# Patient Record
Sex: MALE | Race: BLACK OR AFRICAN AMERICAN | Employment: OTHER | URBAN - METROPOLITAN AREA
[De-identification: names, ages, dates, MRNs, and addresses within clinical notes are randomized per-mention and may not be internally consistent; named-entity substitution may affect disease eponyms.]

---

## 2017-01-26 ENCOUNTER — HOSPITAL ENCOUNTER (OUTPATIENT)
Dept: LAB | Age: 77
Discharge: HOME | End: 2017-01-26
Attending: PHYSICIAN ASSISTANT
Payer: MEDICARE

## 2017-01-26 LAB
ALBUMIN SERPL BCP-MCNC: 4.2 G/DL (ref 3.2–4.6)
ALBUMIN/GLOB SERPL: 1 {RATIO} (ref 1.2–3.5)
ALP SERPL-CCNC: 123 U/L (ref 50–136)
ALT SERPL-CCNC: 21 U/L (ref 12–65)
ANION GAP BLD CALC-SCNC: 9 MMOL/L (ref 7–16)
AST SERPL W P-5'-P-CCNC: 16 U/L (ref 15–37)
BASOPHILS # BLD AUTO: 0 K/UL (ref 0–0.2)
BASOPHILS # BLD: 0 % (ref 0–2)
BILIRUB SERPL-MCNC: 0.2 MG/DL (ref 0.2–1.1)
BUN SERPL-MCNC: 28 MG/DL (ref 8–23)
CALCIUM SERPL-MCNC: 9 MG/DL (ref 8.3–10.4)
CHLORIDE SERPL-SCNC: 102 MMOL/L (ref 98–107)
CHOLEST SERPL-MCNC: 173 MG/DL
CO2 SERPL-SCNC: 31 MMOL/L (ref 21–32)
CREAT SERPL-MCNC: 1.65 MG/DL (ref 0.8–1.5)
DIFFERENTIAL METHOD BLD: ABNORMAL
EOSINOPHIL # BLD: 0.3 K/UL (ref 0–0.8)
EOSINOPHIL NFR BLD: 4 % (ref 0.5–7.8)
ERYTHROCYTE [DISTWIDTH] IN BLOOD BY AUTOMATED COUNT: 14 % (ref 11.9–14.6)
GLOBULIN SER CALC-MCNC: 4.1 G/DL (ref 2.3–3.5)
GLUCOSE SERPL-MCNC: 168 MG/DL (ref 65–100)
HCT VFR BLD AUTO: 37.7 % (ref 41.1–50.3)
HDLC SERPL-MCNC: 49 MG/DL (ref 40–60)
HDLC SERPL: 3.5 {RATIO}
HGB BLD-MCNC: 11.8 G/DL (ref 13.6–17.2)
IMM GRANULOCYTES # BLD: 0 K/UL (ref 0–0.5)
IMM GRANULOCYTES NFR BLD AUTO: 0.2 % (ref 0–5)
LDLC SERPL CALC-MCNC: 100.4 MG/DL
LIPID PROFILE,FLP: ABNORMAL
LYMPHOCYTES # BLD AUTO: 45 % (ref 13–44)
LYMPHOCYTES # BLD: 2.7 K/UL (ref 0.5–4.6)
MCH RBC QN AUTO: 28.8 PG (ref 26.1–32.9)
MCHC RBC AUTO-ENTMCNC: 31.3 G/DL (ref 31.4–35)
MCV RBC AUTO: 92 FL (ref 79.6–97.8)
MONOCYTES # BLD: 0.6 K/UL (ref 0.1–1.3)
MONOCYTES NFR BLD AUTO: 10 % (ref 4–12)
NEUTS SEG # BLD: 2.4 K/UL (ref 1.7–8.2)
NEUTS SEG NFR BLD AUTO: 41 % (ref 43–78)
PLATELET # BLD AUTO: 211 K/UL (ref 150–450)
PMV BLD AUTO: 10.3 FL (ref 10.8–14.1)
POTASSIUM SERPL-SCNC: 4.4 MMOL/L (ref 3.5–5.1)
PROT SERPL-MCNC: 8.3 G/DL (ref 6.3–8.2)
PSA SERPL-MCNC: 1.2 NG/ML
RBC # BLD AUTO: 4.1 M/UL (ref 4.23–5.67)
SODIUM SERPL-SCNC: 142 MMOL/L (ref 136–145)
TRIGL SERPL-MCNC: 118 MG/DL (ref 35–150)
VLDLC SERPL CALC-MCNC: 23.6 MG/DL (ref 7–27)
WBC # BLD AUTO: 6 K/UL (ref 4.3–11.1)

## 2017-01-26 PROCEDURE — 80061 LIPID PANEL: CPT | Performed by: PHYSICIAN ASSISTANT

## 2017-01-26 PROCEDURE — 36415 COLL VENOUS BLD VENIPUNCTURE: CPT | Performed by: PHYSICIAN ASSISTANT

## 2017-01-26 PROCEDURE — 85025 COMPLETE CBC W/AUTO DIFF WBC: CPT | Performed by: PHYSICIAN ASSISTANT

## 2017-01-26 PROCEDURE — 80053 COMPREHEN METABOLIC PANEL: CPT | Performed by: PHYSICIAN ASSISTANT

## 2017-01-26 PROCEDURE — 84153 ASSAY OF PSA TOTAL: CPT | Performed by: PHYSICIAN ASSISTANT

## 2017-01-27 NOTE — PROGRESS NOTES
PSA is within normal range - no previous to use for comparison. Blood sugar levels are within a reasonable range for him. Kidney function is somewhat improved this time. Protein level is marginally high - need to discuss and monitor. Cholesterol is not to goal of < 70 - will need to consider change in treatment regimen to accomplish this given his multiple risk factors. Anemia is somewhat improved this time as well. Results will be discussed with patient during upcoming appt on 2/21/17.

## 2017-03-20 PROBLEM — E11.9 TYPE 2 DIABETES MELLITUS WITHOUT COMPLICATION, WITHOUT LONG-TERM CURRENT USE OF INSULIN (HCC): Status: ACTIVE | Noted: 2017-03-20

## 2017-03-20 PROBLEM — G47.30 SLEEP APNEA: Status: ACTIVE | Noted: 2017-03-20

## 2017-03-20 PROBLEM — I50.32 DIASTOLIC CHF, CHRONIC (HCC): Status: ACTIVE | Noted: 2017-03-20

## 2017-03-24 ENCOUNTER — TELEPHONE (OUTPATIENT)
Dept: CARDIAC REHAB | Age: 77
End: 2017-03-24

## 2017-03-24 NOTE — TELEPHONE ENCOUNTER
For cardiac rehab referral for CHF, we need to know:   1) Is it chronic? 2) EF = ?  (must be 35% or less)  3) NYHA class  = ? (must be 2, 3, or 4)    If you could please, send the above information for this patient. Thank you for you assistance.   Edith Kennedy

## 2017-06-12 ENCOUNTER — HOSPITAL ENCOUNTER (EMERGENCY)
Age: 77
Discharge: HOME | End: 2017-06-12
Attending: EMERGENCY MEDICINE
Payer: MEDICARE

## 2017-06-12 ENCOUNTER — APPOINTMENT (OUTPATIENT)
Dept: GENERAL RADIOLOGY | Age: 77
End: 2017-06-12
Attending: EMERGENCY MEDICINE
Payer: MEDICARE

## 2017-06-12 VITALS
RESPIRATION RATE: 19 BRPM | TEMPERATURE: 97.3 F | OXYGEN SATURATION: 97 % | HEART RATE: 73 BPM | HEIGHT: 67 IN | BODY MASS INDEX: 43.16 KG/M2 | DIASTOLIC BLOOD PRESSURE: 61 MMHG | WEIGHT: 275 LBS | SYSTOLIC BLOOD PRESSURE: 117 MMHG

## 2017-06-12 DIAGNOSIS — K29.00 ACUTE GASTRITIS WITHOUT HEMORRHAGE, UNSPECIFIED GASTRITIS TYPE: Primary | ICD-10-CM

## 2017-06-12 DIAGNOSIS — K59.00 CONSTIPATION, UNSPECIFIED CONSTIPATION TYPE: ICD-10-CM

## 2017-06-12 LAB
ALBUMIN SERPL BCP-MCNC: 3.9 G/DL (ref 3.2–4.6)
ALBUMIN/GLOB SERPL: 0.8 {RATIO} (ref 1.2–3.5)
ALP SERPL-CCNC: 115 U/L (ref 50–136)
ALT SERPL-CCNC: 26 U/L (ref 12–65)
ANION GAP BLD CALC-SCNC: 6 MMOL/L (ref 7–16)
AST SERPL W P-5'-P-CCNC: 39 U/L (ref 15–37)
ATRIAL RATE: 133 BPM
BASOPHILS # BLD AUTO: 0 K/UL (ref 0–0.2)
BASOPHILS # BLD: 0 % (ref 0–2)
BILIRUB SERPL-MCNC: 0.5 MG/DL (ref 0.2–1.1)
BNP SERPL-MCNC: 4 PG/ML
BUN SERPL-MCNC: 28 MG/DL (ref 8–23)
CALCIUM SERPL-MCNC: 8.7 MG/DL (ref 8.3–10.4)
CALCULATED R AXIS, ECG10: -25 DEGREES
CALCULATED T AXIS, ECG11: -10 DEGREES
CHLORIDE SERPL-SCNC: 100 MMOL/L (ref 98–107)
CO2 SERPL-SCNC: 30 MMOL/L (ref 21–32)
CREAT SERPL-MCNC: 1.71 MG/DL (ref 0.8–1.5)
DIAGNOSIS, 93000: NORMAL
DIFFERENTIAL METHOD BLD: ABNORMAL
EOSINOPHIL # BLD: 0.2 K/UL (ref 0–0.8)
EOSINOPHIL NFR BLD: 2 % (ref 0.5–7.8)
ERYTHROCYTE [DISTWIDTH] IN BLOOD BY AUTOMATED COUNT: 15.8 % (ref 11.9–14.6)
GLOBULIN SER CALC-MCNC: 4.7 G/DL (ref 2.3–3.5)
GLUCOSE SERPL-MCNC: 119 MG/DL (ref 65–100)
HCT VFR BLD AUTO: 36.9 % (ref 41.1–50.3)
HGB BLD-MCNC: 12 G/DL (ref 13.6–17.2)
IMM GRANULOCYTES # BLD: 0 K/UL (ref 0–0.5)
IMM GRANULOCYTES NFR BLD AUTO: 0.4 % (ref 0–5)
LIPASE SERPL-CCNC: 110 U/L (ref 73–393)
LYMPHOCYTES # BLD AUTO: 38 % (ref 13–44)
LYMPHOCYTES # BLD: 3.1 K/UL (ref 0.5–4.6)
MCH RBC QN AUTO: 29.1 PG (ref 26.1–32.9)
MCHC RBC AUTO-ENTMCNC: 32.5 G/DL (ref 31.4–35)
MCV RBC AUTO: 89.6 FL (ref 79.6–97.8)
MONOCYTES # BLD: 0.7 K/UL (ref 0.1–1.3)
MONOCYTES NFR BLD AUTO: 9 % (ref 4–12)
NEUTS SEG # BLD: 4.2 K/UL (ref 1.7–8.2)
NEUTS SEG NFR BLD AUTO: 51 % (ref 43–78)
PLATELET # BLD AUTO: 269 K/UL (ref 150–450)
PMV BLD AUTO: 11.6 FL (ref 10.8–14.1)
POTASSIUM SERPL-SCNC: 5.6 MMOL/L (ref 3.5–5.1)
PROT SERPL-MCNC: 8.6 G/DL (ref 6.3–8.2)
Q-T INTERVAL, ECG07: 296 MS
QRS DURATION, ECG06: 86 MS
QTC CALCULATION (BEZET), ECG08: 358 MS
RBC # BLD AUTO: 4.12 M/UL (ref 4.23–5.67)
SODIUM SERPL-SCNC: 136 MMOL/L (ref 136–145)
TROPONIN I BLD-MCNC: 0 NG/ML (ref 0–0.08)
VENTRICULAR RATE, ECG03: 88 BPM
WBC # BLD AUTO: 8.2 K/UL (ref 4.3–11.1)

## 2017-06-12 PROCEDURE — 83880 ASSAY OF NATRIURETIC PEPTIDE: CPT | Performed by: EMERGENCY MEDICINE

## 2017-06-12 PROCEDURE — 85025 COMPLETE CBC W/AUTO DIFF WBC: CPT | Performed by: EMERGENCY MEDICINE

## 2017-06-12 PROCEDURE — 99285 EMERGENCY DEPT VISIT HI MDM: CPT | Performed by: EMERGENCY MEDICINE

## 2017-06-12 PROCEDURE — 80053 COMPREHEN METABOLIC PANEL: CPT | Performed by: EMERGENCY MEDICINE

## 2017-06-12 PROCEDURE — 71010 XR CHEST PORT: CPT

## 2017-06-12 PROCEDURE — 74011250637 HC RX REV CODE- 250/637: Performed by: EMERGENCY MEDICINE

## 2017-06-12 PROCEDURE — 84484 ASSAY OF TROPONIN QUANT: CPT

## 2017-06-12 PROCEDURE — 83690 ASSAY OF LIPASE: CPT | Performed by: EMERGENCY MEDICINE

## 2017-06-12 PROCEDURE — 93005 ELECTROCARDIOGRAM TRACING: CPT | Performed by: EMERGENCY MEDICINE

## 2017-06-12 RX ORDER — PANTOPRAZOLE SODIUM 40 MG/1
40 TABLET, DELAYED RELEASE ORAL DAILY
Qty: 20 TAB | Refills: 0 | Status: SHIPPED | OUTPATIENT
Start: 2017-06-12 | End: 2017-07-02

## 2017-06-12 RX ORDER — PANTOPRAZOLE SODIUM 40 MG/1
40 TABLET, DELAYED RELEASE ORAL
Status: COMPLETED | OUTPATIENT
Start: 2017-06-12 | End: 2017-06-12

## 2017-06-12 RX ORDER — GUAIFENESIN 100 MG/5ML
324 LIQUID (ML) ORAL
Status: COMPLETED | OUTPATIENT
Start: 2017-06-12 | End: 2017-06-12

## 2017-06-12 RX ADMIN — PANTOPRAZOLE SODIUM 40 MG: 40 TABLET, DELAYED RELEASE ORAL at 13:52

## 2017-06-12 RX ADMIN — ASPIRIN 81 MG 324 MG: 81 TABLET ORAL at 13:43

## 2017-06-12 NOTE — ED TRIAGE NOTES
Patient complains of abdominal distention for 1 month, patient feels like his esophagus is narrowing. Patient complains of reflux.

## 2017-06-12 NOTE — ED PROVIDER NOTES
HPI Comments: Patient with history of GERD and constipation. Also with diabetes congestive heart failure. Presents with epigastric pain and burning in nature radiating up into his chest that started this morning and lasted for about 30 minutes. Some nausea and diaphoresis with it. Patient feels better now. Patient is a 78 y.o. male presenting with abdominal pain. The history is provided by the patient. No  was used. Abdominal Pain    This is a new problem. The current episode started 1 to 2 hours ago. The problem has been resolved. The pain is associated with an unknown factor. The pain is located in the epigastric region. The quality of the pain is burning. The pain is moderate. Associated symptoms include nausea. Pertinent negatives include no fever, no diarrhea, no melena, no vomiting, no constipation, no dysuria, no hematuria, no headaches, no chest pain and no back pain. Nothing worsens the pain. The pain is relieved by nothing. His past medical history is significant for GERD and DM.         Past Medical History:   Diagnosis Date    Arthritis     Asthma      prn inhalers    Cervical stenosis of spine     Chronic pain     Constipation     Degenerative disc disease, lumbar     Diabetes mellitus type II     uncontrolled-insulin and oral med. highest ;lowest 57; this am 110    Diverticulosis of colon June 2010    GERD (gastroesophageal reflux disease)     Heart failure (HCC)     Hx of colonic polyps     Hyperlipidemia LDL goal < 70     Hypertension x 30 yrs    some what controlled with meds    Obstructive sleep apnea     Non-Compliant with CPAP    Other ill-defined conditions     obese    Other ill-defined conditions     spur on spine    Pulmonary embolism ([address] Mar 2014    Bilateral - Completed 6 months on Xarelto    Shortness of breath dyspnea     Thromboembolus ([address]     right leg    Thyroid disease     Type 2 diabetes, uncontrolled, with neuropathy ([address] 1/22/2015    Unspecified sleep apnea        Past Surgical History:   Procedure Laterality Date    COLONOSCOPY  7/29/2010    Diverticulosis & Colon/Rectal Polyps    EGD  5/9/2011         HX HERNIA REPAIR      bilateral    HX KNEE ARTHROSCOPY      1991    HX KNEE REPLACEMENT  2006    right    HX ORTHOPAEDIC      bunionectomy    SINUS SURGERY PROC UNLISTED      1973-74    TOTAL KNEE ARTHROPLASTY Right     2006         Family History:   Problem Relation Age of Onset   [facility] Stroke Mother     Diabetes Mother     Heart Disease Mother     Diabetes Maternal Grandmother     Stroke Father     Diabetes Father     Diabetes Paternal Aunt     Cancer Paternal Aunt     Diabetes Paternal Uncle     Cancer Paternal Uncle        Social History     Social History    Marital status: SINGLE     Spouse name: N/A    Number of children: N/A    Years of education: N/A     Occupational History    Not on file. Social History Main Topics    Smoking status: Never Smoker    Smokeless tobacco: Never Used    Alcohol use No    Drug use: No    Sexual activity: Not on file     Other Topics Concern    Not on file     Social History Narrative         ALLERGIES: Enalaprilat and Vasotec [enalapril maleate]    Review of Systems   Constitutional: Negative for chills and fever. HENT: Negative for rhinorrhea and sore throat. Eyes: Negative for pain and redness. Respiratory: Negative for chest tightness, shortness of breath and wheezing. Cardiovascular: Negative for chest pain and leg swelling. Gastrointestinal: Positive for abdominal pain and nausea. Negative for constipation, diarrhea, melena and vomiting. Genitourinary: Negative for dysuria and hematuria. Musculoskeletal: Negative for back pain, gait problem, neck pain and neck stiffness. Skin: Negative for color change and rash. Neurological: Negative for weakness, numbness and headaches.        Vitals:    06/12/17 1238   BP: 165/49   Resp: 18   Temp: 97.3 °F (36.3 °C)   SpO2: 99%   Weight: 124.7 kg (275 lb)   Height: 5' 7\" (1.702 m)            Physical Exam   Constitutional: He is oriented to person, place, and time. He appears well-developed and well-nourished. No distress. HENT:   Head: Normocephalic and atraumatic. Neck: Normal range of motion. Neck supple. Cardiovascular: Normal rate and regular rhythm. No murmur heard. Pulmonary/Chest: Effort normal and breath sounds normal. He has no wheezes. Abdominal: Soft. Bowel sounds are normal. There is tenderness (epigastric). There is no rebound and no guarding. Musculoskeletal: Normal range of motion. He exhibits no edema. Neurological: He is alert and oriented to person, place, and time. Skin: Skin is warm and dry. Nursing note and vitals reviewed. MDM  Number of Diagnoses or Management Options  Diagnosis management comments: Pt feels better wants to eat. Will discharge with GERD medicine and he is going to take mag citrate he has at home for constipation. Amount and/or Complexity of Data Reviewed  Clinical lab tests: ordered and reviewed  Tests in the radiology section of CPT®: ordered and reviewed  Tests in the medicine section of CPT®: ordered and reviewed    Patient Progress  Patient progress: stable    ED Course       Procedures      EKG: normal sinus rhythm, nonspecific ST and T waves changes. Rate 88. Bigeminy. XR CHEST PORT (Final result) Result time: 06/12/17 13:14:24     Final result by Faith Manzo MD (06/12/17 13:14:24)     Impression:     IMPRESSION: No acute disease.       Narrative:     Chest portable    CLINICAL INDICATION: Acute moderate dyspnea    COMPARISON: 8/24/2016    TECHNIQUE: single AP portable view chest at 12:48 PM upright     FINDINGS: Domitila Wheat is no evidence of consolidation, pneumothorax, pleural effusion  or pulmonary edema. The mediastinal and hilar contours are not significantly  changed, given technique.  There is some artifact overlying the lung bases due to  external skin folds and monitoring wires. The patient is slightly rotated to the  left. Results Include:    Recent Results (from the past 24 hour(s))   EKG, 12 LEAD, INITIAL    Collection Time: 06/12/17 12:48 PM   Result Value Ref Range    Ventricular Rate 88 BPM    Atrial Rate 133 BPM    QRS Duration 86 ms    Q-T Interval 296 ms    QTC Calculation (Bezet) 358 ms    Calculated R Axis -25 degrees    Calculated T Axis -10 degrees    Diagnosis       !! AGE AND GENDER SPECIFIC ECG ANALYSIS !! Atrial fibrillation with a competing junctional pacemaker  Nonspecific ST and T wave abnormality , probably digitalis effect  Abnormal ECG  When compared with ECG of 24-AUG-2016 00:31,  Atrial fibrillation has replaced Sinus rhythm  Vent. rate has increased BY  30 BPM     CBC WITH AUTOMATED DIFF    Collection Time: 06/12/17 12:59 PM   Result Value Ref Range    WBC 8.2 4.3 - 11.1 K/uL    RBC 4.12 (L) 4.23 - 5.67 M/uL    HGB 12.0 (L) 13.6 - 17.2 g/dL    HCT 36.9 (L) 41.1 - 50.3 %    MCV 89.6 79.6 - 97.8 FL    MCH 29.1 26.1 - 32.9 PG    MCHC 32.5 31.4 - 35.0 g/dL    RDW 15.8 (H) 11.9 - 14.6 %    PLATELET 602 590 - 585 K/uL    MPV 11.6 10.8 - 14.1 FL    DF AUTOMATED      NEUTROPHILS 51 43 - 78 %    LYMPHOCYTES 38 13 - 44 %    MONOCYTES 9 4.0 - 12.0 %    EOSINOPHILS 2 0.5 - 7.8 %    BASOPHILS 0 0.0 - 2.0 %    IMMATURE GRANULOCYTES 0.4 0.0 - 5.0 %    ABS. NEUTROPHILS 4.2 1.7 - 8.2 K/UL    ABS. LYMPHOCYTES 3.1 0.5 - 4.6 K/UL    ABS. MONOCYTES 0.7 0.1 - 1.3 K/UL    ABS. EOSINOPHILS 0.2 0.0 - 0.8 K/UL    ABS. BASOPHILS 0.0 0.0 - 0.2 K/UL    ABS. IMM.  GRANS. 0.0 0.0 - 0.5 K/UL   METABOLIC PANEL, COMPREHENSIVE    Collection Time: 06/12/17 12:59 PM   Result Value Ref Range    Sodium 136 136 - 145 mmol/L    Potassium 5.6 (H) 3.5 - 5.1 mmol/L    Chloride 100 98 - 107 mmol/L    CO2 30 21 - 32 mmol/L    Anion gap 6 (L) 7 - 16 mmol/L    Glucose 119 (H) 65 - 100 mg/dL    BUN 28 (H) 8 - 23 MG/DL    Creatinine 1.71 (H) 0.8 - 1.5 MG/DL    GFR est AA 51 (L) >60 ml/min/1.73m2    GFR est non-AA 42 (L) >60 ml/min/1.73m2    Calcium 8.7 8.3 - 10.4 MG/DL    Bilirubin, total 0.5 0.2 - 1.1 MG/DL    ALT (SGPT) 26 12 - 65 U/L    AST (SGOT) 39 (H) 15 - 37 U/L    Alk.  phosphatase 115 50 - 136 U/L    Protein, total 8.6 (H) 6.3 - 8.2 g/dL    Albumin 3.9 3.2 - 4.6 g/dL    Globulin 4.7 (H) 2.3 - 3.5 g/dL    A-G Ratio 0.8 (L) 1.2 - 3.5     BNP    Collection Time: 06/12/17 12:59 PM   Result Value Ref Range    BNP 4 pg/mL   POC TROPONIN-I    Collection Time: 06/12/17  1:01 PM   Result Value Ref Range    Troponin-I (POC) 0 0.0 - 0.08 ng/ml   LIPASE    Collection Time: 06/12/17  1:06 PM   Result Value Ref Range    Lipase 110 73 - 393 U/L

## 2017-06-12 NOTE — DISCHARGE INSTRUCTIONS
Constipation: Care Instructions  Your Care Instructions  Constipation means that you have a hard time passing stools (bowel movements). People pass stools from 3 times a day to once every 3 days. What is normal for you may be different. Constipation may occur with pain in the rectum and cramping. The pain may get worse when you try to pass stools. Sometimes there are small amounts of bright red blood on toilet paper or the surface of stools. This is because of enlarged veins near the rectum (hemorrhoids). A few changes in your diet and lifestyle may help you avoid ongoing constipation. Your doctor may also prescribe medicine to help loosen your stool. Some medicines can cause constipation. These include pain medicines and antidepressants. Tell your doctor about all the medicines you take. Your doctor may want to make a medicine change to ease your symptoms. Follow-up care is a key part of your treatment and safety. Be sure to make and go to all appointments, and call your doctor if you are having problems. It's also a good idea to know your test results and keep a list of the medicines you take. How can you care for yourself at home? · Drink plenty of fluids, enough so that your urine is light yellow or clear like water. If you have kidney, heart, or liver disease and have to limit fluids, talk with your doctor before you increase the amount of fluids you drink. · Include high-fiber foods in your diet each day. These include fruits, vegetables, beans, and whole grains. · Get at least 30 minutes of exercise on most days of the week. Walking is a good choice. You also may want to do other activities, such as running, swimming, cycling, or playing tennis or team sports. · Take a fiber supplement, such as Citrucel or Metamucil, every day. Read and follow all instructions on the label. · Schedule time each day for a bowel movement. A daily routine may help.  Take your time having your bowel movement. · Support your feet with a small step stool when you sit on the toilet. This helps flex your hips and places your pelvis in a squatting position. · Your doctor may recommend an over-the-counter laxative to relieve your constipation. Examples are Milk of Magnesia and MiraLax. Read and follow all instructions on the label. Do not use laxatives on a long-term basis. When should you call for help? Call your doctor now or seek immediate medical care if:  · You have new or worse belly pain. · You have new or worse nausea or vomiting. · You have blood in your stools. Watch closely for changes in your health, and be sure to contact your doctor if:  · Your constipation is getting worse. · You do not get better as expected. Where can you learn more? Go to http://jono-zuleima.info/. Enter 21 [phone] in the search box to learn more about \"Constipation: Care Instructions. \"  Current as of: May 27, 2016  Content Version: 11.2  © 2367-7158 [employer]. Care instructions adapted under license by [employer] (which disclaims liability or warranty for this information). If you have questions about a medical condition or this instruction, always ask your healthcare professional. [place] [address] any warranty or liability for your use of this information. Gastritis: Care Instructions  Your Care Instructions    Gastritis is a sore and upset stomach. It happens when something irritates the stomach lining. Many things can cause it. These include an infection such as the flu or something you ate or drank. Medicines or a sore on the lining of the stomach (ulcer) also can cause it. Your belly may bloat and ache. You may belch, vomit, and feel sick to your stomach. You should be able to relieve the problem by taking medicine. And it may help to change your diet. If gastritis lasts, your doctor may prescribe medicine.   Follow-up care is a key part of your treatment and safety. Be sure to make and go to all appointments, and call your doctor if you are having problems. It's also a good idea to know your test results and keep a list of the medicines you take. How can you care for yourself at home? · If your doctor prescribed antibiotics, take them as directed. Do not stop taking them just because you feel better. You need to take the full course of antibiotics. · Be safe with medicines. If your doctor prescribed medicine to decrease stomach acid, take it as directed. Call your doctor if you think you are having a problem with your medicine. · Do not take any other medicine, including over-the-counter pain relievers, without talking to your doctor first.  · If your doctor recommends over-the-counter medicine to reduce stomach acid, such as Pepcid AC, Prilosec, Tagamet HB, or Zantac 75, follow the directions on the label. · Drink plenty of fluids (enough so that your urine is light yellow or clear like water) to prevent dehydration. Choose water and other caffeine-free clear liquids. If you have kidney, heart, or liver disease and have to limit fluids, talk with your doctor before you increase the amount of fluids you drink. · Limit how much alcohol you drink. · Avoid coffee, tea, cola drinks, chocolate, and other foods with caffeine. They increase stomach acid. When should you call for help? Call 911 anytime you think you may need emergency care. For example, call if:  · You vomit blood or what looks like coffee grounds. · You pass maroon or very bloody stools. Call your doctor now or seek immediate medical care if:  · You start breathing fast and have not produced urine in the last 8 hours. · You cannot keep fluids down. Watch closely for changes in your health, and be sure to contact your doctor if:  · You do not get better as expected. Where can you learn more? Go to http://jono-zuleima.info/.   Enter [id number] in the search box to learn more about \"Gastritis: Care Instructions. \"  Current as of: August 9, 2016  Content Version: 11.2  © 2629-0423 [employer]. Care instructions adapted under license by [employer] (which disclaims liability or warranty for this information). If you have questions about a medical condition or this instruction, always ask your healthcare professional. [address] any warranty or liability for your use of this information.

## 2017-06-12 NOTE — ED NOTES
Pt given 1 dose of SL nitro. Pt c/o increased pain to head. Pt educated on Nitro with increased blood flow to coronary arteries and to head. Pt refused any more Nitro despite Carmine Xiomara. Pt said it is not helping his chest pain and headache. Dr. Deanne Kong notified. Orders for Morphine 4mg.

## 2017-06-13 ENCOUNTER — PATIENT OUTREACH (OUTPATIENT)
Dept: CASE MANAGEMENT | Age: 77
End: 2017-06-13

## 2017-06-13 NOTE — PROGRESS NOTES
This note will not be viewable in [address] [facility] OUTREACH #1  Initial outreach attempt unsuccessful. Left message for call back. Will attempt 2nd outreach within 24 hrs.

## 2017-06-14 ENCOUNTER — PATIENT OUTREACH (OUTPATIENT)
Dept: CASE MANAGEMENT | Age: 77
End: 2017-06-14

## 2017-06-14 NOTE — PROGRESS NOTES
After a long discussion with . Jocelyn Owen, it appears he has no transportation for the appointment with his PCP tomorrow morning and his usual transportation is his sister which she is not available to take him until Thursday this week. He is still having so many symptoms that he is afraid he will end up back in the emergency room. He states he cannot afford to pay anyone to take him so after I spoke with my director, we have decided to use [employer] to pick him up and take him back home so we can be sure he is getting a hospital follow up since he is still having ongoing problems. He agreed to this plan and I contacted Vern at [facility] and he agreed to pick him up and did confirm this with the patient as well. Our cost round trip is $30.00 and this will be billed over to our department and approved by [facility]. This note will not be viewable in [address]

## 2017-06-14 NOTE — PROGRESS NOTES
[facility] follow up with Mr. Jocelyn Owen following a recent ED visit for epigastric pain. He reports he is still having a good bit of epigastric pain though he says he has had the medication filled that the ED provided, he does not notice that it's helping. He says he is a little short of breath today. He reports he has not made an appointment with his PCP, has one for next week for an annual wellness check but feels he needs to be seen this week. He asked me to call Dr. Chris Whitfield office to see if I could get him one this week. He has a sister that sometimes takes him to his appointments, but she is not able to do this until Thursday which is the only day he has transportation. I did call Dr. Chris Whtifield office and spoke with [facility] at reception and she indicated they did have an opening tomorrow morning at 8:40a.m with Dr. Colin Donahue, this is the only available appointment for this week. I had her to schedule this and I called Mr. Jocelyn Owen back and he says the only way he could go would be if I could get transportation for him. He says he cannot afford to pay anyone, he says he uses a cane and can walk but has not driven in many years. I will see if I can get transportation arranged so that he can go to the doctor tomorrow. This note will not be viewable in [address]

## 2017-06-14 NOTE — PROGRESS NOTES
This note will not be viewable in [address] [facility] OUTREACH #2  Left message to call back. 2nd outreach attempt unsuccessful. Will schedule 3rd outreach attempt within 5 business days.

## 2017-06-23 ENCOUNTER — HOSPITAL ENCOUNTER (OUTPATIENT)
Dept: LAB | Age: 77
Discharge: HOME | End: 2017-06-23
Attending: PHYSICIAN ASSISTANT
Payer: MEDICARE

## 2017-06-23 LAB
ALBUMIN SERPL BCP-MCNC: 3.5 G/DL (ref 3.2–4.6)
ALBUMIN/GLOB SERPL: 0.8 {RATIO} (ref 1.2–3.5)
ALP SERPL-CCNC: 109 U/L (ref 50–136)
ALT SERPL-CCNC: 25 U/L (ref 12–65)
ANION GAP BLD CALC-SCNC: 8 MMOL/L (ref 7–16)
AST SERPL W P-5'-P-CCNC: 18 U/L (ref 15–37)
BILIRUB SERPL-MCNC: 0.3 MG/DL (ref 0.2–1.1)
BUN SERPL-MCNC: 22 MG/DL (ref 8–23)
CALCIUM SERPL-MCNC: 9.1 MG/DL (ref 8.3–10.4)
CHLORIDE SERPL-SCNC: 104 MMOL/L (ref 98–107)
CHOLEST SERPL-MCNC: 143 MG/DL
CO2 SERPL-SCNC: 30 MMOL/L (ref 21–32)
CREAT SERPL-MCNC: 1.56 MG/DL (ref 0.8–1.5)
GLOBULIN SER CALC-MCNC: 4.3 G/DL (ref 2.3–3.5)
GLUCOSE SERPL-MCNC: 72 MG/DL (ref 65–100)
HDLC SERPL-MCNC: 55 MG/DL (ref 40–60)
HDLC SERPL: 2.6 {RATIO}
LDLC SERPL CALC-MCNC: 73.8 MG/DL
LIPID PROFILE,FLP: NORMAL
POTASSIUM SERPL-SCNC: 5 MMOL/L (ref 3.5–5.1)
PROT SERPL-MCNC: 7.8 G/DL (ref 6.3–8.2)
SODIUM SERPL-SCNC: 142 MMOL/L (ref 136–145)
TRIGL SERPL-MCNC: 71 MG/DL (ref 35–150)
VLDLC SERPL CALC-MCNC: 14.2 MG/DL (ref 6–23)

## 2017-06-23 PROCEDURE — 80053 COMPREHEN METABOLIC PANEL: CPT | Performed by: PHYSICIAN ASSISTANT

## 2017-06-23 PROCEDURE — 36415 COLL VENOUS BLD VENIPUNCTURE: CPT | Performed by: PHYSICIAN ASSISTANT

## 2017-06-23 PROCEDURE — 80061 LIPID PANEL: CPT | Performed by: PHYSICIAN ASSISTANT

## 2017-06-26 NOTE — PROGRESS NOTES
Blood sugar on the low side this time. Kidney function is stable - mildly impaired. Liver function is normal.  Cholesterol is reasonably well controlled with current treatment plan. Results will be discussed with patient during upcoming appt.

## 2017-07-11 ENCOUNTER — PATIENT OUTREACH (OUTPATIENT)
Dept: CASE MANAGEMENT | Age: 77
End: 2017-07-11

## 2017-07-11 ENCOUNTER — HOSPITAL ENCOUNTER (OUTPATIENT)
Dept: LAB | Age: 77
Discharge: HOME | End: 2017-07-11
Attending: PHYSICIAN ASSISTANT
Payer: MEDICARE

## 2017-07-11 LAB
ALBUMIN SERPL BCP-MCNC: 3.6 G/DL (ref 3.2–4.6)
ALBUMIN/GLOB SERPL: 0.9 {RATIO} (ref 1.2–3.5)
ALP SERPL-CCNC: 103 U/L (ref 50–136)
ALT SERPL-CCNC: 20 U/L (ref 12–65)
ANION GAP BLD CALC-SCNC: 11 MMOL/L (ref 7–16)
AST SERPL W P-5'-P-CCNC: 15 U/L (ref 15–37)
BILIRUB SERPL-MCNC: 0.2 MG/DL (ref 0.2–1.1)
BUN SERPL-MCNC: 26 MG/DL (ref 8–23)
CALCIUM SERPL-MCNC: 8.6 MG/DL (ref 8.3–10.4)
CHLORIDE SERPL-SCNC: 104 MMOL/L (ref 98–107)
CHOLEST SERPL-MCNC: 132 MG/DL
CO2 SERPL-SCNC: 28 MMOL/L (ref 21–32)
CREAT SERPL-MCNC: 1.55 MG/DL (ref 0.8–1.5)
GLOBULIN SER CALC-MCNC: 4.1 G/DL (ref 2.3–3.5)
GLUCOSE SERPL-MCNC: 115 MG/DL (ref 65–100)
HDLC SERPL-MCNC: 43 MG/DL (ref 40–60)
HDLC SERPL: 3.1 {RATIO}
LDLC SERPL CALC-MCNC: 74.6 MG/DL
LIPID PROFILE,FLP: NORMAL
POTASSIUM SERPL-SCNC: 4.1 MMOL/L (ref 3.5–5.1)
PROT SERPL-MCNC: 7.7 G/DL (ref 6.3–8.2)
SODIUM SERPL-SCNC: 143 MMOL/L (ref 136–145)
TRIGL SERPL-MCNC: 72 MG/DL (ref 35–150)
VLDLC SERPL CALC-MCNC: 14.4 MG/DL (ref 6–23)

## 2017-07-11 PROCEDURE — 36415 COLL VENOUS BLD VENIPUNCTURE: CPT | Performed by: PHYSICIAN ASSISTANT

## 2017-07-11 PROCEDURE — 80061 LIPID PANEL: CPT | Performed by: PHYSICIAN ASSISTANT

## 2017-07-11 PROCEDURE — 80053 COMPREHEN METABOLIC PANEL: CPT | Performed by: PHYSICIAN ASSISTANT

## 2017-07-11 NOTE — PROGRESS NOTES
[facility] follow up call to Mr. Xuan Oropeza, no answer, left a message. May consider referral over to [facility] for diabetes education due to elevated A1C. This note will not be viewable in [address]

## 2017-07-11 NOTE — PROGRESS NOTES
Unsure why he had labs done today? He was just here for his diabetes f/u and had labs done prior to that visit. His insurance is unlikely to pay for the labs drawn today since they were just done. The labs I ordered are not due until Sept prior to his next office visit.

## 2017-07-12 NOTE — PROGRESS NOTES
Pt notified and verbalized understanding. He states he may have looked at it wrong and states he has had some issues with remembering things. He ask that we send him a new set of labs and highlight when they should be done.

## 2017-07-18 PROBLEM — E11.9 TYPE 2 DIABETES MELLITUS WITHOUT COMPLICATION, WITHOUT LONG-TERM CURRENT USE OF INSULIN (HCC): Status: RESOLVED | Noted: 2017-03-20 | Resolved: 2017-07-18

## 2017-07-18 PROBLEM — G47.30 SLEEP APNEA: Status: RESOLVED | Noted: 2017-03-20 | Resolved: 2017-07-18

## 2017-07-18 PROBLEM — Z98.890 H/O COLONOSCOPY: Chronic | Status: ACTIVE | Noted: 2017-07-18

## 2017-07-18 PROBLEM — E11.40 DIABETIC NEUROPATHY ASSOCIATED WITH TYPE 2 DIABETES MELLITUS (HCC): Chronic | Status: ACTIVE | Noted: 2017-07-18

## 2017-07-18 PROBLEM — I50.32 DIASTOLIC CHF, CHRONIC (HCC): Chronic | Status: ACTIVE | Noted: 2017-03-20

## 2017-07-21 ENCOUNTER — HOSPITAL ENCOUNTER (OUTPATIENT)
Dept: ULTRASOUND IMAGING | Age: 77
Discharge: HOME | End: 2017-07-21
Attending: FAMILY MEDICINE
Payer: MEDICARE

## 2017-07-21 ENCOUNTER — HOSPITAL ENCOUNTER (OUTPATIENT)
Dept: MRI IMAGING | Age: 77
Discharge: HOME | End: 2017-07-21
Attending: FAMILY MEDICINE
Payer: MEDICARE

## 2017-07-21 DIAGNOSIS — G45.8 OTHER SPECIFIED TRANSIENT CEREBRAL ISCHEMIAS: ICD-10-CM

## 2017-07-21 PROCEDURE — 70551 MRI BRAIN STEM W/O DYE: CPT

## 2017-07-21 PROCEDURE — 93880 EXTRACRANIAL BILAT STUDY: CPT

## 2017-07-23 NOTE — PROGRESS NOTES
Please let patient know his brain studies show normal blood blood flow with no significant blockages in carotid or brain blood vessels. No evidence of an aneurysm and the MRI did not show any evidence of an old stroke. He did have evidence of Left maxillary sinusitis or a sinus infection and I have have sent in an antibiotic ( Augmentin ) to treat this so he can pick this up at his pharmacy.

## 2017-07-25 NOTE — PROGRESS NOTES
Pt advised and sts is there something else he can take that he is very agitated since his sister passed.

## 2017-08-07 ENCOUNTER — PATIENT OUTREACH (OUTPATIENT)
Dept: CASE MANAGEMENT | Age: 77
End: 2017-08-07

## 2017-08-07 NOTE — PROGRESS NOTES
[facility] - follow up call to Mr. Jewel Daniels to discuss his status with blood pressure and diabetes and he reports he has been doing ok, but still has some issues with high blood sugars. He does report some shortness of breath and wonders why Cardiology pushed his appointment from June back to September. I did tell him to call cardiology and let them know if he feels he needs to see them sooner. He states he will if he feels he has more of the shortness of breath or chest pain. He states he finished up Amoxicillan a couple days ago, he couldn't really tell me what this was given to him for, thinks maybe GI prescribed it. Also he was recently seen by sleep lab and stated he is supposed to have a follow up set with them again this month. Mostly he does have concern about his A1C being 10.8 and he is willing to let me have our diabetes educator contact him to schedule a home visit to discuss education. I will ask Kevin Braga, our diabetes educator on our team to contact him. This note will not be viewable in [address]

## 2017-08-11 ENCOUNTER — PATIENT OUTREACH (OUTPATIENT)
Dept: CASE MANAGEMENT | Age: 77
End: 2017-08-11

## 2017-08-14 ENCOUNTER — PATIENT OUTREACH (OUTPATIENT)
Dept: CASE MANAGEMENT | Age: 77
End: 2017-08-14

## 2017-08-14 NOTE — PROGRESS NOTES
Diabetes Education Initial Assessment    Date    8/11/17    Referral source? Referral reason?     DSME   Diabetes History  Type  Time since diagnosis  History of treatment  DSME in past?  Recent ED or IP related to DM   T2DM  1998  Several months no meds. Then Lantus insulin  Two years ago       Blood Glucose checks:  What is physician order? How often actually checked? When? Readings? Keep Log? Four times a day    Four time a day  Fasting, lunch, dinner, hs    yes   If not checking BG or not as often as ordered, What is the reason? Recognizes hypoglycemic/hyperglycemic symptoms? Comorbidities/Complications:         CHF, HTN, Pul HTN, MARLEEN, hyperlipidemia    Health Screening:  B/P date/result  B/P checked at home? A1C  date/result  Lipid panel date/result  Foot Exam date  Microalbumin date/result  Renal function date/results  Eye exam date   126/70 at office visit 8/2017    10.8 on 6/2017  132/74/43/72 on 7/2017 1/2017  0 on 6/2017  1.55/26/47 on 7/2017     Health Screening Needs:  A1C, Microalbumin, Metabolic Panel  Physician Foot Exam  Dilated eye exam  B/P home checks  Foot self-checks,  BG monitoring    Diabetes Medications:  Name, strength        Do you ever miss a dose? What prevents taking? Side effects? Empagliflozin  10mg/daily  Metformin 2000mg/daily  Insulin lispro 6u +SS before meals  Insulin degludec 100u/daily   Height/Weight/BMI  Any recent gain or loss? Weight change intentional?   57/290 lb/45   How often food consumed:  Number of meals/day  Snacks  Ever miss meals? 3 meals  Irregular with snacks  Occasionally   Describe typical meal:  Who does the cooking? Meal plan? Breakfast  Lunch  Dinner        Drink preference:  Sweet drinks  Diet drinks  Alcohol  Caffeine    Restaurant meals: How often? What is normally ordered?         Current physical activities:  Type  Duration  Frequency          Barriers to physical activity:  Fear of injury, no time, no energy, need instruction, no place to exercise      Stress:  Home, work, social  Severity 1-10  Measures taken to reduce stress? What concerns you most about diabetes? How concerned are you? 1-10      Where do you want your health to be in 10 years? What do you look forward to/is important for you to be able to do/go to? How important? 1-10    Current Diabetes Management:  Monitoring  Activity  Medication  Diet  Stress Reduction    Needed changes to achieve 10 year goal  Monitoring  Activity  Medication  Diet  Stress Reduction    DSME Plan:   Diabetes 1  General Information/disease process, Reducing Risk- complications,  sick-day management,   Diabetes 2  Monitoring, Being Active, Healthy Coping, Medications  Diabetes 3  Healthy Eating, Smart Goals  Goal Setting and Support  problem solving, psychological adjustment, behavior change    DE outreach completed by:   Michelle Gómez RN      First visit for DSME scheduled for 8/17 at 2 pm. DE will complete assessment at first visit.

## 2017-08-17 ENCOUNTER — PATIENT OUTREACH (OUTPATIENT)
Dept: CASE MANAGEMENT | Age: 77
End: 2017-08-17

## 2017-08-17 NOTE — PROGRESS NOTES
DSME visit at [facility] [place] [address] OF [facility] office. Mr. Lisa Kemp brought a list of his BG readings from 4/2017 to present. He also brought his diet diary from 4/2017 to present. This is reviewed with Mr. Татьяна Sánchez, discussing which foods were good choices and which could be changed for better choices. He is encouraged to eat every 3-4 hours a meal or snack. Each meal should have protein and carb. Avoid fruit juices. Walk daily. Inside during the summer months. Suggested [employer] on T/Th mornings. If he has dessert, have it with a meal.    Next meeting in two weeks to evaluate changes.

## 2017-08-24 PROBLEM — J45.20 MILD INTERMITTENT ASTHMA WITHOUT COMPLICATION: Status: ACTIVE | Noted: 2017-08-24

## 2017-08-30 ENCOUNTER — PATIENT OUTREACH (OUTPATIENT)
Dept: CASE MANAGEMENT | Age: 77
End: 2017-08-30

## 2017-08-30 NOTE — PROGRESS NOTES
DSME visit #2. Patient has many questions and requests a meal plan he can follow for three meals/day. Written plan given based on Mr. Nesha cardoso. All questions answered. Long discussion over adding protein to each meal/snack, eating every 3-4 hours, avoiding simple sugar foods, reducing salt and saturated fat. Next visit scheduled for one month to evaluate progress and answer additional questions.

## 2017-09-12 NOTE — PROGRESS NOTES
Pt was scheduled 9/12/17 and rescheduled for 11/10/17. Spoke with patient to express the importance of regular office visits for uncontrolled diabetes and to see if we could move the rescheduled appt sooner. Pt states he is unable to be seen anytime sooner than the date he has set. I stressed the importance of getting him in sooner and he stated nothing further needed to be explained to him and this was the only day he would be able to come in.

## 2017-09-12 NOTE — PROGRESS NOTES
We will just use these labs and get him back on schedule to have labs done just prior to appointment after this next follow-up.

## 2017-09-19 ENCOUNTER — HOSPITAL ENCOUNTER (OUTPATIENT)
Dept: LAB | Age: 77
Discharge: HOME | End: 2017-09-19
Attending: PHYSICIAN ASSISTANT
Payer: MEDICARE

## 2017-09-19 LAB
ALBUMIN SERPL-MCNC: 3.8 G/DL (ref 3.2–4.6)
ALBUMIN/GLOB SERPL: 1.1 {RATIO} (ref 1.2–3.5)
ALP SERPL-CCNC: 110 U/L (ref 50–136)
ALT SERPL-CCNC: 22 U/L (ref 12–65)
ANION GAP SERPL CALC-SCNC: 5 MMOL/L (ref 7–16)
AST SERPL-CCNC: 18 U/L (ref 15–37)
BILIRUB SERPL-MCNC: 0.3 MG/DL (ref 0.2–1.1)
BUN SERPL-MCNC: 28 MG/DL (ref 8–23)
CALCIUM SERPL-MCNC: 8.7 MG/DL (ref 8.3–10.4)
CHLORIDE SERPL-SCNC: 102 MMOL/L (ref 98–107)
CHOLEST SERPL-MCNC: 141 MG/DL
CO2 SERPL-SCNC: 31 MMOL/L (ref 21–32)
CREAT SERPL-MCNC: 1.76 MG/DL (ref 0.8–1.5)
GLOBULIN SER CALC-MCNC: 3.6 G/DL (ref 2.3–3.5)
GLUCOSE SERPL-MCNC: 92 MG/DL (ref 65–100)
HDLC SERPL-MCNC: 42 MG/DL (ref 40–60)
HDLC SERPL: 3.4 {RATIO}
LDLC SERPL CALC-MCNC: 79 MG/DL
LIPID PROFILE,FLP: NORMAL
POTASSIUM SERPL-SCNC: 4.8 MMOL/L (ref 3.5–5.1)
PROT SERPL-MCNC: 7.4 G/DL (ref 6.3–8.2)
SODIUM SERPL-SCNC: 138 MMOL/L (ref 136–145)
TRIGL SERPL-MCNC: 100 MG/DL (ref 35–150)
VLDLC SERPL CALC-MCNC: 20 MG/DL (ref 6–23)

## 2017-09-19 PROCEDURE — 80053 COMPREHEN METABOLIC PANEL: CPT | Performed by: PHYSICIAN ASSISTANT

## 2017-09-19 PROCEDURE — 80061 LIPID PANEL: CPT | Performed by: PHYSICIAN ASSISTANT

## 2017-09-19 PROCEDURE — 36415 COLL VENOUS BLD VENIPUNCTURE: CPT | Performed by: PHYSICIAN ASSISTANT

## 2017-09-19 NOTE — PROGRESS NOTES
Blood sugar is doing much better - at least according to these labs. Kidney function has worsened - if improvement not seen in this area we will have to consider discontinuing Metformin & Jardiance. Liver function is normal.  Cholesterol levels are stable but not to diabetic & CVA goal of LDL < 70 - need to consider adjustment in statin dose. Results will be discussed with patient during upcoming appt.

## 2017-09-22 ENCOUNTER — PATIENT OUTREACH (OUTPATIENT)
Dept: CASE MANAGEMENT | Age: 77
End: 2017-09-22

## 2017-09-27 ENCOUNTER — PATIENT OUTREACH (OUTPATIENT)
Dept: CASE MANAGEMENT | Age: 77
End: 2017-09-27

## 2017-09-28 ENCOUNTER — PATIENT OUTREACH (OUTPATIENT)
Dept: CASE MANAGEMENT | Age: 77
End: 2017-09-28

## 2017-09-28 NOTE — PROGRESS NOTES
DSME visit #3. Topics covered are a review for Mr. Gypsy Martínez: Monitoring BG, being active, healthy coping, medications. Mr. Gypsy Martínez has questions concerning diet. These were answered by DE. He is pleased with his decrease in A1c to 8.8%. He is encouraged to continue working to goal of <7%.     Next visit scheduled for Nov. 2.

## 2017-10-12 ENCOUNTER — PATIENT OUTREACH (OUTPATIENT)
Dept: CASE MANAGEMENT | Age: 77
End: 2017-10-12

## 2017-10-12 NOTE — PROGRESS NOTES
Incoming call from [facility] Esther Costello with questions concerning his diet. Answered all questions to patient's satisfaction.  Esther Trang reporting s/s of autonomic neuropathy.

## 2017-11-01 ENCOUNTER — PATIENT OUTREACH (OUTPATIENT)
Dept: CASE MANAGEMENT | Age: 77
End: 2017-11-01

## 2017-11-01 NOTE — PROGRESS NOTES
Call to Mr. Elvin Cuevas to verify DSME visit at [address] [place] [address] on Nov 2 at 2pm.   Mr. Elvin Cuevas plans to attent; his sister-in-law will transport.

## 2017-11-02 ENCOUNTER — APPOINTMENT (OUTPATIENT)
Dept: GENERAL RADIOLOGY | Age: 77
End: 2017-11-02
Attending: EMERGENCY MEDICINE
Payer: MEDICARE

## 2017-11-02 ENCOUNTER — HOSPITAL ENCOUNTER (EMERGENCY)
Age: 77
Discharge: HOME | End: 2017-11-02
Attending: EMERGENCY MEDICINE
Payer: MEDICARE

## 2017-11-02 VITALS
WEIGHT: 290 LBS | RESPIRATION RATE: 18 BRPM | BODY MASS INDEX: 45.52 KG/M2 | HEART RATE: 64 BPM | DIASTOLIC BLOOD PRESSURE: 74 MMHG | HEIGHT: 67 IN | SYSTOLIC BLOOD PRESSURE: 118 MMHG | OXYGEN SATURATION: 99 % | TEMPERATURE: 98.5 F

## 2017-11-02 DIAGNOSIS — K22.2 ESOPHAGEAL STRICTURE: Primary | ICD-10-CM

## 2017-11-02 DIAGNOSIS — K21.9 GASTROESOPHAGEAL REFLUX DISEASE WITHOUT ESOPHAGITIS: ICD-10-CM

## 2017-11-02 DIAGNOSIS — K21.9 GASTROESOPHAGEAL REFLUX DISEASE, ESOPHAGITIS PRESENCE NOT SPECIFIED: ICD-10-CM

## 2017-11-02 LAB
ANION GAP SERPL CALC-SCNC: 12 MMOL/L (ref 7–16)
BASOPHILS # BLD: 0 K/UL (ref 0–0.2)
BASOPHILS NFR BLD: 0 % (ref 0–2)
BUN SERPL-MCNC: 34 MG/DL (ref 8–23)
CALCIUM SERPL-MCNC: 9.3 MG/DL (ref 8.3–10.4)
CHLORIDE SERPL-SCNC: 101 MMOL/L (ref 98–107)
CO2 SERPL-SCNC: 28 MMOL/L (ref 21–32)
CREAT SERPL-MCNC: 1.78 MG/DL (ref 0.8–1.5)
DIFFERENTIAL METHOD BLD: ABNORMAL
EOSINOPHIL # BLD: 0.2 K/UL (ref 0–0.8)
EOSINOPHIL NFR BLD: 3 % (ref 0.5–7.8)
ERYTHROCYTE [DISTWIDTH] IN BLOOD BY AUTOMATED COUNT: 14.4 % (ref 11.9–14.6)
GLUCOSE BLD STRIP.AUTO-MCNC: 65 MG/DL (ref 65–100)
GLUCOSE SERPL-MCNC: 76 MG/DL (ref 65–100)
HCT VFR BLD AUTO: 37.4 % (ref 41.1–50.3)
HGB BLD-MCNC: 12.2 G/DL (ref 13.6–17.2)
IMM GRANULOCYTES # BLD: 0 K/UL (ref 0–0.5)
IMM GRANULOCYTES NFR BLD: 0 % (ref 0–5)
LIPASE SERPL-CCNC: 91 U/L (ref 73–393)
LYMPHOCYTES # BLD: 3.5 K/UL (ref 0.5–4.6)
LYMPHOCYTES NFR BLD: 45 % (ref 13–44)
MCH RBC QN AUTO: 29.4 PG (ref 26.1–32.9)
MCHC RBC AUTO-ENTMCNC: 32.6 G/DL (ref 31.4–35)
MCV RBC AUTO: 90.1 FL (ref 79.6–97.8)
MONOCYTES # BLD: 0.7 K/UL (ref 0.1–1.3)
MONOCYTES NFR BLD: 9 % (ref 4–12)
NEUTS SEG # BLD: 3.5 K/UL (ref 1.7–8.2)
NEUTS SEG NFR BLD: 43 % (ref 43–78)
PLATELET # BLD AUTO: 223 K/UL (ref 150–450)
PMV BLD AUTO: 10.5 FL (ref 10.8–14.1)
POTASSIUM SERPL-SCNC: 4.6 MMOL/L (ref 3.5–5.1)
RBC # BLD AUTO: 4.15 M/UL (ref 4.23–5.67)
SODIUM SERPL-SCNC: 141 MMOL/L (ref 136–145)
WBC # BLD AUTO: 8 K/UL (ref 4.3–11.1)

## 2017-11-02 PROCEDURE — 71020 XR CHEST PA LAT: CPT

## 2017-11-02 PROCEDURE — 81003 URINALYSIS AUTO W/O SCOPE: CPT | Performed by: EMERGENCY MEDICINE

## 2017-11-02 PROCEDURE — 99285 EMERGENCY DEPT VISIT HI MDM: CPT | Performed by: EMERGENCY MEDICINE

## 2017-11-02 PROCEDURE — 83690 ASSAY OF LIPASE: CPT | Performed by: EMERGENCY MEDICINE

## 2017-11-02 PROCEDURE — 85025 COMPLETE CBC W/AUTO DIFF WBC: CPT | Performed by: EMERGENCY MEDICINE

## 2017-11-02 PROCEDURE — 82962 GLUCOSE BLOOD TEST: CPT

## 2017-11-02 PROCEDURE — 80048 BASIC METABOLIC PNL TOTAL CA: CPT | Performed by: EMERGENCY MEDICINE

## 2017-11-02 RX ORDER — PANTOPRAZOLE SODIUM 40 MG/1
40 TABLET, DELAYED RELEASE ORAL 2 TIMES DAILY
Qty: 30 TAB | Refills: 5 | Status: SHIPPED
Start: 2017-11-02 | End: 2018-02-05

## 2017-11-02 NOTE — ED PROVIDER NOTES
HPI Comments: Patient presents returned with complaints of difficulty swallowing and painful swallowing. He states is similar episode he was seen for the past records indicate a visit in June with diagnosis of gastroesophageal reflux disease. He apparently had an [facility] but was not planned to have a endoscopy until he is cleared by his cardiologist.  He states he has cardiology appointment on 9 November. He states symptoms are getting worse with difficulty swallowing he tried swallowing some rice earlier that it was painful and took a while to occur as well as some subsequent nausea. He had then ate some applesauce shortly after that that did go down liquids do not appear to be a problem. He's had no regurgitating he describes the pain as starting in the area of the thyroid cartilage going then going down retrosternally to the stomach. He denies any vomiting diarrhea fever chills cough or shortness of breath. Patient is a 78 y.o. male presenting with sore throat. The history is provided by the patient. Sore Throat    This is a recurrent problem. The current episode started more than 2 days ago. There has been no fever. Associated symptoms include trouble swallowing. Pertinent negatives include no diarrhea, no vomiting, no congestion, no drooling, no ear discharge, no ear pain, no headaches, no plugged ear sensation, no shortness of breath, no stridor, no swollen glands, no stiff neck and no cough. Treatments tried: pt currently taking Zantac and Protonix.         Past Medical History:   Diagnosis Date    Cervical stenosis of spine     Chronic kidney disease     Stage 3    Degenerative disc disease, lumbar     Diabetes mellitus type II     uncontrolled-insulin and oral med. 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 (HCC)     R - Moderate, L - Mild    Diastolic congestive heart failure [facility]     Diverticulosis of colon June 2010    DVT (deep venous thrombosis) (HCC)     RLE    Extrinsic allergic asthma     GERD (gastroesophageal reflux disease)     Hx of colonic polyps 07/29/2010    Hyperplastic     Hyperlipidemia LDL goal < 79     Hypertension     Obstructive sleep apnea     Non-Compliant with CPAP    Perennial allergic rhinitis with seasonal variation     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 ([facility]     Pulmonary embolism ([facility] Mar 2014    Bilateral - Completed 6 months on Xarelto    Type 2 diabetes, uncontrolled, with neuropathy ([facility] 1/22/2015       Past Surgical History:   Procedure Laterality Date    COLONOSCOPY  07/29/2010    Diverticulosis & Colon/Rectal Polyps - Due 2020    EGD  5/9/2011         HX BUNIONECTOMY Bilateral     HX HERNIA REPAIR Bilateral     Inguinal, Repeat on Both Sides Separately    HX KNEE ARTHROSCOPY  1991    x 3    SINUS SURGERY PROC UNLISTED      TOTAL KNEE ARTHROPLASTY Right 2006         Family History:   Problem Relation Age of Onset    Stroke Mother     Diabetes Mother     Heart Disease Mother     Diabetes Maternal Grandmother     Glaucoma Maternal Grandmother     Stroke Father     Diabetes Father     Diabetes Paternal Aunt     Cancer Paternal Aunt     Diabetes Paternal Uncle     Cancer Paternal Uncle      Colon    Heart Attack Sister 76    Cancer Sister     Prostate Cancer Brother     Heart Disease Sister 48    Prostate Cancer Brother        Social History     Social History    Marital status: SINGLE     Spouse name: N/A    Number of children: N/A    Years of education: N/A     Occupational History    Not on file.      Social History Main Topics    Smoking status: Never Smoker    Smokeless tobacco: Never Used    Alcohol use No    Drug use: No    Sexual activity: Not on file     Other Topics Concern    Not on file     Social History Narrative         ALLERGIES: Vasotec [enalapril maleate]    Review of Systems   HENT: Positive for sore throat and trouble swallowing. Negative for congestion, drooling, ear discharge and ear pain. Respiratory: Negative for cough, shortness of breath and stridor. Gastrointestinal: Negative for diarrhea and vomiting. Neurological: Negative for headaches. All other systems reviewed and are negative. Vitals:    11/02/17 1524   BP: 120/63   Pulse: 68   Resp: 22   Temp: 98.5 °F (36.9 °C)   SpO2: 99%   Weight: 131.5 kg (290 lb)   Height: 5' 7\" (1.702 m)            Physical Exam   Constitutional: He is oriented to person, place, and time. He appears well-developed and well-nourished. No distress. HENT:   Head: Normocephalic and atraumatic. Eyes: Conjunctivae and EOM are normal. Pupils are equal, round, and reactive to light. Neck: Normal range of motion. Neck supple. Cardiovascular: Normal rate, regular rhythm, normal heart sounds and intact distal pulses. Pulmonary/Chest: Effort normal and breath sounds normal.   Abdominal: Soft. Bowel sounds are normal.   Musculoskeletal: Normal range of motion. He exhibits no edema, tenderness or deformity. Neurological: He is alert and oriented to person, place, and time. Skin: Skin is warm and dry. Psychiatric: He has a normal mood and affect. His behavior is normal.   Nursing note and vitals reviewed.        MDM  Number of Diagnoses or Management Options     Amount and/or Complexity of Data Reviewed  Clinical lab tests: ordered and reviewed  Tests in the radiology section of CPT®: ordered and reviewed  Independent visualization of images, tracings, or specimens: yes    Risk of Complications, Morbidity, and/or Mortality  Presenting problems: high  Diagnostic procedures: moderate  Management options: moderate    Patient Progress  Patient progress: stable    ED Course       Procedures         patient's case was discussed with Dr. Alfonzo Tony who is on-call for [facility] cardiology, and Dr. Jameel Hernandez who is on-call for [facility] with the goal of the accelerating the patient's out patient evaluation and EGD given the worsening of his dysphagia symptoms.

## 2017-11-02 NOTE — DISCHARGE INSTRUCTIONS
Upper GI Endoscopy: Before Your Procedure  What is an upper GI endoscopy? An upper gastrointestinal (or GI) endoscopy is a test that allows your doctor to look at the inside of your esophagus, stomach, and the first part of your small intestine, called the duodenum. The esophagus is the tube that carries food to your stomach. The doctor uses a thin, lighted tube that bends. It is called an endoscope, or scope. The doctor puts the tip of the scope in your mouth and gently moves it down your throat. The scope is a flexible video camera. The doctor looks at a monitor (like a TV set or a computer screen) as he or she moves the scope. A doctor may do this test, which is also called a procedure, to look for ulcers, tumors, infection, or bleeding. It also can be used to look for signs of acid backing up into your esophagus. This is called gastroesophageal reflux disease, or GERD. The doctor can use the scope to take a sample of tissue for study (a biopsy). The doctor also can use the scope to take out growths or stop bleeding. Follow-up care is a key part of your treatment and safety. Be sure to make and go to all appointments, and call your doctor if you are having problems. It's also a good idea to know your test results and keep a list of the medicines you take. What happens before the procedure? ?Preparing for the procedure  ? · Understand exactly what procedure is planned, along with the risks, benefits, and other options. · Tell your doctors ALL the medicines, vitamins, supplements, and herbal remedies you take. Some of these can increase the risk of bleeding or interact with anesthesia. ? · If you take blood thinners, such as warfarin (Coumadin), clopidogrel (Plavix), or aspirin, be sure to talk to your doctor. He or she will tell you if you should stop taking these medicines before your procedure.  Make sure that you understand exactly what your doctor wants you to do.   ? · Your doctor will tell you which medicines to take or stop before your procedure. You may need to stop taking certain medicines a week or more before the procedure. So talk to your doctor as soon as you can.   ? · If you have an advance directive, let your doctor know. It may include a living will and a durable power of  for health care. Bring a copy to the hospital. If you don't have one, you may want to prepare one. It lets your doctor and loved ones know your health care wishes. Doctors advise that everyone prepare these papers before any type of surgery or procedure. Procedures can be stressful. This information will help you understand what you can expect. And it will help you safely prepare for your procedure. What happens on the day of the procedure? · Follow the instructions exactly about when to stop eating and drinking. If you don't, your procedure may be canceled. If your doctor told you to take your medicines on the day of the procedure, take them with only a sip of water. ? · Take a bath or shower before you come in for your procedure. Do not apply lotions, perfumes, deodorants, or nail polish. ? · Take off all jewelry and piercings. And take out contact lenses, if you wear them. ? At the hospital or surgery center   · Bring a picture ID. ? · The test may take 15 to 30 minutes. ? · The doctor may spray medicine on the back of your throat to numb it. You also will get medicine to prevent pain and to relax you. ? · You will lie on your left side. The doctor will put the scope in your mouth and toward the back of your throat. The doctor will tell you when to swallow. This helps the scope move down your throat. You will be able to breathe normally. The doctor will move the scope down your esophagus into your stomach. The doctor also may look at the duodenum.    ? · If your doctor wants to take a sample of tissue for a biopsy, he or she may use small surgical tools, which are put into the scope, to cut off some tissue. You will not feel a biopsy, if one is taken. The doctor also can use the tools to stop bleeding or to do other treatments, if needed. ? · You will stay at the hospital or surgery center for 1 to 2 hours until the medicine you were given wears off. Going home   · Be sure you have someone to drive you home. Anesthesia and pain medicine make it unsafe for you to drive. ? · You will be given more specific instructions about recovering from your procedure. They will cover things like diet, wound care, follow-up care, driving, and getting back to your normal routine. When should you call your doctor? · You have questions or concerns. ? · You don't understand how to prepare for your procedure. ? · You become ill before the procedure (such as fever, flu, or a cold). ? · You need to reschedule or have changed your mind about having the procedure. Where can you learn more? Go to http://jono-zuleima.info/. Enter P790 in the search box to learn more about \"Upper GI Endoscopy: Before Your Procedure. \"  Current as of: May 12, 2017  Content Version: 11.4  © 9619-7210 [employer]. Care instructions adapted under license by [employer] (which disclaims liability or warranty for this information). If you have questions about a medical condition or this instruction, always ask your healthcare professional. [place] [address] any warranty or liability for your use of this information. Gastroesophageal Reflux Disease (GERD): Care Instructions  Your Care Instructions    Gastroesophageal reflux disease (GERD) is the backward flow of stomach acid into the esophagus. The esophagus is the tube that leads from your throat to your stomach. A one-way valve prevents the stomach acid from moving up into this tube. When you have GERD, this valve does not close tightly enough.   If you have mild GERD symptoms including heartburn, you may be able to control the problem with antacids or over-the-counter medicine. Changing your diet, losing weight, and making other lifestyle changes can also help reduce symptoms. Follow-up care is a key part of your treatment and safety. Be sure to make and go to all appointments, and call your doctor if you are having problems. It's also a good idea to know your test results and keep a list of the medicines you take. How can you care for yourself at home? · Take your medicines exactly as prescribed. Call your doctor if you think you are having a problem with your medicine. · Your doctor may recommend over-the-counter medicine. For mild or occasional indigestion, antacids, such as Tums, Gaviscon, Mylanta, or Maalox, may help. Your doctor also may recommend over-the-counter acid reducers, such as Pepcid AC, Tagamet HB, Zantac 75, or Prilosec. Read and follow all instructions on the label. If you use these medicines often, talk with your doctor. · Change your eating habits. ¨ It's best to eat several small meals instead of two or three large meals. ¨ After you eat, wait 2 to 3 hours before you lie down. ¨ Chocolate, mint, and alcohol can make GERD worse. ¨ Spicy foods, foods that have a lot of acid (like tomatoes and oranges), and coffee can make GERD symptoms worse in some people. If your symptoms are worse after you eat a certain food, you may want to stop eating that food to see if your symptoms get better. · Do not smoke or chew tobacco. Smoking can make GERD worse. If you need help quitting, talk to your doctor about stop-smoking programs and medicines. These can increase your chances of quitting for good. · If you have GERD symptoms at night, raise the head of your bed 6 to 8 inches by putting the frame on blocks or placing a foam wedge under the head of your mattress. (Adding extra pillows does not work.)  · Do not wear tight clothing around your middle. · Lose weight if you need to.  Losing just 5 to 10 pounds can help.  When should you call for help? Call your doctor now or seek immediate medical care if:  ? · You have new or different belly pain. ? · Your stools are black and tarlike or have streaks of blood. ? Watch closely for changes in your health, and be sure to contact your doctor if:  ? · Your symptoms have not improved after 2 days. ? · Food seems to catch in your throat or chest.   Where can you learn more? Go to http://jono-zuleima.info/. Enter R794 in the search box to learn more about \"Gastroesophageal Reflux Disease (GERD): Care Instructions. \"  Current as of: May 12, 2017  Content Version: 11.4  © 9024-5823 [employer]. Care instructions adapted under license by [employer] (which disclaims liability or warranty for this information). If you have questions about a medical condition or this instruction, always ask your healthcare professional. [address] any warranty or liability for your use of this information.

## 2017-11-09 PROBLEM — Z79.4 TYPE 2 DIABETES MELLITUS WITHOUT COMPLICATION, WITH LONG-TERM CURRENT USE OF INSULIN (HCC): Status: ACTIVE | Noted: 2017-11-09

## 2017-11-09 PROBLEM — E66.01 MORBID OBESITY DUE TO EXCESS CALORIES (HCC): Status: ACTIVE | Noted: 2017-11-09

## 2017-11-09 PROBLEM — G47.30 SLEEP APNEA: Status: ACTIVE | Noted: 2017-11-09

## 2017-11-09 PROBLEM — E11.9 TYPE 2 DIABETES MELLITUS WITHOUT COMPLICATION, WITH LONG-TERM CURRENT USE OF INSULIN (HCC): Status: ACTIVE | Noted: 2017-11-09

## 2017-11-09 PROBLEM — R13.19 ESOPHAGEAL DYSPHAGIA: Status: ACTIVE | Noted: 2017-11-09

## 2017-11-09 PROBLEM — G45.9 TIA (TRANSIENT ISCHEMIC ATTACK): Status: ACTIVE | Noted: 2017-11-09

## 2017-11-17 ENCOUNTER — HOSPITAL ENCOUNTER (OUTPATIENT)
Dept: GENERAL RADIOLOGY | Age: 77
Discharge: HOME | End: 2017-11-17
Attending: INTERNAL MEDICINE
Payer: MEDICARE

## 2017-11-17 VITALS — HEIGHT: 72 IN | BODY MASS INDEX: 39.01 KG/M2 | WEIGHT: 288 LBS

## 2017-11-17 DIAGNOSIS — K21.9 GERD (GASTROESOPHAGEAL REFLUX DISEASE): ICD-10-CM

## 2017-11-17 DIAGNOSIS — R13.10 DYSPHAGIA: ICD-10-CM

## 2017-11-17 LAB — GLUCOSE BLD STRIP.AUTO-MCNC: 105 MG/DL (ref 65–100)

## 2017-11-17 PROCEDURE — 74220 X-RAY XM ESOPHAGUS 1CNTRST: CPT

## 2017-11-17 PROCEDURE — 74011000250 HC RX REV CODE- 250: Performed by: INTERNAL MEDICINE

## 2017-11-17 PROCEDURE — 74011000255 HC RX REV CODE- 255: Performed by: INTERNAL MEDICINE

## 2017-11-17 PROCEDURE — 82962 GLUCOSE BLOOD TEST: CPT

## 2017-11-17 RX ADMIN — ANTACID/ANTIFLATULENT 4 G: 380; 550; 10; 10 GRANULE, EFFERVESCENT ORAL at 13:45

## 2017-11-17 RX ADMIN — BARIUM SULFATE 250 ML: 0.6 SUSPENSION ORAL at 13:46

## 2017-11-17 RX ADMIN — BARIUM SULFATE 700 MG: 700 TABLET ORAL at 13:53

## 2017-11-20 ENCOUNTER — PATIENT OUTREACH (OUTPATIENT)
Dept: CASE MANAGEMENT | Age: 77
End: 2017-11-20

## 2017-11-20 NOTE — PROGRESS NOTES
Outreach to evaluate further DSME needs. Mr. Maycol Hanley requests further DE visits following the Thanksgiving Holiday. CDE agreed to one visit to cover final questions. Will contact in December. This note will not be viewable in [address]

## 2018-01-24 ENCOUNTER — HOSPITAL ENCOUNTER (OUTPATIENT)
Dept: LAB | Age: 78
Discharge: HOME | End: 2018-01-24
Attending: PHYSICIAN ASSISTANT
Payer: MEDICARE

## 2018-01-24 LAB
ALBUMIN SERPL-MCNC: 3.5 G/DL (ref 3.2–4.6)
ALBUMIN/GLOB SERPL: 0.9 {RATIO} (ref 1.2–3.5)
ALP SERPL-CCNC: 110 U/L (ref 50–136)
ALT SERPL-CCNC: 23 U/L (ref 12–65)
ANION GAP SERPL CALC-SCNC: 11 MMOL/L (ref 7–16)
AST SERPL-CCNC: 19 U/L (ref 15–37)
BASOPHILS # BLD: 0 K/UL (ref 0–0.2)
BASOPHILS NFR BLD: 0 % (ref 0–2)
BILIRUB SERPL-MCNC: 0.3 MG/DL (ref 0.2–1.1)
BUN SERPL-MCNC: 25 MG/DL (ref 8–23)
CALCIUM SERPL-MCNC: 9.1 MG/DL (ref 8.3–10.4)
CHLORIDE SERPL-SCNC: 99 MMOL/L (ref 98–107)
CHOLEST SERPL-MCNC: 120 MG/DL
CO2 SERPL-SCNC: 27 MMOL/L (ref 21–32)
CREAT SERPL-MCNC: 1.68 MG/DL (ref 0.8–1.5)
DIFFERENTIAL METHOD BLD: ABNORMAL
EOSINOPHIL # BLD: 0.2 K/UL (ref 0–0.8)
EOSINOPHIL NFR BLD: 4 % (ref 0.5–7.8)
ERYTHROCYTE [DISTWIDTH] IN BLOOD BY AUTOMATED COUNT: 15.9 % (ref 11.9–14.6)
GLOBULIN SER CALC-MCNC: 3.8 G/DL (ref 2.3–3.5)
GLUCOSE SERPL-MCNC: 211 MG/DL (ref 65–100)
HCT VFR BLD AUTO: 35.8 % (ref 41.1–50.3)
HDLC SERPL-MCNC: 43 MG/DL (ref 40–60)
HDLC SERPL: 2.8 {RATIO}
HGB BLD-MCNC: 11.2 G/DL (ref 13.6–17.2)
IMM GRANULOCYTES # BLD: 0 K/UL (ref 0–0.5)
IMM GRANULOCYTES NFR BLD AUTO: 0 % (ref 0–5)
LDLC SERPL CALC-MCNC: 57.8 MG/DL
LIPID PROFILE,FLP: NORMAL
LYMPHOCYTES # BLD: 2 K/UL (ref 0.5–4.6)
LYMPHOCYTES NFR BLD: 40 % (ref 13–44)
MCH RBC QN AUTO: 28.4 PG (ref 26.1–32.9)
MCHC RBC AUTO-ENTMCNC: 31.3 G/DL (ref 31.4–35)
MCV RBC AUTO: 90.6 FL (ref 79.6–97.8)
MONOCYTES # BLD: 0.4 K/UL (ref 0.1–1.3)
MONOCYTES NFR BLD: 9 % (ref 4–12)
NEUTS SEG # BLD: 2.3 K/UL (ref 1.7–8.2)
NEUTS SEG NFR BLD: 47 % (ref 43–78)
PLATELET # BLD AUTO: 190 K/UL (ref 150–450)
PMV BLD AUTO: 10.3 FL (ref 10.8–14.1)
POTASSIUM SERPL-SCNC: 4.5 MMOL/L (ref 3.5–5.1)
PROT SERPL-MCNC: 7.3 G/DL (ref 6.3–8.2)
RBC # BLD AUTO: 3.95 M/UL (ref 4.23–5.67)
SODIUM SERPL-SCNC: 137 MMOL/L (ref 136–145)
TRIGL SERPL-MCNC: 96 MG/DL (ref 35–150)
VLDLC SERPL CALC-MCNC: 19.2 MG/DL (ref 6–23)
WBC # BLD AUTO: 4.9 K/UL (ref 4.3–11.1)

## 2018-01-24 PROCEDURE — 80053 COMPREHEN METABOLIC PANEL: CPT | Performed by: PHYSICIAN ASSISTANT

## 2018-01-24 PROCEDURE — 85025 COMPLETE CBC W/AUTO DIFF WBC: CPT | Performed by: PHYSICIAN ASSISTANT

## 2018-01-24 PROCEDURE — 36415 COLL VENOUS BLD VENIPUNCTURE: CPT | Performed by: PHYSICIAN ASSISTANT

## 2018-01-24 PROCEDURE — 80061 LIPID PANEL: CPT | Performed by: PHYSICIAN ASSISTANT

## 2018-01-26 NOTE — PROGRESS NOTES
Blood sugar is very high this time. Kidney function is relatively stable but remains impaired. Liver function is normal.  Cholesterol is well controlled with LDL < 70. Anemia is more prominent though no source has been identified other than chronic kidney disease. Results will be discussed with patient during upcoming office visit.

## 2018-05-11 PROBLEM — R42 POSTURAL DIZZINESS: Status: ACTIVE | Noted: 2018-05-11

## 2018-05-11 PROBLEM — E11.21 TYPE 2 DIABETES WITH NEPHROPATHY (HCC): Status: ACTIVE | Noted: 2018-05-11

## 2018-05-11 PROBLEM — R07.9 CHEST PAIN: Status: ACTIVE | Noted: 2018-05-11

## 2018-07-09 ENCOUNTER — HOSPITAL ENCOUNTER (OUTPATIENT)
Dept: LAB | Age: 78
Discharge: HOME | End: 2018-07-09
Attending: PHYSICIAN ASSISTANT
Payer: MEDICARE

## 2018-07-09 LAB
ALBUMIN SERPL-MCNC: 3.5 G/DL (ref 3.2–4.6)
ALBUMIN/GLOB SERPL: 0.9 {RATIO} (ref 1.2–3.5)
ALP SERPL-CCNC: 118 U/L (ref 50–136)
ALT SERPL-CCNC: 29 U/L (ref 12–65)
ANION GAP SERPL CALC-SCNC: 7 MMOL/L (ref 7–16)
AST SERPL-CCNC: 21 U/L (ref 15–37)
BILIRUB SERPL-MCNC: 0.3 MG/DL (ref 0.2–1.1)
BUN SERPL-MCNC: 38 MG/DL (ref 8–23)
CALCIUM SERPL-MCNC: 9.2 MG/DL (ref 8.3–10.4)
CHLORIDE SERPL-SCNC: 104 MMOL/L (ref 98–107)
CHOLEST SERPL-MCNC: 112 MG/DL
CO2 SERPL-SCNC: 31 MMOL/L (ref 21–32)
CREAT SERPL-MCNC: 1.65 MG/DL (ref 0.8–1.5)
GLOBULIN SER CALC-MCNC: 3.9 G/DL (ref 2.3–3.5)
GLUCOSE SERPL-MCNC: 118 MG/DL (ref 65–100)
HDLC SERPL-MCNC: 44 MG/DL (ref 40–60)
HDLC SERPL: 2.5 {RATIO}
LDLC SERPL CALC-MCNC: 59 MG/DL
LIPID PROFILE,FLP: NORMAL
POTASSIUM SERPL-SCNC: 4.3 MMOL/L (ref 3.5–5.1)
PROT SERPL-MCNC: 7.4 G/DL (ref 6.3–8.2)
SODIUM SERPL-SCNC: 142 MMOL/L (ref 136–145)
TRIGL SERPL-MCNC: 45 MG/DL (ref 35–150)
VLDLC SERPL CALC-MCNC: 9 MG/DL (ref 6–23)

## 2018-07-09 PROCEDURE — 36415 COLL VENOUS BLD VENIPUNCTURE: CPT | Performed by: PHYSICIAN ASSISTANT

## 2018-07-09 PROCEDURE — 80061 LIPID PANEL: CPT | Performed by: PHYSICIAN ASSISTANT

## 2018-07-09 PROCEDURE — 80053 COMPREHEN METABOLIC PANEL: CPT | Performed by: PHYSICIAN ASSISTANT

## 2018-07-09 NOTE — PROGRESS NOTES
Blood sugar looks MUCH better this time! Kidney function is stable. Liver function is normal.  Cholesterol levels are well managed with current treatment. Results will be discussed with patient during upcoming office visit.

## 2018-07-16 PROBLEM — E11.9 TYPE 2 DIABETES MELLITUS WITHOUT COMPLICATION, WITH LONG-TERM CURRENT USE OF INSULIN (HCC): Status: RESOLVED | Noted: 2017-11-09 | Resolved: 2018-07-16

## 2018-07-16 PROBLEM — R42 POSTURAL DIZZINESS: Status: RESOLVED | Noted: 2018-05-11 | Resolved: 2018-07-16

## 2018-07-16 PROBLEM — Z98.890 H/O COLONOSCOPY: Chronic | Status: RESOLVED | Noted: 2017-07-18 | Resolved: 2018-07-16

## 2018-07-16 PROBLEM — R07.9 CHEST PAIN: Status: RESOLVED | Noted: 2018-05-11 | Resolved: 2018-07-16

## 2018-07-16 PROBLEM — Z79.4 TYPE 2 DIABETES MELLITUS WITHOUT COMPLICATION, WITH LONG-TERM CURRENT USE OF INSULIN (HCC): Status: RESOLVED | Noted: 2017-11-09 | Resolved: 2018-07-16

## 2018-12-04 ENCOUNTER — HOSPITAL ENCOUNTER (OUTPATIENT)
Dept: LAB | Age: 78
Discharge: HOME | End: 2018-12-04
Payer: MEDICARE

## 2018-12-04 LAB
ALBUMIN SERPL-MCNC: 3.7 G/DL (ref 3.2–4.6)
ALBUMIN/GLOB SERPL: 1 {RATIO}
ALP SERPL-CCNC: 107 U/L (ref 50–136)
ALT SERPL-CCNC: 24 U/L (ref 12–65)
ANION GAP SERPL CALC-SCNC: 7 MMOL/L
AST SERPL-CCNC: 18 U/L (ref 15–37)
BASOPHILS # BLD: 0 K/UL (ref 0–0.2)
BASOPHILS NFR BLD: 1 % (ref 0–2)
BILIRUB SERPL-MCNC: 0.4 MG/DL (ref 0.2–1.1)
BUN SERPL-MCNC: 32 MG/DL (ref 8–23)
CALCIUM SERPL-MCNC: 9 MG/DL (ref 8.3–10.4)
CHLORIDE SERPL-SCNC: 105 MMOL/L (ref 98–107)
CHOLEST SERPL-MCNC: 103 MG/DL
CO2 SERPL-SCNC: 29 MMOL/L (ref 21–32)
CREAT SERPL-MCNC: 1.83 MG/DL (ref 0.8–1.5)
DIFFERENTIAL METHOD BLD: ABNORMAL
EOSINOPHIL # BLD: 0.3 K/UL (ref 0–0.8)
EOSINOPHIL NFR BLD: 4 % (ref 0.5–7.8)
ERYTHROCYTE [DISTWIDTH] IN BLOOD BY AUTOMATED COUNT: 15.6 % (ref 11.9–14.6)
GLOBULIN SER CALC-MCNC: 3.8 G/DL (ref 2.3–3.5)
GLUCOSE SERPL-MCNC: 98 MG/DL (ref 65–100)
HCT VFR BLD AUTO: 37.3 % (ref 41.1–50.3)
HDLC SERPL-MCNC: 44 MG/DL (ref 40–60)
HDLC SERPL: 2.3 {RATIO}
HGB BLD-MCNC: 11.4 G/DL (ref 13.6–17.2)
IMM GRANULOCYTES # BLD: 0 K/UL (ref 0–0.5)
IMM GRANULOCYTES NFR BLD AUTO: 0 % (ref 0–5)
LDLC SERPL CALC-MCNC: 43.2 MG/DL
LIPID PROFILE,FLP: NORMAL
LYMPHOCYTES # BLD: 2.9 K/UL (ref 0.5–4.6)
LYMPHOCYTES NFR BLD: 48 % (ref 13–44)
MCH RBC QN AUTO: 27.7 PG (ref 26.1–32.9)
MCHC RBC AUTO-ENTMCNC: 30.6 G/DL (ref 31.4–35)
MCV RBC AUTO: 90.5 FL (ref 79.6–97.8)
MONOCYTES # BLD: 0.6 K/UL (ref 0.1–1.3)
MONOCYTES NFR BLD: 9 % (ref 4–12)
NEUTS SEG # BLD: 2.3 K/UL (ref 1.7–8.2)
NEUTS SEG NFR BLD: 38 % (ref 43–78)
NRBC # BLD: 0 K/UL (ref 0–0.2)
PLATELET # BLD AUTO: 207 K/UL (ref 150–450)
PMV BLD AUTO: 10.4 FL (ref 9.4–12.3)
POTASSIUM SERPL-SCNC: 4.5 MMOL/L (ref 3.5–5.1)
PROT SERPL-MCNC: 7.5 G/DL
RBC # BLD AUTO: 4.12 M/UL (ref 4.23–5.6)
SODIUM SERPL-SCNC: 141 MMOL/L (ref 136–145)
TRIGL SERPL-MCNC: 79 MG/DL (ref 35–150)
VLDLC SERPL CALC-MCNC: 15.8 MG/DL (ref 6–23)
WBC # BLD AUTO: 6 K/UL (ref 4.3–11.1)

## 2018-12-04 PROCEDURE — 85025 COMPLETE CBC W/AUTO DIFF WBC: CPT

## 2018-12-04 PROCEDURE — 36415 COLL VENOUS BLD VENIPUNCTURE: CPT

## 2018-12-04 PROCEDURE — 80061 LIPID PANEL: CPT

## 2018-12-04 PROCEDURE — 80053 COMPREHEN METABOLIC PANEL: CPT

## 2019-03-07 PROBLEM — G81.92 HEMIPARESIS OF LEFT DOMINANT SIDE (HCC): Status: ACTIVE | Noted: 2019-03-07

## 2019-03-21 PROBLEM — I63.9 CEREBROVASCULAR ACCIDENT (CVA) (HCC): Status: ACTIVE | Noted: 2019-03-21

## 2019-03-21 PROBLEM — E11.9 TYPE 2 DIABETES MELLITUS WITHOUT COMPLICATION (HCC): Status: ACTIVE | Noted: 2019-03-21

## 2019-06-24 PROBLEM — N18.30 CKD (CHRONIC KIDNEY DISEASE) STAGE 3, GFR 30-59 ML/MIN (HCC): Status: ACTIVE | Noted: 2019-06-24

## 2019-07-12 ENCOUNTER — HOSPITAL ENCOUNTER (OUTPATIENT)
Dept: PHYSICAL THERAPY | Age: 79
Discharge: HOME | End: 2019-07-12
Attending: INTERNAL MEDICINE
Payer: MEDICARE

## 2019-07-12 DIAGNOSIS — I69.352 HEMIPARESIS OF LEFT DOMINANT SIDE AS LATE EFFECT OF CEREBRAL INFARCTION (HCC): ICD-10-CM

## 2019-07-12 PROCEDURE — 97162 PT EVAL MOD COMPLEX 30 MIN: CPT

## 2019-07-12 PROCEDURE — 97110 THERAPEUTIC EXERCISES: CPT

## 2019-07-12 NOTE — PROGRESS NOTES
Silvano Mayen  : 1940  [place]  Secondary: Deepak Kenney at [place] [id number] [facility], [address] [place] [address],[address], [address] [facility]  [phone]   [phone]        OUTPATIENT PHYSICAL THERAPY: Daily Treatment Note 2019  Visit Count:  1    ICD-10: Treatment Diagnosis:   · Other abnormalities of gait and mobility (R26.89)  · Difficulty in walking, not elsewhere classified (R26.2)  Precautions/Allergies:   Vasotec [enalapril maleate]   TREATMENT PLAN:  Effective Dates: 2019 TO 10/10/2019 (90 days). Frequency/Duration: 2 times a week for 90 Day(s)    Pre-treatment Symptoms/Complaints:  2019: Patient reports he would like for therapy to help strengthen his arms and leg. Pain: Initial: Pain Intensity 1: 0  Post Session:  0/10   Medications Last Reviewed:  2019  Updated Objective Findings:  See evaluation note from today  TREATMENT:     THERAPEUTIC EXERCISE: (20 minutes):  Exercises per grid below to improve mobility, strength, balance and coordination. Required minimal visual, verbal and manual cues to promote proper body alignment, promote proper body posture and promote proper body mechanics. Progressed resistance, range, repetitions and complexity of movement as indicated. Date:  2019   Activity/Exercise Parameters   Pinch  Pink foam  10 reps  HEP   Standing march 10 reps  B LE  HEP      Treatment/Session Summary:    · Response to Treatment:  Patient tolerated assessment without complaints of increased imbalance or fatigue. Patient verbalized and demonstrated understanding of HEP. · Communication/Consultation:  None today  · Equipment provided today:  None today  · Recommendations/Intent for next treatment session: Next visit will focus on improving overall mobility and safety with ambulation.     Total Treatment Billable Duration:  20 minutes + evaluation  PT Patient Time In/Time Out  Time In: 1100  Time Out: 3200 [facility], PT    Future Appointments   Date Time Provider Lashay Kiser   7/12/2019 11:00 AM Gloria Torres, PT [facility] SFE   8/1/2019  3:30 PM Sakina Cruz MD [facility] UCDG UCD   8/13/2019 10:00 AM Balaji Barnes DO BSNE BSNE   9/25/2019 11:00 AM Joselyn Rosen MD [facility] MAT

## 2019-07-12 NOTE — THERAPY EVALUATION
Sarthak Kiran : 1940 [place] Secondary: Deepak Kenney at [address], [address], [address] [facility] [phone]   [phone] OUTPATIENT PHYSICAL THERAPY:Initial Assessment 2019 ICD-10: Treatment Diagnosis:  
· Other abnormalities of gait and mobility (R26.89) · Difficulty in walking, not elsewhere classified (R26.2) Precautions/Allergies:  
Newton Bors maleate] TREATMENT PLAN: 
Effective Dates: 2019 TO 10/10/2019 (90 days). Frequency/Duration: 2 times a week for 90 Day(s) MEDICAL/REFERRING DIAGNOSIS: 
Hemiparesis of left dominant side as late effect of cerebral infarction ([facility] [address] [I69.352] DATE OF ONSET: Chronic REFERRING PHYSICIAN: Anselmo Costello MD MD Orders: Evaluate and Treat Return MD Appointment: TBD INITIAL ASSESSMENT:  Mr. Lawrence Sanchez presents with decreased mobility, decreased strength, and ambulation secondary to previous CVA. After discussing with patient, he agreed he would benefit from physical therapy to improve above deficits. Please sign this plan of treatment if you concur. Thank you for the opportunity to serve this patient. PROBLEM LIST (Impacting functional limitations): 1. Decreased Strength 2. Decreased ADL/Functional Activities 3. Decreased Ambulation Ability/Technique 4. Decreased Balance 5. Decreased Activity Tolerance INTERVENTIONS PLANNED: (Treatment may consist of any combination of the following) 1. Balance Exercise 2. Gait Training 3. Home Exercise Program (HEP) 4. Neuromuscular Re-education/Strengthening 5. Range of Motion (ROM) 6. Therapeutic Activites 7. Therapeutic Exercise/Strengthening GOALS: (Goals have been discussed and agreed upon with patient.) Short-Term Functional Goals: Time Frame: 30 days 1.  Patient will be independent with home exercise program without exacerbation of symptoms or cueing needed. Discharge Goals: Time Frame: 90 days 1. Patient will be independent with all ADLs with minimal fear of falling and loss of balance with daily tasks. 2. Patient will report no fear avoidance with social or recreational activities due to fear of falling. 3. Patient will score greater than or equal to 45/56 on Suarez Balance Scale with minimal effect of imbalance or weakness on patient's ability to manage every day life activities. OUTCOME MEASURE:  
Tool Used: Suarez Balance Scale Score:  Initial: 25/56 Most Recent: X/56 (Date: -- ) Interpretation of Score: Each section is scored on a 0-4 scale, 0 representing the patients inability to perform the task and 4 representing independence. The scores of each section are added together for a total score of 56. The higher the patients score, the more independent the patient is. Any score below 45 indicates increased risk for falls. MEDICAL NECESSITY:  
· Patient is expected to demonstrate progress in strength, range of motion, balance, coordination and functional technique to improve safety during daily activities. · Patient demonstrates fair rehab potential due to higher previous functional level. · Skilled intervention continues to be required due to decreased mobility. REASON FOR SERVICES/OTHER COMMENTS: 
· Patient continues to demonstrate capacity to improve overall functional mobility which will increase independence and increase safety. Total Duration: PT Patient Time In/Time Out Time In: 1100 Time Out: 1200 Rehabilitation Potential For Stated Goals: Fair Regarding Daivd Spire therapy, I certify that the treatment plan above will be carried out by a therapist or under their direction. Thank you for this referral, 
Felipe Frey PT Referring Physician Signature: Mala Snow MD _______________________________ Date _____________ PAIN/SUBJECTIVE:  
 Initial: Pain Intensity 1: 0  Post Session:  0/10 HISTORY:  
History of Injury/Illness (Reason for Referral): 
Patient reports he has had several TIA's in the past.  He reports most of them have effected his left side. He reports he has weakness in his left arm, hand, and leg. He reports he has very bad blood sugar issues. He reports he tries to go to [employer] and workout but he hasn't been able to make it lately because of not feeling well. He reports he would like to try therapy to make him stronger and able to move better. Past Medical History/Comorbidities:  
Mr. Uriel Alonso  has a past medical history of Cervical stenosis of spine, Chronic kidney disease, Degenerative disc disease, lumbar, Diabetes mellitus type II, Diabetic retinopathy of both eyes without macular edema associated with type 2 diabetes mellitus ([address] Diastolic congestive heart failure ([address] Diverticulosis of colon (June 2010), DVT (deep venous thrombosis) ([address] Extrinsic allergic asthma, GERD (gastroesophageal reflux disease), colonic polyps (07/29/2010), Hyperlipidemia LDL goal < 70, Hypertension, Obstructive sleep apnea, Perennial allergic rhinitis with seasonal variation, 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 ([address] Pulmonary embolism ([address] (Mar 2014), TIA (transient ischemic attack) (11/9/2017), and Type 2 diabetes, uncontrolled, with neuropathy ([address] (1/22/2015). He also has no past medical history of Aneurysm ([address] Arrhythmia, Autoimmune disease ([address] CAD (coronary artery disease), Cancer ([address] Coagulation defects, COPD, Liver disease, Psychiatric disorder, PUD (peptic ulcer disease), or Seizures ([address] Mr. Uriel Alonso  has a past surgical history that includes colonoscopy (07/29/2010); egd (5/9/2011); pr sinus surgery proc unlisted; hx knee arthroscopy (1991); pr total knee arthroplasty (Right, 2006); hx bunionectomy (Bilateral); and hx hernia repair (Bilateral). Social History/Living Environment: Home Environment: Private residence Living Alone: No 
Support Systems: Family member(s), Friends \ neighbors Prior Level of Function/Work/Activity: 
Independent Dominant Side:  
      RIGHT Personal Factors:   
      Sex:  male Age:  70 y.o. Ambulatory/Rehab Services H2 Model Falls Risk Assessment Risk Factors: 
     (1)  Gender [Male] Ability to Rise from Chair: 
     (3)  Multiple attempts, but successful Falls Prevention Plan: No modifications necessary Total: (5 or greater = High Risk): 4  
©2010 [facility] [address] [place] [address] Patent #[id number]. Federal Law prohibits the replication, distribution or use without written permission from [facility] [email or web address] Current Medications:   
  
Current Outpatient Medications:  
  empagliflozin (JARDIANCE) 10 mg tablet, Take 1 Tab by mouth daily. , Disp: 30 Tab, Rfl: 5 
  atorvastatin (LIPITOR) 80 mg tablet, Take 1 Tab by mouth nightly., Disp: 90 Tab, Rfl: 3 
  Insulin Needles, Disposable, (1ST TIER UNIFINE PENTIPS) 32 gauge x 5/32\" ndle, Use to inject insulin, Disp: 200 Pen Needle, Rfl: 1   insulin syr/ndl U100 half rashida 0.3 mL 31 gauge x 15/64\" syrg, Use to inject insulin, Disp: 200 Syringe, Rfl: 1 
  polyethylene glycol (MIRALAX) 17 gram/dose powder, Take 17 g by mouth daily. , Disp: 17 g, Rfl: 5 
  lancets misc, Use to test glucose 4 times/day. Dx: E11.51, I10, Disp: 150 Each, Rfl: 11 
  glucose blood VI test strips (FREESTYLE LITE STRIPS) strip, Use to check glucose 4 times/day. Dx: E11.51, I10, Disp: 150 Strip, Rfl: 11 
  insulin aspart U-100 (NOVOLOG FLEXPEN U-100 INSULIN) 100 unit/mL (3 mL) inpn, Inject 8 units standing dose + additional insulin according to sliding scale (up to 16 units) subQ before each meal, Disp: 15 Adjustable Dose Pre-filled Pen Syringe, Rfl: 3 
  furosemide (LASIX) 40 mg tablet, Take 1 Tab by mouth three (3) times daily. , Disp: 270 Tab, Rfl: 3   carvedilol (COREG) 25 mg tablet, Take 0.5 Tabs by mouth two (2) times daily (with meals). , Disp: 90 Tab, Rfl: 3 
  raNITIdine (ZANTAC) 150 mg tablet, Take 150 mg by mouth two (2) times a day., Disp: , Rfl:  
  insulin degludec (TRESIBA FLEXTOUCH U-100) 100 unit/mL (3 mL) inpn, by SubCUTAneous route., Disp: , Rfl:  
  busPIRone (BUSPAR) 15 mg tablet, Take 1 Tab by mouth daily. , Disp: 90 Tab, Rfl: 1 
  pantoprazole (PROTONIX) 40 mg tablet, Take 1 tablet po 30 minutes before breakfast, Disp: 90 Tab, Rfl: 3 
  ezetimibe (ZETIA) 10 mg tablet, Take 1 Tab by mouth nightly., Disp: 90 Tab, Rfl: 3 
  metFORMIN (GLUCOPHAGE) 1,000 mg tablet, Take 1 Tab by mouth two (2) times daily (with meals). , Disp: 180 Tab, Rfl: 1   irbesartan (AVAPRO) 150 mg tablet, Take 1 Tab by mouth nightly., Disp: 90 Tab, Rfl: 3 
  clopidogrel (PLAVIX) 75 mg tab, Take 1 Tab by mouth daily. , Disp: 90 Tab, Rfl: 3 
  Insulin Syringe-Needle U-100 (BD INSULIN SYRINGE ULTRA-FINE) 0.3 mL 31 gauge x 5/16\" syrg, Use to injection insulin 3 times/day at meals. Dx: E11.40, Disp: 270 Syringe, Rfl: 3 
  Lancets misc, Use to check glucose 4 times/day as directed.   Dx: E11.40, Disp: 120 Each, Rfl: 11 
  cloNIDine HCl (CATAPRES) 0.1 mg tablet, Take 1 Tab by mouth two (2) times a day., Disp: 180 Tab, Rfl: 3 
  albuterol (VENTOLIN HFA) 90 mcg/actuation inhaler, Take 2 Puffs by inhalation every four (4) hours as needed for Wheezing or Shortness of Breath., Disp: 1 Inhaler, Rfl: 3 
  pregabalin (LYRICA) 150 mg capsule, Take 150 mg by mouth two (2) times a day., Disp: , Rfl:  
  potassium chloride (KLOR-CON M20) 20 mEq tablet, TAKE 1 TABLET TWICE A DAY, Disp: 180 Tab, Rfl: 3 
  spironolactone (ALDACTONE) 25 mg tablet, TAKE 1 TABLET BY MOUTH EVERY DAY, Disp: 90 Tab, Rfl: 3 
  OTHER, Semi-Electric Hospital Bed Dx: Mild Diastolic Dysfunction (CVF-83 I51.9) Pulmonary Hypertension (ICD-10 I27.2) Morbid Obesity (ICD-10 E66.01) Lumbar DDD (ICD-10 M51.36) Obstructive Sleep Apnea (ICD-10 G47.33), Disp: 1 Device, Rfl: 0 
  loratadine (CLARITIN) 10 mg tablet, Take 10 mg by mouth daily. , Disp: , Rfl:  
  multivit-min-FA-lycopen-lutein (CENTRUM SILVER) 0.4-300-250 mg-mcg-mcg tab, Take  by mouth., Disp: , Rfl:  
  HYDROcodone-acetaminophen (NORCO)  mg tablet, Take 1 Tab by mouth every six (6) hours as needed for Pain., Disp: 20 Tab, Rfl: 0 
  aspirin 81 mg tablet, Take 81 mg by mouth daily. , Disp: , Rfl:   
Date Last Reviewed:  7/12/2019 Number of Personal Factors/Comorbidities that affect the Plan of Care: 1-2: MODERATE COMPLEXITY EXAMINATION:  
Observation/Orthostatic Postural Assessment:   
      Posture Assessment: Rounded shoulders, Forward head Functional Mobility:  
      Gait/Mobility:  
 Base of Support: Center of gravity altered Speed/Sharon: Pace decreased (<100 feet/min) Step Length: Left shortened, Right shortened Swing Pattern: Left symmetrical, Right symmetrical 
Stance: Left increased, Right increased Gait Abnormalities: Antalgic, Path deviations Ambulation - Level of Assistance: Modified independent Assistive Device: Cane, straight · Interventions: Verbal cues, Safety awareness training Transfers:   
 Sit to Stand: Modified independent Stand to Sit: Modified independent Stand Pivot Transfers: Modified independent Bed to Chair: Modified independent Lateral Transfers: Modified independent Bed Mobility:   
 Rolling: Independent Supine to Sit: Independent Sit to Supine: Independent Scooting: Independent Strength:   
      UE STRENGTH: 3/5 shoulder flexion, 3/5 shoulder abduction, 3/5 shoulder extension, 3/5 elbow flexion, 3/5 elbow extension. LE STRENGTH:  3/5 hip flexion, 3/5 hip abduction, 3/5 hip adduction, 3/5 hip extension, 3/5 knee extension, 3/5 knee flexion, 3/5 ankle dorsiflexion, 3/5 ankle plantarflexion, 3/5 ankle inversion, 3/5 ankle eversion. Sensation:  
      Intact Postural Control & Balance: · Suarez Balance Scale:  25/56.   (A score less than 45/56 indicates high risk of falls) Body Structures Involved: 1. Nerves 2. Muscles Body Functions Affected: 1. Neuromusculoskeletal 
2. Movement Related Activities and Participation Affected: 1. Mobility 2. Self Care Number of elements (examined above) that affect the Plan of Care: 4+: HIGH COMPLEXITY CLINICAL PRESENTATION:  
Presentation: Evolving clinical presentation with changing clinical characteristics: MODERATE COMPLEXITY CLINICAL DECISION MAKING:  
Use of outcome tool(s) and clinical judgement create a POC that gives a: Questionable prediction of patient's progress: MODERATE COMPLEXITY

## 2019-07-18 ENCOUNTER — HOSPITAL ENCOUNTER (OUTPATIENT)
Dept: PHYSICAL THERAPY | Age: 79
Discharge: HOME | End: 2019-07-18
Attending: INTERNAL MEDICINE
Payer: MEDICARE

## 2019-07-18 PROCEDURE — 97110 THERAPEUTIC EXERCISES: CPT

## 2019-07-18 NOTE — PROGRESS NOTES
Ganga Nielsen  : 1940  [place]  Secondary: Deepak Kenney at [facility], [address] [place] [address],[address], [address] [facility]  [phone]   [phone]        OUTPATIENT PHYSICAL THERAPY: Daily Treatment Note 2019  Visit Count:  2    ICD-10: Treatment Diagnosis:   · Other abnormalities of gait and mobility (R26.89)  · Difficulty in walking, not elsewhere classified (R26.2)  Precautions/Allergies:   Vasotec [enalapril maleate]   TREATMENT PLAN:  Effective Dates: 2019 TO 10/10/2019 (90 days). Frequency/Duration: 2 times a week for 90 Day(s)    Pre-treatment Symptoms/Complaints:  2019: Patient reports he is doing ok right now, just some family drama. Pain: Initial: Pain Intensity 1: 0  Post Session:  0/10   Medications Last Reviewed:  2019  Updated Objective Findings:  None Today  TREATMENT:     THERAPEUTIC EXERCISE: (45 minutes):  Exercises per grid below to improve mobility, strength, balance and coordination. Required minimal visual, verbal and manual cues to promote proper body alignment, promote proper body posture and promote proper body mechanics. Progressed resistance, range, repetitions and complexity of movement as indicated.    Date:  2019   Activity/Exercise Parameters   Nu-step 5 minutes  Level 2   Standing march 12 reps  B LE   Standing hip extension 12 reps  B LE   Standing knee flexion 12 reps  B LE   Standing hip abduction 12 reps  B LE   Standing heel raises 12 reps  B LE   Standing toe raises 12 reps  B LE   Step ups 6 inch  8 reps  B LE   Bicep curls 5 pounds  12 reps  B UE  In sitting   Shoulder flexion 3 pounds  12 reps  B UE  In sitting   Digi-flex 3 reps each finger  Red Digi-flex   Digi-flex Gross  with all fingers  10 reps  B UE      Treatment/Session Summary:    · Response to Treatment:  Patient tolerated treatment with minimal complaints of fatigue; needed a few sitting rest breaks. · Communication/Consultation:  None today  · Equipment provided today:  None today  · Recommendations/Intent for next treatment session: Next visit will focus on improving overall mobility and safety with ambulation.     Total Treatment Billable Duration:  45 minutes  PT Patient Time In/Time Out  Time In: 9656  Time Out: JESSIKA Hogue    Future Appointments   Date Time Provider Lashay Kiser   7/18/2019  1:45 PM Eric Ends, PT [facility] SFE   7/19/2019 11:00 AM Eric Ends, PT [facility] SFE   7/23/2019  3:15 PM Eric Ends, PT [facility] SFE   7/25/2019 10:15 AM Eric Ends, PT [facility] SFE   7/30/2019  3:15 PM Eric Ends, PT [facility] SFE   8/1/2019  3:30 PM Sofi Rowley MD [facility] UC UCD   8/2/2019  1:45 PM Eric Ends, PT [facility] SFE   8/6/2019  1:00 PM Eric Ends, PT [facility] SFE   8/8/2019  1:00 PM Eric Ends, PT [facility] SFE   8/13/2019 10:00 AM DO CARMELA EllisNE BSNE   9/25/2019 11:00 AM Leti Camacho MD [facility]

## 2019-07-19 ENCOUNTER — HOSPITAL ENCOUNTER (OUTPATIENT)
Dept: PHYSICAL THERAPY | Age: 79
Discharge: HOME | End: 2019-07-19
Attending: INTERNAL MEDICINE
Payer: MEDICARE

## 2019-07-19 PROCEDURE — 97110 THERAPEUTIC EXERCISES: CPT

## 2019-07-19 NOTE — PROGRESS NOTES
Theron Andre  : 1940  [place]  Secondary: Deepak Kenney at [place] [id number] [facility], [address] [place] [address],[address], [address] [facility]  [phone]   [phone]        OUTPATIENT PHYSICAL THERAPY: Daily Treatment Note 2019  Visit Count:  3    ICD-10: Treatment Diagnosis:   · Other abnormalities of gait and mobility (R26.89)  · Difficulty in walking, not elsewhere classified (R26.2)  Precautions/Allergies:   Vasotec [enalapril maleate]   TREATMENT PLAN:  Effective Dates: 2019 TO 10/10/2019 (90 days). Frequency/Duration: 2 times a week for 90 Day(s)    Pre-treatment Symptoms/Complaints:  2019: Patient reports he went home and took a nap yesterday after therapy. Pain: Initial: Pain Intensity 1: 0  Post Session:  0/10   Medications Last Reviewed:  2019  Updated Objective Findings:  None Today  TREATMENT:     THERAPEUTIC EXERCISE: (45 minutes):  Exercises per grid below to improve mobility, strength, balance and coordination. Required minimal visual, verbal and manual cues to promote proper body alignment, promote proper body posture and promote proper body mechanics. Progressed resistance, range, repetitions and complexity of movement as indicated.    Date:  2019   Activity/Exercise Parameters   Nu-step 6 minutes  Level 2   Standing march 15 reps  B LE   Standing hip extension 15 reps  B LE   Standing knee flexion 15 reps  B LE   Standing hip abduction 15 reps  B LE   Standing heel raises 15 reps  B LE   Standing toe raises 15 reps  B LE   Step ups 6 inch  10 reps  B LE   Bicep curls 5 pounds  15 reps  B UE  In sitting   Shoulder flexion 3 pounds  15 reps  B UE  In sitting   Digi-flex 5 reps each finger  Red Digi-flex   Digi-flex Gross  with all fingers  10 reps  B UE      Treatment/Session Summary:    · Response to Treatment:  Patient tolerated treatment with minimal complaints of fatigue; needed a few sitting rest breaks. · Communication/Consultation:  None today  · Equipment provided today:  None today  · Recommendations/Intent for next treatment session: Next visit will focus on improving overall mobility and safety with ambulation.     Total Treatment Billable Duration:  45 minutes  PT Patient Time In/Time Out  Time In: 1100  Time Out: 13[address] [facility],     Future Appointments   Date Time Provider Lashay Margi   7/19/2019 11:00 AM Linard Iris, PT [facility] SFE   7/23/2019  3:15 PM Linard Iirs, PT [facility] SFE   7/25/2019 10:15 AM Linard Iris, PT [facility] SFE   7/30/2019  3:15 PM Linard Iris, PT [facility] SFE   8/1/2019  3:30 PM Teresa Patel MD [facility] UC UCD   8/2/2019  1:45 PM Linard Iris, PT [facility] SFE   8/6/2019  1:00 PM Linard Iris, PT [facility] SFE   8/8/2019  1:00 PM Linard Iris, PT [facility] SFE   8/13/2019 10:00 AM Manas Duff DO BSNE BSNE   9/25/2019 11:00 AM Donald Boss MD [facility]

## 2019-07-23 ENCOUNTER — HOSPITAL ENCOUNTER (OUTPATIENT)
Dept: PHYSICAL THERAPY | Age: 79
Discharge: HOME | End: 2019-07-23
Attending: INTERNAL MEDICINE
Payer: MEDICARE

## 2019-07-23 PROCEDURE — 97110 THERAPEUTIC EXERCISES: CPT

## 2019-07-23 NOTE — PROGRESS NOTES
Mindi Henderson  : 1940  [place]  Secondary: Deepak Kenney at [address], [address] [place] [address],[address], [address]  [phone]   [phone]        OUTPATIENT PHYSICAL THERAPY: Daily Treatment Note 2019  Visit Count:  4    ICD-10: Treatment Diagnosis:   · Other abnormalities of gait and mobility (R26.89)  · Difficulty in walking, not elsewhere classified (R26.2)  Precautions/Allergies:   Vasotec [enalapril maleate]   TREATMENT PLAN:  Effective Dates: 2019 TO 10/10/2019 (90 days). Frequency/Duration: 2 times a week for 90 Day(s)    Pre-treatment Symptoms/Complaints:  2019: Patient reports he did not do much this weekend. Pain: Initial: Pain Intensity 1: 0  Post Session:  0/10   Medications Last Reviewed:  2019  Updated Objective Findings:  None Today  TREATMENT:     THERAPEUTIC EXERCISE: (45 minutes):  Exercises per grid below to improve mobility, strength, balance and coordination. Required minimal visual, verbal and manual cues to promote proper body alignment, promote proper body posture and promote proper body mechanics. Progressed resistance, range, repetitions and complexity of movement as indicated. Date:  2019   Activity/Exercise Parameters   Nu-step 6 minutes  Level 2   Standing march 15 reps  B LE   Standing hip extension 15 reps  B LE   Standing knee flexion 15 reps  B LE   Standing hip abduction 15 reps  B LE   Standing heel raises 15 reps  B LE   Standing toe raises 15 reps  B LE   Step ups 6 inch  10 reps  B LE   Bicep curls 5 pounds  15 reps  B UE  In sitting   Shoulder flexion 3 pounds  15 reps  B UE  In sitting   Digi-flex 5 reps each finger  Red Digi-flex   Digi-flex Gross  with all fingers  10 reps  B UE      Treatment/Session Summary:    · Response to Treatment:  Patient completed all activities with fewer sitting rest breaks.    · Communication/Consultation:  None today  · Equipment provided today:  None today  · Recommendations/Intent for next treatment session: Next visit will focus on improving overall mobility and safety with ambulation.     Total Treatment Billable Duration:  45 minutes  PT Patient Time In/Time Out  Time In: 6448  Time Out: Wil [address], PT    Future Appointments   Date Time Provider Lashay Margi   7/23/2019  3:15 PM Meryle Rung, PT [facility] SFE   7/25/2019 10:15 AM Meryle Rung, PT [facility] SFE   7/30/2019  3:15 PM Meryle Rung, PT [facility] SFE   8/1/2019  3:30 PM Aroldo Landry MD [facility] UCDG UCD   8/2/2019  1:45 PM Meryle Rung, PT [facility] SFE   8/6/2019  1:00 PM Meryle Rung, PT [facility] SFE   8/8/2019  1:00 PM Meryle Rung, PT [facility] SFE   8/13/2019 10:00 AM Dewayne Ravi DO BSNE BSNE   9/25/2019 11:00 AM Negin Fink MD [facility] MAT

## 2019-07-25 ENCOUNTER — HOSPITAL ENCOUNTER (OUTPATIENT)
Dept: PHYSICAL THERAPY | Age: 79
Discharge: HOME | End: 2019-07-25
Attending: INTERNAL MEDICINE
Payer: MEDICARE

## 2019-07-25 PROCEDURE — 97110 THERAPEUTIC EXERCISES: CPT

## 2019-07-25 NOTE — PROGRESS NOTES
Marleny Bowling  : 1940  [place]  Secondary: Deepak Kenney at [place] [id number] [facility], [address] [place] [address],[address], [facility] U. 91.  [phone]   [phone]        OUTPATIENT PHYSICAL THERAPY: Daily Treatment Note 2019  Visit Count:  5    ICD-10: Treatment Diagnosis:   · Other abnormalities of gait and mobility (R26.89)  · Difficulty in walking, not elsewhere classified (R26.2)  Precautions/Allergies:   Vasotec [enalapril maleate]   TREATMENT PLAN:  Effective Dates: 2019 TO 10/10/2019 (90 days). Frequency/Duration: 2 times a week for 90 Day(s)    Pre-treatment Symptoms/Complaints:  2019: Patient reports he is tired today. Pain: Initial: Pain Intensity 1: 0  Post Session:  0/10   Medications Last Reviewed:  2019  Updated Objective Findings:  None Today  TREATMENT:     THERAPEUTIC EXERCISE: (45 minutes):  Exercises per grid below to improve mobility, strength, balance and coordination. Required minimal visual, verbal and manual cues to promote proper body alignment, promote proper body posture and promote proper body mechanics. Progressed resistance, range, repetitions and complexity of movement as indicated. Date:  2019   Activity/Exercise Parameters   Nu-step 6 minutes  Level 2   Standing march 15 reps  B LE   Standing hip extension 15 reps  B LE   Standing knee flexion 15 reps  B LE   Standing hip abduction 15 reps  B LE   Standing heel raises 15 reps  B LE   Standing toe raises 15 reps  B LE   Step ups 6 inch  10 reps  B LE   Bicep curls 5 pounds  15 reps  B UE  In sitting   Shoulder flexion 3 pounds  15 reps  B UE  In sitting   Digi-flex 5 reps each finger  Red Digi-flex   Digi-flex Gross  with all fingers  10 reps  B UE      Treatment/Session Summary:    · Response to Treatment:  Patient completed all activities with fewer sitting rest breaks.    · Communication/Consultation:  None today  · Equipment provided today:  None today  · Recommendations/Intent for next treatment session: Next visit will focus on improving overall mobility and safety with ambulation.     Total Treatment Billable Duration:  45 minutes  PT Patient Time In/Time Out  Time In: 1015  Time Out: [address], PT    Future Appointments   Date Time Provider Lashay Kiser   7/25/2019 10:15 AM Carlitos Patel, PT [facility] SFE   7/30/2019  3:15 PM Carlitos Hove, PT [facility] SFE   8/1/2019  3:30 PM Lisbeth Nichols MD [facility] UCD   8/2/2019  1:45 PM Carlitos Hove, PT [facility]PT SFE   8/6/2019  1:00 PM Carlitoskaylee Rigginsve, PT SFEORPT SFE   8/8/2019  1:00 PM Carlitoskaylee Rigginsve, PT SFEORPT SFE   8/8/2019  3:15 PM Tay Walsh MD [id number] PGU   8/13/2019 10:00 AM Dalton Cabrera DO BSNE BSNE   9/25/2019 11:00 AM Rosemary Puente MD [facility]

## 2019-07-30 ENCOUNTER — HOSPITAL ENCOUNTER (OUTPATIENT)
Dept: PHYSICAL THERAPY | Age: 79
Discharge: HOME | End: 2019-07-30
Attending: INTERNAL MEDICINE
Payer: MEDICARE

## 2019-07-30 PROCEDURE — 97110 THERAPEUTIC EXERCISES: CPT

## 2019-07-30 NOTE — PROGRESS NOTES
Mindi Henderson  : 1940  [place]  Secondary: Deepak Kenney at [place] [id number] [facility], [address] [place] [address],[address], [facility] [address] 91.  [phone]   [phone]        OUTPATIENT PHYSICAL THERAPY: Daily Treatment Note 2019  Visit Count:  6    ICD-10: Treatment Diagnosis:   · Other abnormalities of gait and mobility (R26.89)  · Difficulty in walking, not elsewhere classified (R26.2)  Precautions/Allergies:   Vasotec [enalapril maleate]   TREATMENT PLAN:  Effective Dates: 2019 TO 10/10/2019 (90 days). Frequency/Duration: 2 times a week for 90 Day(s)    Pre-treatment Symptoms/Complaints:  2019: Patient reports he is tired and thinks that therapy makes him feel worse. Pain: Initial: Pain Intensity 1: 0  Post Session:  0/10   Medications Last Reviewed:  2019  Updated Objective Findings:  None Today  TREATMENT:     THERAPEUTIC EXERCISE: (45 minutes):  Exercises per grid below to improve mobility, strength, balance and coordination. Required minimal visual, verbal and manual cues to promote proper body alignment, promote proper body posture and promote proper body mechanics. Progressed resistance, range, repetitions and complexity of movement as indicated. Date:  2019   Activity/Exercise Parameters   Nu-step 6 minutes  Level 2   Standing march 15 reps  B LE   Standing hip extension 15 reps  B LE   Standing knee flexion 15 reps  B LE   Standing hip abduction 15 reps  B LE   Standing heel raises 15 reps  B LE   Standing toe raises 15 reps  B LE   Step ups 6 inch  10 reps  B LE   Bicep curls 5 pounds  15 reps  B UE  In sitting   Shoulder flexion 3 pounds  15 reps  B UE  In sitting   Digi-flex 5 reps each finger  Red Digi-flex   Digi-flex Gross  with all fingers  10 reps  B UE      Treatment/Session Summary:    · Response to Treatment:  Patient completed all activities with fewer sitting rest breaks. · Communication/Consultation:  None today  · Equipment provided today:  None today  · Recommendations/Intent for next treatment session: Next visit will focus on improving overall mobility and safety with ambulation.     Total Treatment Billable Duration:  45 minutes  PT Patient Time In/Time Out  Time In: 7200  Time Out: [address], PT    Future Appointments   Date Time Provider Lashay Kiser   7/30/2019  3:15 PM Sukhjinder Bishop, PT [facility] SFE   8/1/2019  3:30 PM Victor M Valdez MD SSA UCDG UCD   8/2/2019  1:45 PM Sukhjinder Bishop, PT SFEORPT SFE   8/6/2019  1:00 PM Sukhjinder Bishop PT SFEORPT SFE   8/8/2019  1:00 PM Sukhjinder Bishop PT SFEORPT SFE   8/8/2019  3:15 PM Nga Jiménez MD [id number] PGU   8/13/2019 10:00 AM Dany Daily DO BSNE BSNE   9/25/2019 11:00 AM Cali Moon MD [facility]

## 2019-08-02 ENCOUNTER — HOSPITAL ENCOUNTER (OUTPATIENT)
Dept: PHYSICAL THERAPY | Age: 79
Discharge: HOME | End: 2019-08-02
Attending: INTERNAL MEDICINE
Payer: MEDICARE

## 2019-08-02 PROCEDURE — 97110 THERAPEUTIC EXERCISES: CPT

## 2019-08-02 NOTE — PROGRESS NOTES
Gloria Quivers  : 1940  [place]  Secondary: Deepak Kenney at [address], [address] [place] [address],[address], [facility] [address] 91.  [phone]   [phone]        OUTPATIENT PHYSICAL THERAPY: Daily Treatment Note 2019  Visit Count:  7    ICD-10: Treatment Diagnosis:   · Other abnormalities of gait and mobility (R26.89)  · Difficulty in walking, not elsewhere classified (R26.2)  Precautions/Allergies:   Vasotec [enalapril maleate]   TREATMENT PLAN:  Effective Dates: 2019 TO 10/10/2019 (90 days). Frequency/Duration: 2 times a week for 90 Day(s)    Pre-treatment Symptoms/Complaints:  2019: Patient reports he has a headache today and has for the past few days. Pain: Initial: Pain Intensity 1: 0  Post Session:  0/10   Medications Last Reviewed:  2019  Updated Objective Findings:  None Today  TREATMENT:     THERAPEUTIC EXERCISE: (35 minutes):  Exercises per grid below to improve mobility, strength, balance and coordination. Required minimal visual, verbal and manual cues to promote proper body alignment, promote proper body posture and promote proper body mechanics. Progressed resistance, range, repetitions and complexity of movement as indicated. Date:  2019   Activity/Exercise Parameters   Nu-step 4 minutes  Level 2   Standing march 15 reps  B LE   Standing hip extension X    Standing knee flexion 15 reps  B LE   Standing hip abduction 15 reps  B LE   Standing heel raises 15 reps  B LE   Standing toe raises 15 reps  B LE   Step ups X    Bicep curls 5 pounds  15 reps  B UE  In sitting   Shoulder flexion 3 pounds  15 reps  B UE  In sitting   Digi-flex 5 reps each finger  Red Digi-flex   Digi-flex Gross  with all fingers  10 reps  B UE      Treatment/Session Summary:    · Response to Treatment:  Patient completed all activities with several rest breaks.   Patient was 10 minutes late.  · Communication/Consultation:  None today  · Equipment provided today:  None today  · Recommendations/Intent for next treatment session: Next visit will focus on improving overall mobility and safety with ambulation.     Total Treatment Billable Duration:  35 minutes  PT Patient Time In/Time Out  Time In: 9945  Time Out: JESSIKA Hogue    Future Appointments   Date Time Provider Lashay Kiser   8/6/2019  1:00 PM Ash[facility]   8/8/2019  1:00 PM [facility]g PT [facility]   8/8/2019  3:15 PM Abe Hassan MD [id number] PGU   8/13/2019 10:00 AM Nena Coto DO BSNE BSNE   9/25/2019 11:00 AM Mary Anderson MD [facility] MAT

## 2019-08-06 ENCOUNTER — HOSPITAL ENCOUNTER (OUTPATIENT)
Dept: PHYSICAL THERAPY | Age: 79
Discharge: HOME | End: 2019-08-06
Attending: INTERNAL MEDICINE
Payer: MEDICARE

## 2019-08-06 PROCEDURE — 97110 THERAPEUTIC EXERCISES: CPT

## 2019-08-06 NOTE — PROGRESS NOTES
Constance Hightower  : 1940  [place]  Secondary: Deepak Kenney at [place] [id number] [facility], [address] [place] [address],[address], [address] [facility]  [phone]   [phone]        OUTPATIENT PHYSICAL THERAPY: Daily Treatment Note 2019  Visit Count:  8    ICD-10: Treatment Diagnosis:   · Other abnormalities of gait and mobility (R26.89)  · Difficulty in walking, not elsewhere classified (R26.2)  Precautions/Allergies:   Vasotec [enalapril maleate]   TREATMENT PLAN:  Effective Dates: 2019 TO 10/10/2019 (90 days). Frequency/Duration: 2 times a week for 90 Day(s)    Pre-treatment Symptoms/Complaints:  2019: Patient reports he is very tired today and doesn't know how much he will be able to do. Pain: Initial: Pain Intensity 1: 0  Post Session:  0/10   Medications Last Reviewed:  2019  Updated Objective Findings:  None Today  TREATMENT:     THERAPEUTIC EXERCISE: (30 minutes):  Exercises per grid below to improve mobility, strength, balance and coordination. Required minimal visual, verbal and manual cues to promote proper body alignment, promote proper body posture and promote proper body mechanics. Progressed resistance, range, repetitions and complexity of movement as indicated. Date:  2019   Activity/Exercise Parameters   Nu-step 6 minutes  Level 3   Standing march X    Standing hip extension X    Standing knee flexion X    Standing hip abduction X    Standing heel raises X    Standing toe raises X    Step ups X    Bicep curls 5 pounds  15 reps  B UE  In sitting   Shoulder flexion 3 pounds  15 reps  B UE  In sitting   Digi-flex 5 reps each finger  Red Digi-flex   Digi-flex Gross  with all fingers  10 reps  B UE      Treatment/Session Summary:    · Response to Treatment:  Patient asked to not perform standing LE activities today secondary to fatigue.     · Communication/Consultation:  None today  · Equipment provided today:  None today  · Recommendations/Intent for next treatment session: Next visit will focus on improving overall mobility and safety with ambulation.     Total Treatment Billable Duration:  30 minutes  PT Patient Time In/Time Out  Time In: 1300  Time Out: [facility], [facility]    Future Appointments   Date Time Provider Lashay Margi   8/6/2019  1:00 PM Pablito Young, PT [facility]   8/8/2019  1:00 PM Pablito Young PT [facility]   8/8/2019  3:15 PM Donte Camacho MD [id number] PGU   8/13/2019 10:00 AM Dena Noel DO BSNE BSNE   9/25/2019 11:00 AM Anamaria Singh MD [facility] MAT

## 2019-08-08 ENCOUNTER — HOSPITAL ENCOUNTER (OUTPATIENT)
Dept: PHYSICAL THERAPY | Age: 79
Discharge: HOME | End: 2019-08-08
Attending: INTERNAL MEDICINE
Payer: MEDICARE

## 2019-08-08 PROCEDURE — 97110 THERAPEUTIC EXERCISES: CPT

## 2019-08-08 NOTE — PROGRESS NOTES
Melissa Wynn  : 1940  [place]  Secondary: Deepak Kenney at [address], [address] [place] [address],[address], [facility] [address] 91.  [phone]   [phone]        OUTPATIENT PHYSICAL THERAPY: Daily Treatment Note 2019  Visit Count:  9    ICD-10: Treatment Diagnosis:   · Other abnormalities of gait and mobility (R26.89)  · Difficulty in walking, not elsewhere classified (R26.2)  Precautions/Allergies:   Vasotec [enalapril maleate]   TREATMENT PLAN:  Effective Dates: 2019 TO 10/10/2019 (90 days). Frequency/Duration: 2 times a week for 90 Day(s)    Pre-treatment Symptoms/Complaints:  2019: Patient reports he is tired today. Pain: Initial: Pain Intensity 1: 0  Post Session:  0/10   Medications Last Reviewed:  2019  Updated Objective Findings:  None Today  TREATMENT:     THERAPEUTIC EXERCISE: (40 minutes):  Exercises per grid below to improve mobility, strength, balance and coordination. Required minimal visual, verbal and manual cues to promote proper body alignment, promote proper body posture and promote proper body mechanics. Progressed resistance, range, repetitions and complexity of movement as indicated. Date:  2019   Activity/Exercise Parameters   Nu-step 8 minutes  Level 3   Standing march 15 reps  B LE   Standing hip extension 15 reps  B LE    Standing knee flexion 15 reps  B LE   Standing hip abduction 15 reps  B LE   Standing heel raises 15 reps  B LE   Standing toe raises 15 reps  B LE   Step ups X    Bicep curls 5 pounds  15 reps  B UE  In sitting   Shoulder flexion 3 pounds  15 reps  B UE  In sitting   Digi-flex 5 reps each finger  Red Digi-flex   Digi-flex Gross  with all fingers  10 reps  B UE      Treatment/Session Summary:    · Response to Treatment:  Patient completed all activities with minimal change in mobility.   · Communication/Consultation:  None today  · Equipment provided today: None today  · Recommendations/Intent for next treatment session: Next visit will focus on improving overall mobility and safety with ambulation.     Total Treatment Billable Duration:  40 minutes  PT Patient Time In/Time Out  Time In: 1300  Time Out: 75 Adan Farrar PT    Future Appointments   Date Time Provider Lashay Kiser   8/8/2019  1:00 PM Osmin Stacy, [facility] SFE   8/13/2019 10:00 AM Marilu Moya DO BSNE BSNE   9/18/2019 10:15 AM Lucian Montoya MD [id number] PGU   9/25/2019 11:00 AM Trinidad Villalobos MD SSA MAT MAT

## 2019-09-25 ENCOUNTER — APPOINTMENT (OUTPATIENT)
Dept: GENERAL RADIOLOGY | Age: 79
DRG: 871 | End: 2019-09-25
Attending: EMERGENCY MEDICINE
Payer: MEDICARE

## 2019-09-25 ENCOUNTER — APPOINTMENT (OUTPATIENT)
Dept: CT IMAGING | Age: 79
DRG: 871 | End: 2019-09-25
Attending: PSYCHIATRY & NEUROLOGY
Payer: MEDICARE

## 2019-09-25 ENCOUNTER — APPOINTMENT (OUTPATIENT)
Dept: ULTRASOUND IMAGING | Age: 79
DRG: 871 | End: 2019-09-25
Attending: INTERNAL MEDICINE
Payer: MEDICARE

## 2019-09-25 ENCOUNTER — HOSPITAL ENCOUNTER (INPATIENT)
Age: 79
LOS: 5 days | Discharge: NURSING, REHAB OR LONG-TERM CARE | DRG: 871 | End: 2019-10-01
Attending: EMERGENCY MEDICINE | Admitting: INTERNAL MEDICINE
Payer: MEDICARE

## 2019-09-25 DIAGNOSIS — R40.4 TRANSIENT ALTERATION OF AWARENESS: Primary | ICD-10-CM

## 2019-09-25 DIAGNOSIS — E11.42 DIABETIC POLYNEUROPATHY ASSOCIATED WITH TYPE 2 DIABETES MELLITUS (HCC): Chronic | ICD-10-CM

## 2019-09-25 DIAGNOSIS — R47.1 DYSARTHRIA: ICD-10-CM

## 2019-09-25 DIAGNOSIS — R18.8 ASCITES OF LIVER: ICD-10-CM

## 2019-09-25 PROBLEM — R29.90 STROKE-LIKE SYMPTOMS: Status: ACTIVE | Noted: 2019-09-25

## 2019-09-25 LAB
AMMONIA PLAS-SCNC: 20 UMOL/L (ref 11–32)
ANION GAP SERPL CALC-SCNC: 8 MMOL/L (ref 7–16)
APPEARANCE UR: CLEAR
ATRIAL RATE: 97 BPM
BASOPHILS # BLD: 0 K/UL (ref 0–0.2)
BASOPHILS NFR BLD: 0 % (ref 0–2)
BILIRUB UR QL: NEGATIVE
BUN SERPL-MCNC: 38 MG/DL (ref 8–23)
CALCIUM SERPL-MCNC: 8.6 MG/DL (ref 8.3–10.4)
CALCULATED P AXIS, ECG09: 47 DEGREES
CALCULATED R AXIS, ECG10: -28 DEGREES
CALCULATED T AXIS, ECG11: 42 DEGREES
CHLORIDE SERPL-SCNC: 103 MMOL/L (ref 98–107)
CO2 SERPL-SCNC: 25 MMOL/L (ref 21–32)
COLOR UR: YELLOW
CREAT SERPL-MCNC: 1.85 MG/DL (ref 0.8–1.5)
DIAGNOSIS, 93000: NORMAL
DIFFERENTIAL METHOD BLD: ABNORMAL
EOSINOPHIL # BLD: 0 K/UL (ref 0–0.8)
EOSINOPHIL NFR BLD: 0 % (ref 0.5–7.8)
ERYTHROCYTE [DISTWIDTH] IN BLOOD BY AUTOMATED COUNT: 15.5 % (ref 11.9–14.6)
GLUCOSE BLD STRIP.AUTO-MCNC: 103 MG/DL (ref 65–100)
GLUCOSE BLD STRIP.AUTO-MCNC: 120 MG/DL (ref 65–100)
GLUCOSE SERPL-MCNC: 117 MG/DL (ref 65–100)
GLUCOSE UR STRIP.AUTO-MCNC: >1000 MG/DL
HCT VFR BLD AUTO: 39.2 % (ref 41.1–50.3)
HGB BLD-MCNC: 12.6 G/DL (ref 13.6–17.2)
HGB UR QL STRIP: NEGATIVE
IMM GRANULOCYTES # BLD AUTO: 0.1 K/UL (ref 0–0.5)
IMM GRANULOCYTES NFR BLD AUTO: 1 % (ref 0–5)
KETONES UR QL STRIP.AUTO: NEGATIVE MG/DL
LACTATE SERPL-SCNC: 1.4 MMOL/L (ref 0.4–2)
LEUKOCYTE ESTERASE UR QL STRIP.AUTO: NEGATIVE
LYMPHOCYTES # BLD: 1.4 K/UL (ref 0.5–4.6)
LYMPHOCYTES NFR BLD: 12 % (ref 13–44)
MCH RBC QN AUTO: 29 PG (ref 26.1–32.9)
MCHC RBC AUTO-ENTMCNC: 32.1 G/DL (ref 31.4–35)
MCV RBC AUTO: 90.3 FL (ref 79.6–97.8)
MONOCYTES # BLD: 1.1 K/UL (ref 0.1–1.3)
MONOCYTES NFR BLD: 9 % (ref 4–12)
NEUTS SEG # BLD: 9.3 K/UL (ref 1.7–8.2)
NEUTS SEG NFR BLD: 78 % (ref 43–78)
NITRITE UR QL STRIP.AUTO: NEGATIVE
NRBC # BLD: 0 K/UL (ref 0–0.2)
P-R INTERVAL, ECG05: 184 MS
PH UR STRIP: 7 [PH] (ref 5–9)
PLATELET # BLD AUTO: 171 K/UL (ref 150–450)
PMV BLD AUTO: 11.1 FL (ref 9.4–12.3)
POTASSIUM SERPL-SCNC: 4.1 MMOL/L (ref 3.5–5.1)
POTASSIUM SERPL-SCNC: 5.6 MMOL/L (ref 3.5–5.1)
PROCALCITONIN SERPL-MCNC: 0.8 NG/ML
PROT UR STRIP-MCNC: NEGATIVE MG/DL
Q-T INTERVAL, ECG07: 322 MS
QRS DURATION, ECG06: 102 MS
QTC CALCULATION (BEZET), ECG08: 408 MS
RBC # BLD AUTO: 4.34 M/UL (ref 4.23–5.6)
SODIUM SERPL-SCNC: 136 MMOL/L (ref 136–145)
SP GR UR REFRACTOMETRY: 1.02 (ref 1–1.02)
TROPONIN I SERPL-MCNC: <0.02 NG/ML (ref 0.02–0.05)
UROBILINOGEN UR QL STRIP.AUTO: 1 EU/DL (ref 0.2–1)
VENTRICULAR RATE, ECG03: 97 BPM
WBC # BLD AUTO: 11.9 K/UL (ref 4.3–11.1)

## 2019-09-25 PROCEDURE — 80048 BASIC METABOLIC PNL TOTAL CA: CPT

## 2019-09-25 PROCEDURE — 84484 ASSAY OF TROPONIN QUANT: CPT

## 2019-09-25 PROCEDURE — 93880 EXTRACRANIAL BILAT STUDY: CPT

## 2019-09-25 PROCEDURE — 81003 URINALYSIS AUTO W/O SCOPE: CPT

## 2019-09-25 PROCEDURE — 74011250637 HC RX REV CODE- 250/637: Performed by: HOSPITALIST

## 2019-09-25 PROCEDURE — 74011000258 HC RX REV CODE- 258: Performed by: INTERNAL MEDICINE

## 2019-09-25 PROCEDURE — 77030020255 HC SOL INJ LR 1000ML BG

## 2019-09-25 PROCEDURE — 84132 ASSAY OF SERUM POTASSIUM: CPT

## 2019-09-25 PROCEDURE — 74011250636 HC RX REV CODE- 250/636: Performed by: INTERNAL MEDICINE

## 2019-09-25 PROCEDURE — 77030011943

## 2019-09-25 PROCEDURE — 83605 ASSAY OF LACTIC ACID: CPT

## 2019-09-25 PROCEDURE — 99218 HC RM OBSERVATION: CPT

## 2019-09-25 PROCEDURE — 99285 EMERGENCY DEPT VISIT HI MDM: CPT | Performed by: EMERGENCY MEDICINE

## 2019-09-25 PROCEDURE — 93005 ELECTROCARDIOGRAM TRACING: CPT | Performed by: EMERGENCY MEDICINE

## 2019-09-25 PROCEDURE — 70450 CT HEAD/BRAIN W/O DYE: CPT

## 2019-09-25 PROCEDURE — 36415 COLL VENOUS BLD VENIPUNCTURE: CPT

## 2019-09-25 PROCEDURE — 77030040361 HC SLV COMPR DVT MDII -B

## 2019-09-25 PROCEDURE — 99285 EMERGENCY DEPT VISIT HI MDM: CPT | Performed by: PSYCHIATRY & NEUROLOGY

## 2019-09-25 PROCEDURE — 82962 GLUCOSE BLOOD TEST: CPT

## 2019-09-25 PROCEDURE — 71045 X-RAY EXAM CHEST 1 VIEW: CPT

## 2019-09-25 PROCEDURE — 84145 PROCALCITONIN (PCT): CPT

## 2019-09-25 PROCEDURE — 82140 ASSAY OF AMMONIA: CPT

## 2019-09-25 PROCEDURE — 87040 BLOOD CULTURE FOR BACTERIA: CPT

## 2019-09-25 PROCEDURE — 85025 COMPLETE CBC W/AUTO DIFF WBC: CPT

## 2019-09-25 RX ORDER — ATORVASTATIN CALCIUM 40 MG/1
80 TABLET, FILM COATED ORAL
Status: DISCONTINUED | OUTPATIENT
Start: 2019-09-25 | End: 2019-10-01 | Stop reason: HOSPADM

## 2019-09-25 RX ORDER — PREGABALIN 75 MG/1
150 CAPSULE ORAL 2 TIMES DAILY
Status: DISCONTINUED | OUTPATIENT
Start: 2019-09-25 | End: 2019-10-01 | Stop reason: HOSPADM

## 2019-09-25 RX ORDER — VALSARTAN 80 MG/1
80 TABLET ORAL DAILY
Status: DISCONTINUED | OUTPATIENT
Start: 2019-09-26 | End: 2019-10-01 | Stop reason: HOSPADM

## 2019-09-25 RX ORDER — ACETAMINOPHEN 650 MG/1
650 SUPPOSITORY RECTAL
Status: DISCONTINUED | OUTPATIENT
Start: 2019-09-25 | End: 2019-09-27

## 2019-09-25 RX ORDER — INSULIN GLARGINE 100 [IU]/ML
10 INJECTION, SOLUTION SUBCUTANEOUS
Status: DISCONTINUED | OUTPATIENT
Start: 2019-09-25 | End: 2019-09-26

## 2019-09-25 RX ORDER — SODIUM CHLORIDE 0.9 % (FLUSH) 0.9 %
5-40 SYRINGE (ML) INJECTION AS NEEDED
Status: DISCONTINUED | OUTPATIENT
Start: 2019-09-25 | End: 2019-10-01 | Stop reason: HOSPADM

## 2019-09-25 RX ORDER — POLYETHYLENE GLYCOL 3350 17 G/17G
17 POWDER, FOR SOLUTION ORAL DAILY
Status: DISCONTINUED | OUTPATIENT
Start: 2019-09-26 | End: 2019-09-26 | Stop reason: SDUPTHER

## 2019-09-25 RX ORDER — SODIUM CHLORIDE 9 MG/ML
125 INJECTION, SOLUTION INTRAVENOUS CONTINUOUS
Status: DISCONTINUED | OUTPATIENT
Start: 2019-09-25 | End: 2019-09-26

## 2019-09-25 RX ORDER — LORATADINE 10 MG/1
10 TABLET ORAL DAILY
Status: DISCONTINUED | OUTPATIENT
Start: 2019-09-26 | End: 2019-09-27

## 2019-09-25 RX ORDER — SODIUM CHLORIDE 0.9 % (FLUSH) 0.9 %
5-40 SYRINGE (ML) INJECTION EVERY 8 HOURS
Status: DISCONTINUED | OUTPATIENT
Start: 2019-09-25 | End: 2019-10-01 | Stop reason: HOSPADM

## 2019-09-25 RX ORDER — PANTOPRAZOLE SODIUM 40 MG/1
40 TABLET, DELAYED RELEASE ORAL
Status: DISCONTINUED | OUTPATIENT
Start: 2019-09-26 | End: 2019-10-01 | Stop reason: HOSPADM

## 2019-09-25 RX ORDER — LABETALOL HYDROCHLORIDE 5 MG/ML
5 INJECTION, SOLUTION INTRAVENOUS
Status: DISCONTINUED | OUTPATIENT
Start: 2019-09-25 | End: 2019-10-01 | Stop reason: HOSPADM

## 2019-09-25 RX ORDER — TAMSULOSIN HYDROCHLORIDE 0.4 MG/1
0.4 CAPSULE ORAL DAILY
Status: DISCONTINUED | OUTPATIENT
Start: 2019-09-26 | End: 2019-10-01 | Stop reason: HOSPADM

## 2019-09-25 RX ORDER — INSULIN LISPRO 100 [IU]/ML
INJECTION, SOLUTION INTRAVENOUS; SUBCUTANEOUS
Status: DISCONTINUED | OUTPATIENT
Start: 2019-09-25 | End: 2019-10-01 | Stop reason: HOSPADM

## 2019-09-25 RX ORDER — POTASSIUM CHLORIDE 20 MEQ/1
20 TABLET, EXTENDED RELEASE ORAL DAILY
Status: DISCONTINUED | OUTPATIENT
Start: 2019-09-26 | End: 2019-10-01 | Stop reason: HOSPADM

## 2019-09-25 RX ORDER — FUROSEMIDE 40 MG/1
40 TABLET ORAL 3 TIMES DAILY
Status: DISCONTINUED | OUTPATIENT
Start: 2019-09-25 | End: 2019-09-27

## 2019-09-25 RX ORDER — ASPIRIN 81 MG/1
81 TABLET ORAL DAILY
Status: DISCONTINUED | OUTPATIENT
Start: 2019-09-26 | End: 2019-10-01 | Stop reason: HOSPADM

## 2019-09-25 RX ORDER — CLOPIDOGREL BISULFATE 75 MG/1
75 TABLET ORAL DAILY
Status: DISCONTINUED | OUTPATIENT
Start: 2019-09-26 | End: 2019-10-01 | Stop reason: HOSPADM

## 2019-09-25 RX ORDER — ALBUTEROL SULFATE 0.83 MG/ML
2.5 SOLUTION RESPIRATORY (INHALATION)
Status: DISCONTINUED | OUTPATIENT
Start: 2019-09-25 | End: 2019-10-01 | Stop reason: HOSPADM

## 2019-09-25 RX ORDER — EZETIMIBE 10 MG/1
10 TABLET ORAL
Status: DISCONTINUED | OUTPATIENT
Start: 2019-09-25 | End: 2019-10-01 | Stop reason: HOSPADM

## 2019-09-25 RX ADMIN — SODIUM CHLORIDE 125 ML/HR: 900 INJECTION, SOLUTION INTRAVENOUS at 20:29

## 2019-09-25 RX ADMIN — ACETAMINOPHEN 650 MG: 650 SUPPOSITORY RECTAL at 22:46

## 2019-09-25 RX ADMIN — Medication 10 ML: at 23:04

## 2019-09-25 RX ADMIN — CEFTRIAXONE 1 G: 1 INJECTION, POWDER, FOR SOLUTION INTRAMUSCULAR; INTRAVENOUS at 20:25

## 2019-09-25 RX ADMIN — Medication 1 EACH: at 22:46

## 2019-09-25 NOTE — PROGRESS NOTES
09/25/19 1928   NIH Stroke Scale   Interval Other (comment)  (dual nih)   LOC 0   LOC Questions 1   LOC Commands 1   Best Gaze 1   Visual 0   Facial Palsy 1   Motor Right Arm 0   Motor Left Arm 0   Motor Right Leg 0   Motor Left Leg 1   Limb Ataxia 1   Sensory 0   Best Language 0   Dysarthria 1   Extinction and Inattention 0   Total 7

## 2019-09-25 NOTE — PROGRESS NOTES
09/25/19 1743   Dual Skin Pressure Injury Assessment   Dual Skin Pressure Injury Assessment WDL   Scar to right knee, generalized dry, flaky skin.

## 2019-09-25 NOTE — ED NOTES
TRANSFER - OUT REPORT:    Verbal report given to Dora Daniels RN(name) on Russ Do  being transferred to 732(unit) for routine progression of care       Report consisted of patients Situation, Background, Assessment and   Recommendations(SBAR). Information from the following report(s) SBAR, ED Summary and Recent Results was reveiwed with the receiving nurse. Opportunity for questions and clarification was provided.

## 2019-09-25 NOTE — CONSULTS
Consult    Patient: Fior Snow MRN: [id number]  SSN: [id number]    YOB: 1940  Age: 70 y.o. Sex: male      Subjective:      Fior Snow is a 70 y.o. male who is being seen for acute stroke alert. The stroke alert team was in the emergency room having been given advance warning 10 minutes prior to the patient's arrival and he was seen immediately    The patient was last known well at 0730 hrs. morning. He was noted to be confused paramedics were called and there was a concern with reference to right-sided weakness he was brought to the hospital and an immediate urgent Code S was called. Patient has a lengthy past medical history which is alluded to below.         Past Medical History:   Diagnosis Date    Cervical stenosis of spine     Chronic kidney disease     Stage 3    Degenerative disc disease, lumbar     Diabetes mellitus type II     uncontrolled-insulin and oral med. 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 ([address]     Pulmonary embolism ([address] Mar 2014    Bilateral - Completed 6 months on Xarelto    TIA (transient ischemic attack) 11/9/2017    Type 2 diabetes, uncontrolled, with neuropathy ([address] 1/22/2015     Past Surgical History:   Procedure Laterality Date    COLONOSCOPY  07/29/2010    Diverticulosis & Colon/Rectal Polyps - Due 2020    EGD  5/9/2011         HX BUNIONECTOMY Bilateral     HX HERNIA REPAIR Bilateral     Inguinal, Repeat on Both Sides Separately    HX KNEE ARTHROSCOPY  1991    x 3    SINUS SURGERY PROC UNLISTED      TOTAL KNEE ARTHROPLASTY Right 2006      Family History   Problem Relation Age of Onset    Stroke Mother     Diabetes Mother     Heart Disease Mother     Diabetes Maternal Grandmother     Glaucoma Maternal Grandmother     Stroke Father     Diabetes Father     Diabetes Paternal Aunt     Cancer Paternal Aunt     Diabetes Paternal Uncle     Cancer Paternal Uncle         Colon    Heart Attack Sister 76    Cancer Sister     Prostate Cancer Brother     Heart Disease Sister 48    Prostate Cancer Brother      Social History     Tobacco Use    Smoking status: Never Smoker    Smokeless tobacco: Never Used   Substance Use Topics    Alcohol use: No      Current Outpatient Medications   Medication Sig Dispense Refill    tamsulosin (FLOMAX) 0.4 mg capsule Take 1 Cap by mouth daily. 90 Cap 3    spironolactone (ALDACTONE) 25 mg tablet TAKE 1 TABLET BY MOUTH EVERY DAY 90 Tab 3    busPIRone (BUSPAR) 15 mg tablet Take 1 Tab by mouth daily. 90 Tab 0    metFORMIN (GLUCOPHAGE) 1,000 mg tablet Take 1 Tab by mouth two (2) times daily (with meals). 180 Tab 0    potassium chloride (KLOR-CON M20) 20 mEq tablet TAKE 1 TABLET TWICE A  Tab 3    empagliflozin (JARDIANCE) 10 mg tablet Take 1 Tab by mouth daily. 30 Tab 5    atorvastatin (LIPITOR) 80 mg tablet Take 1 Tab by mouth nightly. 90 Tab 3    Insulin Needles, Disposable, (1ST TIER UNIFINE PENTIPS) 32 gauge x 5/32\" ndle Use to inject insulin 200 Pen Needle 1    insulin syr/ndl U100 half rashida 0.3 mL 31 gauge x 15/64\" syrg Use to inject insulin 200 Syringe 1    polyethylene glycol (MIRALAX) 17 gram/dose powder Take 17 g by mouth daily. 17 g 5    lancets misc Use to test glucose 4 times/day.   Dx: E11.51, I10 150 Each 11    glucose blood VI test strips (FREESTYLE LITE STRIPS) strip Use to check glucose 4 times/day. Dx: E11.51, I10 150 Strip 11    insulin aspart U-100 (NOVOLOG FLEXPEN U-100 INSULIN) 100 unit/mL (3 mL) inpn Inject 8 units standing dose + additional insulin according to sliding scale (up to 16 units) subQ before each meal 15 Adjustable Dose Pre-filled Pen Syringe 3    furosemide (LASIX) 40 mg tablet Take 1 Tab by mouth three (3) times daily. 270 Tab 3    carvedilol (COREG) 25 mg tablet Take 0.5 Tabs by mouth two (2) times daily (with meals). 90 Tab 3    raNITIdine (ZANTAC) 150 mg tablet Take 150 mg by mouth two (2) times a day.  insulin degludec (TRESIBA FLEXTOUCH U-100) 100 unit/mL (3 mL) inpn by SubCUTAneous route.  pantoprazole (PROTONIX) 40 mg tablet Take 1 tablet po 30 minutes before breakfast 90 Tab 3    ezetimibe (ZETIA) 10 mg tablet Take 1 Tab by mouth nightly. 90 Tab 3    irbesartan (AVAPRO) 150 mg tablet Take 1 Tab by mouth nightly. 90 Tab 3    clopidogrel (PLAVIX) 75 mg tab Take 1 Tab by mouth daily. 90 Tab 3    Insulin Syringe-Needle U-100 (BD INSULIN SYRINGE ULTRA-FINE) 0.3 mL 31 gauge x 5/16\" syrg Use to injection insulin 3 times/day at meals. Dx: E11.40 270 Syringe 3    Lancets misc Use to check glucose 4 times/day as directed. Dx: E11.40 120 Each 11    cloNIDine HCl (CATAPRES) 0.1 mg tablet Take 1 Tab by mouth two (2) times a day. 180 Tab 3    albuterol (VENTOLIN HFA) 90 mcg/actuation inhaler Take 2 Puffs by inhalation every four (4) hours as needed for Wheezing or Shortness of Breath. 1 Inhaler 3    pregabalin (LYRICA) 150 mg capsule Take 150 mg by mouth two (2) times a day. [facility] [address] Bed  Dx: Mild Diastolic Dysfunction (DUT-50 I51.9)  Pulmonary Hypertension (ICD-10 I27.2)  Morbid Obesity (ICD-10 E66.01)  Lumbar DDD (ICD-10 M51.36)  Obstructive Sleep Apnea (ICD-10 G47.33) 1 Device 0    loratadine (CLARITIN) 10 mg tablet Take 10 mg by mouth daily.       multivit-min-FA-lycopen-lutein (CENTRUM SILVER) 0.4-300-250 mg-mcg-mcg tab Take  by mouth.  HYDROcodone-acetaminophen (NORCO)  mg tablet Take 1 Tab by mouth every six (6) hours as needed for Pain. 20 Tab 0    aspirin 81 mg tablet Take 81 mg by mouth daily. Allergies   Allergen Reactions    Vasotec [Enalapril Maleate] Shortness of Breath and Cough       Review of Systems:  Not done in an acute stroke setting    Objective:   Review of the patient's prior CT scan in care everywhere    [id number] CT HEAD WO CONTRAST   ACCESSION NO: [id number]  ORDERED BY: Danny Maradiaga MD    DATE OF EXAM: 06/29/2017 17:35  REASON FOR Clark Fontana    ADMISSION DATE: 06/29/2017 17:15        CT Brain was performed without administration of contrast.    Indication:  Stroke    Comparison: None    Findings:  There is diffuse cerebral atrophy. The ventricles and sulci are within normal limits for size.  There are no abnormal intra-axial or extra-axial fluid collection. There is some scattered areas of low attenuation that are nonspecific.  There is no evidence of intracranial, hemmorhage, hematoma, or shift of the midline structures.  There is no evidence of shift of the midline or brainstem herniation. Intracranial vascular calcifications are seen. The left maxillary sinus is slightly small in size.  There is a small amount of fluid within the left maxillary sinus.  There is also fluid within the right sphenoid sinus. Impression:  No evidence of acute intracranial process. These findings were discussed with Dr. Saad Parker, physician, at 550 p.m. on 06/29/2017 via telephone. :   maggi  TRANSCRIBE TIME/DATE: 06/29/2017 05:50 pm  Vitals:    09/25/19 1413   BP: 193/79   Pulse: 97   Temp: 99.5 °F (37.5 °C)        Physical Exam:  He is alert but appears cognitively somewhat bemused -American gentleman. He is able to give his age birthdate and the year.   The NIH score is fluctuating but appears to be of the order of 3-4    There is no evidence of the visual field abnormality. The tongue is midline in the mouth shoulder shrug is symmetric. No persistent asymmetry to the nasolabial folds    A metabolic flap is noted in both upper extremities together with some multifocal asterixis. Impression of the ankle reflexes. 1+ reflexes elsewhere. There is no persistent abnormality in terms of drift testing. Finger-to-nose is inhibited by the his underlying asterixis    Not sufficiently attentive for sensory examination, which would be compromised in terms of acute vascular assessment by his underlying documented peripheral neuropathy    General physical examination demonstrates the presence of a distended abdomen diminished bowel sounds and clinically would appear to have ascites        Assessment:   Fluctuating focal neurological deficit with significant asterixis. Acute toxic metabolic encephalopathy  Hospital Problems  Date Reviewed: 9/18/2019    None          Plan:   Discussed management with the emergency room physician. I do not believe on clinical grounds this is an acute stroke    There is no evidence to suggest any difference in the CT scan appearance now following the scan as reported 2 years ago.     We will be pleased to see again if evidence of further persistent localized neurological disturbance but believe a systemic work-up is more appropriate at the present time    Secondary to my concern with regards to a acute toxic metabolic process I did not believe that a CTA was appropriate as he does not have clinical evidence to suggest a large vessel occlusion and I believe that any intervention in that regard would be contraindicated by his general systemic condition    Secondary to the above considerations and with those being outside the time window he is not a candidate for TPA      Signed By: Cali Muniz MD     September 25, 2019

## 2019-09-25 NOTE — PROGRESS NOTES
TRANSFER - IN REPORT:    Verbal report received from La Nena Floyd, [address] [facility] on [address]  being received from ED for routine progression of care      Report consisted of patients Situation, Background, Assessment and   Recommendations(SBAR). Information from the following report(s) SBAR, Kardex, ED Summary, Procedure Summary, Intake/Output, MAR and Recent Results was reviewed with the receiving nurse. Opportunity for questions and clarification was provided. Assessment completed upon patients arrival to unit and care assumed.

## 2019-09-25 NOTE — PROGRESS NOTES
Initial visit to assess pt's spiritual needs. Feeling today? Pt presented as slightly unfocused; pt just transported to floor from ED    Receiving good care? Needs from Spiritual Care:  Pt did not indicate a need for spiritual care; follow up visit would be advised. Ministry of presence & prayer to demonstrate caring & concern, convey emotional & spiritual support.      Mouna Holder MDiv,ThM,PhD

## 2019-09-25 NOTE — H&P
HOSPITALIST H&P/CONSULT  NAME:  Buster Kussmaul   Age:  70 y.o.  :   1940   MRN:   [id number]  PCP: Jenny Rangel MD  Consulting MD:  Treatment Team: Attending Provider: Darlene Lara DO  HPI:   71 yo AAM with past history of CKD Stage III, IDDM type II, diastolic heart failure, GERD, HTN, HLD, previous TIAs, prior pulmonary embolism, MARLEEN, morbid obesity presents from home via EMS after patient was observed to not be talking well over the phone with a friend. Per sister at bedside, Henna Almazan started doing this grunting or groaning and he actually did a similar type of spell last week and knocked prune juice all over himself. \" Currently at bedside, patient is very slow to answer some questions and it seems like he is having some difficulty with some word finding. He states he did not feel any differently this morning when he woke up. He does not really have any recollection of what was going on when he was talking on the phone. He says he felt \"terrible\" last night and contributed to \"I think it was something that I ate. \" Currently, he denies fevers/chills, chest pain, shortness of breath. He does have some lower abdominal pain and states, \"I really have to go! \" He has swelling in his lower extremities that is unchanged. He says he saw his urologist last week and he was started on Flomax. ED Course: upon presentation, code S called and patient taken to CT scanner with CT head unremarkable. Patient deemed not a candidate for tPA due to being outside the window of presentation as well as unreliable duration of symptoms. Seen by neurology with low suspicion for stroke and higher concern for metabolic encephalopathy. WBC rise to 11.9. Procalcitonin of 0.8. Troponin negative x1. K of 5.6 and SCr of 1.8 (appears at baseline). Serum ammonia of 20. CBG of 117. CXR unremarkable. EKG showing NSR with no ST-T wave abnormalities. Hospitalist called for admission.      Complete ROS done and is as stated in HPI or otherwise negative  Past Medical History:   Diagnosis Date    Cervical stenosis of spine     Chronic kidney disease     Stage 3    Degenerative disc disease, lumbar     Diabetes mellitus type II     uncontrolled-insulin and oral med. 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 ([address]     Pulmonary embolism ([facility] Mar 2014    Bilateral - Completed 6 months on Xarelto    TIA (transient ischemic attack) 11/9/2017    Type 2 diabetes, uncontrolled, with neuropathy ([facility] 1/22/2015      Past Surgical History:   Procedure Laterality Date    COLONOSCOPY  07/29/2010    Diverticulosis & Colon/Rectal Polyps - Due 2020    EGD  5/9/2011         HX BUNIONECTOMY Bilateral     HX HERNIA REPAIR Bilateral     Inguinal, Repeat on Both Sides Separately    HX KNEE ARTHROSCOPY  1991    x 3    SINUS SURGERY PROC UNLISTED      TOTAL KNEE ARTHROPLASTY Right 2006      Prior to Admission Medications   Prescriptions Last Dose Informant Patient Reported? Taking? HYDROcodone-acetaminophen (NORCO)  mg tablet   No No   Sig: Take 1 Tab by mouth every six (6) hours as needed for Pain.    Insulin Needles, Disposable, (1ST TIER UNIFINE PENTIPS) 32 gauge x 5/32\" ndle   No No   Sig: Use to inject insulin   Insulin Syringe-Needle U-100 (BD INSULIN SYRINGE ULTRA-FINE) 0.3 mL 31 gauge x 5/16\" syrg   No No   Sig: Use to injection insulin 3 times/day at meals. Dx: E11.40   Lancets misc   No No   Sig: Use to check glucose 4 times/day as directed. Dx: E11.40   OTHER   No No   Sig: Semi-Electric Hospital Bed  Dx: Mild Diastolic Dysfunction (UXE-37 I51.9)  Pulmonary Hypertension (ICD-10 I27.2)  Morbid Obesity (ICD-10 E66.01)  Lumbar DDD (ICD-10 M51.36)  Obstructive Sleep Apnea (ICD-10 G47.33)   albuterol (VENTOLIN HFA) 90 mcg/actuation inhaler   No No   Sig: Take 2 Puffs by inhalation every four (4) hours as needed for Wheezing or Shortness of Breath. aspirin 81 mg tablet   Yes No   Sig: Take 81 mg by mouth daily. atorvastatin (LIPITOR) 80 mg tablet   No No   Sig: Take 1 Tab by mouth nightly. busPIRone (BUSPAR) 15 mg tablet   No No   Sig: Take 1 Tab by mouth daily. carvedilol (COREG) 25 mg tablet   No No   Sig: Take 0.5 Tabs by mouth two (2) times daily (with meals). cloNIDine HCl (CATAPRES) 0.1 mg tablet   No No   Sig: Take 1 Tab by mouth two (2) times a day. clopidogrel (PLAVIX) 75 mg tab   No No   Sig: Take 1 Tab by mouth daily. empagliflozin (JARDIANCE) 10 mg tablet   No No   Sig: Take 1 Tab by mouth daily. ezetimibe (ZETIA) 10 mg tablet   No No   Sig: Take 1 Tab by mouth nightly. furosemide (LASIX) 40 mg tablet   No No   Sig: Take 1 Tab by mouth three (3) times daily. glucose blood VI test strips (FREESTYLE LITE STRIPS) strip   No No   Sig: Use to check glucose 4 times/day. Dx: E11.51, I10   insulin aspart U-100 (NOVOLOG FLEXPEN U-100 INSULIN) 100 unit/mL (3 mL) inpn   No No   Sig: Inject 8 units standing dose + additional insulin according to sliding scale (up to 16 units) subQ before each meal   insulin degludec (TRESIBA FLEXTOUCH U-100) 100 unit/mL (3 mL) inpn   Yes No   Sig: by SubCUTAneous route. insulin syr/ndl U100 half rashida 0.3 mL 31 gauge x 15/64\" syrg   No No   Sig: Use to inject insulin   irbesartan (AVAPRO) 150 mg tablet   No No   Sig: Take 1 Tab by mouth nightly.    lancets misc   No No   Sig: Use to test glucose 4 times/day. Dx: E11.51, I10   loratadine (CLARITIN) 10 mg tablet   Yes No   Sig: Take 10 mg by mouth daily. metFORMIN (GLUCOPHAGE) 1,000 mg tablet   No No   Sig: Take 1 Tab by mouth two (2) times daily (with meals). multivit-min-FA-lycopen-lutein (CENTRUM SILVER) 0.4-300-250 mg-mcg-mcg tab   Yes No   Sig: Take  by mouth.   pantoprazole (PROTONIX) 40 mg tablet   No No   Sig: Take 1 tablet po 30 minutes before breakfast   polyethylene glycol (MIRALAX) 17 gram/dose powder   No No   Sig: Take 17 g by mouth daily. potassium chloride (KLOR-CON M20) 20 mEq tablet   No No   Sig: TAKE 1 TABLET TWICE A DAY   pregabalin (LYRICA) 150 mg capsule   Yes No   Sig: Take 150 mg by mouth two (2) times a day. raNITIdine (ZANTAC) 150 mg tablet   Yes No   Sig: Take 150 mg by mouth two (2) times a day. spironolactone (ALDACTONE) 25 mg tablet   No No   Sig: TAKE 1 TABLET BY MOUTH EVERY DAY   tamsulosin (FLOMAX) 0.4 mg capsule   No No   Sig: Take 1 Cap by mouth daily.       Facility-Administered Medications: None     Allergies   Allergen Reactions    Vasotec [Enalapril Maleate] Shortness of Breath and Cough      Social History     Tobacco Use    Smoking status: Never Smoker    Smokeless tobacco: Never Used   Substance Use Topics    Alcohol use: No      Family History   Problem Relation Age of Onset    Stroke Mother     Diabetes Mother     Heart Disease Mother     Diabetes Maternal Grandmother     Glaucoma Maternal Grandmother     Stroke Father     Diabetes Father     Diabetes Paternal Aunt     Cancer Paternal Aunt     Diabetes Paternal Uncle     Cancer Paternal Uncle         Colon    Heart Attack Sister 76    Cancer Sister     Prostate Cancer Brother     Heart Disease Sister 48    Prostate Cancer Brother       Objective:     Visit Vitals  /80 (BP 1 Location: Right arm, BP Patient Position: At rest)   Pulse 98   Temp 99 °F (37.2 °C)   Resp 22   SpO2 91%      Temp (24hrs), Av.3 °F (37.4 °C), Min:99 °F (37.2 °C), Max:99.5 °F (37.5 °C)    Oxygen Therapy  O2 Sat (%): 91 % (09/25/19 1743)  Pulse via Oximetry: 102 beats per minute (09/25/19 1558)  Physical Exam:  General:    Alert, cooperative, no distress, answers questions appropriately but slow to respond  Head:   Normocephalic, without obvious abnormality, atraumatic. Nose:  Nares normal. No drainage or sinus tenderness. Lungs:   Clear to auscultation bilaterally. No Wheezing or Rhonchi. No rales. Heart:   Regular rate and rhythm,  no murmur, rub or gallop. Abdomen:   Soft, non-tender. Not distended. Bowel sounds normal. +suprapubic tenderness  Extremities: No cyanosis. No edema. No clubbing  Skin:     Texture, turgor normal. No rashes or lesions. Not Jaundiced  Neurologic: Alert and oriented x 3. +4/5 muscle strength on hip flexion b/l. +5/5 muscle strength of upper extremities b/l. Finger-to-nose testing intact. CN II thru XII intact b/l. Sensation to light touch intact UEs and Harriet b/l. PERRL b/l.      Data Review:   Recent Results (from the past 24 hour(s))   GLUCOSE, POC    Collection Time: 09/25/19  2:14 PM   Result Value Ref Range    Glucose (POC) 120 (H) 65 - 860 mg/dL   METABOLIC PANEL, BASIC    Collection Time: 09/25/19  2:15 PM   Result Value Ref Range    Sodium 136 136 - 145 mmol/L    Potassium 5.6 (H) 3.5 - 5.1 mmol/L    Chloride 103 98 - 107 mmol/L    CO2 25 21 - 32 mmol/L    Anion gap 8 7 - 16 mmol/L    Glucose 117 (H) 65 - 100 mg/dL    BUN 38 (H) 8 - 23 MG/DL    Creatinine 1.85 (H) 0.8 - 1.5 MG/DL    GFR est AA 46 (L) >60 ml/min/1.73m2    GFR est non-AA 38 (L) >60 ml/min/1.73m2    Calcium 8.6 8.3 - 10.4 MG/DL   TROPONIN I    Collection Time: 09/25/19  2:15 PM   Result Value Ref Range    Troponin-I, Qt. <0.02 (L) 0.02 - 0.05 NG/ML   PROCALCITONIN    Collection Time: 09/25/19  2:15 PM   Result Value Ref Range    Procalcitonin 0.8 ng/mL   EKG, 12 LEAD, INITIAL    Collection Time: 09/25/19  2:16 PM   Result Value Ref Range    Ventricular Rate 97 BPM    Atrial Rate 97 BPM    P-R Interval 184 ms    QRS Duration 102 ms    Q-T Interval 322 ms    QTC Calculation (Bezet) 408 ms    Calculated P Axis 47 degrees    Calculated R Axis -28 degrees    Calculated T Axis 42 degrees    Diagnosis       !! AGE AND GENDER SPECIFIC ECG ANALYSIS !! Normal sinus rhythm  Normal ECG  When compared with ECG of 12-JUN-2017 12:48,  Previous ECG has undetermined rhythm, needs review  Non-specific change in ST segment in Anterior leads  Nonspecific T wave abnormality no longer evident in Inferior leads  Nonspecific T wave abnormality, improved in Anterolateral leads  QT has lengthened  Confirmed by [facility]  MD ()DONNELL ([id number]) on 9/25/2019 4:11:53 PM     AMMONIA    Collection Time: 09/25/19  3:18 PM   Result Value Ref Range    Ammonia 20 11 - 32 UMOL/L   CBC WITH AUTOMATED DIFF    Collection Time: 09/25/19  3:18 PM   Result Value Ref Range    WBC 11.9 (H) 4.3 - 11.1 K/uL    RBC 4.34 4.23 - 5.6 M/uL    HGB 12.6 (L) 13.6 - 17.2 g/dL    HCT 39.2 (L) 41.1 - 50.3 %    MCV 90.3 79.6 - 97.8 FL    MCH 29.0 26.1 - 32.9 PG    MCHC 32.1 31.4 - 35.0 g/dL    RDW 15.5 (H) 11.9 - 14.6 %    PLATELET 110 765 - 656 K/uL    MPV 11.1 9.4 - 12.3 FL    ABSOLUTE NRBC 0.00 0.0 - 0.2 K/uL    DF AUTOMATED      NEUTROPHILS 78 43 - 78 %    LYMPHOCYTES 12 (L) 13 - 44 %    MONOCYTES 9 4.0 - 12.0 %    EOSINOPHILS 0 (L) 0.5 - 7.8 %    BASOPHILS 0 0.0 - 2.0 %    IMMATURE GRANULOCYTES 1 0.0 - 5.0 %    ABS. NEUTROPHILS 9.3 (H) 1.7 - 8.2 K/UL    ABS. LYMPHOCYTES 1.4 0.5 - 4.6 K/UL    ABS. MONOCYTES 1.1 0.1 - 1.3 K/UL    ABS. EOSINOPHILS 0.0 0.0 - 0.8 K/UL    ABS. BASOPHILS 0.0 0.0 - 0.2 K/UL    ABS. IMM. GRANS. 0.1 0.0 - 0.5 K/UL     Imaging /Procedures /Studies     Assessment and Plan:      Active Hospital Problems    Diagnosis Date Noted    Stroke-like symptoms 09/25/2019    CKD (chronic kidney disease) stage 3, GFR 30-59 ml/min ([facility] 06/24/2019    Diabetic neuropathy associated with type 2 diabetes mellitus ([facility] 03/84/2453    Diastolic CHF, chronic ([facility] 03/20/2017    Pulmonary hypertension ([facility] 10/14/2015    Gastroesophageal reflux disease without esophagitis 04/21/2015    Hyperlipidemia with target LDL less than 70     Essential hypertension, benign 01/22/2015    Type 2 diabetes, uncontrolled, with neuropathy ([facility] 01/22/2015    Obstructive sleep apnea of adult 01/22/2015       PLAN    # Acute metabolic encephalopathy  - given elevated WBC count, procalcitonin, and suprapubic tenderness, will get sepsis workup and start the patient on Rocephin empirically for UTI (UA collected and pending)  - CT head negative  - MRI, TTE, carotid ultrasound pending  - permissive hypertension, hold home antihypertensives for now and use labetalol for SBP>220 and/or DBP>110  - risk stratification with HgbA1c and lipid panel  - encephalopathy panel with UA, blood cultures x2, TSH, RPR, folate, vitamin B12, TSH, LFTs  - ?nonconvulsive seizure as etiology for \"spells\", will order EEG  - ammonia negative  - PT/OT/speech therapy consults  - continuous neuro checks  - hold heparin products for now  - continue with ASA and Plavix  - continue with statin    # CKD Stage III  - currently at baseline  - avoid nephrotoxic meds  - serial BMPs    # DM type II  - Lantus 10 units   - Humalog SSI and serial CBGs with goal CBGs between 140-180 while inpatient  - repeat HgbA1c pending    # History of pulmonary hypertension and diastolic dysfunction  - continue with home diuretics    # GERD  - continue with pantoprazole    F/E/N: start maintenance fluids, replete electrolytes as needed, diabetic diet    Ppx: SCDs for VTE    Code Status: FULL CODE    Disposition: Admit to OBS with plan as above. PT/OT consults. PPD ordered. Discussed with patient and family at bedside. All questions answered.      Signed By: Tyron Faust DO     September 25, 2019

## 2019-09-25 NOTE — ED TRIAGE NOTES
Patient comes from home via EMS for Code S. As of 0730 today normal and at 1230 unable to speak, per family. For EMS, drift to right arm and right leg cannot lift. VS fpr /80,  HR, 94% RA, put on 3 L increased to 99%. Absent gaze on arrival, resolved by ED.  Patient arrived and immediately taken to CT with Stroke coordinator and neurologist.

## 2019-09-25 NOTE — ED PROVIDER NOTES
Patient presents from home with a story per EMS that he was normal at 730 this morning when he talked with his sister, but sometime in the last 6 hours patient became a phasic and would not respond. Patient had a RACE score of 5 and code stroke was called in the field. The history is provided by the patient and the EMS personnel. The history is limited by the condition of the patient. Extremity Weakness    This is a new problem. Episode onset: Unknown, last known normal at 730 this morning. The problem occurs constantly. The problem has been gradually improving. The patient is experiencing no pain. Pertinent negatives include no numbness, full range of motion, no stiffness, no tingling, no itching and no back pain. He has tried rest for the symptoms. The treatment provided moderate relief. There has been no history of extremity trauma.         Past Medical History:   Diagnosis Date    Cervical stenosis of spine     Chronic kidney disease     Stage 3    Degenerative disc disease, lumbar     Diabetes mellitus type II     uncontrolled-insulin and oral med. 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 ([address]     Pulmonary embolism ([address] Mar 2014    Bilateral - Completed 6 months on Xarelto    TIA (transient ischemic attack) 11/9/2017    Type 2 diabetes, uncontrolled, with neuropathy ([address] 1/22/2015       Past Surgical History:   Procedure Laterality Date    COLONOSCOPY  07/29/2010    Diverticulosis & Colon/Rectal Polyps - Due 2020    EGD  5/9/2011         HX BUNIONECTOMY Bilateral     HX HERNIA REPAIR Bilateral     Inguinal, Repeat on Both Sides Separately    HX KNEE ARTHROSCOPY  1991    x 3    SINUS SURGERY PROC UNLISTED      TOTAL KNEE ARTHROPLASTY Right 2006         Family History:   Problem Relation Age of Onset    Stroke Mother     Diabetes Mother     Heart Disease Mother     Diabetes Maternal Grandmother     Glaucoma Maternal Grandmother     Stroke Father     Diabetes Father     Diabetes Paternal Aunt     Cancer Paternal Aunt     Diabetes Paternal Uncle     Cancer Paternal Uncle         Colon    Heart Attack Sister 76    Cancer Sister     Prostate Cancer Brother     Heart Disease Sister 48    Prostate Cancer Brother        Social History     Socioeconomic History    Marital status: SINGLE     Spouse name: Not on file    Number of children: Not on file    Years of education: Not on file    Highest education level: Not on file   Occupational History    Not on file   Social Needs    Financial resource strain: Not on file    Food insecurity:     Worry: Not on file     Inability: Not on file    Transportation needs:     Medical: Not on file     Non-medical: Not on file   Tobacco Use    Smoking status: Never Smoker    Smokeless tobacco: Never Used   Substance and Sexual Activity    Alcohol use: No    Drug use: No    Sexual activity: Not on file   Lifestyle    Physical activity:     Days per week: Not on file     Minutes per session: Not on file    Stress: Not on file   Relationships    Social connections:     Talks on phone: Not on file     Gets together: Not on file     Attends Taoism service: Not on file     Active member of club or organization: Not on file     Attends meetings of clubs or organizations: Not on file     Relationship status: Not on file    Intimate partner violence:     Fear of current or ex partner: Not on file     Emotionally abused: Not on file     Physically abused: Not on file     Forced sexual activity: Not on file   Other Topics Concern    Not on file   Social History Narrative    Not on file         ALLERGIES: Vasotec [enalapril maleate]    Review of Systems   Unable to perform ROS: Mental status change   Musculoskeletal: Negative for back pain and stiffness. Skin: Negative for itching. Neurological: Positive for weakness. Negative for tingling and numbness. There were no vitals filed for this visit. Physical Exam   Constitutional: He appears well-developed and well-nourished. He appears lethargic. No distress. HENT:   Head: Normocephalic and atraumatic. Right Ear: External ear normal.   Left Ear: External ear normal.   Mouth/Throat: Oropharynx is clear and moist.   Eyes: Pupils are equal, round, and reactive to light. Conjunctivae and EOM are normal.   Neck: Normal range of motion. Neck supple. No thyromegaly present. Cardiovascular: Normal rate, regular rhythm, normal heart sounds and intact distal pulses. Pulmonary/Chest: Effort normal and breath sounds normal.   Abdominal: Soft. Bowel sounds are normal. There is no tenderness. No hernia. Musculoskeletal: Normal range of motion. He exhibits no edema. Neurological: He has normal strength. He appears lethargic. He is disoriented (Patient thinks is 2005, knows Lucy Pedersen is the president and knows he is in an emergency room). No cranial nerve deficit or sensory deficit. Negative pronator drift, although the patient does have some flapping asterixis-like movements of bilateral hands with attempts to hold them backward. Skin: Skin is warm and dry. Capillary refill takes less than 2 seconds. Psychiatric: He has a normal mood and affect. His speech is delayed. He is slowed. He exhibits abnormal recent memory. Nursing note and vitals reviewed.        MDM  Number of Diagnoses or Management Options     Amount and/or Complexity of Data Reviewed  Clinical lab tests: reviewed and ordered  Tests in the radiology section of CPT®: reviewed and ordered  Tests in the medicine section of CPT®: reviewed  Obtain history from someone other than the patient: yes  Review and summarize past medical records: yes  Discuss the patient with other providers: yes  Independent visualization of images, tracings, or specimens: yes    Risk of Complications, Morbidity, and/or Mortality  Presenting problems: high  Diagnostic procedures: high  Management options: high    Critical Care  Total time providing critical care: (===================================================================  This patient is critically ill and there is a high probability of of imminent or life threatening deterioration in the patient's condition without immediate management. The nature of the patient's clinical problem is: Acute altered mental status with dysarthria    I have spent 45 minutes in direct patient care, documentation, review of labs/xrays/old records, discussion with Family, Staff, Colleague, Nursing . The time involved in the performance of separately reportable procedures was not counted toward critical care time. Henri Anderson MD; 9/25/2019 @3:30 PM  ===================================================================    )    Patient Progress  Patient progress: stable    ED Course as of Sep 25 1526   Wed Sep 25, 2019   1409 The patient presented as a code stroke, was evaluated by the stroke team, felt millimeters be encephalopathic with possible asterixis then he was any type of stroke. 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 Patient was not felt to be a candidate for TPA due to the fact he was not felt to be a stroke presentation.   Patient's symptoms also apparently started sometime in the last 6 hours, so actual time of onset is unclear but he does fall outside the therapeutic window for TPA.  Last normal interaction was at 730 this morning.    [BB]   1504 Family arrived with further history. According to family patient was in his normal state and was actually talking to a neighbor on the phone who was going to take him to the office when he suddenly stopped talking. Neighbor went over to find the patient unable to speak. To the fact they could not get the patient in the car EMS was called.   According to family, patient is now essentially back at baseline.     [BB]      ED Course User Index  [BB] Benny Carrera MD       Procedures

## 2019-09-26 PROBLEM — N18.30 CKD (CHRONIC KIDNEY DISEASE) STAGE 3, GFR 30-59 ML/MIN (HCC): Chronic | Status: ACTIVE | Noted: 2019-06-24

## 2019-09-26 PROBLEM — G93.41 ACUTE METABOLIC ENCEPHALOPATHY: Status: ACTIVE | Noted: 2019-09-25

## 2019-09-26 PROBLEM — A41.9 SEPSIS SECONDARY TO UTI (HCC): Status: ACTIVE | Noted: 2019-09-26

## 2019-09-26 PROBLEM — N39.0 SEPSIS SECONDARY TO UTI (HCC): Status: ACTIVE | Noted: 2019-09-26

## 2019-09-26 LAB
ALBUMIN SERPL-MCNC: 2.9 G/DL (ref 3.2–4.6)
ALBUMIN/GLOB SERPL: 0.8 {RATIO} (ref 1.2–3.5)
ALP SERPL-CCNC: 79 U/L (ref 50–136)
ALT SERPL-CCNC: 17 U/L (ref 12–65)
ANION GAP SERPL CALC-SCNC: 9 MMOL/L (ref 7–16)
AST SERPL-CCNC: 20 U/L (ref 15–37)
BASOPHILS # BLD: 0 K/UL (ref 0–0.2)
BASOPHILS NFR BLD: 0 % (ref 0–2)
BILIRUB DIRECT SERPL-MCNC: 0.2 MG/DL
BILIRUB SERPL-MCNC: 0.6 MG/DL (ref 0.2–1.1)
BUN SERPL-MCNC: 32 MG/DL (ref 8–23)
CALCIUM SERPL-MCNC: 8.4 MG/DL (ref 8.3–10.4)
CHLORIDE SERPL-SCNC: 107 MMOL/L (ref 98–107)
CHOLEST SERPL-MCNC: 108 MG/DL
CO2 SERPL-SCNC: 25 MMOL/L (ref 21–32)
CREAT SERPL-MCNC: 1.56 MG/DL (ref 0.8–1.5)
DIFFERENTIAL METHOD BLD: ABNORMAL
EOSINOPHIL # BLD: 0 K/UL (ref 0–0.8)
EOSINOPHIL NFR BLD: 0 % (ref 0.5–7.8)
ERYTHROCYTE [DISTWIDTH] IN BLOOD BY AUTOMATED COUNT: 15.9 % (ref 11.9–14.6)
EST. AVERAGE GLUCOSE BLD GHB EST-MCNC: 194 MG/DL
FOLATE SERPL-MCNC: 15.1 NG/ML (ref 3.1–17.5)
GLOBULIN SER CALC-MCNC: 3.7 G/DL (ref 2.3–3.5)
GLUCOSE BLD STRIP.AUTO-MCNC: 129 MG/DL (ref 65–100)
GLUCOSE BLD STRIP.AUTO-MCNC: 234 MG/DL (ref 65–100)
GLUCOSE BLD STRIP.AUTO-MCNC: 59 MG/DL (ref 65–100)
GLUCOSE BLD STRIP.AUTO-MCNC: 87 MG/DL (ref 65–100)
GLUCOSE BLD STRIP.AUTO-MCNC: 89 MG/DL (ref 65–100)
GLUCOSE SERPL-MCNC: 93 MG/DL (ref 65–100)
HBA1C MFR BLD: 8.4 % (ref 4.8–6)
HCT VFR BLD AUTO: 38 % (ref 41.1–50.3)
HDLC SERPL-MCNC: 55 MG/DL (ref 40–60)
HDLC SERPL: 2 {RATIO}
HGB BLD-MCNC: 11.7 G/DL (ref 13.6–17.2)
IMM GRANULOCYTES # BLD AUTO: 0.1 K/UL (ref 0–0.5)
IMM GRANULOCYTES NFR BLD AUTO: 1 % (ref 0–5)
LACTATE SERPL-SCNC: 1.4 MMOL/L (ref 0.4–2)
LDLC SERPL CALC-MCNC: 41.4 MG/DL
LIPID PROFILE,FLP: NORMAL
LYMPHOCYTES # BLD: 1.8 K/UL (ref 0.5–4.6)
LYMPHOCYTES NFR BLD: 17 % (ref 13–44)
MCH RBC QN AUTO: 28.8 PG (ref 26.1–32.9)
MCHC RBC AUTO-ENTMCNC: 30.8 G/DL (ref 31.4–35)
MCV RBC AUTO: 93.6 FL (ref 79.6–97.8)
MONOCYTES # BLD: 1 K/UL (ref 0.1–1.3)
MONOCYTES NFR BLD: 9 % (ref 4–12)
NEUTS SEG # BLD: 7.7 K/UL (ref 1.7–8.2)
NEUTS SEG NFR BLD: 73 % (ref 43–78)
NRBC # BLD: 0 K/UL (ref 0–0.2)
PLATELET # BLD AUTO: 162 K/UL (ref 150–450)
PMV BLD AUTO: 10.9 FL (ref 9.4–12.3)
POTASSIUM SERPL-SCNC: 4 MMOL/L (ref 3.5–5.1)
PROT SERPL-MCNC: 6.6 G/DL (ref 6.3–8.2)
RBC # BLD AUTO: 4.06 M/UL (ref 4.23–5.6)
SODIUM SERPL-SCNC: 141 MMOL/L (ref 136–145)
T4 FREE SERPL-MCNC: 1 NG/DL (ref 0.9–1.8)
TRIGL SERPL-MCNC: 58 MG/DL (ref 35–150)
TSH SERPL DL<=0.005 MIU/L-ACNC: 0.25 UIU/ML (ref 0.36–3.74)
VIT B12 SERPL-MCNC: 191 PG/ML (ref 193–986)
VLDLC SERPL CALC-MCNC: 11.6 MG/DL (ref 6–23)
WBC # BLD AUTO: 10.6 K/UL (ref 4.3–11.1)

## 2019-09-26 PROCEDURE — 74011000250 HC RX REV CODE- 250

## 2019-09-26 PROCEDURE — 74011250637 HC RX REV CODE- 250/637: Performed by: INTERNAL MEDICINE

## 2019-09-26 PROCEDURE — 86593 SYPHILIS TEST NON-TREP QUANT: CPT

## 2019-09-26 PROCEDURE — 74011000258 HC RX REV CODE- 258: Performed by: INTERNAL MEDICINE

## 2019-09-26 PROCEDURE — 36415 COLL VENOUS BLD VENIPUNCTURE: CPT

## 2019-09-26 PROCEDURE — 83036 HEMOGLOBIN GLYCOSYLATED A1C: CPT

## 2019-09-26 PROCEDURE — 80076 HEPATIC FUNCTION PANEL: CPT

## 2019-09-26 PROCEDURE — 65270000029 HC RM PRIVATE

## 2019-09-26 PROCEDURE — 83605 ASSAY OF LACTIC ACID: CPT

## 2019-09-26 PROCEDURE — 84443 ASSAY THYROID STIM HORMONE: CPT

## 2019-09-26 PROCEDURE — 4A00X4Z MEASUREMENT OF CENTRAL NERVOUS ELECTRICAL ACTIVITY, EXTERNAL APPROACH: ICD-10-PCS | Performed by: PSYCHIATRY & NEUROLOGY

## 2019-09-26 PROCEDURE — 95816 EEG AWAKE AND DROWSY: CPT | Performed by: PSYCHIATRY & NEUROLOGY

## 2019-09-26 PROCEDURE — 85025 COMPLETE CBC W/AUTO DIFF WBC: CPT

## 2019-09-26 PROCEDURE — 82607 VITAMIN B-12: CPT

## 2019-09-26 PROCEDURE — 82962 GLUCOSE BLOOD TEST: CPT

## 2019-09-26 PROCEDURE — 80048 BASIC METABOLIC PNL TOTAL CA: CPT

## 2019-09-26 PROCEDURE — 74011636637 HC RX REV CODE- 636/637: Performed by: INTERNAL MEDICINE

## 2019-09-26 PROCEDURE — 74011250636 HC RX REV CODE- 250/636: Performed by: INTERNAL MEDICINE

## 2019-09-26 PROCEDURE — 92610 EVALUATE SWALLOWING FUNCTION: CPT

## 2019-09-26 PROCEDURE — 86780 TREPONEMA PALLIDUM: CPT

## 2019-09-26 PROCEDURE — 99218 HC RM OBSERVATION: CPT

## 2019-09-26 PROCEDURE — 80061 LIPID PANEL: CPT

## 2019-09-26 PROCEDURE — 82746 ASSAY OF FOLIC ACID SERUM: CPT

## 2019-09-26 PROCEDURE — 84439 ASSAY OF FREE THYROXINE: CPT

## 2019-09-26 PROCEDURE — 95816 EEG AWAKE AND DROWSY: CPT | Performed by: INTERNAL MEDICINE

## 2019-09-26 PROCEDURE — 86592 SYPHILIS TEST NON-TREP QUAL: CPT

## 2019-09-26 RX ORDER — POLYETHYLENE GLYCOL 3350 17 G/17G
17 POWDER, FOR SOLUTION ORAL DAILY
Status: DISCONTINUED | OUTPATIENT
Start: 2019-09-26 | End: 2019-10-01 | Stop reason: HOSPADM

## 2019-09-26 RX ORDER — DEXTROSE 50 % IN WATER (D50W) INTRAVENOUS SYRINGE
Status: COMPLETED
Start: 2019-09-26 | End: 2019-09-26

## 2019-09-26 RX ORDER — ONDANSETRON 2 MG/ML
4 INJECTION INTRAMUSCULAR; INTRAVENOUS
Status: DISCONTINUED | OUTPATIENT
Start: 2019-09-26 | End: 2019-10-01 | Stop reason: HOSPADM

## 2019-09-26 RX ADMIN — Medication 10 ML: at 20:30

## 2019-09-26 RX ADMIN — ATORVASTATIN CALCIUM 80 MG: 40 TABLET, FILM COATED ORAL at 20:20

## 2019-09-26 RX ADMIN — FUROSEMIDE 40 MG: 40 TABLET ORAL at 20:26

## 2019-09-26 RX ADMIN — CEFTRIAXONE 1 G: 1 INJECTION, POWDER, FOR SOLUTION INTRAMUSCULAR; INTRAVENOUS at 20:19

## 2019-09-26 RX ADMIN — INSULIN LISPRO 4 UNITS: 100 INJECTION, SOLUTION INTRAVENOUS; SUBCUTANEOUS at 20:29

## 2019-09-26 RX ADMIN — EZETIMIBE 10 MG: 10 TABLET ORAL at 20:19

## 2019-09-26 RX ADMIN — DEXTROSE 50 % IN WATER (D50W) INTRAVENOUS SYRINGE 25 G: at 07:16

## 2019-09-26 RX ADMIN — FUROSEMIDE 40 MG: 40 TABLET ORAL at 14:51

## 2019-09-26 RX ADMIN — Medication 10 ML: at 16:17

## 2019-09-26 RX ADMIN — ONDANSETRON 4 MG: 2 INJECTION INTRAMUSCULAR; INTRAVENOUS at 09:20

## 2019-09-26 RX ADMIN — SODIUM CHLORIDE 125 ML/HR: 900 INJECTION, SOLUTION INTRAVENOUS at 05:54

## 2019-09-26 RX ADMIN — PREGABALIN 150 MG: 75 CAPSULE ORAL at 14:51

## 2019-09-26 RX ADMIN — ONDANSETRON 4 MG: 2 INJECTION INTRAMUSCULAR; INTRAVENOUS at 20:19

## 2019-09-26 NOTE — PROCEDURES
EEG    The study is performed on a multi channel digital EEG device utilizing dermal electrodes    The background rhythm posterior scalp recording areas is fragmentary alpha but there is a good deal of theta range slowing. There is theta activity as the patient waxes and wanes in and out of drowsiness. There are no specific triphasic waves identified. Symmetry is maintained between the 2 cerebral hemispheres. In the latter stages of the recording the largely drowsy in the sleep does not reveal evidence however of well-formed sleep transients segment activity or vertex sharp waves of sleep    Impression    This is an abnormal EEG which shows evidence of a diffuse encephalopathy. It does not demonstrate any ictal propensity as a potential underlying etiology for the patient's change in neurologic status.     The recording does not demonstrate triphasia in terms of an underlying potential hepatic etiology

## 2019-09-26 NOTE — PROGRESS NOTES
Attempted OT evaluation. Patient is currently getting EEG. Will check back as schedule permits.    Laura Arias, OTR/L

## 2019-09-26 NOTE — PROGRESS NOTES
Physical Therapy Note:    Physical therapy evaluation orders received and chart reviewed. Attempted to see patient this AM to initiate assessment. Patient currently undergoing  testing. Will follow and re-attempt at a later time/date as schedule permits/patient available.  Thank you,    Ortega Atkinson, PT, DPT  9/26/19

## 2019-09-26 NOTE — PROGRESS NOTES
Hospitalist Note     Admit Date:  2019  1:59 PM   Name:  Seema Oliva   Age:  70 y.o.  :  1940   MRN:  [id number]   PCP:  Ina Grant MD  Treatment Team: Attending Provider: Rabia Marrero DO; Consulting Provider: Kecia Wong MD; Speech Language Pathologist: Luna Jackson; Physical Therapist: POLLY ParnellT; Utilization Review: Angélica Spring RN; Occupational Therapist: Hanny Coe, OTR/EULALIA    HPI/Subjective:   Pt is a 69 y/o M admitted with encephalopathy. Hx CKD3, DM, dCHF, GERD, HTN, prior TIA, MARLEEN, obesity. Neurology saw in ER and felt infectious in etiology. Had fever in ER with elevated WBC. Did complain of some urinary symptoms suprapubic pain, urinary urgency on admission. Recently started on flomax for these symptoms as an outpatient.  - pt feeling a little better. No more fevers. Some mild nausea this morning, no vomiting. Denies abd pain, urinary symptoms today. Objective:     Patient Vitals for the past 24 hrs:   Temp Pulse Resp BP SpO2   19 0800 98.1 °F (36.7 °C) 85 20 152/70 92 %   19 0400 97.4 °F (36.3 °C) 89 20 101/60    19 0000 99.1 °F (37.3 °C) 93 18 116/67 90 %   19 1955 (!) 100.5 °F (38.1 °C) 94 20 108/60 95 %   19 1743 99 °F (37.2 °C) 98 22 161/80 91 %   19 1558  (!) 101   95 %   19 1415  97  193/79 94 %   19 1413 99.5 °F (37.5 °C) 97  193/79      Oxygen Therapy  O2 Sat (%): 92 % (19 0800)  Pulse via Oximetry: 102 beats per minute (19 1558)    Estimated body mass index is 38.79 kg/m² as calculated from the following:    Height as of 19: 6' (1.829 m). Weight as of this encounter: 129.7 kg (286 lb).       Intake/Output Summary (Last 24 hours) at 2019 1028  Last data filed at 2019 0553  Gross per 24 hour   Intake 975 ml   Output 1800 ml   Net -825 ml       *Note that automatically entered I/Os may not be accurate; dependent on patient compliance with collection and accurate  by [employer]. General:    Well nourished. Alert. CV:   RRR. No murmur, rub, or gallop. Lungs:   CTAB. No wheezing, rhonchi, or rales. Abdomen:   Soft, nontender, nondistended. Extremities: Warm and dry. No cyanosis or edema. Skin:     No rashes or jaundice. Neuro:  No gross focal deficits    Data Review:  I have reviewed all labs, meds, and studies from the last 24 hours:    Recent Results (from the past 24 hour(s))   GLUCOSE, POC    Collection Time: 09/25/19  2:14 PM   Result Value Ref Range    Glucose (POC) 120 (H) 65 - 067 mg/dL   METABOLIC PANEL, BASIC    Collection Time: 09/25/19  2:15 PM   Result Value Ref Range    Sodium 136 136 - 145 mmol/L    Potassium 5.6 (H) 3.5 - 5.1 mmol/L    Chloride 103 98 - 107 mmol/L    CO2 25 21 - 32 mmol/L    Anion gap 8 7 - 16 mmol/L    Glucose 117 (H) 65 - 100 mg/dL    BUN 38 (H) 8 - 23 MG/DL    Creatinine 1.85 (H) 0.8 - 1.5 MG/DL    GFR est AA 46 (L) >60 ml/min/1.73m2    GFR est non-AA 38 (L) >60 ml/min/1.73m2    Calcium 8.6 8.3 - 10.4 MG/DL   TROPONIN I    Collection Time: 09/25/19  2:15 PM   Result Value Ref Range    Troponin-I, Qt. <0.02 (L) 0.02 - 0.05 NG/ML   PROCALCITONIN    Collection Time: 09/25/19  2:15 PM   Result Value Ref Range    Procalcitonin 0.8 ng/mL   EKG, 12 LEAD, INITIAL    Collection Time: 09/25/19  2:16 PM   Result Value Ref Range    Ventricular Rate 97 BPM    Atrial Rate 97 BPM    P-R Interval 184 ms    QRS Duration 102 ms    Q-T Interval 322 ms    QTC Calculation (Bezet) 408 ms    Calculated P Axis 47 degrees    Calculated R Axis -28 degrees    Calculated T Axis 42 degrees    Diagnosis       !! AGE AND GENDER SPECIFIC ECG ANALYSIS !!   Normal sinus rhythm  Normal ECG  When compared with ECG of 12-JUN-2017 12:48,  Previous ECG has undetermined rhythm, needs review  Non-specific change in ST segment in Anterior leads  Nonspecific T wave abnormality no longer evident in Inferior leads  Nonspecific T wave abnormality, improved in Anterolateral leads  QT has lengthened  Confirmed by [facility]  MD ()DONNELL ([id number]) on 9/25/2019 4:11:53 PM     AMMONIA    Collection Time: 09/25/19  3:18 PM   Result Value Ref Range    Ammonia 20 11 - 32 UMOL/L   CBC WITH AUTOMATED DIFF    Collection Time: 09/25/19  3:18 PM   Result Value Ref Range    WBC 11.9 (H) 4.3 - 11.1 K/uL    RBC 4.34 4.23 - 5.6 M/uL    HGB 12.6 (L) 13.6 - 17.2 g/dL    HCT 39.2 (L) 41.1 - 50.3 %    MCV 90.3 79.6 - 97.8 FL    MCH 29.0 26.1 - 32.9 PG    MCHC 32.1 31.4 - 35.0 g/dL    RDW 15.5 (H) 11.9 - 14.6 %    PLATELET 100 054 - 404 K/uL    MPV 11.1 9.4 - 12.3 FL    ABSOLUTE NRBC 0.00 0.0 - 0.2 K/uL    DF AUTOMATED      NEUTROPHILS 78 43 - 78 %    LYMPHOCYTES 12 (L) 13 - 44 %    MONOCYTES 9 4.0 - 12.0 %    EOSINOPHILS 0 (L) 0.5 - 7.8 %    BASOPHILS 0 0.0 - 2.0 %    IMMATURE GRANULOCYTES 1 0.0 - 5.0 %    ABS. NEUTROPHILS 9.3 (H) 1.7 - 8.2 K/UL    ABS. LYMPHOCYTES 1.4 0.5 - 4.6 K/UL    ABS. MONOCYTES 1.1 0.1 - 1.3 K/UL    ABS. EOSINOPHILS 0.0 0.0 - 0.8 K/UL    ABS. BASOPHILS 0.0 0.0 - 0.2 K/UL    ABS. IMM.  GRANS. 0.1 0.0 - 0.5 K/UL   URINALYSIS W/ RFLX MICROSCOPIC    Collection Time: 09/25/19  7:00 PM   Result Value Ref Range    Color YELLOW      Appearance CLEAR      Specific gravity 1.018 1.001 - 1.023      pH (UA) 7.0 5.0 - 9.0      Protein NEGATIVE  NEG mg/dL    Glucose >1,000 mg/dL    Ketone NEGATIVE  NEG mg/dL    Bilirubin NEGATIVE  NEG      Blood NEGATIVE  NEG      Urobilinogen 1.0 0.2 - 1.0 EU/dL    Nitrites NEGATIVE  NEG      Leukocyte Esterase NEGATIVE  NEG     GLUCOSE, POC    Collection Time: 09/25/19  8:22 PM   Result Value Ref Range    Glucose (POC) 103 (H) 65 - 100 mg/dL   CULTURE, BLOOD    Collection Time: 09/25/19  8:55 PM   Result Value Ref Range    Special Requests: LEFT  Antecubital        Culture result: NO GROWTH AFTER 8 HOURS     LACTIC ACID    Collection Time: 09/25/19  8:55 PM   Result Value Ref Range    Lactic acid 1.4 0.4 - 2.0 MMOL/L POTASSIUM    Collection Time: 09/25/19  8:55 PM   Result Value Ref Range    Potassium 4.1 3.5 - 5.1 mmol/L   CULTURE, BLOOD    Collection Time: 09/25/19  8:59 PM   Result Value Ref Range    Special Requests: RIGHT  Antecubital        Culture result: NO GROWTH AFTER 8 HOURS     TSH 3RD GENERATION    Collection Time: 09/26/19  5:50 AM   Result Value Ref Range    TSH 0.254 (L) 0.358 - 3.740 uIU/mL   VITAMIN B12    Collection Time: 09/26/19  5:50 AM   Result Value Ref Range    Vitamin B12 191 (L) 193 - 986 pg/mL   FOLATE    Collection Time: 09/26/19  5:50 AM   Result Value Ref Range    Folate 15.1 3.1 - 17.5 ng/mL   LACTIC ACID    Collection Time: 09/26/19  5:50 AM   Result Value Ref Range    Lactic acid 1.4 0.4 - 2.0 MMOL/L   HEPATIC FUNCTION PANEL    Collection Time: 09/26/19  5:50 AM   Result Value Ref Range    Protein, total 6.6 6.3 - 8.2 g/dL    Albumin 2.9 (L) 3.2 - 4.6 g/dL    Globulin 3.7 (H) 2.3 - 3.5 g/dL    A-G Ratio 0.8 (L) 1.2 - 3.5      Bilirubin, total 0.6 0.2 - 1.1 MG/DL    Bilirubin, direct 0.2 <0.4 MG/DL    Alk.  phosphatase 79 50 - 136 U/L    AST (SGOT) 20 15 - 37 U/L    ALT (SGPT) 17 12 - 65 U/L   RPR    Collection Time: 09/26/19  5:50 AM   Result Value Ref Range    RPR PENDING    HEMOGLOBIN A1C WITH EAG    Collection Time: 09/26/19  5:50 AM   Result Value Ref Range    Hemoglobin A1c 8.4 (H) 4.8 - 6.0 %    Est. average glucose 194 mg/dL   LIPID PANEL    Collection Time: 09/26/19  5:50 AM   Result Value Ref Range    LIPID PROFILE          Cholesterol, total 108 <200 MG/DL    Triglyceride 58 35 - 150 MG/DL    HDL Cholesterol 55 40 - 60 MG/DL    LDL, calculated 41.4 <100 MG/DL    VLDL, calculated 11.6 6.0 - 23.0 MG/DL    CHOL/HDL Ratio 2.0     GLUCOSE, POC    Collection Time: 09/26/19  7:02 AM   Result Value Ref Range    Glucose (POC) 59 (L) 65 - 100 mg/dL   GLUCOSE, POC    Collection Time: 09/26/19  7:49 AM   Result Value Ref Range    Glucose (POC) 89 65 - 100 mg/dL        All Micro Results Procedure Component Value Units Date/Time    CULTURE, BLOOD [[id number]] Collected:  09/25/19 2055    Order Status:  Completed Specimen:  Blood Updated:  09/26/19 0709     Special Requests: --        LEFT  Antecubital       Culture result: NO GROWTH AFTER 8 HOURS       CULTURE, BLOOD [[id number]] Collected:  09/25/19 2059    Order Status:  Completed Specimen:  Blood Updated:  09/26/19 0709     Special Requests: --        RIGHT  Antecubital       Culture result: NO GROWTH AFTER 8 HOURS             No results found for this visit on 09/25/19.     Current Meds:  Current Facility-Administered Medications   Medication Dose Route Frequency    polyethylene glycol (MIRALAX) packet 17 g  17 g Oral DAILY    ondansetron (ZOFRAN) injection 4 mg  4 mg IntraVENous Q4H PRN    sodium chloride (NS) flush 5-40 mL  5-40 mL IntraVENous Q8H    sodium chloride (NS) flush 5-40 mL  5-40 mL IntraVENous PRN    labetalol (NORMODYNE;TRANDATE) injection 5 mg  5 mg IntraVENous Q10MIN PRN    albuterol (PROVENTIL VENTOLIN) nebulizer solution 2.5 mg  2.5 mg Nebulization Q4H PRN    aspirin delayed-release tablet 81 mg  81 mg Oral DAILY    atorvastatin (LIPITOR) tablet 80 mg  80 mg Oral QHS    busPIRone (BUSPAR) tablet 15 mg  15 mg Oral DAILY    clopidogrel (PLAVIX) tablet 75 mg  75 mg Oral DAILY    insulin lispro (HUMALOG) injection   SubCUTAneous AC&HS    ezetimibe (ZETIA) tablet 10 mg  10 mg Oral QHS    furosemide (LASIX) tablet 40 mg  40 mg Oral TID    valsartan (DIOVAN) tablet 80 mg  80 mg Oral DAILY    loratadine (CLARITIN) tablet 10 mg  10 mg Oral DAILY    pantoprazole (PROTONIX) tablet 40 mg  40 mg Oral ACB    [Held by provider] potassium chloride (K-DUR, KLOR-CON) SR tablet 20 mEq  20 mEq Oral DAILY    pregabalin (LYRICA) capsule 150 mg  150 mg Oral BID    tamsulosin (FLOMAX) capsule 0.4 mg  0.4 mg Oral DAILY    cefTRIAXone (ROCEPHIN) 1 g in 0.9% sodium chloride (MBP/ADV) 50 mL  1 g IntraVENous Q24H    acetaminophen (TYLENOL) suppository 650 mg  650 mg Rectal Q6H PRN    lip protectant (BLISTEX) ointment 1 Each  1 Each Topical PRN       Other Studies (last 24 hours):  Xr Chest Sngl V    Result Date: 9/25/2019  CHEST X-RAY, one view. HISTORY:  Altered mental status. TECHNIQUE:  AP upright semiupright view COMPARISON: November 2017 FINDINGS:   The lungs are clear. The heart size is normal. The costophrenic angles are sharp. The pulmonary vasculature is unremarkable. Included portion of the upper abdomen is unremarkable. . There are aortic arch calcifications. IMPRESSION:  Negative for acute change     Ct Code Neuro Head Wo Contrast    Result Date: 9/25/2019  CT HEAD WITHOUT CONTRAST. INDICATION: Aphasia and left-sided weakness. Code stroke. COMPARISON: April 2014  TECHNIQUE:   5 mm axial scans from the skull base to the vertex. FINDINGS:  No acute intraparenchymal hemorrhage or abnormal extra-axial fluid collection. The ventricles are normal size. No midline shift or mass effect. Mild symmetric volume loss. Included portion of the paranasal sinuses and orbits demonstrates opacification of right sphenoid sinus. IMPRESSION:  Negative for acute intracranial abnormality. Chronic changes. Duplex Carotid Bilateral    Result Date: 9/26/2019  TITLE:  Carotid and Vertebral Ultrasound Examination. INDICATION:  Strokelike symptoms. TECHNIQUE: Grayscale, Color Doppler, and Spectral Doppler Ultrasound interrogation performed. Peak Systolic Velocity, ICA-to-CCA ratio, and End-Diastolic Velocity are recorded. The estimate of stenosis is inferred from velocity measurements cross referenced to published correlations as defined by the Society of Radiologists in [address] [place] [address] Drive Radiology 2003; 229; 340-346. Slightly limited study secondary to patient body habitus COMPARISON: July 21, 2017. RIGHT NECK:  The peak systolic velocity in the Common Carotid Artery = 100 cm/sec; Internal Carotid Artery = 74 cm/sec.   Ratio = 0.7. Antegrade flow in the vertebral artery. LEFT  NECK:  The peak systolic velocity in the Common Carotid Artery = 73 cm/sec; Internal Carotid Artery = 60 cm/sec. Ratio = 0.8. Antegrade flow in the vertebral artery. IMPRESSION:  DANNIE:  No significant stenosis. LICA:  No significant stenosis. Assessment and Plan:     Hospital Problems as of 9/26/2019 Date Reviewed: 9/18/2019          Codes Class Noted - Resolved POA    Sepsis secondary to UTI [facility]) ICD-10-CM: A41.9, N39.0  ICD-9-CM: 038.9, 995.91, 599.0  9/26/2019 - Present Yes        * (Principal) Acute metabolic encephalopathy [id number]: G93.41  ICD-9-CM: 348.31  9/25/2019 - Present Yes        CKD (chronic kidney disease) stage 3, GFR 30-59 ml/min (HCC) (Chronic) ICD-10-CM: N18.3  ICD-9-CM: 585.3  6/24/2019 - Present Yes        Diabetic neuropathy associated with type 2 diabetes mellitus (HCC) (Chronic) ICD-10-CM: E11.40  ICD-9-CM: 250.60, 357.2  7/18/2017 - Present Yes        Diastolic CHF, chronic (HCC) (Chronic) ICD-10-CM: I50.32  ICD-9-CM: 428.32, 428.0  3/20/2017 - Present Yes        Pulmonary hypertension (HCC) (Chronic) ICD-10-CM: I27.20  ICD-9-CM: 416.8  10/14/2015 - Present Yes        Gastroesophageal reflux disease without esophagitis (Chronic) ICD-10-CM: K21.9  ICD-9-CM: 530.81  4/21/2015 - Present Yes        Hyperlipidemia with target LDL less than 70 (Chronic) ICD-10-CM: E78.5  ICD-9-CM: 272.4  Unknown - Present Yes        Essential hypertension, benign (Chronic) ICD-10-CM: I10  ICD-9-CM: 401.1  1/22/2015 - Present Yes        Type 2 diabetes, uncontrolled, with neuropathy (HCC) (Chronic) ICD-10-CM: E11.40, E11.65  ICD-9-CM: 250.62, 357.2  1/22/2015 - Present Yes        Obstructive sleep apnea of adult (Chronic) ICD-10-CM: G47.33  ICD-9-CM: 327.23  1/22/2015 - Present Yes              Plan:  · Agree with neurology note this is likely infectious in etiology, does not need echo, EEG, etc.  Cancelled appropriately  · Cont rocephin.   No more fevers since admission  · Cont home meds for previous TIAs, asa/plavix.   Lipids ok  · Check ft4, CBC, BMP  · Hold lantus  · Restart diet  · Likely home vs  tomorrow    Diet:  DIET CARDIAC      Signed:  Ronald Squires MD

## 2019-09-26 NOTE — PROGRESS NOTES
Physical Medicine & Rehabilitation Note-consult    Patient: Consuelo Snowden MRN: [id number]  SSN: [id number]    YOB: 1940  Age: 70 y.o. Sex: male      Admit Date: 9/25/2019  Admitting Physician: Yi Rivera DO      Medical Decision Making/Plan/Recommend:   Case reviewed. This is a patient who is being worked up for acute CVA/ encephalopathy. Still observation status. PT/OT and ST to assess and treat as appropriate. I will follow for possible [facility] admission. Thank you for the opportunity to participate in the care of this patient.     Signed By: Lorrie Parker MD     September 26, 2019

## 2019-09-26 NOTE — PROGRESS NOTES
SPEECH PATHOLOGY NOTE:    Speech therapy consult received and appreciated. Attempted to see patient this AM for bedside swallow evaluation, however EEG in progress. Will re-attempt at later time this date as patient becomes available.          Tristan Ans[address] CCC-SLP

## 2019-09-26 NOTE — PROGRESS NOTES
09/26/19 0700   NIH Stroke Scale   Interval Other (comment)  (shift change)   LOC 0   LOC Questions 0   LOC Commands 0   Best Gaze 1   Visual 0   Facial Palsy 1   Motor Right Arm 0   Motor Left Arm 0   Motor Right Leg 0   Motor Left Leg 1   Limb Ataxia 1   Sensory 0   Best Language 0   Dysarthria 1   Extinction and Inattention 0   Total 5

## 2019-09-26 NOTE — PROGRESS NOTES
STG: Patient will participate in speech/language/cognitive evaluation if deemed appropriate pending imaging     OBSERVATION STATUS  SPEECH LANGUAGE PATHOLOGY: DYSPHAGIA- Initial Assessment    NAME/AGE/GENDER: Paula Javier is a 70 y.o. male  DATE: 9/26/2019  PRIMARY DIAGNOSIS: Stroke-like symptoms [R29.90]      ICD-10: Treatment Diagnosis: R13.12 Dysphagia, Oropharyngeal Phase    INTERDISCIPLINARY COLLABORATION: Registered Nurse  PRECAUTIONS/ALLERGIES: Belenda Freed maleate]       SUBJECTIVE   Pleasant, sister at bedside. Denies changes with speech or cognition. Diet Prior to Evaluation: cardiac regular/thin liquids      History of Present Injury/Illness: Mr. Seng Melendez  has a past medical history of Cervical stenosis of spine, Chronic kidney disease, Degenerative disc disease, lumbar, Diabetes mellitus type II, Diabetic retinopathy of both eyes without macular edema associated with type 2 diabetes mellitus ([address] Diastolic congestive heart failure ([address] Diverticulosis of colon (June 2010), DVT (deep venous thrombosis) ([address] Extrinsic allergic asthma, GERD (gastroesophageal reflux disease), colonic polyps (07/29/2010), Hyperlipidemia LDL goal < 70, Hypertension, Obstructive sleep apnea, Perennial allergic rhinitis with seasonal variation, 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 ([address] Pulmonary embolism ([address] (Mar 2014), TIA (transient ischemic attack) (11/9/2017), and Type 2 diabetes, uncontrolled, with neuropathy ([address] (1/22/2015). He also has no past medical history of Aneurysm ([address] Arrhythmia, Autoimmune disease ([address] CAD (coronary artery disease), Cancer ([address] Coagulation defects, COPD, Liver disease, Psychiatric disorder, PUD (peptic ulcer disease), or Seizures ([address] . He also  has a past surgical history that includes colonoscopy (07/29/2010); egd (5/9/2011); pr sinus surgery proc unlisted; hx knee arthroscopy (1991); pr total knee arthroplasty (Right, 2006); hx bunionectomy (Bilateral); and hx hernia repair (Bilateral). Previous Dysphagia: NONE REPORTED    Problem List:  (Impairments causing functional limitations):  1. Oropharyngeal dysphagia- No symptoms identified      Orientation:   Person  Place  Time  Situation     Pain: Pain Scale 1: Numeric (0 - 10)  Pain Intensity 1: 0         OBJECTIVE   Oral Motor Assessment:  · Labial: No impairment  · Dentition: Natural  · Oral Hygiene: Adequate  · Lingual: No impairment     Swallow assessment:   Patient presented with thin liquid via cup and straw, puree, mixed, and solid consistencies. Appropriate oral prep with all textures. Timely swallow initiation, and single swallows upon palpation. Adequate oral clearing. No overt signs or symptoms of airway compromise observed with liquid or solid textures. ASSESSMENT   Patient presents with normal oropharyngeal swallow. No overt s/sx of airway compromise with any consistencies. Mild wheezing which patient reports \"I do that\". Recommend continue current diet: regular/thin. Medications at a time with liquid wash. No dysphagia treatment indicated. Per chart review, \"abnormal EEG which shows evidence of a diffuse encephalopathy\". Will follow up for cognitive linguistic evaluation if appropriate pending MRI results.         Tool Used: Dysphagia Outcome and Severity Scale (HEMANT)    Score Comments   Normal Diet  [x] 7 With no strategies or extra time needed   Functional Swallow  [] 6 May have mild oral or pharyngeal delay   Mild Dysphagia  [] 5 Which may require one diet consistency restricted    Mild-Moderate Dysphagia  [] 4 With 1-2 diet consistencies restricted   Moderate Dysphagia  [] 3 With 2 or more diet consistencies restricted   Moderate-Severe Dysphagia  [] 2 With partial PO strategies (trials with ST only)   Severe Dysphagia  [] 1 With inability to tolerate any PO safely      Score:  Initial: 7 Most Recent:  (Date 09/26/19 )   Interpretation of Tool: The Dysphagia Outcome and Severity Scale (HEMANT) is a simple, easy-to-use, 7-point scale developed to systematically rate the functional severity of dysphagia based on objective assessment and make recommendations for diet level, independence level, and type of nutrition. Current Medications:   No current facility-administered medications on file prior to encounter. Current Outpatient Medications on File Prior to Encounter   Medication Sig Dispense Refill    tamsulosin (FLOMAX) 0.4 mg capsule Take 1 Cap by mouth daily. 90 Cap 3    spironolactone (ALDACTONE) 25 mg tablet TAKE 1 TABLET BY MOUTH EVERY DAY 90 Tab 3    busPIRone (BUSPAR) 15 mg tablet Take 1 Tab by mouth daily. 90 Tab 0    metFORMIN (GLUCOPHAGE) 1,000 mg tablet Take 1 Tab by mouth two (2) times daily (with meals). 180 Tab 0    potassium chloride (KLOR-CON M20) 20 mEq tablet TAKE 1 TABLET TWICE A  Tab 3    empagliflozin (JARDIANCE) 10 mg tablet Take 1 Tab by mouth daily. 30 Tab 5    atorvastatin (LIPITOR) 80 mg tablet Take 1 Tab by mouth nightly. 90 Tab 3    Insulin Needles, Disposable, (1ST TIER UNIFINE PENTIPS) 32 gauge x 5/32\" ndle Use to inject insulin 200 Pen Needle 1    insulin syr/ndl U100 half rashida 0.3 mL 31 gauge x 15/64\" syrg Use to inject insulin 200 Syringe 1    polyethylene glycol (MIRALAX) 17 gram/dose powder Take 17 g by mouth daily. 17 g 5    lancets misc Use to test glucose 4 times/day. Dx: E11.51, I10 150 Each 11    glucose blood VI test strips (FREESTYLE LITE STRIPS) strip Use to check glucose 4 times/day. Dx: E11.51, I10 150 Strip 11    insulin aspart U-100 (NOVOLOG FLEXPEN U-100 INSULIN) 100 unit/mL (3 mL) inpn Inject 8 units standing dose + additional insulin according to sliding scale (up to 16 units) subQ before each meal 15 Adjustable Dose Pre-filled Pen Syringe 3    furosemide (LASIX) 40 mg tablet Take 1 Tab by mouth three (3) times daily.  270 Tab 3    carvedilol (COREG) 25 mg tablet Take 0.5 Tabs by mouth two (2) times daily (with meals). 90 Tab 3    raNITIdine (ZANTAC) 150 mg tablet Take 150 mg by mouth two (2) times a day.  insulin degludec (TRESIBA FLEXTOUCH U-100) 100 unit/mL (3 mL) inpn by SubCUTAneous route.  pantoprazole (PROTONIX) 40 mg tablet Take 1 tablet po 30 minutes before breakfast 90 Tab 3    ezetimibe (ZETIA) 10 mg tablet Take 1 Tab by mouth nightly. 90 Tab 3    irbesartan (AVAPRO) 150 mg tablet Take 1 Tab by mouth nightly. 90 Tab 3    clopidogrel (PLAVIX) 75 mg tab Take 1 Tab by mouth daily. 90 Tab 3    Insulin Syringe-Needle U-100 (BD INSULIN SYRINGE ULTRA-FINE) 0.3 mL 31 gauge x 5/16\" syrg Use to injection insulin 3 times/day at meals. Dx: E11.40 270 Syringe 3    Lancets misc Use to check glucose 4 times/day as directed. Dx: E11.40 120 Each 11    cloNIDine HCl (CATAPRES) 0.1 mg tablet Take 1 Tab by mouth two (2) times a day. 180 Tab 3    albuterol (VENTOLIN HFA) 90 mcg/actuation inhaler Take 2 Puffs by inhalation every four (4) hours as needed for Wheezing or Shortness of Breath. 1 Inhaler 3    pregabalin (LYRICA) 150 mg capsule Take 150 mg by mouth two (2) times a day. [facility] 1690 Bed  Dx: Mild Diastolic Dysfunction (HZV-30 I51.9)  Pulmonary Hypertension (ICD-10 I27.2)  Morbid Obesity (ICD-10 E66.01)  Lumbar DDD (ICD-10 M51.36)  Obstructive Sleep Apnea (ICD-10 G47.33) 1 Device 0    loratadine (CLARITIN) 10 mg tablet Take 10 mg by mouth daily.  multivit-min-FA-lycopen-lutein (CENTRUM SILVER) 0.4-300-250 mg-mcg-mcg tab Take  by mouth.  HYDROcodone-acetaminophen (NORCO)  mg tablet Take 1 Tab by mouth every six (6) hours as needed for Pain. 20 Tab 0    aspirin 81 mg tablet Take 81 mg by mouth daily.            PLAN    FREQUENCY/DURATION: Speech therapy to follow up for assessment of cognitive-linguistic function.     - Recommendations for next treatment session: Next treatment will address cognitive linguistic evaluation if appropriate REHABILITATION POTENTIAL FOR STATED GOALS: Good     COMPLIANCE WITH PROGRAM/EXERCISES: Will assess as treatment progresses    CONTINUATION OF SKILLED SERVICES/MEDICAL NECESSITY:   Will follow up with cognitive linguistic evaluation pending MRI results          RECOMMENDATIONS   DIET:    continue prescribed diet   PO:  Regular   Liquids:  regular thin    MEDICATIONS: With liquid     ASPIRATION PRECAUTIONS  · Slow rate of intake  · Small bites/sips  · Upright at 90 degrees during meal     COMPENSATORY STRATEGIES/MODIFICATIONS  · None     EDUCATION:  · Recommendations discussed with Nursing  · Family  · Patient     RECOMMENDATIONS for CONTINUED SPEECH THERAPY:   YES: Anticipate need for ongoing speech therapy during this hospitalization.        SAFETY:  After treatment position/precautions:  · RN notified  · sister at bedside  · Call light within reach      Total Treatment Duration:   Time In: 9519  Time Out: [address] [address] [facility]

## 2019-09-26 NOTE — PROGRESS NOTES
Care Management Interventions  PCP Verified by CM: Yes  Palliative Care Criteria Met (RRAT>21 & CHF Dx)?: No(RRAT 45 Dx Sepsis UTI)  Transition of Care Consult (CM Consult): Discharge Planning  Discharge Durable Medical Equipment: No(walker, cane, BSC)  Physical Therapy Consult: Yes  Occupational Therapy Consult: Yes  Speech Therapy Consult: Yes  Current Support Network: Own Home(brother lives with him)  Confirm Follow Up Transport: Family(sister and brother)  Plan discussed with Pt/Family/Caregiver: Yes  Freedom of Choice Offered: Yes  Discharge Location  Discharge Placement: Home with home health  Met with patient for d/c planning. Patient alert and oriented x 3, independent of ADL's and lives in his apartment with his brother who works during the day. DME includes walker, cane and BSC. He does not drive and has transportation from his brother or sister. He is the oldest of 10 children. He has Medicare and secondary insurance and able to obtain medications. He wants to go home if possible. He would like [facility] 13. if needed. Awaiting PT/OT evaluation. Noted was changed to inpatient admission today and if needs rehab will need to have 3 night inpatient admission which would not be until 9/30 and also PPD. Current d/c plan is home with home health RN/PT/OT. CM following.

## 2019-09-27 ENCOUNTER — HOME HEALTH ADMISSION (OUTPATIENT)
Dept: HOME HEALTH SERVICES | Facility: HOME HEALTH | Age: 79
End: 2019-09-27

## 2019-09-27 PROBLEM — G93.41 ACUTE METABOLIC ENCEPHALOPATHY: Status: RESOLVED | Noted: 2019-09-25 | Resolved: 2019-09-27

## 2019-09-27 PROBLEM — N39.0 SEPSIS SECONDARY TO UTI (HCC): Status: RESOLVED | Noted: 2019-09-26 | Resolved: 2019-09-27

## 2019-09-27 PROBLEM — E87.5 HYPERKALEMIA: Status: ACTIVE | Noted: 2019-09-27

## 2019-09-27 PROBLEM — A41.9 SEPSIS SECONDARY TO UTI (HCC): Status: RESOLVED | Noted: 2019-09-26 | Resolved: 2019-09-27

## 2019-09-27 PROBLEM — E87.5 HYPERKALEMIA: Status: RESOLVED | Noted: 2019-09-27 | Resolved: 2019-09-27

## 2019-09-27 LAB
ANION GAP SERPL CALC-SCNC: 5 MMOL/L (ref 7–16)
BUN SERPL-MCNC: 31 MG/DL (ref 8–23)
CALCIUM SERPL-MCNC: 8.3 MG/DL (ref 8.3–10.4)
CHLORIDE SERPL-SCNC: 108 MMOL/L (ref 98–107)
CO2 SERPL-SCNC: 29 MMOL/L (ref 21–32)
CREAT SERPL-MCNC: 1.62 MG/DL (ref 0.8–1.5)
ERYTHROCYTE [DISTWIDTH] IN BLOOD BY AUTOMATED COUNT: 16 % (ref 11.9–14.6)
GLUCOSE BLD STRIP.AUTO-MCNC: 157 MG/DL (ref 65–100)
GLUCOSE BLD STRIP.AUTO-MCNC: 166 MG/DL (ref 65–100)
GLUCOSE BLD STRIP.AUTO-MCNC: 329 MG/DL (ref 65–100)
GLUCOSE BLD STRIP.AUTO-MCNC: 338 MG/DL (ref 65–100)
GLUCOSE SERPL-MCNC: 131 MG/DL (ref 65–100)
HCT VFR BLD AUTO: 35.8 % (ref 41.1–50.3)
HGB BLD-MCNC: 11.2 G/DL (ref 13.6–17.2)
MCH RBC QN AUTO: 28.6 PG (ref 26.1–32.9)
MCHC RBC AUTO-ENTMCNC: 31.3 G/DL (ref 31.4–35)
MCV RBC AUTO: 91.3 FL (ref 79.6–97.8)
NRBC # BLD: 0 K/UL (ref 0–0.2)
PLATELET # BLD AUTO: 150 K/UL (ref 150–450)
PMV BLD AUTO: 10.9 FL (ref 9.4–12.3)
POTASSIUM SERPL-SCNC: 3.6 MMOL/L (ref 3.5–5.1)
RBC # BLD AUTO: 3.92 M/UL (ref 4.23–5.6)
RPR SER QL: REACTIVE
RPR SER-TITR: NORMAL {TITER}
SODIUM SERPL-SCNC: 142 MMOL/L (ref 136–145)
WBC # BLD AUTO: 9.2 K/UL (ref 4.3–11.1)

## 2019-09-27 PROCEDURE — 94640 AIRWAY INHALATION TREATMENT: CPT

## 2019-09-27 PROCEDURE — 97530 THERAPEUTIC ACTIVITIES: CPT

## 2019-09-27 PROCEDURE — 97161 PT EVAL LOW COMPLEX 20 MIN: CPT

## 2019-09-27 PROCEDURE — 82962 GLUCOSE BLOOD TEST: CPT

## 2019-09-27 PROCEDURE — 74011000250 HC RX REV CODE- 250: Performed by: INTERNAL MEDICINE

## 2019-09-27 PROCEDURE — 65270000029 HC RM PRIVATE

## 2019-09-27 PROCEDURE — 94664 DEMO&/EVAL PT USE INHALER: CPT

## 2019-09-27 PROCEDURE — 36415 COLL VENOUS BLD VENIPUNCTURE: CPT

## 2019-09-27 PROCEDURE — 74011250636 HC RX REV CODE- 250/636: Performed by: INTERNAL MEDICINE

## 2019-09-27 PROCEDURE — 74011250637 HC RX REV CODE- 250/637: Performed by: INTERNAL MEDICINE

## 2019-09-27 PROCEDURE — 77010033678 HC OXYGEN DAILY

## 2019-09-27 PROCEDURE — 85027 COMPLETE CBC AUTOMATED: CPT

## 2019-09-27 PROCEDURE — 74011636637 HC RX REV CODE- 636/637: Performed by: INTERNAL MEDICINE

## 2019-09-27 PROCEDURE — 80048 BASIC METABOLIC PNL TOTAL CA: CPT

## 2019-09-27 PROCEDURE — 74011250637 HC RX REV CODE- 250/637: Performed by: HOSPITALIST

## 2019-09-27 PROCEDURE — 86580 TB INTRADERMAL TEST: CPT | Performed by: INTERNAL MEDICINE

## 2019-09-27 PROCEDURE — 74011000302 HC RX REV CODE- 302: Performed by: INTERNAL MEDICINE

## 2019-09-27 PROCEDURE — 94760 N-INVAS EAR/PLS OXIMETRY 1: CPT

## 2019-09-27 RX ORDER — CEPHALEXIN 500 MG/1
500 CAPSULE ORAL 2 TIMES DAILY
Qty: 10 CAP | Refills: 0 | Status: SHIPPED | OUTPATIENT
Start: 2019-09-27 | End: 2019-10-09

## 2019-09-27 RX ORDER — ACETAMINOPHEN 325 MG/1
650 TABLET ORAL
Status: DISCONTINUED | OUTPATIENT
Start: 2019-09-27 | End: 2019-10-01 | Stop reason: HOSPADM

## 2019-09-27 RX ORDER — CEPHALEXIN 500 MG/1
500 CAPSULE ORAL 2 TIMES DAILY
Status: DISCONTINUED | OUTPATIENT
Start: 2019-09-27 | End: 2019-09-28

## 2019-09-27 RX ORDER — NYSTATIN 100000 [USP'U]/G
POWDER TOPICAL 2 TIMES DAILY
Status: DISCONTINUED | OUTPATIENT
Start: 2019-09-28 | End: 2019-10-01 | Stop reason: HOSPADM

## 2019-09-27 RX ORDER — LANOLIN ALCOHOL/MO/W.PET/CERES
500 CREAM (GRAM) TOPICAL DAILY
Qty: 30 TAB | Refills: 2 | Status: SHIPPED | OUTPATIENT
Start: 2019-09-27 | End: 2020-09-15

## 2019-09-27 RX ORDER — FUROSEMIDE 40 MG/1
40 TABLET ORAL
Status: DISCONTINUED | OUTPATIENT
Start: 2019-09-28 | End: 2019-10-01 | Stop reason: HOSPADM

## 2019-09-27 RX ADMIN — FUROSEMIDE 40 MG: 40 TABLET ORAL at 07:36

## 2019-09-27 RX ADMIN — PANTOPRAZOLE SODIUM 40 MG: 40 TABLET, DELAYED RELEASE ORAL at 03:51

## 2019-09-27 RX ADMIN — Medication 10 ML: at 17:00

## 2019-09-27 RX ADMIN — ATORVASTATIN CALCIUM 80 MG: 40 TABLET, FILM COATED ORAL at 21:01

## 2019-09-27 RX ADMIN — INSULIN LISPRO 8 UNITS: 100 INJECTION, SOLUTION INTRAVENOUS; SUBCUTANEOUS at 23:25

## 2019-09-27 RX ADMIN — ASPIRIN 81 MG: 81 TABLET ORAL at 07:36

## 2019-09-27 RX ADMIN — PREGABALIN 150 MG: 75 CAPSULE ORAL at 16:57

## 2019-09-27 RX ADMIN — VALSARTAN 80 MG: 80 TABLET, FILM COATED ORAL at 07:37

## 2019-09-27 RX ADMIN — BUSPIRONE HYDROCHLORIDE 15 MG: 5 TABLET ORAL at 07:36

## 2019-09-27 RX ADMIN — INSULIN LISPRO 8 UNITS: 100 INJECTION, SOLUTION INTRAVENOUS; SUBCUTANEOUS at 16:56

## 2019-09-27 RX ADMIN — PREGABALIN 150 MG: 75 CAPSULE ORAL at 07:36

## 2019-09-27 RX ADMIN — POLYETHYLENE GLYCOL 3350 17 G: 17 POWDER, FOR SOLUTION ORAL at 07:38

## 2019-09-27 RX ADMIN — ACETAMINOPHEN 650 MG: 325 TABLET, FILM COATED ORAL at 03:51

## 2019-09-27 RX ADMIN — LORATADINE 10 MG: 10 TABLET ORAL at 07:37

## 2019-09-27 RX ADMIN — CEPHALEXIN 500 MG: 500 CAPSULE ORAL at 16:57

## 2019-09-27 RX ADMIN — EZETIMIBE 10 MG: 10 TABLET ORAL at 21:01

## 2019-09-27 RX ADMIN — ALBUTEROL SULFATE 2.5 MG: 2.5 SOLUTION RESPIRATORY (INHALATION) at 21:28

## 2019-09-27 RX ADMIN — INSULIN LISPRO 2 UNITS: 100 INJECTION, SOLUTION INTRAVENOUS; SUBCUTANEOUS at 07:35

## 2019-09-27 RX ADMIN — Medication 5 ML: at 03:52

## 2019-09-27 RX ADMIN — TUBERCULIN PURIFIED PROTEIN DERIVATIVE 5 UNITS: 5 INJECTION, SOLUTION INTRADERMAL at 16:57

## 2019-09-27 RX ADMIN — TAMSULOSIN HYDROCHLORIDE 0.4 MG: 0.4 CAPSULE ORAL at 07:36

## 2019-09-27 RX ADMIN — Medication 5 ML: at 21:01

## 2019-09-27 RX ADMIN — INSULIN LISPRO 2 UNITS: 100 INJECTION, SOLUTION INTRAVENOUS; SUBCUTANEOUS at 12:27

## 2019-09-27 RX ADMIN — POTASSIUM CHLORIDE 20 MEQ: 20 TABLET, EXTENDED RELEASE ORAL at 12:27

## 2019-09-27 RX ADMIN — ONDANSETRON 4 MG: 2 INJECTION INTRAMUSCULAR; INTRAVENOUS at 23:25

## 2019-09-27 RX ADMIN — CLOPIDOGREL BISULFATE 75 MG: 75 TABLET ORAL at 07:35

## 2019-09-27 NOTE — PROGRESS NOTES
09/27/19 0641   NIH Stroke Scale   Interval Other (comment)   LOC 0   LOC Questions 0   LOC Commands 0   Best Gaze 1   Visual 0   Facial Palsy 1   Motor Right Arm 0   Motor Left Arm 0   Motor Right Leg 0   Motor Left Leg 1   Limb Ataxia 1   Sensory 0   Best Language 0   Dysarthria 1   Extinction and Inattention 0   Total 5

## 2019-09-27 NOTE — PROGRESS NOTES
Problem: Mobility Impaired (Adult and Pediatric)  Goal: *Acute Goals and Plan of Care (Insert Text)  Description  ST. Patient will perform bed mobility with MINIMAL ASSISTANCE within 3 days. 2. Patient will transfer bed to chair with MODERATE ASSISTANCE within 3 days. 3. Patient will demonstrate GOOD DYNAMIC SITTING balance within 3 day(s). 4. Patient will ambulate 25+ using least restrictive assistive device and MINIMAL ASSISTANCE within 3 days. 5. Patient will tolerate 15+ minutes of therapeutic activity/exercise and/or neuromuscular re-education while maintaining stable vitals to improve functional strength and activity tolerance within 3 days. LT. Patient will perform bed mobility with SUPERVISION within 7 days. 2. Patient will transfer bed to chair with STAND BY ASSISTANCE within 7 days. 3. Patient will demonstrate FAIR DYNAMIC STANDING balance within 7 day(s). 4. Patient will ambulate 50+ using least restrictive assistive device and SUPERVISION within 7 days. 5. Patient will tolerate 25+ minutes of therapeutic activity/exercise and/or neuromuscular re-education while maintaining stable vitals to improve functional strength and activity tolerance within 7 days. Outcome: Progressing Towards Goal       PHYSICAL THERAPY: Initial Assessment, Daily Note and AM 2019  INPATIENT: PT Visit Days : 1  [place] / [place] / Product Type: Medicare /       NAME/AGE/GENDER: Boubacar Garsia is a 70 y.o. male   PRIMARY DIAGNOSIS: Stroke-like symptoms [K64.84]  Acute metabolic encephalopathy [W48.72] Sepsis secondary to UTI ([facility]   Sepsis secondary to UTI [facility])          ICD-10: Treatment Diagnosis:    Generalized Muscle Weakness (M62.81)  Difficulty in walking, Not elsewhere classified (R26.2)   Precaution/Allergies:  Debbora Farhan maleate]      ASSESSMENT:     Mr. Xuan Mccauley is a 70year old male admitted with acute metabolic encephalopathy.   Patient seen this AM for initial physical therapy evaluation: presents supine in bed, oriented x3, and endorses head ache as well as abdominal pain (ongoing per patient). Patient lives with his brother, alone during the day, in a single story apartment with 4 steps to enter. At baseline, patient is modified independent with ADLs, ambulates household level distances with a SPC > RW, and endorse 0 recent falls. States he has not been out of bed during admission. Today, B UE strength 4+/5, B UE edema appreciated (patient states new), L LE strength 3-3+/5, R LE 3-/5 proximally, AROM R LE limited, edema B LE, and sensation is impaired to light touch L3-S1 R LE. Patient stated R LE strength and sensory deficits are baseline x years and patient utilizes a strap to lift leg in and out of bed. Patient required moderate assistance for rolling, and maximal assistance and additional time for supine to sitting mobility. Patient c/o severe R LE quadrant pain in sitting, became tearful, sweating, and short of breath and increasingly poor sitting balance. Transitioned back into supine, placed on 2L 02 per patient request, RN called to bedside. Vitals assessed: BP: 104/70, HR: 78bpm, and Sp02 98% on 2L and 96% on room air. Palpated R LE quadrant, patient with rebound tenderness. Notified primary RN and attempted to position patient to comfort. Mr. Katie Olivas presents with decreased functional strength and activity tolerance. Recommend continued skilled PT services to address stated deficits.        This section established at most recent assessment   PROBLEM LIST (Impairments causing functional limitations):  Decreased Strength  Decreased ADL/Functional Activities  Decreased Transfer Abilities  Decreased Ambulation Ability/Technique  Decreased Balance  Increased Pain  Decreased Activity Tolerance   INTERVENTIONS PLANNED: (Benefits and precautions of physical therapy have been discussed with the patient.)  Balance Exercise  Bed Mobility  Family Education  Gait Training  Home Exercise Program (HEP)  Range of Motion (ROM)  Therapeutic Activites  Therapeutic Exercise/Strengthening  Transfer Training     TREATMENT PLAN: Frequency/Duration: 3 times a week for duration of hospital stay  Rehabilitation Potential For Stated Goals: Good     REHAB RECOMMENDATIONS (at time of discharge pending progress):    Placement: It is my opinion, based on this patient's performance to date, that Mr. Edward Wagner may benefit from participating in 1-2 additional therapy sessions in order to continue to assess for rehab potential and then make recommendation for disposition at discharge. Equipment:   Patient has RW and SPC for home use. HISTORY:   History of Present Injury/Illness (Reason for Referral):  Weakness, Difficulty in walking  Past Medical History/Comorbidities:   Mr. Edward Wagner  has a past medical history of Cervical stenosis of spine, Chronic kidney disease, Degenerative disc disease, lumbar, Diabetes mellitus type II, Diabetic retinopathy of both eyes without macular edema associated with type 2 diabetes mellitus ([address] Diastolic congestive heart failure ([address] Diverticulosis of colon (June 2010), DVT (deep venous thrombosis) ([address] Extrinsic allergic asthma, GERD (gastroesophageal reflux disease), colonic polyps (07/29/2010), Hyperlipidemia LDL goal < 70, Hypertension, Obstructive sleep apnea, Perennial allergic rhinitis with seasonal variation, 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 ([address] Pulmonary embolism ([address] (Mar 2014), TIA (transient ischemic attack) (11/9/2017), and Type 2 diabetes, uncontrolled, with neuropathy ([address] (1/22/2015). He also has no past medical history of Aneurysm ([address] Arrhythmia, Autoimmune disease ([address] CAD (coronary artery disease), Cancer ([address] Coagulation defects, COPD, Liver disease, Psychiatric disorder, PUD (peptic ulcer disease), or Seizures ([address]   Mr. Edward Wagner  has a past surgical history that includes colonoscopy (2010); egd (2011); pr sinus surgery proc unlisted; hx knee arthroscopy (); pr total knee arthroplasty (Right, ); hx bunionectomy (Bilateral); and hx hernia repair (Bilateral). Social History/Living Environment:   Home Environment: Apartment  # Steps to Enter: 4  One/Two Story Residence: One story  Living Alone: No  Support Systems: Family member(s)(Lives with brother)  Patient Expects to be Discharged to[de-identified] Rehabilitation facility  Current DME Used/Available at Home: Mounika Dills, straight, Walker, rolling  Prior Level of Function/Work/Activity:  Patient lives with his brother, alone during the day, in a single story apartment with 4 steps to enter. At baseline, patient is modified independent with ADLs, ambulates household level distances with a SPC > RW, and endorse 0 recent falls. States he has not been out of bed during admission. Number of Personal Factors/Comorbidities that affect the Plan of Care: 1-2: MODERATE COMPLEXITY   EXAMINATION:   Most Recent Physical Functioning:   Gross Assessment:  AROM: Generally decreased, functional(R LE proximally> distally)  Strength: Generally decreased, functional  Sensation: Impaired(light touch R LE)               Posture:     Balance:  Sitting: Impaired  Sitting - Static: Poor (constant support) Bed Mobility:  Rolling: Moderate assistance  Supine to Sit: Maximum assistance  Sit to Supine: Maximum assistance  Scooting: Maximum assistance  Wheelchair Mobility:     Transfers:     Gait:            Body Structures Involved:  Metabolic  Muscles Body Functions Affected: Movement Related Activities and Participation Affected:   Mobility  Self Care   Number of elements that affect the Plan of Care: 4+: HIGH COMPLEXITY   CLINICAL PRESENTATION:   Presentation: Stable and uncomplicated: LOW COMPLEXITY   CLINICAL DECISION [facility] [address] [id number] AM-PAC 6 Clicks   Basic Mobility Inpatient Short Form  How much difficulty does the patient currently have... Unable A Lot A Little None   1. Turning over in bed (including adjusting bedclothes, sheets and blankets)? ? 1   ? 2   ? 3   ? 4   2. Sitting down on and standing up from a chair with arms ( e.g., wheelchair, bedside commode, etc.)   ? 1   ? 2   ? 3   ? 4   3. Moving from lying on back to sitting on the side of the bed?   ? 1   ? 2   ? 3   ? 4   How much help from another person does the patient currently need. .. Total A Lot A Little None   4. Moving to and from a bed to a chair (including a wheelchair)? ? 1   ? 2   ? 3   ? 4   5. Need to walk in hospital room? ? 1   ? 2   ? 3   ? 4   6. Climbing 3-5 steps with a railing? ? 1   ? 2   ? 3   ? 4   © 2007, [address] [address], under license to [employer]. All rights reserved      Score:  Initial: 12 Most Recent: 12 (Date: 9/27/19)    Interpretation of Tool:  Represents activities that are increasingly more difficult (i.e. Bed mobility, Transfers, Gait). Medical Necessity:     Patient demonstrates good   rehab potential due to higher previous functional level. Reason for Services/Other Comments:  Patient continues to require skilled intervention due to decreased functional activity tolerance from baseline. .   Use of outcome tool(s) and clinical judgement create a POC that gives a: Clear prediction of patient's progress: LOW COMPLEXITY            TREATMENT:   (In addition to Assessment/Re-Assessment sessions the following treatments were rendered)   Pre-treatment Symptoms/Complaints:    Pain: Initial:   Pain Intensity 1: 10  Pain Location 1: Abdomen(R LQ)  Pain Orientation 1: Right, Lower  Pain Intervention(s) 1: Nurse notified, Repositioned  Post Session:  RN monitoring abdominal pain; patient positioned to comfort. Initial Evaluation (untimed charge)  Therapeutic Activity: (    24 Minutes):   Therapeutic activities including rolling, scooting, transfer training strategies, scooting strategies, positioning, sitting balance and postural retraining, and patient education  to improve mobility and balance. Required maximal   to promote static balance in sitting. Braces/Orthotics/Lines/Etc:   IV  Treatment/Session Assessment:    Response to Treatment:  See above. Interdisciplinary Collaboration:   Physical Therapist  Registered Nurse  After treatment position/precautions:   Supine in bed  Bed alarm/tab alert on  Bed/Chair-wheels locked  Bed in low position  Call light within reach  RN notified  Family at bedside  Side rails x 2  Needs met    Compliance with Program/Exercises: Will assess as treatment progresses  Recommendations/Intent for next treatment session: \"Next visit will focus on advancements to more challenging activities and reduction in assistance provided\".     Total Treatment Duration:  PT Patient Time In/Time Out  Time In: 0940  Time Out: [address] [facility], [place]

## 2019-09-27 NOTE — PROGRESS NOTES
[address]  Face to Face Encounter    Patients Name: Stewart Garcia    YOB: 1940    Ordering Physician: Karlene Nunez    Primary Diagnosis: Stroke-like symptoms [M09.85]  Acute metabolic encephalopathy [W50.50]    Date of Face to Face:   9/27/2019                                  Face to Face Encounter findings are related to primary reason for home care:   yes. 1. I certify that the patient needs intermittent care as follows: skilled nursing care:  skilled observation/assessment, patient education  physical therapy: strengthening  occupational therapy:  ADL safety (ie. cooking, bathing, dressing)    2. I certify that this patient is homebound, that is Patient's condition makes leaving the home medically contraindicated; and 3) patient has a normal inability to leave the home and leaving the home requires considerable and taxing effort. Patient may leave the home for infrequent and short duration for medical reasons, and occasional absences for non-medical reasons. Homebound status is due to the following functional limitations:     3. I certify that this patient is under my care and that I, or a nurse practitioner or  [id number], or clinical nurse specialist, or certified nurse midwife, working with me, had a Face-to-Face Encounter that meets the physician Face-to-Face Encounter requirements. The following are the clinical findings from the [address] [place] [address] encounter that support the need for skilled services and is a summary of the encounter:     See Progress notes      Tank Parra RN  9/27/2019      THE FOLLOWING TO BE COMPLETED BY THE COMMUNITY PHYSICIAN:    I concur with the findings described above from the [facility] encounter that this patient is homebound and in need of a skilled service.     Certifying Physician: _____________________________________      Printed Certifying Physician Name: _____________________________________    Date: _________________

## 2019-09-27 NOTE — PROGRESS NOTES
09/27/19 4215   NIH Stroke Scale   Interval Other (comment)  (Dual NIH with [facility] [place])   LOC 0   LOC Questions 0   LOC Commands 0   Best Gaze 1   Visual 0   Facial Palsy 1   Motor Right Arm 0   Motor Left Arm 0   Motor Right Leg 0   Motor Left Leg 1   Limb Ataxia 1   Sensory 0   Best Language 0   Dysarthria 0   Extinction and Inattention 0   Total 4

## 2019-09-27 NOTE — PROGRESS NOTES
Physical Therapy Note:    PT evaluation completed. Patient modified independent at baseline with SPC. Today, maximal assistance for bed mobility with poor sitting balance with severe right lower quadrant pain with supine to sitting mobility. Rebound tenderness present. Patient tearful, short of breath, and sweating in sitting secondary to pain. Placed on 2L 02. Transitioned into supine: vitals assessed and RN called to bedside: BP: 104/ 70, HR: 78bpm, Sp02 98% on 2L and 96% on room air. Primary RN updated. Full consult to follow.     Thank you,    Mary Rutledge, PT, DPT

## 2019-09-27 NOTE — PROGRESS NOTES
09/26/19 1856   NIH Stroke Scale   Interval Other (comment)  (Dual NIH with David Nguyen RN)   LOC 0   LOC Questions 0   LOC Commands 0   Best Gaze 1   Visual 0   Facial Palsy 1   Motor Right Arm 0   Motor Left Arm 0   Motor Right Leg 0   Motor Left Leg 1   Limb Ataxia 1   Sensory 0   Best Language 0   Dysarthria 1   Extinction and Inattention 0   Total 5

## 2019-09-27 NOTE — PROGRESS NOTES
Hospitalist Note     Admit Date:  2019  1:59 PM   Name:  Yanet Wray   Age:  70 y.o.  :  1940   MRN:  [id number]   PCP:  Kena Dixon MD  Treatment Team: Attending Provider: Paul Gonzales MD; Utilization Review: Saba Mccauley RN; Physical Therapist: Leon Brenner DPT; Charge Nurse: Sanju Gibson    HPI/Subjective:   Pt is a 71 y/o M admitted with encephalopathy. Hx CKD3, DM, dCHF, GERD, HTN, prior TIA, MARLEEN, obesity. Neurology saw in ER and felt infectious in etiology. Had fever in ER with elevated WBC. Did complain of some urinary symptoms suprapubic pain, urinary urgency on admission. Recently started on flomax for these symptoms as an outpatient.  - See DC summary from earlier. DC held due to worsened weakness on PT eval today. Pt has chronic debility and severe obesity BMI 45 complicating issue. No CP, SOB, fevers. Mental status baseline      Objective:     Patient Vitals for the past 24 hrs:   Temp Pulse Resp BP SpO2   19 1600 98.2 °F (36.8 °C) 96 18 147/72 92 %   19 1200 98 °F (36.7 °C) 80 18 126/78 91 %   19 0935  78  104/70    19 0800 97.5 °F (36.4 °C) 91 18 122/71 91 %   19 0400 98.5 °F (36.9 °C) 80 17 146/77 97 %   19 0000 99.2 °F (37.3 °C) 86 17 149/55 94 %   19 2000 98 °F (36.7 °C) 93 20 143/65 93 %     Oxygen Therapy  O2 Sat (%): 92 % (19 1600)  Pulse via Oximetry: 102 beats per minute (19 1558)    Estimated body mass index is 45.73 kg/m² as calculated from the following:    Height as of an earlier encounter on 19: 5' 6\" (1.676 m). Weight as of this encounter: 128.5 kg (283 lb 4.8 oz).       Intake/Output Summary (Last 24 hours) at 2019 1622  Last data filed at 2019 0544  Gross per 24 hour   Intake    Output 2300 ml   Net -2300 ml       *Note that automatically entered I/Os may not be accurate; dependent on patient compliance with collection and accurate  by techs.    General:    Well nourished. Alert. CV:   RRR. No murmur, rub, or gallop. Lungs:   CTAB. No wheezing, rhonchi, or rales. Abdomen:   Soft, nontender, nondistended. Extremities: Warm and dry. No cyanosis or edema. Skin:     No rashes or jaundice.    Neuro:  No gross focal deficits    Data Review:  I have reviewed all labs, meds, and studies from the last 24 hours:    Recent Results (from the past 24 hour(s))   GLUCOSE, POC    Collection Time: 09/26/19  8:28 PM   Result Value Ref Range    Glucose (POC) 234 (H) 65 - 237 mg/dL   METABOLIC PANEL, BASIC    Collection Time: 09/27/19  6:34 AM   Result Value Ref Range    Sodium 142 136 - 145 mmol/L    Potassium 3.6 3.5 - 5.1 mmol/L    Chloride 108 (H) 98 - 107 mmol/L    CO2 29 21 - 32 mmol/L    Anion gap 5 (L) 7 - 16 mmol/L    Glucose 131 (H) 65 - 100 mg/dL    BUN 31 (H) 8 - 23 MG/DL    Creatinine 1.62 (H) 0.8 - 1.5 MG/DL    GFR est AA 53 (L) >60 ml/min/1.73m2    GFR est non-AA 44 (L) >60 ml/min/1.73m2    Calcium 8.3 8.3 - 10.4 MG/DL   CBC W/O DIFF    Collection Time: 09/27/19  6:34 AM   Result Value Ref Range    WBC 9.2 4.3 - 11.1 K/uL    RBC 3.92 (L) 4.23 - 5.6 M/uL    HGB 11.2 (L) 13.6 - 17.2 g/dL    HCT 35.8 (L) 41.1 - 50.3 %    MCV 91.3 79.6 - 97.8 FL    MCH 28.6 26.1 - 32.9 PG    MCHC 31.3 (L) 31.4 - 35.0 g/dL    RDW 16.0 (H) 11.9 - 14.6 %    PLATELET 523 394 - 472 K/uL    MPV 10.9 9.4 - 12.3 FL    ABSOLUTE NRBC 0.00 0.0 - 0.2 K/uL   GLUCOSE, POC    Collection Time: 09/27/19  7:30 AM   Result Value Ref Range    Glucose (POC) 157 (H) 65 - 100 mg/dL   GLUCOSE, POC    Collection Time: 09/27/19 12:13 PM   Result Value Ref Range    Glucose (POC) 166 (H) 65 - 100 mg/dL        All Micro Results     Procedure Component Value Units Date/Time    CULTURE, BLOOD [[id number]] Collected:  09/25/19 2055    Order Status:  Completed Specimen:  Blood Updated:  09/27/19 1104     Special Requests: --        LEFT  Antecubital       Culture result: NO GROWTH 2 DAYS CULTURE, BLOOD [[id number]] Collected:  09/25/19 2053    Order Status:  Completed Specimen:  Blood Updated:  09/27/19 1932     Special Requests: --        RIGHT  Antecubital       Culture result: NO GROWTH 2 DAYS             No results found for this visit on 09/25/19. Current Meds:  Current Facility-Administered Medications   Medication Dose Route Frequency    acetaminophen (TYLENOL) tablet 650 mg  650 mg Oral Q6H PRN    cephALEXin (KEFLEX) capsule 500 mg  500 mg Oral BID    [START ON 9/28/2019] furosemide (LASIX) tablet 40 mg  40 mg Oral ACB&D    polyethylene glycol (MIRALAX) packet 17 g  17 g Oral DAILY    ondansetron (ZOFRAN) injection 4 mg  4 mg IntraVENous Q4H PRN    sodium chloride (NS) flush 5-40 mL  5-40 mL IntraVENous Q8H    sodium chloride (NS) flush 5-40 mL  5-40 mL IntraVENous PRN    labetalol (NORMODYNE;TRANDATE) injection 5 mg  5 mg IntraVENous Q10MIN PRN    albuterol (PROVENTIL VENTOLIN) nebulizer solution 2.5 mg  2.5 mg Nebulization Q4H PRN    aspirin delayed-release tablet 81 mg  81 mg Oral DAILY    atorvastatin (LIPITOR) tablet 80 mg  80 mg Oral QHS    busPIRone (BUSPAR) tablet 15 mg  15 mg Oral DAILY    clopidogrel (PLAVIX) tablet 75 mg  75 mg Oral DAILY    insulin lispro (HUMALOG) injection   SubCUTAneous AC&HS    ezetimibe (ZETIA) tablet 10 mg  10 mg Oral QHS    valsartan (DIOVAN) tablet 80 mg  80 mg Oral DAILY    loratadine (CLARITIN) tablet 10 mg  10 mg Oral DAILY    pantoprazole (PROTONIX) tablet 40 mg  40 mg Oral ACB    potassium chloride (K-DUR, KLOR-CON) SR tablet 20 mEq  20 mEq Oral DAILY    pregabalin (LYRICA) capsule 150 mg  150 mg Oral BID    tamsulosin (FLOMAX) capsule 0.4 mg  0.4 mg Oral DAILY    lip protectant (BLISTEX) ointment 1 Each  1 Each Topical PRN       Other Studies (last 24 hours):  No results found.     Assessment and Plan:     Hospital Problems as of 9/27/2019 Date Reviewed: 9/18/2019          Codes Class Noted - Resolved POA    CKD (chronic kidney disease) stage 3, GFR 30-59 ml/min (HCC) (Chronic) ICD-10-CM: N18.3  ICD-9-CM: 585.3  6/24/2019 - Present Yes        Diabetic neuropathy associated with type 2 diabetes mellitus (HCC) (Chronic) ICD-10-CM: E11.40  ICD-9-CM: 250.60, 357.2  7/18/2017 - Present Yes        Diastolic CHF, chronic (HCC) (Chronic) ICD-10-CM: I50.32  ICD-9-CM: 428.32, 428.0  3/20/2017 - Present Yes        Pulmonary hypertension (HCC) (Chronic) ICD-10-CM: I27.20  ICD-9-CM: 416.8  10/14/2015 - Present Yes        Gastroesophageal reflux disease without esophagitis (Chronic) ICD-10-CM: K21.9  ICD-9-CM: 530.81  4/21/2015 - Present Yes        Hyperlipidemia with target LDL less than 70 (Chronic) ICD-10-CM: E78.5  ICD-9-CM: 272.4  Unknown - Present Yes        Essential hypertension, benign (Chronic) ICD-10-CM: I10  ICD-9-CM: 401.1  1/22/2015 - Present Yes        Type 2 diabetes, uncontrolled, with neuropathy (HCC) (Chronic) ICD-10-CM: E11.40, E11.65  ICD-9-CM: 250.62, 357.2  1/22/2015 - Present Yes        Obstructive sleep apnea of adult (Chronic) ICD-10-CM: G47.33  ICD-9-CM: 327.23  1/22/2015 - Present Yes        Morbid obesity ([address] (Chronic) ICD-10-CM: E66.01  ICD-9-CM: 278.01  6/27/2011 - Present Yes        RESOLVED: Hyperkalemia ICD-10-CM: E87.5  ICD-9-CM: 276.7  9/27/2019 - 9/27/2019 Yes        * (Principal) RESOLVED: Sepsis secondary to UTI [facility]) ICD-10-CM: A41.9, N39.0  ICD-9-CM: 038.9, 995.91, 599.0  9/26/2019 - 9/27/2019 Yes        RESOLVED: Acute metabolic encephalopathy [id number]: G93.41  ICD-9-CM: 348.31  9/25/2019 - 9/27/2019 Yes              Plan:  · Cont keflex for UTI  · Discharge done but PT now saying pt needs STR.   Pt can go when placement found    Diet:  DIET CARDIAC      Signed:  Aristeo Dotson MD

## 2019-09-27 NOTE — DISCHARGE INSTRUCTIONS
DISCHARGE SUMMARY from Nurse    PATIENT INSTRUCTIONS:    After general anesthesia or intravenous sedation, for 24 hours or while taking prescription Narcotics:  · Limit your activities  · Do not drive and operate hazardous machinery  · Do not make important personal or business decisions  · Do  not drink alcoholic beverages  · If you have not urinated within 8 hours after discharge, please contact your surgeon on call. What to do at Home:  Recommended activity: Activity as tolerated. If you experience any of the following symptoms temp > 101.5, unrelieved pain, nausea or vomiting, shortness of breath or fatigue not relieved with rest, please follow up with MD.    *  Please give a list of your current medications to your Primary Care Provider. *  Please update this list whenever your medications are discontinued, doses are      changed, or new medications (including over-the-counter products) are added. *  Please carry medication information at all times in case of emergency situations. These are general instructions for a healthy lifestyle:    No smoking/ No tobacco products/ Avoid exposure to second hand smoke  Surgeon General's Warning:  Quitting smoking now greatly reduces serious risk to your health. Obesity, smoking, and sedentary lifestyle greatly increases your risk for illness    A healthy diet, regular physical exercise & weight monitoring are important for maintaining a healthy lifestyle    You may be retaining fluid if you have a history of heart failure or if you experience any of the following symptoms:  Weight gain of 3 pounds or more overnight or 5 pounds in a week, increased swelling in our hands or feet or shortness of breath while lying flat in bed. Please call your doctor as soon as you notice any of these symptoms; do not wait until your next office visit. The discharge information has been reviewed with the patient. The patient verbalized understanding.   Discharge medications reviewed with the patient and appropriate educational materials and side effects teaching were provided. Patient Education        Learning About Metabolic Encephalopathy  What is metabolic encephalopathy? Metabolic encephalopathy is a problem in the brain. It is caused by a chemical imbalance in the blood. The imbalance is caused by an illness or organs that are not working as well as they should. It is not caused by a head injury. When the imbalance affects the brain, it can lead to personality changes. It can also make it harder to think clearly and remember things. The problems may only last a short time if you get treatment right away. But this depends on the cause. If the imbalance has been building up because you've been sick for a long time, the mental changes may be more severe. They may also last longer. What happens when you have this problem? When things are working right, your body has many ways to keep the chemicals in your blood in balance. For example, your liver and kidneys remove waste from your blood. The kidneys also help keep fluids and sodium in balance. And your pancreas makes insulin. It is a hormone that helps control the amount of sugar in your blood. But the chemicals in your blood can get out of balance and damage parts of your body because of a medical problem. This may be kidney or liver failure. Or it could be diabetes that isn't controlled well. When the imbalance affects the brain, normal thinking and behavior can change. What are the symptoms? Symptoms may include:  · Confusion. · Problems with thinking and remembering. · Being grouchy and depressed. · Feeling drowsy. · Not being able to sleep. · Passing out (fainting) now and then. How is it treated? The doctor will try to find the illness that's causing the problem. He or she may ask questions about your past health.   The doctor will also do tests to find what is causing the chemical imbalance and to see how severe it is. The doctor may treat the organ system that's causing the problem. For example, if it's a kidney problem, you may have treatment to help your kidneys work better. If you have an infection, you may need antibiotics. If the doctor can't treat the cause of the problem, he or she will treat the symptoms. The doctor will carefully watch your blood chemicals to make sure that your treatment is being done safely. Follow-up care is a key part of your treatment and safety. Be sure to make and go to all appointments, and call your doctor if you are having problems. It's also a good idea to know your test results and keep a list of the medicines you take. Where can you learn more? Go to http://jono-zuleima.info/. Enter X842 in the search box to learn more about \"Learning About Metabolic Encephalopathy. \"  Current as of: November 7, 2018  Content Version: 12.2  © 6481-4575 [employer]. Care instructions adapted under license by [employer] (which disclaims liability or warranty for this information). If you have questions about a medical condition or this instruction, always ask your healthcare professional. [place] [address] any warranty or liability for your use of this information. Patient Education        Urinary Tract Infections in Men: Care Instructions  Your Care Instructions    A urinary tract infection, or UTI, is a general term for an infection anywhere between the kidneys and the tip of the penis. UTIs can also be a result of a prostate problem. Most cause pain or burning when you urinate. Most UTIs are caused by bacteria and can be cured with antibiotics. It is important to complete your treatment so that the infection does not get worse. Follow-up care is a key part of your treatment and safety. Be sure to make and go to all appointments, and call your doctor if you are having problems.  It's also a good idea to know your test results and keep a list of the medicines you take. How can you care for yourself at home? · Take your antibiotics as prescribed. Do not stop taking them just because you feel better. You need to take the full course of antibiotics. · Take your medicines exactly as prescribed. Your doctor may have prescribed a medicine, such as phenazopyridine (Pyridium), to help relieve pain when you urinate. This turns your urine orange. You may stop taking it when your symptoms get better. But be sure to take all of your antibiotics, which treat the infection. · Drink extra water for the next day or two. This will help make the urine less concentrated and help wash out the bacteria causing the infection. (If you have kidney, heart, or liver disease and have to limit your fluids, talk with your doctor before you increase your fluid intake.)  · Avoid drinks that are carbonated or have caffeine. They can irritate the bladder. · Urinate often. Try to empty your bladder each time. · To relieve pain, take a hot bath or lay a heating pad (set on low) over your lower belly or genital area. Never go to sleep with a heating pad in place. To help prevent UTIs  · Drink plenty of fluids, enough so that your urine is light yellow or clear like water. If you have kidney, heart, or liver disease and have to limit fluids, talk with your doctor before you increase the amount of fluids you drink. · Urinate when you have the urge. Do not hold your urine for a long time. Urinate before you go to sleep. · Keep your penis clean. Catheter care  If you have a drainage tube (catheter) in place, the following steps will help you care for it. · Always wash your hands before and after touching your catheter. · Check the area around the urethra for inflammation or signs of infection. Signs of infection include irritated, swollen, red, or tender skin, or pus around the catheter.   · Clean the area around the catheter with soap and water two times a day. Dry with a clean towel afterward. · Do not apply powder or lotion to the skin around the catheter. To empty the urine collection bag  · Wash your hands with soap and water. · Without touching the drain spout, remove the spout from its sleeve at the bottom of the collection bag. Open the valve on the spout. · Let the urine flow out of the bag and into the toilet or a container. Do not let the tubing or drain spout touch anything. · After you empty the bag, clean the end of the drain spout with tissue and water. Close the valve and put the drain spout back into its sleeve at the bottom of the collection bag. · Wash your hands with soap and water. When should you call for help? Call your doctor now or seek immediate medical care if:    · Symptoms such as a fever, chills, nausea, or vomiting get worse or happen for the first time.     · You have new pain in your back just below your rib cage. This is called flank pain.     · There is new blood or pus in your urine.     · You are not able to take or keep down your antibiotics.    Watch closely for changes in your health, and be sure to contact your doctor if:    · You are not getting better after taking an antibiotic for 2 days.     · Your symptoms go away but then come back. Where can you learn more? Go to http://jono-zuleima.info/. Enter K648 in the search box to learn more about \"Urinary Tract Infections in Men: Care Instructions. \"  Current as of: December 19, 2018  Content Version: 12.2  © 0496-2236 [employer]. Care instructions adapted under license by [employer] (which disclaims liability or warranty for this information). If you have questions about a medical condition or this instruction, always ask your healthcare professional. [place] [address] any warranty or liability for your use of this information. ___________________________________________________________________________________________________________________________________

## 2019-09-27 NOTE — DISCHARGE SUMMARY
Hospitalist Discharge Summary     Admit Date:  2019  1:59 PM   Name:  Fior Snow   Age:  70 y.o.  :  1940   MRN:  [id number]   PCP:  Hari Feliz MD  Treatment Team: Attending Provider: Patito Marie MD; Consulting Provider: Hernan Sena MD; Utilization Review: Luisa Fallon RN; Speech Language Pathologist: Maryann Vasques; Occupational Therapist: Zuleima Hutchison, OTR/L    Problem List for this Hospitalization:  Hospital Problems as of 2019 Date Reviewed: 2019          Codes Class Noted - Resolved POA    CKD (chronic kidney disease) stage 3, GFR 30-59 ml/min (HCC) (Chronic) ICD-10-CM: N18.3  ICD-9-CM: 585.3  2019 - Present Yes        Diabetic neuropathy associated with type 2 diabetes mellitus ([facility] (Chronic) ICD-10-CM: E11.40  ICD-9-CM: 250.60, 357.2  2017 - Present Yes        Diastolic CHF, chronic (HCC) (Chronic) ICD-10-CM: I50.32  ICD-9-CM: 428.32, 428.0  3/20/2017 - Present Yes        Pulmonary hypertension ([facility] (Chronic) ICD-10-CM: I27.20  ICD-9-CM: 416.8  10/14/2015 - Present Yes        Gastroesophageal reflux disease without esophagitis (Chronic) ICD-10-CM: K21.9  ICD-9-CM: 530.81  2015 - Present Yes        Hyperlipidemia with target LDL less than 70 (Chronic) ICD-10-CM: E78.5  ICD-9-CM: 272.4  Unknown - Present Yes        Essential hypertension, benign (Chronic) ICD-10-CM: I10  ICD-9-CM: 401.1  2015 - Present Yes        Type 2 diabetes, uncontrolled, with neuropathy ([facility] (Chronic) ICD-10-CM: E11.40, E11.65  ICD-9-CM: 250.62, 357.2  2015 - Present Yes        Obstructive sleep apnea of adult (Chronic) ICD-10-CM: G47.33  ICD-9-CM: 327.23  2015 - Present Yes        RESOLVED: Hyperkalemia ICD-10-CM: E87.5  ICD-9-CM: 276.7  2019 - 2019 Yes        * (Principal) RESOLVED: Sepsis secondary to UTI [facility]) ICD-10-CM: A41.9, N39.0  ICD-9-CM: 038.9, 995.91, 599.0  2019 - 2019 Yes        RESOLVED: Acute metabolic encephalopathy ICD-10-CM: G93.41  ICD-9-CM: 348.31  9/25/2019 - 9/27/2019 Yes                Admission HPI from 9/25/2019:    \"78 yo AAM with past history of CKD Stage III, IDDM type II, diastolic heart failure, GERD, HTN, HLD, previous TIAs, prior pulmonary embolism, MARLEEN, morbid obesity presents from home via EMS after patient was observed to not be talking well over the phone with a friend. Per sister at bedside, Tena Catherine started doing this grunting or groaning and he actually did a similar type of spell last week and knocked prune juice all over himself. \" Currently at bedside, patient is very slow to answer some questions and it seems like he is having some difficulty with some word finding. He states he did not feel any differently this morning when he woke up. He does not really have any recollection of what was going on when he was talking on the phone. He says he felt \"terrible\" last night and contributed to \"I think it was something that I ate. \" Currently, he denies fevers/chills, chest pain, shortness of breath. He does have some lower abdominal pain and states, \"I really have to go! \" He has swelling in his lower extremities that is unchanged. He says he saw his urologist last week and he was started on Flomax.      ED Course: upon presentation, code S called and patient taken to CT scanner with CT head unremarkable. Patient deemed not a candidate for tPA due to being outside the window of presentation as well as unreliable duration of symptoms. Seen by neurology with low suspicion for stroke and higher concern for metabolic encephalopathy. WBC rise to 11.9. Procalcitonin of 0.8. Troponin negative x1. K of 5.6 and SCr of 1.8 (appears at baseline). Serum ammonia of 20. CBG of 117. CXR unremarkable. EKG showing NSR with no ST-T wave abnormalities. Hospitalist called for admission. \"    Hospital Course:  Pt is a 69 y/o M admitted with encephalopathy. Hx CKD3, DM, dCHF, GERD, HTN, prior TIA, MARLEEN, obesity. Neurology saw in ER and felt infectious in etiology. Had fever in ER with elevated WBC. Did complain of some urinary symptoms suprapubic pain, urinary urgency on admission. Recently started on flomax for these symptoms as an outpatient. Admitted with encephalopathy due to UTI. Unfortunately no urine culture sent in ER. Improved on rocephin. Blood cx NGTD. Cont keflex as outpatient, renally dosed. Neurology evaluated and did not think TIA/CVA was likely. EEG no evidence of seizure activity, showed encephalopathy. Incidentally b12 was low so will start PO supplementation. Pt reports he takes 40mg lasix TID at home. He thinks it is for his CHF. I do not see any info in recent cardiology notes about this. Recent echo with normal EF, which could represent compensation. He got a little lightheaded when getting up for discharge today. I offered to keep him but he wants to go home. Will check orthostatics. May need small bolus of IVF and lasix held if orthostatic. I encouraged him to call his physician that prescribed the lasix; he is not sure which doctor prescribed it but will check at home    Reported some moderate RLQ abd pain earlier but is better now, thinks it may be gas related. He refused CT scan, told me he would rather go home and follow up with Dr Pau Palencia. Called nursing again around 11:30 and nursing reported to me again he is still refusing CT abd. Hyperkalemia on admission though K quickly dropped as KCL was held. Will resume today and needs recheck at PCP follow up    Subclinical hyperthyroidism, mildly low TSH, normal t4. No symptoms. Disposition: Home Health Care Svc  Activity: Activity as tolerated and PT/OT per Home Health  Diet: DIET CARDIAC Regular  Code Status: Full Code      Follow up instructions, discharge meds at bottom of this note. Plan was discussed with pt. All questions answered. Patient was stable at time of discharge.   Patient will call a physician or return if any concerns. Diagnostic Imaging/Tests:   Xr Chest Sngl V    Result Date: 9/25/2019  CHEST X-RAY, one view. HISTORY:  Altered mental status. TECHNIQUE:  AP upright semiupright view COMPARISON: November 2017 FINDINGS:   The lungs are clear. The heart size is normal. The costophrenic angles are sharp. The pulmonary vasculature is unremarkable. Included portion of the upper abdomen is unremarkable. . There are aortic arch calcifications. IMPRESSION:  Negative for acute change     Ct Code Neuro Head Wo Contrast    Result Date: 9/25/2019  CT HEAD WITHOUT CONTRAST. INDICATION: Aphasia and left-sided weakness. Code stroke. COMPARISON: April 2014  TECHNIQUE:   5 mm axial scans from the skull base to the vertex. FINDINGS:  No acute intraparenchymal hemorrhage or abnormal extra-axial fluid collection. The ventricles are normal size. No midline shift or mass effect. Mild symmetric volume loss. Included portion of the paranasal sinuses and orbits demonstrates opacification of right sphenoid sinus. IMPRESSION:  Negative for acute intracranial abnormality. Chronic changes. Duplex Carotid Bilateral    Result Date: 9/26/2019  TITLE:  Carotid and Vertebral Ultrasound Examination. INDICATION:  Strokelike symptoms. TECHNIQUE: Grayscale, Color Doppler, and Spectral Doppler Ultrasound interrogation performed. Peak Systolic Velocity, ICA-to-CCA ratio, and End-Diastolic Velocity are recorded. The estimate of stenosis is inferred from velocity measurements cross referenced to published correlations as defined by the Society of Radiologists in [address] [place] [address] Drive Radiology 2003; 229; 340-346. Slightly limited study secondary to patient body habitus COMPARISON: July 21, 2017. RIGHT NECK:  The peak systolic velocity in the Common Carotid Artery = 100 cm/sec; Internal Carotid Artery = 74 cm/sec. Ratio = 0.7. Antegrade flow in the vertebral artery.  LEFT  NECK:  The peak systolic velocity in the Common Carotid Artery = 73 cm/sec; Internal Carotid Artery = 60 cm/sec. Ratio = 0.8. Antegrade flow in the vertebral artery. IMPRESSION:  DANNIE:  No significant stenosis. LICA:  No significant stenosis. Echocardiogram results:  No results found for this visit on 09/25/19.     Procedures done this admission:  * No surgery found *    All Micro Results     Procedure Component Value Units Date/Time    CULTURE, BLOOD [[id number]] Collected:  09/25/19 2055    Order Status:  Completed Specimen:  Blood Updated:  09/26/19 0709     Special Requests: --        LEFT  Antecubital       Culture result: NO GROWTH AFTER 8 HOURS       CULTURE, BLOOD [[id number]] Collected:  09/25/19 2059    Order Status:  Completed Specimen:  Blood Updated:  09/26/19 0709     Special Requests: --        RIGHT  Antecubital       Culture result: NO GROWTH AFTER 8 HOURS             Labs: Results:       BMP, Mg, Phos Recent Labs     09/27/19  0634 09/26/19  1153 09/25/19 2055 09/25/19  1415    141  --  136   K 3.6 4.0 4.1 5.6*   * 107  --  103   CO2 29 25  --  25   AGAP 5* 9  --  8   BUN 31* 32*  --  38*   CREA 1.62* 1.56*  --  1.85*   CA 8.3 8.4  --  8.6   * 93  --  117*      CBC Recent Labs     09/27/19  0634 09/26/19  1153 09/25/19  1518   WBC 9.2 10.6 11.9*   RBC 3.92* 4.06* 4.34   HGB 11.2* 11.7* 12.6*   HCT 35.8* 38.0* 39.2*    162 171   GRANS  --  73 78   LYMPH  --  17 12*   EOS  --  0* 0*   MONOS  --  9 9   BASOS  --  0 0   IG  --  1 1   ANEU  --  7.7 9.3*   ABL  --  1.8 1.4   HANNAH  --  0.0 0.0   ABM  --  1.0 1.1   ABB  --  0.0 0.0   AIG  --  0.1 0.1      LFT Recent Labs     09/26/19  0550   SGOT 20   ALT 17   AP 79   TP 6.6   ALB 2.9*   GLOB 3.7*   AGRAT 0.8*      Cardiac Testing Lab Results   Component Value Date/Time    BNP 4 06/12/2017 12:59 PM    BNP 26 03/21/2013 10:36 AM    BNP 4 10/03/2012 10:03 AM    BNP <5 05/09/2012 01:34 AM    Troponin-I <0.05 05/09/2012 01:34 AM    Troponin-I <0.05 05/09/2012 01:15 AM    Troponin-I <0.05 06/26/2011 12:36 AM    Troponin-I, Qt. <0.02 (L) 09/25/2019 02:15 PM    Troponin-I, Qt. <0.04 (L) 06/26/2011 03:20 PM    Troponin-I, Qt. <0.04 (L) 06/26/2011 07:15 AM      Coagulation Tests Lab Results   Component Value Date/Time    Prothrombin time 11.5 06/26/2011 12:22 AM    INR 1.1 06/26/2011 12:22 AM      A1c Lab Results   Component Value Date/Time    Hemoglobin A1c 8.4 (H) 09/26/2019 05:50 AM    Hemoglobin A1c 10.8 (H) 06/15/2017 09:35 AM    Hemoglobin A1c 10.7 (H) 04/23/2013 09:09 AM      Lipid Panel Lab Results   Component Value Date/Time    Cholesterol, total 108 09/26/2019 05:50 AM    HDL Cholesterol 55 09/26/2019 05:50 AM    LDL, calculated 41.4 09/26/2019 05:50 AM    VLDL, calculated 11.6 09/26/2019 05:50 AM    Triglyceride 58 09/26/2019 05:50 AM    CHOL/HDL Ratio 2.0 09/26/2019 05:50 AM      Thyroid Panel Lab Results   Component Value Date/Time    TSH 0.254 (L) 09/26/2019 05:50 AM    TSH 0.871 04/23/2013 09:09 AM    T4, Free 1.0 09/26/2019 11:53 AM        Most Recent UA Lab Results   Component Value Date/Time    Color YELLOW 09/25/2019 07:00 PM    Appearance CLEAR 09/25/2019 07:00 PM    Specific gravity 1.018 09/25/2019 07:00 PM    pH (UA) 7.0 09/25/2019 07:00 PM    Protein NEGATIVE  09/25/2019 07:00 PM    Glucose >1,000 09/25/2019 07:00 PM    Ketone NEGATIVE  09/25/2019 07:00 PM    Bilirubin NEGATIVE  09/25/2019 07:00 PM    Blood NEGATIVE  09/25/2019 07:00 PM    Urobilinogen 1.0 09/25/2019 07:00 PM    Nitrites NEGATIVE  09/25/2019 07:00 PM    Leukocyte Esterase NEGATIVE  09/25/2019 07:00 PM    WBC 0 12/04/2012 10:16 AM    RBC 0 12/04/2012 10:16 AM    Epithelial cells 0-3 12/04/2012 10:16 AM    Bacteria 0 12/04/2012 10:16 AM    Casts 0 12/04/2012 10:16 AM    Crystals, urine 0 12/04/2012 10:16 AM    Mucus 0 12/04/2012 10:16 AM        Allergies   Allergen Reactions    Vasotec [Enalapril Maleate] Shortness of Breath and Cough     Immunization History   Administered Date(s) Administered    Influenza High Dose Vaccine PF 09/23/2011, 08/21/2016, 08/24/2017, 08/27/2018    Influenza Vaccine 09/12/2012, 10/01/2015    Pneumococcal Conjugate (PCV-13) 09/11/2015    Pneumococcal Polysaccharide (PPSV-23) 09/13/2010, 11/01/2010    Tdap 06/22/2017    Zoster Recombinant 09/02/2018    Zoster Vaccine, Live 01/10/2013       All Labs from Last 24 Hrs:  Recent Results (from the past 24 hour(s))   GLUCOSE, POC    Collection Time: 09/26/19 11:26 AM   Result Value Ref Range    Glucose (POC) 87 65 - 100 mg/dL   CBC WITH AUTOMATED DIFF    Collection Time: 09/26/19 11:53 AM   Result Value Ref Range    WBC 10.6 4.3 - 11.1 K/uL    RBC 4.06 (L) 4.23 - 5.6 M/uL    HGB 11.7 (L) 13.6 - 17.2 g/dL    HCT 38.0 (L) 41.1 - 50.3 %    MCV 93.6 79.6 - 97.8 FL    MCH 28.8 26.1 - 32.9 PG    MCHC 30.8 (L) 31.4 - 35.0 g/dL    RDW 15.9 (H) 11.9 - 14.6 %    PLATELET 086 747 - 491 K/uL    MPV 10.9 9.4 - 12.3 FL    ABSOLUTE NRBC 0.00 0.0 - 0.2 K/uL    NEUTROPHILS 73 43 - 78 %    LYMPHOCYTES 17 13 - 44 %    MONOCYTES 9 4.0 - 12.0 %    EOSINOPHILS 0 (L) 0.5 - 7.8 %    BASOPHILS 0 0.0 - 2.0 %    IMMATURE GRANULOCYTES 1 0.0 - 5.0 %    ABS. NEUTROPHILS 7.7 1.7 - 8.2 K/UL    ABS. LYMPHOCYTES 1.8 0.5 - 4.6 K/UL    ABS. MONOCYTES 1.0 0.1 - 1.3 K/UL    ABS. EOSINOPHILS 0.0 0.0 - 0.8 K/UL    ABS. BASOPHILS 0.0 0.0 - 0.2 K/UL    ABS. IMM.  GRANS. 0.1 0.0 - 0.5 K/UL    DF AUTOMATED     T4, FREE    Collection Time: 09/26/19 11:53 AM   Result Value Ref Range    T4, Free 1.0 0.9 - 1.8 NG/DL   METABOLIC PANEL, BASIC    Collection Time: 09/26/19 11:53 AM   Result Value Ref Range    Sodium 141 136 - 145 mmol/L    Potassium 4.0 3.5 - 5.1 mmol/L    Chloride 107 98 - 107 mmol/L    CO2 25 21 - 32 mmol/L    Anion gap 9 7 - 16 mmol/L    Glucose 93 65 - 100 mg/dL    BUN 32 (H) 8 - 23 MG/DL    Creatinine 1.56 (H) 0.8 - 1.5 MG/DL    GFR est AA 56 (L) >60 ml/min/1.73m2    GFR est non-AA 46 (L) >60 ml/min/1.73m2    Calcium 8.4 8.3 - 10.4 MG/DL GLUCOSE, POC    Collection Time: 09/26/19  4:18 PM   Result Value Ref Range    Glucose (POC) 129 (H) 65 - 100 mg/dL   GLUCOSE, POC    Collection Time: 09/26/19  8:28 PM   Result Value Ref Range    Glucose (POC) 234 (H) 65 - 946 mg/dL   METABOLIC PANEL, BASIC    Collection Time: 09/27/19  6:34 AM   Result Value Ref Range    Sodium 142 136 - 145 mmol/L    Potassium 3.6 3.5 - 5.1 mmol/L    Chloride 108 (H) 98 - 107 mmol/L    CO2 29 21 - 32 mmol/L    Anion gap 5 (L) 7 - 16 mmol/L    Glucose 131 (H) 65 - 100 mg/dL    BUN 31 (H) 8 - 23 MG/DL    Creatinine 1.62 (H) 0.8 - 1.5 MG/DL    GFR est AA 53 (L) >60 ml/min/1.73m2    GFR est non-AA 44 (L) >60 ml/min/1.73m2    Calcium 8.3 8.3 - 10.4 MG/DL   CBC W/O DIFF    Collection Time: 09/27/19  6:34 AM   Result Value Ref Range    WBC 9.2 4.3 - 11.1 K/uL    RBC 3.92 (L) 4.23 - 5.6 M/uL    HGB 11.2 (L) 13.6 - 17.2 g/dL    HCT 35.8 (L) 41.1 - 50.3 %    MCV 91.3 79.6 - 97.8 FL    MCH 28.6 26.1 - 32.9 PG    MCHC 31.3 (L) 31.4 - 35.0 g/dL    RDW 16.0 (H) 11.9 - 14.6 %    PLATELET 703 289 - 081 K/uL    MPV 10.9 9.4 - 12.3 FL    ABSOLUTE NRBC 0.00 0.0 - 0.2 K/uL   GLUCOSE, POC    Collection Time: 09/27/19  7:30 AM   Result Value Ref Range    Glucose (POC) 157 (H) 65 - 100 mg/dL       Current Med List in Hospital:   Current Facility-Administered Medications   Medication Dose Route Frequency    acetaminophen (TYLENOL) tablet 650 mg  650 mg Oral Q6H PRN    cephALEXin (KEFLEX) capsule 500 mg  500 mg Oral BID    polyethylene glycol (MIRALAX) packet 17 g  17 g Oral DAILY    ondansetron (ZOFRAN) injection 4 mg  4 mg IntraVENous Q4H PRN    sodium chloride (NS) flush 5-40 mL  5-40 mL IntraVENous Q8H    sodium chloride (NS) flush 5-40 mL  5-40 mL IntraVENous PRN    labetalol (NORMODYNE;TRANDATE) injection 5 mg  5 mg IntraVENous Q10MIN PRN    albuterol (PROVENTIL VENTOLIN) nebulizer solution 2.5 mg  2.5 mg Nebulization Q4H PRN    aspirin delayed-release tablet 81 mg  81 mg Oral DAILY    atorvastatin (LIPITOR) tablet 80 mg  80 mg Oral QHS    busPIRone (BUSPAR) tablet 15 mg  15 mg Oral DAILY    clopidogrel (PLAVIX) tablet 75 mg  75 mg Oral DAILY    insulin lispro (HUMALOG) injection   SubCUTAneous AC&HS    ezetimibe (ZETIA) tablet 10 mg  10 mg Oral QHS    furosemide (LASIX) tablet 40 mg  40 mg Oral TID    valsartan (DIOVAN) tablet 80 mg  80 mg Oral DAILY    loratadine (CLARITIN) tablet 10 mg  10 mg Oral DAILY    pantoprazole (PROTONIX) tablet 40 mg  40 mg Oral ACB    potassium chloride (K-DUR, KLOR-CON) SR tablet 20 mEq  20 mEq Oral DAILY    pregabalin (LYRICA) capsule 150 mg  150 mg Oral BID    tamsulosin (FLOMAX) capsule 0.4 mg  0.4 mg Oral DAILY    lip protectant (BLISTEX) ointment 1 Each  1 Each Topical PRN       Discharge Exam:  Patient Vitals for the past 24 hrs:   Temp Pulse Resp BP SpO2   09/27/19 0400 98.5 °F (36.9 °C) 80 17 146/77 97 %   09/27/19 0000 99.2 °F (37.3 °C) 86 17 149/55 94 %   09/26/19 2000 98 °F (36.7 °C) 93 20 143/65 93 %   09/26/19 1600 98.1 °F (36.7 °C) 84 20 145/69 92 %   09/26/19 1200 98.8 °F (37.1 °C) 87 20 151/68 91 %   09/26/19 0800 98.1 °F (36.7 °C) 85 20 152/70 92 %     Oxygen Therapy  O2 Sat (%): 97 % (09/27/19 0400)  Pulse via Oximetry: 102 beats per minute (09/25/19 1558)    Estimated body mass index is 45.73 kg/m² as calculated from the following:    Height as of an earlier encounter on 9/25/19: 5' 6\" (1.676 m). Weight as of this encounter: 128.5 kg (283 lb 4.8 oz). Intake/Output Summary (Last 24 hours) at 9/27/2019 0752  Last data filed at 9/27/2019 0544  Gross per 24 hour   Intake    Output 2300 ml   Net -2300 ml       *Note that automatically entered I/Os may not be accurate; dependent on patient compliance with collection and accurate  by assistants. General:    Well nourished. Alert. Eyes:   Normal sclerae. Extraocular movements intact. ENT:  Normocephalic, atraumatic.   Moist mucous membranes  CV:   Regular rate and rhythm. No murmur, rub, or gallop. Lungs:  Clear to auscultation bilaterally. No wheezing, rhonchi, or rales. Abdomen: Soft, nontender, nondistended. Bowel sounds normal.   Extremities: Warm and dry. No cyanosis or edema. Neurologic: CN II-XII grossly intact. Sensation intact. Skin:     No rashes or jaundice. Psych:  Normal mood and affect. Discharge Info:   Current Discharge Medication List      START taking these medications    Details   cephALEXin (KEFLEX) 500 mg capsule Take 1 Cap by mouth two (2) times a day for 5 days. Indications: Bacterial Urinary Tract Infection  Qty: 10 Cap, Refills: 0      cyanocobalamin (VITAMIN B12) 500 mcg tablet Take 1 Tab by mouth daily. Indications: b12 deficiency  Qty: 30 Tab, Refills: 2         CONTINUE these medications which have NOT CHANGED    Details   tamsulosin (FLOMAX) 0.4 mg capsule Take 1 Cap by mouth daily. Qty: 90 Cap, Refills: 3    Associated Diagnoses: Benign prostatic hyperplasia with urinary frequency      spironolactone (ALDACTONE) 25 mg tablet TAKE 1 TABLET BY MOUTH EVERY DAY  Qty: 90 Tab, Refills: 3      busPIRone (BUSPAR) 15 mg tablet Take 1 Tab by mouth daily. Qty: 90 Tab, Refills: 0    Associated Diagnoses: Mood disorder ([address]      metFORMIN (GLUCOPHAGE) 1,000 mg tablet Take 1 Tab by mouth two (2) times daily (with meals). Qty: 180 Tab, Refills: 0    Associated Diagnoses: Type 2 diabetes with nephropathy (HCC)      potassium chloride (KLOR-CON M20) 20 mEq tablet TAKE 1 TABLET TWICE A DAY  Qty: 180 Tab, Refills: 3      empagliflozin (JARDIANCE) 10 mg tablet Take 1 Tab by mouth daily.   Qty: 30 Tab, Refills: 5    Associated Diagnoses: Type 2 diabetes with nephropathy (HCC)      Insulin Needles, Disposable, (1ST TIER UNIFINE PENTIPS) 32 gauge x 5/32\" ndle Use to inject insulin  Qty: 200 Pen Needle, Refills: 1    Associated Diagnoses: Type 2 diabetes with nephropathy (HCC)      insulin syr/ndl U100 half rashida 0.3 mL 31 gauge x 15/64\" syrg Use to inject insulin  Qty: 200 Syringe, Refills: 1    Associated Diagnoses: Type 2 diabetes with nephropathy (HCC)      polyethylene glycol (MIRALAX) 17 gram/dose powder Take 17 g by mouth daily. Qty: 17 g, Refills: 5      !! lancets misc Use to test glucose 4 times/day. Dx: E11.51, I10  Qty: 150 Each, Refills: 11    Associated Diagnoses: Type 2 diabetes, uncontrolled, with neuropathy (HCC)      glucose blood VI test strips (FREESTYLE LITE STRIPS) strip Use to check glucose 4 times/day. Dx: E11.51, I10  Qty: 150 Strip, Refills: 11    Associated Diagnoses: Type 2 diabetes, uncontrolled, with neuropathy (HCC)      insulin aspart U-100 (NOVOLOG FLEXPEN U-100 INSULIN) 100 unit/mL (3 mL) inpn Inject 8 units standing dose + additional insulin according to sliding scale (up to 16 units) subQ before each meal  Qty: 15 Adjustable Dose Pre-filled Pen Syringe, Refills: 3    Associated Diagnoses: Type 2 diabetes, uncontrolled, with neuropathy (HCC)      furosemide (LASIX) 40 mg tablet Take 1 Tab by mouth three (3) times daily. Qty: 270 Tab, Refills: 3      carvedilol (COREG) 25 mg tablet Take 0.5 Tabs by mouth two (2) times daily (with meals). Qty: 90 Tab, Refills: 3      raNITIdine (ZANTAC) 150 mg tablet Take 150 mg by mouth two (2) times a day. insulin degludec (TRESIBA FLEXTOUCH U-100) 100 unit/mL (3 mL) inpn by SubCUTAneous route. pantoprazole (PROTONIX) 40 mg tablet Take 1 tablet po 30 minutes before breakfast  Qty: 90 Tab, Refills: 3    Associated Diagnoses: Chronic GERD; Esophageal dysphagia      clopidogrel (PLAVIX) 75 mg tab Take 1 Tab by mouth daily. Qty: 90 Tab, Refills: 3      Insulin Syringe-Needle U-100 (BD INSULIN SYRINGE ULTRA-FINE) 0.3 mL 31 gauge x 5/16\" syrg Use to injection insulin 3 times/day at meals. Dx: E11.40  Qty: 270 Syringe, Refills: 3    Associated Diagnoses: Type 2 diabetes, uncontrolled, with neuropathy ([address]      ! ! Lancets misc Use to check glucose 4 times/day as directed.   Dx: E11.40  Qty: 120 Each, Refills: 11    Associated Diagnoses: Type 2 diabetes, uncontrolled, with neuropathy (HCC)      cloNIDine HCl (CATAPRES) 0.1 mg tablet Take 1 Tab by mouth two (2) times a day. Qty: 180 Tab, Refills: 3      albuterol (VENTOLIN HFA) 90 mcg/actuation inhaler Take 2 Puffs by inhalation every four (4) hours as needed for Wheezing or Shortness of Breath. Qty: 1 Inhaler, Refills: 3    Associated Diagnoses: Extrinsic asthma, moderate persistent, uncomplicated      pregabalin (LYRICA) 150 mg capsule Take 150 mg by mouth two (2) times a day. OTHER Semi-Electric Hospital Bed  Dx: Mild Diastolic Dysfunction (CSB-59 I51.9)  Pulmonary Hypertension (ICD-10 I27.2)  Morbid Obesity (ICD-10 E66.01)  Lumbar DDD (ICD-10 M51.36)  Obstructive Sleep Apnea (ICD-10 G47.33)  Qty: 1 Device, Refills: 0      loratadine (CLARITIN) 10 mg tablet Take 10 mg by mouth daily. multivit-min-FA-lycopen-lutein (CENTRUM SILVER) 0.4-300-250 mg-mcg-mcg tab Take  by mouth. aspirin 81 mg tablet Take 81 mg by mouth daily. atorvastatin (LIPITOR) 80 mg tablet Take 1 Tab by mouth nightly. Qty: 90 Tab, Refills: 3    Associated Diagnoses: Hyperlipidemia LDL goal <70      ezetimibe (ZETIA) 10 mg tablet Take 1 Tab by mouth nightly. Qty: 90 Tab, Refills: 3    Associated Diagnoses: Hyperlipidemia LDL goal <70      irbesartan (AVAPRO) 150 mg tablet Take 1 Tab by mouth nightly. Qty: 90 Tab, Refills: 3      HYDROcodone-acetaminophen (NORCO)  mg tablet Take 1 Tab by mouth every six (6) hours as needed for Pain. Qty: 20 Tab, Refills: 0       !! - Potential duplicate medications found. Please discuss with provider. Follow Up Orders: Follow-up Appointments   Procedures    FOLLOW UP VISIT Appointment in: One Week PCP for follow up     PCP for follow up     Standing Status:   Standing     Number of Occurrences:   1     Order Specific Question:   Appointment in     Answer:    One Week       Follow-up Information Follow up With Specialties Details Why Contact Info    Aubrie Hull MD Internal Medicine In 1 week follow up. repeat BMP at visit [address] [address] [phone]            Time spent in patient discharge planning and coordination 35 minutes.     Signed:  Naeem Ruano MD

## 2019-09-28 ENCOUNTER — APPOINTMENT (OUTPATIENT)
Dept: CT IMAGING | Age: 79
DRG: 871 | End: 2019-09-28
Attending: INTERNAL MEDICINE
Payer: MEDICARE

## 2019-09-28 PROBLEM — R10.31 RLQ ABDOMINAL PAIN: Status: ACTIVE | Noted: 2019-09-28

## 2019-09-28 LAB
ANION GAP SERPL CALC-SCNC: 6 MMOL/L (ref 7–16)
BUN SERPL-MCNC: 27 MG/DL (ref 8–23)
CALCIUM SERPL-MCNC: 8.7 MG/DL (ref 8.3–10.4)
CHLORIDE SERPL-SCNC: 107 MMOL/L (ref 98–107)
CO2 SERPL-SCNC: 28 MMOL/L (ref 21–32)
CREAT SERPL-MCNC: 1.5 MG/DL (ref 0.8–1.5)
GLUCOSE BLD STRIP.AUTO-MCNC: 173 MG/DL (ref 65–100)
GLUCOSE BLD STRIP.AUTO-MCNC: 235 MG/DL (ref 65–100)
GLUCOSE BLD STRIP.AUTO-MCNC: 240 MG/DL (ref 65–100)
GLUCOSE BLD STRIP.AUTO-MCNC: 315 MG/DL (ref 65–100)
GLUCOSE SERPL-MCNC: 156 MG/DL (ref 65–100)
POTASSIUM SERPL-SCNC: 3.9 MMOL/L (ref 3.5–5.1)
SODIUM SERPL-SCNC: 141 MMOL/L (ref 136–145)

## 2019-09-28 PROCEDURE — 74011000258 HC RX REV CODE- 258: Performed by: INTERNAL MEDICINE

## 2019-09-28 PROCEDURE — 74011000250 HC RX REV CODE- 250: Performed by: INTERNAL MEDICINE

## 2019-09-28 PROCEDURE — 77010033678 HC OXYGEN DAILY

## 2019-09-28 PROCEDURE — 74011250637 HC RX REV CODE- 250/637: Performed by: INTERNAL MEDICINE

## 2019-09-28 PROCEDURE — 74011636637 HC RX REV CODE- 636/637: Performed by: INTERNAL MEDICINE

## 2019-09-28 PROCEDURE — 74011636320 HC RX REV CODE- 636/320: Performed by: INTERNAL MEDICINE

## 2019-09-28 PROCEDURE — 74011250636 HC RX REV CODE- 250/636: Performed by: HOSPITALIST

## 2019-09-28 PROCEDURE — 65270000029 HC RM PRIVATE

## 2019-09-28 PROCEDURE — 80048 BASIC METABOLIC PNL TOTAL CA: CPT

## 2019-09-28 PROCEDURE — 94760 N-INVAS EAR/PLS OXIMETRY 1: CPT

## 2019-09-28 PROCEDURE — 36415 COLL VENOUS BLD VENIPUNCTURE: CPT

## 2019-09-28 PROCEDURE — 74177 CT ABD & PELVIS W/CONTRAST: CPT

## 2019-09-28 PROCEDURE — 94640 AIRWAY INHALATION TREATMENT: CPT

## 2019-09-28 PROCEDURE — 74011250637 HC RX REV CODE- 250/637: Performed by: HOSPITALIST

## 2019-09-28 PROCEDURE — 97165 OT EVAL LOW COMPLEX 30 MIN: CPT

## 2019-09-28 PROCEDURE — 82962 GLUCOSE BLOOD TEST: CPT

## 2019-09-28 RX ORDER — METRONIDAZOLE 500 MG/100ML
500 INJECTION, SOLUTION INTRAVENOUS EVERY 8 HOURS
Status: DISCONTINUED | OUTPATIENT
Start: 2019-09-28 | End: 2019-10-01

## 2019-09-28 RX ORDER — SODIUM CHLORIDE 0.9 % (FLUSH) 0.9 %
10 SYRINGE (ML) INJECTION
Status: COMPLETED | OUTPATIENT
Start: 2019-09-28 | End: 2019-09-28

## 2019-09-28 RX ORDER — INSULIN GLARGINE 100 [IU]/ML
20 INJECTION, SOLUTION SUBCUTANEOUS
Status: DISCONTINUED | OUTPATIENT
Start: 2019-09-28 | End: 2019-10-01 | Stop reason: HOSPADM

## 2019-09-28 RX ORDER — CIPROFLOXACIN 2 MG/ML
400 INJECTION, SOLUTION INTRAVENOUS EVERY 12 HOURS
Status: DISCONTINUED | OUTPATIENT
Start: 2019-09-28 | End: 2019-10-01

## 2019-09-28 RX ADMIN — EZETIMIBE 10 MG: 10 TABLET ORAL at 21:20

## 2019-09-28 RX ADMIN — FUROSEMIDE 40 MG: 40 TABLET ORAL at 17:07

## 2019-09-28 RX ADMIN — INSULIN LISPRO 4 UNITS: 100 INJECTION, SOLUTION INTRAVENOUS; SUBCUTANEOUS at 12:41

## 2019-09-28 RX ADMIN — INSULIN LISPRO 2 UNITS: 100 INJECTION, SOLUTION INTRAVENOUS; SUBCUTANEOUS at 08:59

## 2019-09-28 RX ADMIN — POTASSIUM CHLORIDE 20 MEQ: 20 TABLET, EXTENDED RELEASE ORAL at 09:01

## 2019-09-28 RX ADMIN — NYSTATIN: 100000 POWDER TOPICAL at 09:02

## 2019-09-28 RX ADMIN — ACETAMINOPHEN 650 MG: 325 TABLET, FILM COATED ORAL at 05:16

## 2019-09-28 RX ADMIN — ASPIRIN 81 MG: 81 TABLET ORAL at 09:00

## 2019-09-28 RX ADMIN — PREGABALIN 150 MG: 75 CAPSULE ORAL at 09:00

## 2019-09-28 RX ADMIN — INSULIN LISPRO 4 UNITS: 100 INJECTION, SOLUTION INTRAVENOUS; SUBCUTANEOUS at 21:19

## 2019-09-28 RX ADMIN — PANTOPRAZOLE SODIUM 40 MG: 40 TABLET, DELAYED RELEASE ORAL at 05:16

## 2019-09-28 RX ADMIN — ALBUTEROL SULFATE 2.5 MG: 2.5 SOLUTION RESPIRATORY (INHALATION) at 20:15

## 2019-09-28 RX ADMIN — BUSPIRONE HYDROCHLORIDE 15 MG: 5 TABLET ORAL at 09:01

## 2019-09-28 RX ADMIN — CEPHALEXIN 500 MG: 500 CAPSULE ORAL at 17:08

## 2019-09-28 RX ADMIN — CLOPIDOGREL BISULFATE 75 MG: 75 TABLET ORAL at 09:01

## 2019-09-28 RX ADMIN — IOPAMIDOL 100 ML: 755 INJECTION, SOLUTION INTRAVENOUS at 18:51

## 2019-09-28 RX ADMIN — Medication 5 ML: at 21:21

## 2019-09-28 RX ADMIN — TAMSULOSIN HYDROCHLORIDE 0.4 MG: 0.4 CAPSULE ORAL at 09:01

## 2019-09-28 RX ADMIN — PREGABALIN 150 MG: 75 CAPSULE ORAL at 17:19

## 2019-09-28 RX ADMIN — ACETAMINOPHEN 650 MG: 325 TABLET, FILM COATED ORAL at 12:40

## 2019-09-28 RX ADMIN — ATORVASTATIN CALCIUM 80 MG: 40 TABLET, FILM COATED ORAL at 21:20

## 2019-09-28 RX ADMIN — VALSARTAN 80 MG: 80 TABLET, FILM COATED ORAL at 10:00

## 2019-09-28 RX ADMIN — Medication 10 ML: at 18:51

## 2019-09-28 RX ADMIN — DIATRIZOATE MEGLUMINE AND DIATRIZOATE SODIUM 15 ML: 660; 100 LIQUID ORAL; RECTAL at 17:06

## 2019-09-28 RX ADMIN — CIPROFLOXACIN 400 MG: 2 INJECTION, SOLUTION INTRAVENOUS at 23:25

## 2019-09-28 RX ADMIN — FUROSEMIDE 40 MG: 40 TABLET ORAL at 05:16

## 2019-09-28 RX ADMIN — Medication 10 ML: at 14:08

## 2019-09-28 RX ADMIN — CEPHALEXIN 500 MG: 500 CAPSULE ORAL at 09:01

## 2019-09-28 RX ADMIN — POLYETHYLENE GLYCOL 3350 17 G: 17 POWDER, FOR SOLUTION ORAL at 09:07

## 2019-09-28 RX ADMIN — INSULIN LISPRO 8 UNITS: 100 INJECTION, SOLUTION INTRAVENOUS; SUBCUTANEOUS at 17:09

## 2019-09-28 RX ADMIN — SODIUM CHLORIDE 100 ML: 900 INJECTION, SOLUTION INTRAVENOUS at 18:51

## 2019-09-28 RX ADMIN — INSULIN GLARGINE 20 UNITS: 100 INJECTION, SOLUTION SUBCUTANEOUS at 21:19

## 2019-09-28 RX ADMIN — NYSTATIN: 100000 POWDER TOPICAL at 17:20

## 2019-09-28 NOTE — PROGRESS NOTES
1. Yanet Wray will be modified independent with functional mobility for ADL within 4 - 7 visits. 2. Yanet Wray will be modified independent with total body bathing and dressing within 4 - 7 visits. 3. Yanet Wray will voice a plan for appropriate home modifications for home safety and fall prevention within 7 visits. 4. Yanet Wray will participate at least 30 minutes of ADL with 3 or less rest breaks within 7 visits. 5. Yanet Wray will complete a toilet transfer with modified independence within 7 visits. OCCUPATIONAL THERAPY: Initial Assessment 9/28/2019  INPATIENT: OT Visit Days: 1  [place] / [place] / Product Type: Medicare /      NAME/AGE/GENDER: Yanet Wray is a 70 y.o. male   PRIMARY DIAGNOSIS:  Stroke-like symptoms [S79.04]  Acute metabolic encephalopathy [E20.39] Sepsis secondary to UTI ([facility] [address]   Sepsis secondary to UTI ([facility] [address]          ICD-10: Treatment Diagnosis:    Generalized Muscle Weakness (M62.81)   Precautions/Allergies:     Davey Vincent maleate]      ASSESSMENT:     Mr. Leila Ibarra presents for the above stating he had L sided weakness which has improved significantly. BUE grossly equal in strength/ROM. Mr. Leila Ibarra complains of R lower quadrant pain and hasn't had a bowel movement since getting here on Wednesday. He apparently was not able to stand with PT yesterday, but stood today with minimal assistance and transferred over to the chair. Yanet Wray presents with the following problems and would benefit from occupational therapy to maximize independence with self-care,instrumental activities of daily living, and functional transfers/mobility for activities of daily living/instrumental activities of daily living via the stated goals.     This section established at most recent assessment   PROBLEM LIST (Impairments causing functional limitations):  Decreased Strength  Decreased ADL/Functional Activities  Decreased Transfer Abilities  Decreased Balance  Increased Pain  Decreased Activity Tolerance  Decreased Flexibility/Joint Mobility  Edema/Girth   INTERVENTIONS PLANNED: (Benefits and precautions of occupational therapy have been discussed with the patient.)  Activities of daily living training  Therapeutic activity  Therapeutic exercise     TREATMENT PLAN: Frequency/Duration: Follow patient 3 times/week to address above goals. Rehabilitation Potential For Stated Goals: Good     REHAB RECOMMENDATIONS (at time of discharge pending progress):    Placement: It is my opinion, based on this patient's performance to date, that Mr. Ganesh Leblanc may benefit from participating in 1-2 additional therapy sessions in order to continue to assess for rehab potential and then make recommendation for disposition at discharge. Equipment:   None at this time              OCCUPATIONAL PROFILE AND HISTORY:   History of Present Injury/Illness (Reason for Referral):  See H & P. Past Medical History/Comorbidities:   Mr. Ganesh Leblanc  has a past medical history of Cervical stenosis of spine, Chronic kidney disease, Degenerative disc disease, lumbar, Diabetes mellitus type II, Diabetic retinopathy of both eyes without macular edema associated with type 2 diabetes mellitus ([address] Diastolic congestive heart failure ([address] Diverticulosis of colon (June 2010), DVT (deep venous thrombosis) ([address] Extrinsic allergic asthma, GERD (gastroesophageal reflux disease), colonic polyps (07/29/2010), Hyperlipidemia LDL goal < 70, Hypertension, Obstructive sleep apnea, Perennial allergic rhinitis with seasonal variation, 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 ([address] Pulmonary embolism ([address] (Mar 2014), TIA (transient ischemic attack) (11/9/2017), and Type 2 diabetes, uncontrolled, with neuropathy ([address] (1/22/2015).  He also has no past medical history of Aneurysm ([address] Arrhythmia, Autoimmune disease ([address] CAD (coronary artery disease), Cancer ([address] Coagulation defects, COPD, Liver disease, Psychiatric disorder, PUD (peptic ulcer disease), or Seizures ([facility] [address] Mr. Dania Guzman  has a past surgical history that includes colonoscopy (07/29/2010); egd (5/9/2011); pr sinus surgery proc unlisted; hx knee arthroscopy (1991); pr total knee arthroplasty (Right, 2006); hx bunionectomy (Bilateral); and hx hernia repair (Bilateral). Social History/Living Environment:   Home Environment: Apartment  # Steps to Enter: 4  One/Two Story Residence: One story  Living Alone: No  Support Systems: Family member(s)(Lives with brother)  Patient Expects to be Discharged to[de-identified] Rehabilitation facility  Current DME Used/Available at Home: gaurang Olmedo, Walker, rolling  Prior Level of Function/Work/Activity:  Modified independent with ADL. Number of Personal Factors/Comorbidities that affect the Plan of Care: Brief history (0):  LOW COMPLEXITY   ASSESSMENT OF OCCUPATIONAL PERFORMANCE[de-identified]   Activities of Daily Living:   Basic ADLs (From Assessment) Complex ADLs (From Assessment)   Feeding: Supervision  Oral Facial Hygiene/Grooming: Supervision  Bathing: Moderate assistance  Upper Body Dressing: Supervision  Lower Body Dressing: Moderate assistance  Toileting: Minimum assistance Instrumental ADL  Meal Preparation: Total assistance  Homemaking:  Total assistance   Grooming/Bathing/Dressing Activities of Daily Living     Cognitive Retraining  Safety/Judgement: Awareness of environment                       Bed/Mat Mobility  Supine to Sit: Stand-by assistance  Sit to Stand: Minimum assistance  Stand to Sit: Contact guard assistance  Bed to Chair: Minimum assistance  Scooting: Minimum assistance  Cues: Verbal cues provided     Most Recent Physical Functioning:   Gross Assessment:  AROM: Generally decreased, functional  Strength: Generally decreased, functional               Posture:     Balance:  Sitting: Intact  Standing: Impaired  Standing - Static: Constant support  Standing - Dynamic : Fair Bed Mobility:  Supine to Sit: Stand-by assistance  Scooting: Minimum assistance  Wheelchair Mobility:     Transfers:  Sit to Stand: Minimum assistance  Stand to Sit: Contact guard assistance  Bed to Chair: Minimum assistance            Patient Vitals for the past 6 hrs:   BP BP Patient Position SpO2 Pulse   19 1200 121/74 At rest 94 % 81       Mental Status  Neurologic State: Alert  Orientation Level: Oriented to person, Oriented to place  Cognition: Follows commands  Perception: Appears intact  Perseveration: No perseveration noted  Safety/Judgement: Awareness of environment                          Physical Skills Involved:  Range of Motion  Balance  Strength  Activity Tolerance  Pain (acute)  Edema Cognitive Skills Affected (resulting in the inability to perform in a timely and safe manner):  None noted  Psychosocial Skills Affected:  Habits/Routines  Environmental Adaptation  Social Interaction  Social Roles   Number of elements that affect the Plan of Care: 3-5:  MODERATE COMPLEXITY   CLINICAL DECISION MAKIN[address] [place] [id number] AM-PAC 6 Clicks   Daily Activity Inpatient Short Form  How much help from another person does the patient currently need. .. Total A Lot A Little None   1. Putting on and taking off regular lower body clothing? ? 1   ? 2   ? 3   ? 4   2. Bathing (including washing, rinsing, drying)? ? 1   ? 2   ? 3   ? 4   3. Toileting, which includes using toilet, bedpan or urinal?   ? 1   ? 2   ? 3   ? 4   4. Putting on and taking off regular upper body clothing? ? 1   ? 2   ? 3   ? 4   5. Taking care of personal grooming such as brushing teeth? ? 1   ? 2   ? 3   ? 4   6. Eating meals? ? 1   ? 2   ? 3   ? 4   © 2007, Trustees of [address] [place] [id number], under license to [employer].  All rights reserved      Score:  Initial: 15 Most Recent: X (Date: -- )    Interpretation of Tool:  Represents activities that are increasingly more difficult (i.e. Bed mobility, Transfers, Gait).    Medical Necessity:     Patient demonstrates good   rehab potential due to higher previous functional level. Reason for Services/Other Comments:  Patient continues to require skilled intervention due to decreased independence with ADL/instrumental ADL   . Use of outcome tool(s) and clinical judgement create a POC that gives a: LOW COMPLEXITY         TREATMENT:   (In addition to Assessment/Re-Assessment sessions the following treatments were rendered)     Pre-treatment Symptoms/Complaints:    Pain: Initial:   Pain Intensity 1: (no complaints of pain)  Post Session:  same     Assessment/Reassessment only, no treatment provided today    Braces/Orthotics/Lines/Etc:   IV  O2 Device: Room air  Treatment/Session Assessment:    Response to Treatment:  No treatment rendered. Assessment only. Interdisciplinary Collaboration:   Occupational Therapist  Registered Nurse  After treatment position/precautions:   Up in chair  Bed/Chair-wheels locked  Call light within reach  RN notified   Compliance with Program/Exercises: Will assess as treatment progresses. Recommendations/Intent for next treatment session: \"Next visit will focus on advancements to more challenging activities\".   Total Treatment Duration:  OT Patient Time In/Time Out  Time In: 3617  Time Out: [facility] LILLIANA Meeks, OTR/L

## 2019-09-28 NOTE — PROGRESS NOTES
09/28/19 0750   Abdominal    Abdominal (WDL) X   Abdominal Assessment Distended;Tender   Appetite Fair   Bowel Sounds Hyperactive    Dr Yoli Ley notified

## 2019-09-28 NOTE — PROGRESS NOTES
Hospitalist Progress Note    2019  Admit Date: 2019  1:59 PM   NAME: George Toro   :  1940   MRN:  [id number]   Attending: Arden Slaughter MD  PCP:  Ibrahima Armenta MD    SUBJECTIVE:   Pt is a 69 y/o M admitted with encephalopathy. Hx CKD3, DM, dCHF, GERD, HTN, prior TIA, MARLEEN, obesity. Neurology saw in ER and felt infectious in etiology. Had fever in ER with elevated WBC. Did complain of some urinary symptoms suprapubic pain, urinary urgency on admission. Recently started on flomax for these symptoms as an outpatient. UA from ED showing negative leukocyte esterace and nitrites. Urine culture never obtained. He has been on rocephin or keflex since admission. Per chart review, he was complaining of RLQ abdominal pain at time of admission.  '  - reports ongoing RLQ abdominal pain. Has been constipated with last reported BM on . He reports he is passing flatus. He is uncertain if he has had appendectomy in the past, but he has remote history of bilateral hernia repairs.       Review of Systems negative with exception of pertinent positives noted above  PHYSICAL EXAM     Visit Vitals  /79 (BP 1 Location: Left arm, BP Patient Position: Sitting)   Pulse 91   Temp 98.2 °F (36.8 °C)   Resp 17   Wt 128.5 kg (283 lb 4.8 oz)   SpO2 98%   BMI 45.73 kg/m²      Temp (24hrs), Av.1 °F (36.7 °C), Min:97.5 °F (36.4 °C), Max:98.6 °F (37 °C)    Patient Vitals for the past 24 hrs:   Temp Pulse Resp BP SpO2   19 1200 97.2 °F (36.2 °C) 81 18 121/74 94 %   19 0800 98.2 °F (36.8 °C) 91 17 113/79 98 %   19 0400 98.6 °F (37 °C) 87 18 151/75 94 %   19 0000 98 °F (36.7 °C) 75 18 174/85 95 %   19 2128  92   92 %   19 2000 97.5 °F (36.4 °C) 90 18 168/85 90 %   19 1600 98.2 °F (36.8 °C) 96 18 147/72 92 %       Oxygen Therapy  O2 Sat (%): 98 % (19 0800)  Pulse via Oximetry: 102 beats per minute (19 1558)  O2 Device: Room air (19 2128)    Intake/Output Summary (Last 24 hours) at 9/28/2019 1037  Last data filed at 9/27/2019 2128  Gross per 24 hour   Intake    Output 1400 ml   Net -1400 ml      General: No acute distress, morbidly obese   Lungs:  CTA Bilaterally. Heart:  Regular rate and rhythm,  No murmur, rub, or gallop  Abdomen: Very obese with difficult exam due to habitus, has RLQ tenderness to palpation  Extremities: No cyanosis, clubbing or edema  Neurologic:  No focal deficits    Recent Results (from the past 24 hour(s))   GLUCOSE, POC    Collection Time: 09/27/19 12:13 PM   Result Value Ref Range    Glucose (POC) 166 (H) 65 - 100 mg/dL   GLUCOSE, POC    Collection Time: 09/27/19  4:11 PM   Result Value Ref Range    Glucose (POC) 329 (H) 65 - 100 mg/dL   GLUCOSE, POC    Collection Time: 09/27/19  9:09 PM   Result Value Ref Range    Glucose (POC) 338 (H) 65 - 158 mg/dL   METABOLIC PANEL, BASIC    Collection Time: 09/28/19  5:29 AM   Result Value Ref Range    Sodium 141 136 - 145 mmol/L    Potassium 3.9 3.5 - 5.1 mmol/L    Chloride 107 98 - 107 mmol/L    CO2 28 21 - 32 mmol/L    Anion gap 6 (L) 7 - 16 mmol/L    Glucose 156 (H) 65 - 100 mg/dL    BUN 27 (H) 8 - 23 MG/DL    Creatinine 1.50 0.8 - 1.5 MG/DL    GFR est AA 58 (L) >60 ml/min/1.73m2    GFR est non-AA 48 (L) >60 ml/min/1.73m2    Calcium 8.7 8.3 - 10.4 MG/DL   GLUCOSE, POC    Collection Time: 09/28/19  7:58 AM   Result Value Ref Range    Glucose (POC) 173 (H) 65 - 100 mg/dL     Imaging:    DUPLEX CAROTID BILATERAL   Final Result   IMPRESSION:     DANNIE:  No significant stenosis. LICA:  No significant stenosis. XR CHEST SNGL V   Final Result   IMPRESSION:  Negative for acute change                CT CODE NEURO HEAD WO CONTRAST   Final Result   IMPRESSION:  Negative for acute intracranial abnormality. Chronic changes.              CT ABD PELV W CONT    (Results Pending)         ASSESSMENT      Hospital Problems as of 9/28/2019 Date Reviewed: 9/18/2019          Codes Class Noted - Resolved POA    CKD (chronic kidney disease) stage 3, GFR 30-59 ml/min (HCC) (Chronic) ICD-10-CM: N18.3  ICD-9-CM: 585.3  6/24/2019 - Present Yes        Diabetic neuropathy associated with type 2 diabetes mellitus (HCC) (Chronic) ICD-10-CM: E11.40  ICD-9-CM: 250.60, 357.2  7/18/2017 - Present Yes        Diastolic CHF, chronic (HCC) (Chronic) ICD-10-CM: I50.32  ICD-9-CM: 428.32, 428.0  3/20/2017 - Present Yes        Pulmonary hypertension (HCC) (Chronic) ICD-10-CM: I27.20  ICD-9-CM: 416.8  10/14/2015 - Present Yes        Gastroesophageal reflux disease without esophagitis (Chronic) ICD-10-CM: K21.9  ICD-9-CM: 530.81  4/21/2015 - Present Yes        Hyperlipidemia with target LDL less than 70 (Chronic) ICD-10-CM: E78.5  ICD-9-CM: 272.4  Unknown - Present Yes        Essential hypertension, benign (Chronic) ICD-10-CM: I10  ICD-9-CM: 401.1  1/22/2015 - Present Yes        Type 2 diabetes, uncontrolled, with neuropathy (HCC) (Chronic) ICD-10-CM: E11.40, E11.65  ICD-9-CM: 250.62, 357.2  1/22/2015 - Present Yes        Obstructive sleep apnea of adult (Chronic) ICD-10-CM: G47.33  ICD-9-CM: 327.23  1/22/2015 - Present Yes        Morbid obesity ([address] (Chronic) ICD-10-CM: E66.01  ICD-9-CM: 278.01  6/27/2011 - Present Yes        RESOLVED: Hyperkalemia ICD-10-CM: E87.5  ICD-9-CM: 276.7  9/27/2019 - 9/27/2019 Yes        * (Principal) RESOLVED: Sepsis secondary to UTI [facility]) ICD-10-CM: A41.9, N39.0  ICD-9-CM: 038.9, 995.91, 599.0  9/26/2019 - 9/27/2019 Yes        RESOLVED: Acute metabolic encephalopathy RSD-98-TZ: G93.41  ICD-9-CM: 348.31  9/25/2019 - 9/27/2019 Yes            Plan:  · ? UTI  · Keflex for now  · RLQ abdominal pain  · Ongoing  · Since urinalysis not impressive on admission, will get CT abd/pelvis to further evaluate for infectious cause  · May need abx broadened if colitis or appendicitis found  · ? If related to constipation  · If CT okay, give enema and laxative.     · Constipation  · As above    · Encephalopathy- resolved    · T2DM  · Add lantus 20 units qHS based on elevated blood sugars  · Continue SSI  · Hold oral medications; likely discontinue Jardiance on discharge as he feels this is leading to what sounds like jock itch    DVT Prophylaxis: SCDs    Signed By: Chato Hugo MD     September 28, 2019

## 2019-09-28 NOTE — PROGRESS NOTES
09/28/19 0745   NIH Stroke Scale   Interval Other (comment)  (End of shift w/Tanja)   LOC 0   LOC Questions 0   LOC Commands 0   Best Gaze 1   Visual 0   Facial Palsy 1   Motor Right Arm 0   Motor Left Arm 0   Motor Right Leg 1   Motor Left Leg 1   Limb Ataxia 0   Sensory 0   Best Language 0   Dysarthria 0   Extinction and Inattention 0   Total 4

## 2019-09-29 PROBLEM — K57.32 CECAL DIVERTICULITIS: Status: ACTIVE | Noted: 2019-09-29

## 2019-09-29 LAB
ANION GAP SERPL CALC-SCNC: 6 MMOL/L (ref 7–16)
BUN SERPL-MCNC: 27 MG/DL (ref 8–23)
CALCIUM SERPL-MCNC: 8.8 MG/DL (ref 8.3–10.4)
CHLORIDE SERPL-SCNC: 107 MMOL/L (ref 98–107)
CO2 SERPL-SCNC: 28 MMOL/L (ref 21–32)
CREAT SERPL-MCNC: 1.44 MG/DL (ref 0.8–1.5)
CRP SERPL-MCNC: 12.7 MG/DL (ref 0–0.9)
ERYTHROCYTE [DISTWIDTH] IN BLOOD BY AUTOMATED COUNT: 16.2 % (ref 11.9–14.6)
GLUCOSE BLD STRIP.AUTO-MCNC: 148 MG/DL (ref 65–100)
GLUCOSE BLD STRIP.AUTO-MCNC: 219 MG/DL (ref 65–100)
GLUCOSE BLD STRIP.AUTO-MCNC: 243 MG/DL (ref 65–100)
GLUCOSE BLD STRIP.AUTO-MCNC: 263 MG/DL (ref 65–100)
GLUCOSE SERPL-MCNC: 148 MG/DL (ref 65–100)
HCT VFR BLD AUTO: 39.6 % (ref 41.1–50.3)
HGB BLD-MCNC: 12.3 G/DL (ref 13.6–17.2)
LACTATE SERPL-SCNC: 1.6 MMOL/L (ref 0.4–2)
MCH RBC QN AUTO: 28.5 PG (ref 26.1–32.9)
MCHC RBC AUTO-ENTMCNC: 31.1 G/DL (ref 31.4–35)
MCV RBC AUTO: 91.9 FL (ref 79.6–97.8)
MM INDURATION POC: 0 MM (ref 0–5)
NRBC # BLD: 0 K/UL (ref 0–0.2)
PLATELET # BLD AUTO: 186 K/UL (ref 150–450)
PMV BLD AUTO: 10.8 FL (ref 9.4–12.3)
POTASSIUM SERPL-SCNC: 4.3 MMOL/L (ref 3.5–5.1)
PPD POC: NEGATIVE NEGATIVE
RBC # BLD AUTO: 4.31 M/UL (ref 4.23–5.6)
SODIUM SERPL-SCNC: 141 MMOL/L (ref 136–145)
WBC # BLD AUTO: 7.6 K/UL (ref 4.3–11.1)

## 2019-09-29 PROCEDURE — 65270000029 HC RM PRIVATE

## 2019-09-29 PROCEDURE — 85027 COMPLETE CBC AUTOMATED: CPT

## 2019-09-29 PROCEDURE — 74011636637 HC RX REV CODE- 636/637: Performed by: INTERNAL MEDICINE

## 2019-09-29 PROCEDURE — 36415 COLL VENOUS BLD VENIPUNCTURE: CPT

## 2019-09-29 PROCEDURE — 74011250637 HC RX REV CODE- 250/637: Performed by: HOSPITALIST

## 2019-09-29 PROCEDURE — 94760 N-INVAS EAR/PLS OXIMETRY 1: CPT

## 2019-09-29 PROCEDURE — 77030020263 HC SOL INJ SOD CL0.9% LFCR 1000ML

## 2019-09-29 PROCEDURE — 74011250637 HC RX REV CODE- 250/637: Performed by: INTERNAL MEDICINE

## 2019-09-29 PROCEDURE — 77010033678 HC OXYGEN DAILY

## 2019-09-29 PROCEDURE — 86140 C-REACTIVE PROTEIN: CPT

## 2019-09-29 PROCEDURE — 83605 ASSAY OF LACTIC ACID: CPT

## 2019-09-29 PROCEDURE — 74011250636 HC RX REV CODE- 250/636: Performed by: HOSPITALIST

## 2019-09-29 PROCEDURE — 80048 BASIC METABOLIC PNL TOTAL CA: CPT

## 2019-09-29 PROCEDURE — 82962 GLUCOSE BLOOD TEST: CPT

## 2019-09-29 RX ORDER — HYDROCODONE BITARTRATE AND ACETAMINOPHEN 7.5; 325 MG/1; MG/1
1 TABLET ORAL
Status: DISCONTINUED | OUTPATIENT
Start: 2019-09-29 | End: 2019-10-01 | Stop reason: HOSPADM

## 2019-09-29 RX ORDER — DOCUSATE SODIUM 100 MG/1
100 CAPSULE, LIQUID FILLED ORAL DAILY
Status: DISCONTINUED | OUTPATIENT
Start: 2019-09-29 | End: 2019-10-01 | Stop reason: HOSPADM

## 2019-09-29 RX ADMIN — Medication 10 ML: at 21:18

## 2019-09-29 RX ADMIN — INSULIN LISPRO 6 UNITS: 100 INJECTION, SOLUTION INTRAVENOUS; SUBCUTANEOUS at 22:03

## 2019-09-29 RX ADMIN — ACETAMINOPHEN 650 MG: 325 TABLET, FILM COATED ORAL at 00:50

## 2019-09-29 RX ADMIN — FUROSEMIDE 40 MG: 40 TABLET ORAL at 06:32

## 2019-09-29 RX ADMIN — ASPIRIN 81 MG: 81 TABLET ORAL at 08:22

## 2019-09-29 RX ADMIN — INSULIN LISPRO 4 UNITS: 100 INJECTION, SOLUTION INTRAVENOUS; SUBCUTANEOUS at 17:14

## 2019-09-29 RX ADMIN — ATORVASTATIN CALCIUM 80 MG: 40 TABLET, FILM COATED ORAL at 21:16

## 2019-09-29 RX ADMIN — TAMSULOSIN HYDROCHLORIDE 0.4 MG: 0.4 CAPSULE ORAL at 08:21

## 2019-09-29 RX ADMIN — BUSPIRONE HYDROCHLORIDE 15 MG: 5 TABLET ORAL at 08:22

## 2019-09-29 RX ADMIN — DOCUSATE SODIUM 100 MG: 100 CAPSULE, LIQUID FILLED ORAL at 14:51

## 2019-09-29 RX ADMIN — PANTOPRAZOLE SODIUM 40 MG: 40 TABLET, DELAYED RELEASE ORAL at 06:32

## 2019-09-29 RX ADMIN — NYSTATIN: 100000 POWDER TOPICAL at 09:00

## 2019-09-29 RX ADMIN — Medication 10 ML: at 06:31

## 2019-09-29 RX ADMIN — PREGABALIN 150 MG: 75 CAPSULE ORAL at 08:21

## 2019-09-29 RX ADMIN — METRONIDAZOLE 500 MG: 500 INJECTION, SOLUTION INTRAVENOUS at 17:15

## 2019-09-29 RX ADMIN — METRONIDAZOLE 500 MG: 500 INJECTION, SOLUTION INTRAVENOUS at 00:50

## 2019-09-29 RX ADMIN — FUROSEMIDE 40 MG: 40 TABLET ORAL at 17:15

## 2019-09-29 RX ADMIN — HYDROCODONE BITARTRATE AND ACETAMINOPHEN 1 TABLET: 7.5; 325 TABLET ORAL at 09:17

## 2019-09-29 RX ADMIN — PREGABALIN 150 MG: 75 CAPSULE ORAL at 17:15

## 2019-09-29 RX ADMIN — POTASSIUM CHLORIDE 20 MEQ: 20 TABLET, EXTENDED RELEASE ORAL at 08:21

## 2019-09-29 RX ADMIN — VALSARTAN 80 MG: 80 TABLET, FILM COATED ORAL at 08:21

## 2019-09-29 RX ADMIN — Medication 10 ML: at 13:54

## 2019-09-29 RX ADMIN — POLYETHYLENE GLYCOL 3350 17 G: 17 POWDER, FOR SOLUTION ORAL at 08:21

## 2019-09-29 RX ADMIN — CIPROFLOXACIN 400 MG: 2 INJECTION, SOLUTION INTRAVENOUS at 08:21

## 2019-09-29 RX ADMIN — INSULIN GLARGINE 20 UNITS: 100 INJECTION, SOLUTION SUBCUTANEOUS at 21:19

## 2019-09-29 RX ADMIN — METRONIDAZOLE 500 MG: 500 INJECTION, SOLUTION INTRAVENOUS at 08:14

## 2019-09-29 RX ADMIN — INSULIN LISPRO 4 UNITS: 100 INJECTION, SOLUTION INTRAVENOUS; SUBCUTANEOUS at 12:29

## 2019-09-29 RX ADMIN — EZETIMIBE 10 MG: 10 TABLET ORAL at 21:16

## 2019-09-29 RX ADMIN — CLOPIDOGREL BISULFATE 75 MG: 75 TABLET ORAL at 08:21

## 2019-09-29 RX ADMIN — CIPROFLOXACIN 400 MG: 2 INJECTION, SOLUTION INTRAVENOUS at 21:13

## 2019-09-29 NOTE — PROGRESS NOTES
09/29/19 4873   NIH Stroke Scale   Interval Other (comment)  (shift change with Ashlean)   LOC 0   LOC Questions 0   LOC Commands 0   Best Gaze 0   Visual 0   Facial Palsy 1   Motor Right Arm 0   Motor Left Arm 0   Motor Right Leg 0   Motor Left Leg 0   Limb Ataxia 0   Sensory 0   Best Language 0   Dysarthria 0   Extinction and Inattention 0   Total 1

## 2019-09-29 NOTE — PROGRESS NOTES
Problem: Patient Education: Go to Patient Education Activity  Goal: Patient/Family Education  Outcome: Progressing Towards Goal     Problem: TIA/CVA Stroke: Day 2 Until Discharge  Goal: Off Pathway (Use only if patient is Off Pathway)  Outcome: Progressing Towards Goal  Goal: Activity/Safety  Outcome: Progressing Towards Goal  Goal: Diagnostic Test/Procedures  Outcome: Progressing Towards Goal  Goal: Nutrition/Diet  Outcome: Progressing Towards Goal  Goal: Discharge Planning  Outcome: Progressing Towards Goal  Goal: Medications  Outcome: Progressing Towards Goal  Goal: Respiratory  Outcome: Progressing Towards Goal  Goal: Treatments/Interventions/Procedures  Outcome: Progressing Towards Goal  Goal: Psychosocial  Outcome: Progressing Towards Goal  Goal: *Verbalizes anxiety and depression are reduced or absent  Outcome: Progressing Towards Goal  Goal: *Absence of aspiration  Outcome: Progressing Towards Goal  Goal: *Absence of deep venous thrombosis signs and symptoms(Stroke Metric)  Outcome: Progressing Towards Goal  Goal: *Optimal pain control at patient's stated goal  Outcome: Progressing Towards Goal  Goal: *Tolerating diet  Outcome: Progressing Towards Goal  Goal: *Ability to perform ADLs and demonstrates progressive mobility and function  Outcome: Progressing Towards Goal  Goal: *Stroke education continued(Stroke Metric)  Outcome: Progressing Towards Goal     Problem: Ischemic Stroke: Discharge Outcomes  Goal: *Verbalizes anxiety and depression are reduced or absent  Outcome: Progressing Towards Goal  Goal: *Verbalize understanding of risk factor modification(Stroke Metric)  Outcome: Progressing Towards Goal  Goal: *Hemodynamically stable  Outcome: Progressing Towards Goal  Goal: *Absence of aspiration pneumonia  Outcome: Progressing Towards Goal  Goal: *Aware of needed dietary changes  Outcome: Progressing Towards Goal  Goal: *Verbalize understanding of prescribed medications including anti-coagulants, anti-lipid, and/or anti-platelets(Stroke Metric)  Outcome: Progressing Towards Goal  Goal: *Tolerating diet  Outcome: Progressing Towards Goal  Goal: *Aware of follow-up diagnostics related to anticoagulants  Outcome: Progressing Towards Goal  Goal: *Ability to perform ADLs and demonstrates progressive mobility and function  Outcome: Progressing Towards Goal  Goal: *Absence of DVT(Stroke Metric)  Outcome: Progressing Towards Goal  Goal: *Absence of aspiration  Outcome: Progressing Towards Goal  Goal: *Optimal pain control at patient's stated goal  Outcome: Progressing Towards Goal  Goal: *Home safety concerns addressed  Outcome: Progressing Towards Goal  Goal: *Describes available resources and support systems  Outcome: Progressing Towards Goal  Goal: *Verbalizes understanding of activation of EMS(911) for stroke symptoms(Stroke Metric)  Outcome: Progressing Towards Goal  Goal: *Understands and describes signs and symptoms to report to providers(Stroke Metric)  Outcome: Progressing Towards Goal  Goal: *Neurolgocially stable (absence of additional neurological deficits)  Outcome: Progressing Towards Goal  Goal: *Verbalizes importance of follow-up with primary care physician(Stroke Metric)  Outcome: Progressing Towards Goal  Goal: *Smoking cessation discussed,if applicable(Stroke Metric)  Outcome: Progressing Towards Goal  Goal: *Depression screening completed(Stroke Metric)  Outcome: Progressing Towards Goal     Problem: Patient Education: Go to Patient Education Activity  Goal: Patient/Family Education  Outcome: Progressing Towards Goal     Problem: Heart Failure: Day 2  Goal: Off Pathway (Use only if patient is Off Pathway)  Outcome: Progressing Towards Goal  Goal: Activity/Safety  Outcome: Progressing Towards Goal  Goal: Consults, if ordered  Outcome: Progressing Towards Goal  Goal: Diagnostic Test/Procedures  Outcome: Progressing Towards Goal  Goal: Nutrition/Diet  Outcome: Progressing Towards Goal  Goal: Discharge Planning  Outcome: Progressing Towards Goal  Goal: Medications  Outcome: Progressing Towards Goal  Goal: Respiratory  Outcome: Progressing Towards Goal  Goal: Treatments/Interventions/Procedures  Outcome: Progressing Towards Goal  Goal: Psychosocial  Outcome: Progressing Towards Goal  Goal: *Oxygen saturation within defined limits  Outcome: Progressing Towards Goal  Goal: *Hemodynamically stable  Outcome: Progressing Towards Goal  Goal: *Optimal pain control at patient's stated goal  Outcome: Progressing Towards Goal  Goal: *Anxiety reduced or absent  Outcome: Progressing Towards Goal  Goal: *Demonstrates progressive activity  Outcome: Progressing Towards Goal     Problem: Heart Failure: Day 3  Goal: Off Pathway (Use only if patient is Off Pathway)  Outcome: Progressing Towards Goal  Goal: Activity/Safety  Outcome: Progressing Towards Goal  Goal: Diagnostic Test/Procedures  Outcome: Progressing Towards Goal  Goal: Nutrition/Diet  Outcome: Progressing Towards Goal  Goal: Discharge Planning  Outcome: Progressing Towards Goal  Goal: Medications  Outcome: Progressing Towards Goal  Goal: Respiratory  Outcome: Progressing Towards Goal  Goal: Treatments/Interventions/Procedures  Outcome: Progressing Towards Goal  Goal: Psychosocial  Outcome: Progressing Towards Goal  Goal: *Oxygen saturation within defined limits  Outcome: Progressing Towards Goal  Goal: *Hemodynamically stable  Outcome: Progressing Towards Goal  Goal: *Optimal pain control at patient's stated goal  Outcome: Progressing Towards Goal  Goal: *Anxiety reduced or absent  Outcome: Progressing Towards Goal  Goal: *Demonstrates progressive activity  Outcome: Progressing Towards Goal     Problem: Heart Failure: Day 4  Goal: Off Pathway (Use only if patient is Off Pathway)  Outcome: Progressing Towards Goal  Goal: Activity/Safety  Outcome: Progressing Towards Goal  Goal: Diagnostic Test/Procedures  Outcome: Progressing Towards Goal  Goal: Nutrition/Diet  Outcome: Progressing Towards Goal  Goal: Discharge Planning  Outcome: Progressing Towards Goal  Goal: Medications  Outcome: Progressing Towards Goal  Goal: Respiratory  Outcome: Progressing Towards Goal  Goal: Treatments/Interventions/Procedures  Outcome: Progressing Towards Goal  Goal: Psychosocial  Outcome: Progressing Towards Goal  Goal: *Oxygen saturation within defined limits  Outcome: Progressing Towards Goal  Goal: *Hemodynamically stable  Outcome: Progressing Towards Goal  Goal: *Optimal pain control at patient's stated goal  Outcome: Progressing Towards Goal  Goal: *Anxiety reduced or absent  Outcome: Progressing Towards Goal  Goal: *Demonstrates progressive activity  Outcome: Progressing Towards Goal     Problem: Heart Failure: Day 5  Goal: Off Pathway (Use only if patient is Off Pathway)  Outcome: Progressing Towards Goal  Goal: Activity/Safety  Outcome: Progressing Towards Goal  Goal: Diagnostic Test/Procedures  Outcome: Progressing Towards Goal  Goal: Nutrition/Diet  Outcome: Progressing Towards Goal  Goal: Discharge Planning  Outcome: Progressing Towards Goal  Goal: Medications  Outcome: Progressing Towards Goal  Goal: Respiratory  Outcome: Progressing Towards Goal  Goal: Treatments/Interventions/Procedures  Outcome: Progressing Towards Goal  Goal: Psychosocial  Outcome: Progressing Towards Goal     Problem: Heart Failure: Discharge Outcomes  Goal: *Demonstrates ability to perform prescribed activity without shortness of breath or discomfort  Outcome: Progressing Towards Goal  Goal: *Left ventricular function assessment completed prior to or during stay, or planned for post-discharge  Outcome: Progressing Towards Goal  Goal: *ACEI prescribed if LVEF less than 40% and no contraindications or ARB prescribed  Outcome: Progressing Towards Goal  Goal: *Verbalizes understanding and describes prescribed diet  Outcome: Progressing Towards Goal  Goal: *Verbalizes understanding/describes prescribed medications  Outcome: Progressing Towards Goal  Goal: *Describes available resources and support systems  Description  (eg: Home Health, Palliative Care, Advanced Medical Directive)  Outcome: Progressing Towards Goal  Goal: *Describes smoking cessation resources  Outcome: Progressing Towards Goal  Goal: *Understands and describes signs and symptoms to report to providers(Stroke Metric)  Outcome: Progressing Towards Goal  Goal: *Describes/verbalizes understanding of follow-up/return appt  Description  (eg: to physicians, diabetes treatment coordinator, and other resources  Outcome: Progressing Towards Goal  Goal: *Describes importance of continuing daily weights and changes to report to physician  Outcome: Progressing Towards Goal

## 2019-09-29 NOTE — PROGRESS NOTES
09/29/19 0748   NIH Stroke Scale   Interval Other (comment)   LOC 0   LOC Questions 0   LOC Commands 0   Best Gaze 0   Visual 0   Facial Palsy 0   Motor Right Arm 0   Motor Left Arm 0   Motor Right Leg 0   Motor Left Leg 0   Limb Ataxia 0   Sensory 0   Best Language 0   Dysarthria 0   Extinction and Inattention 0   Total 0

## 2019-09-29 NOTE — CONSULTS
Admit Date: 9/25/2019  Referring Physician: Dr. Jenifer Adorno Making/Plan/Recommend: Medical chart reviewed. Briefly, this is a 70year old with past medical history of CKD, DM, CHF, HTN, prior TIA, obesity who was admitted due to infectious encephalopathy secondary to UTI. Neurology consulted with EEG positive diffuse encephalopathy. He continues on antibiotics and reports abdominal pain with CT abdomen/pelvis suspicious for acute cecal diverticulitis. He was made NPO. Acute therapy report current level of function: mobility - min A. Will follow as medical work-up continues. Discuss with Dorothy Wallace/Artur and rehab admission coordinators tomorrow. Thank you for the opportunity to participate in the care of this patient.

## 2019-09-29 NOTE — PROGRESS NOTES
Night coverage: Follow-up of CT abdomen/pelvis-- focal inflammation and wall thickening of the cecum. Few scattered diverticula. Acute cecal diverticulitis suspected. Appendix normal    Will keep patient NPO except for meds.  Start IV cipro and flagyl  Check CBC, CRP and lactic acid in am

## 2019-09-29 NOTE — PROGRESS NOTES
Hospitalist Progress Note    2019  Admit Date: 2019  1:59 PM   NAME: Boubacar Garsia   :  1940   MRN:  [id number]   Attending: Donna Álvarez MD  PCP:  Joe Hernandez MD    SUBJECTIVE:   Pt is a 71 y/o M admitted with encephalopathy. Hx CKD3, DM, dCHF, GERD, HTN, prior TIA, MARLEEN, obesity. Neurology saw in ER and felt infectious in etiology. Had fever in ER with elevated WBC. Did complain of some urinary symptoms suprapubic pain, urinary urgency on admission. Recently started on flomax for these symptoms as an outpatient. UA from ED showing negative leukocyte esterace and nitrites. Urine culture never obtained. He has been on rocephin or keflex since admission. Per chart review, he was complaining of RLQ abdominal pain at time of admission.  '  - reports ongoing RLQ abdominal pain. Has been constipated with last reported BM on . He reports he is passing flatus. He is uncertain if he has had appendectomy in the past, but he has remote history of bilateral hernia repairs. - CT abd/pelvis yesterday showed cecal diverticulitis and large amount of stool in colon. His abx changed to cipro/flagyl overnight and made NPO. He reports abdominal pain still present, but less than yesterday. Still constipated despite miralax; does not want enema yet.       Review of Systems negative with exception of pertinent positives noted above  PHYSICAL EXAM     Visit Vitals  /70 (BP 1 Location: Left arm, BP Patient Position: At rest)   Pulse 80   Temp 98.2 °F (36.8 °C)   Resp 18   Wt 128.8 kg (284 lb)   SpO2 98%   BMI 45.84 kg/m²      Temp (24hrs), Av.6 °F (36.4 °C), Min:97.2 °F (36.2 °C), Max:98.2 °F (36.8 °C)    Patient Vitals for the past 24 hrs:   Temp Pulse Resp BP SpO2   19 0800 98.2 °F (36.8 °C) 80 18 124/70 98 %   19 0400 97.3 °F (36.3 °C) 87 17 101/60 95 %   19 0000 97.8 °F (36.6 °C) 82 17 119/79 97 %   19     99 %   19     98 % 09/28/19 2000 97.5 °F (36.4 °C) 83 17 117/74 97 %   09/28/19 1600 97.7 °F (36.5 °C) 85 17 122/80 96 %   09/28/19 1200 97.2 °F (36.2 °C) 81 18 121/74 94 %       Oxygen Therapy  O2 Sat (%): 98 % (09/29/19 0800)  Pulse via Oximetry: 88 beats per minute (09/28/19 2019)  O2 Device: Nasal cannula (09/28/19 2019)  O2 Flow Rate (L/min): 2 l/min (09/28/19 2019)    Intake/Output Summary (Last 24 hours) at 9/29/2019 1143  Last data filed at 9/29/2019 0559  Gross per 24 hour   Intake    Output 1350 ml   Net -1350 ml      General: No acute distress, morbidly obese   Lungs:  CTA Bilaterally.    Heart:  Regular rate and rhythm,  No murmur, rub, or gallop  Abdomen: Very obese with difficult exam due to habitus, has RLQ tenderness to palpation  Extremities: No cyanosis, clubbing or edema  Neurologic:  No focal deficits    Recent Results (from the past 24 hour(s))   GLUCOSE, POC    Collection Time: 09/28/19 11:54 AM   Result Value Ref Range    Glucose (POC) 235 (H) 65 - 100 mg/dL   GLUCOSE, POC    Collection Time: 09/28/19  4:58 PM   Result Value Ref Range    Glucose (POC) 315 (H) 65 - 100 mg/dL   GLUCOSE, POC    Collection Time: 09/28/19  9:17 PM   Result Value Ref Range    Glucose (POC) 240 (H) 65 - 514 mg/dL   METABOLIC PANEL, BASIC    Collection Time: 09/29/19  4:45 AM   Result Value Ref Range    Sodium 141 136 - 145 mmol/L    Potassium 4.3 3.5 - 5.1 mmol/L    Chloride 107 98 - 107 mmol/L    CO2 28 21 - 32 mmol/L    Anion gap 6 (L) 7 - 16 mmol/L    Glucose 148 (H) 65 - 100 mg/dL    BUN 27 (H) 8 - 23 MG/DL    Creatinine 1.44 0.8 - 1.5 MG/DL    GFR est AA >60 >60 ml/min/1.73m2    GFR est non-AA 51 (L) >60 ml/min/1.73m2    Calcium 8.8 8.3 - 10.4 MG/DL   CBC W/O DIFF    Collection Time: 09/29/19  4:45 AM   Result Value Ref Range    WBC 7.6 4.3 - 11.1 K/uL    RBC 4.31 4.23 - 5.6 M/uL    HGB 12.3 (L) 13.6 - 17.2 g/dL    HCT 39.6 (L) 41.1 - 50.3 %    MCV 91.9 79.6 - 97.8 FL    MCH 28.5 26.1 - 32.9 PG    MCHC 31.1 (L) 31.4 - 35.0 g/dL RDW 16.2 (H) 11.9 - 14.6 %    PLATELET 813 380 - 932 K/uL    MPV 10.8 9.4 - 12.3 FL    ABSOLUTE NRBC 0.00 0.0 - 0.2 K/uL   LACTIC ACID    Collection Time: 09/29/19  4:45 AM   Result Value Ref Range    Lactic acid 1.6 0.4 - 2.0 MMOL/L   C REACTIVE PROTEIN, QT    Collection Time: 09/29/19  4:45 AM   Result Value Ref Range    C-Reactive protein 12.7 (H) 0.0 - 0.9 mg/dL   GLUCOSE, POC    Collection Time: 09/29/19  7:43 AM   Result Value Ref Range    Glucose (POC) 148 (H) 65 - 100 mg/dL     Imaging:    CT ABD PELV W CONT   Final Result   IMPRESSION:   Focal inflammation and wall thickening of the cecum. There are a few scattered   diverticula. Acute cecal diverticulitis is suspected. The appendix is visualized   and normal.            DUPLEX CAROTID BILATERAL   Final Result   IMPRESSION:     DANNIE:  No significant stenosis. LICA:  No significant stenosis. XR CHEST SNGL V   Final Result   IMPRESSION:  Negative for acute change                CT CODE NEURO HEAD WO CONTRAST   Final Result   IMPRESSION:  Negative for acute intracranial abnormality. Chronic changes.                    ASSESSMENT      Hospital Problems as of 9/29/2019 Date Reviewed: 9/18/2019          Codes Class Noted - Resolved POA    Cecal diverticulitis ICD-10-CM: K57.32  ICD-9-CM: 562.11  9/29/2019 - Present Yes        RLQ abdominal pain ICD-10-CM: R10.31  ICD-9-CM: 789.03  9/28/2019 - Present Yes        CKD (chronic kidney disease) stage 3, GFR 30-59 ml/min (HCC) (Chronic) ICD-10-CM: N18.3  ICD-9-CM: 585.3  6/24/2019 - Present Yes        Diabetic neuropathy associated with type 2 diabetes mellitus ([facility] [address] (Chronic) ICD-10-CM: E11.40  ICD-9-CM: 250.60, 357.2  7/18/2017 - Present Yes        Diastolic CHF, chronic (HCC) (Chronic) ICD-10-CM: I50.32  ICD-9-CM: 428.32, 428.0  3/20/2017 - Present Yes        Pulmonary hypertension (HCC) (Chronic) ICD-10-CM: I27.20  ICD-9-CM: 416.8  10/14/2015 - Present Yes        Gastroesophageal reflux disease without esophagitis (Chronic) ICD-10-CM: K21.9  ICD-9-CM: 530.81  4/21/2015 - Present Yes        Hyperlipidemia with target LDL less than 70 (Chronic) ICD-10-CM: E78.5  ICD-9-CM: 272.4  Unknown - Present Yes        Essential hypertension, benign (Chronic) ICD-10-CM: I10  ICD-9-CM: 401.1  1/22/2015 - Present Yes        Type 2 diabetes, uncontrolled, with neuropathy (HCC) (Chronic) ICD-10-CM: E11.40, E11.65  ICD-9-CM: 250.62, 357.2  1/22/2015 - Present Yes        Obstructive sleep apnea of adult (Chronic) ICD-10-CM: G47.33  ICD-9-CM: 327.23  1/22/2015 - Present Yes        Morbid obesity ([address] (Chronic) ICD-10-CM: E66.01  ICD-9-CM: 278.01  6/27/2011 - Present Yes        RESOLVED: Hyperkalemia ICD-10-CM: E87.5  ICD-9-CM: 276.7  9/27/2019 - 9/27/2019 Yes        * (Principal) RESOLVED: Sepsis secondary to UTI [facility]) ICD-10-CM: A41.9, N39.0  ICD-9-CM: 038.9, 995.91, 599.0  9/26/2019 - 9/27/2019 Yes        RESOLVED: Acute metabolic encephalopathy YPI-14-OU: G93.41  ICD-9-CM: 348.31  9/25/2019 - 9/27/2019 Yes            Plan:  · No UTI   · Keflex stopped  · Abdominal pain from cecal diverticulitis    · Cecal diverticulitis on CT abd/pelvis  · Continue IV cipro/flagyl (treat with 10 days but likely switch to PO tomorrow)  · Since urinalysis not impressive on admission, will get CT abd/pelvis to further evaluate for infectious cause    · Constipation  · Continue miralax  · Add colace  · Enema if no BM by tomorrow    · Metabolic encephalopathy- resolved    · T2DM  · Blood sugar improved; continue lantus 20 units qHS and SSI  · Hold oral medications; likely discontinue Jardiance on discharge as he feels this is leading to what sounds like jock itch    DVT Prophylaxis: SCDs    Signed By: Tank Gore MD     September 29, 2019

## 2019-09-29 NOTE — PROGRESS NOTES
Problem: Patient Education: Go to Patient Education Activity  Goal: Patient/Family Education  Outcome: Progressing Towards Goal     Problem: TIA/CVA Stroke: Day 2 Until Discharge  Goal: Activity/Safety  Outcome: Progressing Towards Goal  Goal: Diagnostic Test/Procedures  Outcome: Progressing Towards Goal  Goal: Nutrition/Diet  Outcome: Progressing Towards Goal  Goal: Discharge Planning  Outcome: Progressing Towards Goal  Goal: Medications  Outcome: Progressing Towards Goal  Goal: Respiratory  Outcome: Progressing Towards Goal  Goal: Treatments/Interventions/Procedures  Outcome: Progressing Towards Goal  Goal: Psychosocial  Outcome: Progressing Towards Goal  Goal: *Verbalizes anxiety and depression are reduced or absent  Outcome: Progressing Towards Goal  Goal: *Absence of aspiration  Outcome: Progressing Towards Goal  Goal: *Absence of deep venous thrombosis signs and symptoms(Stroke Metric)  Outcome: Progressing Towards Goal  Goal: *Optimal pain control at patient's stated goal  Outcome: Progressing Towards Goal  Goal: *Tolerating diet  Outcome: Progressing Towards Goal  Goal: *Ability to perform ADLs and demonstrates progressive mobility and function  Outcome: Progressing Towards Goal  Goal: *Stroke education continued(Stroke Metric)  Outcome: Progressing Towards Goal     Problem: Patient Education: Go to Patient Education Activity  Goal: Patient/Family Education  Outcome: Progressing Towards Goal     Problem: Heart Failure: Day 2  Goal: Activity/Safety  Outcome: Progressing Towards Goal  Goal: Consults, if ordered  Outcome: Progressing Towards Goal  Goal: Diagnostic Test/Procedures  Outcome: Progressing Towards Goal  Goal: Nutrition/Diet  Outcome: Progressing Towards Goal  Goal: Discharge Planning  Outcome: Progressing Towards Goal  Goal: Medications  Outcome: Progressing Towards Goal  Goal: Respiratory  Outcome: Progressing Towards Goal  Goal: Treatments/Interventions/Procedures  Outcome: Progressing Towards Goal  Goal: Psychosocial  Outcome: Progressing Towards Goal  Goal: *Oxygen saturation within defined limits  Outcome: Progressing Towards Goal  Goal: *Hemodynamically stable  Outcome: Progressing Towards Goal  Goal: *Optimal pain control at patient's stated goal  Outcome: Progressing Towards Goal  Goal: *Anxiety reduced or absent  Outcome: Progressing Towards Goal  Goal: *Demonstrates progressive activity  Outcome: Progressing Towards Goal     Problem: Hypertension  Goal: *Blood pressure within specified parameters  Outcome: Progressing Towards Goal  Goal: *Fluid volume balance  Outcome: Progressing Towards Goal  Goal: *Labs within defined limits  Outcome: Progressing Towards Goal     Problem: Patient Education: Go to Patient Education Activity  Goal: Patient/Family Education  Outcome: Progressing Towards Goal     Problem: Diabetes Self-Management  Goal: *Disease process and treatment process  Description  Define diabetes and identify own type of diabetes; list 3 options for treating diabetes. Outcome: Progressing Towards Goal  Goal: *Incorporating nutritional management into lifestyle  Description  Describe effect of type, amount and timing of food on blood glucose; list 3 methods for planning meals. Outcome: Progressing Towards Goal  Goal: *Incorporating physical activity into lifestyle  Description  State effect of exercise on blood glucose levels. Outcome: Progressing Towards Goal  Goal: *Developing strategies to promote health/change behavior  Description  Define the ABC's of diabetes; identify appropriate screenings, schedule and personal plan for screenings. Outcome: Progressing Towards Goal  Goal: *Using medications safely  Description  State effect of diabetes medications on diabetes; name diabetes medication taking, action and side effects.   Outcome: Progressing Towards Goal  Goal: *Monitoring blood glucose, interpreting and using results  Description  Identify recommended blood glucose targets  and personal targets. Outcome: Progressing Towards Goal  Goal: *Prevention, detection, treatment of acute complications  Description  List symptoms of hyper- and hypoglycemia; describe how to treat low blood sugar and actions for lowering  high blood glucose level. Outcome: Progressing Towards Goal  Goal: *Prevention, detection and treatment of chronic complications  Description  Define the natural course of diabetes and describe the relationship of blood glucose levels to long term complications of diabetes. Outcome: Progressing Towards Goal  Goal: *Developing strategies to address psychosocial issues  Description  Describe feelings about living with diabetes; identify support needed and support network  Outcome: Progressing Towards Goal     Problem: Patient Education: Go to Patient Education Activity  Goal: Patient/Family Education  Outcome: Progressing Towards Goal     Problem: Falls - Risk of  Goal: *Absence of Falls  Description  Document Tajbaltazar Crespo Fall Risk and appropriate interventions in the flowsheet. Outcome: Progressing Towards Goal  Note:   Fall Risk Interventions:  Mobility Interventions: Patient to call before getting OOB    Mentation Interventions: Bed/chair exit alarm    Medication Interventions: Patient to call before getting OOB    Elimination Interventions: Call light in reach    History of Falls Interventions: Bed/chair exit alarm         Problem: Patient Education: Go to Patient Education Activity  Goal: Patient/Family Education  Outcome: Progressing Towards Goal     Problem: Pressure Injury - Risk of  Goal: *Prevention of pressure injury  Description  Document Odell Scale and appropriate interventions in the flowsheet.   Outcome: Progressing Towards Goal  Note:   Pressure Injury Interventions:  Sensory Interventions: Assess changes in LOC    Moisture Interventions: Absorbent underpads, Apply protective barrier, creams and emollients    Activity Interventions: Increase time out of bed    Mobility Interventions: Pressure redistribution bed/mattress (bed type)    Nutrition Interventions: Document food/fluid/supplement intake, Offer support with meals,snacks and hydration    Friction and Shear Interventions: HOB 30 degrees or less                Problem: Patient Education: Go to Patient Education Activity  Goal: Patient/Family Education  Outcome: Progressing Towards Goal     Problem: Patient Education: Go to Patient Education Activity  Goal: Patient/Family Education  Outcome: Progressing Towards Goal     Problem: Patient Education: Go to Patient Education Activity  Goal: Patient/Family Education  Outcome: Progressing Towards Goal

## 2019-09-30 PROBLEM — A41.9 SEPSIS (HCC): Status: RESOLVED | Noted: 2019-09-30 | Resolved: 2019-09-30

## 2019-09-30 PROBLEM — A41.9 SEPSIS (HCC): Status: ACTIVE | Noted: 2019-09-30

## 2019-09-30 LAB
ANION GAP SERPL CALC-SCNC: 8 MMOL/L (ref 7–16)
BACTERIA SPEC CULT: NORMAL
BACTERIA SPEC CULT: NORMAL
BUN SERPL-MCNC: 26 MG/DL (ref 8–23)
CALCIUM SERPL-MCNC: 8.7 MG/DL (ref 8.3–10.4)
CHLORIDE SERPL-SCNC: 105 MMOL/L (ref 98–107)
CO2 SERPL-SCNC: 28 MMOL/L (ref 21–32)
CREAT SERPL-MCNC: 1.41 MG/DL (ref 0.8–1.5)
GLUCOSE BLD STRIP.AUTO-MCNC: 172 MG/DL (ref 65–100)
GLUCOSE BLD STRIP.AUTO-MCNC: 247 MG/DL (ref 65–100)
GLUCOSE BLD STRIP.AUTO-MCNC: 251 MG/DL (ref 65–100)
GLUCOSE BLD STRIP.AUTO-MCNC: 253 MG/DL (ref 65–100)
GLUCOSE SERPL-MCNC: 143 MG/DL (ref 65–100)
MM INDURATION POC: 0 MM (ref 0–5)
POTASSIUM SERPL-SCNC: 4.1 MMOL/L (ref 3.5–5.1)
PPD POC: NEGATIVE NEGATIVE
SERVICE CMNT-IMP: NORMAL
SERVICE CMNT-IMP: NORMAL
SODIUM SERPL-SCNC: 141 MMOL/L (ref 136–145)
T PALLIDUM AB SER QL IA: NEGATIVE

## 2019-09-30 PROCEDURE — 74011636637 HC RX REV CODE- 636/637: Performed by: INTERNAL MEDICINE

## 2019-09-30 PROCEDURE — 92523 SPEECH SOUND LANG COMPREHEN: CPT

## 2019-09-30 PROCEDURE — 74011250637 HC RX REV CODE- 250/637: Performed by: INTERNAL MEDICINE

## 2019-09-30 PROCEDURE — 82962 GLUCOSE BLOOD TEST: CPT

## 2019-09-30 PROCEDURE — 80048 BASIC METABOLIC PNL TOTAL CA: CPT

## 2019-09-30 PROCEDURE — 76937 US GUIDE VASCULAR ACCESS: CPT

## 2019-09-30 PROCEDURE — 65270000029 HC RM PRIVATE

## 2019-09-30 PROCEDURE — 77010033678 HC OXYGEN DAILY

## 2019-09-30 PROCEDURE — 36415 COLL VENOUS BLD VENIPUNCTURE: CPT

## 2019-09-30 PROCEDURE — 74011250636 HC RX REV CODE- 250/636: Performed by: HOSPITALIST

## 2019-09-30 PROCEDURE — 94760 N-INVAS EAR/PLS OXIMETRY 1: CPT

## 2019-09-30 RX ORDER — FACIAL-BODY WIPES
10 EACH TOPICAL DAILY PRN
Status: DISCONTINUED | OUTPATIENT
Start: 2019-09-30 | End: 2019-10-01 | Stop reason: HOSPADM

## 2019-09-30 RX ORDER — MAGNESIUM CITRATE
296 SOLUTION, ORAL ORAL
Status: COMPLETED | OUTPATIENT
Start: 2019-09-30 | End: 2019-09-30

## 2019-09-30 RX ADMIN — VALSARTAN 80 MG: 80 TABLET, FILM COATED ORAL at 08:44

## 2019-09-30 RX ADMIN — INSULIN LISPRO 6 UNITS: 100 INJECTION, SOLUTION INTRAVENOUS; SUBCUTANEOUS at 21:24

## 2019-09-30 RX ADMIN — CIPROFLOXACIN 400 MG: 2 INJECTION, SOLUTION INTRAVENOUS at 08:43

## 2019-09-30 RX ADMIN — BUSPIRONE HYDROCHLORIDE 15 MG: 5 TABLET ORAL at 08:43

## 2019-09-30 RX ADMIN — METRONIDAZOLE 500 MG: 500 INJECTION, SOLUTION INTRAVENOUS at 08:45

## 2019-09-30 RX ADMIN — PREGABALIN 150 MG: 75 CAPSULE ORAL at 08:44

## 2019-09-30 RX ADMIN — FUROSEMIDE 40 MG: 40 TABLET ORAL at 06:27

## 2019-09-30 RX ADMIN — POLYETHYLENE GLYCOL 3350 17 G: 17 POWDER, FOR SOLUTION ORAL at 08:42

## 2019-09-30 RX ADMIN — DOCUSATE SODIUM 100 MG: 100 CAPSULE, LIQUID FILLED ORAL at 08:43

## 2019-09-30 RX ADMIN — CLOPIDOGREL BISULFATE 75 MG: 75 TABLET ORAL at 08:43

## 2019-09-30 RX ADMIN — INSULIN LISPRO 4 UNITS: 100 INJECTION, SOLUTION INTRAVENOUS; SUBCUTANEOUS at 17:16

## 2019-09-30 RX ADMIN — TAMSULOSIN HYDROCHLORIDE 0.4 MG: 0.4 CAPSULE ORAL at 08:43

## 2019-09-30 RX ADMIN — HYDROCODONE BITARTRATE AND ACETAMINOPHEN 1 TABLET: 7.5; 325 TABLET ORAL at 21:27

## 2019-09-30 RX ADMIN — Medication 10 ML: at 21:15

## 2019-09-30 RX ADMIN — EZETIMIBE 10 MG: 10 TABLET ORAL at 21:20

## 2019-09-30 RX ADMIN — PANTOPRAZOLE SODIUM 40 MG: 40 TABLET, DELAYED RELEASE ORAL at 06:27

## 2019-09-30 RX ADMIN — ATORVASTATIN CALCIUM 80 MG: 40 TABLET, FILM COATED ORAL at 21:20

## 2019-09-30 RX ADMIN — NYSTATIN: 100000 POWDER TOPICAL at 08:42

## 2019-09-30 RX ADMIN — METRONIDAZOLE 500 MG: 500 INJECTION, SOLUTION INTRAVENOUS at 00:53

## 2019-09-30 RX ADMIN — BISACODYL 10 MG: 10 SUPPOSITORY RECTAL at 13:45

## 2019-09-30 RX ADMIN — FUROSEMIDE 40 MG: 40 TABLET ORAL at 17:16

## 2019-09-30 RX ADMIN — METRONIDAZOLE 500 MG: 500 INJECTION, SOLUTION INTRAVENOUS at 17:16

## 2019-09-30 RX ADMIN — CIPROFLOXACIN 400 MG: 2 INJECTION, SOLUTION INTRAVENOUS at 21:15

## 2019-09-30 RX ADMIN — ASPIRIN 81 MG: 81 TABLET ORAL at 08:43

## 2019-09-30 RX ADMIN — Medication 5 ML: at 12:56

## 2019-09-30 RX ADMIN — INSULIN LISPRO 4 UNITS: 100 INJECTION, SOLUTION INTRAVENOUS; SUBCUTANEOUS at 12:03

## 2019-09-30 RX ADMIN — Medication 10 ML: at 06:28

## 2019-09-30 RX ADMIN — PREGABALIN 150 MG: 75 CAPSULE ORAL at 17:16

## 2019-09-30 RX ADMIN — INSULIN GLARGINE 20 UNITS: 100 INJECTION, SOLUTION SUBCUTANEOUS at 21:21

## 2019-09-30 RX ADMIN — INSULIN LISPRO 2 UNITS: 100 INJECTION, SOLUTION INTRAVENOUS; SUBCUTANEOUS at 08:42

## 2019-09-30 RX ADMIN — NYSTATIN: 100000 POWDER TOPICAL at 17:16

## 2019-09-30 RX ADMIN — POTASSIUM CHLORIDE 20 MEQ: 20 TABLET, EXTENDED RELEASE ORAL at 08:44

## 2019-09-30 RX ADMIN — MAGNESIUM CITRATE 296 ML: 1.75 LIQUID ORAL at 13:26

## 2019-09-30 NOTE — PROGRESS NOTES
Problem: Motor Speech Impaired (Adult)  Goal: *Acute Goals and Plan of Care (Insert Text)  Description  LTG: Patient will develop functional and intelligible speech and utilize compensatory strategies to improve communication across environments  STG: Patient will utilize dysarthria compensatory strategies at conversation level to improve rate and precision with 95% accuracy given minimal cueing    Outcome: Progressing Towards Goal     Problem: Neurolinguistics Impaired (Adult)  Goal: *Acute Goals and Plan of Care (Insert Text)  Description  LTG: Patient will increase neuro-linguistic abilities to increase safety and awareness in functional living environment   STG: Patient will participate in moderate level word finding task with 80% accuracy with minimal cueing   STG: Patient will identify 1 similarity and difference given 2 items with 85% accuracy given minimal cueing  STG: Patient will name 10 items to concrete category in 1minute with moderate cueing   STG: Patient will complete verbal reasoning/problem solving tasks with 90% accuracy given minimal cueing   STG: Patient will utilize word finding compensatory strategies  90%accuracy given minimal assistance  STG: Patient will participate in ongoing therapeutic assessment to further determine goals     Outcome: Progressing Towards Goal     SPEECH LANGUAGE PATHOLOGY: SPEECH-LANGUAGE/COGNITION: Initial Assessment    NAME/AGE/GENDER: Christian Chen is a 70 y.o. male  DATE: 9/30/2019  PRIMARY DIAGNOSIS: Stroke-like symptoms [N56.58]  Acute metabolic encephalopathy [I79.39]       ICD-10: Treatment Diagnosis: R47.1 Dysarthria and Anarthria  R41.841 Cognitive-Communication Deficit    INTERDISCIPLINARY COLLABORATION: Registered Nurse  PRECAUTIONS/ALLERGIES: [place] male[place]]     SUBJECTIVE   Patient pleasant, agreeable to participate. 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       History of Present Injury/Illness: Mr. Christy Tidwell  has a past medical history of Cervical stenosis of spine, Chronic kidney disease, Degenerative disc disease, lumbar, Diabetes mellitus type II, Diabetic retinopathy of both eyes without macular edema associated with type 2 diabetes mellitus ([address] Diastolic congestive heart failure ([address] Diverticulosis of colon (June 2010), DVT (deep venous thrombosis) ([address] Extrinsic allergic asthma, GERD (gastroesophageal reflux disease), colonic polyps (07/29/2010), Hyperlipidemia LDL goal < 70, Hypertension, Obstructive sleep apnea, Perennial allergic rhinitis with seasonal variation, 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 ([address] Pulmonary embolism ([address] (Mar 2014), TIA (transient ischemic attack) (11/9/2017), and Type 2 diabetes, uncontrolled, with neuropathy ([address] (1/22/2015). He also has no past medical history of Aneurysm ([address] Arrhythmia, Autoimmune disease ([address] CAD (coronary artery disease), Cancer ([address] Coagulation defects, COPD, Liver disease, Psychiatric disorder, PUD (peptic ulcer disease), or Seizures ([address] . He also  has a past surgical history that includes colonoscopy (07/29/2010); egd (5/9/2011); pr sinus surgery proc unlisted; hx knee arthroscopy (1991); pr total knee arthroplasty (Right, 2006); hx bunionectomy (Bilateral); and hx hernia repair (Bilateral). Previous Speech Therapy: NONE REPORTED    Problem List:  (Impairments causing functional limitations):  1. Min dysarthria (decreased rate)  2.  Cognitive linguistic deficit (higher level word finding, verbal reasoning, verbal fluency)    Orientation:   Person  Place  Time  Situation     Pain: Pain Scale 1: Numeric (0 - 10)  Pain Intensity 1: 0         OBJECTIVE   Patient seen for speech/cognitive linguistic evaluation  Dysarthria tasks:  Rate- mildly impaired           Sequential motion rates/rate of movement: 7 repetitions (\"fair\")   100% intelligibility at conversation level  Min left labial weakness observed, no lingual weakness     Cognitive Linguistic Tasks: Orientation: 100%  -divergent namin word in 1 minute  -abstract reasonin/3   Receptive language:   -yes/no:100%  -command following (1,2,3-step): 100%  Expressive language:  -automatic speech: 100%  -finishing sentence: 100%  -sentence formulation: 100%  -confrontational object namin%  -responsive namin%  -sentence repetition: 2/4, 4/4 with repetition x3           ASSESSMENT   Patient presents with minimal dysarthria due to decreased rate of speech. Verbal reasoning and verbal fluency impairments. Difficulty with word finding when explaining abstract proverbs/expressions. Divergent naming significantly impaired, only able to name 1 item in a minute (N>11). Higher level word finding difficulty at conversation level, however confrontational and responsive naming WFL. Unclear patient's baseline. He denies any deficits, reports speech and cognition at baseline. Will follow up 1-2 sessions for ongoing therapeutic assessment as deemed appropriate and training for compensatory strategies to improve verbal fluency and speech rate. Tool Used: MODIFIED YOSSI SCALE (mRS)   Score   No Symptoms  ? 0   No significant disability despite symptoms; able to carry out all usual duties and activities  ? 1   Slight disability; unable to carry out all previous activities but able to look after own affairs without assistance. ? 2   Moderate disability; requiring some help but able to walk without assistance  ? 3   Moderately severe disability; unable to walk without assistance and unable to attend to own bodily needs without assistance  ? 4   Severe disability; bedridden, incontinent, and requiring constant nursing care and attention  ? 5      Score:  Initial: 1    Interpretation of Tool: The Modified Yossi Scale is a 7-point scaled used to quantify level of disability as it relates to a patient's functional abilities.      Current Medications:   No current facility-administered medications on file prior to encounter. Current Outpatient Medications on File Prior to Encounter   Medication Sig Dispense Refill    tamsulosin (FLOMAX) 0.4 mg capsule Take 1 Cap by mouth daily. 90 Cap 3    spironolactone (ALDACTONE) 25 mg tablet TAKE 1 TABLET BY MOUTH EVERY DAY 90 Tab 3    busPIRone (BUSPAR) 15 mg tablet Take 1 Tab by mouth daily. 90 Tab 0    metFORMIN (GLUCOPHAGE) 1,000 mg tablet Take 1 Tab by mouth two (2) times daily (with meals). 180 Tab 0    potassium chloride (KLOR-CON M20) 20 mEq tablet TAKE 1 TABLET TWICE A  Tab 3    empagliflozin (JARDIANCE) 10 mg tablet Take 1 Tab by mouth daily. 30 Tab 5    Insulin Needles, Disposable, (1ST TIER UNIFINE PENTIPS) 32 gauge x 5/32\" ndle Use to inject insulin 200 Pen Needle 1    insulin syr/ndl U100 half rashida 0.3 mL 31 gauge x 15/64\" syrg Use to inject insulin 200 Syringe 1    polyethylene glycol (MIRALAX) 17 gram/dose powder Take 17 g by mouth daily. 17 g 5    lancets misc Use to test glucose 4 times/day. Dx: E11.51, I10 150 Each 11    glucose blood VI test strips (FREESTYLE LITE STRIPS) strip Use to check glucose 4 times/day. Dx: E11.51, I10 150 Strip 11    insulin aspart U-100 (NOVOLOG FLEXPEN U-100 INSULIN) 100 unit/mL (3 mL) inpn Inject 8 units standing dose + additional insulin according to sliding scale (up to 16 units) subQ before each meal 15 Adjustable Dose Pre-filled Pen Syringe 3    furosemide (LASIX) 40 mg tablet Take 1 Tab by mouth three (3) times daily. 270 Tab 3    carvedilol (COREG) 25 mg tablet Take 0.5 Tabs by mouth two (2) times daily (with meals). 90 Tab 3    raNITIdine (ZANTAC) 150 mg tablet Take 150 mg by mouth two (2) times a day.  insulin degludec (TRESIBA FLEXTOUCH U-100) 100 unit/mL (3 mL) inpn by SubCUTAneous route.  pantoprazole (PROTONIX) 40 mg tablet Take 1 tablet po 30 minutes before breakfast 90 Tab 3    clopidogrel (PLAVIX) 75 mg tab Take 1 Tab by mouth daily.  90 Tab 3    Insulin Syringe-Needle U-100 (BD INSULIN SYRINGE ULTRA-FINE) 0.3 mL 31 gauge x 5/16\" syrg Use to injection insulin 3 times/day at meals. Dx: E11.40 270 Syringe 3    Lancets misc Use to check glucose 4 times/day as directed. Dx: E11.40 120 Each 11    cloNIDine HCl (CATAPRES) 0.1 mg tablet Take 1 Tab by mouth two (2) times a day. 180 Tab 3    albuterol (VENTOLIN HFA) 90 mcg/actuation inhaler Take 2 Puffs by inhalation every four (4) hours as needed for Wheezing or Shortness of Breath. 1 Inhaler 3    pregabalin (LYRICA) 150 mg capsule Take 150 mg by mouth two (2) times a day. [facility] 1690 Bed  Dx: Mild Diastolic Dysfunction ([facility]-78 I51.9)  Pulmonary Hypertension (ICD-10 I27.2)  Morbid Obesity (ICD-10 E66.01)  Lumbar DDD (ICD-10 M51.36)  Obstructive Sleep Apnea (ICD-10 G47.33) 1 Device 0    loratadine (CLARITIN) 10 mg tablet Take 10 mg by mouth daily.  multivit-min-FA-lycopen-lutein (CENTRUM SILVER) 0.4-300-250 mg-mcg-mcg tab Take  by mouth.  aspirin 81 mg tablet Take 81 mg by mouth daily.  atorvastatin (LIPITOR) 80 mg tablet Take 1 Tab by mouth nightly. 90 Tab 3    ezetimibe (ZETIA) 10 mg tablet Take 1 Tab by mouth nightly. 90 Tab 3    irbesartan (AVAPRO) 150 mg tablet Take 1 Tab by mouth nightly. 90 Tab 3    HYDROcodone-acetaminophen (NORCO)  mg tablet Take 1 Tab by mouth every six (6) hours as needed for Pain. 20 Tab 0       PLAN    FREQUENCY/DURATION: Continue to follow patient 2 times a week for duration of hospital stay to address above goals. - Recommendations for next treatment session: Next treatment will address compensatory strategies      REHABILITATION POTENTIAL FOR STATED GOALS: Excellent         RECOMMENDATIONS   EDUCATION:  · Recommendations discussed with Nursing  · Patient     RECOMMENDATIONS for CONTINUED SPEECH THERAPY: YES: Anticipate need for ongoing speech therapy during this hospitalization. Compliance with Program/Exercises:  Will assess as treatment progresses  Continuation of Skilled Services/Medical Necessity:   Patient is expected to demonstrate progress in expressive communication and cognitive ability to decrease assistance required communication, increase independence with activities of daily living and increase communication with family/caregivers.  Patient continues to require skilled intervention due to mild cognitive/speech deficits.        SAFETY:  After treatment position/precautions:  · RN at bedside  · Call light within reach    Total Treatment Duration:     Time In: 3063  Time Out: [facility], [facility], CCC-SLP

## 2019-09-30 NOTE — PROGRESS NOTES
09/30/19 1923   NIH Stroke Scale   Interval Other (comment)  (Dual NIH with GARY Fontenot)   LOC 0   LOC Questions 0   LOC Commands 0   Best Gaze 0   Visual 0   Facial Palsy 1   Motor Right Arm 0   Motor Left Arm 0   Motor Right Leg 0   Motor Left Leg 0   Limb Ataxia 0   Sensory 0   Best Language 0   Dysarthria 0   Extinction and Inattention 0   Total 1

## 2019-09-30 NOTE — PROGRESS NOTES
Hospitalist Progress Note    2019  Admit Date: 2019  1:59 PM   NAME: George Toro   :  1940   MRN:  [id number]   Attending: Arden Slaughter MD  PCP:  Ibrahima Armenta MD    SUBJECTIVE:   Pt is a 69 y/o M admitted with encephalopathy. Hx CKD3, DM, dCHF, GERD, HTN, prior TIA, MARLEEN, obesity. Neurology saw in ER and felt infectious in etiology. Had fever in ER with elevated WBC. Did complain of some urinary symptoms suprapubic pain, urinary urgency on admission. Recently started on flomax for these symptoms as an outpatient. UA from ED showing negative leukocyte esterace and nitrites. Urine culture never obtained. He has been on rocephin or keflex since admission. Per chart review, he was complaining of RLQ abdominal pain at time of admission.  '  - reports ongoing RLQ abdominal pain. Has been constipated with last reported BM on . He reports he is passing flatus. He is uncertain if he has had appendectomy in the past, but he has remote history of bilateral hernia repairs. - CT abd/pelvis yesterday showed cecal diverticulitis and large amount of stool in colon. His abx changed to cipro/flagyl overnight and made NPO. He reports abdominal pain still present, but less than yesterday. Still constipated despite miralax; does not want enema yet. - reports some improvement in R side pain. Still with no bowel movement. Denies other complaints at present. Has been accepted to 9th floor rehab per Dr. Haider Rahman.       Review of Systems negative with exception of pertinent positives noted above  PHYSICAL EXAM     Visit Vitals  /73 (BP 1 Location: Right arm, BP Patient Position: At rest)   Pulse 84   Temp 98 °F (36.7 °C)   Resp 18   Wt 128.8 kg (284 lb)   SpO2 91%   BMI 45.84 kg/m²      Temp (24hrs), Av °F (36.7 °C), Min:97.4 °F (36.3 °C), Max:98.8 °F (37.1 °C)    Patient Vitals for the past 24 hrs:   Temp Pulse Resp BP SpO2   19 1202 98 °F (36.7 °C) 84 18 127/73 91 % 09/30/19 0759 98 °F (36.7 °C) 85 19 154/79 95 %   09/30/19 0400 98.2 °F (36.8 °C) 79 18 127/75 95 %   09/30/19 0000 98.8 °F (37.1 °C) 87 18 126/89 93 %   09/29/19 2000 97.6 °F (36.4 °C) 83 18 101/61 93 %   09/29/19 1600 97.4 °F (36.3 °C) 77 18 106/68 93 %       Oxygen Therapy  O2 Sat (%): 91 % (09/30/19 1202)  Pulse via Oximetry: 88 beats per minute (09/28/19 2019)  O2 Device: Room air (09/30/19 1238)  O2 Flow Rate (L/min): 0 l/min (09/30/19 1238)    Intake/Output Summary (Last 24 hours) at 9/30/2019 1335  Last data filed at 9/30/2019 0716  Gross per 24 hour   Intake    Output 1450 ml   Net -1450 ml      General: No acute distress, morbidly obese   Lungs:  CTA Bilaterally.    Heart:  Regular rate and rhythm,  No murmur, rub, or gallop  Abdomen: Very obese with difficult exam due to habitus, has RLQ tenderness to palpation  Extremities: No cyanosis, clubbing or edema  Neurologic:  No focal deficits    Recent Results (from the past 24 hour(s))   GLUCOSE, POC    Collection Time: 09/29/19  4:16 PM   Result Value Ref Range    Glucose (POC) 243 (H) 65 - 100 mg/dL   PLEASE READ & DOCUMENT PPD TEST IN 24 HRS    Collection Time: 09/29/19  5:21 PM   Result Value Ref Range    PPD Negative Negative    mm Induration 0 0 - 5 mm   GLUCOSE, POC    Collection Time: 09/29/19  9:37 PM   Result Value Ref Range    Glucose (POC) 263 (H) 65 - 706 mg/dL   METABOLIC PANEL, BASIC    Collection Time: 09/30/19  5:24 AM   Result Value Ref Range    Sodium 141 136 - 145 mmol/L    Potassium 4.1 3.5 - 5.1 mmol/L    Chloride 105 98 - 107 mmol/L    CO2 28 21 - 32 mmol/L    Anion gap 8 7 - 16 mmol/L    Glucose 143 (H) 65 - 100 mg/dL    BUN 26 (H) 8 - 23 MG/DL    Creatinine 1.41 0.8 - 1.5 MG/DL    GFR est AA >60 >60 ml/min/1.73m2    GFR est non-AA 52 (L) >60 ml/min/1.73m2    Calcium 8.7 8.3 - 10.4 MG/DL   GLUCOSE, POC    Collection Time: 09/30/19  7:15 AM   Result Value Ref Range    Glucose (POC) 172 (H) 65 - 100 mg/dL   GLUCOSE, POC    Collection Time: 09/30/19 11:08 AM   Result Value Ref Range    Glucose (POC) 247 (H) 65 - 100 mg/dL     Imaging:    CT ABD PELV W CONT   Final Result   IMPRESSION:   Focal inflammation and wall thickening of the cecum. There are a few scattered   diverticula. Acute cecal diverticulitis is suspected. The appendix is visualized   and normal.            DUPLEX CAROTID BILATERAL   Final Result   IMPRESSION:     DANNIE:  No significant stenosis. LICA:  No significant stenosis. XR CHEST SNGL V   Final Result   IMPRESSION:  Negative for acute change                CT CODE NEURO HEAD WO CONTRAST   Final Result   IMPRESSION:  Negative for acute intracranial abnormality. Chronic changes.                    ASSESSMENT      Hospital Problems as of 9/30/2019 Date Reviewed: 9/18/2019          Codes Class Noted - Resolved POA    * (Principal) Cecal diverticulitis ICD-10-CM: K57.32  ICD-9-CM: 562.11  9/29/2019 - Present Yes        RLQ abdominal pain ICD-10-CM: R10.31  ICD-9-CM: 789.03  9/28/2019 - Present Yes        CKD (chronic kidney disease) stage 3, GFR 30-59 ml/min (HCC) (Chronic) ICD-10-CM: N18.3  ICD-9-CM: 585.3  6/24/2019 - Present Yes        Diabetic neuropathy associated with type 2 diabetes mellitus (HCC) (Chronic) ICD-10-CM: E11.40  ICD-9-CM: 250.60, 357.2  7/18/2017 - Present Yes        Diastolic CHF, chronic (HCC) (Chronic) ICD-10-CM: I50.32  ICD-9-CM: 428.32, 428.0  3/20/2017 - Present Yes        Pulmonary hypertension (HCC) (Chronic) ICD-10-CM: I27.20  ICD-9-CM: 416.8  10/14/2015 - Present Yes        Gastroesophageal reflux disease without esophagitis (Chronic) ICD-10-CM: K21.9  ICD-9-CM: 530.81  4/21/2015 - Present Yes        Hyperlipidemia with target LDL less than 70 (Chronic) ICD-10-CM: E78.5  ICD-9-CM: 272.4  Unknown - Present Yes        Essential hypertension, benign (Chronic) ICD-10-CM: I10  ICD-9-CM: 401.1  1/22/2015 - Present Yes        Type 2 diabetes, uncontrolled, with neuropathy (HCC) (Chronic) ICD-10-CM: E11.40, E11.65  ICD-9-CM: 250.62, 357.2  1/22/2015 - Present Yes        Obstructive sleep apnea of adult (Chronic) ICD-10-CM: G47.33  ICD-9-CM: 327.23  1/22/2015 - Present Yes        Morbid obesity ([facility] (Chronic) ICD-10-CM: E66.01  ICD-9-CM: 278.01  6/27/2011 - Present Yes        RESOLVED: Sepsis ([facility] ICD-10-CM: A41.9  ICD-9-CM: 038.9, 995.91  9/30/2019 - 9/30/2019 Yes        RESOLVED: Hyperkalemia ICD-10-CM: E87.5  ICD-9-CM: 276.7  9/27/2019 - 9/27/2019 Yes        RESOLVED: Acute metabolic encephalopathy RHA-11-AB: G93.41  ICD-9-CM: 348.31  9/25/2019 - 9/27/2019 Yes            Plan:  · No UTI   · Keflex stopped  · Abdominal pain from cecal diverticulitis    · Cecal diverticulitis on CT abd/pelvis  · Continue IV cipro/flagyl through today. · Switch to PO antibiotics tomorrow and treat for total of 10 days.     · Constipation  · Continue miralax and colace  · Will give mag citrate x 1 and dulcolax suppository  · He is very resistant to idea of enema    · Metabolic encephalopathy- resolved    · T2DM  · Blood sugar improved; continue lantus 20 units qHS and SSI  · Hold oral medications; likely discontinue Jardiance on discharge as he feels this is leading to what sounds like jock itch    Dispo- 9th floor rehab on discharge    DVT Prophylaxis: SCDs    Signed By: Mayruri Woodall MD     September 30, 2019

## 2019-09-30 NOTE — PROGRESS NOTES
Physical Therapy Note:    Attempted to see patient for physical therapy treatment session. Patient reports he just got a suppository and would prefer to defer treatment and mobility for now. Will follow and re-attempt as schedule permits/patient available.     Thank you,  Nena Rosario, PT, DPT

## 2019-09-30 NOTE — PROGRESS NOTES
09/30/19 0748   NIH Stroke Scale   Interval Other (comment)   LOC 0   LOC Questions 0   LOC Commands 0   Best Gaze 0   Visual 0   Facial Palsy 1   Motor Right Arm 0   Motor Left Arm 0   Motor Right Leg 0   Motor Left Leg 0   Limb Ataxia 0   Sensory 0   Best Language 0   Dysarthria 0   Extinction and Inattention 0   Total 1

## 2019-09-30 NOTE — PROGRESS NOTES
Problem: Hypertension  Goal: *Blood pressure within specified parameters  Outcome: Progressing Towards Goal  Goal: *Fluid volume balance  Outcome: Progressing Towards Goal  Goal: *Labs within defined limits  Outcome: Progressing Towards Goal     Problem: Patient Education: Go to Patient Education Activity  Goal: Patient/Family Education  Outcome: Progressing Towards Goal     Problem: Diabetes Self-Management  Goal: *Disease process and treatment process  Description  Define diabetes and identify own type of diabetes; list 3 options for treating diabetes. Outcome: Progressing Towards Goal  Goal: *Incorporating nutritional management into lifestyle  Description  Describe effect of type, amount and timing of food on blood glucose; list 3 methods for planning meals. Outcome: Progressing Towards Goal  Goal: *Incorporating physical activity into lifestyle  Description  State effect of exercise on blood glucose levels. Outcome: Progressing Towards Goal  Goal: *Developing strategies to promote health/change behavior  Description  Define the ABC's of diabetes; identify appropriate screenings, schedule and personal plan for screenings. Outcome: Progressing Towards Goal  Goal: *Using medications safely  Description  State effect of diabetes medications on diabetes; name diabetes medication taking, action and side effects. Outcome: Progressing Towards Goal  Goal: *Monitoring blood glucose, interpreting and using results  Description  Identify recommended blood glucose targets  and personal targets. Outcome: Progressing Towards Goal  Goal: *Prevention, detection, treatment of acute complications  Description  List symptoms of hyper- and hypoglycemia; describe how to treat low blood sugar and actions for lowering  high blood glucose level.   Outcome: Progressing Towards Goal  Goal: *Prevention, detection and treatment of chronic complications  Description  Define the natural course of diabetes and describe the relationship of blood glucose levels to long term complications of diabetes. Outcome: Progressing Towards Goal  Goal: *Developing strategies to address psychosocial issues  Description  Describe feelings about living with diabetes; identify support needed and support network  Outcome: Progressing Towards Goal     Problem: Falls - Risk of  Goal: *Absence of Falls  Description  Document Shrutialexis Kerns Fall Risk and appropriate interventions in the flowsheet. Outcome: Progressing Towards Goal  Note:   Fall Risk Interventions:  Mobility Interventions: Bed/chair exit alarm, OT consult for ADLs, Patient to call before getting OOB, PT Consult for mobility concerns, PT Consult for assist device competence    Mentation Interventions: Bed/chair exit alarm    Medication Interventions: Bed/chair exit alarm, Patient to call before getting OOB, Teach patient to arise slowly    Elimination Interventions: Bed/chair exit alarm, Call light in reach, Patient to call for help with toileting needs, Stay With Me (per policy)    History of Falls Interventions: Bed/chair exit alarm         Problem: Patient Education: Go to Patient Education Activity  Goal: Patient/Family Education  Outcome: Progressing Towards Goal     Problem: Pressure Injury - Risk of  Goal: *Prevention of pressure injury  Description  Document Odell Scale and appropriate interventions in the flowsheet.   Outcome: Progressing Towards Goal  Note:   Pressure Injury Interventions:  Sensory Interventions: Assess changes in LOC    Moisture Interventions: Absorbent underpads, Apply protective barrier, creams and emollients    Activity Interventions: Increase time out of bed, Pressure redistribution bed/mattress(bed type), PT/OT evaluation    Mobility Interventions: HOB 30 degrees or less, Pressure redistribution bed/mattress (bed type), PT/OT evaluation    Nutrition Interventions: Document food/fluid/supplement intake    Friction and Shear Interventions: HOB 30 degrees or less, Minimize layers                Problem: Patient Education: Go to Patient Education Activity  Goal: Patient/Family Education  Outcome: Progressing Towards Goal     Problem: Patient Education: Go to Patient Education Activity  Goal: Patient/Family Education  Outcome: Progressing Towards Goal     Problem: Patient Education: Go to Patient Education Activity  Goal: Patient/Family Education  Outcome: Progressing Towards Goal     Problem: TIA/CVA Stroke: Day 2 Until Discharge  Goal: Off Pathway (Use only if patient is Off Pathway)  Outcome: Resolved/Met  Goal: Activity/Safety  Outcome: Resolved/Met  Goal: Diagnostic Test/Procedures  Outcome: Resolved/Met  Goal: Nutrition/Diet  Outcome: Resolved/Met  Goal: Discharge Planning  Outcome: Resolved/Met  Goal: Medications  Outcome: Resolved/Met  Goal: Respiratory  Outcome: Resolved/Met  Goal: Treatments/Interventions/Procedures  Outcome: Resolved/Met  Goal: Psychosocial  Outcome: Resolved/Met  Goal: *Verbalizes anxiety and depression are reduced or absent  Outcome: Resolved/Met  Goal: *Absence of aspiration  Outcome: Resolved/Met  Goal: *Absence of deep venous thrombosis signs and symptoms(Stroke Metric)  Outcome: Resolved/Met  Goal: *Optimal pain control at patient's stated goal  Outcome: Resolved/Met  Goal: *Tolerating diet  Outcome: Resolved/Met  Goal: *Ability to perform ADLs and demonstrates progressive mobility and function  Outcome: Resolved/Met  Goal: *Stroke education continued(Stroke Metric)  Outcome: Resolved/Met     Problem: Heart Failure: Day 2  Goal: Off Pathway (Use only if patient is Off Pathway)  Outcome: Resolved/Met  Goal: Activity/Safety  Outcome: Resolved/Met  Goal: Consults, if ordered  Outcome: Resolved/Met  Goal: Diagnostic Test/Procedures  Outcome: Resolved/Met  Goal: Nutrition/Diet  Outcome: Resolved/Met  Goal: Discharge Planning  Outcome: Resolved/Met  Goal: Medications  Outcome: Resolved/Met  Goal: Respiratory  Outcome: Resolved/Met  Goal: Treatments/Interventions/Procedures  Outcome: Resolved/Met  Goal: Psychosocial  Outcome: Resolved/Met  Goal: *Oxygen saturation within defined limits  Outcome: Resolved/Met  Goal: *Hemodynamically stable  Outcome: Resolved/Met  Goal: *Optimal pain control at patient's stated goal  Outcome: Resolved/Met  Goal: *Anxiety reduced or absent  Outcome: Resolved/Met  Goal: *Demonstrates progressive activity  Outcome: Resolved/Met     Problem: Heart Failure: Day 3  Goal: Off Pathway (Use only if patient is Off Pathway)  Outcome: Resolved/Met  Goal: Activity/Safety  Outcome: Resolved/Met  Goal: Diagnostic Test/Procedures  Outcome: Resolved/Met  Goal: Nutrition/Diet  Outcome: Resolved/Met  Goal: Discharge Planning  Outcome: Resolved/Met  Goal: Medications  Outcome: Resolved/Met  Goal: Respiratory  Outcome: Resolved/Met  Goal: Treatments/Interventions/Procedures  Outcome: Resolved/Met  Goal: Psychosocial  Outcome: Resolved/Met  Goal: *Oxygen saturation within defined limits  Outcome: Resolved/Met  Goal: *Hemodynamically stable  Outcome: Resolved/Met  Goal: *Optimal pain control at patient's stated goal  Outcome: Resolved/Met  Goal: *Anxiety reduced or absent  Outcome: Resolved/Met  Goal: *Demonstrates progressive activity  Outcome: Resolved/Met     Problem: Heart Failure: Day 4  Goal: Off Pathway (Use only if patient is Off Pathway)  Outcome: Resolved/Met  Goal: Activity/Safety  Outcome: Resolved/Met  Goal: Diagnostic Test/Procedures  Outcome: Resolved/Met  Goal: Nutrition/Diet  Outcome: Resolved/Met  Goal: Discharge Planning  Outcome: Resolved/Met  Goal: Medications  Outcome: Resolved/Met  Goal: Respiratory  Outcome: Resolved/Met  Goal: Treatments/Interventions/Procedures  Outcome: Resolved/Met  Goal: Psychosocial  Outcome: Resolved/Met  Goal: *Oxygen saturation within defined limits  Outcome: Resolved/Met  Goal: *Hemodynamically stable  Outcome: Resolved/Met  Goal: *Optimal pain control at patient's stated goal  Outcome: Resolved/Met  Goal: *Anxiety reduced or absent  Outcome: Resolved/Met  Goal: *Demonstrates progressive activity  Outcome: Resolved/Met

## 2019-09-30 NOTE — INTERDISCIPLINARY ROUNDS
Interdisciplinary team rounds were held 9/30/2019 with the following team members: Care Management, Nursing, Occupational Therapy and Physician. Plan of care discussed. See clinical pathway and/or care plan for interventions and desired outcomes.

## 2019-09-30 NOTE — PROGRESS NOTES
Pt has been accepted to [facility] per Dr Leola Espinoza for admission pending being medically ready for 10/1/19    CM will remain available for DC needs.

## 2019-09-30 NOTE — PROGRESS NOTES
SPEECH PATHOLOGY NOTE:    Attempted to see patient, however IV being placed. Will check back at later time/date.            T[address] CCC-SLP

## 2019-10-01 ENCOUNTER — HOSPITAL ENCOUNTER (INPATIENT)
Age: 79
LOS: 8 days | Discharge: HOME | DRG: 071 | End: 2019-10-09
Attending: PHYSICAL MEDICINE & REHABILITATION | Admitting: PHYSICAL MEDICINE & REHABILITATION
Payer: MEDICARE

## 2019-10-01 VITALS
WEIGHT: 285.7 LBS | BODY MASS INDEX: 46.11 KG/M2 | SYSTOLIC BLOOD PRESSURE: 126 MMHG | OXYGEN SATURATION: 97 % | DIASTOLIC BLOOD PRESSURE: 65 MMHG | HEART RATE: 76 BPM | TEMPERATURE: 98.7 F | RESPIRATION RATE: 20 BRPM

## 2019-10-01 DIAGNOSIS — K59.09 CHRONIC CONSTIPATION: Chronic | ICD-10-CM

## 2019-10-01 DIAGNOSIS — I10 ESSENTIAL HYPERTENSION WITH GOAL BLOOD PRESSURE LESS THAN 130/85: Chronic | ICD-10-CM

## 2019-10-01 DIAGNOSIS — K57.32 CECAL DIVERTICULITIS: ICD-10-CM

## 2019-10-01 DIAGNOSIS — I50.32 DIASTOLIC CHF, CHRONIC (HCC): Chronic | ICD-10-CM

## 2019-10-01 DIAGNOSIS — N18.30 CKD (CHRONIC KIDNEY DISEASE) STAGE 3, GFR 30-59 ML/MIN (HCC): Chronic | ICD-10-CM

## 2019-10-01 DIAGNOSIS — I63.9 CEREBROVASCULAR ACCIDENT (CVA), UNSPECIFIED MECHANISM (HCC): ICD-10-CM

## 2019-10-01 DIAGNOSIS — E11.42 DIABETIC POLYNEUROPATHY ASSOCIATED WITH TYPE 2 DIABETES MELLITUS (HCC): Chronic | ICD-10-CM

## 2019-10-01 LAB
ANION GAP SERPL CALC-SCNC: 7 MMOL/L (ref 7–16)
BUN SERPL-MCNC: 22 MG/DL (ref 8–23)
CALCIUM SERPL-MCNC: 8.5 MG/DL (ref 8.3–10.4)
CHLORIDE SERPL-SCNC: 105 MMOL/L (ref 98–107)
CO2 SERPL-SCNC: 30 MMOL/L (ref 21–32)
CREAT SERPL-MCNC: 1.33 MG/DL (ref 0.8–1.5)
GLUCOSE BLD STRIP.AUTO-MCNC: 140 MG/DL (ref 65–100)
GLUCOSE BLD STRIP.AUTO-MCNC: 248 MG/DL (ref 65–100)
GLUCOSE BLD STRIP.AUTO-MCNC: 255 MG/DL (ref 65–100)
GLUCOSE BLD STRIP.AUTO-MCNC: 255 MG/DL (ref 65–100)
GLUCOSE SERPL-MCNC: 161 MG/DL (ref 65–100)
MM INDURATION POC: 0 MM (ref 0–5)
POTASSIUM SERPL-SCNC: 4.1 MMOL/L (ref 3.5–5.1)
PPD POC: NEGATIVE NEGATIVE
SODIUM SERPL-SCNC: 142 MMOL/L (ref 136–145)

## 2019-10-01 PROCEDURE — 94760 N-INVAS EAR/PLS OXIMETRY 1: CPT

## 2019-10-01 PROCEDURE — 74011250636 HC RX REV CODE- 250/636: Performed by: HOSPITALIST

## 2019-10-01 PROCEDURE — 74011250637 HC RX REV CODE- 250/637: Performed by: INTERNAL MEDICINE

## 2019-10-01 PROCEDURE — 77010033678 HC OXYGEN DAILY

## 2019-10-01 PROCEDURE — 74011636637 HC RX REV CODE- 636/637: Performed by: PHYSICAL MEDICINE & REHABILITATION

## 2019-10-01 PROCEDURE — 65310000000 HC RM PRIVATE REHAB

## 2019-10-01 PROCEDURE — 36415 COLL VENOUS BLD VENIPUNCTURE: CPT

## 2019-10-01 PROCEDURE — 97162 PT EVAL MOD COMPLEX 30 MIN: CPT

## 2019-10-01 PROCEDURE — 99223 1ST HOSP IP/OBS HIGH 75: CPT | Performed by: PHYSICAL MEDICINE & REHABILITATION

## 2019-10-01 PROCEDURE — 97530 THERAPEUTIC ACTIVITIES: CPT

## 2019-10-01 PROCEDURE — 80048 BASIC METABOLIC PNL TOTAL CA: CPT

## 2019-10-01 PROCEDURE — 82962 GLUCOSE BLOOD TEST: CPT

## 2019-10-01 PROCEDURE — 94640 AIRWAY INHALATION TREATMENT: CPT

## 2019-10-01 PROCEDURE — 74011250637 HC RX REV CODE- 250/637: Performed by: PHYSICAL MEDICINE & REHABILITATION

## 2019-10-01 PROCEDURE — 74011000250 HC RX REV CODE- 250: Performed by: INTERNAL MEDICINE

## 2019-10-01 PROCEDURE — 97116 GAIT TRAINING THERAPY: CPT

## 2019-10-01 RX ORDER — FACIAL-BODY WIPES
10 EACH TOPICAL DAILY PRN
Status: DISCONTINUED | OUTPATIENT
Start: 2019-10-01 | End: 2019-10-09 | Stop reason: HOSPADM

## 2019-10-01 RX ORDER — PANTOPRAZOLE SODIUM 40 MG/1
40 TABLET, DELAYED RELEASE ORAL
Status: CANCELLED | OUTPATIENT
Start: 2019-10-02

## 2019-10-01 RX ORDER — METRONIDAZOLE 500 MG/100ML
500 INJECTION, SOLUTION INTRAVENOUS EVERY 8 HOURS
Status: CANCELLED | OUTPATIENT
Start: 2019-10-01

## 2019-10-01 RX ORDER — INSULIN LISPRO 100 [IU]/ML
0-10 INJECTION, SOLUTION INTRAVENOUS; SUBCUTANEOUS
Status: DISCONTINUED | OUTPATIENT
Start: 2019-10-01 | End: 2019-10-09 | Stop reason: HOSPADM

## 2019-10-01 RX ORDER — FUROSEMIDE 40 MG/1
40 TABLET ORAL
Status: CANCELLED | OUTPATIENT
Start: 2019-10-01

## 2019-10-01 RX ORDER — CIPROFLOXACIN 500 MG/1
500 TABLET ORAL EVERY 12 HOURS
Qty: 14 TAB | Refills: 0 | Status: SHIPPED
Start: 2019-10-01 | End: 2019-10-09

## 2019-10-01 RX ORDER — TAMSULOSIN HYDROCHLORIDE 0.4 MG/1
0.4 CAPSULE ORAL DAILY
Status: DISCONTINUED | OUTPATIENT
Start: 2019-10-02 | End: 2019-10-09 | Stop reason: HOSPADM

## 2019-10-01 RX ORDER — CLOPIDOGREL BISULFATE 75 MG/1
75 TABLET ORAL DAILY
Status: DISCONTINUED | OUTPATIENT
Start: 2019-10-02 | End: 2019-10-09 | Stop reason: HOSPADM

## 2019-10-01 RX ORDER — METRONIDAZOLE 500 MG/1
500 TABLET ORAL EVERY 12 HOURS
Qty: 14 TAB | Refills: 0 | Status: SHIPPED
Start: 2019-10-01 | End: 2019-10-09

## 2019-10-01 RX ORDER — SODIUM CHLORIDE 0.9 % (FLUSH) 0.9 %
5-40 SYRINGE (ML) INJECTION AS NEEDED
Status: CANCELLED | OUTPATIENT
Start: 2019-10-01

## 2019-10-01 RX ORDER — FACIAL-BODY WIPES
10 EACH TOPICAL
Qty: 10 SUPPOSITORY | Refills: 0 | Status: SHIPPED
Start: 2019-10-01 | End: 2019-11-20

## 2019-10-01 RX ORDER — INSULIN GLARGINE 100 [IU]/ML
20 INJECTION, SOLUTION SUBCUTANEOUS
Status: CANCELLED | OUTPATIENT
Start: 2019-10-01

## 2019-10-01 RX ORDER — POTASSIUM CHLORIDE 20 MEQ/1
20 TABLET, EXTENDED RELEASE ORAL DAILY
Status: CANCELLED | OUTPATIENT
Start: 2019-10-02

## 2019-10-01 RX ORDER — ALBUTEROL SULFATE 0.83 MG/ML
2.5 SOLUTION RESPIRATORY (INHALATION)
Status: DISCONTINUED | OUTPATIENT
Start: 2019-10-01 | End: 2019-10-09 | Stop reason: HOSPADM

## 2019-10-01 RX ORDER — PREGABALIN 150 MG/1
150 CAPSULE ORAL 2 TIMES DAILY
Status: DISCONTINUED | OUTPATIENT
Start: 2019-10-01 | End: 2019-10-09 | Stop reason: HOSPADM

## 2019-10-01 RX ORDER — CIPROFLOXACIN 500 MG/1
500 TABLET ORAL EVERY 12 HOURS
Status: COMPLETED | OUTPATIENT
Start: 2019-10-01 | End: 2019-10-04

## 2019-10-01 RX ORDER — PANTOPRAZOLE SODIUM 40 MG/1
40 TABLET, DELAYED RELEASE ORAL
Status: DISCONTINUED | OUTPATIENT
Start: 2019-10-02 | End: 2019-10-09 | Stop reason: HOSPADM

## 2019-10-01 RX ORDER — ATORVASTATIN CALCIUM 40 MG/1
80 TABLET, FILM COATED ORAL
Status: CANCELLED | OUTPATIENT
Start: 2019-10-01

## 2019-10-01 RX ORDER — EZETIMIBE 10 MG/1
10 TABLET ORAL
Status: CANCELLED | OUTPATIENT
Start: 2019-10-01

## 2019-10-01 RX ORDER — EZETIMIBE 10 MG/1
10 TABLET ORAL
Status: DISCONTINUED | OUTPATIENT
Start: 2019-10-01 | End: 2019-10-09 | Stop reason: HOSPADM

## 2019-10-01 RX ORDER — POLYETHYLENE GLYCOL 3350 17 G/17G
17 POWDER, FOR SOLUTION ORAL DAILY
Status: DISCONTINUED | OUTPATIENT
Start: 2019-10-02 | End: 2019-10-09 | Stop reason: HOSPADM

## 2019-10-01 RX ORDER — ASPIRIN 81 MG/1
81 TABLET ORAL DAILY
Status: CANCELLED | OUTPATIENT
Start: 2019-10-02

## 2019-10-01 RX ORDER — NYSTATIN 100000 [USP'U]/G
POWDER TOPICAL 2 TIMES DAILY
Status: DISCONTINUED | OUTPATIENT
Start: 2019-10-01 | End: 2019-10-09 | Stop reason: HOSPADM

## 2019-10-01 RX ORDER — VALSARTAN 80 MG/1
80 TABLET ORAL DAILY
Status: CANCELLED | OUTPATIENT
Start: 2019-10-02

## 2019-10-01 RX ORDER — METRONIDAZOLE 500 MG/1
500 TABLET ORAL EVERY 12 HOURS
Status: DISCONTINUED | OUTPATIENT
Start: 2019-10-01 | End: 2019-10-01 | Stop reason: HOSPADM

## 2019-10-01 RX ORDER — DOCUSATE SODIUM 100 MG/1
100 CAPSULE, LIQUID FILLED ORAL DAILY
Qty: 60 CAP | Refills: 0 | Status: SHIPPED
Start: 2019-10-02 | End: 2019-10-09

## 2019-10-01 RX ORDER — CIPROFLOXACIN 500 MG/1
500 TABLET ORAL EVERY 12 HOURS
Status: DISCONTINUED | OUTPATIENT
Start: 2019-10-01 | End: 2019-10-01 | Stop reason: HOSPADM

## 2019-10-01 RX ORDER — ASPIRIN 81 MG/1
81 TABLET ORAL DAILY
Status: DISCONTINUED | OUTPATIENT
Start: 2019-10-02 | End: 2019-10-09 | Stop reason: HOSPADM

## 2019-10-01 RX ORDER — METRONIDAZOLE 500 MG/1
500 TABLET ORAL EVERY 12 HOURS
Status: COMPLETED | OUTPATIENT
Start: 2019-10-01 | End: 2019-10-08

## 2019-10-01 RX ORDER — CLOPIDOGREL BISULFATE 75 MG/1
75 TABLET ORAL DAILY
Status: CANCELLED | OUTPATIENT
Start: 2019-10-01

## 2019-10-01 RX ORDER — FUROSEMIDE 20 MG/1
40 TABLET ORAL
Status: DISCONTINUED | OUTPATIENT
Start: 2019-10-01 | End: 2019-10-09 | Stop reason: HOSPADM

## 2019-10-01 RX ORDER — HYDROCODONE BITARTRATE AND ACETAMINOPHEN 7.5; 325 MG/1; MG/1
1 TABLET ORAL
Status: CANCELLED | OUTPATIENT
Start: 2019-10-01

## 2019-10-01 RX ORDER — BUSPIRONE HYDROCHLORIDE 5 MG/1
15 TABLET ORAL DAILY
Status: DISCONTINUED | OUTPATIENT
Start: 2019-10-02 | End: 2019-10-09 | Stop reason: HOSPADM

## 2019-10-01 RX ORDER — TAMSULOSIN HYDROCHLORIDE 0.4 MG/1
0.4 CAPSULE ORAL DAILY
Status: CANCELLED | OUTPATIENT
Start: 2019-10-02

## 2019-10-01 RX ORDER — DOCUSATE SODIUM 100 MG/1
100 CAPSULE, LIQUID FILLED ORAL DAILY
Status: DISCONTINUED | OUTPATIENT
Start: 2019-10-02 | End: 2019-10-09 | Stop reason: HOSPADM

## 2019-10-01 RX ORDER — ATORVASTATIN CALCIUM 40 MG/1
80 TABLET, FILM COATED ORAL
Status: DISCONTINUED | OUTPATIENT
Start: 2019-10-01 | End: 2019-10-09 | Stop reason: HOSPADM

## 2019-10-01 RX ORDER — ACETAMINOPHEN 325 MG/1
650 TABLET ORAL
Status: CANCELLED | OUTPATIENT
Start: 2019-10-01

## 2019-10-01 RX ORDER — NYSTATIN 100000 [USP'U]/G
POWDER TOPICAL 2 TIMES DAILY
Status: CANCELLED | OUTPATIENT
Start: 2019-10-01

## 2019-10-01 RX ORDER — ALBUTEROL SULFATE 0.83 MG/ML
2.5 SOLUTION RESPIRATORY (INHALATION)
Status: CANCELLED | OUTPATIENT
Start: 2019-10-01

## 2019-10-01 RX ORDER — ACETAMINOPHEN 325 MG/1
650 TABLET ORAL
Status: DISCONTINUED | OUTPATIENT
Start: 2019-10-01 | End: 2019-10-09 | Stop reason: HOSPADM

## 2019-10-01 RX ORDER — POTASSIUM CHLORIDE 20 MEQ/1
20 TABLET, EXTENDED RELEASE ORAL DAILY
Status: DISCONTINUED | OUTPATIENT
Start: 2019-10-02 | End: 2019-10-09 | Stop reason: HOSPADM

## 2019-10-01 RX ORDER — SODIUM CHLORIDE 0.9 % (FLUSH) 0.9 %
5-40 SYRINGE (ML) INJECTION AS NEEDED
Status: DISCONTINUED | OUTPATIENT
Start: 2019-10-01 | End: 2019-10-09 | Stop reason: HOSPADM

## 2019-10-01 RX ORDER — INSULIN LISPRO 100 [IU]/ML
INJECTION, SOLUTION INTRAVENOUS; SUBCUTANEOUS
Status: CANCELLED | OUTPATIENT
Start: 2019-10-01

## 2019-10-01 RX ORDER — CIPROFLOXACIN 2 MG/ML
400 INJECTION, SOLUTION INTRAVENOUS EVERY 12 HOURS
Status: CANCELLED | OUTPATIENT
Start: 2019-10-01

## 2019-10-01 RX ORDER — PREGABALIN 75 MG/1
150 CAPSULE ORAL 2 TIMES DAILY
Status: CANCELLED | OUTPATIENT
Start: 2019-10-01

## 2019-10-01 RX ORDER — INSULIN GLARGINE 100 [IU]/ML
20 INJECTION, SOLUTION SUBCUTANEOUS
Status: DISCONTINUED | OUTPATIENT
Start: 2019-10-01 | End: 2019-10-09 | Stop reason: HOSPADM

## 2019-10-01 RX ORDER — VALSARTAN 40 MG/1
80 TABLET ORAL DAILY
Status: DISCONTINUED | OUTPATIENT
Start: 2019-10-02 | End: 2019-10-09 | Stop reason: HOSPADM

## 2019-10-01 RX ORDER — POLYETHYLENE GLYCOL 3350 17 G/17G
17 POWDER, FOR SOLUTION ORAL DAILY
Status: CANCELLED | OUTPATIENT
Start: 2019-10-02

## 2019-10-01 RX ORDER — HYDROCODONE BITARTRATE AND ACETAMINOPHEN 7.5; 325 MG/1; MG/1
1 TABLET ORAL
Status: DISCONTINUED | OUTPATIENT
Start: 2019-10-01 | End: 2019-10-09 | Stop reason: HOSPADM

## 2019-10-01 RX ORDER — FACIAL-BODY WIPES
10 EACH TOPICAL DAILY PRN
Status: CANCELLED | OUTPATIENT
Start: 2019-10-01

## 2019-10-01 RX ADMIN — POLYETHYLENE GLYCOL 3350 17 G: 17 POWDER, FOR SOLUTION ORAL at 08:42

## 2019-10-01 RX ADMIN — TAMSULOSIN HYDROCHLORIDE 0.4 MG: 0.4 CAPSULE ORAL at 08:39

## 2019-10-01 RX ADMIN — LACTULOSE 30 ML: 20 SOLUTION ORAL at 21:42

## 2019-10-01 RX ADMIN — METRONIDAZOLE 500 MG: 500 TABLET ORAL at 10:06

## 2019-10-01 RX ADMIN — INSULIN LISPRO 4 UNITS: 100 INJECTION, SOLUTION INTRAVENOUS; SUBCUTANEOUS at 17:39

## 2019-10-01 RX ADMIN — METRONIDAZOLE 500 MG: 500 TABLET ORAL at 21:29

## 2019-10-01 RX ADMIN — ASPIRIN 81 MG: 81 TABLET ORAL at 08:39

## 2019-10-01 RX ADMIN — BUSPIRONE HYDROCHLORIDE 15 MG: 5 TABLET ORAL at 08:40

## 2019-10-01 RX ADMIN — INSULIN GLARGINE 20 UNITS: 100 INJECTION, SOLUTION SUBCUTANEOUS at 21:29

## 2019-10-01 RX ADMIN — INSULIN LISPRO 4 UNITS: 100 INJECTION, SOLUTION INTRAVENOUS; SUBCUTANEOUS at 21:29

## 2019-10-01 RX ADMIN — NYSTATIN: 100000 POWDER TOPICAL at 08:39

## 2019-10-01 RX ADMIN — PANTOPRAZOLE SODIUM 40 MG: 40 TABLET, DELAYED RELEASE ORAL at 05:49

## 2019-10-01 RX ADMIN — ATORVASTATIN CALCIUM 80 MG: 40 TABLET, FILM COATED ORAL at 21:29

## 2019-10-01 RX ADMIN — EZETIMIBE 10 MG: 10 TABLET ORAL at 21:29

## 2019-10-01 RX ADMIN — CIPROFLOXACIN HYDROCHLORIDE 500 MG: 500 TABLET, FILM COATED ORAL at 10:06

## 2019-10-01 RX ADMIN — Medication 10 ML: at 05:50

## 2019-10-01 RX ADMIN — PREGABALIN 150 MG: 150 CAPSULE ORAL at 17:38

## 2019-10-01 RX ADMIN — DOCUSATE SODIUM 100 MG: 100 CAPSULE, LIQUID FILLED ORAL at 08:49

## 2019-10-01 RX ADMIN — CLOPIDOGREL BISULFATE 75 MG: 75 TABLET ORAL at 09:13

## 2019-10-01 RX ADMIN — HYDROCODONE BITARTRATE AND ACETAMINOPHEN 1 TABLET: 7.5; 325 TABLET ORAL at 21:29

## 2019-10-01 RX ADMIN — CIPROFLOXACIN HYDROCHLORIDE 500 MG: 500 TABLET, FILM COATED ORAL at 21:29

## 2019-10-01 RX ADMIN — METRONIDAZOLE 500 MG: 500 INJECTION, SOLUTION INTRAVENOUS at 01:34

## 2019-10-01 RX ADMIN — PREGABALIN 150 MG: 75 CAPSULE ORAL at 08:41

## 2019-10-01 RX ADMIN — ALBUTEROL SULFATE 2.5 MG: 2.5 SOLUTION RESPIRATORY (INHALATION) at 05:13

## 2019-10-01 RX ADMIN — POTASSIUM CHLORIDE 20 MEQ: 20 TABLET, EXTENDED RELEASE ORAL at 08:39

## 2019-10-01 RX ADMIN — FUROSEMIDE 40 MG: 40 TABLET ORAL at 05:50

## 2019-10-01 RX ADMIN — NYSTATIN: 100000 POWDER TOPICAL at 17:38

## 2019-10-01 RX ADMIN — FUROSEMIDE 40 MG: 20 TABLET ORAL at 17:37

## 2019-10-01 RX ADMIN — VALSARTAN 80 MG: 80 TABLET, FILM COATED ORAL at 08:40

## 2019-10-01 NOTE — PROGRESS NOTES
PHYSICAL THERAPY EXAMINATION  TIME IN [address] [place] [address] [employer] [facility] [address]  Patient Name: Velma Laurent  Patient Age: 70 y.o.   Past Medical History:   Past Medical History:   Diagnosis Date    Cervical stenosis of spine     Chronic kidney disease     Stage 3    Degenerative disc disease, lumbar     Diabetes mellitus type II     uncontrolled-insulin and oral med. 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 ([address]     Pulmonary embolism ([facility] [address] Mar 2014    Bilateral - Completed 6 months on Xarelto    TIA (transient ischemic attack) 11/9/2017    Type 2 diabetes, uncontrolled, with neuropathy ([facility] [address] 1/22/2015       Medical Diagnosis:  other neurologic condition <principal problem not specified>    Precautions at Admission: Other (comment)(fall precautions)    Therapy Diagnosis:   Difficulty with bed mobility  []     Difficulty with functional transfers  []     Difficulty with ambulation  []     Difficulty with stair negotiations  []       Problem List:    Decreased strength B LE  [x]     Decreased strength trunk/core  [x]     Decreased AROM   [x]     Decreased PROM  [x]    Decreased endurance  [x]     Decreased balance sitting  [x]     Decreased balance standing  [x]     Pain   [x]     Slow ambulation velocity  [x]    Decreased coordination  [x]    Decreased safety awareness  []      Functional Limitations:   Decreased independence with bed mobility  [x]     Decreased independence with functional transfers  [x]     Decreased independence with ambulation  [x]     Decreased independence with stair negotiation  [x]       Previous Functional Level: Patient reporting ambulating independently inside his home with a straight cane. Home Environment: Home Environment: Apartment  # Steps to Enter: 4  Rails to Enter: Yes  Hand Rails : Bilateral  Wheelchair Ramp: No  One/Two Story Residence: One story  Living Alone: No  Support Systems: Family member(s)  Patient Expects to be Discharged to[de-identified] Apartment  Current DME Used/Available at Home: Cane, straight, Walker, rolling  Tub or Shower Type: Tub/Shower combination(shower curtain, no grab bars)         Outcome Measures: Vital Signs:patient on room air, resting 02 sat 96% and HR 70, 02 sat 90% and HR 84 after ambulating 175ft  , 02 sat 94% at end of treatment      Pain level:0 to 4 out of 10  Pain location:right groin area  Pain interventions:medication per RN, rest, positioning,    Patient education:PT POC and goals, Bed mobility training,transfer training, gait training, fall precautions, activity pacing, body mechanics. Patient verbalizing understanding and demonstrating  understanding of patient education. Recommend follow up education. Interdisciplinary Communication:spoke with RN regarding functional status.  RN in to complete skin assessment      Cognition: Orientation:A+O x 4  Communication:able to express needs verbally, able to follow MULTI step commands  Social Interaction:cooperating, appropriate with PT, participating, motivated to improve  Problem Solving:nt  Memory:ABLE to recall name, , PLOF, PMH         MMT Initial Asssessment   Right Lower Extremity Left Lower Extremity   Hip Flexion 4+ 4-   Knee Extension 5 4+   Knee Flexion 4+ 4   Ankle Dorsiflexion 5 5   0/5 No palpable muscle contraction  1/5 Palpable muscle contraction, no joint movement  2-/5 Less than full range of motion in gravity eliminated position  2/5 Able to complete full range of motion in gravity eliminated position  2+/5 Able to initiate movement against gravity  3-/5 More than half but not full range of motion against gravity  3/5 Able to complete full range of motion against gravity  3+/5 Completes full range of motion against gravity with minimal resistance  4-/5 Completes full range of motion against gravity with minimal-moderate resistance  4/5 Completes full range of motion against gravity with moderate resistance  4+/5 Completes full range of motion against gravity with moderate-maximum resistance  5/5 Completes full range of motion against gravity with maximum resistance     AROM: bilateral hip flex 90, ABD 15    FIM SCORES Initial Assessment   Bed/Chair/Wheelchair Transfers 3   Wheelchair Mobility 0(NT)   Walking [place] 4   Steps/Stairs 2   PRIMARY MODE OF LOCOMOTION: ambulation  Please see [facility] Interdisciplinary Eval: Coordination/Balance Section for details regarding FIM score description. BED/CHAIR/WHEELCHAIR TRANSFERS Initial Assessment   Rolling Right 5 (Supervision)(using bed rail)   Rolling Left 5 (Supervision)(using bed rail)   Supine to Sit 3 (Moderate assistance)(using bed rail)   Sit to Stand Contact guard assistance   Sit to Supine 3 (Moderate assistance)(using leg  for LLE)   Transfer Assist Score 3   Transfer Type SPT with walker   Comments Increased time and effort to complete bed mobility and transfers. using bed rails to complete bed mobility. Difficulty with static sitting balance bed side and supine to sit due to amount of sag bed frrame allows in mattress with patient's buttock sitting lower then edge of bed frame. Unable to position feet on floor due to bed frame   Car Transfer Not tested(due to patient's BMI, will not fit in rehab car)   Car Type         WHEELCHAIR MOBILITY/MANAGEMENT Initial Assessment   Able to [employer]el     Functional Level 0(NT)   Curbs/ramps assistance required 0 (Not tested)   Wheelchair set up assistance required 0 (Not tested)   Wheelchair management         WALKING INDEPENDENCE Initial Assessment   Assistive device Walker, rolling, Gait belt   Ambulation assistance - level surface 4 (Contact guard assistance)   Distance 175 Feet (ft)   Functional Level 4   Comments slow cont step through gait with decrease in step length and gait speed. Decreased ankle DF at initial contact   Ambulation assistance - unlevel surface 4 (Contact guard assistance)(up/down 10 ft ramp with RW)       STEPS/STAIRS Initial Assessment   Steps/Stairs ambulated 4   Rail Use Both   Functional Level 2   Comments slow single step at a time going up/down steps. Increased time and effort to complete   Curbs/Ramps 4 (Contact guard assistance)(up/down 10 ft ramp with RW)       QUALITY INDICATOR ASSIST COMMENTS   Walk 10 feet 4: Supervision or touching A    Walk 50 feet with 2 turns 4: Supervision or touching A    Walk 150 feet 4: Supervision or touching A    Walk 10 feet on uneven  4: Supervision or touching A    1 step/curb 4: Supervision or touching A Using hand rails   4 steps 4: Supervision or touching A Using hand rails   12 steps Not Tested: Not attempted due to decreased endurance     object 4: Supervision or touching A Using reacher   Wheel 48' w/2 turns Not Tested: Not applicable secondary to pt not completing activity previously    Wheel 150' Not Tested: Not applicable secondary to pt not completing activity previously           PHYSICAL THERAPY PLAN OF CARE    Therapy Diagnosis:   Please see table above    Order received from MD for physical therapy services and chart reviewed. Pt to be seen at least 5 times per week for at least 1.5 hours of physical therapy per day for 1 week  Thank you for the referral.    Maryann Nicholson to care plan for LTGs    Pt would benefit from skilled physical therapy in order to improve independent functional mobility within the home.  Interventions may include range of motion (AROM, PROM B LE/trunk), motor function (B LE/trunk strengthening/coordination), activity tolerance (vitals, oxygen saturation levels), bed mobility training, balance activities, gait training (progressive ambulation program), and functional transfer training. Please see [facility]; Interdisciplinary Eval, Care Plan, and Patient Education for further information regarding physical therapy examination and plan of care. Patient returned to room at end of treatment. Patient supine in bed with head of bed elevated and bed rails up x 2. Needs placed in reach of patient.     Miguel Parra, PT  10/1/2019

## 2019-10-01 NOTE — PROGRESS NOTES
TRANSFER - OUT REPORT:    Verbal report given to Shaye Rodney RN (name) on Glass Rashad  being transferred to Room 904 (unit) for routine progression of care       Report consisted of patients Situation, Background, Assessment and   Recommendations(SBAR). Information from the following report(s) SBAR, Kardex, Procedure Summary, Intake/Output and MAR was reviewed with the receiving nurse. Lines:       Opportunity for questions and clarification was provided.       Patient transported with:   Registered Nurse

## 2019-10-01 NOTE — H&P
HISTORY AND PHYSICAL  IRC       Admit Date: 10/1/2019    Primary Care Physician: Mike Millan MD  Specialty Group:  Neurology, PMR    Chief Complaint : Gait dysfunction secondary to below.   Admit Diagnosis: other neurologic condition   Cecal diverticulitis  Acute metabolic encephalopathy  CKD (chronic kidney disease) stage 3,   Diabetic neuropathy associated with type 2 diabetes mellitus (HCC) (Chronic)  Diastolic CHF, chronic (HCC) (Chronic)  Essential hypertension, benign (Chronic)  Obstructive sleep apnea of adult   Pulmonary hypertension (HCC) (Chronic)  Morbid obesity (HCC) (Chronic  Hyperlipidemia with target LDL less than 70 (Chronic)  constipation  Acute Rehab Dx:  Gait impairment/ gait dysfunction  Debility    deconditioning  Mobility and ambulation deficits  Self Care/ADL deficits    Medical Dx:  Past Medical History:   Diagnosis Date    Cervical stenosis of spine     Chronic kidney disease     Stage 3    Degenerative disc disease, lumbar     Diabetes mellitus type II     uncontrolled-insulin and oral med. 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 (HCC)     R - Moderate, L - Mild    Diastolic congestive heart failure (HCC)     Diverticulosis of colon June 2010    D[facility])     RLE    Extrinsic allergic asthma     GERD (gastroesophageal reflux disease)     Hx of colonic polyps 07/29/2010    Hyperplastic     Hyperlipidemia LDL goal < 70     Hypertension     Obstructive sleep apnea     Non-Compliant with CPAP    Perennial allergic rhinitis with seasonal variation     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 male patient at [address] [place] [address] who was admitted on 10/1/2019  by Jasen Cartagena MD with below mentioned medical history ,is being seen for Physical Medicine and Rehabilitation. Em Patiño who has history of CKD Stage III, IDDM type II, diastolic heart failure, GERD, HTN, HLD, previous TIAs, prior pulmonary embolism, MARLEEN, morbid obesity presented from home to the ER not being able to talk well, grunting and groaning confused. Patient was very slow to answer questions, difficulty finding words, difficulty ambulating. Code S was called in the ER. CT scan of the head was unremarkable. Patient was not given tPA. He was evaluated by neurology with low suspicion for CVA. Patient was admitted with diagnosis of metabolic and cephalopathy. WBC elevated at 11.9 prolactin of 0.8 potassium of 5.6 inseam creatinine of 1.8 which is above baseline. Patient serum ammonia level was 20. CT abdomen due to abdominal pain taken demonstrated focal inflammation in wall thickening of the cecum. There were a few scattered diverticula acute SQL diverticulitis was suspected. Patient is placed on IV anabiotics initially transition to PO anabiotics at admission to [facility]. Patient is continued on medical management for CHF, edema. Patient noted to be severely constipated has not had bowel movements for three days. Patient continued on medical management for diabetes mellitus. Management for hypertension required. Physical therapy was initiated and patient was starting to mobilize. However, Patient shows significant functional deficits due to prolonged immobility and hospitalization. Our service was consulted for rehab needs and we recommended inpatient hospital rehabilitation is reasonable and necessary due to ongoing acute medical issues which have not changed since initial pre-admission evaluation. I reviewed the preadmission screening and have approved for admission to the [facility].  The patient was cleared for transfer to rehab today. Patient continues to have ongoing  medical issues outlined above requiring physician medical supervision and functional deficits which would benefit from continued intensive therapies. Current Level of Function: (evaluated by acute therapy staff, with bed mobility, transfers, balance personally observed post-admission in the IRF setting minutes prior to submission of document) bathing - mod A, lower body dressing - max A, toileting - max A, bed mobility - mod/ max A, transfers- max A, poor balance , ambulation- unable    Prior Level of Function/Work/Activity:  Patient lives with his brother, alone during the day, in a single story apartment with 4 steps to enter. At baseline, patient is modified independent with ADLs, ambulates household level distances with a SPC > RW, and endorse 0 recent falls. States he has not been out of bed during admission.        Past Medical History:   Diagnosis Date    Cervical stenosis of spine     Chronic kidney disease     Stage 3    Degenerative disc disease, lumbar     Diabetes mellitus type II     uncontrolled-insulin and oral med. 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 1/22/2015      Past Surgical History:   Procedure Laterality Date    COLONOSCOPY  07/29/2010    Diverticulosis & Colon/Rectal Polyps - Due 2020    EGD  5/9/2011         HX BUNIONECTOMY Bilateral     HX HERNIA REPAIR Bilateral     Inguinal, Repeat on Both Sides Separately    HX KNEE ARTHROSCOPY  1991    x 3    SINUS SURGERY PROC UNLISTED      TOTAL KNEE ARTHROPLASTY Right 2006     Allergies   Allergen Reactions    Vasotec [Enalapril Maleate] Shortness of Breath and Cough      Family History   Problem Relation Age of Onset    Stroke Mother     Diabetes Mother     Heart Disease Mother     Diabetes Maternal Grandmother     Glaucoma Maternal Grandmother     Stroke Father     Diabetes Father     Diabetes Paternal Aunt     Cancer Paternal Aunt     Diabetes Paternal Uncle     Cancer Paternal Uncle         Colon    Heart Attack Sister 76    Cancer Sister     Prostate Cancer Brother     Heart Disease Sister 48    Prostate Cancer Brother       Social History     Tobacco Use    Smoking status: Never Smoker    Smokeless tobacco: Never Used   Substance Use Topics    Alcohol use: No      Current Facility-Administered Medications   Medication Dose Route Frequency    acetaminophen (TYLENOL) tablet 650 mg  650 mg Oral Q6H PRN    albuterol (PROVENTIL VENTOLIN) nebulizer solution 2.5 mg  2.5 mg Nebulization Q4H PRN    [START ON 10/2/2019] aspirin delayed-release tablet 81 mg  81 mg Oral DAILY    atorvastatin (LIPITOR) tablet 80 mg  80 mg Oral QHS    bisacodyl (DULCOLAX) suppository 10 mg  10 mg Rectal DAILY PRN    [START ON 10/2/2019] busPIRone (BUSPAR) tablet 15 mg  15 mg Oral DAILY    [START ON 10/2/2019] clopidogrel (PLAVIX) tablet 75 mg  75 mg Oral DAILY    ezetimibe (ZETIA) tablet 10 mg  10 mg Oral QHS    furosemide (LASIX) tablet 40 mg  40 mg Oral ACB&D    HYDROcodone-acetaminophen (NORCO) 7.5-325 mg per tablet 1 Tab  1 Tab Oral Q6H PRN    insulin glargine (LANTUS) injection 20 Units  20 Units SubCUTAneous QHS    insulin lispro (HUMALOG) injection 0-10 Units  0-10 Units SubCUTAneous AC&HS    lip protectant (BLISTEX) ointment 1 Each  1 Each Topical PRN    nystatin (MYCOSTATIN) 100,000 unit/gram powder   Topical BID    [START ON 10/2/2019] pantoprazole (PROTONIX) tablet 40 mg  40 mg Oral ACB    [START ON 10/2/2019] polyethylene glycol (MIRALAX) packet 17 g  17 g Oral DAILY    [START ON 10/2/2019] potassium chloride (K-DUR, KLOR-CON) SR tablet 20 mEq  20 mEq Oral DAILY    pregabalin (LYRICA) capsule 150 mg  150 mg Oral BID    [START ON 10/2/2019] tamsulosin (FLOMAX) capsule 0.4 mg  0.4 mg Oral DAILY    [START ON 10/2/2019] valsartan (DIOVAN) tablet 80 mg  80 mg Oral DAILY    sodium chloride (NS) flush 5-40 mL  5-40 mL IntraVENous PRN       Review of Systems: + weakness + fatigue. Denies: fevers, chills, sweats, fatigue, malaise, anorexia, weight loss   Denies: blurry vision, loss of vision, eye pain, photophobia   Denies: hearing loss, ringing in the ears, earache, epistaxis   Denies: chest pain, palpitations, syncope, orthopnea, paroxysmal nocturnal dyspnea, claudication   Denies: dysphagia, odynophagia, nausea, vomiting, diarrhea,  jaundice, melena   Denies: frequency, dysuria, nocturia, urinary incontinence, stones, hematuria   Denies: polydipsia/polyuria, skin changes, temperature intolerance, unexpected weight gain   Denies: back pain, joint pain, joint swelling   Denies: bleeding problems, blood transfusions, bruising, pallor, swollen lymph nodes   Denies: headache, dysarthria, blurred vision, diplopia,seizure       Objective: There were no vitals taken for this visit. Intake and Output:  No intake/output data recorded. Physical Exam:   Psych: Patient was oriented to person, place, and time. Without hallucinations, abnormal affect or abnormal behaviors during the examination.  Patient is not suicidal.   General:   Alert, appears stated age, No acute distress. Obese. HEENT:  Normocephalic, EOMI, facial symmetry  Intact. Oral mucosa pink and moist.  No JVD. Lungs:  CTA Bilaterally,Respiration even and unlabored   No apparent use of accessory muscles for respiration. No nasal alar flaring. Heart:  Regular rate and rhythm, S1, S2, No obvious murmur, click, rub or gallop. Genitourinary: defered   Abdomen:  Soft, distended, non-tender to palpation in all four quadrants. Bowel sounds present. No obvious masses palpated.     Neuromuscular:  PERRL, EOMI  LUE     Shoulder abduction  5- /5              Elbow flexion: 5-  /5               Wrist extension: 5- /5              Finger flexion;   5-/5  RUE    Shoulder abduction: 5- /5                Elbow flexion:  5- /5    Wrist extension: 5- /5   Finger flexion:  5- /5  LLE     Hip flexion:  3 /5              Knee extension:   4/5    Ankle dorsiflexion:  4 /5   Ankle plantarflexion:   4/5        RLE     Hip flexion:  3 /5   Knee extension:  4 /5    Ankle dorsiflexion:  4 /5   Ankle plantarflexion:  4 /5  Sensory - intact     Skin:  Intact, dry, good skin turgor, age related changes present   Edema: 1+ BLE edema             Lab Review:    Recent Results (from the past 72 hour(s))   GLUCOSE, POC    Collection Time: 09/28/19  4:58 PM   Result Value Ref Range    Glucose (POC) 315 (H) 65 - 100 mg/dL   GLUCOSE, POC    Collection Time: 09/28/19  9:17 PM   Result Value Ref Range    Glucose (POC) 240 (H) 65 - 659 mg/dL   METABOLIC PANEL, BASIC    Collection Time: 09/29/19  4:45 AM   Result Value Ref Range    Sodium 141 136 - 145 mmol/L    Potassium 4.3 3.5 - 5.1 mmol/L    Chloride 107 98 - 107 mmol/L    CO2 28 21 - 32 mmol/L    Anion gap 6 (L) 7 - 16 mmol/L    Glucose 148 (H) 65 - 100 mg/dL    BUN 27 (H) 8 - 23 MG/DL    Creatinine 1.44 0.8 - 1.5 MG/DL    GFR est AA >60 >60 ml/min/1.73m2    GFR est non-AA 51 (L) >60 ml/min/1.73m2    Calcium 8.8 8.3 - 10.4 MG/DL   CBC W/O DIFF    Collection Time: 09/29/19  4:45 AM Result Value Ref Range    WBC 7.6 4.3 - 11.1 K/uL    RBC 4.31 4.23 - 5.6 M/uL    HGB 12.3 (L) 13.6 - 17.2 g/dL    HCT 39.6 (L) 41.1 - 50.3 %    MCV 91.9 79.6 - 97.8 FL    MCH 28.5 26.1 - 32.9 PG    MCHC 31.1 (L) 31.4 - 35.0 g/dL    RDW 16.2 (H) 11.9 - 14.6 %    PLATELET 875 467 - 503 K/uL    MPV 10.8 9.4 - 12.3 FL    ABSOLUTE NRBC 0.00 0.0 - 0.2 K/uL   LACTIC ACID    Collection Time: 09/29/19  4:45 AM   Result Value Ref Range    Lactic acid 1.6 0.4 - 2.0 MMOL/L   C REACTIVE PROTEIN, QT    Collection Time: 09/29/19  4:45 AM   Result Value Ref Range    C-Reactive protein 12.7 (H) 0.0 - 0.9 mg/dL   GLUCOSE, POC    Collection Time: 09/29/19  7:43 AM   Result Value Ref Range    Glucose (POC) 148 (H) 65 - 100 mg/dL   GLUCOSE, POC    Collection Time: 09/29/19 12:19 PM   Result Value Ref Range    Glucose (POC) 219 (H) 65 - 100 mg/dL   GLUCOSE, POC    Collection Time: 09/29/19  4:16 PM   Result Value Ref Range    Glucose (POC) 243 (H) 65 - 100 mg/dL   PLEASE READ & DOCUMENT PPD TEST IN 24 HRS    Collection Time: 09/29/19  5:21 PM   Result Value Ref Range    PPD Negative Negative    mm Induration 0 0 - 5 mm   GLUCOSE, POC    Collection Time: 09/29/19  9:37 PM   Result Value Ref Range    Glucose (POC) 263 (H) 65 - 473 mg/dL   METABOLIC PANEL, BASIC    Collection Time: 09/30/19  5:24 AM   Result Value Ref Range    Sodium 141 136 - 145 mmol/L    Potassium 4.1 3.5 - 5.1 mmol/L    Chloride 105 98 - 107 mmol/L    CO2 28 21 - 32 mmol/L    Anion gap 8 7 - 16 mmol/L    Glucose 143 (H) 65 - 100 mg/dL    BUN 26 (H) 8 - 23 MG/DL    Creatinine 1.41 0.8 - 1.5 MG/DL    GFR est AA >60 >60 ml/min/1.73m2    GFR est non-AA 52 (L) >60 ml/min/1.73m2    Calcium 8.7 8.3 - 10.4 MG/DL   GLUCOSE, POC    Collection Time: 09/30/19  7:15 AM   Result Value Ref Range    Glucose (POC) 172 (H) 65 - 100 mg/dL   GLUCOSE, POC    Collection Time: 09/30/19 11:08 AM   Result Value Ref Range    Glucose (POC) 247 (H) 65 - 100 mg/dL   GLUCOSE, POC    Collection Time: 09/30/19  3:28 PM   Result Value Ref Range    Glucose (POC) 251 (H) 65 - 100 mg/dL   PLEASE READ & DOCUMENT PPD TEST IN 72 HRS    Collection Time: 09/30/19  5:00 PM   Result Value Ref Range    PPD Negative Negative    mm Induration 0 0 - 5 mm   GLUCOSE, POC    Collection Time: 09/30/19  8:39 PM   Result Value Ref Range    Glucose (POC) 253 (H) 65 - 068 mg/dL   METABOLIC PANEL, BASIC    Collection Time: 10/01/19  5:29 AM   Result Value Ref Range    Sodium 142 136 - 145 mmol/L    Potassium 4.1 3.5 - 5.1 mmol/L    Chloride 105 98 - 107 mmol/L    CO2 30 21 - 32 mmol/L    Anion gap 7 7 - 16 mmol/L    Glucose 161 (H) 65 - 100 mg/dL    BUN 22 8 - 23 MG/DL    Creatinine 1.33 0.8 - 1.5 MG/DL    GFR est AA >60 >60 ml/min/1.73m2    GFR est non-AA 55 (L) >60 ml/min/1.73m2    Calcium 8.5 8.3 - 10.4 MG/DL   GLUCOSE, POC    Collection Time: 10/01/19  7:17 AM   Result Value Ref Range    Glucose (POC) 140 (H) 65 - 100 mg/dL   GLUCOSE, POC    Collection Time: 10/01/19 11:24 AM   Result Value Ref Range    Glucose (POC) 255 (H) 65 - 100 mg/dL       Functional Assessment:  Bed Mobility:  Rolling: Moderate assistance  Supine to Sit: Maximum assistance  Sit to Supine: Maximum assistance  Scooting: Maximum assistance    Feeding: Supervision  Oral Facial Hygiene/Grooming: Supervision  Bathing: Moderate assistance  Upper Body Dressing: Supervision  Lower Body Dressing: Moderate assistance  Toileting: Minimum assistance      Condition on Admission: Good      Assessment:    The Post Assessment Physician Evaluation (KIEL) found the current functional status to be comparable with the Pre-admission Screening. The Patient is a good candidate for acute inpatient rehabilitation. Nothing since the Pre-admission screen has changed that determination.      Rehabilitation Plan  The patient has shown the ability to tolerate and benefit from 3 hours of therapy daily and is being admitted to a comprehensive acute inpatient rehabilitation program consisting of at least 3 hours of combined physical and occupational therapies. Begin intensive Physical Therapy for a minimum of 1.5 hours a day, at least 5 out of 7 days per week to address bed mobility, transfers, ambulation, strengthening, balance, and endurance. Begin intensive Occupational Therapy for a minimum of 1.5 hours a day, at least 5 out of 7 days per week to address ADL ( bathing, LE dressing, toileting) and adaptive equipment as needed. The patient will also require 24-hour skilled rehabilitation nursing for bowel and bladder management, skin care for decubitus ulcer prevention , pain management and ongoing medication administration     The patient may benefit from a psychology consult for depression, adjustment disorder. Continue ST for: dysarthria, impaired communication skills, vital stimulation for L facial weakness. Continue 24-hour skilled rehabilitation nursing for bowel and bladder management, skin care for decubitus ulcer prevention , pain management and ongoing medication administration     The patient may benefit from a psychology consult for depression, anxiety and adjustment disorder. Continue daily physician medical management:  Cecal diverticulitis  - iv antibiotics transitioned to po Flagyl + po cipro x 7 days. Acute metabolic encephalopathy  - monitor. Continue ST treatment. CKD (chronic kidney disease) stage 3,   - monitor Cr. Steady improvement. CAD/ Diastolic CHF, chronic /Essential hypertension, benign (Chronic)/ Pulmonary hypertension (HCC) (Chronic)  - diovan 80  - lasix 40 bid  - aspirin/ plavix.      Obstructive sleep apnea of adult   - CPAP hs.    Morbid obesity - Chronic  - dietary/ nutrition consult    Hyperlipidemia with target LDL less than 70 (Chronic)      Pneumonia prophylaxis- Insentive spirometer every hour while awake    DVT risk / DVT Prophylaxis- Will require daily physician exam to assess for signs and symptoms as patient is at increased risk for of thromboembolism. Mobilization as tolerated. Intermittent pneumatic compression devices when in bed Thigh-high or knee-high thromboembolic deterrent hose when out of bed. - Lovenox subq daily. Pain Control: diabetic neuropathy. no current joint or muscle symptoms, essentially pain-free. Will require regular pain assessment and comprenhensive pain management,   - lyrica    anxiety   - Buspar     Diabetes mellitus - Uncontrolled. poor glycemic control. Will require daily, close FSG monitoring and medication adjustment to optimize glycemic control in setting of acute illness and hospitalization.   - Diabetic neuropathy associated with type 2 diabetes mellitus (HCC) (Chronic)    Urinary retention/ neurogenic bladder - schedule voids q6-8 hrs. Check post-void residual every shift; In and Out catheterize if post-void residual is more than 400 cc.  - flomax. bowel program - + constipation.   -Miralax, dialy. Colace daily  - dulcolax supp prn    GERD - resume PPI. At times may need additional antacids, Maalox prn. The patient's prognosis for significant practical improvement within a reasonable period of time appears good and the estimated length of stay is 14 days and patient is expected to return home with spouse and continued rehabilitation with home health therapy. Given the patient's complex neurologic/medical condition and the risk of further medical complications including: DVT, PE, skin breakdown, pneumonia due to decreased mobility, diverticulitis, Infection, encephalopathy, TIA/ CVA, MI, cardiac arrhythmias due to HTN  could potentially impact the IRF program. For these ongoing medical issues, rehabilitation services could not be safely provided at a lower level of care such as a skilled nursing facility or nursing home.         Signed By: Kelsey Sarah MD     October 1, 2019

## 2019-10-01 NOTE — PROGRESS NOTES
Pt has been accepted to [facility] for admission today, Matt Mitchell RN notified of dc order and need to call report.

## 2019-10-01 NOTE — PROGRESS NOTES
Problem: Diabetes Self-Management  Goal: *Disease process and treatment process  Description  Define diabetes and identify own type of diabetes; list 3 options for treating diabetes. Outcome: Progressing Towards Goal  Goal: *Incorporating nutritional management into lifestyle  Description  Describe effect of type, amount and timing of food on blood glucose; list 3 methods for planning meals. Outcome: Progressing Towards Goal  Goal: *Incorporating physical activity into lifestyle  Description  State effect of exercise on blood glucose levels. Outcome: Progressing Towards Goal  Goal: *Developing strategies to promote health/change behavior  Description  Define the ABC's of diabetes; identify appropriate screenings, schedule and personal plan for screenings. Outcome: Progressing Towards Goal  Goal: *Using medications safely  Description  State effect of diabetes medications on diabetes; name diabetes medication taking, action and side effects. Outcome: Progressing Towards Goal  Goal: *Monitoring blood glucose, interpreting and using results  Description  Identify recommended blood glucose targets  and personal targets. Outcome: Progressing Towards Goal  Goal: *Prevention, detection, treatment of acute complications  Description  List symptoms of hyper- and hypoglycemia; describe how to treat low blood sugar and actions for lowering  high blood glucose level. Outcome: Progressing Towards Goal  Goal: *Prevention, detection and treatment of chronic complications  Description  Define the natural course of diabetes and describe the relationship of blood glucose levels to long term complications of diabetes.   Outcome: Progressing Towards Goal  Goal: *Developing strategies to address psychosocial issues  Description  Describe feelings about living with diabetes; identify support needed and support network  Outcome: Progressing Towards Goal  Goal: *Insulin pump training  Outcome: Progressing Towards Goal  Goal: *Sick day guidelines  Outcome: Progressing Towards Goal  Goal: *Patient Specific Goal (EDIT GOAL, INSERT TEXT)  Outcome: Progressing Towards Goal     Problem: Patient Education: Go to Patient Education Activity  Goal: Patient/Family Education  Outcome: Progressing Towards Goal     Problem: Inpatient Rehab (Adult)  Goal: *LTG: Avoids injury/falls 100% of time related to deficits  Outcome: Progressing Towards Goal  Goal: *LTG: Avoids infection 100% of time related to deficits  Outcome: Progressing Towards Goal  Goal: *LTG: Verbalize understanding of diagnosis and risk factors for recurring stroke  Outcome: Progressing Towards Goal  Goal: *LTG: Absence of DVT during hospitalization  Outcome: Progressing Towards Goal  Goal: *LTG: Maintains Skin Integrity With No Evidence of Pressure Injury 100% of Time  Outcome: Progressing Towards Goal  Goal: *Nursing Goal (Insert Text)  Outcome: Progressing Towards Goal  Goal: *Nursing Goal (Insert Text)  Outcome: Progressing Towards Goal  Goal: Interventions  Outcome: Progressing Towards Goal     Problem: Falls - Risk of  Goal: *Absence of Falls  Description  Document Shabbir Fall Risk and appropriate interventions in the flowsheet.   Outcome: Progressing Towards Goal  Note:   Fall Risk Interventions:  Mobility Interventions: OT consult for ADLs, Patient to call before getting OOB, PT Consult for mobility concerns, PT Consult for assist device competence, Communicate number of staff needed for ambulation/transfer, Utilize walker, cane, or other assistive device    Mentation Interventions: Adequate sleep, hydration, pain control, Door open when patient unattended, Evaluate medications/consider consulting pharmacy, Eyeglasses and hearing aids, Increase mobility, Update white board    Medication Interventions: Bed/chair exit alarm, Patient to call before getting OOB, Evaluate medications/consider consulting pharmacy    Elimination Interventions: Call light in reach, Patient to call for help with toileting needs    History of Falls Interventions: Evaluate medications/consider consulting pharmacy, Consult care management for discharge planning, Door open when patient unattended         Problem: Patient Education: Go to Patient Education Activity  Goal: Patient/Family Education  Outcome: Progressing Towards Goal

## 2019-10-01 NOTE — PROGRESS NOTES
Problem: Patient Education: Go to Patient Education Activity  Goal: Patient/Family Education  Outcome: Progressing Towards Goal     Problem: Patient Education: Go to Patient Education Activity  Goal: Patient/Family Education  Outcome: Progressing Towards Goal     Problem: Hypertension  Goal: *Blood pressure within specified parameters  Outcome: Progressing Towards Goal  Goal: *Fluid volume balance  Outcome: Progressing Towards Goal  Goal: *Labs within defined limits  Outcome: Progressing Towards Goal     Problem: Patient Education: Go to Patient Education Activity  Goal: Patient/Family Education  Outcome: Progressing Towards Goal     Problem: Diabetes Self-Management  Goal: *Disease process and treatment process  Description  Define diabetes and identify own type of diabetes; list 3 options for treating diabetes. Outcome: Progressing Towards Goal  Goal: *Incorporating nutritional management into lifestyle  Description  Describe effect of type, amount and timing of food on blood glucose; list 3 methods for planning meals. Outcome: Progressing Towards Goal  Goal: *Incorporating physical activity into lifestyle  Description  State effect of exercise on blood glucose levels. Outcome: Progressing Towards Goal  Goal: *Developing strategies to promote health/change behavior  Description  Define the ABC's of diabetes; identify appropriate screenings, schedule and personal plan for screenings. Outcome: Progressing Towards Goal  Goal: *Using medications safely  Description  State effect of diabetes medications on diabetes; name diabetes medication taking, action and side effects. Outcome: Progressing Towards Goal  Goal: *Monitoring blood glucose, interpreting and using results  Description  Identify recommended blood glucose targets  and personal targets.   Outcome: Progressing Towards Goal  Goal: *Prevention, detection, treatment of acute complications  Description  List symptoms of hyper- and hypoglycemia; describe how to treat low blood sugar and actions for lowering  high blood glucose level. Outcome: Progressing Towards Goal  Goal: *Prevention, detection and treatment of chronic complications  Description  Define the natural course of diabetes and describe the relationship of blood glucose levels to long term complications of diabetes. Outcome: Progressing Towards Goal  Goal: *Developing strategies to address psychosocial issues  Description  Describe feelings about living with diabetes; identify support needed and support network  Outcome: Progressing Towards Goal     Problem: Patient Education: Go to Patient Education Activity  Goal: Patient/Family Education  Outcome: Progressing Towards Goal     Problem: Falls - Risk of  Goal: *Absence of Falls  Description  Document [place] Fall Risk and appropriate interventions in the flowsheet. Outcome: Progressing Towards Goal  Note:   Fall Risk Interventions:  Mobility Interventions: Bed/chair exit alarm, OT consult for ADLs, Patient to call before getting OOB, PT Consult for mobility concerns, PT Consult for assist device competence    Mentation Interventions: Bed/chair exit alarm    Medication Interventions: Bed/chair exit alarm, Patient to call before getting OOB, Teach patient to arise slowly    Elimination Interventions: Bed/chair exit alarm, Call light in reach, Patient to call for help with toileting needs, Stay With Me (per policy)    History of Falls Interventions: Bed/chair exit alarm         Problem: Patient Education: Go to Patient Education Activity  Goal: Patient/Family Education  Outcome: Progressing Towards Goal     Problem: Pressure Injury - Risk of  Goal: *Prevention of pressure injury  Description  Document Odell Scale and appropriate interventions in the flowsheet.   Outcome: Progressing Towards Goal  Note:   Pressure Injury Interventions:  Sensory Interventions: Assess changes in LOC    Moisture Interventions: Absorbent underpads, Apply protective barrier, creams and emollients    Activity Interventions: Increase time out of bed, Pressure redistribution bed/mattress(bed type), PT/OT evaluation    Mobility Interventions: HOB 30 degrees or less, Pressure redistribution bed/mattress (bed type), PT/OT evaluation    Nutrition Interventions: Document food/fluid/supplement intake    Friction and Shear Interventions: HOB 30 degrees or less, Minimize layers                Problem: Patient Education: Go to Patient Education Activity  Goal: Patient/Family Education  Outcome: Progressing Towards Goal     Problem: Patient Education: Go to Patient Education Activity  Goal: Patient/Family Education  Outcome: Progressing Towards Goal     Problem: Patient Education: Go to Patient Education Activity  Goal: Patient/Family Education  Outcome: Progressing Towards Goal     Problem: Patient Education: Go to Patient Education Activity  Goal: Patient/Family Education  Outcome: Progressing Towards Goal     Problem: Patient Education: Go to Patient Education Activity  Goal: Patient/Family Education  Outcome: Progressing Towards Goal     Problem: Heart Failure: Day 5  Goal: Off Pathway (Use only if patient is Off Pathway)  Outcome: Resolved/Met  Goal: Activity/Safety  Outcome: Resolved/Met  Goal: Diagnostic Test/Procedures  Outcome: Resolved/Met  Goal: Nutrition/Diet  Outcome: Resolved/Met  Goal: Discharge Planning  Outcome: Resolved/Met  Goal: Medications  Outcome: Resolved/Met  Goal: Respiratory  Outcome: Resolved/Met  Goal: Treatments/Interventions/Procedures  Outcome: Resolved/Met  Goal: Psychosocial  Outcome: Resolved/Met

## 2019-10-01 NOTE — PROGRESS NOTES
10/01/19 7905   NIH Stroke Scale   Interval Other (comment)   LOC 0   LOC Questions 0   LOC Commands 0   Best Gaze 0   Visual 0   Facial Palsy 1   Motor Right Arm 0   Motor Left Arm 0   Motor Right Leg 0   Motor Left Leg 0   Limb Ataxia 0   Sensory 0   Best Language 0   Dysarthria 0   Extinction and Inattention 0   Total 1

## 2019-10-01 NOTE — PROGRESS NOTES
Called to assess patient, SpO2 97% clear breath sounds. Based on assessment patient not in need of tx but patient stated he is \"wheezing\" gave prn tx.

## 2019-10-01 NOTE — PROGRESS NOTES
Attempted to call report for transfer to 9th floor - this RN was told they will call back in 5 minutes. Will continue to monitor.

## 2019-10-01 NOTE — DISCHARGE SUMMARY
Hospitalist Discharge Summary     Patient ID:  Aaron Dos Santos  [id number]  70 y.o.  1940  Admit date: 9/25/2019  1:59 PM  Discharge date and time: 10/1/2019  Attending: Mike Cameron MD  PCP:  Kristin Villegas MD  Treatment Team: Attending Provider: Mike Cameron MD; Utilization Review: Saul Lucas RN; Care Manager: Morales Tello RN; Charge Nurse: Sim Ding; Physical Therapist: Annalee Linares DPT    Principal Diagnosis Cecal diverticulitis   Hospital Problems as of 10/1/2019 Date Reviewed: 9/18/2019          Codes Class Noted - Resolved POA    * (Principal) Cecal diverticulitis ICD-10-CM: X30.91  ICD-9-CM: 562.11  9/29/2019 - Present Yes        RLQ abdominal pain ICD-10-CM: R10.31  ICD-9-CM: 789.03  9/28/2019 - Present Yes        CKD (chronic kidney disease) stage 3, GFR 30-59 ml/min (HCC) (Chronic) ICD-10-CM: N18.3  ICD-9-CM: 585.3  6/24/2019 - Present Yes        Diabetic neuropathy associated with type 2 diabetes mellitus ([facility] (Chronic) ICD-10-CM: E11.40  ICD-9-CM: 250.60, 357.2  7/18/2017 - Present Yes        Diastolic CHF, chronic (HCC) (Chronic) ICD-10-CM: I50.32  ICD-9-CM: 428.32, 428.0  3/20/2017 - Present Yes        Pulmonary hypertension ([facility] (Chronic) ICD-10-CM: I27.20  ICD-9-CM: 416.8  10/14/2015 - Present Yes        Gastroesophageal reflux disease without esophagitis (Chronic) ICD-10-CM: K21.9  ICD-9-CM: 530.81  4/21/2015 - Present Yes        Hyperlipidemia with target LDL less than 70 (Chronic) ICD-10-CM: E78.5  ICD-9-CM: 272.4  Unknown - Present Yes        Essential hypertension, benign (Chronic) ICD-10-CM: I10  ICD-9-CM: 401.1  1/22/2015 - Present Yes        Type 2 diabetes, uncontrolled, with neuropathy ([facility] (Chronic) ICD-10-CM: E11.40, E11.65  ICD-9-CM: 250.62, 357.2  1/22/2015 - Present Yes        Obstructive sleep apnea of adult (Chronic) ICD-10-CM: G47.33  ICD-9-CM: 327.23  1/22/2015 - Present Yes        Morbid obesity ([facility] (Chronic) ICD-10-CM: E66.01  ICD-9-CM: 278.01  6/27/2011 - Present Yes        RESOLVED: Sepsis ([facility] ICD-10-CM: A41.9  ICD-9-CM: 038.9, 995.91  9/30/2019 - 9/30/2019 Yes        RESOLVED: Hyperkalemia ICD-10-CM: E87.5  ICD-9-CM: 276.7  9/27/2019 - 9/27/2019 Yes        RESOLVED: Acute metabolic encephalopathy [facility]-78-LL: G93.41  ICD-9-CM: 348.31  9/25/2019 - 9/27/2019 Yes                 HPI:  '80 yo AAM with past history of CKD Stage III, IDDM type II, diastolic heart failure, GERD, HTN, HLD, previous TIAs, prior pulmonary embolism, MARLEEN, morbid obesity presents from home via EMS after patient was observed to not be talking well over the phone with a friend. Per sister at bedside, Marty Colby started doing this grunting or groaning and he actually did a similar type of spell last week and knocked prune juice all over himself. \" Currently at bedside, patient is very slow to answer some questions and it seems like he is having some difficulty with some word finding. He states he did not feel any differently this morning when he woke up. He does not really have any recollection of what was going on when he was talking on the phone. He says he felt \"terrible\" last night and contributed to \"I think it was something that I ate. \" Currently, he denies fevers/chills, chest pain, shortness of breath. He does have some lower abdominal pain and states, \"I really have to go! \" He has swelling in his lower extremities that is unchanged. He says he saw his urologist last week and he was started on Flomax.    ED Course: upon presentation, code S called and patient taken to [place] [id number] with CT head unremarkable. Patient deemed not a candidate for tPA due to being outside the window of presentation as well as unreliable duration of symptoms. Seen by neurology with low suspicion for stroke and higher concern for metabolic encephalopathy. WBC rise to 11.9. Procalcitonin of 0.8. Troponin negative x1. K of 5.6 and SCr of 1.8 (appears at baseline). Serum ammonia of 20.  CBG of 117. CXR unremarkable. EKG showing NSR with no ST-T wave abnormalities. Hospitalist called for admission.'    Hospital Course:  Please refer to the admission H&P for details of presentation. In summary, the patient is a 69 yo M admitted 9/26 for suspected UTI (not UTI) with lower abdominal pain and confusion. He was started on IV rocephin. He had continued RLQ abdominal pain and CT abd/pelvis on 9/28 showed cecal diverticulitis. Antibiotics changed to IV cipro and flagyl. Abdominal pain improved after conversion though he was still having some degree of RLQ pain on discharge (anticipate pain may last for a week or so). He was evaluated by PT 9th floor inpatient rehab and recommended for admission to rehab unit. He will be discharged in an improved and stable state to get aggressive physical therapy so he can return home safely. Of note, he complained of some jock itch after starting jardiance for diabetes. Recommend stopping this and concentrating on insulin therapy or other oral agents. Significant Diagnostic Studies:       Labs: Results:       Chemistry Recent Labs     10/01/19  0529 09/30/19  0524 09/29/19  0445   * 143* 148*    141 141   K 4.1 4.1 4.3    105 107   CO2 30 28 28   BUN 22 26* 27*   CREA 1.33 1.41 1.44   CA 8.5 8.7 8.8   AGAP 7 8 6*      CBC w/Diff Recent Labs     09/29/19  0445   WBC 7.6   RBC 4.31   HGB 12.3*   HCT 39.6*         Cardiac Enzymes No results for input(s): CPK, CKND1, MARYCHUY in the last 72 hours. No lab exists for component: CKRMB, TROIP   Coagulation No results for input(s): PTP, INR, APTT, INREXT in the last 72 hours.     Lipid Panel Lab Results   Component Value Date/Time    Cholesterol, total 108 09/26/2019 05:50 AM    HDL Cholesterol 55 09/26/2019 05:50 AM    LDL, calculated 41.4 09/26/2019 05:50 AM    VLDL, calculated 11.6 09/26/2019 05:50 AM    Triglyceride 58 09/26/2019 05:50 AM    CHOL/HDL Ratio 2.0 09/26/2019 05:50 AM      BNP No results for input(s): BNPP in the last 72 hours. Liver Enzymes No results for input(s): TP, ALB, TBIL, AP, SGOT, GPT in the last 72 hours. No lab exists for component: DBIL   Thyroid Studies Lab Results   Component Value Date/Time    TSH 0.254 (L) 09/26/2019 05:50 AM          Results     Procedure Component Value Units Date/Time    CULTURE, BLOOD [[id number]] Collected:  09/25/19 2059    Order Status:  Completed Specimen:  Blood Updated:  09/30/19 0908     Special Requests: --        RIGHT  Antecubital       Culture result: NO GROWTH 5 DAYS       CULTURE, BLOOD [[id number]] Collected:  09/25/19 2055    Order Status:  Completed Specimen:  Blood Updated:  09/30/19 0908     Special Requests: --        LEFT  Antecubital       Culture result: NO GROWTH 5 DAYS           Imaging:    CT ABD PELV W CONT   Final Result   IMPRESSION:   Focal inflammation and wall thickening of the cecum. There are a few scattered   diverticula. Acute cecal diverticulitis is suspected. The appendix is visualized   and normal.            DUPLEX CAROTID BILATERAL   Final Result   IMPRESSION:     DANNIE:  No significant stenosis. LICA:  No significant stenosis. XR CHEST SNGL V   Final Result   IMPRESSION:  Negative for acute change                CT CODE NEURO HEAD WO CONTRAST   Final Result   IMPRESSION:  Negative for acute intracranial abnormality. Chronic changes.                  Discharge Exam:  Visit Vitals  /65 (BP 1 Location: Left arm, BP Patient Position: Sitting)   Pulse 76   Temp 98.7 °F (37.1 °C)   Resp 20   Wt 129.6 kg (285 lb 11.2 oz)   SpO2 97%   BMI 46.11 kg/m²     Patient Vitals for the past 24 hrs:   Temp Pulse Resp BP SpO2   10/01/19 0933     97 %   10/01/19 0800 98.7 °F (37.1 °C) 76 20 126/65 99 %   10/01/19 0513     97 %   10/01/19 0400 98.1 °F (36.7 °C) 83 17 144/73 93 %   10/01/19 0000 98.3 °F (36.8 °C) 83 17 119/67 95 %   09/30/19 2237     97 %   09/30/19 2000 97.3 °F (36.3 °C) 84 18 134/80 93 % 09/30/19 1600 98.2 °F (36.8 °C) 93 20 125/70 95 %   09/30/19 1202 98 °F (36.7 °C) 84 18 127/73 91 %       General appearance: alert, cooperative, no distress, appears stated age, morbidly obese  Lungs: clear to auscultation bilaterally  Heart: regular rate and rhythm, S1, S2 normal, no murmur, click, rub or gallop  Abdomen: Very obese abdomen. Mild RLQ tenderness. + bowel sounds  Extremities: no cyanosis or edema  Neurologic: Grossly normal    Disposition: 9th Floor Rehab Unit  Discharge Condition: stable  Patient Instructions:   Current Discharge Medication List      START taking these medications    Details   bisacodyl (DULCOLAX) 10 mg suppository Insert 10 mg into rectum daily as needed (Constipation). Qty: 10 Suppository, Refills: 0      ciprofloxacin HCl (CIPRO) 500 mg tablet Take 1 Tab by mouth every twelve (12) hours for 7 days. Qty: 14 Tab, Refills: 0      docusate sodium (COLACE) 100 mg capsule Take 1 Cap by mouth daily for 90 days. Qty: 60 Cap, Refills: 0      metroNIDAZOLE (FLAGYL) 500 mg tablet Take 1 Tab by mouth every twelve (12) hours for 7 days. Qty: 14 Tab, Refills: 0      cephALEXin (KEFLEX) 500 mg capsule Take 1 Cap by mouth two (2) times a day for 5 days. Indications: Bacterial Urinary Tract Infection  Qty: 10 Cap, Refills: 0      cyanocobalamin (VITAMIN B12) 500 mcg tablet Take 1 Tab by mouth daily. Indications: b12 deficiency  Qty: 30 Tab, Refills: 2         CONTINUE these medications which have NOT CHANGED    Details   tamsulosin (FLOMAX) 0.4 mg capsule Take 1 Cap by mouth daily. Qty: 90 Cap, Refills: 3    Associated Diagnoses: Benign prostatic hyperplasia with urinary frequency      spironolactone (ALDACTONE) 25 mg tablet TAKE 1 TABLET BY MOUTH EVERY DAY  Qty: 90 Tab, Refills: 3      busPIRone (BUSPAR) 15 mg tablet Take 1 Tab by mouth daily.   Qty: 90 Tab, Refills: 0    Associated Diagnoses: Mood disorder ([address]      metFORMIN (GLUCOPHAGE) 1,000 mg tablet Take 1 Tab by mouth two (2) times daily (with meals). Qty: 180 Tab, Refills: 0    Associated Diagnoses: Type 2 diabetes with nephropathy (HCC)      potassium chloride (KLOR-CON M20) 20 mEq tablet TAKE 1 TABLET TWICE A DAY  Qty: 180 Tab, Refills: 3      Insulin Needles, Disposable, (1ST TIER UNIFINE PENTIPS) 32 gauge x 5/32\" ndle Use to inject insulin  Qty: 200 Pen Needle, Refills: 1    Associated Diagnoses: Type 2 diabetes with nephropathy (HCC)      insulin syr/ndl U100 half rashida 0.3 mL 31 gauge x 15/64\" syrg Use to inject insulin  Qty: 200 Syringe, Refills: 1    Associated Diagnoses: Type 2 diabetes with nephropathy (HCC)      polyethylene glycol (MIRALAX) 17 gram/dose powder Take 17 g by mouth daily. Qty: 17 g, Refills: 5      !! lancets misc Use to test glucose 4 times/day. Dx: E11.51, I10  Qty: 150 Each, Refills: 11    Associated Diagnoses: Type 2 diabetes, uncontrolled, with neuropathy (HCC)      glucose blood VI test strips (FREESTYLE LITE STRIPS) strip Use to check glucose 4 times/day. Dx: E11.51, I10  Qty: 150 Strip, Refills: 11    Associated Diagnoses: Type 2 diabetes, uncontrolled, with neuropathy (HCC)      insulin aspart U-100 (NOVOLOG FLEXPEN U-100 INSULIN) 100 unit/mL (3 mL) inpn Inject 8 units standing dose + additional insulin according to sliding scale (up to 16 units) subQ before each meal  Qty: 15 Adjustable Dose Pre-filled Pen Syringe, Refills: 3    Associated Diagnoses: Type 2 diabetes, uncontrolled, with neuropathy (HCC)      furosemide (LASIX) 40 mg tablet Take 1 Tab by mouth three (3) times daily. Qty: 270 Tab, Refills: 3      carvedilol (COREG) 25 mg tablet Take 0.5 Tabs by mouth two (2) times daily (with meals). Qty: 90 Tab, Refills: 3      raNITIdine (ZANTAC) 150 mg tablet Take 150 mg by mouth two (2) times a day. insulin degludec (TRESIBA FLEXTOUCH U-100) 100 unit/mL (3 mL) inpn by SubCUTAneous route.       pantoprazole (PROTONIX) 40 mg tablet Take 1 tablet po 30 minutes before breakfast  Qty: 90 Tab, Refills: 3    Associated Diagnoses: Chronic GERD; Esophageal dysphagia      clopidogrel (PLAVIX) 75 mg tab Take 1 Tab by mouth daily. Qty: 90 Tab, Refills: 3      Insulin Syringe-Needle U-100 (BD INSULIN SYRINGE ULTRA-FINE) 0.3 mL 31 gauge x 5/16\" syrg Use to injection insulin 3 times/day at meals. Dx: E11.40  Qty: 270 Syringe, Refills: 3    Associated Diagnoses: Type 2 diabetes, uncontrolled, with neuropathy ([address]      ! ! Lancets misc Use to check glucose 4 times/day as directed. Dx: E11.40  Qty: 120 Each, Refills: 11    Associated Diagnoses: Type 2 diabetes, uncontrolled, with neuropathy ([facility])      cloNIDine HCl (CATAPRES) 0.1 mg tablet Take 1 Tab by mouth two (2) times a day. Qty: 180 Tab, Refills: 3      albuterol (VENTOLIN HFA) 90 mcg/actuation inhaler Take 2 Puffs by inhalation every four (4) hours as needed for Wheezing or Shortness of Breath. Qty: 1 Inhaler, Refills: 3    Associated Diagnoses: Extrinsic asthma, moderate persistent, uncomplicated      pregabalin (LYRICA) 150 mg capsule Take 150 mg by mouth two (2) times a day. OTHER Semi-Electric Hospital Bed  Dx: Mild Diastolic Dysfunction (RVK-27 I51.9)  Pulmonary Hypertension (ICD-10 I27.2)  Morbid Obesity (ICD-10 E66.01)  Lumbar DDD (ICD-10 M51.36)  Obstructive Sleep Apnea (ICD-10 G47.33)  Qty: 1 Device, Refills: 0      loratadine (CLARITIN) 10 mg tablet Take 10 mg by mouth daily. multivit-min-FA-lycopen-lutein (CENTRUM SILVER) 0.4-300-250 mg-mcg-mcg tab Take  by mouth. aspirin 81 mg tablet Take 81 mg by mouth daily. atorvastatin (LIPITOR) 80 mg tablet Take 1 Tab by mouth nightly. Qty: 90 Tab, Refills: 3    Associated Diagnoses: Hyperlipidemia LDL goal <70      ezetimibe (ZETIA) 10 mg tablet Take 1 Tab by mouth nightly. Qty: 90 Tab, Refills: 3    Associated Diagnoses: Hyperlipidemia LDL goal <70      irbesartan (AVAPRO) 150 mg tablet Take 1 Tab by mouth nightly.   Qty: 90 Tab, Refills: 3      HYDROcodone-acetaminophen ([address])  mg tablet Take 1 Tab by mouth every six (6) hours as needed for Pain. Qty: 20 Tab, Refills: 0       !! - Potential duplicate medications found. Please discuss with provider. STOP taking these medications       empagliflozin (JARDIANCE) 10 mg tablet Comments:   Reason for Stopping:               Activity: PT/OT Eval and Treat  Diet: Diabetic Diet    Follow-up      Follow-up Appointments   Procedures    FOLLOW UP VISIT Appointment in: One Week PCP for follow up     PCP for follow up     Standing Status:   Standing     Number of Occurrences:   1     Order Specific Question:   Appointment in     Answer:    One Week   ·   ·   Time spent to discharge patient 31 minutes  Signed:  Torrie Woo MD  10/1/2019  10:21 AM

## 2019-10-01 NOTE — PROGRESS NOTES
Pt's IV infiltrated again - MD notified pt was unable to receive IV dose of cipro and flagyl. MD stated OK to not have IV access at this time because we are transitioning to oral antibiotics. Will continue to monitor.

## 2019-10-02 ENCOUNTER — PATIENT OUTREACH (OUTPATIENT)
Dept: CASE MANAGEMENT | Age: 79
End: 2019-10-02

## 2019-10-02 LAB
ALBUMIN SERPL-MCNC: 2.9 G/DL (ref 3.2–4.6)
ALBUMIN/GLOB SERPL: 0.6 {RATIO} (ref 1.2–3.5)
ALP SERPL-CCNC: 97 U/L (ref 50–136)
ALT SERPL-CCNC: 27 U/L (ref 12–65)
ANION GAP SERPL CALC-SCNC: 7 MMOL/L (ref 7–16)
AST SERPL-CCNC: 26 U/L (ref 15–37)
BASOPHILS # BLD: 0 K/UL (ref 0–0.2)
BASOPHILS NFR BLD: 1 % (ref 0–2)
BILIRUB SERPL-MCNC: 0.4 MG/DL (ref 0.2–1.1)
BUN SERPL-MCNC: 21 MG/DL (ref 8–23)
CALCIUM SERPL-MCNC: 8.9 MG/DL (ref 8.3–10.4)
CHLORIDE SERPL-SCNC: 105 MMOL/L (ref 98–107)
CO2 SERPL-SCNC: 28 MMOL/L (ref 21–32)
CREAT SERPL-MCNC: 1.33 MG/DL (ref 0.8–1.5)
DIFFERENTIAL METHOD BLD: ABNORMAL
EOSINOPHIL # BLD: 0.3 K/UL (ref 0–0.8)
EOSINOPHIL NFR BLD: 4 % (ref 0.5–7.8)
ERYTHROCYTE [DISTWIDTH] IN BLOOD BY AUTOMATED COUNT: 16.1 % (ref 11.9–14.6)
GLOBULIN SER CALC-MCNC: 4.6 G/DL (ref 2.3–3.5)
GLUCOSE BLD STRIP.AUTO-MCNC: 218 MG/DL (ref 65–100)
GLUCOSE BLD STRIP.AUTO-MCNC: 241 MG/DL (ref 65–100)
GLUCOSE BLD STRIP.AUTO-MCNC: 259 MG/DL (ref 65–100)
GLUCOSE BLD STRIP.AUTO-MCNC: 323 MG/DL (ref 65–100)
GLUCOSE SERPL-MCNC: 216 MG/DL (ref 65–100)
HCT VFR BLD AUTO: 37.3 % (ref 41.1–50.3)
HGB BLD-MCNC: 12 G/DL (ref 13.6–17.2)
IMM GRANULOCYTES # BLD AUTO: 0.1 K/UL (ref 0–0.5)
IMM GRANULOCYTES NFR BLD AUTO: 2 % (ref 0–5)
LYMPHOCYTES # BLD: 2.1 K/UL (ref 0.5–4.6)
LYMPHOCYTES NFR BLD: 31 % (ref 13–44)
MAGNESIUM SERPL-MCNC: 2.3 MG/DL (ref 1.8–2.4)
MCH RBC QN AUTO: 29 PG (ref 26.1–32.9)
MCHC RBC AUTO-ENTMCNC: 32.2 G/DL (ref 31.4–35)
MCV RBC AUTO: 90.1 FL (ref 79.6–97.8)
MONOCYTES # BLD: 0.7 K/UL (ref 0.1–1.3)
MONOCYTES NFR BLD: 11 % (ref 4–12)
NEUTS SEG # BLD: 3.5 K/UL (ref 1.7–8.2)
NEUTS SEG NFR BLD: 52 % (ref 43–78)
NRBC # BLD: 0 K/UL (ref 0–0.2)
PLATELET # BLD AUTO: 228 K/UL (ref 150–450)
PMV BLD AUTO: 11.1 FL (ref 9.4–12.3)
POTASSIUM SERPL-SCNC: 4.1 MMOL/L (ref 3.5–5.1)
PROT SERPL-MCNC: 7.5 G/DL (ref 6.3–8.2)
RBC # BLD AUTO: 4.14 M/UL (ref 4.23–5.6)
SODIUM SERPL-SCNC: 140 MMOL/L (ref 136–145)
WBC # BLD AUTO: 6.8 K/UL (ref 4.3–11.1)

## 2019-10-02 PROCEDURE — 97165 OT EVAL LOW COMPLEX 30 MIN: CPT

## 2019-10-02 PROCEDURE — 97116 GAIT TRAINING THERAPY: CPT

## 2019-10-02 PROCEDURE — 97530 THERAPEUTIC ACTIVITIES: CPT

## 2019-10-02 PROCEDURE — 65310000000 HC RM PRIVATE REHAB

## 2019-10-02 PROCEDURE — 36415 COLL VENOUS BLD VENIPUNCTURE: CPT

## 2019-10-02 PROCEDURE — 99232 SBSQ HOSP IP/OBS MODERATE 35: CPT | Performed by: PHYSICAL MEDICINE & REHABILITATION

## 2019-10-02 PROCEDURE — 83735 ASSAY OF MAGNESIUM: CPT

## 2019-10-02 PROCEDURE — 85025 COMPLETE CBC W/AUTO DIFF WBC: CPT

## 2019-10-02 PROCEDURE — 82962 GLUCOSE BLOOD TEST: CPT

## 2019-10-02 PROCEDURE — 97110 THERAPEUTIC EXERCISES: CPT

## 2019-10-02 PROCEDURE — 92523 SPEECH SOUND LANG COMPREHEN: CPT

## 2019-10-02 PROCEDURE — 92610 EVALUATE SWALLOWING FUNCTION: CPT

## 2019-10-02 PROCEDURE — 80053 COMPREHEN METABOLIC PANEL: CPT

## 2019-10-02 PROCEDURE — 74011636637 HC RX REV CODE- 636/637: Performed by: PHYSICAL MEDICINE & REHABILITATION

## 2019-10-02 PROCEDURE — 74011250637 HC RX REV CODE- 250/637: Performed by: PHYSICAL MEDICINE & REHABILITATION

## 2019-10-02 PROCEDURE — 97535 SELF CARE MNGMENT TRAINING: CPT

## 2019-10-02 RX ORDER — ALBUTEROL SULFATE 0.83 MG/ML
2.5 SOLUTION RESPIRATORY (INHALATION)
Status: DISCONTINUED | OUTPATIENT
Start: 2019-10-02 | End: 2019-10-09 | Stop reason: HOSPADM

## 2019-10-02 RX ADMIN — VALSARTAN 80 MG: 40 TABLET, FILM COATED ORAL at 08:04

## 2019-10-02 RX ADMIN — PREGABALIN 150 MG: 150 CAPSULE ORAL at 08:03

## 2019-10-02 RX ADMIN — METRONIDAZOLE 500 MG: 500 TABLET ORAL at 21:36

## 2019-10-02 RX ADMIN — INSULIN LISPRO 4 UNITS: 100 INJECTION, SOLUTION INTRAVENOUS; SUBCUTANEOUS at 08:03

## 2019-10-02 RX ADMIN — EZETIMIBE 10 MG: 10 TABLET ORAL at 21:37

## 2019-10-02 RX ADMIN — TAMSULOSIN HYDROCHLORIDE 0.4 MG: 0.4 CAPSULE ORAL at 08:03

## 2019-10-02 RX ADMIN — FUROSEMIDE 40 MG: 20 TABLET ORAL at 17:04

## 2019-10-02 RX ADMIN — FUROSEMIDE 40 MG: 20 TABLET ORAL at 08:04

## 2019-10-02 RX ADMIN — BUSPIRONE HYDROCHLORIDE 15 MG: 5 TABLET ORAL at 08:03

## 2019-10-02 RX ADMIN — INSULIN LISPRO 8 UNITS: 100 INJECTION, SOLUTION INTRAVENOUS; SUBCUTANEOUS at 21:37

## 2019-10-02 RX ADMIN — METRONIDAZOLE 500 MG: 500 TABLET ORAL at 08:04

## 2019-10-02 RX ADMIN — INSULIN LISPRO 6 UNITS: 100 INJECTION, SOLUTION INTRAVENOUS; SUBCUTANEOUS at 12:23

## 2019-10-02 RX ADMIN — CIPROFLOXACIN HYDROCHLORIDE 500 MG: 500 TABLET, FILM COATED ORAL at 21:36

## 2019-10-02 RX ADMIN — HYDROCODONE BITARTRATE AND ACETAMINOPHEN 1 TABLET: 7.5; 325 TABLET ORAL at 21:36

## 2019-10-02 RX ADMIN — PANTOPRAZOLE SODIUM 40 MG: 40 TABLET, DELAYED RELEASE ORAL at 06:08

## 2019-10-02 RX ADMIN — INSULIN LISPRO 4 UNITS: 100 INJECTION, SOLUTION INTRAVENOUS; SUBCUTANEOUS at 17:05

## 2019-10-02 RX ADMIN — INSULIN GLARGINE 20 UNITS: 100 INJECTION, SOLUTION SUBCUTANEOUS at 21:37

## 2019-10-02 RX ADMIN — CLOPIDOGREL BISULFATE 75 MG: 75 TABLET ORAL at 08:04

## 2019-10-02 RX ADMIN — NYSTATIN: 100000 POWDER TOPICAL at 08:05

## 2019-10-02 RX ADMIN — DOCUSATE SODIUM 100 MG: 100 CAPSULE, LIQUID FILLED ORAL at 08:04

## 2019-10-02 RX ADMIN — POLYETHYLENE GLYCOL 3350 17 G: 17 POWDER, FOR SOLUTION ORAL at 08:05

## 2019-10-02 RX ADMIN — CIPROFLOXACIN HYDROCHLORIDE 500 MG: 500 TABLET, FILM COATED ORAL at 08:04

## 2019-10-02 RX ADMIN — ATORVASTATIN CALCIUM 80 MG: 40 TABLET, FILM COATED ORAL at 21:36

## 2019-10-02 RX ADMIN — PREGABALIN 150 MG: 150 CAPSULE ORAL at 17:05

## 2019-10-02 RX ADMIN — ASPIRIN 81 MG: 81 TABLET ORAL at 08:04

## 2019-10-02 RX ADMIN — POTASSIUM CHLORIDE 20 MEQ: 20 TABLET, EXTENDED RELEASE ORAL at 08:05

## 2019-10-02 NOTE — PROGRESS NOTES
PHYSICAL THERAPY DAILY NOTE  Time In: 6108  Time Out: 9488  Patient Seen For: PM;Gait training;Patient education; Therapeutic exercise;Transfer training; Other (see progress notes)    Subjective: patient reporting he is tired from AM therapy. Reports at this time he does not need a breathing treatment. Asking about cleanliness of shower and asking if he needs to take a shower everyday. Discussed with patient plan to discuss with OT tomorrow regarding bathing schedule         Objective:Vital Signs:on room air. 02 sat 90% after ambulating 200ft. 02 sat 94% at end of treatment  Patient Vitals for the past 12 hrs:   Temp Pulse Resp BP SpO2   10/02/19 0817 98.2 °F (36.8 °C) 99 14 125/70 93 %     Pain level:No c/o pain during treatment  Pain location:NA  Pain interventions:NA    Patient education:transfer training, gait training, fall precautions, activity pacing, energy conservation,body mechanics, Patient verbalizing understanding and demonstrating understanding of patient education. Recommend follow up education.     Interdisciplinary Communication:spoke with RN regarding 02 sat readings    Other (comment)(fall precautions)  GROSS ASSESSMENT Daily Assessment     NA       COGNITION Daily Assessment    Orientation:A+O x 4  Communication:able to express needs verbally, able to follow multi step commands  Social Interaction:cooperating, appropriate with PT, participating, motivated to improve  Problem Solving:difficulty with managing body mechanics during functional mobility due to generalized weakness  Memory: cues to recall body mechanics with transfers       BED/MAT MOBILITY Daily Assessment    Rolling Right : 0 (Not tested)  Rolling Left : 0 (Not tested)  Supine to Sit : 0 (Not tested)  Sit to Supine : 0 (Not tested)       TRANSFERS Daily Assessment   Increased time and effort to complete with cues for body mechanics   Transfer Type: SPT with walker  Transfer Assistance : 5 (Stand-by assistance)  Sit to Stand Assistance: Stand-by assistance  Car Transfers: Not tested       GAIT Daily Assessment    Amount of Assistance: 5 (Stand-by assistance)  Distance (ft): 200 Feet (ft)(200ft x 1   100ft x 1)  Assistive Device: Walker, rolling;Gait belt   Gait training with one time cue for posture correction and cues to control gait speed with RR    Gait training up/down 10 ft ramp with RW and CGA. STEPS or STAIRS Daily Assessment   Slow single step at a time going up/down steps leading up with RLE and down with LLE. Increased time and effort to complete with 3 min sitting rest break after going up steps Steps/Stairs Ambulated (#): 4  Level of Assist : 4 (Contact guard assistance)  Rail Use: Both       BALANCE Daily Assessment    Sitting - Static: Good (unsupported)  Sitting - Dynamic: Good (unsupported)  Standing - Static: Fair  Standing - Dynamic : Impaired       WHEELCHAIR MOBILITY Daily Assessment    Curbs/Ramps Assist Required (FIM Score): 0 (Not tested)  Wheelchair Setup Assist Required : 0 (Not tested)       LOWER EXTREMITY EXERCISES Daily Assessment    Extremity: Both  Exercise Type #1: Other (comment)(LE motomed x 6 mins at level 2)  Level of Assist: Supervision          Assessment: Progressing towards goals. As gait balance and pattern improves with attempt to progress to straight cane. Patient returned to room at end of treatment and remained up in recliner with LEs elevated and with needs in reach. Plan of Care: Continue with POC and progress as tolerated.      Joana Centeno, PT  10/2/2019

## 2019-10-02 NOTE — PROGRESS NOTES
[facility] OCCUPATIONAL THERAPY INITIAL EVALUATION    Time In: 9950  Time Out: 0957    Precautions: Falls    Pain: Patient had no complaint of pain. History of Presenting Illness (per previous reports): Errol Blakely who has history of CKD Stage III, IDDM type II, diastolic heart failure, GERD, HTN, HLD, previous TIAs, prior pulmonary embolism, MARLEEN, morbid obesity presented from home to the ER not being able to talk well, grunting and groaning confused. Patient was very slow to answer questions, difficulty finding words, difficulty ambulating. Code S was called in the ER. CT scan of the head was unremarkable. Patient was not given tPA. He was evaluated by neurology with low suspicion for CVA. Patient was admitted with diagnosis of metabolic and cephalopathy. WBC elevated at 11.9 prolactin of 0.8 potassium of 5.6 inseam creatinine of 1.8 which is above baseline. Patient serum ammonia level was 20. CT abdomen due to abdominal pain taken demonstrated focal inflammation in wall thickening of the cecum. There were a few scattered diverticula acute SQL diverticulitis was suspected. Patient is placed on IV anabiotics initially transition to PO anabiotics at admission to [facility]. Patient is continued on medical management for CHF, edema. Patient noted to be severely constipated has not had bowel movements for three days. Patient continued on medical management for diabetes mellitus. Management for hypertension required.     Past Medical History/ Co-morbidities:   Past Medical History:   Diagnosis Date    Cervical stenosis of spine     Chronic kidney disease     Stage 3    Degenerative disc disease, lumbar     Diabetes mellitus type II     uncontrolled-insulin and oral med. 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 (HCC)     R - Moderate, L - Mild    Diastolic congestive heart failure (HCC)     Diverticulosis of colon June 2010  DVT (deep venous thrombosis) (HCC)     RLE    Extrinsic allergic asthma     GERD (gastroesophageal reflux disease)     Hx of colonic polyps 07/29/2010    Hyperplastic     Hyperlipidemia LDL goal < 79     Hypertension     Obstructive sleep apnea     Non-Compliant with CPAP    Perennial allergic rhinitis with seasonal variation     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 ([facility] [address]     Pulmonary embolism ([facility] [address] Mar 2014    Bilateral - Completed 6 months on Xarelto    TIA (transient ischemic attack) 11/9/2017    Type 2 diabetes, uncontrolled, with neuropathy ([facility] [address] 1/22/2015       Patient's Goal: do the therapy where I can get out of here and go home before Dr. Lazarus Morin kicks me out    Previous Level of Function: Pt was independent with self care using a SPC, but admits putting on socks and shoes was hard. Pt does not drive, but manages medications. [facility]   Lives Alone No   Support Systems Family member(s)   Shower Situation Tub/Shower combination   Current DME Cane, straight, Walker, rollator, Walker, rolling, Hospital bed   Stairs to Enter 4      Rails Bilateral      W/C Ramp No     Functional Mobility   Performance   Sitting: Static Normal    Sitting: Dynamic Normal   Standing: Static Fair   Standing: Dynamic Poor (constant support)   Sit to Stand Assistance Contact guard assistance   Transfer Type SPT without device     Activities of Daily Living    Score Comments   Oral Hyigene 6: Independent I   Bathing 3: Partial/Moderate A Type of Shower: Shower  Position: Supported sitting and Standing PRN   Adaptive  Equipment: Tub Transfer Bench and Grab Bars  Comments: A for buttocks   Upper Body  Dressing 5: S/U or clean-up assist Items Applied: Pullover  Position: Supported Sitting  Comments: S/U   Lower Body Dressing 4: Supervision or touching A Items Applied: Underwear and Elastic Pants  Position: Supported sitting and Standing PRN  Adaptive Equipment: RW  Comments: S in standing   Donning/[place] Footwear 3: Partial/Moderate A Items Applied: Socks and Slip-on Shoes  Adaptive Equipment: N/A  Comments: A for B socks     Vision/Perception: No changes from pre-morbid function where patient wore glasses. Session: Pt was in recliner and agreeable to tx. Pt's performance with ADL is reflected in above chart. Pt required increased time, but reported he was close to baseline. Pt required multiple rest breaks. Interdisciplinary Communication: PT Woody Abraham on d/c     Patient/Family Education: Patient was/were educated On the role of OT, On POC and On IRC expectations. Problem List: Activity Tolerance, Safety Awareness and Standing Balance    Functional Limitations: ADL, IADL, Functional Transfers and Functional Mobility    Goals: Please see Care Plan    OT order received and chart reviewed. OT orders have been acknowledged. Patient will benefit from skilled OT services to address ADL, functional transfers, balance and activity tolerance to maximize functional performance with daily self-care tasks and functional mobility. Treatment is likely to include ADL, Balance, Activity tolerance, DME and AE training to increase independence with self-care. Patient will be seen for 1.5-2 hours of skilled OT services 5-6 days a week as appropriate. Initate POC.      Marnell Lundborg OTR/EULALIA  10/2/2019

## 2019-10-02 NOTE — PROGRESS NOTES
CASE MANAGEMENT ASSESSMENT      AGE:   70            DIAGNOSIS:    Neurologic Condition                     DPOA/ LIVING WILL:    None           PCP? LAST VISIT?  last seen in September    FAMILY ASSESSMENT:     no children  CURRENT FAMILY SUPPORT:    Sisters Sabino Morales [phone], Zelda Villagoemz, and brother Rochelle Mayo:    Patient lives with brother Jordan Vyas only available in the evening. Spokesperson for the patient is Sabino Morales [phone]            HOUSE/ APARTMENT/ TRAILER:    Apartment ground level   TUB-SHOWER COMBO OR WALK IN SHOWER:    Tub/shower combination (needs transfer bench and shower chair) Patient fell in the bathroom before and is afraid to shower alone         STEPS TO ENTER HOME:    4  INTERIOR STEPS:   None    PRIOR FUNCTION:    Independent with straight cane          ADL'S:    Needs assistance  DRIVING:    Has not driven since 0146'V    DME:  Straight cane, walker  PROVIDER FOR OXYGEN/ NEBULIZER:    None  AIDES/ SITTERS:    None    HOME HEALTH AGENCY PRIOR:    None  HOME HEALTH AGENCY DESIRED AT DISCHARGE IF NEEDED:    Patient agrees with [facility] [place] at discharge    :    None    SPIRITUAL ASSESSMENT:    [facility]:   Retired RN since 2005  FINANCIAL ASSESSMENT:   group home and social security  ARE [address]:    Medicare Part D    PRIMARY CAREGIVER ABILITY:    Brother Yuni Looney in the evening only  LEARNING CAPABILITY WITH PREFERRED METHOD OF LEARNING AND COMMUNITY REINTEGRATION POTENTIAL:    good  HOBBIES:  Travel and dancing    Patient requested information on living will and DPOA, called [facility] they will see the patient today at 1:00pm.  Patient request information on transportation, cm will provide information to patient.       CM to follow patient     Care Management Interventions  PCP Verified by CM: Yes( last seen in Sept 2019)  Mode of Transport at Discharge: Self  Transition of Care Consult (CM Consult): Discharge Planning  Physical Therapy Consult: Yes  Occupational Therapy Consult: Yes  Speech Therapy Consult: Yes  Current Support Network: Relative's Home, Own Home(Sisters:   Samson Coffin and Griselda Spar [phone] Brother:  Jorgito Orozco )  Confirm Follow Up Transport: Family  Plan discussed with Pt/Family/Caregiver: Yes  Freedom of Choice Offered: Yes   Resource Information Provided?: No  Discharge Location  Discharge Placement: Unable to determine at this time

## 2019-10-02 NOTE — PROGRESS NOTES
[facility] PROGRESS NOTE    Quan Solorzano  Admit Date: 10/1/2019  Admit Diagnosis: other neurologic condition    Subjective     Patient seen and examined. Vss. Patient reported to have mild SOB, mild transient desats during PT. Patient states he does take albuterol inhaler as needed at home. PT, OT otherwise well tolerated. Steady gains made. No new barrier to progress noted.      Objective:     Current Facility-Administered Medications   Medication Dose Route Frequency    acetaminophen (TYLENOL) tablet 650 mg  650 mg Oral Q6H PRN    albuterol (PROVENTIL VENTOLIN) nebulizer solution 2.5 mg  2.5 mg Nebulization Q4H PRN    aspirin delayed-release tablet 81 mg  81 mg Oral DAILY    atorvastatin (LIPITOR) tablet 80 mg  80 mg Oral QHS    bisacodyl (DULCOLAX) suppository 10 mg  10 mg Rectal DAILY PRN    busPIRone (BUSPAR) tablet 15 mg  15 mg Oral DAILY    clopidogrel (PLAVIX) tablet 75 mg  75 mg Oral DAILY    ezetimibe (ZETIA) tablet 10 mg  10 mg Oral QHS    furosemide (LASIX) tablet 40 mg  40 mg Oral ACB&D    HYDROcodone-acetaminophen (NORCO) 7.5-325 mg per tablet 1 Tab  1 Tab Oral Q6H PRN    insulin glargine (LANTUS) injection 20 Units  20 Units SubCUTAneous QHS    insulin lispro (HUMALOG) injection 0-10 Units  0-10 Units SubCUTAneous AC&HS    lip protectant (BLISTEX) ointment 1 Each  1 Each Topical PRN    nystatin (MYCOSTATIN) 100,000 unit/gram powder   Topical BID    pantoprazole (PROTONIX) tablet 40 mg  40 mg Oral ACB    polyethylene glycol (MIRALAX) packet 17 g  17 g Oral DAILY    potassium chloride (K-DUR, KLOR-CON) SR tablet 20 mEq  20 mEq Oral DAILY    pregabalin (LYRICA) capsule 150 mg  150 mg Oral BID    tamsulosin (FLOMAX) capsule 0.4 mg  0.4 mg Oral DAILY    valsartan (DIOVAN) tablet 80 mg  80 mg Oral DAILY    sodium chloride (NS) flush 5-40 mL  5-40 mL IntraVENous PRN    ciprofloxacin HCl (CIPRO) tablet 500 mg  500 mg Oral Q12H    metroNIDAZOLE (FLAGYL) tablet 500 mg  500 mg Oral Q12H    docusate sodium (COLACE) capsule 100 mg  100 mg Oral DAILY    lactulose (CHRONULAC) 10 gram/15 mL solution 30 mL  30 mL Oral DAILY PRN     Review of Systems:Denies chest pain, shortness of breath, cough, headache, visual problems, abdominal pain, dysurea, calf pain. Pertinent positives are as noted in the medical records and unremarkable otherwise. Visit Vitals  /70   Pulse 99   Temp 98.2 °F (36.8 °C)   Resp 14   Wt 274 lb 6.4 oz (124.5 kg)   SpO2 93%   BMI 44.29 kg/m²        Physical Exam:   General: Alert and age appropriately oriented. No acute cardio respiratory distress. HEENT: Normocephalic,no scleral icterus   Oral mucosa moist without cyanosis, no JVD   Lungs: Clear to auscultation  bilaterally. Respiration even and unlabored   Heart: Regular rate and rhythm, S1, S2   No  murmurs, clicks, rub or gallops   Abdomen: Soft, non-tender, nondistended. Bowel sounds present. No organomegaly. Genitourinary: Benign .    Neuromuscular:      PERRL, EOMI  LUE     Shoulder abduction  5- /5              Elbow flexion: 5-  /5               Wrist extension: 5- /5              Finger flexion;   5-/5  RUE    Shoulder abduction: 5- /5                Elbow flexion:  5- /5               Wrist extension: 5- /5              Finger flexion:  5- /5  LLE     Hip flexion:  3 /5              Knee extension:   4/5               Ankle dorsiflexion:  4 /5              Ankle plantarflexion:   4/5                                         RLE     Hip flexion:  3 /5              Knee extension:  4 /5               Ankle dorsiflexion:  4 /5              Ankle plantarflexion:  4 /5  Sensory - intact      Skin/extremity: ntact, dry, good skin turgor, age related changes present   Edema: 1+ BLE edema                                                                            Functional Assessment:          Balance  Sitting - Static: Normal  (10/02/19 1300)  Sitting - Dynamic: Normal (10/02/19 1300)  Standing - Static: Fair (10/02/19 1300)                     Myriam Gomes Fall Risk Assessment:  Myriam Gomes Fall Risk  Mobility: Ambulates or transfers with assist devices or assistance (10/02/19 0730)  Mobility Interventions: OT consult for ADLs; Patient to call before getting OOB;Utilize walker, cane, or other assistive device (10/02/19 0730)  Mentation: Alert, oriented x 3 (10/02/19 0730)  Mentation Interventions: Adequate sleep, hydration, pain control;Door open when patient unattended;More frequent rounding; Toileting rounds (10/02/19 0730)  Medication: Patient receiving anticonvulsants, sedatives(tranquilizers), psychotropics or hypnotics, hypoglycemics, narcotics, sleep aids, antihypertensives, laxatives, or diuretics (10/02/19 0730)  Medication Interventions: Patient to call before getting OOB (10/02/19 0730)  Elimination: Needs assistance with toileting (10/02/19 0730)  Elimination Interventions: Call light in reach; Patient to call for help with toileting needs;Urinal in reach (10/02/19 0730)  Prior Fall History: No (10/02/19 0730)  History of Falls Interventions: Door open when patient unattended (10/02/19 0730)  Total Score: 3 (10/02/19 0730)  Standard Fall Precautions: Yes (10/01/19 1140)  High Fall Risk: Yes (10/02/19 0730)     Speech Assessment:  Aspiration Signs/Symptoms: None (10/02/19 1221)      Ambulation:  Gait  Distance (ft): 175 Feet (ft) (10/01/19 1700)  Assistive Device: Dave Nettle, rolling;Gait belt (10/01/19 1700)  Rail Use: Both (10/01/19 1700)     Labs/Studies:  Recent Results (from the past 72 hour(s))   GLUCOSE, POC    Collection Time: 09/29/19  4:16 PM   Result Value Ref Range    Glucose (POC) 243 (H) 65 - 100 mg/dL   PLEASE READ & DOCUMENT PPD TEST IN 24 HRS    Collection Time: 09/29/19  5:21 PM   Result Value Ref Range    PPD Negative Negative    mm Induration 0 0 - 5 mm   GLUCOSE, POC    Collection Time: 09/29/19  9:37 PM   Result Value Ref Range    Glucose (POC) 263 (H) 65 - 593 mg/dL   METABOLIC PANEL, BASIC    Collection Time: 09/30/19  5:24 AM   Result Value Ref Range    Sodium 141 136 - 145 mmol/L    Potassium 4.1 3.5 - 5.1 mmol/L    Chloride 105 98 - 107 mmol/L    CO2 28 21 - 32 mmol/L    Anion gap 8 7 - 16 mmol/L    Glucose 143 (H) 65 - 100 mg/dL    BUN 26 (H) 8 - 23 MG/DL    Creatinine 1.41 0.8 - 1.5 MG/DL    GFR est AA >60 >60 ml/min/1.73m2    GFR est non-AA 52 (L) >60 ml/min/1.73m2    Calcium 8.7 8.3 - 10.4 MG/DL   GLUCOSE, POC    Collection Time: 09/30/19  7:15 AM   Result Value Ref Range    Glucose (POC) 172 (H) 65 - 100 mg/dL   GLUCOSE, POC    Collection Time: 09/30/19 11:08 AM   Result Value Ref Range    Glucose (POC) 247 (H) 65 - 100 mg/dL   GLUCOSE, POC    Collection Time: 09/30/19  3:28 PM   Result Value Ref Range    Glucose (POC) 251 (H) 65 - 100 mg/dL   PLEASE READ & DOCUMENT PPD TEST IN 72 HRS    Collection Time: 09/30/19  5:00 PM   Result Value Ref Range    PPD Negative Negative    mm Induration 0 0 - 5 mm   GLUCOSE, POC    Collection Time: 09/30/19  8:39 PM   Result Value Ref Range    Glucose (POC) 253 (H) 65 - 702 mg/dL   METABOLIC PANEL, BASIC    Collection Time: 10/01/19  5:29 AM   Result Value Ref Range    Sodium 142 136 - 145 mmol/L    Potassium 4.1 3.5 - 5.1 mmol/L    Chloride 105 98 - 107 mmol/L    CO2 30 21 - 32 mmol/L    Anion gap 7 7 - 16 mmol/L    Glucose 161 (H) 65 - 100 mg/dL    BUN 22 8 - 23 MG/DL    Creatinine 1.33 0.8 - 1.5 MG/DL    GFR est AA >60 >60 ml/min/1.73m2    GFR est non-AA 55 (L) >60 ml/min/1.73m2    Calcium 8.5 8.3 - 10.4 MG/DL   GLUCOSE, POC    Collection Time: 10/01/19  7:17 AM   Result Value Ref Range    Glucose (POC) 140 (H) 65 - 100 mg/dL   GLUCOSE, POC    Collection Time: 10/01/19 11:24 AM   Result Value Ref Range    Glucose (POC) 255 (H) 65 - 100 mg/dL   GLUCOSE, POC    Collection Time: 10/01/19  5:03 PM   Result Value Ref Range    Glucose (POC) 248 (H) 65 - 100 mg/dL   GLUCOSE, POC    Collection Time: 10/01/19  9:26 PM   Result Value Ref Range    Glucose (POC) 255 (H) 65 - 100 mg/dL   GLUCOSE, POC    Collection Time: 10/02/19  7:02 AM   Result Value Ref Range    Glucose (POC) 218 (H) 65 - 100 mg/dL   CBC WITH AUTOMATED DIFF    Collection Time: 10/02/19  7:04 AM   Result Value Ref Range    WBC 6.8 4.3 - 11.1 K/uL    RBC 4.14 (L) 4.23 - 5.6 M/uL    HGB 12.0 (L) 13.6 - 17.2 g/dL    HCT 37.3 (L) 41.1 - 50.3 %    MCV 90.1 79.6 - 97.8 FL    MCH 29.0 26.1 - 32.9 PG    MCHC 32.2 31.4 - 35.0 g/dL    RDW 16.1 (H) 11.9 - 14.6 %    PLATELET 758 556 - 159 K/uL    MPV 11.1 9.4 - 12.3 FL    ABSOLUTE NRBC 0.00 0.0 - 0.2 K/uL    DF AUTOMATED      NEUTROPHILS 52 43 - 78 %    LYMPHOCYTES 31 13 - 44 %    MONOCYTES 11 4.0 - 12.0 %    EOSINOPHILS 4 0.5 - 7.8 %    BASOPHILS 1 0.0 - 2.0 %    IMMATURE GRANULOCYTES 2 0.0 - 5.0 %    ABS. NEUTROPHILS 3.5 1.7 - 8.2 K/UL    ABS. LYMPHOCYTES 2.1 0.5 - 4.6 K/UL    ABS. MONOCYTES 0.7 0.1 - 1.3 K/UL    ABS. EOSINOPHILS 0.3 0.0 - 0.8 K/UL    ABS. BASOPHILS 0.0 0.0 - 0.2 K/UL    ABS. IMM. GRANS. 0.1 0.0 - 0.5 K/UL   MAGNESIUM    Collection Time: 10/02/19  7:04 AM   Result Value Ref Range    Magnesium 2.3 1.8 - 2.4 mg/dL   METABOLIC PANEL, COMPREHENSIVE    Collection Time: 10/02/19  7:04 AM   Result Value Ref Range    Sodium 140 136 - 145 mmol/L    Potassium 4.1 3.5 - 5.1 mmol/L    Chloride 105 98 - 107 mmol/L    CO2 28 21 - 32 mmol/L    Anion gap 7 7 - 16 mmol/L    Glucose 216 (H) 65 - 100 mg/dL    BUN 21 8 - 23 MG/DL    Creatinine 1.33 0.8 - 1.5 MG/DL    GFR est AA >60 >60 ml/min/1.73m2    GFR est non-AA 55 (L) >60 ml/min/1.73m2    Calcium 8.9 8.3 - 10.4 MG/DL    Bilirubin, total 0.4 0.2 - 1.1 MG/DL    ALT (SGPT) 27 12 - 65 U/L    AST (SGOT) 26 15 - 37 U/L    Alk.  phosphatase 97 50 - 136 U/L    Protein, total 7.5 6.3 - 8.2 g/dL    Albumin 2.9 (L) 3.2 - 4.6 g/dL    Globulin 4.6 (H) 2.3 - 3.5 g/dL    A-G Ratio 0.6 (L) 1.2 - 3.5     GLUCOSE, POC    Collection Time: 10/02/19 12:14 PM   Result Value Ref Range    Glucose (POC) 259 (H) 65 - 100 mg/dL       Assessment:     Problem List as of 10/2/2019 Date Reviewed: 9/18/2019          Codes Class Noted - Resolved    Cecal diverticulitis ICD-10-CM: K57.32  ICD-9-CM: 562.11  9/29/2019 - Present        RLQ abdominal pain ICD-10-CM: R10.31  ICD-9-CM: 789.03  9/28/2019 - Present        CKD (chronic kidney disease) stage 3, GFR 30-59 ml/min (HCC) (Chronic) ICD-10-CM: N18.3  ICD-9-CM: 585.3  6/24/2019 - Present        Cerebrovascular accident (CVA) ([facility] ICD-10-CM: I63.9  ICD-9-CM: 434.91  3/21/2019 - Present        Type 2 diabetes mellitus without complication ([facility] PPN-82-VJ: E11.9  ICD-9-CM: 250.00  3/21/2019 - Present        Hemiparesis of left dominant side ([facility] ICD-10-CM: G81.92  ICD-9-CM: 342.91  3/7/2019 - Present        Type 2 diabetes with nephropathy ([facility] ICD-10-CM: E11.21  ICD-9-CM: 250.40, 583.81  5/11/2018 - Present        Morbid obesity due to excess calories ([facility] ICD-10-CM: E66.01  ICD-9-CM: 278.01  11/9/2017 - Present        Sleep apnea ICD-10-CM: G47.30  ICD-9-CM: 780.57  11/9/2017 - Present        Esophageal dysphagia ICD-10-CM: R13.10  ICD-9-CM: 787.20  11/9/2017 - Present        TIA (transient ischemic attack) ICD-10-CM: G45.9  ICD-9-CM: 435.9  11/9/2017 - Present        Mild intermittent asthma without complication QCT-11-MP: K36.76  ICD-9-CM: 493.90  8/24/2017 - Present        Diabetic neuropathy associated with type 2 diabetes mellitus (HCC) (Chronic) ICD-10-CM: E11.40  ICD-9-CM: 250.60, 357.2  7/18/2017 - Present        Diastolic CHF, chronic (HCC) (Chronic) ICD-10-CM: I50.32  ICD-9-CM: 428.32, 428.0  3/20/2017 - Present        Mixed hyperlipidemia (Chronic) ICD-10-CM: M60.3  ICD-9-CM: 272.2  11/17/2016 - Present        Essential hypertension with goal blood pressure less than 130/85 (Chronic) ICD-10-CM: I10  ICD-9-CM: 401.9  7/5/2016 - Present        Pulmonary hypertension (HCC) (Chronic) ICD-10-CM: I27.20  ICD-9-CM: 416.8  10/14/2015 - Present        Mild diastolic dysfunction (Chronic) ICD-10-CM: I51.9  ICD-9-CM: 429.9 7/17/2015 - Present        Gastroesophageal reflux disease without esophagitis (Chronic) ICD-10-CM: K21.9  ICD-9-CM: 530.81  4/21/2015 - Present        Hyperlipidemia with target LDL less than 70 (Chronic) ICD-10-CM: E78.5  ICD-9-CM: 272.4  Unknown - Present        Essential hypertension, benign (Chronic) ICD-10-CM: I10  ICD-9-CM: 401.1  1/22/2015 - Present        Chronic constipation (Chronic) ICD-10-CM: K59.09  ICD-9-CM: 564.00  1/22/2015 - Present        Type 2 diabetes, uncontrolled, with neuropathy (HCC) (Chronic) ICD-10-CM: E11.40, E11.65  ICD-9-CM: 250.62, 357.2  1/22/2015 - Present        Obstructive sleep apnea of adult (Chronic) ICD-10-CM: G47.33  ICD-9-CM: 327.23  1/22/2015 - Present        Morbid obesity (HCC) (Chronic) ICD-10-CM: E66.01  ICD-9-CM: 278.01  6/27/2011 - Present        RESOLVED: Sepsis ([facility] ICD-10-CM: A41.9  ICD-9-CM: 038.9, 995.91  9/30/2019 - 9/30/2019        RESOLVED: Hyperkalemia ICD-10-CM: E87.5  ICD-9-CM: 276.7  9/27/2019 - 9/27/2019        RESOLVED: Acute metabolic encephalopathy CBF-33-FG: G93.41  ICD-9-CM: 348.31  9/25/2019 - 9/27/2019        RESOLVED: Postural dizziness ICD-10-CM: R42  ICD-9-CM: 780.4  5/11/2018 - 7/16/2018        RESOLVED: Chest pain ICD-10-CM: R07.9  ICD-9-CM: 786.50  5/11/2018 - 7/16/2018        RESOLVED: Type 2 diabetes mellitus without complication, with long-term current use of insulin ([facility] ICD-10-CM: E11.9, Z79.4  ICD-9-CM: 250.00, V58.67  11/9/2017 - 7/16/2018        RESOLVED: H/O colonoscopy (Chronic) ICD-10-CM: B25.541  ICD-9-CM: V45.89  7/18/2017 - 7/16/2018    Overview Signed 7/18/2017  7:06 AM by Aleyda Snigh     Colonoscopy in 7/29/10 Dr Colleen Haskins with repeat in 7/2020 [facility] Dr Andrey Gomes: Type 2 diabetes mellitus without complication, without long-term current use of insulin ([facility] ICD-10-CM: E11.9  ICD-9-CM: 250.00  3/20/2017 - 7/18/2017        RESOLVED: Sleep apnea ICD-10-CM: G47.30  ICD-9-CM: 780.57  3/20/2017 - 7/18/2017        RESOLVED: Generalized edema ICD-10-CM: R60.1  ICD-9-CM: 782.3  7/5/2016 - 7/18/2017        RESOLVED: Type 2 diabetes mellitus without complication ([facility] EZG-19-WZ: E11.9  ICD-9-CM: 250.00  7/5/2016 - 2/21/2017        RESOLVED: Sleep apnea ICD-10-CM: G47.30  ICD-9-CM: 780.57  7/5/2016 - 12/16/2016        RESOLVED: Peripheral neuropathy ([facility] ICD-10-CM: G62.9  ICD-9-CM: 356.9  1/22/2015 - 7/18/2017        RESOLVED: Chest pain, unspecified ICD-10-CM: R07.9  ICD-9-CM: 786.50  6/27/2011 - 1/22/2015        RESOLVED: HTN (hypertension) ICD-10-CM: I10  ICD-9-CM: 401.9  6/27/2011 - 1/22/2015        RESOLVED: DM circ dis type II ICD-10-CM: E11.51  ICD-9-CM: 250.70  6/27/2011 - 9/22/2017              Plan:     Rehabilitation Plan  The patient has shown the ability to tolerate and benefit from 3 hours of therapy daily and is being admitted to a comprehensive acute inpatient rehabilitation program consisting of at least 3 hours of combined physical and occupational therapies. continue intensive Physical Therapy for a minimum of 1.5 hours a day, at least 5 out of 7 days per week to address bed mobility, transfers, ambulation, strengthening, balance, and endurance.    continue intensive Occupational Therapy for a minimum of 1.5 hours a day, at least 5 out of 7 days per week to address ADL ( bathing, LE dressing, toileting) and adaptive equipment as needed.     The patient will also require 24-hour skilled rehabilitation nursing for bowel and bladder management, skin care for decubitus ulcer prevention , pain management and ongoing medication administration      The patient may benefit from a psychology consult for depression, adjustment disorder.            Continue 24-hour skilled rehabilitation nursing for bowel and bladder management, skin care for decubitus ulcer prevention , pain management and ongoing medication administration      Continue daily physician medical management:  Cecal diverticulitis  - iv antibiotics transitioned to po Flagyl + po cipro x 7 days.   -10/2  continue po abx. Afebrile. abd pain improved.     Acute metabolic encephalopathy  - monitor. Continue ST treatment. - 10/2 improving clinically.      CKD (chronic kidney disease) stage 3,   - monitor Cr. Steady improvement. 10/2 - 51/7.95     CAD/ Diastolic CHF, chronic /Essential hypertension, benign (Chronic)/ Pulmonary hypertension (HCC) (Chronic)  - diovan 80  - lasix 40 bid  - aspirin/ plavix. - 10/2 continue lasix bid. Monitor renal funciton, may be able to reduce dose soon.      Obstructive sleep apnea of adult   - CPAP hs.  Add - prn albuterol inh.      Morbid obesity - Chronic  - dietary/ nutrition consult     Hyperlipidemia with target LDL less than 70 (Chronic)        Pneumonia prophylaxis- Insentive spirometer every hour while awake     DVT risk / DVT Prophylaxis- Will require daily physician exam to assess for signs and symptoms as patient is at increased risk for of thromboembolism. Mobilization as tolerated. Intermittent pneumatic compression devices when in bed Thigh-high or knee-high thromboembolic deterrent hose when out of bed. - Lovenox subq daily.      Pain Control: diabetic neuropathy. no current joint or muscle symptoms, essentially pain-free. Will require regular pain assessment and comprenhensive pain management,   - lyrica     anxiety   - Buspar     Diabetes mellitus - Uncontrolled. poor glycemic control. Will require daily, close FSG monitoring and medication adjustment to optimize glycemic control in setting of acute illness and hospitalization.   - Diabetic neuropathy associated with type 2 diabetes mellitus ([facility] (Chronic)  10/2 - Pt was taking tresiba + scheduled 8u novolog at mealtime and SSI coverage. BS - [phone]. Continue SSI coverage. Resume metformin. Start slow, 500 bid.      Urinary retention/ neurogenic bladder - schedule voids q6-8 hrs. Check post-void residual every shift;  In and Out catheterize if post-void residual is more than 400 cc.  - flomax.      bowel program - + constipation.   -Miralax, dialy. Colace daily  - dulcolax supp prn     GERD - resume PPI. At times may need additional antacids, Maalox prn. Time spent was 25 minutes with over 1/2 in direct patient care/examination, consultation and coordination of care.      Signed By: Sydnie Goss MD     October 2, 2019

## 2019-10-02 NOTE — PROGRESS NOTES
PHYSICAL THERAPY DAILY NOTE  Time In: 4523  Time Out: 1201  Patient Seen For: AM;Gait training;Patient education; Therapeutic exercise;Transfer training; Other (see progress notes)    Subjective: patient reporting he is tired from AM ADLs. Reports he gets SOB at times at home and uses an inhaler prn. Objective: Vital Signs:  Patient Vitals for the past 12 hrs:   Temp Pulse Resp BP SpO2   10/02/19 0817 98.2 °F (36.8 °C) 99 14 125/70 93 %     Patient on room air, resting 02 sat 96%, 02 sat 90% after ambulating 200ft. 02 sat 94% at end of treatment    Pain level:No c/o pain during treatment  Pain location:NA  Pain interventions:NA    Patient education:transfer training, gait training, fall precautions, activity pacing,body mechanics,energy conversation, Patient verbalizing understanding and demonstrating understanding of patient education. Recommend follow up education.     Interdisciplinary Communication:spoke with MD regarding patient using an inhaler at home and patient's 02 sat dropping to 90% with long distance ambulation    Other (comment)(fall precautions)  GROSS ASSESSMENT Daily Assessment     NA       COGNITION Daily Assessment    Orientation:A+O x 4  Communication:able to express needs verbally, able to follow multi step commands  Social Interaction:cooperating, appropriate with PT, participating, motivated to improve  Problem Solving:difficulty with managing body mechanics during functional mobility due to generalized weakness  Memory: cues to recall body mechanics with transfers            BED/MAT MOBILITY Daily Assessment    Rolling Right : 0 (Not tested)  Rolling Left : 0 (Not tested)  Supine to Sit : 0 (Not tested)  Sit to Supine : 0 (Not tested)       TRANSFERS Daily Assessment   Increased time and effort to complete with cues for body mechanics   Transfer Type: SPT with walker  Transfer Assistance : 4 (Contact guard assistance)  Sit to Stand Assistance: Contact guard assistance  Car Transfers: Not tested       GAIT Daily Assessment    Amount of Assistance: 4 (Contact guard assistance)  Distance (ft): 200 Feet (ft)(200ft x 1  150ft x 1)  Assistive Device: Walker, rolling;Gait belt   Gait training with one time cue for posture correction and cues to control gait speed with RR    STEPS or STAIRS Daily Assessment    Steps/Stairs Ambulated (#): 0  Level of Assist : 0 (Not tested)       BALANCE Daily Assessment   Static standing without a device and SBA to CGA with cues for balance control Sitting - Static: Good (unsupported)  Sitting - Dynamic: Good (unsupported)  Standing - Static: Fair  Standing - Dynamic : Impaired       WHEELCHAIR MOBILITY Daily Assessment    Curbs/Ramps Assist Required (FIM Score): 0 (Not tested)  Wheelchair Setup Assist Required : 0 (Not tested)       LOWER EXTREMITY EXERCISES Daily Assessment   Increased time and effort to complete with multiple and frequent rest breaks. Cues for  correct form   Extremity: Both  Exercise Type #1: Seated lower extremity strengthening  Sets Performed: 2  Reps Performed: 10  Level of Assist: Minimal assistance     SEATED EXERCISES Sets Reps Comments   Ankle Pumps 1 20    Hip Flexion 2 10    Long Arc Quads 2 10    Hip Adduction/Ball Squeeze 2 10    Hip Abduction with green Theraband 2 10    Hamstring Curls with green  Theraband 2 10    Hip Extension with green Theraband 2 10             Assessment: Good potential to meet goals. Functional endurance improving       Patient returned to room at end of treatment and remained up in recliner with LEs elevated and with needs in reach. Plan of Care: Continue with POC and progress as tolerated.      Sunita Leon, PT  10/2/2019

## 2019-10-02 NOTE — PROGRESS NOTES
This note will not be viewable in [address] [facility] outreach postponed for 7 days due to discharge to [address] [place] [address].

## 2019-10-02 NOTE — PROGRESS NOTES
10/02/19 1218   Time Spent With Patient   Time In 1035   Time Out 1115   Patient Seen For: AM   Mental Status   Neurologic State Alert   Orientation Level Oriented X4   Cognition Follows commands;Memory loss   Perseveration No perseveration noted   Safety/Judgement Home safety;Decreased insight into deficits        10/02/19 1218   Comprehension Mode   Primary Mode of Comprehension Auditory   Score 6   Expression (Native Language)   Primary Mode of Expression Verbal   Score 5   Social Interaction/Pragmatics   Score 5   Problem Solving   Score 4   Memory   Score 4        10/02/19 1219   Neuro-Linguistic Exercises   Verbal Problem Solving Impaired; 5/8 abstract reasoning   Verbal Organization Impaired; increased time for thought organization and delayed response formation   Mathematical Impaired; 2/4   Memory Impaired; 6/12   Attention  Impaired; 7/8 with digit recall        10/02/19 1221   Oral Assessment   Labial Left droop  (mild)   Lingual No impairment   PO Trials   Assessment Method(s) Observation;Palpation   Vocal Quality No impairment   Consistency Presented Thin liquid; Solid   How Presented Self-fed/presented; Successive swallows;Straw   Bolus Acceptance No impairment   Propulsion No impairment   Oral Residue None   Initiation of Swallow No impairment   Aspiration Signs/Symptoms None     Patient participated with a bedside swallowing and cognitive assessment. Mild left facial asymmetry. No overt signs/sx of aspiration with thin liquids via the straw. Normal mastication time and initiation of the swallow with regular textures. Patient's speech is intelligible but with rate of speech slightly slower and occasional delays appeared related to increased time for thought organization needed. Patient was living with his brother who assisted with running errands as patient does not drive. 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   Mild-moderate deficits in the areas of naming, reasoning, calculations, and short-term memory noted on Cognistat assessment with results as follows:  Orientation, comprehension, and basic repetition grossly intact. 6/8 naming pictures. Recognized one picture as a \"musical instrument\" but could not recall the name despite cues. Calculations 2/4, reasoning 5/8, and memory 6/12. Word retention was limited with standardized testing; however, patient recalled aspects of his medical history and the names of staff he has worked with since arrival to the floor yesterday. Recommend continued therapy to address cognitive-linguistic deficits due to mild-moderate impairments noted and high level of independence at discharge. Patient's swallowing appears grossly within normal limits.   Diverticulitis with GI soft diet ordered and advancement per MD.    Rosa Elena Hartman MS, CCC-SLP

## 2019-10-03 LAB
GLUCOSE BLD STRIP.AUTO-MCNC: 237 MG/DL (ref 65–100)
GLUCOSE BLD STRIP.AUTO-MCNC: 265 MG/DL (ref 65–100)
GLUCOSE BLD STRIP.AUTO-MCNC: 300 MG/DL (ref 65–100)
GLUCOSE BLD STRIP.AUTO-MCNC: 304 MG/DL (ref 65–100)

## 2019-10-03 PROCEDURE — 97110 THERAPEUTIC EXERCISES: CPT

## 2019-10-03 PROCEDURE — 99232 SBSQ HOSP IP/OBS MODERATE 35: CPT | Performed by: PHYSICAL MEDICINE & REHABILITATION

## 2019-10-03 PROCEDURE — 97116 GAIT TRAINING THERAPY: CPT

## 2019-10-03 PROCEDURE — 92507 TX SP LANG VOICE COMM INDIV: CPT

## 2019-10-03 PROCEDURE — 82962 GLUCOSE BLOOD TEST: CPT

## 2019-10-03 PROCEDURE — 74011000250 HC RX REV CODE- 250: Performed by: PHYSICAL MEDICINE & REHABILITATION

## 2019-10-03 PROCEDURE — 97530 THERAPEUTIC ACTIVITIES: CPT

## 2019-10-03 PROCEDURE — 65310000000 HC RM PRIVATE REHAB

## 2019-10-03 PROCEDURE — 74011250637 HC RX REV CODE- 250/637: Performed by: PHYSICAL MEDICINE & REHABILITATION

## 2019-10-03 PROCEDURE — 97535 SELF CARE MNGMENT TRAINING: CPT

## 2019-10-03 PROCEDURE — 94760 N-INVAS EAR/PLS OXIMETRY 1: CPT

## 2019-10-03 PROCEDURE — 74011636637 HC RX REV CODE- 636/637: Performed by: PHYSICAL MEDICINE & REHABILITATION

## 2019-10-03 PROCEDURE — 94640 AIRWAY INHALATION TREATMENT: CPT

## 2019-10-03 RX ORDER — METFORMIN HYDROCHLORIDE 500 MG/1
500 TABLET ORAL 2 TIMES DAILY WITH MEALS
Status: DISCONTINUED | OUTPATIENT
Start: 2019-10-03 | End: 2019-10-04

## 2019-10-03 RX ADMIN — CIPROFLOXACIN HYDROCHLORIDE 500 MG: 500 TABLET, FILM COATED ORAL at 08:34

## 2019-10-03 RX ADMIN — METRONIDAZOLE 500 MG: 500 TABLET ORAL at 20:58

## 2019-10-03 RX ADMIN — NYSTATIN: 100000 POWDER TOPICAL at 20:53

## 2019-10-03 RX ADMIN — INSULIN LISPRO 4 UNITS: 100 INJECTION, SOLUTION INTRAVENOUS; SUBCUTANEOUS at 07:57

## 2019-10-03 RX ADMIN — METFORMIN HYDROCHLORIDE 500 MG: 500 TABLET ORAL at 10:30

## 2019-10-03 RX ADMIN — FUROSEMIDE 40 MG: 20 TABLET ORAL at 17:07

## 2019-10-03 RX ADMIN — DOCUSATE SODIUM 100 MG: 100 CAPSULE, LIQUID FILLED ORAL at 08:33

## 2019-10-03 RX ADMIN — ATORVASTATIN CALCIUM 80 MG: 40 TABLET, FILM COATED ORAL at 21:25

## 2019-10-03 RX ADMIN — PANTOPRAZOLE SODIUM 40 MG: 40 TABLET, DELAYED RELEASE ORAL at 06:20

## 2019-10-03 RX ADMIN — PREGABALIN 150 MG: 150 CAPSULE ORAL at 08:33

## 2019-10-03 RX ADMIN — ASPIRIN 81 MG: 81 TABLET ORAL at 08:32

## 2019-10-03 RX ADMIN — INSULIN GLARGINE 20 UNITS: 100 INJECTION, SOLUTION SUBCUTANEOUS at 21:25

## 2019-10-03 RX ADMIN — CIPROFLOXACIN HYDROCHLORIDE 500 MG: 500 TABLET, FILM COATED ORAL at 20:59

## 2019-10-03 RX ADMIN — BUSPIRONE HYDROCHLORIDE 15 MG: 5 TABLET ORAL at 08:34

## 2019-10-03 RX ADMIN — METFORMIN HYDROCHLORIDE 500 MG: 500 TABLET ORAL at 17:07

## 2019-10-03 RX ADMIN — FUROSEMIDE 40 MG: 20 TABLET ORAL at 08:33

## 2019-10-03 RX ADMIN — CLOPIDOGREL BISULFATE 75 MG: 75 TABLET ORAL at 08:34

## 2019-10-03 RX ADMIN — INSULIN LISPRO 6 UNITS: 100 INJECTION, SOLUTION INTRAVENOUS; SUBCUTANEOUS at 12:43

## 2019-10-03 RX ADMIN — PREGABALIN 150 MG: 150 CAPSULE ORAL at 17:07

## 2019-10-03 RX ADMIN — TAMSULOSIN HYDROCHLORIDE 0.4 MG: 0.4 CAPSULE ORAL at 08:34

## 2019-10-03 RX ADMIN — METRONIDAZOLE 500 MG: 500 TABLET ORAL at 08:34

## 2019-10-03 RX ADMIN — INSULIN LISPRO 8 UNITS: 100 INJECTION, SOLUTION INTRAVENOUS; SUBCUTANEOUS at 17:07

## 2019-10-03 RX ADMIN — POLYETHYLENE GLYCOL 3350 17 G: 17 POWDER, FOR SOLUTION ORAL at 08:35

## 2019-10-03 RX ADMIN — INSULIN LISPRO 8 UNITS: 100 INJECTION, SOLUTION INTRAVENOUS; SUBCUTANEOUS at 21:24

## 2019-10-03 RX ADMIN — ALBUTEROL SULFATE 2.5 MG: 2.5 SOLUTION RESPIRATORY (INHALATION) at 00:29

## 2019-10-03 RX ADMIN — EZETIMIBE 10 MG: 10 TABLET ORAL at 21:25

## 2019-10-03 RX ADMIN — POTASSIUM CHLORIDE 20 MEQ: 20 TABLET, EXTENDED RELEASE ORAL at 08:33

## 2019-10-03 RX ADMIN — VALSARTAN 80 MG: 40 TABLET, FILM COATED ORAL at 08:34

## 2019-10-03 NOTE — PROGRESS NOTES
PHYSICAL THERAPY DAILY NOTE  Time In: 1300  Time Out: 2079  Patient Seen For: PM;Gait training; Other (see progress notes); Therapeutic exercise;Transfer training    Subjective: Patient had no complaints. Objective: NO pain noted. Other (comment)(fall precautions)  GROSS ASSESSMENT Daily Assessment            COGNITION Daily Assessment    intact       BED/MAT MOBILITY Daily Assessment    Rolling Right : 0 (Not tested)  Rolling Left : 0 (Not tested)  Supine to Sit : 0 (Not tested)  Sit to Supine : 0 (Not tested)       TRANSFERS Daily Assessment    Transfer Type: SPT with walker  Transfer Assistance : 5 (Stand-by assistance)  Sit to Stand Assistance: Stand-by assistance  Car Transfers: Not tested       GAIT Daily Assessment   Attempted to ambulate with straight cane but patient began to furniture walk with other hand. Discussed with patient that he would be safer now to use walker. He has a walker and cane at home. Amount of Assistance: 5 (Stand-by assistance)  Distance (ft): 200 Feet (ft)  Assistive Device: Walker, rolling(attempted straight cane but patient starting hanging on to things in other hand)       STEPS or STAIRS Daily Assessment    Level of Assist : 0 (Not tested)       BALANCE Daily Assessment            WHEELCHAIR MOBILITY Daily Assessment            LOWER EXTREMITY EXERCISES Daily Assessment    Extremity: Both  Exercise Type #1: Other (comment)(motomed x 10 minutes)  Sets Performed: 1  Reps Performed: 10  Level of Assist: Supervision  Exercise Type #2: Seated lower extremity strengthening  Sets Performed: 2  Reps Performed: 10  Level of Assist: Contact guard assistance          Assessment: Patient making gains with ambulation and strength. Plan of Care: Continue with plan of care.     Anne Ladd, PTA  10/3/2019

## 2019-10-03 NOTE — PROGRESS NOTES
Problem: Inpatient Rehab (Adult)  Goal: *LTG: Avoids injury/falls 100% of time related to deficits  Outcome: Progressing Towards Goal  Goal: *LTG: Avoids infection 100% of time related to deficits  Outcome: Progressing Towards Goal  Goal: *LTG: Verbalize understanding of diagnosis and risk factors for recurring stroke  Outcome: Progressing Towards Goal  Goal: *LTG: Absence of DVT during hospitalization  Outcome: Progressing Towards Goal  Goal: *LTG: Maintains Skin Integrity With No Evidence of Pressure Injury 100% of Time  Outcome: Progressing Towards Goal  Goal: Interventions  Outcome: Progressing Towards Goal     Problem: Falls - Risk of  Goal: *Absence of Falls  Description  Document Day Renny Fall Risk and appropriate interventions in the flowsheet.   Outcome: Progressing Towards Goal  Note:   Fall Risk Interventions:  Mobility Interventions: Utilize walker, cane, or other assistive device    Mentation Interventions: Bed/chair exit alarm    Medication Interventions: Bed/chair exit alarm, Patient to call before getting OOB, Teach patient to arise slowly    Elimination Interventions: Call light in reach    History of Falls Interventions: Bed/chair exit alarm, Door open when patient unattended

## 2019-10-03 NOTE — PROGRESS NOTES
dropped off HCPoA paperwork. Pt stated his wife  and has no children only brothers and sisters. He is unsure at this time who he would like to be his HCPoA. He desired someone with medical terminology to make a rational and watson decision. Will inform nursing staff when he is ready to fill out. Pt was ready to take a nap upon  departure. No further needs from  at this time. Please consult Spiritual Care as needed. Isabella Quispe, [employer].

## 2019-10-03 NOTE — PROGRESS NOTES
Time In 0830   Time Out 1000     Mobility   Score Comments   Transfer Assist 4: Supervision or touching A Transfer Type: SPT   Equipment: Cane   Comments: S in room; RW outside of room with S     Activities of Daily Living    Score Comments   Bathing 4: Supervision or touching A Type of Shower: Bath Pack  Position: Supported sitting and Standing PRN   Adaptive  Equipment: N/A  Comments: Pt reported not wanting to wash feet   Upper Body  Dressing 5: S/U or clean-up assist Items Applied: Pullover  Position: Supported Sitting  Comments: S/U   Lower Body Dressing 4: Supervision or touching A Items Applied: Underwear and Elastic Pants  Position: Supported sitting and Standing PRN  Adaptive Equipment: Cane  Comments: S for safety, pt reported using cane at home to A with LB dressing and completed it with his cane, but requested a reacher which he was issued   Donning/Uncertain Footwear 4: Supervision or touching A Items Applied: Socks and Fasten Shoes  Adaptive Equipment: Sock Aide  Comments: Educated on how to use a sock aide with pt demonstrating fair performance, continued education recommended   Education  Sock aide; requirements      Pt was in recliner. Discussed for 30 minutes with pt the expectations of being part of an IRC. Pt verbalized frustration with being on a time schedule and having to be S when bathed. Pt reported he would just bathe when he went home. Discussed how bathing was a skill that needed to be worked on for [employer] and as a personal need for him secondary to him fatiguing quickly. Pt reported he did not want to shower with therapist secondary to the MeToo movement. Discussed bringing in the tech as a witness. Discussed not showering every day because he doesn't want to, but he needs to do every 3 days to work on skills. Pt c/o the bathrooms being dirty. Therapist volunteered to clean the bathroom prior and post use each time. Pt was agreeable to those compromises.  Pt appears to have decreased insight to deficits and problem solving. Pt reported not like losing his modesty, but then dropped his underwear and clean when the compromise was to just wash the visible spots. Cognitive testing needs to be completed. Pt walked 150' with RW with S. Pt was left in room with all needs within reach. Continue with POC.      Marnell Lundborg OTR/L  10/3/2019

## 2019-10-03 NOTE — PROGRESS NOTES
10/03/19 1120   Time Spent With Patient   Time In 1035   Time Out 1115   Patient Seen For: AM   Mental Status   Neurologic State Confused   Cognition Decreased attention/concentration;Memory loss   Perception Appears intact   Perseveration Perseverates during conversation   Safety/Judgement Home safety;Decreased insight into deficits      10/03/19 1120   Verbal Expression   Primary Mode of Expression Verbal   Naming Impaired   Pictures (%) 70 %  (decreased word finding with lower frequency words especially; familiar with the items describing what they do and stating that he has used them before)   Reasoning and insight Poor insight into cognitive and word finding deficits stated \"you maybe had to help me with 2 or 3 of those\" (pt missed 17)     Patient participated with the [facility] with a score of 43/60 which is the lower end of mild impairment range for word finding. Patient was familiar with the pictures stating that he has used the items and was often able to describe an element of their function. Episode of confusion during the assessment with patient stating \"are they over there in the corner right now? \" and when ST asked for clarification stating \"I don't know what I'm saying. I was in left field. \"  Patient then had difficulty restating the name of the picture we had just looked at and discussed naming something else. Underestimating level of impairment with cognitive-linguistic tasks. Stated both sessions that he was tired. Recommend continued therapy to address cognitive-linguistic deficits to increased safety and awareness for increased independence at discharge.     Navya Seo MS, CCC-SLP

## 2019-10-03 NOTE — PROGRESS NOTES
[facility] PROGRESS NOTE    Patty Rai  Admit Date: 10/1/2019  Admit Diagnosis: other neurologic condition    Subjective     Patient seen and examined. Vss.   PT, OT progressing well. No new barriers reported. Still limited by fatigue and weakness, however improving per patient.        Objective:     Current Facility-Administered Medications   Medication Dose Route Frequency    albuterol (PROVENTIL VENTOLIN) nebulizer solution 2.5 mg  2.5 mg Nebulization Q6H PRN    acetaminophen (TYLENOL) tablet 650 mg  650 mg Oral Q6H PRN    albuterol (PROVENTIL VENTOLIN) nebulizer solution 2.5 mg  2.5 mg Nebulization Q4H PRN    aspirin delayed-release tablet 81 mg  81 mg Oral DAILY    atorvastatin (LIPITOR) tablet 80 mg  80 mg Oral QHS    bisacodyl (DULCOLAX) suppository 10 mg  10 mg Rectal DAILY PRN    busPIRone (BUSPAR) tablet 15 mg  15 mg Oral DAILY    clopidogrel (PLAVIX) tablet 75 mg  75 mg Oral DAILY    ezetimibe (ZETIA) tablet 10 mg  10 mg Oral QHS    furosemide (LASIX) tablet 40 mg  40 mg Oral ACB&D    HYDROcodone-acetaminophen (NORCO) 7.5-325 mg per tablet 1 Tab  1 Tab Oral Q6H PRN    insulin glargine (LANTUS) injection 20 Units  20 Units SubCUTAneous QHS    insulin lispro (HUMALOG) injection 0-10 Units  0-10 Units SubCUTAneous AC&HS    lip protectant (BLISTEX) ointment 1 Each  1 Each Topical PRN    nystatin (MYCOSTATIN) 100,000 unit/gram powder   Topical BID    pantoprazole (PROTONIX) tablet 40 mg  40 mg Oral ACB    polyethylene glycol (MIRALAX) packet 17 g  17 g Oral DAILY    potassium chloride (K-DUR, KLOR-CON) SR tablet 20 mEq  20 mEq Oral DAILY    pregabalin (LYRICA) capsule 150 mg  150 mg Oral BID    tamsulosin (FLOMAX) capsule 0.4 mg  0.4 mg Oral DAILY    valsartan (DIOVAN) tablet 80 mg  80 mg Oral DAILY    sodium chloride (NS) flush 5-40 mL  5-40 mL IntraVENous PRN    ciprofloxacin HCl (CIPRO) tablet 500 mg  500 mg Oral Q12H    metroNIDAZOLE (FLAGYL) tablet 500 mg  500 mg Oral Q12H  docusate sodium (COLACE) capsule 100 mg  100 mg Oral DAILY    lactulose (CHRONULAC) 10 gram/15 mL solution 30 mL  30 mL Oral DAILY PRN     Review of Systems: + subjective generalized weakness, + fatigue. Denies chest pain, shortness of breath, cough, headache, visual problems, abdominal pain, dysurea, calf pain. Pertinent positives are as noted in the medical records and unremarkable otherwise. Visit Vitals  /88 (BP 1 Location: Right arm, BP Patient Position: Sitting)   Pulse 87   Temp 98.2 °F (36.8 °C)   Resp 16   Wt 274 lb 6.4 oz (124.5 kg)   SpO2 92%   BMI 44.29 kg/m²        Physical Exam:   General: Alert and age appropriately oriented. No acute cardio respiratory distress. HEENT: Normocephalic, no JVD   Lungs: Clear to auscultation  bilaterally. Respiration even and unlabored   Heart: Regular rate and rhythm, S1, S2   No  murmurs, clicks, rub or gallops   Abdomen: Softly distended, non-tender to palpation in all four quadrants. Bowel sounds present. No obvious masses palpated. Genitourinary: Benign .    Neuromuscular:      PERRL, EOMI  LUE     Shoulder abduction  5- /5              Elbow flexion: 5-  /5               Wrist extension: 5- /5              Finger flexion;   5-/5  RUE    Shoulder abduction: 5- /5                Elbow flexion:  5- /5               Wrist extension: 5- /5              Finger flexion:  5- /5  LLE     Hip flexion:  3 /5              Knee extension:   4/5               Ankle dorsiflexion:  4 /5              Ankle plantarflexion:   4/5                                         RLE     Hip flexion:  3 /5              Knee extension:  4 /5               Ankle dorsiflexion:  4 /5              Ankle plantarflexion:  4 /5  Sensory - intact      Skin/extremity: ntact, dry, good skin turgor, age related changes present   Edema: 1+ BLE edema                                                                            Functional Assessment:          Balance  Sitting - Static: Good (unsupported) (10/02/19 1630)  Sitting - Dynamic: Good (unsupported) (10/02/19 1630)  Standing - Static: Fair (10/02/19 1630)  Standing - Dynamic : Impaired (10/02/19 1630)                     [facility] Fall Risk Assessment:  [facility] Fall Risk  Mobility: Ambulates or transfers with assist devices or assistance (10/02/19 1927)  Mobility Interventions: Utilize walker, cane, or other assistive device (10/02/19 1927)  Mentation: Alert, oriented x 3 (10/02/19 1927)  Mentation Interventions: Door open when patient unattended (10/02/19 1927)  Medication: Patient receiving anticonvulsants, sedatives(tranquilizers), psychotropics or hypnotics, hypoglycemics, narcotics, sleep aids, antihypertensives, laxatives, or diuretics (10/02/19 1927)  Medication Interventions: Patient to call before getting OOB (10/02/19 1927)  Elimination: Needs assistance with toileting (10/02/19 1927)  Elimination Interventions: Call light in reach (10/02/19 1927)  Prior Fall History: No (10/02/19 1927)  History of Falls Interventions: Door open when patient unattended (10/02/19 1927)  Total Score: 3 (10/02/19 1927)  Standard Fall Precautions: Yes (10/01/19 1140)  High Fall Risk: Yes (10/02/19 1927)     Speech Assessment:  Aspiration Signs/Symptoms: None (10/02/19 1221)      Ambulation:  Gait  Distance (ft): 200 Feet (ft)(200ft x 1   100ft x 1) (10/02/19 1630)  Assistive Device: Walker, rolling;Gait belt (10/02/19 1630)  Rail Use: Both (10/02/19 1630)     Labs/Studies:  Recent Results (from the past 72 hour(s))   GLUCOSE, POC    Collection Time: 09/30/19 11:08 AM   Result Value Ref Range    Glucose (POC) 247 (H) 65 - 100 mg/dL   GLUCOSE, POC    Collection Time: 09/30/19  3:28 PM   Result Value Ref Range    Glucose (POC) 251 (H) 65 - 100 mg/dL   PLEASE READ & DOCUMENT PPD TEST IN 72 HRS    Collection Time: 09/30/19  5:00 PM   Result Value Ref Range    PPD Negative Negative    mm Induration 0 0 - 5 mm   GLUCOSE, POC    Collection Time: 09/30/19  8:39 PM Result Value Ref Range    Glucose (POC) 253 (H) 65 - 652 mg/dL   METABOLIC PANEL, BASIC    Collection Time: 10/01/19  5:29 AM   Result Value Ref Range    Sodium 142 136 - 145 mmol/L    Potassium 4.1 3.5 - 5.1 mmol/L    Chloride 105 98 - 107 mmol/L    CO2 30 21 - 32 mmol/L    Anion gap 7 7 - 16 mmol/L    Glucose 161 (H) 65 - 100 mg/dL    BUN 22 8 - 23 MG/DL    Creatinine 1.33 0.8 - 1.5 MG/DL    GFR est AA >60 >60 ml/min/1.73m2    GFR est non-AA 55 (L) >60 ml/min/1.73m2    Calcium 8.5 8.3 - 10.4 MG/DL   GLUCOSE, POC    Collection Time: 10/01/19  7:17 AM   Result Value Ref Range    Glucose (POC) 140 (H) 65 - 100 mg/dL   GLUCOSE, POC    Collection Time: 10/01/19 11:24 AM   Result Value Ref Range    Glucose (POC) 255 (H) 65 - 100 mg/dL   GLUCOSE, POC    Collection Time: 10/01/19  5:03 PM   Result Value Ref Range    Glucose (POC) 248 (H) 65 - 100 mg/dL   GLUCOSE, POC    Collection Time: 10/01/19  9:26 PM   Result Value Ref Range    Glucose (POC) 255 (H) 65 - 100 mg/dL   GLUCOSE, POC    Collection Time: 10/02/19  7:02 AM   Result Value Ref Range    Glucose (POC) 218 (H) 65 - 100 mg/dL   CBC WITH AUTOMATED DIFF    Collection Time: 10/02/19  7:04 AM   Result Value Ref Range    WBC 6.8 4.3 - 11.1 K/uL    RBC 4.14 (L) 4.23 - 5.6 M/uL    HGB 12.0 (L) 13.6 - 17.2 g/dL    HCT 37.3 (L) 41.1 - 50.3 %    MCV 90.1 79.6 - 97.8 FL    MCH 29.0 26.1 - 32.9 PG    MCHC 32.2 31.4 - 35.0 g/dL    RDW 16.1 (H) 11.9 - 14.6 %    PLATELET 013 703 - 581 K/uL    MPV 11.1 9.4 - 12.3 FL    ABSOLUTE NRBC 0.00 0.0 - 0.2 K/uL    DF AUTOMATED      NEUTROPHILS 52 43 - 78 %    LYMPHOCYTES 31 13 - 44 %    MONOCYTES 11 4.0 - 12.0 %    EOSINOPHILS 4 0.5 - 7.8 %    BASOPHILS 1 0.0 - 2.0 %    IMMATURE GRANULOCYTES 2 0.0 - 5.0 %    ABS. NEUTROPHILS 3.5 1.7 - 8.2 K/UL    ABS. LYMPHOCYTES 2.1 0.5 - 4.6 K/UL    ABS. MONOCYTES 0.7 0.1 - 1.3 K/UL    ABS. EOSINOPHILS 0.3 0.0 - 0.8 K/UL    ABS. BASOPHILS 0.0 0.0 - 0.2 K/UL    ABS. IMM.  GRANS. 0.1 0.0 - 0.5 K/UL MAGNESIUM    Collection Time: 10/02/19  7:04 AM   Result Value Ref Range    Magnesium 2.3 1.8 - 2.4 mg/dL   METABOLIC PANEL, COMPREHENSIVE    Collection Time: 10/02/19  7:04 AM   Result Value Ref Range    Sodium 140 136 - 145 mmol/L    Potassium 4.1 3.5 - 5.1 mmol/L    Chloride 105 98 - 107 mmol/L    CO2 28 21 - 32 mmol/L    Anion gap 7 7 - 16 mmol/L    Glucose 216 (H) 65 - 100 mg/dL    BUN 21 8 - 23 MG/DL    Creatinine 1.33 0.8 - 1.5 MG/DL    GFR est AA >60 >60 ml/min/1.73m2    GFR est non-AA 55 (L) >60 ml/min/1.73m2    Calcium 8.9 8.3 - 10.4 MG/DL    Bilirubin, total 0.4 0.2 - 1.1 MG/DL    ALT (SGPT) 27 12 - 65 U/L    AST (SGOT) 26 15 - 37 U/L    Alk.  phosphatase 97 50 - 136 U/L    Protein, total 7.5 6.3 - 8.2 g/dL    Albumin 2.9 (L) 3.2 - 4.6 g/dL    Globulin 4.6 (H) 2.3 - 3.5 g/dL    A-G Ratio 0.6 (L) 1.2 - 3.5     GLUCOSE, POC    Collection Time: 10/02/19 12:14 PM   Result Value Ref Range    Glucose (POC) 259 (H) 65 - 100 mg/dL   GLUCOSE, POC    Collection Time: 10/02/19  4:29 PM   Result Value Ref Range    Glucose (POC) 241 (H) 65 - 100 mg/dL   GLUCOSE, POC    Collection Time: 10/02/19  8:55 PM   Result Value Ref Range    Glucose (POC) 323 (H) 65 - 100 mg/dL   GLUCOSE, POC    Collection Time: 10/03/19  7:06 AM   Result Value Ref Range    Glucose (POC) 237 (H) 65 - 100 mg/dL       Assessment:     Problem List as of 10/3/2019 Date Reviewed: 9/18/2019          Codes Class Noted - Resolved    Cecal diverticulitis ICD-10-CM: K57.32  ICD-9-CM: 562.11  9/29/2019 - Present        RLQ abdominal pain ICD-10-CM: R10.31  ICD-9-CM: 789.03  9/28/2019 - Present        CKD (chronic kidney disease) stage 3, GFR 30-59 ml/min (HCC) (Chronic) ICD-10-CM: N18.3  ICD-9-CM: 585.3  6/24/2019 - Present        Cerebrovascular accident (CVA) ([facility] ICD-10-CM: I63.9  ICD-9-CM: 434.91  3/21/2019 - Present        Type 2 diabetes mellitus without complication ([facility] [place] [facility] [address]: E11.9  ICD-9-CM: 250.00  3/21/2019 - Present        Hemiparesis of left dominant side ([facility] ICD-10-CM: G81.92  ICD-9-CM: 342.91  3/7/2019 - Present        Type 2 diabetes with nephropathy ([facility].) ICD-10-CM: E11.21  ICD-9-CM: 250.40, 583.81  5/11/2018 - Present        Morbid obesity due to excess calories ([facility].) ICD-10-CM: E66.01  ICD-9-CM: 278.01  11/9/2017 - Present        Sleep apnea ICD-10-CM: G47.30  ICD-9-CM: 780.57  11/9/2017 - Present        Esophageal dysphagia ICD-10-CM: R13.10  ICD-9-CM: 787.20  11/9/2017 - Present        TIA (transient ischemic attack) ICD-10-CM: G45.9  ICD-9-CM: 435.9  11/9/2017 - Present        Mild intermittent asthma without complication ZUG-60-OT: M96.00  ICD-9-CM: 493.90  8/24/2017 - Present        Diabetic neuropathy associated with type 2 diabetes mellitus (HCC) (Chronic) ICD-10-CM: E11.40  ICD-9-CM: 250.60, 357.2  7/18/2017 - Present        Diastolic CHF, chronic (HCC) (Chronic) ICD-10-CM: I50.32  ICD-9-CM: 428.32, 428.0  3/20/2017 - Present        Mixed hyperlipidemia (Chronic) ICD-10-CM: J84.8  ICD-9-CM: 272.2  11/17/2016 - Present        Essential hypertension with goal blood pressure less than 130/85 (Chronic) ICD-10-CM: I10  ICD-9-CM: 401.9  7/5/2016 - Present        Pulmonary hypertension (HCC) (Chronic) ICD-10-CM: I27.20  ICD-9-CM: 416.8  10/14/2015 - Present        Mild diastolic dysfunction (Chronic) ICD-10-CM: I51.9  ICD-9-CM: 429.9  7/17/2015 - Present        Gastroesophageal reflux disease without esophagitis (Chronic) ICD-10-CM: K21.9  ICD-9-CM: 530.81  4/21/2015 - Present        Hyperlipidemia with target LDL less than 70 (Chronic) ICD-10-CM: E78.5  ICD-9-CM: 272.4  Unknown - Present        Essential hypertension, benign (Chronic) ICD-10-CM: I10  ICD-9-CM: 401.1  1/22/2015 - Present        Chronic constipation (Chronic) ICD-10-CM: K59.09  ICD-9-CM: 564.00  1/22/2015 - Present        Type 2 diabetes, uncontrolled, with neuropathy (HCC) (Chronic) ICD-10-CM: E11.40, E11.65  ICD-9-CM: 250.62, 357.2  1/22/2015 - Present Obstructive sleep apnea of adult (Chronic) ICD-10-CM: G47.33  ICD-9-CM: 327.23  1/22/2015 - Present        Morbid obesity ([facility] (Chronic) ICD-10-CM: E66.01  ICD-9-CM: 278.01  6/27/2011 - Present        RESOLVED: Sepsis ([facility] ICD-10-CM: A41.9  ICD-9-CM: 038.9, 995.91  9/30/2019 - 9/30/2019        RESOLVED: Hyperkalemia ICD-10-CM: E87.5  ICD-9-CM: 276.7  9/27/2019 - 9/27/2019        RESOLVED: Acute metabolic encephalopathy SO-30-KW: G93.41  ICD-9-CM: 348.31  9/25/2019 - 9/27/2019        RESOLVED: Postural dizziness ICD-10-CM: R42  ICD-9-CM: 780.4  5/11/2018 - 7/16/2018        RESOLVED: Chest pain ICD-10-CM: R07.9  ICD-9-CM: 786.50  5/11/2018 - 7/16/2018        RESOLVED: Type 2 diabetes mellitus without complication, with long-term current use of insulin ([facility] ICD-10-CM: E11.9, Z79.4  ICD-9-CM: 250.00, V58.67  11/9/2017 - 7/16/2018        RESOLVED: H/O colonoscopy (Chronic) ICD-10-CM: P81.764  ICD-9-CM: V45.89  7/18/2017 - 7/16/2018    Overview Signed 7/18/2017  7:06 AM by Satinder Philip     Colonoscopy in 7/29/10 Dr Vianey Silva with repeat in 7/2020 [facility] at [facility] Dr Epifanio Solisbody: Type 2 diabetes mellitus without complication, without long-term current use of insulin ([facility] ICD-10-CM: E11.9  ICD-9-CM: 250.00  3/20/2017 - 7/18/2017        RESOLVED: Sleep apnea ICD-10-CM: G47.30  ICD-9-CM: 780.57  3/20/2017 - 7/18/2017        RESOLVED: Generalized edema ICD-10-CM: R60.1  ICD-9-CM: 782.3  7/5/2016 - 7/18/2017        RESOLVED: Type 2 diabetes mellitus without complication ([facility] OWV-17-YR: E11.9  ICD-9-CM: 250.00  7/5/2016 - 2/21/2017        RESOLVED: Sleep apnea ICD-10-CM: G47.30  ICD-9-CM: 780.57  7/5/2016 - 12/16/2016        RESOLVED: Peripheral neuropathy ([facility] ICD-10-CM: G62.9  ICD-9-CM: 356.9  1/22/2015 - 7/18/2017        RESOLVED: Chest pain, unspecified ICD-10-CM: R07.9  ICD-9-CM: 786.50  6/27/2011 - 1/22/2015        RESOLVED: HTN (hypertension) ICD-10-CM: I10  ICD-9-CM: 401.9  6/27/2011 - 1/22/2015        RESOLVED: DM circ dis type II ICD-10-CM: E11.51  ICD-9-CM: 250.70  6/27/2011 - 9/22/2017              Plan:     Rehabilitation Plan  The patient has shown the ability to tolerate and benefit from 3 hours of therapy daily and is being admitted to a comprehensive acute inpatient rehabilitation program consisting of at least 3 hours of combined physical and occupational therapies. continue intensive Physical Therapy for a minimum of 1.5 hours a day, at least 5 out of 7 days per week to address bed mobility, transfers, ambulation, strengthening, balance, and endurance. continue intensive Occupational Therapy for a minimum of 1.5 hours a day, at least 5 out of 7 days per week to address ADL ( bathing, LE dressing, toileting) and adaptive equipment as needed.     Continue 24-hour skilled rehabilitation nursing for bowel and bladder management, skin care for decubitus ulcer prevention , pain management and ongoing medication administration      Continue daily physician medical management:  Cecal diverticulitis  - iv antibiotics transitioned to po Flagyl + po cipro x 7 days.   -10/2  continue po abx. Afebrile. abd pain improved. 10/3- no c/o abdominal pain. hgb- 12.0, WBC - 6.8     Acute metabolic encephalopathy  - monitor. Continue ST treatment. - 10/2 improving clinically. - 10/3 alertness, sensorium improving.      CKD (chronic kidney disease) stage 3,   - monitor Cr. Steady improvement. 10/2 - 58/4.15     CAD/ Diastolic CHF, chronic /Essential hypertension, benign (Chronic)/ Pulmonary hypertension (HCC) (Chronic)  - diovan 80  - lasix 40 bid  - aspirin/ plavix. - 10/2 continue lasix bid. Monitor renal funciton, may be able to reduce dose soon. 10/3 pt reports he has been on scheduled lasix, dose variable. Will continue lasix 40 bid and monitor volume status, renal funciton.  No acute decompensation.      Obstructive sleep apnea of adult   - CPAP hs.  Add - prn albuterol inh.      Morbid obesity - Chronic  - dietary/ nutrition consult     Hyperlipidemia with target LDL less than 70 (Chronic)        Pneumonia prophylaxis- Insentive spirometer every hour while awake     DVT risk / DVT Prophylaxis- Will require daily physician exam to assess for signs and symptoms as patient is at increased risk for of thromboembolism. Mobilization as tolerated. Intermittent pneumatic compression devices when in bed Thigh-high or knee-high thromboembolic deterrent hose when out of bed. - Lovenox subq daily.      Pain Control: diabetic neuropathy. no current joint or muscle symptoms, essentially pain-free. Will require regular pain assessment and comprenhensive pain management,   - lyrica     anxiety   - Buspar     Diabetes mellitus - Uncontrolled. poor glycemic control. Will require daily, close FSG monitoring and medication adjustment to optimize glycemic control in setting of acute illness and hospitalization.   - Diabetic neuropathy associated with type 2 diabetes mellitus ([facility] (Chronic)  10/2 - Pt was taking tresiba + scheduled 8u novolog at mealtime and SSI coverage. BS - [phone]. Continue SSI coverage. Resume metformin. Start slow, 500 bid. 10/3 metformin started. Monitor response.      Urinary retention/ neurogenic bladder - schedule voids q6-8 hrs. Check post-void residual every shift; In and Out catheterize if post-void residual is more than 400 cc.  - flomax.      bowel program - + constipation.   -Miralax, dialy. Colace daily  - dulcolax supp prn     GERD - resume PPI. At times may need additional antacids, Maalox prn. Time spent was 25 minutes with over 1/2 in direct patient care/examination, consultation and coordination of care.      Signed By: Itzel Gonzalez MD     October 3, 2019

## 2019-10-03 NOTE — PROGRESS NOTES
PHYSICAL THERAPY DAILY NOTE  Time In: 1116  Time Out: 1787  Patient Seen For: PM;Gait training; Therapeutic exercise;Transfer training    Subjective: Patient had no complaints. Objective: NO pain noted. Other (comment)(fall precautions)  GROSS ASSESSMENT Daily Assessment            COGNITION Daily Assessment           BED/MAT MOBILITY Daily Assessment    Rolling Right : 0 (Not tested)  Rolling Left : 0 (Not tested)  Supine to Sit : 0 (Not tested)  Sit to Supine : 0 (Not tested)       TRANSFERS Daily Assessment    Transfer Type: SPT with walker  Transfer Assistance : 5 (Stand-by assistance)  Sit to Stand Assistance: Stand-by assistance  Car Transfers: Not tested       GAIT Daily Assessment    Amount of Assistance: 5 (Stand-by assistance)  Distance (ft): 200 Feet (ft)  Assistive Device: Walker, rolling       STEPS or STAIRS Daily Assessment    Level of Assist : 0 (Not tested)       BALANCE Daily Assessment            WHEELCHAIR MOBILITY Daily Assessment            LOWER EXTREMITY EXERCISES Daily Assessment   Worked in II bars on balance , step length , and gait pattern. Extremity: Both  Exercise Type #1: Other (comment)(standing balance exs in II bars working on step length and gait pattern)  Sets Performed: 3  Reps Performed: 0  Level of Assist: Contact guard assistance          Assessment: Patient seems to be motivated with therapy. Plan of Care: Continue with plan of care.     Ana Henderson, PTA  10/3/2019

## 2019-10-04 LAB
GLUCOSE BLD STRIP.AUTO-MCNC: 231 MG/DL (ref 65–100)
GLUCOSE BLD STRIP.AUTO-MCNC: 269 MG/DL (ref 65–100)
GLUCOSE BLD STRIP.AUTO-MCNC: 272 MG/DL (ref 65–100)
GLUCOSE BLD STRIP.AUTO-MCNC: 370 MG/DL (ref 65–100)

## 2019-10-04 PROCEDURE — 97535 SELF CARE MNGMENT TRAINING: CPT

## 2019-10-04 PROCEDURE — 99232 SBSQ HOSP IP/OBS MODERATE 35: CPT | Performed by: PHYSICAL MEDICINE & REHABILITATION

## 2019-10-04 PROCEDURE — 74011250637 HC RX REV CODE- 250/637: Performed by: PHYSICAL MEDICINE & REHABILITATION

## 2019-10-04 PROCEDURE — 97530 THERAPEUTIC ACTIVITIES: CPT

## 2019-10-04 PROCEDURE — 97116 GAIT TRAINING THERAPY: CPT

## 2019-10-04 PROCEDURE — 82962 GLUCOSE BLOOD TEST: CPT

## 2019-10-04 PROCEDURE — 74011636637 HC RX REV CODE- 636/637: Performed by: PHYSICAL MEDICINE & REHABILITATION

## 2019-10-04 PROCEDURE — 65310000000 HC RM PRIVATE REHAB

## 2019-10-04 PROCEDURE — 97110 THERAPEUTIC EXERCISES: CPT

## 2019-10-04 PROCEDURE — 92507 TX SP LANG VOICE COMM INDIV: CPT

## 2019-10-04 RX ORDER — METFORMIN HYDROCHLORIDE 500 MG/1
1000 TABLET ORAL 2 TIMES DAILY WITH MEALS
Status: DISCONTINUED | OUTPATIENT
Start: 2019-10-05 | End: 2019-10-09 | Stop reason: HOSPADM

## 2019-10-04 RX ADMIN — PREGABALIN 150 MG: 150 CAPSULE ORAL at 17:03

## 2019-10-04 RX ADMIN — ASPIRIN 81 MG: 81 TABLET ORAL at 08:08

## 2019-10-04 RX ADMIN — VALSARTAN 80 MG: 40 TABLET, FILM COATED ORAL at 08:08

## 2019-10-04 RX ADMIN — FUROSEMIDE 40 MG: 20 TABLET ORAL at 08:09

## 2019-10-04 RX ADMIN — CIPROFLOXACIN HYDROCHLORIDE 500 MG: 500 TABLET, FILM COATED ORAL at 20:50

## 2019-10-04 RX ADMIN — EZETIMIBE 10 MG: 10 TABLET ORAL at 20:51

## 2019-10-04 RX ADMIN — INSULIN LISPRO 10 UNITS: 100 INJECTION, SOLUTION INTRAVENOUS; SUBCUTANEOUS at 21:29

## 2019-10-04 RX ADMIN — INSULIN LISPRO 6 UNITS: 100 INJECTION, SOLUTION INTRAVENOUS; SUBCUTANEOUS at 17:03

## 2019-10-04 RX ADMIN — INSULIN LISPRO 4 UNITS: 100 INJECTION, SOLUTION INTRAVENOUS; SUBCUTANEOUS at 08:07

## 2019-10-04 RX ADMIN — PANTOPRAZOLE SODIUM 40 MG: 40 TABLET, DELAYED RELEASE ORAL at 08:09

## 2019-10-04 RX ADMIN — ATORVASTATIN CALCIUM 80 MG: 40 TABLET, FILM COATED ORAL at 20:51

## 2019-10-04 RX ADMIN — NYSTATIN: 100000 POWDER TOPICAL at 17:04

## 2019-10-04 RX ADMIN — METFORMIN HYDROCHLORIDE 500 MG: 500 TABLET ORAL at 17:03

## 2019-10-04 RX ADMIN — METRONIDAZOLE 500 MG: 500 TABLET ORAL at 20:49

## 2019-10-04 RX ADMIN — METFORMIN HYDROCHLORIDE 500 MG: 500 TABLET ORAL at 08:09

## 2019-10-04 RX ADMIN — CIPROFLOXACIN HYDROCHLORIDE 500 MG: 500 TABLET, FILM COATED ORAL at 08:09

## 2019-10-04 RX ADMIN — POTASSIUM CHLORIDE 20 MEQ: 20 TABLET, EXTENDED RELEASE ORAL at 08:08

## 2019-10-04 RX ADMIN — METRONIDAZOLE 500 MG: 500 TABLET ORAL at 08:09

## 2019-10-04 RX ADMIN — INSULIN GLARGINE 20 UNITS: 100 INJECTION, SOLUTION SUBCUTANEOUS at 21:28

## 2019-10-04 RX ADMIN — NYSTATIN: 100000 POWDER TOPICAL at 08:09

## 2019-10-04 RX ADMIN — PREGABALIN 150 MG: 150 CAPSULE ORAL at 08:08

## 2019-10-04 RX ADMIN — CLOPIDOGREL BISULFATE 75 MG: 75 TABLET ORAL at 08:09

## 2019-10-04 RX ADMIN — FUROSEMIDE 40 MG: 20 TABLET ORAL at 17:03

## 2019-10-04 RX ADMIN — BUSPIRONE HYDROCHLORIDE 15 MG: 5 TABLET ORAL at 08:22

## 2019-10-04 RX ADMIN — POLYETHYLENE GLYCOL 3350 17 G: 17 POWDER, FOR SOLUTION ORAL at 08:09

## 2019-10-04 RX ADMIN — INSULIN LISPRO 6 UNITS: 100 INJECTION, SOLUTION INTRAVENOUS; SUBCUTANEOUS at 11:30

## 2019-10-04 RX ADMIN — DOCUSATE SODIUM 100 MG: 100 CAPSULE, LIQUID FILLED ORAL at 08:09

## 2019-10-04 RX ADMIN — TAMSULOSIN HYDROCHLORIDE 0.4 MG: 0.4 CAPSULE ORAL at 08:08

## 2019-10-04 NOTE — PROGRESS NOTES
PHYSICAL THERAPY DAILY NOTE  Time In: 1115  Time Out: 3216  Patient Seen For: AM;Balance activities; Therapeutic exercise;Gait training    Subjective: \"I seem to be dong pretty well from what I am told\"         Objective:   Vital Signs:    Patient Vitals for the past 12 hrs:   Temp Pulse Resp BP SpO2   10/04/19 0640 98.4 °F (36.9 °C) 99 18 111/74 91 %     Pain level:  Patient had no complaints of pain during therapy    Patient education: Fall prevention and safety with all mobility    Other (comment)(fall precautions)  GROSS ASSESSMENT Daily Assessment    AROM: Generally decreased, functional  Strength: Generally decreased, functional  Sensation: Impaired     COGNITION Daily Assessment    Alert, oriented and follows commands well. Slow to move and during conversation but engages well. Good attention and concentration during therapy     TRANSFERS Daily Assessment   Steady and controlled with SPT with use of the r/walker. Turns well and demonstrates good safety with SPT Transfer Type: SPT with walker  Transfer Assistance : 5 (Stand-by assistance)  Sit to Stand Assistance: Stand-by assistance  Car Transfers: Not tested     GAIT Daily Assessment   Steady and controlled with gait with the r/walker. Amount of Assistance: 5 (Supervision/setup)  Distance (ft): 200 Feet (ft)  Assistive Device: Walker, rolling     BALANCE Daily Assessment    Sitting - Static: Good (unsupported)  Sitting - Dynamic: Good (unsupported)  Standing - Static: Fair  Standing - Dynamic : Impaired     LOWER EXTREMITY EXERCISES Daily Assessment    Extremity: Both  Exercise Type #1: Seated lower extremity strengthening(NuStep x 10 level 3)  Level of Assist: Supervision  Exercise Type #2: Standing lower extremity strengthening(Parallel bars high marching/side stepping/ backward)  Sets Performed: 2  Reps Performed: 10  Level of Assist: Supervision          Assessment: Patient appears to be progressing well.   He has an unusual sense of humor but is kind and cooperative. He manages his size and weight well and demonstrates good safety with mobility       Patient ambulated to his room with his r/walker with SBA and was seated in the bedside chair with his call light and personal items within reach. Plan of Care: Continue to treat and progress as indicated.      Ahmet Sanchez  10/4/2019

## 2019-10-04 NOTE — PROGRESS NOTES
OT Daily Note  Time In 1032   Time Out 1120     Pain: Patient had no complaint of pain. Greeted pt and told him it was time to go out the gym. Pt was upset because he had not been washed up. Sandrita Varner was in earlier and he was mad she came earlier than the schedule said. Pt understands schedule. Later confirmed GARY Valenzuela attempted to get him ready, but the pt would keep changing the subject and not participating. Discussed yesterday he stated he did not want to wash up with nursing today and that he only baths once a week at home and did not want to wash up every day here. Pt was adamant that he never said such things. Accused therapist of bullying him and said she was trying to ruin his stay. Functional Mobility   Pt walked to gym with RW with I.      Self-Care   Pt donned sweatshirt, shoes, and sweatpants with s/u. Cognition   Pt completed medication management independently. Worked with bi-colored visual perceptual blocks. Pt needed mod A for moderately complex designs. Pt was dependent for complex designs. Discussed that pt may have some cognitive deficits in executive planning. Pt reported the activity was just not him. Asked pt what he needed to work on without response. Asked pt what he was able to do at home that he doesn't think he can do now. Pt reported he thinks he could do everything at home. Asked pt why he was here for the program. He reported he wanted rehabilitative services. Pt has poor insight to condition. Education   Purpose of rehab     Interdisciplinary Communication: GARY Valenzuela on pt's behavior    Plan: Continue with POC. Pt was left with PT Genesis Salinas.      Adriel Longoria OTR/L  10/4/2019

## 2019-10-04 NOTE — PROGRESS NOTES
Problem: Diabetes Self-Management  Goal: *Disease process and treatment process  Description  Define diabetes and identify own type of diabetes; list 3 options for treating diabetes. Outcome: Progressing Towards Goal  Goal: *Monitoring blood glucose, interpreting and using results  Description  Identify recommended blood glucose targets  and personal targets. Outcome: Progressing Towards Goal     Problem: Inpatient Rehab (Adult)  Goal: *LTG: Avoids injury/falls 100% of time related to deficits  Outcome: Progressing Towards Goal  Goal: *LTG: Avoids infection 100% of time related to deficits  Outcome: Progressing Towards Goal     Problem: Falls - Risk of  Goal: *Absence of Falls  Description  Document [place] Fall Risk and appropriate interventions in the flowsheet.   Outcome: Progressing Towards Goal  Note:   Fall Risk Interventions:  Mobility Interventions: Patient to call before getting OOB, Utilize walker, cane, or other assistive device    Mentation Interventions: Door open when patient unattended, Reorient patient, Toileting rounds    Medication Interventions: Patient to call before getting OOB    Elimination Interventions: Call light in reach, Patient to call for help with toileting needs, Toilet paper/wipes in reach    History of Falls Interventions: Door open when patient unattended

## 2019-10-04 NOTE — PROGRESS NOTES
PHYSICAL THERAPY DAILY NOTE  Time In: 6527  Time Out: 2309  Patient Seen For: PM;Balance activities;Transfer training; Therapeutic exercise;Gait training    Subjective: \"What all do we have to work on this afternoon? \"         Objective: Vital Signs:    Patient Vitals for the past 12 hrs:   Temp Pulse Resp BP SpO2   10/04/19 0640 98.4 °F (36.9 °C) 99 18 111/74 91 %     Pain level:  Patient had no complaints of pain during therapy    Patient education: Safety with mobility and fall prevention    Other (comment)(fall precautions)  GROSS ASSESSMENT Daily Assessment    AROM: Generally decreased, functional  Strength: Generally decreased, functional  Sensation: Impaired     COGNITION Daily Assessment    Same as this morning. TRANSFERS Daily Assessment   Steady and controlled with gait using the r/walker. Sit to stand is more unusual but he likes to do it on his own. Transfer Type: SPT with walker  Transfer Assistance : 5 (Stand-by assistance)  Sit to Stand Assistance: Stand-by assistance  Car Transfers: Not tested     GAIT Daily Assessment   Steady and controlled with gait. Tends to move slow and needs rest breaks in standing during prolonged gait. Amount of Assistance: 5 (Supervision/setup)  Distance (ft): 150 Feet (ft)(75' on 2nd walk to room)  Assistive Device: Walker, rolling     STEPS or STAIRS Daily Assessment   Needs verbal cues for proper sequencing up/down with rails.  Steps/Stairs Ambulated (#): 4(up step over step and down 1 step at a time)  Level of Assist : 5 (Stand-by assistance)  Rail Use: Both     BALANCE Daily Assessment    Sitting - Static: Good (unsupported)  Sitting - Dynamic: Good (unsupported)  Standing - Static: Fair;Constant support  Standing - Dynamic : Impaired     LOWER EXTREMITY EXERCISES Daily Assessment    Extremity: Both  Exercise Type #1: Seated lower extremity strengthening(1.5# used for knee ext and hip flex)  Sets Performed: 2  Reps Performed: 10  Level of Assist: Supervision  Exercise Type #2: Standing lower extremity strengthening(Parallel bars high marching/side stepping/ backward)  Sets Performed: 2  Reps Performed: 10  Level of Assist: Supervision          Assessment: Patient participated and tolerated therapy well today. Very slow with all tasks and needs encouragement at times. Very particular with his needs and how things are done. Patient ambulated with the r/walker to his room and was seated and positioned in the bedside recliner with the call light and his personal items within reach. Plan of Care: Continue to treat and progress as indicated and tolerated.      Ahmet Ghosh  10/4/2019

## 2019-10-04 NOTE — PROGRESS NOTES
OT Daily Note  Time In 1303   Time Out 1347     Subjective: Patient stated, \"it looks like you're a breath of fresh air. \"  Pain: none reported  Education: importance of OT  Interdisciplinary Communication: handoff to PT  Precautions: Other (comment)(fall precautions)  Location on arrival: seated in bedside chair    Activity Tolerance Daily Assessment   Ambulated from room to therapy gym using [address] with CGA-SBA. Completed activity while standing at table using table for UE support frequently. Patient stood for 5 minutes. Good tolerance, increased rest break (2x) while ambulating       Cognition Daily Assessment   Completed Rush Hour game 1-3 with moderate to stand-by assistance. He required increased verbal and visual cues initially with decreased prompting with each game. Moderate verbal prompts for correctly sequencing and planning, increased time required. Orientation: alert and oriented x 4  Expression: able to express needs verbally  Comprehension: understands basic instructions, moderate assist with complex instructions  Social Interaction: cooperating, appropriate with OT, participating, motivated to improve  Problem Solving: solves routine problems, moderate assist with complex problems   Memory: recalls people frequently seen, able to complete 2/3 step commands  Increased time to sequence and plan     Patient was left in therapy gym awaiting PT with all needs in reach and in no distress. Assessment: Good participation and ability to complete all therapist directed tasks. Activities completed to address executive functioning and activity tolerance. Plan: Continue OT POC with focus on ADL/IADL skills, functional transfers, functional mobility, coordination, strength, static and dynamic balance, and activity tolerance to maximize safety and independence with ADLs and functional transfers.      LILLIANA Vargas, FAVIANR/L  10/4/2019        Note may contain the following abbreviations:   Abbreviation Explanation   AROM Active range of motion   PROM Passive range of motion   SPT Stand pivot transfer   LPT Lateral pivot transfer   WC wheelchair   RW Rolling walker    BUE Bilateral upper extremities   BLE Bilateral lower extremities    WBAT Weight bearing as tolerated    TTWB Toe-touch weight bearing    AD Adaptive device   AE Adaptive equipment    NMES Neuro muscular electrical stimulation   UBE Upper body ergometer

## 2019-10-04 NOTE — PROGRESS NOTES
Problem: Diabetes Self-Management  Goal: *Disease process and treatment process  Description  Define diabetes and identify own type of diabetes; list 3 options for treating diabetes. Outcome: Progressing Towards Goal  Goal: *Incorporating nutritional management into lifestyle  Description  Describe effect of type, amount and timing of food on blood glucose; list 3 methods for planning meals. Outcome: Progressing Towards Goal  Goal: *Incorporating physical activity into lifestyle  Description  State effect of exercise on blood glucose levels. Outcome: Progressing Towards Goal  Goal: *Developing strategies to promote health/change behavior  Description  Define the ABC's of diabetes; identify appropriate screenings, schedule and personal plan for screenings. Outcome: Progressing Towards Goal  Goal: *Using medications safely  Description  State effect of diabetes medications on diabetes; name diabetes medication taking, action and side effects. Outcome: Progressing Towards Goal  Goal: *Monitoring blood glucose, interpreting and using results  Description  Identify recommended blood glucose targets  and personal targets. Outcome: Progressing Towards Goal  Goal: *Prevention, detection, treatment of acute complications  Description  List symptoms of hyper- and hypoglycemia; describe how to treat low blood sugar and actions for lowering  high blood glucose level. Outcome: Progressing Towards Goal  Goal: *Prevention, detection and treatment of chronic complications  Description  Define the natural course of diabetes and describe the relationship of blood glucose levels to long term complications of diabetes.   Outcome: Progressing Towards Goal  Goal: *Developing strategies to address psychosocial issues  Description  Describe feelings about living with diabetes; identify support needed and support network  Outcome: Progressing Towards Goal  Goal: *Insulin pump training  Outcome: Progressing Towards Goal  Goal: *Sick day guidelines  Outcome: Progressing Towards Goal  Goal: *Patient Specific Goal (EDIT GOAL, INSERT TEXT)  Outcome: Progressing Towards Goal     Problem: Patient Education: Go to Patient Education Activity  Goal: Patient/Family Education  Outcome: Progressing Towards Goal     Problem: Inpatient Rehab (Adult)  Goal: *LTG: Avoids injury/falls 100% of time related to deficits  Outcome: Progressing Towards Goal  Goal: *LTG: Avoids infection 100% of time related to deficits  Outcome: Progressing Towards Goal  Goal: *LTG: Verbalize understanding of diagnosis and risk factors for recurring stroke  Outcome: Progressing Towards Goal  Goal: *LTG: Absence of DVT during hospitalization  Outcome: Progressing Towards Goal  Goal: *LTG: Maintains Skin Integrity With No Evidence of Pressure Injury 100% of Time  Outcome: Progressing Towards Goal  Goal: *Nursing Goal (Insert Text)  Outcome: Progressing Towards Goal  Goal: *Nursing Goal (Insert Text)  Outcome: Progressing Towards Goal  Goal: Interventions  Outcome: Progressing Towards Goal     Problem: Falls - Risk of  Goal: *Absence of Falls  Description  Document [place] Fall Risk and appropriate interventions in the flowsheet.   Outcome: Progressing Towards Goal  Note:   Fall Risk Interventions:  Mobility Interventions: OT consult for ADLs, Patient to call before getting OOB, PT Consult for mobility concerns, PT Consult for assist device competence, Communicate number of staff needed for ambulation/transfer, Utilize walker, cane, or other assistive device    Mentation Interventions: Adequate sleep, hydration, pain control, Evaluate medications/consider consulting pharmacy, Increase mobility, Update white board    Medication Interventions: Evaluate medications/consider consulting pharmacy, Teach patient to arise slowly, Patient to call before getting OOB    Elimination Interventions: Call light in reach, Patient to call for help with toileting needs, Urinal in reach    History of Falls Interventions: Evaluate medications/consider consulting pharmacy, Utilize gait belt for transfer/ambulation         Problem: Patient Education: Go to Patient Education Activity  Goal: Patient/Family Education  Outcome: Progressing Towards Goal     Problem: Patient Education: Go to Patient Education Activity  Goal: Patient/Family Education  Outcome: Progressing Towards Goal     Problem: Pressure Injury - Risk of  Goal: *Prevention of pressure injury  Description  Document [place] Scale and appropriate interventions in the flowsheet.   Outcome: Progressing Towards Goal  Note:   Pressure Injury Interventions:  Sensory Interventions: Check visual cues for pain, Assess changes in LOC, Discuss PT/OT consult with provider, Minimize linen layers, Maintain/enhance activity level, Pressure redistribution bed/mattress (bed type)    Moisture Interventions: Check for incontinence Q2 hours and as needed, Absorbent underpads    Activity Interventions: Assess need for specialty bed    Mobility Interventions: Pressure redistribution bed/mattress (bed type), PT/OT evaluation    Nutrition Interventions: Document food/fluid/supplement intake    Friction and Shear Interventions: Lift team/patient mobility team                Problem: Patient Education: Go to Patient Education Activity  Goal: Patient/Family Education  Outcome: Progressing Towards Goal     Problem: Patient Education: Go to Patient Education Activity  Goal: Patient/Family Education  Outcome: Progressing Towards Goal

## 2019-10-04 NOTE — PROGRESS NOTES
10/04/19 0918   Time Spent With Patient   Time In 0827   Time Out 0911   Patient Seen For: AM   Mental Status   Neurologic State Alert   Cognition Follows commands;Memory loss;Decreased attention/concentration   Perseveration Perseverates during conversation   Safety/Judgement Decreased insight into deficits;Home safety      10/04/19 0918   Verbal Expression   Naming Impaired   Convergent (%) 10/15 with word deductions      10/04/19 0919   Neuro-Linguistic Exercises   Verbal Problem Solving Impaired; 70% accuracy answering questions related to his daily schedule with visual aid   Verbal Organization Impaired   Memory Impaired; decreased recall of names of staff today and needing verbal cues to recall activities completed with therapist previous session   Attention  Impaired     Patient participated with cognitive-linguistic therapy. Word deductions completed 10/15 with impaired reasoning naming items that only fit 1-2 of the words provided and moderate cues required. 70% accuracy answering questions related to his daily schedule with visual aid provided. More confused yesterday and today with decreased recall of activities completed with therapist previous session and decreased recall of names of staff. Patient expressed sadness over feeling a loss of independence. Provided active listening and reassurance. Recommend continued therapy to address cognitive-linguistic deficits.       Owen Marks MS, CCC-SLP

## 2019-10-04 NOTE — PROGRESS NOTES
[facility] PROGRESS NOTE    Yasmine Wagner  Admit Date: 10/1/2019  Admit Diagnosis: other neurologic condition    Subjective     Patient seen and examined. Vss.   PT, OT progressing steadily. Moves slow and needs rest breaks. No new barriers reported.      Objective:     Current Facility-Administered Medications   Medication Dose Route Frequency    metFORMIN (GLUCOPHAGE) tablet 500 mg  500 mg Oral BID WITH MEALS    albuterol (PROVENTIL VENTOLIN) nebulizer solution 2.5 mg  2.5 mg Nebulization Q6H PRN    acetaminophen (TYLENOL) tablet 650 mg  650 mg Oral Q6H PRN    albuterol (PROVENTIL VENTOLIN) nebulizer solution 2.5 mg  2.5 mg Nebulization Q4H PRN    aspirin delayed-release tablet 81 mg  81 mg Oral DAILY    atorvastatin (LIPITOR) tablet 80 mg  80 mg Oral QHS    bisacodyl (DULCOLAX) suppository 10 mg  10 mg Rectal DAILY PRN    busPIRone (BUSPAR) tablet 15 mg  15 mg Oral DAILY    clopidogrel (PLAVIX) tablet 75 mg  75 mg Oral DAILY    ezetimibe (ZETIA) tablet 10 mg  10 mg Oral QHS    furosemide (LASIX) tablet 40 mg  40 mg Oral ACB&D    HYDROcodone-acetaminophen (NORCO) 7.5-325 mg per tablet 1 Tab  1 Tab Oral Q6H PRN    insulin glargine (LANTUS) injection 20 Units  20 Units SubCUTAneous QHS    insulin lispro (HUMALOG) injection 0-10 Units  0-10 Units SubCUTAneous AC&HS    lip protectant (BLISTEX) ointment 1 Each  1 Each Topical PRN    nystatin (MYCOSTATIN) 100,000 unit/gram powder   Topical BID    pantoprazole (PROTONIX) tablet 40 mg  40 mg Oral ACB    polyethylene glycol (MIRALAX) packet 17 g  17 g Oral DAILY    potassium chloride (K-DUR, KLOR-CON) SR tablet 20 mEq  20 mEq Oral DAILY    pregabalin (LYRICA) capsule 150 mg  150 mg Oral BID    tamsulosin (FLOMAX) capsule 0.4 mg  0.4 mg Oral DAILY    valsartan (DIOVAN) tablet 80 mg  80 mg Oral DAILY    sodium chloride (NS) flush 5-40 mL  5-40 mL IntraVENous PRN    ciprofloxacin HCl (CIPRO) tablet 500 mg  500 mg Oral Q12H    metroNIDAZOLE (FLAGYL) tablet 500 mg  500 mg Oral Q12H    docusate sodium (COLACE) capsule 100 mg  100 mg Oral DAILY    lactulose (CHRONULAC) 10 gram/15 mL solution 30 mL  30 mL Oral DAILY PRN     Review of Systems: + subjective generalized weakness, + fatigue. Denies chest pain, shortness of breath, cough, headache, visual problems, abdominal pain, dysurea, calf pain. Pertinent positives are as noted in the medical records and unremarkable otherwise. Visit Vitals  /74 (BP 1 Location: Right arm, BP Patient Position: At rest)   Pulse 99   Temp 98.4 °F (36.9 °C)   Resp 18   Wt 274 lb 6.4 oz (124.5 kg)   SpO2 91%   BMI 44.29 kg/m²        Physical Exam:   General: Alert and age appropriately oriented. Pleasant. HEENT: Normocephalic, no JVD   Lungs: Clear to auscultation    Heart: Regular rate and rhythm, S1, S2   No  murmurs, clicks, rub or gallops   Abdomen: Softly distended, non-tender to palpation in all four quadrants. Bowel sounds present.      Genitourinary: defer   Neuromuscular:      PERRL, EOMI  LUE     Shoulder abduction  5- /5              Elbow flexion: 5-  /5               Wrist extension: 5- /5              Finger flexion;   5-/5  RUE    Shoulder abduction: 5- /5                Elbow flexion:  5- /5               Wrist extension: 5- /5              Finger flexion:  5- /5  LLE     Hip flexion:  3 /5              Knee extension:   4/5               Ankle dorsiflexion:  4 /5              Ankle plantarflexion:   4/5                                         RLE     Hip flexion:  3 /5              Knee extension:  4 /5               Ankle dorsiflexion:  4 /5              Ankle plantarflexion:  4 /5  Sensory - intact      Skin/extremity: ntact, dry, good skin turgor, age related changes present   Edema: 1+ BLE edema                                                                            Functional Assessment:  Gross Assessment  AROM: Generally decreased, functional (10/04/19 1200)  Strength: Generally decreased, functional (10/04/19 1200)  Sensation: Impaired (10/04/19 1200)       Balance  Sitting - Static: Good (unsupported) (10/04/19 1200)  Sitting - Dynamic: Good (unsupported) (10/04/19 1200)  Standing - Static: Fair (10/04/19 1200)  Standing - Dynamic : Impaired (10/04/19 1200)                     Lisha Frye Fall Risk Assessment:  Lisha Frye Fall Risk  Mobility: Ambulates or transfers with assist devices or assistance (10/04/19 9206)  Mobility Interventions: Patient to call before getting OOB;Utilize walker, cane, or other assistive device (10/04/19 2119)  Mentation: Alert, oriented x 3 (10/04/19 0639)  Mentation Interventions: Door open when patient unattended;Reorient patient; Toileting rounds (10/04/19 8545)  Medication: Patient receiving anticonvulsants, sedatives(tranquilizers), psychotropics or hypnotics, hypoglycemics, narcotics, sleep aids, antihypertensives, laxatives, or diuretics (10/04/19 6670)  Medication Interventions: Patient to call before getting OOB (10/04/19 7169)  Elimination: Needs assistance with toileting (10/04/19 7155)  Elimination Interventions: Call light in reach; Patient to call for help with toileting needs; Toilet paper/wipes in reach (10/04/19 2464)  Prior Fall History: No (10/04/19 1667)  History of Falls Interventions: Door open when patient unattended (10/04/19 0718)  Total Score: 3 (10/04/19 0718)  Standard Fall Precautions: Yes (10/04/19 0002)  High Fall Risk: Yes (10/04/19 0718)     Speech Assessment:  Aspiration Signs/Symptoms: None (10/02/19 1221)      Ambulation:  Gait  Distance (ft): 200 Feet (ft) (10/04/19 1200)  Assistive Device: Walker, rolling (10/04/19 1200)  Rail Use: Both (10/02/19 1630)     Labs/Studies:  Recent Results (from the past 72 hour(s))   GLUCOSE, POC    Collection Time: 10/01/19  5:03 PM   Result Value Ref Range    Glucose (POC) 248 (H) 65 - 100 mg/dL   GLUCOSE, POC    Collection Time: 10/01/19  9:26 PM   Result Value Ref Range    Glucose (POC) 255 (H) 65 - 100 mg/dL GLUCOSE, POC    Collection Time: 10/02/19  7:02 AM   Result Value Ref Range    Glucose (POC) 218 (H) 65 - 100 mg/dL   CBC WITH AUTOMATED DIFF    Collection Time: 10/02/19  7:04 AM   Result Value Ref Range    WBC 6.8 4.3 - 11.1 K/uL    RBC 4.14 (L) 4.23 - 5.6 M/uL    HGB 12.0 (L) 13.6 - 17.2 g/dL    HCT 37.3 (L) 41.1 - 50.3 %    MCV 90.1 79.6 - 97.8 FL    MCH 29.0 26.1 - 32.9 PG    MCHC 32.2 31.4 - 35.0 g/dL    RDW 16.1 (H) 11.9 - 14.6 %    PLATELET 848 672 - 492 K/uL    MPV 11.1 9.4 - 12.3 FL    ABSOLUTE NRBC 0.00 0.0 - 0.2 K/uL    DF AUTOMATED      NEUTROPHILS 52 43 - 78 %    LYMPHOCYTES 31 13 - 44 %    MONOCYTES 11 4.0 - 12.0 %    EOSINOPHILS 4 0.5 - 7.8 %    BASOPHILS 1 0.0 - 2.0 %    IMMATURE GRANULOCYTES 2 0.0 - 5.0 %    ABS. NEUTROPHILS 3.5 1.7 - 8.2 K/UL    ABS. LYMPHOCYTES 2.1 0.5 - 4.6 K/UL    ABS. MONOCYTES 0.7 0.1 - 1.3 K/UL    ABS. EOSINOPHILS 0.3 0.0 - 0.8 K/UL    ABS. BASOPHILS 0.0 0.0 - 0.2 K/UL    ABS. IMM. GRANS. 0.1 0.0 - 0.5 K/UL   MAGNESIUM    Collection Time: 10/02/19  7:04 AM   Result Value Ref Range    Magnesium 2.3 1.8 - 2.4 mg/dL   METABOLIC PANEL, COMPREHENSIVE    Collection Time: 10/02/19  7:04 AM   Result Value Ref Range    Sodium 140 136 - 145 mmol/L    Potassium 4.1 3.5 - 5.1 mmol/L    Chloride 105 98 - 107 mmol/L    CO2 28 21 - 32 mmol/L    Anion gap 7 7 - 16 mmol/L    Glucose 216 (H) 65 - 100 mg/dL    BUN 21 8 - 23 MG/DL    Creatinine 1.33 0.8 - 1.5 MG/DL    GFR est AA >60 >60 ml/min/1.73m2    GFR est non-AA 55 (L) >60 ml/min/1.73m2    Calcium 8.9 8.3 - 10.4 MG/DL    Bilirubin, total 0.4 0.2 - 1.1 MG/DL    ALT (SGPT) 27 12 - 65 U/L    AST (SGOT) 26 15 - 37 U/L    Alk.  phosphatase 97 50 - 136 U/L    Protein, total 7.5 6.3 - 8.2 g/dL    Albumin 2.9 (L) 3.2 - 4.6 g/dL    Globulin 4.6 (H) 2.3 - 3.5 g/dL    A-G Ratio 0.6 (L) 1.2 - 3.5     GLUCOSE, POC    Collection Time: 10/02/19 12:14 PM   Result Value Ref Range    Glucose (POC) 259 (H) 65 - 100 mg/dL   GLUCOSE, POC    Collection Time: 10/02/19  4:29 PM   Result Value Ref Range    Glucose (POC) 241 (H) 65 - 100 mg/dL   GLUCOSE, POC    Collection Time: 10/02/19  8:55 PM   Result Value Ref Range    Glucose (POC) 323 (H) 65 - 100 mg/dL   GLUCOSE, POC    Collection Time: 10/03/19  7:06 AM   Result Value Ref Range    Glucose (POC) 237 (H) 65 - 100 mg/dL   GLUCOSE, POC    Collection Time: 10/03/19 12:16 PM   Result Value Ref Range    Glucose (POC) 265 (H) 65 - 100 mg/dL   GLUCOSE, POC    Collection Time: 10/03/19  4:30 PM   Result Value Ref Range    Glucose (POC) 304 (H) 65 - 100 mg/dL   GLUCOSE, POC    Collection Time: 10/03/19  8:57 PM   Result Value Ref Range    Glucose (POC) 300 (H) 65 - 100 mg/dL   GLUCOSE, POC    Collection Time: 10/04/19  6:55 AM   Result Value Ref Range    Glucose (POC) 231 (H) 65 - 100 mg/dL   GLUCOSE, POC    Collection Time: 10/04/19 11:35 AM   Result Value Ref Range    Glucose (POC) 272 (H) 65 - 100 mg/dL       Assessment:     Problem List as of 10/4/2019 Date Reviewed: 9/18/2019          Codes Class Noted - Resolved    Cecal diverticulitis ICD-10-CM: K57.32  ICD-9-CM: 562.11  9/29/2019 - Present        RLQ abdominal pain ICD-10-CM: R10.31  ICD-9-CM: 789.03  9/28/2019 - Present        CKD (chronic kidney disease) stage 3, GFR 30-59 ml/min (HCC) (Chronic) ICD-10-CM: N18.3  ICD-9-CM: 585.3  6/24/2019 - Present        Cerebrovascular accident (CVA) ([facility] ICD-10-CM: I63.9  ICD-9-CM: 434.91  3/21/2019 - Present        Type 2 diabetes mellitus without complication ([facility] [id number]: E11.9  ICD-9-CM: 250.00  3/21/2019 - Present        Hemiparesis of left dominant side ([facility] ICD-10-CM: G81.92  ICD-9-CM: 342.91  3/7/2019 - Present        Type 2 diabetes with nephropathy ([facility] ICD-10-CM: E11.21  ICD-9-CM: 250.40, 583.81  5/11/2018 - Present        Morbid obesity due to excess calories ([facility] ICD-10-CM: E66.01  ICD-9-CM: 278.01  11/9/2017 - Present        Sleep apnea ICD-10-CM: G47.30  ICD-9-CM: 780.57  11/9/2017 - Present Esophageal dysphagia ICD-10-CM: R13.10  ICD-9-CM: 787.20  11/9/2017 - Present        TIA (transient ischemic attack) ICD-10-CM: G45.9  ICD-9-CM: 435.9  11/9/2017 - Present        Mild intermittent asthma without complication ADRIAN: G01.87  ICD-9-CM: 493.90  8/24/2017 - Present        Diabetic neuropathy associated with type 2 diabetes mellitus (HCC) (Chronic) ICD-10-CM: E11.40  ICD-9-CM: 250.60, 357.2  7/18/2017 - Present        Diastolic CHF, chronic (HCC) (Chronic) ICD-10-CM: I50.32  ICD-9-CM: 428.32, 428.0  3/20/2017 - Present        Mixed hyperlipidemia (Chronic) ICD-10-CM: J20.5  ICD-9-CM: 272.2  11/17/2016 - Present        Essential hypertension with goal blood pressure less than 130/85 (Chronic) ICD-10-CM: I10  ICD-9-CM: 401.9  7/5/2016 - Present        Pulmonary hypertension (HCC) (Chronic) ICD-10-CM: I27.20  ICD-9-CM: 416.8  10/14/2015 - Present        Mild diastolic dysfunction (Chronic) ICD-10-CM: I51.9  ICD-9-CM: 429.9  7/17/2015 - Present        Gastroesophageal reflux disease without esophagitis (Chronic) ICD-10-CM: K21.9  ICD-9-CM: 530.81  4/21/2015 - Present        Hyperlipidemia with target LDL less than 70 (Chronic) ICD-10-CM: E78.5  ICD-9-CM: 272.4  Unknown - Present        Essential hypertension, benign (Chronic) ICD-10-CM: I10  ICD-9-CM: 401.1  1/22/2015 - Present        Chronic constipation (Chronic) ICD-10-CM: K59.09  ICD-9-CM: 564.00  1/22/2015 - Present        Type 2 diabetes, uncontrolled, with neuropathy (HCC) (Chronic) ICD-10-CM: E11.40, E11.65  ICD-9-CM: 250.62, 357.2  1/22/2015 - Present        Obstructive sleep apnea of adult (Chronic) ICD-10-CM: G47.33  ICD-9-CM: 327.23  1/22/2015 - Present        Morbid obesity ([facility] (Chronic) ICD-10-CM: E66.01  ICD-9-CM: 278.01  6/27/2011 - Present        RESOLVED: Sepsis ([facility] ICD-10-CM: A41.9  ICD-9-CM: 038.9, 995.91  9/30/2019 - 9/30/2019        RESOLVED: Hyperkalemia ICD-10-CM: E87.5  ICD-9-CM: 276.7  9/27/2019 - 9/27/2019        RESOLVED: Acute metabolic encephalopathy ORL-25-SA: G93.41  ICD-9-CM: 348.31  9/25/2019 - 9/27/2019        RESOLVED: Postural dizziness ICD-10-CM: R42  ICD-9-CM: 780.4  5/11/2018 - 7/16/2018        RESOLVED: Chest pain ICD-10-CM: R07.9  ICD-9-CM: 786.50  5/11/2018 - 7/16/2018        RESOLVED: Type 2 diabetes mellitus without complication, with long-term current use of insulin ([facility] ICD-10-CM: E11.9, Z79.4  ICD-9-CM: 250.00, V58.67  11/9/2017 - 7/16/2018        RESOLVED: H/O colonoscopy (Chronic) ICD-10-CM: A66.337  ICD-9-CM: V45.89  7/18/2017 - 7/16/2018    Overview Signed 7/18/2017  7:06 AM by Levell Chicken     Colonoscopy in 7/29/10 Dr Juanito Jennings with repeat in 7/2020 [facility] Dr Mouna Hilton: Type 2 diabetes mellitus without complication, without long-term current use of insulin ([facility] ICD-10-CM: E11.9  ICD-9-CM: 250.00  3/20/2017 - 7/18/2017        RESOLVED: Sleep apnea ICD-10-CM: G47.30  ICD-9-CM: 780.57  3/20/2017 - 7/18/2017        RESOLVED: Generalized edema ICD-10-CM: R60.1  ICD-9-CM: 782.3  7/5/2016 - 7/18/2017        RESOLVED: Type 2 diabetes mellitus without complication ([facility] [id number]: E11.9  ICD-9-CM: 250.00  7/5/2016 - 2/21/2017        RESOLVED: Sleep apnea ICD-10-CM: G47.30  ICD-9-CM: 780.57  7/5/2016 - 12/16/2016        RESOLVED: Peripheral neuropathy ([facility] ICD-10-CM: G62.9  ICD-9-CM: 356.9  1/22/2015 - 7/18/2017        RESOLVED: Chest pain, unspecified ICD-10-CM: R07.9  ICD-9-CM: 786.50  6/27/2011 - 1/22/2015        RESOLVED: HTN (hypertension) ICD-10-CM: I10  ICD-9-CM: 401.9  6/27/2011 - 1/22/2015        RESOLVED: DM circ dis type II ICD-10-CM: E11.51  ICD-9-CM: 250.70  6/27/2011 - 9/22/2017              Plan:     Rehabilitation Plan  The patient has shown the ability to tolerate and benefit from 3 hours of therapy daily and is being admitted to a comprehensive acute inpatient rehabilitation program consisting of at least 3 hours of combined physical and occupational therapies. continue intensive Physical Therapy for a minimum of 1.5 hours a day, at least 5 out of 7 days per week to address bed mobility, transfers, ambulation, strengthening, balance, and endurance. continue intensive Occupational Therapy for a minimum of 1.5 hours a day, at least 5 out of 7 days per week to address ADL ( bathing, LE dressing, toileting) and adaptive equipment as needed.     Continue 24-hour skilled rehabilitation nursing for bowel and bladder management, skin care for decubitus ulcer prevention , pain management and ongoing medication administration      Continue daily physician medical management:  Cecal diverticulitis  - iv antibiotics transitioned to po Flagyl + po cipro x 7 days, until 10/5.   - 10/4 -No acute symptoms.      Acute metabolic encephalopathy  - monitor. Continue ST treatment. 10/4 - mental status appears at baseline      CKD (chronic kidney disease) stage 3,   - monitor Cr. Steady improvement. 10/2 - 08/5.32     CAD/ Diastolic CHF, chronic /Essential hypertension, benign (Chronic)/ Pulmonary hypertension (HCC) (Chronic)  - diovan 80  - lasix 40 bid  - aspirin/ plavix. - 10/2 continue lasix bid. Monitor renal funciton, may be able to reduce dose soon. 10/3 pt reports he has been on scheduled lasix, dose variable. Will continue lasix 40 bid and monitor volume status, renal funciton. No acute decompensation. 10/4 - monitor renal function. Continue current lasix dose. Check renaol function Monday.      Obstructive sleep apnea of adult   - CPAP hs.  Add - prn albuterol inh.      Morbid obesity - Chronic  - dietary/ nutrition consult     Hyperlipidemia with target LDL less than 70 (Chronic)        Pneumonia prophylaxis- Insentive spirometer every hour while awake     DVT risk / DVT Prophylaxis- Will require daily physician exam to assess for signs and symptoms as patient is at increased risk for of thromboembolism. Mobilization as tolerated.  Intermittent pneumatic compression devices when in bed Thigh-high or knee-high thromboembolic deterrent hose when out of bed.   - SCDs. Pain Control: diabetic neuropathy. no current joint or muscle symptoms, essentially pain-free. Will require regular pain assessment and comprenhensive pain management,   - lyrica  - occasional norco for chronic LBP.      anxiety   - Buspar     Diabetes mellitus - Uncontrolled. poor glycemic control. Will require daily, close FSG monitoring and medication adjustment to optimize glycemic control in setting of acute illness and hospitalization.   - Diabetic neuropathy associated with type 2 diabetes mellitus ([facility] (Chronic)  10/2 - Pt was taking tresiba + scheduled 8u novolog at mealtime and SSI coverage. BS - [phone]. Continue SSI coverage. Resume metformin. Start slow, 500 bid. 10/3 metformin started. Monitor response. 10/4 increase metformin to home dose 1000 bid.       Urinary retention/ neurogenic bladder - schedule voids q6-8 hrs. Check post-void residual every shift; In and Out catheterize if post-void residual is more than 400 cc.  - flomax.      bowel program - + constipation.   -Miralax, dialy. Colace daily  - dulcolax supp prn     GERD - resume PPI. At times may need additional antacids, Maalox prn. Time spent was 25 minutes with over 1/2 in direct patient care/examination, consultation and coordination of care.      Signed By: David Wilks MD     October 4, 2019

## 2019-10-05 LAB
GLUCOSE BLD STRIP.AUTO-MCNC: 207 MG/DL (ref 65–100)
GLUCOSE BLD STRIP.AUTO-MCNC: 215 MG/DL (ref 65–100)
GLUCOSE BLD STRIP.AUTO-MCNC: 249 MG/DL (ref 65–100)
GLUCOSE BLD STRIP.AUTO-MCNC: 307 MG/DL (ref 65–100)

## 2019-10-05 PROCEDURE — 74011250637 HC RX REV CODE- 250/637: Performed by: PHYSICAL MEDICINE & REHABILITATION

## 2019-10-05 PROCEDURE — 97150 GROUP THERAPEUTIC PROCEDURES: CPT

## 2019-10-05 PROCEDURE — 65310000000 HC RM PRIVATE REHAB

## 2019-10-05 PROCEDURE — 99232 SBSQ HOSP IP/OBS MODERATE 35: CPT | Performed by: PHYSICAL MEDICINE & REHABILITATION

## 2019-10-05 PROCEDURE — 82962 GLUCOSE BLOOD TEST: CPT

## 2019-10-05 PROCEDURE — 74011636637 HC RX REV CODE- 636/637: Performed by: PHYSICAL MEDICINE & REHABILITATION

## 2019-10-05 PROCEDURE — 97116 GAIT TRAINING THERAPY: CPT

## 2019-10-05 RX ADMIN — HYDROCODONE BITARTRATE AND ACETAMINOPHEN 1 TABLET: 7.5; 325 TABLET ORAL at 20:59

## 2019-10-05 RX ADMIN — METRONIDAZOLE 500 MG: 500 TABLET ORAL at 08:40

## 2019-10-05 RX ADMIN — ASPIRIN 81 MG: 81 TABLET ORAL at 08:38

## 2019-10-05 RX ADMIN — METFORMIN HYDROCHLORIDE 1000 MG: 500 TABLET ORAL at 08:37

## 2019-10-05 RX ADMIN — INSULIN LISPRO 4 UNITS: 100 INJECTION, SOLUTION INTRAVENOUS; SUBCUTANEOUS at 16:47

## 2019-10-05 RX ADMIN — ATORVASTATIN CALCIUM 80 MG: 40 TABLET, FILM COATED ORAL at 21:01

## 2019-10-05 RX ADMIN — INSULIN HUMAN 4 UNITS: 100 INJECTION, SOLUTION PARENTERAL at 12:07

## 2019-10-05 RX ADMIN — METFORMIN HYDROCHLORIDE 1000 MG: 500 TABLET ORAL at 16:09

## 2019-10-05 RX ADMIN — FUROSEMIDE 40 MG: 20 TABLET ORAL at 08:39

## 2019-10-05 RX ADMIN — POTASSIUM CHLORIDE 20 MEQ: 20 TABLET, EXTENDED RELEASE ORAL at 08:40

## 2019-10-05 RX ADMIN — NYSTATIN: 100000 POWDER TOPICAL at 08:41

## 2019-10-05 RX ADMIN — METRONIDAZOLE 500 MG: 500 TABLET ORAL at 21:01

## 2019-10-05 RX ADMIN — INSULIN HUMAN 4 UNITS: 100 INJECTION, SOLUTION PARENTERAL at 16:48

## 2019-10-05 RX ADMIN — INSULIN LISPRO 4 UNITS: 100 INJECTION, SOLUTION INTRAVENOUS; SUBCUTANEOUS at 08:41

## 2019-10-05 RX ADMIN — NYSTATIN: 100000 POWDER TOPICAL at 17:12

## 2019-10-05 RX ADMIN — EZETIMIBE 10 MG: 10 TABLET ORAL at 22:00

## 2019-10-05 RX ADMIN — VALSARTAN 80 MG: 40 TABLET, FILM COATED ORAL at 08:35

## 2019-10-05 RX ADMIN — BUSPIRONE HYDROCHLORIDE 15 MG: 5 TABLET ORAL at 08:37

## 2019-10-05 RX ADMIN — INSULIN LISPRO 4 UNITS: 100 INJECTION, SOLUTION INTRAVENOUS; SUBCUTANEOUS at 21:01

## 2019-10-05 RX ADMIN — PREGABALIN 150 MG: 150 CAPSULE ORAL at 17:09

## 2019-10-05 RX ADMIN — INSULIN LISPRO 4 UNITS: 100 INJECTION, SOLUTION INTRAVENOUS; SUBCUTANEOUS at 11:59

## 2019-10-05 RX ADMIN — PANTOPRAZOLE SODIUM 40 MG: 40 TABLET, DELAYED RELEASE ORAL at 05:33

## 2019-10-05 RX ADMIN — PREGABALIN 150 MG: 150 CAPSULE ORAL at 08:37

## 2019-10-05 RX ADMIN — TAMSULOSIN HYDROCHLORIDE 0.4 MG: 0.4 CAPSULE ORAL at 08:39

## 2019-10-05 RX ADMIN — FUROSEMIDE 40 MG: 20 TABLET ORAL at 16:54

## 2019-10-05 RX ADMIN — CLOPIDOGREL BISULFATE 75 MG: 75 TABLET ORAL at 08:38

## 2019-10-05 RX ADMIN — INSULIN GLARGINE 20 UNITS: 100 INJECTION, SOLUTION SUBCUTANEOUS at 21:01

## 2019-10-05 NOTE — PROGRESS NOTES
OT Daily Note  Time In 1030   Time Out 1100     Subjective: Patient agreeable for therapy  Pain: none  Education: pt educated on technique with therapy tasks  Interdisciplinary Communication: PT  Precautions: Other (comment)(fall precautions)    Strenghening & Activity Tolerance    Group Note:  Patient performed reaching activity using the MMRT with 1 UE at a time while donning 1 pound wrist weights with fair quality and extra time, working on cognition, B UE reach, B UE endurance/coordination, and visual motor skills. Patient performed rickshaw exercises with 26 pounds total weight, 70 trpd with brief rest breaks between sets, working on B UE strength/endurance. Assessment: Patient able to follow basic commands with tasks, though he had difficulty with higher level cognitive commands. Pt appears to be steadily progressing towards goals. Plan: Cont OT per tx plan.   Josselyn Trimble, OT

## 2019-10-05 NOTE — PROGRESS NOTES
Problem: Diabetes Self-Management  Goal: *Disease process and treatment process  Description  Define diabetes and identify own type of diabetes; list 3 options for treating diabetes. Outcome: Progressing Towards Goal  Goal: *Incorporating nutritional management into lifestyle  Description  Describe effect of type, amount and timing of food on blood glucose; list 3 methods for planning meals. Outcome: Progressing Towards Goal  Goal: *Incorporating physical activity into lifestyle  Description  State effect of exercise on blood glucose levels. Outcome: Progressing Towards Goal  Goal: *Developing strategies to promote health/change behavior  Description  Define the ABC's of diabetes; identify appropriate screenings, schedule and personal plan for screenings. Outcome: Progressing Towards Goal  Goal: *Using medications safely  Description  State effect of diabetes medications on diabetes; name diabetes medication taking, action and side effects. Outcome: Progressing Towards Goal  Goal: *Monitoring blood glucose, interpreting and using results  Description  Identify recommended blood glucose targets  and personal targets. Outcome: Progressing Towards Goal  Goal: *Prevention, detection, treatment of acute complications  Description  List symptoms of hyper- and hypoglycemia; describe how to treat low blood sugar and actions for lowering  high blood glucose level. Outcome: Progressing Towards Goal  Goal: *Prevention, detection and treatment of chronic complications  Description  Define the natural course of diabetes and describe the relationship of blood glucose levels to long term complications of diabetes.   Outcome: Progressing Towards Goal  Goal: *Developing strategies to address psychosocial issues  Description  Describe feelings about living with diabetes; identify support needed and support network  Outcome: Progressing Towards Goal  Goal: *Insulin pump training  Outcome: Progressing Towards Goal  Goal: *Sick day guidelines  Outcome: Progressing Towards Goal  Goal: *Patient Specific Goal (EDIT GOAL, INSERT TEXT)  Outcome: Progressing Towards Goal     Problem: Patient Education: Go to Patient Education Activity  Goal: Patient/Family Education  Outcome: Progressing Towards Goal     Problem: Inpatient Rehab (Adult)  Goal: *LTG: Avoids injury/falls 100% of time related to deficits  Outcome: Progressing Towards Goal  Goal: *LTG: Avoids infection 100% of time related to deficits  Outcome: Progressing Towards Goal  Goal: *LTG: Verbalize understanding of diagnosis and risk factors for recurring stroke  Outcome: Progressing Towards Goal  Goal: *LTG: Absence of DVT during hospitalization  Outcome: Progressing Towards Goal  Goal: *LTG: Maintains Skin Integrity With No Evidence of Pressure Injury 100% of Time  Outcome: Progressing Towards Goal  Goal: *Nursing Goal (Insert Text)  Outcome: Progressing Towards Goal  Goal: *Nursing Goal (Insert Text)  Outcome: Progressing Towards Goal  Goal: Interventions  Outcome: Progressing Towards Goal     Problem: Falls - Risk of  Goal: *Absence of Falls  Description  Document Shabbir Fall Risk and appropriate interventions in the flowsheet.   Outcome: Progressing Towards Goal  Note:   Fall Risk Interventions:  Mobility Interventions: OT consult for ADLs, Patient to call before getting OOB, PT Consult for mobility concerns, PT Consult for assist device competence, Utilize walker, cane, or other assistive device    Mentation Interventions: Adequate sleep, hydration, pain control, Door open when patient unattended, Evaluate medications/consider consulting pharmacy    Medication Interventions: Patient to call before getting OOB, Teach patient to arise slowly, Evaluate medications/consider consulting pharmacy    Elimination Interventions: Call light in reach, Patient to call for help with toileting needs    History of Falls Interventions: Evaluate medications/consider consulting pharmacy, Door open when patient unattended, Consult care management for discharge planning         Problem: Patient Education: Go to Patient Education Activity  Goal: Patient/Family Education  Outcome: Progressing Towards Goal     Problem: Patient Education: Go to Patient Education Activity  Goal: Patient/Family Education  Outcome: Progressing Towards Goal     Problem: Pressure Injury - Risk of  Goal: *Prevention of pressure injury  Description  Document Odell Scale and appropriate interventions in the flowsheet.   Outcome: Progressing Towards Goal  Note:   Pressure Injury Interventions:  Sensory Interventions: Check visual cues for pain, Assess changes in LOC, Discuss PT/OT consult with provider, Pressure redistribution bed/mattress (bed type), Keep linens dry and wrinkle-free, Minimize linen layers, Maintain/enhance activity level    Moisture Interventions: Apply protective barrier, creams and emollients, Check for incontinence Q2 hours and as needed, Moisture barrier    Activity Interventions: Increase time out of bed, Pressure redistribution bed/mattress(bed type), PT/OT evaluation    Mobility Interventions: Pressure redistribution bed/mattress (bed type)    Nutrition Interventions: Document food/fluid/supplement intake    Friction and Shear Interventions: HOB 30 degrees or less, Minimize layers, Lift team/patient mobility team, Lift sheet                Problem: Patient Education: Go to Patient Education Activity  Goal: Patient/Family Education  Outcome: Progressing Towards Goal     Problem: Patient Education: Go to Patient Education Activity  Goal: Patient/Family Education  Outcome: Progressing Towards Goal

## 2019-10-05 NOTE — PROGRESS NOTES
Jeralene Holter, MD,   Medical Director  [address] [facility], [address] [facility]  Tel: [phone]       SFD PROGRESS NOTE    Velma Laurent  Admit Date: 10/1/2019  Admit Diagnosis: other neurologic condition    Subjective   Pt feels well. Requesting new OT due to interaction yesterday. (see OT note from 10/4) . Pt seems to have had issues with his schedule, not just with OT. Explained to him that sticking to the schedule is important.  Pt wants to control own care  Objective:     Current Facility-Administered Medications   Medication Dose Route Frequency    metFORMIN (GLUCOPHAGE) tablet 1,000 mg  1,000 mg Oral BID WITH MEALS    albuterol (PROVENTIL VENTOLIN) nebulizer solution 2.5 mg  2.5 mg Nebulization Q6H PRN    acetaminophen (TYLENOL) tablet 650 mg  650 mg Oral Q6H PRN    albuterol (PROVENTIL VENTOLIN) nebulizer solution 2.5 mg  2.5 mg Nebulization Q4H PRN    aspirin delayed-release tablet 81 mg  81 mg Oral DAILY    atorvastatin (LIPITOR) tablet 80 mg  80 mg Oral QHS    bisacodyl (DULCOLAX) suppository 10 mg  10 mg Rectal DAILY PRN    busPIRone (BUSPAR) tablet 15 mg  15 mg Oral DAILY    clopidogrel (PLAVIX) tablet 75 mg  75 mg Oral DAILY    ezetimibe (ZETIA) tablet 10 mg  10 mg Oral QHS    furosemide (LASIX) tablet 40 mg  40 mg Oral ACB&D    HYDROcodone-acetaminophen (NORCO) 7.5-325 mg per tablet 1 Tab  1 Tab Oral Q6H PRN    insulin glargine (LANTUS) injection 20 Units  20 Units SubCUTAneous QHS    insulin lispro (HUMALOG) injection 0-10 Units  0-10 Units SubCUTAneous AC&HS    lip protectant (BLISTEX) ointment 1 Each  1 Each Topical PRN    nystatin (MYCOSTATIN) 100,000 unit/gram powder   Topical BID    pantoprazole (PROTONIX) tablet 40 mg  40 mg Oral ACB    polyethylene glycol (MIRALAX) packet 17 g  17 g Oral DAILY    potassium chloride (K-DUR, KLOR-CON) SR tablet 20 mEq  20 mEq Oral DAILY    pregabalin (LYRICA) capsule 150 mg  150 mg Oral BID    tamsulosin (FLOMAX) capsule 0.4 mg  0.4 mg Oral DAILY    valsartan (DIOVAN) tablet 80 mg  80 mg Oral DAILY    sodium chloride (NS) flush 5-40 mL  5-40 mL IntraVENous PRN    metroNIDAZOLE (FLAGYL) tablet 500 mg  500 mg Oral Q12H    docusate sodium (COLACE) capsule 100 mg  100 mg Oral DAILY    lactulose (CHRONULAC) 10 gram/15 mL solution 30 mL  30 mL Oral DAILY PRN     Review of Systems:Denies chest pain, shortness of breath, cough, headache, visual problems, abdominal pain, dysurea, calf pain. Pertinent positives are as noted in the medical records and unremarkable otherwise. Visit Vitals  /50 (BP 1 Location: Right arm, BP Patient Position: Sitting)   Pulse 97   Temp 98.3 °F (36.8 °C)   Resp 18   Wt 274 lb 6.4 oz (124.5 kg)   SpO2 92%   BMI 44.29 kg/m²        Physical Exam:   General: Alert and age appropriately oriented. No acute cardio respiratory distress. HEENT: Normocephalic,no scleral icterus  Oral mucosa moist without cyanosis   Lungs: Clear to auscultation  bilaterally. Respiration even and unlabored   Heart: Regular rate and rhythm, S1, S2   No  murmurs, clicks, rub or gallops   Abdomen: Soft, non-tender, nondistended. Bowel sounds present. No organomegaly. obese   Genitourinary: defer   Neuromuscular:      Grossly no focal motor deficits noted. Moves ankles. Generalized prox> distal weakness  Perseverative, poor recal. Dec attn/concent. Very poor insight    Skin/extremity: No rashes, no erythema.  No calf tenderness BLE  1+ edema                                                                            Functional Assessment:  Gross Assessment  AROM: Generally decreased, functional (10/04/19 1500)  Strength: Generally decreased, functional (10/04/19 1500)  Sensation: Impaired (10/04/19 1500)       Balance  Sitting - Static: Good (unsupported) (10/04/19 1500)  Sitting - Dynamic: Good (unsupported) (10/04/19 1500)  Standing - Static: Fair;Constant support (10/04/19 1500)  Standing - Dynamic : Impaired (10/04/19 1500)                     Donata Carrel Fall Risk Assessment:  Donata Carrel Fall Risk  Mobility: Ambulates or transfers with assist devices or assistance (10/04/19 1953)  Mobility Interventions: Patient to call before getting OOB; Strengthening exercises (ROM-active/passive); Utilize walker, cane, or other assistive device (10/04/19 1953)  Mentation: Alert, oriented x 3 (10/04/19 1953)  Mentation Interventions: Increase mobility; Adequate sleep, hydration, pain control (10/04/19 1953)  Medication: Patient receiving anticonvulsants, sedatives(tranquilizers), psychotropics or hypnotics, hypoglycemics, narcotics, sleep aids, antihypertensives, laxatives, or diuretics (10/04/19 1953)  Medication Interventions: Patient to call before getting OOB; Teach patient to arise slowly (10/04/19 1953)  Elimination: Needs assistance with toileting (10/04/19 1953)  Elimination Interventions: Call light in reach; Patient to call for help with toileting needs (10/04/19 1953)  Prior Fall History: No (10/04/19 1953)  History of Falls Interventions: Consult care management for discharge planning (10/04/19 1953)  Total Score: 3 (10/04/19 1953)  Standard Fall Precautions: Yes (10/04/19 0002)  High Fall Risk: Yes (10/04/19 1953)     Speech Assessment:  Aspiration Signs/Symptoms: None (10/02/19 1221)      Ambulation:  Gait  Distance (ft): 150 Feet (ft)(75' on 2nd walk to room) (10/04/19 1500)  Assistive Device: Halle Rouleau, rolling (10/04/19 1500)  Rail Use: Both (10/04/19 1500)     Labs/Studies:  Recent Results (from the past 72 hour(s))   GLUCOSE, POC    Collection Time: 10/02/19 12:14 PM   Result Value Ref Range    Glucose (POC) 259 (H) 65 - 100 mg/dL   GLUCOSE, POC    Collection Time: 10/02/19  4:29 PM   Result Value Ref Range    Glucose (POC) 241 (H) 65 - 100 mg/dL   GLUCOSE, POC    Collection Time: 10/02/19  8:55 PM   Result Value Ref Range    Glucose (POC) 323 (H) 65 - 100 mg/dL   GLUCOSE, POC    Collection Time: 10/03/19  7:06 AM   Result Value Ref Range    Glucose (POC) 237 (H) 65 - 100 mg/dL   GLUCOSE, POC    Collection Time: 10/03/19 12:16 PM   Result Value Ref Range    Glucose (POC) 265 (H) 65 - 100 mg/dL   GLUCOSE, POC    Collection Time: 10/03/19  4:30 PM   Result Value Ref Range    Glucose (POC) 304 (H) 65 - 100 mg/dL   GLUCOSE, POC    Collection Time: 10/03/19  8:57 PM   Result Value Ref Range    Glucose (POC) 300 (H) 65 - 100 mg/dL   GLUCOSE, POC    Collection Time: 10/04/19  6:55 AM   Result Value Ref Range    Glucose (POC) 231 (H) 65 - 100 mg/dL   GLUCOSE, POC    Collection Time: 10/04/19 11:35 AM   Result Value Ref Range    Glucose (POC) 272 (H) 65 - 100 mg/dL   GLUCOSE, POC    Collection Time: 10/04/19  4:12 PM   Result Value Ref Range    Glucose (POC) 269 (H) 65 - 100 mg/dL   GLUCOSE, POC    Collection Time: 10/04/19  9:01 PM   Result Value Ref Range    Glucose (POC) 370 (H) 65 - 100 mg/dL   GLUCOSE, POC    Collection Time: 10/05/19  7:48 AM   Result Value Ref Range    Glucose (POC) 207 (H) 65 - 100 mg/dL       Assessment:     Problem List as of 10/5/2019 Date Reviewed: 9/18/2019          Codes Class Noted - Resolved    Cecal diverticulitis ICD-10-CM: K57.32  ICD-9-CM: 562.11  9/29/2019 - Present        RLQ abdominal pain ICD-10-CM: R10.31  ICD-9-CM: 789.03  9/28/2019 - Present        CKD (chronic kidney disease) stage 3, GFR 30-59 ml/min (HCC) (Chronic) ICD-10-CM: N18.3  ICD-9-CM: 585.3  6/24/2019 - Present        Cerebrovascular accident (CVA) ([facility] ICD-10-CM: I63.9  ICD-9-CM: 434.91  3/21/2019 - Present        Type 2 diabetes mellitus without complication ([facility] [id number]: E11.9  ICD-9-CM: 250.00  3/21/2019 - Present        Hemiparesis of left dominant side ([facility] ICD-10-CM: G81.92  ICD-9-CM: 342.91  3/7/2019 - Present        Type 2 diabetes with nephropathy ([facility] ICD-10-CM: E11.21  ICD-9-CM: 250.40, 583.81  5/11/2018 - Present        Morbid obesity due to excess calories ([facility] ICD-10-CM: E66.01  ICD-9-CM: 278.01  11/9/2017 - Present        Sleep apnea ICD-10-CM: G47.30  ICD-9-CM: 780.57  11/9/2017 - Present        Esophageal dysphagia ICD-10-CM: R13.10  ICD-9-CM: 787.20  11/9/2017 - Present        TIA (transient ischemic attack) ICD-10-CM: G45.9  ICD-9-CM: 435.9  11/9/2017 - Present        Mild intermittent asthma without complication [id number]: U98.43  ICD-9-CM: 493.90  8/24/2017 - Present        Diabetic neuropathy associated with type 2 diabetes mellitus (HCC) (Chronic) ICD-10-CM: E11.40  ICD-9-CM: 250.60, 357.2  7/18/2017 - Present        Diastolic CHF, chronic (HCC) (Chronic) ICD-10-CM: I50.32  ICD-9-CM: 428.32, 428.0  3/20/2017 - Present        Mixed hyperlipidemia (Chronic) ICD-10-CM: T06.4  ICD-9-CM: 272.2  11/17/2016 - Present        Essential hypertension with goal blood pressure less than 130/85 (Chronic) ICD-10-CM: I10  ICD-9-CM: 401.9  7/5/2016 - Present        Pulmonary hypertension (HCC) (Chronic) ICD-10-CM: I27.20  ICD-9-CM: 416.8  10/14/2015 - Present        Mild diastolic dysfunction (Chronic) ICD-10-CM: I51.9  ICD-9-CM: 429.9  7/17/2015 - Present        Gastroesophageal reflux disease without esophagitis (Chronic) ICD-10-CM: K21.9  ICD-9-CM: 530.81  4/21/2015 - Present        Hyperlipidemia with target LDL less than 70 (Chronic) ICD-10-CM: E78.5  ICD-9-CM: 272.4  Unknown - Present        Essential hypertension, benign (Chronic) ICD-10-CM: I10  ICD-9-CM: 401.1  1/22/2015 - Present        Chronic constipation (Chronic) ICD-10-CM: K59.09  ICD-9-CM: 564.00  1/22/2015 - Present        Type 2 diabetes, uncontrolled, with neuropathy (HCC) (Chronic) ICD-10-CM: E11.40, E11.65  ICD-9-CM: 250.62, 357.2  1/22/2015 - Present        Obstructive sleep apnea of adult (Chronic) ICD-10-CM: G47.33  ICD-9-CM: 327.23  1/22/2015 - Present        Morbid obesity ([facility] (Chronic) ICD-10-CM: E66.01  ICD-9-CM: 278.01  6/27/2011 - Present        RESOLVED: Sepsis ([facility] ICD-10-CM: A41.9  ICD-9-CM: 038.9, 995.91  9/30/2019 - 9/30/2019 RESOLVED: Hyperkalemia ICD-10-CM: E87.5  ICD-9-CM: 276.7  9/27/2019 - 9/27/2019        RESOLVED: Acute metabolic encephalopathy [id number]: G93.41  ICD-9-CM: 348.31  9/25/2019 - 9/27/2019        RESOLVED: Postural dizziness ICD-10-CM: R42  ICD-9-CM: 780.4  5/11/2018 - 7/16/2018        RESOLVED: Chest pain ICD-10-CM: R07.9  ICD-9-CM: 786.50  5/11/2018 - 7/16/2018        RESOLVED: Type 2 diabetes mellitus without complication, with long-term current use of insulin ([facility] ICD-10-CM: E11.9, Z79.4  ICD-9-CM: 250.00, V58.67  11/9/2017 - 7/16/2018        RESOLVED: H/O colonoscopy (Chronic) ICD-10-CM: W25.406  ICD-9-CM: V45.89  7/18/2017 - 7/16/2018    Overview Signed 7/18/2017  7:06 AM by [place]     Colonoscopy in 7/29/10 Dr Juanito Jennings with repeat in 7/2020 [facility] Dr Mouna Hilton: Type 2 diabetes mellitus without complication, without long-term current use of insulin ([facility] ICD-10-CM: E11.9  ICD-9-CM: 250.00  3/20/2017 - 7/18/2017        RESOLVED: Sleep apnea ICD-10-CM: G47.30  ICD-9-CM: 780.57  3/20/2017 - 7/18/2017        RESOLVED: Generalized edema ICD-10-CM: R60.1  ICD-9-CM: 782.3  7/5/2016 - 7/18/2017        RESOLVED: Type 2 diabetes mellitus without complication ([facility] [id number]: E11.9  ICD-9-CM: 250.00  7/5/2016 - 2/21/2017        RESOLVED: Sleep apnea ICD-10-CM: G47.30  ICD-9-CM: 780.57  7/5/2016 - 12/16/2016        RESOLVED: Peripheral neuropathy ([address] ICD-10-CM: G62.9  ICD-9-CM: 356.9  1/22/2015 - 7/18/2017        RESOLVED: Chest pain, unspecified ICD-10-CM: R07.9  ICD-9-CM: 786.50  6/27/2011 - 1/22/2015        RESOLVED: HTN (hypertension) ICD-10-CM: I10  ICD-9-CM: 401.9  6/27/2011 - 1/22/2015        RESOLVED: DM circ dis type II ICD-10-CM: E11.51  ICD-9-CM: 250.70  6/27/2011 - 9/22/2017              PHYSICAL DEBILITATION;     1. Cecal diverticulitis; reporting BRBPR; known ext and internal hemorrhoids, no pain.    2. 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 Mental status likely baseline  3. CKD stg 3; creat stable  4. CAD/dCHF; compensated; cont diuretics . Monitoring renal fxn with f/u labs Mond  5. HTN; bp controlled with Diovan  6. Hx CVA; cont lipitor/zetia/asa/ plavix  7. DM; poorly controlled. Called last noc for a bs of 370. Currently on Lantus 20u qhs, SSI, glucophage 1000mg bid; start novolog with meals 4u; adjust as needed. 8. Anxiety; on buspar. Only taking 15mg daily. Would consider increasing to bid. Would benefit from psychology f/u  9. GERD; cont protonix    Time spent was 25 minutes with over 1/2 in direct patient care/examination, consultation and coordination of care.      Signed By: Catarino Figueroa MD     October 5, 2019

## 2019-10-05 NOTE — PROGRESS NOTES
PHYSICAL THERAPY DAILY NOTE  Time In: 6054  Time Out: 1030  Patient Seen For: AM;Balance activities;Gait training;Patient education;Transfer training; Other (see progress notes)    Subjective: Patient reporting he does not like to be rushed in the morning. He feels like we are rushing him to much with bathing and eating breakfast. Reports he was walking independently with a cane prior to admit but his sisters would insist on him using his rollator or RW to decrease his risk for falling         Objective:Vital Signs:patient on room air 02 sat ranging 94 to 98% and  to 118 during treatment. Patient Vitals for the past 12 hrs:   Temp Pulse Resp BP SpO2   10/05/19 0750 98.3 °F (36.8 °C) 97 18 148/50 92 %     Pain level:No c/o pain during treatment  Pain location:NA  Pain interventions:NA    Patient education:Balance training,transfer training, gait training, fall precautions, activity pacing, body mechanics,energy conservation. Patient verbalizing understanding and demonstrating partial understanding of patient education. Recommend follow up education.     Interdisciplinary Communication:NA    Other (comment)(fall precautions)  GROSS ASSESSMENT Daily Assessment     NA       COGNITION Daily Assessment    Orientation:A+O x 4  Communication:able to express needs verbally, able to follow multi step commands  Social Interaction:cooperating, appropriate with PT, participating, motivated to improve  Problem Solving:NA  Memory:cues to recall breathing techniques during gait training           BED/MAT MOBILITY Daily Assessment    Rolling Right : 0 (Not tested)  Rolling Left : 0 (Not tested)  Supine to Sit : 0 (Not tested)  Sit to Supine : 0 (Not tested)       TRANSFERS Daily Assessment   Increased time and effort to complete with cues for body mechanics   Transfer Type: SPT with walker  Transfer Assistance : 5 (Supervision/setup)  Sit to Stand Assistance: Supervision  Car Transfers: Not tested       GAIT Daily Assessment Amount of Assistance: 5 (Supervision/setup)  Distance (ft): 150 Feet (ft)  Assistive Device: Walker, rollator   Gait training x 20 ft x 2  With RW in figure 8 pattern around bolsters with Supervision and no loss of balance making multiple turns. One time cue for balance control while making multiple turns    Gait training x 20 ft x 2 on carpet with RW and supervision. Cues for managing RW onto carpet    Gait training x 40 ft x 3 with single UE support on hand rail facilitating cont step through gait with single UE support. SBA with cues to improve step length and gait speed. Gait training x 70 ft x 2 with straight cane and SBA with cues for improving step length and gait speed. No loss of balance. Fatigued after 70 feet requiring 3 minute sitting rest break between attempts. STEPS or STAIRS Daily Assessment   Gait training up/down 6 inch step with RW and SBA with cues for sequencing steps with RW. Steps/Stairs Ambulated (#): 0  Level of Assist : 0 (Not tested)       BALANCE Daily Assessment    Sitting - Static: Good (unsupported)  Sitting - Dynamic: Good (unsupported)  Standing - Static: Good  Standing - Dynamic : Impaired       WHEELCHAIR MOBILITY Daily Assessment    Curbs/Ramps Assist Required (FIM Score): 0 (Not tested)  Wheelchair Setup Assist Required : 0 (Not tested)       LOWER EXTREMITY EXERCISES Daily Assessment     NA          Assessment: at this time patient is safer ambulating with RW vs straight cane with a decreased risk for falling. Functional endurance slowly improving with 02 sat stable on room air during treatment but still having increase in HR with gait training       Patient to OT at end of treatment    Plan of Care: Continue with POC and progress as tolerated.      Radha Bishop, PT  10/5/2019

## 2019-10-06 LAB
GLUCOSE BLD STRIP.AUTO-MCNC: 177 MG/DL (ref 65–100)
GLUCOSE BLD STRIP.AUTO-MCNC: 183 MG/DL (ref 65–100)
GLUCOSE BLD STRIP.AUTO-MCNC: 216 MG/DL (ref 65–100)
GLUCOSE BLD STRIP.AUTO-MCNC: 245 MG/DL (ref 65–100)

## 2019-10-06 PROCEDURE — 74011636637 HC RX REV CODE- 636/637: Performed by: PHYSICAL MEDICINE & REHABILITATION

## 2019-10-06 PROCEDURE — 82962 GLUCOSE BLOOD TEST: CPT

## 2019-10-06 PROCEDURE — 99232 SBSQ HOSP IP/OBS MODERATE 35: CPT | Performed by: PHYSICAL MEDICINE & REHABILITATION

## 2019-10-06 PROCEDURE — 65310000000 HC RM PRIVATE REHAB

## 2019-10-06 PROCEDURE — 74011250637 HC RX REV CODE- 250/637: Performed by: PHYSICAL MEDICINE & REHABILITATION

## 2019-10-06 RX ADMIN — INSULIN HUMAN 4 UNITS: 100 INJECTION, SOLUTION PARENTERAL at 09:14

## 2019-10-06 RX ADMIN — METFORMIN HYDROCHLORIDE 1000 MG: 500 TABLET ORAL at 17:19

## 2019-10-06 RX ADMIN — BUSPIRONE HYDROCHLORIDE 15 MG: 5 TABLET ORAL at 09:18

## 2019-10-06 RX ADMIN — FUROSEMIDE 40 MG: 20 TABLET ORAL at 17:20

## 2019-10-06 RX ADMIN — VALSARTAN 80 MG: 40 TABLET, FILM COATED ORAL at 09:15

## 2019-10-06 RX ADMIN — PREGABALIN 150 MG: 150 CAPSULE ORAL at 17:19

## 2019-10-06 RX ADMIN — METRONIDAZOLE 500 MG: 500 TABLET ORAL at 21:09

## 2019-10-06 RX ADMIN — INSULIN LISPRO 4 UNITS: 100 INJECTION, SOLUTION INTRAVENOUS; SUBCUTANEOUS at 17:17

## 2019-10-06 RX ADMIN — POTASSIUM CHLORIDE 20 MEQ: 20 TABLET, EXTENDED RELEASE ORAL at 09:20

## 2019-10-06 RX ADMIN — INSULIN GLARGINE 20 UNITS: 100 INJECTION, SOLUTION SUBCUTANEOUS at 21:08

## 2019-10-06 RX ADMIN — METFORMIN HYDROCHLORIDE 1000 MG: 500 TABLET ORAL at 09:18

## 2019-10-06 RX ADMIN — INSULIN LISPRO 2 UNITS: 100 INJECTION, SOLUTION INTRAVENOUS; SUBCUTANEOUS at 21:08

## 2019-10-06 RX ADMIN — EZETIMIBE 10 MG: 10 TABLET ORAL at 21:07

## 2019-10-06 RX ADMIN — ATORVASTATIN CALCIUM 80 MG: 40 TABLET, FILM COATED ORAL at 21:07

## 2019-10-06 RX ADMIN — NYSTATIN: 100000 POWDER TOPICAL at 09:21

## 2019-10-06 RX ADMIN — HYDROCODONE BITARTRATE AND ACETAMINOPHEN 1 TABLET: 7.5; 325 TABLET ORAL at 21:07

## 2019-10-06 RX ADMIN — INSULIN LISPRO 4 UNITS: 100 INJECTION, SOLUTION INTRAVENOUS; SUBCUTANEOUS at 12:11

## 2019-10-06 RX ADMIN — FUROSEMIDE 40 MG: 20 TABLET ORAL at 09:19

## 2019-10-06 RX ADMIN — HUMAN INSULIN 8 UNITS: 100 INJECTION, SOLUTION SUBCUTANEOUS at 17:18

## 2019-10-06 RX ADMIN — HUMAN INSULIN 8 UNITS: 100 INJECTION, SOLUTION SUBCUTANEOUS at 12:11

## 2019-10-06 RX ADMIN — NYSTATIN: 100000 POWDER TOPICAL at 17:23

## 2019-10-06 RX ADMIN — ASPIRIN 81 MG: 81 TABLET ORAL at 09:20

## 2019-10-06 RX ADMIN — PREGABALIN 150 MG: 150 CAPSULE ORAL at 09:19

## 2019-10-06 RX ADMIN — INSULIN LISPRO 2 UNITS: 100 INJECTION, SOLUTION INTRAVENOUS; SUBCUTANEOUS at 09:14

## 2019-10-06 RX ADMIN — TAMSULOSIN HYDROCHLORIDE 0.4 MG: 0.4 CAPSULE ORAL at 09:19

## 2019-10-06 RX ADMIN — METRONIDAZOLE 500 MG: 500 TABLET ORAL at 09:16

## 2019-10-06 RX ADMIN — PANTOPRAZOLE SODIUM 40 MG: 40 TABLET, DELAYED RELEASE ORAL at 06:08

## 2019-10-06 RX ADMIN — CLOPIDOGREL BISULFATE 75 MG: 75 TABLET ORAL at 09:17

## 2019-10-06 NOTE — PROGRESS NOTES
Problem: Diabetes Self-Management  Goal: *Disease process and treatment process  Description  Define diabetes and identify own type of diabetes; list 3 options for treating diabetes. Outcome: Progressing Towards Goal  Goal: *Incorporating nutritional management into lifestyle  Description  Describe effect of type, amount and timing of food on blood glucose; list 3 methods for planning meals. Outcome: Progressing Towards Goal  Goal: *Incorporating physical activity into lifestyle  Description  State effect of exercise on blood glucose levels. Outcome: Progressing Towards Goal  Goal: *Developing strategies to promote health/change behavior  Description  Define the ABC's of diabetes; identify appropriate screenings, schedule and personal plan for screenings. Outcome: Progressing Towards Goal  Goal: *Using medications safely  Description  State effect of diabetes medications on diabetes; name diabetes medication taking, action and side effects. Outcome: Progressing Towards Goal  Goal: *Monitoring blood glucose, interpreting and using results  Description  Identify recommended blood glucose targets  and personal targets. Outcome: Progressing Towards Goal  Goal: *Prevention, detection, treatment of acute complications  Description  List symptoms of hyper- and hypoglycemia; describe how to treat low blood sugar and actions for lowering  high blood glucose level. Outcome: Progressing Towards Goal  Goal: *Prevention, detection and treatment of chronic complications  Description  Define the natural course of diabetes and describe the relationship of blood glucose levels to long term complications of diabetes.   Outcome: Progressing Towards Goal  Goal: *Developing strategies to address psychosocial issues  Description  Describe feelings about living with diabetes; identify support needed and support network  Outcome: Progressing Towards Goal  Goal: *Insulin pump training  Outcome: Progressing Towards Goal  Goal: *Sick day guidelines  Outcome: Progressing Towards Goal  Goal: *Patient Specific Goal (EDIT GOAL, INSERT TEXT)  Outcome: Progressing Towards Goal     Problem: Patient Education: Go to Patient Education Activity  Goal: Patient/Family Education  Outcome: Progressing Towards Goal     Problem: Inpatient Rehab (Adult)  Goal: *LTG: Avoids injury/falls 100% of time related to deficits  Outcome: Progressing Towards Goal  Goal: *LTG: Avoids infection 100% of time related to deficits  Outcome: Progressing Towards Goal  Goal: *LTG: Verbalize understanding of diagnosis and risk factors for recurring stroke  Outcome: Progressing Towards Goal  Goal: *LTG: Absence of DVT during hospitalization  Outcome: Progressing Towards Goal  Goal: *LTG: Maintains Skin Integrity With No Evidence of Pressure Injury 100% of Time  Outcome: Progressing Towards Goal  Goal: *Nursing Goal (Insert Text)  Outcome: Progressing Towards Goal  Goal: *Nursing Goal (Insert Text)  Outcome: Progressing Towards Goal  Goal: Interventions  Outcome: Progressing Towards Goal     Problem: Falls - Risk of  Goal: *Absence of Falls  Description  Document Shabbir Fall Risk and appropriate interventions in the flowsheet.   Outcome: Progressing Towards Goal  Note:   Fall Risk Interventions:  Mobility Interventions: OT consult for ADLs, Patient to call before getting OOB, PT Consult for mobility concerns, PT Consult for assist device competence    Mentation Interventions: Adequate sleep, hydration, pain control, Door open when patient unattended, Evaluate medications/consider consulting pharmacy, Eyeglasses and hearing aids, Increase mobility, Update white board    Medication Interventions: Patient to call before getting OOB, Evaluate medications/consider consulting pharmacy    Elimination Interventions: Call light in reach    History of Falls Interventions: Evaluate medications/consider consulting pharmacy, Consult care management for discharge planning, Door open when patient unattended         Problem: Patient Education: Go to Patient Education Activity  Goal: Patient/Family Education  Outcome: Progressing Towards Goal     Problem: Patient Education: Go to Patient Education Activity  Goal: Patient/Family Education  Outcome: Progressing Towards Goal     Problem: Pressure Injury - Risk of  Goal: *Prevention of pressure injury  Description  Document Odell Scale and appropriate interventions in the flowsheet.   Outcome: Progressing Towards Goal  Note:   Pressure Injury Interventions:  Sensory Interventions: Assess changes in LOC, Keep linens dry and wrinkle-free, Discuss PT/OT consult with provider, Check visual cues for pain, Pressure redistribution bed/mattress (bed type)    Moisture Interventions: Absorbent underpads, Apply protective barrier, creams and emollients    Activity Interventions: Increase time out of bed, Pressure redistribution bed/mattress(bed type), PT/OT evaluation    Mobility Interventions: Pressure redistribution bed/mattress (bed type), PT/OT evaluation    Nutrition Interventions: Document food/fluid/supplement intake    Friction and Shear Interventions: Apply protective barrier, creams and emollients, Lift sheet, Minimize layers                Problem: Patient Education: Go to Patient Education Activity  Goal: Patient/Family Education  Outcome: Progressing Towards Goal     Problem: Patient Education: Go to Patient Education Activity  Goal: Patient/Family Education  Outcome: Progressing Towards Goal

## 2019-10-06 NOTE — PROGRESS NOTES
Emy Fajardo MD,   Medical Director  [address] [facility], [address] [facility]  Tel: [phone]       SFD PROGRESS NOTE    Em Patiño  Admit Date: 10/1/2019  Admit Diagnosis: other neurologic condition    Subjective     Upset with staff again because his breakfast got cold. Allowed pt to express frustrations and offered empathetic support. Pt wanting to go home ASAP.     Objective:     Current Facility-Administered Medications   Medication Dose Route Frequency    insulin regular (NOVOLIN R, HUMULIN R) injection 4 Units  4 Units SubCUTAneous TIDAC    metFORMIN (GLUCOPHAGE) tablet 1,000 mg  1,000 mg Oral BID WITH MEALS    albuterol (PROVENTIL VENTOLIN) nebulizer solution 2.5 mg  2.5 mg Nebulization Q6H PRN    acetaminophen (TYLENOL) tablet 650 mg  650 mg Oral Q6H PRN    albuterol (PROVENTIL VENTOLIN) nebulizer solution 2.5 mg  2.5 mg Nebulization Q4H PRN    aspirin delayed-release tablet 81 mg  81 mg Oral DAILY    atorvastatin (LIPITOR) tablet 80 mg  80 mg Oral QHS    bisacodyl (DULCOLAX) suppository 10 mg  10 mg Rectal DAILY PRN    busPIRone (BUSPAR) tablet 15 mg  15 mg Oral DAILY    clopidogrel (PLAVIX) tablet 75 mg  75 mg Oral DAILY    ezetimibe (ZETIA) tablet 10 mg  10 mg Oral QHS    furosemide (LASIX) tablet 40 mg  40 mg Oral ACB&D    HYDROcodone-acetaminophen (NORCO) 7.5-325 mg per tablet 1 Tab  1 Tab Oral Q6H PRN    insulin glargine (LANTUS) injection 20 Units  20 Units SubCUTAneous QHS    insulin lispro (HUMALOG) injection 0-10 Units  0-10 Units SubCUTAneous AC&HS    lip protectant (BLISTEX) ointment 1 Each  1 Each Topical PRN    nystatin (MYCOSTATIN) 100,000 unit/gram powder   Topical BID    pantoprazole (PROTONIX) tablet 40 mg  40 mg Oral ACB    polyethylene glycol (MIRALAX) packet 17 g  17 g Oral DAILY    potassium chloride (K-DUR, KLOR-CON) SR tablet 20 mEq  20 mEq Oral DAILY    pregabalin (LYRICA) capsule 150 mg  150 mg Oral BID    tamsulosin (FLOMAX) capsule 0.4 mg  0.4 mg Oral DAILY    valsartan (DIOVAN) tablet 80 mg  80 mg Oral DAILY    sodium chloride (NS) flush 5-40 mL  5-40 mL IntraVENous PRN    metroNIDAZOLE (FLAGYL) tablet 500 mg  500 mg Oral Q12H    docusate sodium (COLACE) capsule 100 mg  100 mg Oral DAILY    lactulose (CHRONULAC) 10 gram/15 mL solution 30 mL  30 mL Oral DAILY PRN     Review of Systems:Denies chest pain, shortness of breath, cough, headache, visual problems, abdominal pain, dysurea, calf pain. Pertinent positives are as noted in the medical records and unremarkable otherwise. Visit Vitals  /67 (BP 1 Location: Left arm, BP Patient Position: Sitting)   Pulse (!) 102   Temp 98.2 °F (36.8 °C)   Resp 18   Wt 274 lb 6.4 oz (124.5 kg)   SpO2 94%   BMI 44.29 kg/m²        Physical Exam:   General: Alert and age appropriately oriented. No acute cardio respiratory distress. HEENT: Normocephalic,no scleral icterus  Oral mucosa moist without cyanosis   Lungs: Clear to auscultation  bilaterally. Respiration even and unlabored   Heart: Regular rate and rhythm, S1, S2   No  murmurs, clicks, rub or gallops   Abdomen: Soft, non-tender, nondistended. Bowel sounds present. No organomegaly. obese   Genitourinary: defer   Neuromuscular:      No new neuro changes   Skin/extremity: No rashes, no erythema. No calf tenderness BLE  Wound covered.                                                                             Functional Assessment:  Gross Assessment  AROM: Generally decreased, functional (10/04/19 1500)  Strength: Generally decreased, functional (10/04/19 1500)  Sensation: Impaired (10/04/19 1500)       Balance  Sitting - Static: Good (unsupported) (10/05/19 1000)  Sitting - Dynamic: Good (unsupported) (10/05/19 1000)  Standing - Static: Good (10/05/19 1000)  Standing - Dynamic : Impaired (10/05/19 1000)                     Clark Lawrence Fall Risk Assessment:  Clark Lawrence Fall Risk  Mobility: Ambulates or transfers with assist devices or assistance (10/06/19 0710)  Mobility Interventions: OT consult for ADLs; Patient to call before getting OOB;PT Consult for mobility concerns;PT Consult for assist device competence (10/06/19 0710)  Mentation: Alert, oriented x 3 (10/06/19 0710)  Mentation Interventions: Adequate sleep, hydration, pain control;Door open when patient unattended;Evaluate medications/consider consulting pharmacy; Eyeglasses and hearing aids; Increase mobility;Update white board (10/06/19 0710)  Medication: Patient receiving anticonvulsants, sedatives(tranquilizers), psychotropics or hypnotics, hypoglycemics, narcotics, sleep aids, antihypertensives, laxatives, or diuretics (10/06/19 0710)  Medication Interventions: Patient to call before getting OOB; Evaluate medications/consider consulting pharmacy (10/06/19 0710)  Elimination: Needs assistance with toileting (10/06/19 0710)  Elimination Interventions: Call light in reach (10/06/19 0710)  Prior Fall History: No (10/06/19 0710)  History of Falls Interventions: Evaluate medications/consider consulting pharmacy; Consult care management for discharge planning;Door open when patient unattended (10/06/19 0710)  Total Score: 3 (10/06/19 0710)  Standard Fall Precautions: Yes (10/06/19 0710)  High Fall Risk: Yes (10/05/19 2159)     Speech Assessment:  Aspiration Signs/Symptoms: None (10/02/19 1221)      Ambulation:  Gait  Distance (ft): 150 Feet (ft) (10/05/19 1000)  Assistive Device: Paris Dolan, rollator (10/05/19 1000)  Rail Use: Both (10/04/19 1500)     Labs/Studies:  Recent Results (from the past 72 hour(s))   GLUCOSE, POC    Collection Time: 10/03/19 12:16 PM   Result Value Ref Range    Glucose (POC) 265 (H) 65 - 100 mg/dL   GLUCOSE, POC    Collection Time: 10/03/19  4:30 PM   Result Value Ref Range    Glucose (POC) 304 (H) 65 - 100 mg/dL   GLUCOSE, POC    Collection Time: 10/03/19  8:57 PM   Result Value Ref Range    Glucose (POC) 300 (H) 65 - 100 mg/dL   GLUCOSE, POC Collection Time: 10/04/19  6:55 AM   Result Value Ref Range    Glucose (POC) 231 (H) 65 - 100 mg/dL   GLUCOSE, POC    Collection Time: 10/04/19 11:35 AM   Result Value Ref Range    Glucose (POC) 272 (H) 65 - 100 mg/dL   GLUCOSE, POC    Collection Time: 10/04/19  4:12 PM   Result Value Ref Range    Glucose (POC) 269 (H) 65 - 100 mg/dL   GLUCOSE, POC    Collection Time: 10/04/19  9:01 PM   Result Value Ref Range    Glucose (POC) 370 (H) 65 - 100 mg/dL   GLUCOSE, POC    Collection Time: 10/05/19  7:48 AM   Result Value Ref Range    Glucose (POC) 207 (H) 65 - 100 mg/dL   GLUCOSE, POC    Collection Time: 10/05/19 11:19 AM   Result Value Ref Range    Glucose (POC) 249 (H) 65 - 100 mg/dL   GLUCOSE, POC    Collection Time: 10/05/19  4:29 PM   Result Value Ref Range    Glucose (POC) 307 (H) 65 - 100 mg/dL   GLUCOSE, POC    Collection Time: 10/05/19  9:00 PM   Result Value Ref Range    Glucose (POC) 215 (H) 65 - 100 mg/dL   GLUCOSE, POC    Collection Time: 10/06/19  7:12 AM   Result Value Ref Range    Glucose (POC) 183 (H) 65 - 100 mg/dL       Assessment:     Problem List as of 10/6/2019 Date Reviewed: 9/18/2019          Codes Class Noted - Resolved    Cecal diverticulitis ICD-10-CM: K57.32  ICD-9-CM: 562.11  9/29/2019 - Present        RLQ abdominal pain ICD-10-CM: R10.31  ICD-9-CM: 789.03  9/28/2019 - Present        CKD (chronic kidney disease) stage 3, GFR 30-59 ml/min (HCC) (Chronic) ICD-10-CM: N18.3  ICD-9-CM: 585.3  6/24/2019 - Present        Cerebrovascular accident (CVA) ([facility] ICD-10-CM: I63.9  ICD-9-CM: 434.91  3/21/2019 - Present        Type 2 diabetes mellitus without complication ([facility] BSX-97-DU: E11.9  ICD-9-CM: 250.00  3/21/2019 - Present        Hemiparesis of left dominant side ([facility] ICD-10-CM: G81.92  ICD-9-CM: 342.91  3/7/2019 - Present        Type 2 diabetes with nephropathy ([facility] ICD-10-CM: E11.21  ICD-9-CM: 250.40, 583.81  5/11/2018 - Present        Morbid obesity due to excess calories ([facility] ICD-10-CM: E66.01  ICD-9-CM: 278.01  11/9/2017 - Present        Sleep apnea ICD-10-CM: G47.30  ICD-9-CM: 780.57  11/9/2017 - Present        Esophageal dysphagia ICD-10-CM: R13.10  ICD-9-CM: 787.20  11/9/2017 - Present        TIA (transient ischemic attack) ICD-10-CM: G45.9  ICD-9-CM: 435.9  11/9/2017 - Present        Mild intermittent asthma without complication RPX-23-LAMB: T87.84  ICD-9-CM: 493.90  8/24/2017 - Present        Diabetic neuropathy associated with type 2 diabetes mellitus (HCC) (Chronic) ICD-10-CM: E11.40  ICD-9-CM: 250.60, 357.2  7/18/2017 - Present        Diastolic CHF, chronic (HCC) (Chronic) ICD-10-CM: I50.32  ICD-9-CM: 428.32, 428.0  3/20/2017 - Present        Mixed hyperlipidemia (Chronic) ICD-10-CM: E62.9  ICD-9-CM: 272.2  11/17/2016 - Present        Essential hypertension with goal blood pressure less than 130/85 (Chronic) ICD-10-CM: I10  ICD-9-CM: 401.9  7/5/2016 - Present        Pulmonary hypertension (HCC) (Chronic) ICD-10-CM: I27.20  ICD-9-CM: 416.8  10/14/2015 - Present        Mild diastolic dysfunction (Chronic) ICD-10-CM: I51.9  ICD-9-CM: 429.9  7/17/2015 - Present        Gastroesophageal reflux disease without esophagitis (Chronic) ICD-10-CM: K21.9  ICD-9-CM: 530.81  4/21/2015 - Present        Hyperlipidemia with target LDL less than 70 (Chronic) ICD-10-CM: E78.5  ICD-9-CM: 272.4  Unknown - Present        Essential hypertension, benign (Chronic) ICD-10-CM: I10  ICD-9-CM: 401.1  1/22/2015 - Present        Chronic constipation (Chronic) ICD-10-CM: K59.09  ICD-9-CM: 564.00  1/22/2015 - Present        Type 2 diabetes, uncontrolled, with neuropathy (HCC) (Chronic) ICD-10-CM: E11.40, E11.65  ICD-9-CM: 250.62, 357.2  1/22/2015 - Present        Obstructive sleep apnea of adult (Chronic) ICD-10-CM: G47.33  ICD-9-CM: 327.23  1/22/2015 - Present        Morbid obesity ([facility] (Chronic) ICD-10-CM: E66.01  ICD-9-CM: 278.01  6/27/2011 - Present        RESOLVED: Sepsis ([facility] ICD-10-CM: A41.9  ICD-9-CM: 038.9, 995.91 9/30/2019 - 9/30/2019        RESOLVED: Hyperkalemia ICD-10-CM: E87.5  ICD-9-CM: 276.7  9/27/2019 - 9/27/2019        RESOLVED: Acute metabolic encephalopathy BWG-36-ZB: G93.41  ICD-9-CM: 348.31  9/25/2019 - 9/27/2019        RESOLVED: Postural dizziness ICD-10-CM: R42  ICD-9-CM: 780.4  5/11/2018 - 7/16/2018        RESOLVED: Chest pain ICD-10-CM: R07.9  ICD-9-CM: 786.50  5/11/2018 - 7/16/2018        RESOLVED: Type 2 diabetes mellitus without complication, with long-term current use of insulin ([facility] ICD-10-CM: E11.9, Z79.4  ICD-9-CM: 250.00, V58.67  11/9/2017 - 7/16/2018        RESOLVED: H/O colonoscopy (Chronic) ICD-10-CM: N49.323  ICD-9-CM: V45.89  7/18/2017 - 7/16/2018    Overview Signed 7/18/2017  7:06 AM by Esther Nolan     Colonoscopy in 7/29/10 Dr Fletcher Blanchard with repeat in 7/2020 [facility] at [facility] Dr Laxmi Villagomez: Type 2 diabetes mellitus without complication, without long-term current use of insulin ([facility] ICD-10-CM: E11.9  ICD-9-CM: 250.00  3/20/2017 - 7/18/2017        RESOLVED: Sleep apnea ICD-10-CM: G47.30  ICD-9-CM: 780.57  3/20/2017 - 7/18/2017        RESOLVED: Generalized edema ICD-10-CM: R60.1  ICD-9-CM: 782.3  7/5/2016 - 7/18/2017        RESOLVED: Type 2 diabetes mellitus without complication ([facility] VME-45-SERENITY: E11.9  ICD-9-CM: 250.00  7/5/2016 - 2/21/2017        RESOLVED: Sleep apnea ICD-10-CM: G47.30  ICD-9-CM: 780.57  7/5/2016 - 12/16/2016        RESOLVED: Peripheral neuropathy ([facility] ICD-10-CM: G62.9  ICD-9-CM: 356.9  1/22/2015 - 7/18/2017        RESOLVED: Chest pain, unspecified ICD-10-CM: R07.9  ICD-9-CM: 786.50  6/27/2011 - 1/22/2015        RESOLVED: HTN (hypertension) ICD-10-CM: I10  ICD-9-CM: 401.9  6/27/2011 - 1/22/2015        RESOLVED: DM circ dis type II ICD-10-CM: E11.51  ICD-9-CM: 250.70  6/27/2011 - 9/22/2017               PHYSICAL DEBILITATION;      1. Cecal diverticulitis; reporting BRBPR; known ext and internal hemorrhoids, no pain.    2. 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 Mental status likely baseline  -10/6 very particular with his care. Upset by certain staff members for varying reasons. Care team Tues; would recommend dc home if medically clear and safe to do so with assistance and HH  3. CKD stg 3; creat stable  4. CAD/dCHF; compensated; cont diuretics . Monitoring renal fxn with f/u labs Mond  5. HTN; bp controlled with Diovan  6. Hx CVA; cont lipitor/zetia/asa/ plavix  7. DM; poorly controlled. Called last noc for a bs of 370. Currently on Lantus 20u qhs, SSI, glucophage 1000mg bid; start novolog with meals 4u; adjust as needed.  -10/16 bs 183 this am, improving; inc novolog with meals to 8units  8. Anxiety; on buspar. Only taking 15mg daily. Would consider increasing to bid. Would benefit from psychology f/u  9. GERD; cont protonix  Time spent was 25 minutes with over 1/2 in direct patient care/examination, consultation and coordination of care.      Signed By: Bright Madrigal MD     October 6, 2019

## 2019-10-06 NOTE — PROGRESS NOTES
Problem: Inpatient Rehab (Adult)  Goal: *LTG: Avoids injury/falls 100% of time related to deficits  Outcome: Progressing Towards Goal  Goal: *LTG: Avoids infection 100% of time related to deficits  Outcome: Progressing Towards Goal  Goal: *LTG: Verbalize understanding of diagnosis and risk factors for recurring stroke  Outcome: Progressing Towards Goal  Goal: *LTG: Absence of DVT during hospitalization  Outcome: Progressing Towards Goal  Goal: *LTG: Maintains Skin Integrity With No Evidence of Pressure Injury 100% of Time  Outcome: Progressing Towards Goal  Goal: Interventions  Outcome: Progressing Towards Goal

## 2019-10-07 LAB
ANION GAP SERPL CALC-SCNC: 5 MMOL/L (ref 7–16)
BASOPHILS # BLD: 0 K/UL (ref 0–0.2)
BASOPHILS NFR BLD: 0 % (ref 0–2)
BUN SERPL-MCNC: 27 MG/DL (ref 8–23)
CALCIUM SERPL-MCNC: 8.3 MG/DL (ref 8.3–10.4)
CHLORIDE SERPL-SCNC: 108 MMOL/L (ref 98–107)
CO2 SERPL-SCNC: 29 MMOL/L (ref 21–32)
CREAT SERPL-MCNC: 1.35 MG/DL (ref 0.8–1.5)
DIFFERENTIAL METHOD BLD: ABNORMAL
EOSINOPHIL # BLD: 0.2 K/UL (ref 0–0.8)
EOSINOPHIL NFR BLD: 2 % (ref 0.5–7.8)
ERYTHROCYTE [DISTWIDTH] IN BLOOD BY AUTOMATED COUNT: 16.2 % (ref 11.9–14.6)
GLUCOSE BLD STRIP.AUTO-MCNC: 177 MG/DL (ref 65–100)
GLUCOSE BLD STRIP.AUTO-MCNC: 191 MG/DL (ref 65–100)
GLUCOSE BLD STRIP.AUTO-MCNC: 212 MG/DL (ref 65–100)
GLUCOSE BLD STRIP.AUTO-MCNC: 214 MG/DL (ref 65–100)
GLUCOSE SERPL-MCNC: 168 MG/DL (ref 65–100)
HCT VFR BLD AUTO: 32.2 % (ref 41.1–50.3)
HGB BLD-MCNC: 10 G/DL (ref 13.6–17.2)
IMM GRANULOCYTES # BLD AUTO: 0 K/UL (ref 0–0.5)
IMM GRANULOCYTES NFR BLD AUTO: 0 % (ref 0–5)
LYMPHOCYTES # BLD: 2.5 K/UL (ref 0.5–4.6)
LYMPHOCYTES NFR BLD: 34 % (ref 13–44)
MCH RBC QN AUTO: 28.2 PG (ref 26.1–32.9)
MCHC RBC AUTO-ENTMCNC: 31.1 G/DL (ref 31.4–35)
MCV RBC AUTO: 91 FL (ref 79.6–97.8)
MONOCYTES # BLD: 0.6 K/UL (ref 0.1–1.3)
MONOCYTES NFR BLD: 9 % (ref 4–12)
NEUTS SEG # BLD: 4.1 K/UL (ref 1.7–8.2)
NEUTS SEG NFR BLD: 55 % (ref 43–78)
NRBC # BLD: 0 K/UL (ref 0–0.2)
PLATELET # BLD AUTO: 262 K/UL (ref 150–450)
PMV BLD AUTO: 10.8 FL (ref 9.4–12.3)
POTASSIUM SERPL-SCNC: 3.9 MMOL/L (ref 3.5–5.1)
RBC # BLD AUTO: 3.54 M/UL (ref 4.23–5.6)
SODIUM SERPL-SCNC: 142 MMOL/L (ref 136–145)
WBC # BLD AUTO: 7.4 K/UL (ref 4.3–11.1)

## 2019-10-07 PROCEDURE — 97530 THERAPEUTIC ACTIVITIES: CPT

## 2019-10-07 PROCEDURE — 97535 SELF CARE MNGMENT TRAINING: CPT

## 2019-10-07 PROCEDURE — 74011250637 HC RX REV CODE- 250/637: Performed by: PHYSICAL MEDICINE & REHABILITATION

## 2019-10-07 PROCEDURE — 99232 SBSQ HOSP IP/OBS MODERATE 35: CPT | Performed by: PHYSICAL MEDICINE & REHABILITATION

## 2019-10-07 PROCEDURE — 77030012893

## 2019-10-07 PROCEDURE — 97116 GAIT TRAINING THERAPY: CPT

## 2019-10-07 PROCEDURE — 74011636637 HC RX REV CODE- 636/637: Performed by: PHYSICAL MEDICINE & REHABILITATION

## 2019-10-07 PROCEDURE — 85025 COMPLETE CBC W/AUTO DIFF WBC: CPT

## 2019-10-07 PROCEDURE — 82962 GLUCOSE BLOOD TEST: CPT

## 2019-10-07 PROCEDURE — 65310000000 HC RM PRIVATE REHAB

## 2019-10-07 PROCEDURE — 36415 COLL VENOUS BLD VENIPUNCTURE: CPT

## 2019-10-07 PROCEDURE — 92507 TX SP LANG VOICE COMM INDIV: CPT

## 2019-10-07 PROCEDURE — 80048 BASIC METABOLIC PNL TOTAL CA: CPT

## 2019-10-07 RX ORDER — SIMETHICONE 80 MG
80 TABLET,CHEWABLE ORAL
Status: DISCONTINUED | OUTPATIENT
Start: 2019-10-07 | End: 2019-10-09 | Stop reason: HOSPADM

## 2019-10-07 RX ORDER — ONDANSETRON 4 MG/1
8 TABLET, ORALLY DISINTEGRATING ORAL
Status: DISCONTINUED | OUTPATIENT
Start: 2019-10-07 | End: 2019-10-09 | Stop reason: HOSPADM

## 2019-10-07 RX ADMIN — METFORMIN HYDROCHLORIDE 1000 MG: 500 TABLET ORAL at 17:33

## 2019-10-07 RX ADMIN — METRONIDAZOLE 500 MG: 500 TABLET ORAL at 09:22

## 2019-10-07 RX ADMIN — EZETIMIBE 10 MG: 10 TABLET ORAL at 21:22

## 2019-10-07 RX ADMIN — ONDANSETRON 8 MG: 4 TABLET, ORALLY DISINTEGRATING ORAL at 12:19

## 2019-10-07 RX ADMIN — FUROSEMIDE 40 MG: 20 TABLET ORAL at 17:32

## 2019-10-07 RX ADMIN — PREGABALIN 150 MG: 150 CAPSULE ORAL at 09:22

## 2019-10-07 RX ADMIN — HUMAN INSULIN 8 UNITS: 100 INJECTION, SOLUTION SUBCUTANEOUS at 07:40

## 2019-10-07 RX ADMIN — HUMAN INSULIN 8 UNITS: 100 INJECTION, SOLUTION SUBCUTANEOUS at 12:20

## 2019-10-07 RX ADMIN — DOCUSATE SODIUM 100 MG: 100 CAPSULE, LIQUID FILLED ORAL at 09:21

## 2019-10-07 RX ADMIN — FUROSEMIDE 40 MG: 20 TABLET ORAL at 09:28

## 2019-10-07 RX ADMIN — INSULIN LISPRO 4 UNITS: 100 INJECTION, SOLUTION INTRAVENOUS; SUBCUTANEOUS at 17:30

## 2019-10-07 RX ADMIN — POTASSIUM CHLORIDE 20 MEQ: 20 TABLET, EXTENDED RELEASE ORAL at 09:22

## 2019-10-07 RX ADMIN — BUSPIRONE HYDROCHLORIDE 15 MG: 5 TABLET ORAL at 09:22

## 2019-10-07 RX ADMIN — INSULIN LISPRO 2 UNITS: 100 INJECTION, SOLUTION INTRAVENOUS; SUBCUTANEOUS at 12:20

## 2019-10-07 RX ADMIN — HUMAN INSULIN 8 UNITS: 100 INJECTION, SOLUTION SUBCUTANEOUS at 17:31

## 2019-10-07 RX ADMIN — PREGABALIN 150 MG: 150 CAPSULE ORAL at 17:33

## 2019-10-07 RX ADMIN — METFORMIN HYDROCHLORIDE 1000 MG: 500 TABLET ORAL at 09:22

## 2019-10-07 RX ADMIN — INSULIN LISPRO 4 UNITS: 100 INJECTION, SOLUTION INTRAVENOUS; SUBCUTANEOUS at 21:28

## 2019-10-07 RX ADMIN — ATORVASTATIN CALCIUM 80 MG: 40 TABLET, FILM COATED ORAL at 21:22

## 2019-10-07 RX ADMIN — CLOPIDOGREL BISULFATE 75 MG: 75 TABLET ORAL at 09:23

## 2019-10-07 RX ADMIN — PANTOPRAZOLE SODIUM 40 MG: 40 TABLET, DELAYED RELEASE ORAL at 06:25

## 2019-10-07 RX ADMIN — INSULIN LISPRO 2 UNITS: 100 INJECTION, SOLUTION INTRAVENOUS; SUBCUTANEOUS at 07:40

## 2019-10-07 RX ADMIN — SIMETHICONE CHEW TAB 80 MG 80 MG: 80 TABLET ORAL at 21:22

## 2019-10-07 RX ADMIN — METRONIDAZOLE 500 MG: 500 TABLET ORAL at 21:22

## 2019-10-07 RX ADMIN — TAMSULOSIN HYDROCHLORIDE 0.4 MG: 0.4 CAPSULE ORAL at 09:23

## 2019-10-07 RX ADMIN — VALSARTAN 80 MG: 40 TABLET, FILM COATED ORAL at 09:23

## 2019-10-07 RX ADMIN — INSULIN GLARGINE 20 UNITS: 100 INJECTION, SOLUTION SUBCUTANEOUS at 21:27

## 2019-10-07 RX ADMIN — ASPIRIN 81 MG: 81 TABLET ORAL at 09:22

## 2019-10-07 NOTE — PROGRESS NOTES
Time In 0833   Time Out 0917     Occupational Therapy  Mobility   Score Comments   Rolling     Supine to Sit     Sit to Supine     Transfer Assist 4: Supervision or touching A Transfer Type: SPT   Equipment: Rolling Walker and tub transfer bench   Comments: requiring verbal cueing to use RW for safety     Activities of Daily Living    Score Comments   Eating 6: Independent    Oral Hygiene no time to complete grooming     Bathing 6: Independent Type of Shower: Shower  Position: Standing PRN and Unsupported Sitting   Adaptive  Equipment: Tub Transfer Bench and Grab Bars  Comments: patient refused assistance    Upper Body  Dressing 6: Independent Items Applied: Pullover  Position: Standing  Comments: gathered clothing using RW    Lower Body Dressing 5: S/U or clean-up assist Items Applied: Underwear and Elastic Pants  Position: Standing PRN and Unsupported Sitting  Adaptive Equipment: none  Comments: increased time   Donning/Bern Footwear 1: Dependent Items Applied: Socks  Adaptive Equipment: none  Comments: refused shoes    Toilet Transfer     Toileting Hygiene     Education  Benefits of rehab, therapy schedule, elevating BLE for edema management     Pt presented seated up in recliner. Patient completed ADL; see above for details. Required rehab tech Blanchie Buoy in room for duration of session due to patient refusing to participate in ADL tasks without a male present. Patient required much encouragement to complete a shower but did eventually agree to complete one. Patient resistant to therapeutic ADL interventions at this time; anticipate premorbid cognitive and personality characteristics may be contributing to resistance. Educated patient on elevating BLE for edema management with verbalized but not demonstrated understanding. Patient tolerated session well with no complaint of pain and was left seated up inr ecliner with call light/all needs in reach and in no distress.  Continue OT POC with focus on ADL/IADL skills, cognition, participation, LB AE management, functional transfers, functional mobility, coordination, strength, static and dynamic balance, and activity tolerance to maximize safety and independence with ADLs and functional transfers.      Estrella Myers MS, OTR/L  10/7/2019

## 2019-10-07 NOTE — PROGRESS NOTES
10/07/19 1030   Time Spent With Patient   Time In 0917   Time Out 1000   Patient Seen For: AM   Mental Status   Neurologic State Alert   Orientation Level Oriented X4   Cognition Decreased attention/concentration; Impaired decision making   Perception Appears intact   Perseveration No perseveration noted   Safety/Judgement Decreased insight into deficits        10/07/19 1031   Reasoning Task Exercises-Similarities/Differences   Similarities Accuracy (%) 90 %   Differences Accuracy (%) 50 %   Mathematical Exercises    Word Problems Accuracy (%) 90 % calculating total amount from a menu; basic level   Memory Exercises   Functional Tasks recalled items ordered earlier in the session with SBA   Neuro-Linguistic Exercises   Verbal Reasoning Tasks Impaired   Verbal Organization Impaired   Thought Organization difficulty staying on task at times   Mathematical Impaired   Memory Impaired     Patient participated with cognitive tx. Patient was reluctant to complete a basic reasoning task with ST on Friday so a different task was introduced. Later that day the pt stopped ST to request the same task he declined to participate with to be left for him to work on over the weekend. Patient reported today he didn't feel like completing it over the weekend. Simple calculations to determine a bill from a restaurant completed with 90% accuracy. SBA to recall items ordered earlier in the session. Basic reasoning task to determine similarities and differences between items completed with ModA. Patient is tangential at times with re-direction to task required. Difficulty generalizing information and tendency to externalize deficits versus acknowledge cognitive decline.     Ghulam Salmon MS, CCC-SLP

## 2019-10-07 NOTE — PROGRESS NOTES
[facility] PROGRESS NOTE    Andres Torres  Admit Date: 10/1/2019  Admit Diagnosis: other neurologic condition    Subjective     Vss. Afebrile. Appetite good. + BM. Case discussed in team conference. Patient progressing toward short term functional goals. Patient feels he is ready for home discharge, tomorrow. All agree.      Objective:     Current Facility-Administered Medications   Medication Dose Route Frequency    ondansetron (ZOFRAN ODT) tablet 8 mg  8 mg Oral Q8H PRN    insulin regular (NOVOLIN R, HUMULIN R) injection 8 Units  8 Units SubCUTAneous TIDAC    metFORMIN (GLUCOPHAGE) tablet 1,000 mg  1,000 mg Oral BID WITH MEALS    albuterol (PROVENTIL VENTOLIN) nebulizer solution 2.5 mg  2.5 mg Nebulization Q6H PRN    acetaminophen (TYLENOL) tablet 650 mg  650 mg Oral Q6H PRN    albuterol (PROVENTIL VENTOLIN) nebulizer solution 2.5 mg  2.5 mg Nebulization Q4H PRN    aspirin delayed-release tablet 81 mg  81 mg Oral DAILY    atorvastatin (LIPITOR) tablet 80 mg  80 mg Oral QHS    bisacodyl (DULCOLAX) suppository 10 mg  10 mg Rectal DAILY PRN    busPIRone (BUSPAR) tablet 15 mg  15 mg Oral DAILY    clopidogrel (PLAVIX) tablet 75 mg  75 mg Oral DAILY    ezetimibe (ZETIA) tablet 10 mg  10 mg Oral QHS    furosemide (LASIX) tablet 40 mg  40 mg Oral ACB&D    HYDROcodone-acetaminophen (NORCO) 7.5-325 mg per tablet 1 Tab  1 Tab Oral Q6H PRN    insulin glargine (LANTUS) injection 20 Units  20 Units SubCUTAneous QHS    insulin lispro (HUMALOG) injection 0-10 Units  0-10 Units SubCUTAneous AC&HS    lip protectant (BLISTEX) ointment 1 Each  1 Each Topical PRN    nystatin (MYCOSTATIN) 100,000 unit/gram powder   Topical BID    pantoprazole (PROTONIX) tablet 40 mg  40 mg Oral ACB    polyethylene glycol (MIRALAX) packet 17 g  17 g Oral DAILY    potassium chloride (K-DUR, KLOR-CON) SR tablet 20 mEq  20 mEq Oral DAILY    pregabalin (LYRICA) capsule 150 mg  150 mg Oral BID    tamsulosin (FLOMAX) capsule 0.4 mg 0.4 mg Oral DAILY    valsartan (DIOVAN) tablet 80 mg  80 mg Oral DAILY    sodium chloride (NS) flush 5-40 mL  5-40 mL IntraVENous PRN    metroNIDAZOLE (FLAGYL) tablet 500 mg  500 mg Oral Q12H    docusate sodium (COLACE) capsule 100 mg  100 mg Oral DAILY    lactulose (CHRONULAC) 10 gram/15 mL solution 30 mL  30 mL Oral DAILY PRN     Review of Systems: + subjective generalized weakness, + fatigue. Denies chest pain, shortness of breath, cough, headache, visual problems, abdominal pain, dysurea, calf pain. Pertinent positives are as noted in the medical records and unremarkable otherwise. Visit Vitals  BP 96/62   Pulse 89   Temp 97.4 °F (36.3 °C)   Resp 16   Wt 274 lb 6.4 oz (124.5 kg)   SpO2 94%   BMI 44.29 kg/m²        Physical Exam:   General: Alert and age appropriately oriented. Pleasant. Obese. HEENT: Normocephalic, no JVD   Lungs: Clear to auscultation    Heart: Regular rate and rhythm, S1, S2   No  murmurs, clicks, rub or gallops   Abdomen: Softly distended, non-tender to palpation in all four quadrants. Bowel sounds present.      Genitourinary: defer   Neuromuscular:      PERRL, EOMI  LUE     Shoulder abduction  5- /5              Elbow flexion: 5-  /5               Wrist extension: 5- /5              Finger flexion;   5-/5  RUE    Shoulder abduction: 5- /5                Elbow flexion:  5- /5               Wrist extension: 5- /5              Finger flexion:  5- /5  LLE     Hip flexion:  3+/5              Knee extension:   4+/5               Ankle dorsiflexion:  4 /5              Ankle plantarflexion:   4/5                                         RLE     Hip flexion:  3+ /5              Knee extension:  4+ /5               Ankle dorsiflexion:  4 /5              Ankle plantarflexion:  4 /5  Sensory - intact      Skin/extremity: ntact, dry, good skin turgor, age related changes present   Edema: 1+ BLE edema                                                                            Functional Assessment:  Gross Assessment  AROM: Generally decreased, functional (10/04/19 1500)  Strength: Generally decreased, functional (10/04/19 1500)  Sensation: Impaired (10/04/19 1500)       Balance  Sitting - Static: Good (unsupported) (10/05/19 1000)  Sitting - Dynamic: Good (unsupported) (10/05/19 1000)  Standing - Static: Good (10/05/19 1000)  Standing - Dynamic : Impaired (10/05/19 1000)                     Bonilla Doyle Fall Risk Assessment:  Bonilla Doyle Fall Risk  Mobility: Ambulates or transfers with assist devices or assistance (10/07/19 0745)  Mobility Interventions: Patient to call before getting OOB;Utilize walker, cane, or other assistive device (10/07/19 0745)  Mentation: Alert, oriented x 3 (10/07/19 0745)  Mentation Interventions: Adequate sleep, hydration, pain control;Door open when patient unattended;Evaluate medications/consider consulting pharmacy; Eyeglasses and hearing aids; Increase mobility;Update white board (10/06/19 0710)  Medication: Patient receiving anticonvulsants, sedatives(tranquilizers), psychotropics or hypnotics, hypoglycemics, narcotics, sleep aids, antihypertensives, laxatives, or diuretics (10/07/19 0745)  Medication Interventions: Patient to call before getting OOB; Teach patient to arise slowly (10/07/19 0745)  Elimination: Needs assistance with toileting (10/07/19 0745)  Elimination Interventions: Call light in reach; Patient to call for help with toileting needs (10/07/19 0745)  Prior Fall History: No (10/07/19 0745)  History of Falls Interventions: Door open when patient unattended(pt refuses) (10/07/19 0745)  Total Score: 3 (10/07/19 0745)  Standard Fall Precautions: Yes (10/07/19 0745)  High Fall Risk: Yes (10/07/19 0745)     Speech Assessment:  Aspiration Signs/Symptoms: None (10/02/19 1221)      Ambulation:  Gait  Distance (ft): 150 Feet (ft) (10/05/19 1000)  Assistive Device: Mamie Meneses, rollator (10/05/19 1000)  Rail Use: Both (10/04/19 1500)     Labs/Studies:  Recent Results (from the past 72 hour(s))   GLUCOSE, POC    Collection Time: 10/04/19  4:12 PM   Result Value Ref Range    Glucose (POC) 269 (H) 65 - 100 mg/dL   GLUCOSE, POC    Collection Time: 10/04/19  9:01 PM   Result Value Ref Range    Glucose (POC) 370 (H) 65 - 100 mg/dL   GLUCOSE, POC    Collection Time: 10/05/19  7:48 AM   Result Value Ref Range    Glucose (POC) 207 (H) 65 - 100 mg/dL   GLUCOSE, POC    Collection Time: 10/05/19 11:19 AM   Result Value Ref Range    Glucose (POC) 249 (H) 65 - 100 mg/dL   GLUCOSE, POC    Collection Time: 10/05/19  4:29 PM   Result Value Ref Range    Glucose (POC) 307 (H) 65 - 100 mg/dL   GLUCOSE, POC    Collection Time: 10/05/19  9:00 PM   Result Value Ref Range    Glucose (POC) 215 (H) 65 - 100 mg/dL   GLUCOSE, POC    Collection Time: 10/06/19  7:12 AM   Result Value Ref Range    Glucose (POC) 183 (H) 65 - 100 mg/dL   GLUCOSE, POC    Collection Time: 10/06/19 11:21 AM   Result Value Ref Range    Glucose (POC) 245 (H) 65 - 100 mg/dL   GLUCOSE, POC    Collection Time: 10/06/19  4:57 PM   Result Value Ref Range    Glucose (POC) 216 (H) 65 - 100 mg/dL   GLUCOSE, POC    Collection Time: 10/06/19  9:05 PM   Result Value Ref Range    Glucose (POC) 177 (H) 65 - 069 mg/dL   METABOLIC PANEL, BASIC    Collection Time: 10/07/19  6:57 AM   Result Value Ref Range    Sodium 142 136 - 145 mmol/L    Potassium 3.9 3.5 - 5.1 mmol/L    Chloride 108 (H) 98 - 107 mmol/L    CO2 29 21 - 32 mmol/L    Anion gap 5 (L) 7 - 16 mmol/L    Glucose 168 (H) 65 - 100 mg/dL    BUN 27 (H) 8 - 23 MG/DL    Creatinine 1.35 0.8 - 1.5 MG/DL    GFR est AA >60 >60 ml/min/1.73m2    GFR est non-AA 54 (L) >60 ml/min/1.73m2    Calcium 8.3 8.3 - 10.4 MG/DL   GLUCOSE, POC    Collection Time: 10/07/19  7:18 AM   Result Value Ref Range    Glucose (POC) 177 (H) 65 - 100 mg/dL   GLUCOSE, POC    Collection Time: 10/07/19 11:17 AM   Result Value Ref Range    Glucose (POC) 191 (H) 65 - 100 mg/dL       Assessment:     Problem List as of 10/7/2019 Date Reviewed: 9/18/2019          Codes Class Noted - Resolved    Cecal diverticulitis ICD-10-CM: K57.32  ICD-9-CM: 562.11  9/29/2019 - Present        RLQ abdominal pain ICD-10-CM: R10.31  ICD-9-CM: 789.03  9/28/2019 - Present        CKD (chronic kidney disease) stage 3, GFR 30-59 ml/min (HCC) (Chronic) ICD-10-CM: N18.3  ICD-9-CM: 585.3  6/24/2019 - Present        Cerebrovascular accident (CVA) ([facility] ICD-10-CM: I63.9  ICD-9-CM: 434.91  3/21/2019 - Present        Type 2 diabetes mellitus without complication ([facility] [id number]: E11.9  ICD-9-CM: 250.00  3/21/2019 - Present        Hemiparesis of left dominant side ([facility] ICD-10-CM: G81.92  ICD-9-CM: 342.91  3/7/2019 - Present        Type 2 diabetes with nephropathy ([facility] ICD-10-CM: E11.21  ICD-9-CM: 250.40, 583.81  5/11/2018 - Present        Morbid obesity due to excess calories ([facility] ICD-10-CM: E66.01  ICD-9-CM: 278.01  11/9/2017 - Present        Sleep apnea ICD-10-CM: G47.30  ICD-9-CM: 780.57  11/9/2017 - Present        Esophageal dysphagia ICD-10-CM: R13.10  ICD-9-CM: 787.20  11/9/2017 - Present        TIA (transient ischemic attack) ICD-10-CM: G45.9  ICD-9-CM: 435.9  11/9/2017 - Present        Mild intermittent asthma without complication [id number]: H85.86  ICD-9-CM: 493.90  8/24/2017 - Present        Diabetic neuropathy associated with type 2 diabetes mellitus (HCC) (Chronic) ICD-10-CM: E11.40  ICD-9-CM: 250.60, 357.2  7/18/2017 - Present        Diastolic CHF, chronic (HCC) (Chronic) ICD-10-CM: I50.32  ICD-9-CM: 428.32, 428.0  3/20/2017 - Present        Mixed hyperlipidemia (Chronic) ICD-10-CM: N36.1  ICD-9-CM: 272.2  11/17/2016 - Present        Essential hypertension with goal blood pressure less than 130/85 (Chronic) ICD-10-CM: I10  ICD-9-CM: 401.9  7/5/2016 - Present        Pulmonary hypertension (HCC) (Chronic) ICD-10-CM: I27.20  ICD-9-CM: 416.8  10/14/2015 - Present        Mild diastolic dysfunction (Chronic) ICD-10-CM: I51.9  ICD-9-CM: 429.9  7/17/2015 - Present Gastroesophageal reflux disease without esophagitis (Chronic) ICD-10-CM: K21.9  ICD-9-CM: 530.81  4/21/2015 - Present        Hyperlipidemia with target LDL less than 70 (Chronic) ICD-10-CM: E78.5  ICD-9-CM: 272.4  Unknown - Present        Essential hypertension, benign (Chronic) ICD-10-CM: I10  ICD-9-CM: 401.1  1/22/2015 - Present        Chronic constipation (Chronic) ICD-10-CM: K59.09  ICD-9-CM: 564.00  1/22/2015 - Present        Type 2 diabetes, uncontrolled, with neuropathy (HCC) (Chronic) ICD-10-CM: E11.40, E11.65  ICD-9-CM: 250.62, 357.2  1/22/2015 - Present        Obstructive sleep apnea of adult (Chronic) ICD-10-CM: G47.33  ICD-9-CM: 327.23  1/22/2015 - Present        Morbid obesity (HCC) (Chronic) ICD-10-CM: E66.01  ICD-9-CM: 278.01  6/27/2011 - Present        RESOLVED: Sepsis ([facility] ICD-10-CM: A41.9  ICD-9-CM: 038.9, 995.91  9/30/2019 - 9/30/2019        RESOLVED: Hyperkalemia ICD-10-CM: E87.5  ICD-9-CM: 276.7  9/27/2019 - 9/27/2019        RESOLVED: Acute metabolic encephalopathy [id number]: G93.41  ICD-9-CM: 348.31  9/25/2019 - 9/27/2019        RESOLVED: Postural dizziness ICD-10-CM: R42  ICD-9-CM: 780.4  5/11/2018 - 7/16/2018        RESOLVED: Chest pain ICD-10-CM: R07.9  ICD-9-CM: 786.50  5/11/2018 - 7/16/2018        RESOLVED: Type 2 diabetes mellitus without complication, with long-term current use of insulin ([facility] ICD-10-CM: E11.9, Z79.4  ICD-9-CM: 250.00, V58.67  11/9/2017 - 7/16/2018        RESOLVED: H/O colonoscopy (Chronic) ICD-10-CM: H56.386  ICD-9-CM: V45.89  7/18/2017 - 7/16/2018    Overview Signed 7/18/2017  7:06 AM by El Read     Colonoscopy in 7/29/10 Dr Christopher Shrestha with repeat in 7/2020 [facility] at [facility] Dr Perez Player: Type 2 diabetes mellitus without complication, without long-term current use of insulin ([facility] ICD-10-CM: E11.9  ICD-9-CM: 250.00  3/20/2017 - 7/18/2017        RESOLVED: Sleep apnea ICD-10-CM: G47.30  ICD-9-CM: 780.57  3/20/2017 - 7/18/2017 RESOLVED: Generalized edema ICD-10-CM: R60.1  ICD-9-CM: 782.3  7/5/2016 - 7/18/2017        RESOLVED: Type 2 diabetes mellitus without complication ([facility] [id number]: E11.9  ICD-9-CM: 250.00  7/5/2016 - 2/21/2017        RESOLVED: Sleep apnea ICD-10-CM: G47.30  ICD-9-CM: 780.57  7/5/2016 - 12/16/2016        RESOLVED: Peripheral neuropathy ([facility] ICD-10-CM: G62.9  ICD-9-CM: 356.9  1/22/2015 - 7/18/2017        RESOLVED: Chest pain, unspecified ICD-10-CM: R07.9  ICD-9-CM: 786.50  6/27/2011 - 1/22/2015        RESOLVED: HTN (hypertension) ICD-10-CM: I10  ICD-9-CM: 401.9  6/27/2011 - 1/22/2015        RESOLVED: DM circ dis type II ICD-10-CM: E11.51  ICD-9-CM: 250.70  6/27/2011 - 9/22/2017              Plan:     Rehabilitation Plan  The patient has shown the ability to tolerate and benefit from 3 hours of therapy daily and is being admitted to a comprehensive acute inpatient rehabilitation program consisting of at least 3 hours of combined physical and occupational therapies. continue intensive Physical Therapy for a minimum of 1.5 hours a day, at least 5 out of 7 days per week to address bed mobility, transfers, ambulation, strengthening, balance, and endurance. continue intensive Occupational Therapy for a minimum of 1.5 hours a day, at least 5 out of 7 days per week to address ADL ( bathing, LE dressing, toileting) and adaptive equipment as needed.     Continue 24-hour skilled rehabilitation nursing for bowel and bladder management, skin care for decubitus ulcer prevention , pain management and ongoing medication administration      Continue daily physician medical management:  Cecal diverticulitis  - iv antibiotics transitioned to po Flagyl + po cipro x 7 days, until 10/5.   - 10/4 -No acute symptoms. - 10/7 BRBPR 10/5, improved, trace on 106. Monitor. Due to possibly known int/ ext hemorrhoids, diverticulitis. Monitor. GI of worse. Check hgb today. 10/8 - hbg stable .  No report of blood tinged stool.      Acute metabolic encephalopathy  - monitor. Continue ST treatment. 10/7 - mental status  at baseline         CKD (chronic kidney disease) stage 3,   - monitor Cr. Steady improvement. 10/2 - 21/1.33  10/7 - 27/1. 35. Baseline.      CAD/ Diastolic CHF, chronic /Essential hypertension, benign (Chronic)/ Pulmonary hypertension (HCC) (Chronic)  - diovan 80  - lasix 40 bid  - aspirin/ plavix. - 10/2 continue lasix bid. Monitor renal funciton, may be able to reduce dose soon. 10/3 pt reports he has been on scheduled lasix, dose variable. Will continue lasix 40 bid and monitor volume status, renal funciton. No acute decompensation. 10/4 - monitor renal function. Continue current lasix dose. Check renaol function Monday. 10/7 - continue lasix 40mg bid  10/8 - patient has LE edema. Patient states edema he has presently is about where he is when at his best. Continue lasix 40mg po bid. Steady control of volume status at this dose during his admission.      Obstructive sleep apnea of adult   - CPAP hs.  Add - prn albuterol inh.      Morbid obesity - Chronic  - dietary/ nutrition consult     Hyperlipidemia with target LDL less than 70 (Chronic)        Pneumonia prophylaxis- Insentive spirometer every hour while awake     DVT risk / DVT Prophylaxis- Will require daily physician exam to assess for signs and symptoms as patient is at increased risk for of thromboembolism. Mobilization as tolerated. Intermittent pneumatic compression devices when in bed Thigh-high or knee-high thromboembolic deterrent hose when out of bed.   - SCDs. Pain Control: diabetic neuropathy. no current joint or muscle symptoms, essentially pain-free. Will require regular pain assessment and comprenhensive pain management,   - lyrica  - occasional norco for chronic LBP.      anxiety   - Buspar     Diabetes mellitus - Uncontrolled. poor glycemic control.  Will require daily, close FSG monitoring and medication adjustment to optimize glycemic control in setting of acute illness and hospitalization.   - Diabetic neuropathy associated with type 2 diabetes mellitus ([facility] (Chronic)  10/2 - Pt was taking tresiba + scheduled 8u novolog at mealtime and SSI coverage. BS - [phone]. Continue SSI coverage. Resume metformin. Start slow, 500 bid. 10/3 metformin started. Monitor response. 10/4 increase metformin to home dose 1000 bid. 10/8 - glycemic control better on usual home regimen.      Urinary retention/ neurogenic bladder - schedule voids q6-8 hrs. Check post-void residual every shift; In and Out catheterize if post-void residual is more than 400 cc.  - flomax.      bowel program - + constipation.   -Miralax, dialy. Colace daily  - dulcolax supp prn     GERD - resume PPI. At times may need additional antacids, Maalox prn. Time spent was 25 minutes with over 1/2 in direct patient care/examination, consultation and coordination of care.      Signed By: Eliezer Negro MD     October 7, 2019

## 2019-10-07 NOTE — PROGRESS NOTES
PHYSICAL THERAPY DAILY NOTE  Time In: 9631  Time Out: 3369  Patient Seen For: PM;Balance activities;Gait training;Patient education;Transfer training; Other (see progress notes)    Subjective: patient reporting he is ready to go home. Reports he thinks he is going to get [facility] PT when he goes home         Objective:Vital Signs:  Patient Vitals for the past 12 hrs:   Temp Pulse Resp BP SpO2   10/07/19 0743 97.4 °F (36.3 °C) 89 16 96/62 94 %     Pain level:No c/o pain during treatment  Pain location:NA  Pain interventions:NA    Patient education:transfer training, gait training, fall precautions, activity pacing,energy conservation, body mechanics, Patient verbalizing understanding and demonstrating  understanding of patient education. Recommend follow up education. Interdisciplinary Communication:NA    Other (comment)(fall precautions)  GROSS ASSESSMENT Daily Assessment     NA       COGNITION Daily Assessment    No change from AM       BED/MAT MOBILITY Daily Assessment    Rolling Right : 0 (Not tested)  Rolling Left : 0 (Not tested)  Supine to Sit : 0 (Not tested)  Sit to Supine : 0 (Not tested)       TRANSFERS Daily Assessment   Increased time and effort to complete with cues for body mechanics   Transfer Type: SPT with walker  Transfer Assistance : 5 (Supervision/setup)  Sit to Stand Assistance: Supervision  Car Transfers: Not tested       GAIT Daily Assessment   Gait training with one time cue for posture correction and to improve step length and step clearance Amount of Assistance: 5 (Supervision/setup)  Distance (ft): 150 Feet (ft)(150ft x 1, 100ft x 2 with straight cane)  Assistive Device: Cane, straight     Gait training x 20 ft x 6 with 3 to 4 min rest breaks between attempts in parallel bars facilitating symmetrical step length with improved step length and ankle DF with knee ext at initial contact.  Able to complete with supervision and cueing    STEPS or STAIRS Daily Assessment   Slow single step at a time going up/down steps with increased time and effort to complete. Steps/Stairs Ambulated (#): 4  Level of Assist : 5 (Supervision/setup)  Rail Use: Both       BALANCE Daily Assessment    Sitting - Static: Good (unsupported)  Sitting - Dynamic: Good (unsupported)  Standing - Static: Good  Standing - Dynamic : Impaired       WHEELCHAIR MOBILITY Daily Assessment    Curbs/Ramps Assist Required (FIM Score): 0 (Not tested)  Wheelchair Setup Assist Required : 0 (Not tested)       LOWER EXTREMITY EXERCISES Daily Assessment     NA          Assessment: gait pattern and gait balance with straight cane improving. Gait distance limited due to fatigue with c/o SOB but 02 sat remains greater than 90% on room air       Patient returned to room at end of treatment and remained up in recliner with LEs elevated and with needs in reach. Plan of Care: Continue with POC and progress as tolerated.      Rigo Simms, PT  10/7/2019

## 2019-10-07 NOTE — PROGRESS NOTES
PHYSICAL THERAPY DAILY NOTE  Time In: 1033  Time Out: 9095  Patient Seen For: AM;Gait training;Patient education;Transfer training; Other (see progress notes)    Subjective: patient reporting he gets SOB when walking long distances. Reports he prefers to walk with his cane. Reports his family tells him to walk with his RW due to history of falls. Objective:Vital Signs:02 sat 94 to 96% and  to 118 during treatment, on room air  Patient Vitals for the past 12 hrs:   Temp Pulse Resp BP SpO2   10/07/19 0743 97.4 °F (36.3 °C) 89 16 96/62 94 %     Pain level:No c/o pain during treatment  Pain location:NA  Pain interventions:NA    Patient education:Balance training,transfer training, gait training, fall precautions, activity pacing, body mechanics, energy conservation, Patient verbalizing understanding and demonstrating understanding of patient education. Recommend follow up education.     Interdisciplinary Communication:    Other (comment)(fall precautions)  GROSS ASSESSMENT Daily Assessment     NA       COGNITION Daily Assessment    Orientation:A+O x 4  Communication:able to express needs verbally, able to follow multi step commands  Social Interaction:cooperating, appropriate with PT, participating, motivated to improve  Problem Solving:NA  Memory:cues to recall breathing techniques during gait training       BED/MAT MOBILITY Daily Assessment    Rolling Right : 0 (Not tested)  Rolling Left : 0 (Not tested)  Supine to Sit : 0 (Not tested)  Sit to Supine : 0 (Not tested)       TRANSFERS Daily Assessment   Increased time and effort to complete with cues for body mechanics   Transfer Type: SPT with walker  Transfer Assistance : 5 (Supervision/setup)  Sit to Stand Assistance: Supervision  Car Transfers: Not tested       GAIT Daily Assessment    Amount of Assistance: 5 (Supervision/setup)  Distance (ft): 150 Feet (ft)(150ft x 1 with RW and 100ft x 2 with straight cane)  Assistive Device: Cane, straight   Gait training with one time cue for posture correction and to improve step length and step clearance    Gait training up/down 20 ft ramp with RW and Supervision. No loss of balance. Cues to control gait speed going down steps. 2 min sitting rest break after going up steps    STEPS or STAIRS Daily Assessment   Slow single step at a time going up/down steps with increased time and effort to complete. Steps/Stairs Ambulated (#): 4  Level of Assist : 5 (Supervision/setup)  Rail Use: Both       BALANCE Daily Assessment   Static standing without a device and Supervision with no loss of balance Sitting - Static: Good (unsupported)  Sitting - Dynamic: Good (unsupported)  Standing - Static: Good  Standing - Dynamic : Impaired       WHEELCHAIR MOBILITY Daily Assessment    Curbs/Ramps Assist Required (FIM Score): 0 (Not tested)  Wheelchair Setup Assist Required : 0 (Not tested)       LOWER EXTREMITY EXERCISES Daily Assessment     NA          Assessment: progressing towards modified independence with functional mobility. Functional endurance slowly improving. 02 sat stable on room air       Patient to OT at end of treatment    Plan of Care: Continue with POC and progress as tolerated.      William Torres, PT  10/7/2019

## 2019-10-07 NOTE — PROGRESS NOTES
OT Daily Note  Time In 1116   Time Out 1205     Subjective: \"There is nothing I cannot do. \" [no insight into deficits]  Pain: none reported  Education: benefits of rehab, application of therapy tasks to real-world tasks   Interdisciplinary Communication: handoff from PT   Precautions: Other (comment)(fall precautions)  Location on arrival: handoff from PT    Cognition Daily Assessment   Patient engaged in cognitive task answering 5 questions regarding menu items from a typical restaurant menu. Focus was on scanning written information, locating information, following written instructions, and attention to detail. Patient completed task with 60% accuracy. Educated patient extensively on application of this specific therapeutic task to community integration tasks of ordering food from a menu in [facility]. Very limited insight into deficits. Patient persists with resistance to therapeutic interventions and with no understanding or acknowledgement of the relationship between functionall therapeutic tasks and daily functioning at discharge. Transfers Daily Assessment   Patient transferred [facility] to [facility] using SPT with RW with supervision. Progressing well. On track. [facility]/all needs in reach and in no distress. Patient reporting nausea; no vomiting noted. Requested medication for nausea; notified nurse. Assessment: See above. No insight into deficits. Plan: Continue OT POC with focus on ADL/IADL skills, functional transfers, cognition, functional mobility, coordination, strength, static and dynamic balance, and activity tolerance to maximize safety and independence with ADLs and functional transfers.      D[place]ain [place], MS, OTR/L  10/7/2019        Note may contain the following abbreviations:   Abbreviation Explanation   AROM Active range of motion   PROM Passive range of motion   SPT Stand pivot transfer   LPT Lateral pivot transfer   WC wheelchair   RW Rolling walker    BUE Bilateral upper extremities   BLE Bilateral lower extremities    WBAT Weight bearing as tolerated    TTWB Toe-touch weight bearing    AD Adaptive device   AE Adaptive equipment    NMES Neuro muscular electrical stimulation   UBE Upper body ergometer

## 2019-10-08 ENCOUNTER — HOME HEALTH ADMISSION (OUTPATIENT)
Dept: HOME HEALTH SERVICES | Facility: HOME HEALTH | Age: 79
End: 2019-10-08
Payer: MEDICARE

## 2019-10-08 LAB
GLUCOSE BLD STRIP.AUTO-MCNC: 134 MG/DL (ref 65–100)
GLUCOSE BLD STRIP.AUTO-MCNC: 188 MG/DL (ref 65–100)
GLUCOSE BLD STRIP.AUTO-MCNC: 206 MG/DL (ref 65–100)
GLUCOSE BLD STRIP.AUTO-MCNC: 219 MG/DL (ref 65–100)
HCT VFR BLD AUTO: 34.1 % (ref 41.1–50.3)
HGB BLD-MCNC: 10.7 G/DL (ref 13.6–17.2)

## 2019-10-08 PROCEDURE — 82962 GLUCOSE BLOOD TEST: CPT

## 2019-10-08 PROCEDURE — 65310000000 HC RM PRIVATE REHAB

## 2019-10-08 PROCEDURE — 97535 SELF CARE MNGMENT TRAINING: CPT

## 2019-10-08 PROCEDURE — 74011250637 HC RX REV CODE- 250/637: Performed by: PHYSICAL MEDICINE & REHABILITATION

## 2019-10-08 PROCEDURE — 92507 TX SP LANG VOICE COMM INDIV: CPT

## 2019-10-08 PROCEDURE — 85018 HEMOGLOBIN: CPT

## 2019-10-08 PROCEDURE — 97530 THERAPEUTIC ACTIVITIES: CPT

## 2019-10-08 PROCEDURE — 36415 COLL VENOUS BLD VENIPUNCTURE: CPT

## 2019-10-08 PROCEDURE — 99232 SBSQ HOSP IP/OBS MODERATE 35: CPT | Performed by: PHYSICAL MEDICINE & REHABILITATION

## 2019-10-08 PROCEDURE — 97116 GAIT TRAINING THERAPY: CPT

## 2019-10-08 PROCEDURE — 74011636637 HC RX REV CODE- 636/637: Performed by: PHYSICAL MEDICINE & REHABILITATION

## 2019-10-08 PROCEDURE — 97110 THERAPEUTIC EXERCISES: CPT

## 2019-10-08 PROCEDURE — 96152 PR HEAL & BEHAV INTERVENT,EA 15 MIN,INDIV: CPT | Performed by: PSYCHOLOGIST

## 2019-10-08 RX ADMIN — EZETIMIBE 10 MG: 10 TABLET ORAL at 21:20

## 2019-10-08 RX ADMIN — FUROSEMIDE 40 MG: 20 TABLET ORAL at 17:28

## 2019-10-08 RX ADMIN — INSULIN LISPRO 4 UNITS: 100 INJECTION, SOLUTION INTRAVENOUS; SUBCUTANEOUS at 21:21

## 2019-10-08 RX ADMIN — INSULIN LISPRO 2 UNITS: 100 INJECTION, SOLUTION INTRAVENOUS; SUBCUTANEOUS at 17:24

## 2019-10-08 RX ADMIN — NYSTATIN 1 G: 100000 POWDER TOPICAL at 17:32

## 2019-10-08 RX ADMIN — TAMSULOSIN HYDROCHLORIDE 0.4 MG: 0.4 CAPSULE ORAL at 09:11

## 2019-10-08 RX ADMIN — HUMAN INSULIN 8 UNITS: 100 INJECTION, SOLUTION SUBCUTANEOUS at 17:23

## 2019-10-08 RX ADMIN — POLYETHYLENE GLYCOL 3350 17 G: 17 POWDER, FOR SOLUTION ORAL at 09:13

## 2019-10-08 RX ADMIN — HUMAN INSULIN 8 UNITS: 100 INJECTION, SOLUTION SUBCUTANEOUS at 07:30

## 2019-10-08 RX ADMIN — FUROSEMIDE 40 MG: 20 TABLET ORAL at 05:37

## 2019-10-08 RX ADMIN — METRONIDAZOLE 500 MG: 500 TABLET ORAL at 09:11

## 2019-10-08 RX ADMIN — INSULIN GLARGINE 20 UNITS: 100 INJECTION, SOLUTION SUBCUTANEOUS at 21:21

## 2019-10-08 RX ADMIN — CLOPIDOGREL BISULFATE 75 MG: 75 TABLET ORAL at 09:11

## 2019-10-08 RX ADMIN — ONDANSETRON 8 MG: 4 TABLET, ORALLY DISINTEGRATING ORAL at 15:04

## 2019-10-08 RX ADMIN — INSULIN LISPRO 4 UNITS: 100 INJECTION, SOLUTION INTRAVENOUS; SUBCUTANEOUS at 12:10

## 2019-10-08 RX ADMIN — POTASSIUM CHLORIDE 20 MEQ: 20 TABLET, EXTENDED RELEASE ORAL at 09:12

## 2019-10-08 RX ADMIN — DOCUSATE SODIUM 100 MG: 100 CAPSULE, LIQUID FILLED ORAL at 09:12

## 2019-10-08 RX ADMIN — HYDROCODONE BITARTRATE AND ACETAMINOPHEN 1 TABLET: 7.5; 325 TABLET ORAL at 21:20

## 2019-10-08 RX ADMIN — ATORVASTATIN CALCIUM 80 MG: 40 TABLET, FILM COATED ORAL at 21:20

## 2019-10-08 RX ADMIN — PREGABALIN 150 MG: 150 CAPSULE ORAL at 09:11

## 2019-10-08 RX ADMIN — BUSPIRONE HYDROCHLORIDE 15 MG: 5 TABLET ORAL at 09:12

## 2019-10-08 RX ADMIN — VALSARTAN 80 MG: 40 TABLET, FILM COATED ORAL at 09:12

## 2019-10-08 RX ADMIN — ASPIRIN 81 MG: 81 TABLET ORAL at 09:12

## 2019-10-08 RX ADMIN — PREGABALIN 150 MG: 150 CAPSULE ORAL at 17:28

## 2019-10-08 RX ADMIN — HUMAN INSULIN 8 UNITS: 100 INJECTION, SOLUTION SUBCUTANEOUS at 12:11

## 2019-10-08 RX ADMIN — METFORMIN HYDROCHLORIDE 1000 MG: 500 TABLET ORAL at 09:11

## 2019-10-08 RX ADMIN — PANTOPRAZOLE SODIUM 40 MG: 40 TABLET, DELAYED RELEASE ORAL at 05:38

## 2019-10-08 RX ADMIN — METFORMIN HYDROCHLORIDE 1000 MG: 500 TABLET ORAL at 17:28

## 2019-10-08 NOTE — PROGRESS NOTES
OT Daily Note  Time In 1030   Time Out 1117     Subjective: \"I don't like to be rushed. \" [patient easily agitated, but able to be redirected this session through building rapport]  Pain: none reported  Education: sock-aid management, transfer training   Interdisciplinary Communication: handoff to PT; with PT regarding DC planning   Precautions: Other (comment)(fall precautions)  Location on arrival: up in recliner    Self-Care Daily Assessment   Patient completed LB AE training using sock-aid and was able to doff R shoe and R sock and then don R sock using sock-aid with verbal cueing. Required increased time for task. Would benefit from further LB AE training. Transfers Daily Assessment   Patient transferred [facility] to [facility] twice using SPT with RW with supervision. Progressing well. Continues to require intermittent cueing to consistently use RW. Visual-Perceptual Daily Assessment   Patient completed visual perceptual task replicating one simple pattern using rubberband board with no errors and no assist.  Progressing. Cognition Daily Assessment   Confusing this therapist with both speech therapist and physical therapist; also with decreased recall of therapy schedule and prior training. Remains impaired. Remains resistant to change. Patient was handed off to PT. Assessment: Remains with fixed mindset, not very open to changes. Impaired cognition remains a barrier. Plan: Continue OT POC with focus on ADL/IADL skills, functional transfers, functional mobility, cognition, coordination, strength, static and dynamic balance, and activity tolerance to maximize safety and independence with ADLs and functional transfers.      Chioma Stewart MS, OTR/L  10/8/2019        Note may contain the following abbreviations:   Abbreviation Explanation   AROM Active range of motion   PROM Passive range of motion   SPT Stand pivot transfer   LPT Lateral pivot transfer   WC wheelchair   RW Rolling walker BUE Bilateral upper extremities   BLE Bilateral lower extremities    WBAT Weight bearing as tolerated    TTWB Toe-touch weight bearing    AD Adaptive device   AE Adaptive equipment    NMES Neuro muscular electrical stimulation   UBE Upper body ergometer

## 2019-10-08 NOTE — PROGRESS NOTES
Patient agrees with [facility] upon discharge. Order, referral, face to face and placed on the AVS.  Details given to patient and family.  Ordered:  PT/OT/SW/AIDE/NSG

## 2019-10-08 NOTE — PROGRESS NOTES
[address]  Face to Face Encounter    Patients Name: Parth Hassan    YOB: 1940    Ordering Physician: [address]    Primary Diagnosis: other neurologic condition    Date of Face to Face:   10/8/2019                                  Face to Face Encounter findings are related to primary reason for home care:   yes. 1. I certify that the patient needs intermittent care as follows: skilled nursing care:  skilled observation/assessment, patient education and complex care plan management  physical therapy: strengthening, stretching/ROM, transfer training, gait/stair training, balance training and pt/caregiver education  occupational therapy:  ADL safety (ie. cooking, bathing, dressing), ROM and pt/caregiver education    2. I certify that this patient is homebound, that is: 1) patient requires the use of a walker and wheelchair device, special transportation, or assistance of another to leave the home; or 2) patient's condition makes leaving the home medically contraindicated; and 3) patient has a normal inability to leave the home and leaving the home requires considerable and taxing effort. Patient may leave the home for infrequent and short duration for medical reasons, and occasional absences for non-medical reasons. Homebound status is due to the following functional limitations: Patient with strength deficits limiting the performance of all ADL's without caregiver assistance or the use of an assistive device. Patient with poor safety awareness and is at risk for falls without assistance of another person and the use of an assistive device. Patient with poor ambulation endurance limiting their safe ability to ascend/descend the required number of steps to leave the home.     3. I certify that this patient is under my care and that I, or a nurse practitioner or [address] [place] [address], or clinical nurse specialist, or certified nurse midwife, working with me, had a Face-to-Face Encounter that meets the physician Face-to-Face Encounter requirements. The following are the clinical findings from the [facility] encounter that support the need for skilled services and is a summary of the encounter: [facility]  10/8/2019      THE FOLLOWING TO BE COMPLETED BY THE COMMUNITY PHYSICIAN:    I concur with the findings described above from the F2F encounter that this patient is homebound and in need of a skilled service.     Certifying Physician: _____________________________________      Printed Certifying Physician Name: _____________________________________    Date: _________________

## 2019-10-08 NOTE — PROGRESS NOTES
Interdisciplinary Conference Notes    Interdisciplinary conference completed to discuss plan of care. Estimated D/C Date: 10/12/2019    Recommended Follow-Up Therapy: Home Health  PT, OT, Nursing, Aide and Social Work    Communication with family/caregivers: Spoke with patient aware of discharge tomorrow. Called and left message with sister Michaela Hamm at [phone].

## 2019-10-08 NOTE — PROGRESS NOTES
Problem: Diabetes Self-Management  Goal: *Disease process and treatment process  Description  Define diabetes and identify own type of diabetes; list 3 options for treating diabetes. Outcome: Progressing Towards Goal  Goal: *Incorporating nutritional management into lifestyle  Description  Describe effect of type, amount and timing of food on blood glucose; list 3 methods for planning meals. Outcome: Progressing Towards Goal  Goal: *Incorporating physical activity into lifestyle  Description  State effect of exercise on blood glucose levels. Outcome: Progressing Towards Goal  Goal: *Developing strategies to promote health/change behavior  Description  Define the ABC's of diabetes; identify appropriate screenings, schedule and personal plan for screenings. Outcome: Progressing Towards Goal  Goal: *Using medications safely  Description  State effect of diabetes medications on diabetes; name diabetes medication taking, action and side effects. Outcome: Progressing Towards Goal  Goal: *Monitoring blood glucose, interpreting and using results  Description  Identify recommended blood glucose targets  and personal targets. Outcome: Progressing Towards Goal  Goal: *Prevention, detection, treatment of acute complications  Description  List symptoms of hyper- and hypoglycemia; describe how to treat low blood sugar and actions for lowering  high blood glucose level. Outcome: Progressing Towards Goal  Goal: *Prevention, detection and treatment of chronic complications  Description  Define the natural course of diabetes and describe the relationship of blood glucose levels to long term complications of diabetes.   Outcome: Progressing Towards Goal  Goal: *Developing strategies to address psychosocial issues  Description  Describe feelings about living with diabetes; identify support needed and support network  Outcome: Progressing Towards Goal  Goal: *Insulin pump training  Outcome: Progressing Towards Goal  Goal: *Sick day guidelines  Outcome: Progressing Towards Goal  Goal: *Patient Specific Goal (EDIT GOAL, INSERT TEXT)  Outcome: Progressing Towards Goal     Problem: Patient Education: Go to Patient Education Activity  Goal: Patient/Family Education  Outcome: Progressing Towards Goal     Problem: Inpatient Rehab (Adult)  Goal: *LTG: Avoids injury/falls 100% of time related to deficits  Outcome: Progressing Towards Goal  Goal: *LTG: Avoids infection 100% of time related to deficits  Outcome: Progressing Towards Goal  Goal: *LTG: Verbalize understanding of diagnosis and risk factors for recurring stroke  Outcome: Progressing Towards Goal  Goal: *LTG: Absence of DVT during hospitalization  Outcome: Progressing Towards Goal  Goal: *LTG: Maintains Skin Integrity With No Evidence of Pressure Injury 100% of Time  Outcome: Progressing Towards Goal  Goal: Interventions  Outcome: Progressing Towards Goal     Problem: Patient Education: Go to Patient Education Activity  Goal: Patient/Family Education  Outcome: Progressing Towards Goal     Problem: Patient Education: Go to Patient Education Activity  Goal: Patient/Family Education  Outcome: Progressing Towards Goal     Problem: Patient Education: Go to Patient Education Activity  Goal: Patient/Family Education  Outcome: Progressing Towards Goal     Problem: Patient Education: Go to Patient Education Activity  Goal: Patient/Family Education  Outcome: Progressing Towards Goal

## 2019-10-08 NOTE — PROGRESS NOTES
PSYCHOLOGY PROGRESS NOTE      Name:  Yanet Wray    Date of Service: 10/8/2019  Location of Service:   904/01  Type of Service: [address] and behavior intervention  Duration:  15  Primary Diagnosis:   1. Cecal diverticulitis    2. Diastolic CHF, chronic ([facility]    3. Diabetic polyneuropathy associated with type 2 diabetes mellitus ([facility]    4. Chronic constipation    5. CKD (chronic kidney disease) stage 3, GFR 30-59 ml/min ([facility]    6. Cerebrovascular accident (CVA), unspecified mechanism ([facility]    7. Essential hypertension with goal blood pressure less than 130/85        Summary of Service:  Engaged in brief intervention with patient. Introduced psychological services in rehab, and, provided overview of the emotional and practical components, risks, and benefits of the rehab process. Patient verbalized understanding, and was reluctant to respond to inquiries. Carla Epstein Psy.D.   Psychologist

## 2019-10-08 NOTE — PROGRESS NOTES
DISCHARGE SUMMARY     10/08/19 1031   Time Spent With Patient   Time In 0919   Time Out 1000   Patient Seen For: AM   Mental Status   Neurologic State Alert   Orientation Level Oriented X4   Cognition Follows commands;Memory loss;Decreased attention/concentration   Perception Appears intact   Perseveration No perseveration noted   Safety/Judgement Decreased insight into deficits;Home safety        10/09/19 1118   Comprehension Mode   Primary Mode of Comprehension Auditory   Score 6   Expression (Native Language)   Primary Mode of Expression Verbal   Score 6   Social Interaction/Pragmatics   Score 3   Problem Solving   Score 4   Memory   Score 4        10/08/19 1031   Mathematical Exercises    Word Problems Accuracy (%) 80 %   Memory Exercises   Recall Message Accuracy (%) 75 % recalling list of grocery items discussed; instructed on use of compensatory strategies but patient did not use them   Neuro-Linguistic Exercises   Reasoning Tasks Impaired; basic time reasoning 80% accuracy; increased time to respond   Mathematical Impaired   Memory Impaired     Patient participated with cognitive therapy. Upset about having a psychology consult. Attempts to state reasons why he would not need to complete functional activities given this session at home with limited insight into cognitive deficits. 80% accuracy with money management and basic time reasoning tasks. Instructed on compensatory memory strategies to recall a list of grocery items. Patient is tangential when writing out grocery list with re-direction to task required. Recalled 6/8 items but did not attempt to use compensatory strategies for recall despite education on strategies and examples provided. Recommend assistance with higher level ADLs at home.      William Hwang MS, CCC-SLP

## 2019-10-08 NOTE — PROGRESS NOTES
PHYSICAL THERAPY DISCHARGE SUMMARY   TIME IN 1116  TIME OUT 1202   Precautions at discharge: Other (comment)(fall precautions)    Problem List:    Decreased strength B LE  [x]     Decreased strength trunk/core  [x]     Decreased AROM   [x]     Decreased PROM  [x]     Decreased balance sitting  []     Decreased balance standing  [x]     Decreased endurance  [x]     Pain  []       Functional Limitations:   Decreased independence with bed mobility  [x]     Decreased independence with functional transfers  [x]     Decreased independence with ambulation  [x]     Decreased independence with stair negotiation  [x]            Outcome Measures: Vital Signs:on room air, 02 sat 96% and   after ambulating 150ft  Patient Vitals for the past 12 hrs:   Temp Pulse Resp BP SpO2   10/08/19 0756 98.6 °F (37 °C) 87 16 121/65 94 %     Pain level:No c/o pain during treatment  Pain location:NA  Pain interventions:NA    Patient education:Bed mobility training,transfer training, gait training, fall precautions, activity pacing, energy conservation, LE HEP. Patient verbalizing understanding and demonstrating  understanding of patient education. Recommend follow up education with [place] PT    Interdisciplinary Communication:spoke with RN case manager regarding D/C plans.        Cognition: Orientation:A+O x 4  Communication:able to express needs verbally, able to follow multi step commands  Social Interaction:cooperating, appropriate with PT, participating, motivated to improve  Problem Solving:improving with ability to control activity pacing and energy conservation during functional mobility  Memory:cues to recall breathing techniques during gait training     MMT Initial Asssessment   Right Lower Extremity Left Lower Extremity   Hip Flexion 4+ 4-   Knee Extension 5 4+   Knee Flexion 4+ 4   Ankle Dorsiflexion 5 5      MMT Discharge Assessment   Right Lower Extremity Left Lower Extremity   Hip Flexion 4+ 4-   Knee Extension 5 4+   Knee Flexion 4+ 4   Ankle Dorsiflexion 5 5   0/5 No palpable muscle contraction  1/5 Palpable muscle contraction, no joint movement  2-/5 Less than full range of motion in gravity eliminated position  2/5 Able to complete full range of motion in gravity eliminated position  2+/5 Able to initiate movement against gravity  3-/5 More than half but not full range of motion against gravity  3/5 Able to complete full range of motion against gravity  3+/5 Completes full range of motion against gravity with minimal resistance  4-/5 Completes full range of motion against gravity with minimal-moderate resistance  4/5 Completes full range of motion against gravity with moderate resistance  4+/5 Completes full range of motion against gravity with moderate-maximum resistance  5/5 Completes full range of motion against gravity with maximum resistance     AROM: LE generally decreased, functional    PRIMARY MODE OF LOCOMOTION: ambulation  Please see [facility] Interdisciplinary Eval: Coordination/Balance Section for details regarding FIM score description. BED/CHAIR/WHEELCHAIR TRANSFERS Initial Assessment Discharge Assessment   Rolling Right 5 (Supervision)(using bed rail) 6 (Modified independent)   Rolling Left 5 (Supervision)(using bed rail) 6 (Modified independent)   Supine to Sit 3 (Moderate assistance)(using bed rail) 6 (Modified independent)   Sit to Stand Contact guard assistance Modified independent   Sit to Supine 3 (Moderate assistance)(using leg  for LLE) 6 (Modified independent)   Transfer Type SPT with walker SPT with walker   Comments Increased time and effort to complete bed mobility and transfers. using bed rails to complete bed mobility. Difficulty with static sitting balance bed side and supine to sit due to amount of sag bed frrame allows in mattress with patient's buttock sitting lower then edge of bed frame. Unable to position feet on floor due to bed frame Increased time and effort to complete bed mobility and transfers using bed rail for rolling and supine to sit. Difficulty getting LLE onto bed due to left hip flexor weakness. patient has leg  at home to use prn   Car Transfer Not tested(due to patient's BMI, will not fit in rehab car) Not tested(Unable to assess due to size of rehab car and patient's BMI)   Car Type           WHEELCHAIR MOBILITY/MANAGEMENT Initial Assessment Discharge Assessment   Able to Propel       Functional Level 0(NT) 0(NT, activity did not occur before)   Curbs/ramps assistance required 0 (Not tested) 0 (Not tested)   Wheelchair set up assistance required 0 (Not tested) 0 (Not tested)   Wheelchair management           WALKING INDEPENDENCE Initial Assessment Discharge Assessment   Assistive device Walker, rolling, Gait belt Walker, rolling   Ambulation assistance - level surface 4 (Contact guard assistance) 6 (Modified independent)   Distance 175 Feet (ft) 150 Feet (ft)(with 3 standing rest breaks x 20 to 30 seconds each)   Comments slow cont step through gait with decrease in step length and gait speed. Decreased ankle DF at initial contact slow cont step through gait with trunk flexion and decreased step length and gait speed   Ambulation assistance - unlevel surface 4 (Contact guard assistance)(up/down 10 ft ramp with RW) 6 (Modified independent)(up/down 10 ft ramp with RW)       STEPS/STAIRS Initial Assessment Discharge Assessment   Steps/Stairs ambulated 4 4   Rail Use Both Both   Functional Level 2 5   Comments slow single step at a time going up/down steps. Increased time and effort to complete slow single step at a time leading up with RLE and down with LLE, increased time and effort to complete    Curbs/Ramps 4 (Contact guard assistance)(up/down 10 ft ramp with RW) 5 (Stand-by assistance)(up/down 6 inch step with RW)       QUALITY INDICATOR ASSIST COMMENTS   Roll right (&return to back) 6: Independent USING BED RAIL FOR ROLLING   Roll left (& return to back) 6:  Independent    Supine to sit 6: Independent Using bed rail   Sit to stand 6: Independent    Chair/bed-to-chair transfer 6: Independent With RW   Walk 10 feet 6: Independent With RW   Walk 50 feet with 2 turns 6: Independent With RW   Walk 150 feet 6: Independent With RW   Walk 10 feet on uneven  6: Independent With RW, up/down 10 ft ramp   1 step/curb 4: SBA  With RW, up/down 6 inch step   4 steps 6: Independent With hand rails   12 steps Not Tested: Not attempted due to decreased endurance With hand rails    object 6: Independent    Wheel 48' w/2 turns Not Tested: Not applicable secondary to pt not completing activity previously    Wheel 150' Not Tested: Not applicable secondary to pt not completing activity previously    Car Transfer Not Tested: Not attempted due to size of rehab car and patient's BMI             PHYSICAL THERAPY PLAN OF CARE    LTGs: patient met 4 out of 5 goals per eval. Refer to care plan for details    Pt would benefit from continued skilled physical therapy in order to improve independent functional mobility within the home with use of least restrictive device. Interventions may include range of motion (AROM, PROM B LE/trunk), motor function (B LE/trunk strengthening/coordination), activity tolerance (vitals, oxygen saturation levels), bed mobility training, balance activities, gait training (progressive ambulation program), and functional transfer training. HEP handout:issued written LE HEP. Able to complete with min assist and cues  SEATED EXERCISES Sets Reps Comments   Ankle Pumps 3 10    Hip Flexion 3 10    Long Arc Quads 3 10    Hip Adduction/Ball Squeeze 3 10    Hip Abduction with green Theraband 3 10    Hamstring Curls with green Theraband 3 10    Hip Extension with green Theraband 3 10      Pt to be discharged 10/09/19 with assistance provided by family.    No family training  Therapy Recommendations upon discharge: Quynh E Alan Malone needs at discharge: patient owns a SW, rollator and straight cane. No other DME recommended at this time      Please see IRC; Interdisciplinary Eval, Care Plan, and Patient Education for further information regarding physical therapy discharge summary and plan of care. Patient returned to room at end of treatment and remained up in recliner with LEs elevated and with needs in reach.      Carey Cancer, PT  10/8/2019

## 2019-10-08 NOTE — PROGRESS NOTES
PHYSICAL THERAPY DAILY NOTE  Time In: 5456  Time Out: 6164  Patient Seen For: PM;Gait training;Patient education; Therapeutic exercise;Transfer training; Other (see progress notes)    Subjective: patient reporting he is going home tomorrow. Reports he would like to start with [facility] before going back to [facility]         Objective:Vital Signs:02 sat 96% and  after ambulating 150ft on room air  Patient Vitals for the past 12 hrs:   Temp Pulse Resp BP SpO2   10/08/19 0756 98.6 °F (37 °C) 87 16 121/65 94 %     Pain level:No c/o pain during treatment  Pain location:NA  Pain interventions:NA    Patient education:transfer training, gait training, fall precautions, activity pacing, body mechanics,energy conservation, Patient verbalizing understanding and demonstrating  understanding of patient education. Recommend follow up education.     Interdisciplinary Communication:NA    Other (comment)(fall precautions)  GROSS ASSESSMENT Daily Assessment     NA           BED/MAT MOBILITY Daily Assessment    Rolling Right : 0 (Not tested)  Rolling Left : 0 (Not tested)  Supine to Sit : 0 (Not tested)  Sit to Supine : 0 (Not tested)       TRANSFERS Daily Assessment   Increased time and effort to complete   Transfer Type: SPT with walker  Transfer Assistance : 6 (Modified independent)  Sit to Stand Assistance: Modified independent  Car Transfers: Not tested       GAIT Daily Assessment    Amount of Assistance: 5 (Supervision/setup)  Distance (ft): 150 Feet (ft)(150ft x 1  75 ft x 1)  Assistive Device: Cane, straight   Gait training with one time cue to control gait speed with RR. 2 standing 30 second rest breaks to complete 150ft    STEPS or STAIRS Daily Assessment    Steps/Stairs Ambulated (#): 0  Level of Assist : 0 (Not tested)  Rail Use: Both       BALANCE Daily Assessment   Static standing with straight cane and supervision with no loss of balance Sitting - Static: Good (unsupported)  Sitting - Dynamic: Good (unsupported)  Standing - Static: Good  Standing - Dynamic : Impaired       WHEELCHAIR MOBILITY Daily Assessment    Functional Level: 0(NT, activity did not occur before)  Curbs/Ramps Assist Required (FIM Score): 0 (Not tested)  Wheelchair Setup Assist Required : 0 (Not tested)       LOWER EXTREMITY EXERCISES Daily Assessment   Increased time and effort to complete with multiple and frequent rest breaks. Cues for  correct form   Extremity: Both  Exercise Type #1: Seated lower extremity strengthening  Sets Performed: 2  Reps Performed: 10  Level of Assist: Minimal assistance     SEATED EXERCISES Sets Reps Comments   Ankle Pumps 1 20    Hip Flexion 2 10    Long Arc Quads 2 10    Hip Adduction/Ball Squeeze 2 10    Hip Abduction with green Theraband 2 10    Hamstring Curls with green Theraband 2 10    Hip Extension with green Theraband 2 10             Assessment: Improving with gait balance and gait distance with straight cane. Requires multiple and frequent rest breaks to complete functional mobility activity       Patient returned to room at end of treatment and remained up in recliner with LEs elevated and with needs in reach. Plan of Care: D/C home 10/09/19 with family.  Follow up with [facility], PT  10/8/2019

## 2019-10-09 VITALS
TEMPERATURE: 98.5 F | OXYGEN SATURATION: 95 % | SYSTOLIC BLOOD PRESSURE: 114 MMHG | WEIGHT: 274.4 LBS | RESPIRATION RATE: 16 BRPM | BODY MASS INDEX: 44.29 KG/M2 | DIASTOLIC BLOOD PRESSURE: 60 MMHG | HEART RATE: 103 BPM

## 2019-10-09 LAB — GLUCOSE BLD STRIP.AUTO-MCNC: 138 MG/DL (ref 65–100)

## 2019-10-09 PROCEDURE — 74011250637 HC RX REV CODE- 250/637: Performed by: PHYSICAL MEDICINE & REHABILITATION

## 2019-10-09 PROCEDURE — 74011636637 HC RX REV CODE- 636/637: Performed by: PHYSICAL MEDICINE & REHABILITATION

## 2019-10-09 PROCEDURE — 82962 GLUCOSE BLOOD TEST: CPT

## 2019-10-09 PROCEDURE — 97535 SELF CARE MNGMENT TRAINING: CPT

## 2019-10-09 PROCEDURE — 99239 HOSP IP/OBS DSCHRG MGMT >30: CPT | Performed by: PHYSICAL MEDICINE & REHABILITATION

## 2019-10-09 RX ORDER — FUROSEMIDE 20 MG/1
40 TABLET ORAL 2 TIMES DAILY
Qty: 60 TAB | Refills: 2 | Status: SHIPPED | OUTPATIENT
Start: 2019-10-09 | End: 2019-11-20

## 2019-10-09 RX ORDER — IRBESARTAN 150 MG/1
150 TABLET ORAL
Qty: 90 TAB | Refills: 3 | Status: SHIPPED | OUTPATIENT
Start: 2019-10-09 | End: 2019-11-20 | Stop reason: SDUPTHER

## 2019-10-09 RX ADMIN — POLYETHYLENE GLYCOL 3350 17 G: 17 POWDER, FOR SOLUTION ORAL at 07:44

## 2019-10-09 RX ADMIN — TAMSULOSIN HYDROCHLORIDE 0.4 MG: 0.4 CAPSULE ORAL at 07:36

## 2019-10-09 RX ADMIN — POTASSIUM CHLORIDE 20 MEQ: 20 TABLET, EXTENDED RELEASE ORAL at 07:39

## 2019-10-09 RX ADMIN — BUSPIRONE HYDROCHLORIDE 15 MG: 5 TABLET ORAL at 07:41

## 2019-10-09 RX ADMIN — METFORMIN HYDROCHLORIDE 1000 MG: 500 TABLET ORAL at 07:37

## 2019-10-09 RX ADMIN — FUROSEMIDE 40 MG: 20 TABLET ORAL at 07:36

## 2019-10-09 RX ADMIN — PANTOPRAZOLE SODIUM 40 MG: 40 TABLET, DELAYED RELEASE ORAL at 06:41

## 2019-10-09 RX ADMIN — DOCUSATE SODIUM 100 MG: 100 CAPSULE, LIQUID FILLED ORAL at 07:38

## 2019-10-09 RX ADMIN — HUMAN INSULIN 8 UNITS: 100 INJECTION, SOLUTION SUBCUTANEOUS at 07:32

## 2019-10-09 RX ADMIN — CLOPIDOGREL BISULFATE 75 MG: 75 TABLET ORAL at 07:40

## 2019-10-09 RX ADMIN — VALSARTAN 80 MG: 40 TABLET, FILM COATED ORAL at 07:40

## 2019-10-09 RX ADMIN — PREGABALIN 150 MG: 150 CAPSULE ORAL at 07:40

## 2019-10-09 RX ADMIN — ASPIRIN 81 MG: 81 TABLET ORAL at 07:40

## 2019-10-09 NOTE — PROGRESS NOTES
OT DISCHARGE REPORT    Time In: 0830  Time Out: 0927  Mobility   Score Comments   Rolling     Supine to Sit     Sit to Supine     Sit to Stand 6: Independent Using RW    Transfer Assist 6: Independent Transfer Type: SPT   Equipment: Rolling Walker and tub transfer bench   Comments: extra time due to slow pace      Activities of Daily Living    Score Comments   Eating 6: Independent    Oral Hygiene Not Tested: Pt refused    Bathing 6: Independent Type of Shower: Shower  Position: Standing PRN and Unsupported Sitting   Adaptive  Equipment: Tub Transfer Bench and Grab Bars  Comments: much encouragement required to participate in bathing    Upper Body  Dressing 6: Independent Items Applied: Pullover  Position: Unsupported Sitting  Comments:    Lower Body Dressing 6: Independent Items Applied: Underwear and Elastic pants  Position: Standing PRN and Unsupported Sitting  Adaptive Equipment: RW  Comments:    Donning/[place] Footwear 4: Supervision or touching A Items Applied: Socks and Slip-on shoes  Adaptive Equipment: Sock Aide  Comments: assist with L sock even after using sock-aid    Toilet Transfer Not Tested: Pt refused Transfer Type:  Comments:     Toileting Hygiene Not Tested: Pt refused Output:   Comments:    Education  Benefits of rehab, use of sock-aid, importance of taking showers     Plan of Care: Please see Care Plan for progress towards goals during stay  Precautions at Discharge: Falls, Poor Safety Awareness and impaired cognition  Discharge Location: home with brother with assistance from sisters   Safety/Supervision Recommendations/Functional Level: recommend assist with IADL tasks, assist with tasks requiring cognitive skills such as medication management and financial management  Family Training: NA due to patient would not have allowed family to be present during ADL tasks     Recommended Continuing Therapy: HH OT  Residual Deficits: impaired cognition, fixed mindset, resistant to therapeutic interventions, impaired activity tolerance, impaired insight into deficits   Progress over LOS: Patient made good progress with ADL skills, functional transfers, activity tolerance, strength, and coordination. Impaired cognition, edema, and resistance to therapeutic interventions remain ongoing barriers. Summary of Session: Patient seated up up recliner on arrival to room. Agreeable to OT. Completed ADL; see above for details. Rehab tech present for duration of ADL due to patient request. Patient met all goals except toileting goal due to patient did not toilet during discharge evaluation. Patient required increased time for all tasks as well as encouragement to participate in self care tasks. Patient is on track for discharge to home today with [facility].      Ashley Stewart, FAVIAN   10/9/2019

## 2019-10-09 NOTE — PROGRESS NOTES
CASE MANAGEMENT DISCHARGE NOTE      Discharge Destination: Home patient discharged with Home Health    Discharge Date:  10/09/2019       DME:None        Home Health Agency: Patient agrees with [facility] for PT/OT/NSG/AIDE/SW     Out Patient Facility:  None    STR: None

## 2019-10-09 NOTE — PROGRESS NOTES
OT Daily Note     Time In 1320   Time Out 1400      Subjective: \"I cook all kinds of things. \"   Pain: Not indicated     Precautions: Other (comment)(WBAT bilateral LEs, Fall precautions)     Mobility   Patient transferred recliner <> wheelchair this session ambulating ~10 feet x 2 with RW with supervision.      Education   Patient educated regarding environmental organization, safe item transport, and functional mobility for kitchen management ambulating with RW at discharge. Patient verbalized and demonstrated understanding, completing 3-part scavenger hunt in kitchen to retrieve items from refrigerator, drawer, and high shelf ambulating with RW with supervision, minimal cues for problem solving item transport and RW positioning.           Assessment: Patient tolerated well. Initial supervision recommended for IADLs ambulating with RW, to reinforce with caregivers present.     Education: Home Management/IADL with RW  Interdisciplinary Communication: Collaborated with Maureen Estrella regarding patient's performance and POC.      Plan:  To complete Discharge ADL next OT session.        Heidi Pedroza OTR/L

## 2019-10-09 NOTE — PROGRESS NOTES
Patient given discharge instructions and prescriptions. Verbalizes understanding of instructions. Patient leaving via [facility] with Deneen Duran CNA.

## 2019-10-09 NOTE — PROGRESS NOTES
[facility] PROGRESS NOTE    Fany Young  Admit Date: 10/1/2019  Admit Diagnosis: other neurologic condition    Subjective     Vss. Afebrile. Appetite good. + BM. Case discussed in team conference. Patient progressing toward short term functional goals. Patient feels he is ready for home discharge, tomorrow. All agree.      Objective:     Current Facility-Administered Medications   Medication Dose Route Frequency    ondansetron (ZOFRAN ODT) tablet 8 mg  8 mg Oral Q8H PRN    simethicone (MYLICON) tablet 80 mg  80 mg Oral Q6H PRN    insulin regular (NOVOLIN R, HUMULIN R) injection 8 Units  8 Units SubCUTAneous TIDAC    metFORMIN (GLUCOPHAGE) tablet 1,000 mg  1,000 mg Oral BID WITH MEALS    albuterol (PROVENTIL VENTOLIN) nebulizer solution 2.5 mg  2.5 mg Nebulization Q6H PRN    acetaminophen (TYLENOL) tablet 650 mg  650 mg Oral Q6H PRN    albuterol (PROVENTIL VENTOLIN) nebulizer solution 2.5 mg  2.5 mg Nebulization Q4H PRN    aspirin delayed-release tablet 81 mg  81 mg Oral DAILY    atorvastatin (LIPITOR) tablet 80 mg  80 mg Oral QHS    bisacodyl (DULCOLAX) suppository 10 mg  10 mg Rectal DAILY PRN    busPIRone (BUSPAR) tablet 15 mg  15 mg Oral DAILY    clopidogrel (PLAVIX) tablet 75 mg  75 mg Oral DAILY    ezetimibe (ZETIA) tablet 10 mg  10 mg Oral QHS    furosemide (LASIX) tablet 40 mg  40 mg Oral ACB&D    HYDROcodone-acetaminophen (NORCO) 7.5-325 mg per tablet 1 Tab  1 Tab Oral Q6H PRN    insulin glargine (LANTUS) injection 20 Units  20 Units SubCUTAneous QHS    insulin lispro (HUMALOG) injection 0-10 Units  0-10 Units SubCUTAneous AC&HS    lip protectant (BLISTEX) ointment 1 Each  1 Each Topical PRN    nystatin (MYCOSTATIN) 100,000 unit/gram powder   Topical BID    pantoprazole (PROTONIX) tablet 40 mg  40 mg Oral ACB    polyethylene glycol (MIRALAX) packet 17 g  17 g Oral DAILY    potassium chloride (K-DUR, KLOR-CON) SR tablet 20 mEq  20 mEq Oral DAILY    pregabalin (LYRICA) capsule 150 mg  150 mg Oral BID    tamsulosin (FLOMAX) capsule 0.4 mg  0.4 mg Oral DAILY    valsartan (DIOVAN) tablet 80 mg  80 mg Oral DAILY    sodium chloride (NS) flush 5-40 mL  5-40 mL IntraVENous PRN    docusate sodium (COLACE) capsule 100 mg  100 mg Oral DAILY    lactulose (CHRONULAC) 10 gram/15 mL solution 30 mL  30 mL Oral DAILY PRN     Review of Systems: + subjective generalized weakness, + fatigue. Denies chest pain, shortness of breath, cough, headache, visual problems, abdominal pain, dysurea, calf pain. Pertinent positives are as noted in the medical records and unremarkable otherwise. Visit Vitals  /60   Pulse (!) 103   Temp 98.5 °F (36.9 °C)   Resp 16   Wt 274 lb 6.4 oz (124.5 kg)   SpO2 95%   BMI 44.29 kg/m²        Physical Exam:   General: Alert and age appropriately oriented. Pleasant. Obese. HEENT: Normocephalic, no JVD   Lungs: Clear to auscultation    Heart: Regular rate and rhythm, S1, S2   No  murmurs, clicks, rub or gallops   Abdomen: Softly distended, non-tender to palpation in all four quadrants. Bowel sounds present.      Genitourinary: defer   Neuromuscular:      PERRL, EOMI  LUE     Shoulder abduction  5- /5              Elbow flexion: 5-  /5               Wrist extension: 5- /5              Finger flexion;   5-/5  RUE    Shoulder abduction: 5- /5                Elbow flexion:  5- /5               Wrist extension: 5- /5              Finger flexion:  5- /5  LLE     Hip flexion:  3+/5              Knee extension:   4+/5               Ankle dorsiflexion:  4 /5              Ankle plantarflexion:   4/5                                         RLE     Hip flexion:  3+ /5              Knee extension:  4+ /5               Ankle dorsiflexion:  4 /5              Ankle plantarflexion:  4 /5  Sensory - intact      Skin/extremity: ntact, dry, good skin turgor, age related changes present   Edema: 1+ BLE edema                                                                            Functional Assessment:  Gross Assessment  AROM: Generally decreased, functional (10/04/19 1500)  Strength: Generally decreased, functional (10/04/19 1500)  Sensation: Impaired (10/04/19 1500)       Balance  Sitting - Static: Good (unsupported) (10/08/19 1500)  Sitting - Dynamic: Good (unsupported) (10/08/19 1500)  Standing - Static: Good (10/08/19 1500)  Standing - Dynamic : Impaired (10/08/19 1500)                     [facility] Risk Assessment:  [facility]  Mobility: Ambulates or transfers with assist devices or assistance (10/09/19 0755)  Mobility Interventions: Utilize walker, cane, or other assistive device (10/09/19 0755)  Mentation: Alert, oriented x 3 (10/09/19 0755)  Mentation Interventions: Bed/chair exit alarm (10/08/19 0800)  Medication: Patient receiving anticonvulsants, sedatives(tranquilizers), psychotropics or hypnotics, hypoglycemics, narcotics, sleep aids, antihypertensives, laxatives, or diuretics (10/09/19 0755)  Medication Interventions: Patient to call before getting OOB; Teach patient to arise slowly (10/09/19 0755)  Elimination: Needs assistance with toileting (10/09/19 0755)  Elimination Interventions: Call light in reach; Patient to call for help with toileting needs (10/09/19 0755)  Prior Fall History: No (10/09/19 0755)  History of Falls Interventions: Door open when patient unattended(pt refuses) (10/09/19 0755)  Total Score: 3 (10/09/19 0755)  Standard Fall Precautions: Yes (10/07/19 0745)  High Fall Risk: Yes (10/09/19 0755)     Speech Assessment:  Aspiration Signs/Symptoms: None (10/02/19 1221)      Ambulation:  Gait  Distance (ft): 150 Feet (ft)(150ft x 1  75 ft x 1) (10/08/19 1500)  Assistive Device: Cane, straight (10/08/19 1500)  Rail Use: Both (10/08/19 1200)     Labs/Studies:  Recent Results (from the past 72 hour(s))   GLUCOSE, POC    Collection Time: 10/06/19 11:21 AM   Result Value Ref Range    Glucose (POC) 245 (H) 65 - 100 mg/dL   GLUCOSE, POC    Collection Time: 10/06/19  4:57 PM   Result Value Ref Range    Glucose (POC) 216 (H) 65 - 100 mg/dL   GLUCOSE, POC    Collection Time: 10/06/19  9:05 PM   Result Value Ref Range    Glucose (POC) 177 (H) 65 - 509 mg/dL   METABOLIC PANEL, BASIC    Collection Time: 10/07/19  6:57 AM   Result Value Ref Range    Sodium 142 136 - 145 mmol/L    Potassium 3.9 3.5 - 5.1 mmol/L    Chloride 108 (H) 98 - 107 mmol/L    CO2 29 21 - 32 mmol/L    Anion gap 5 (L) 7 - 16 mmol/L    Glucose 168 (H) 65 - 100 mg/dL    BUN 27 (H) 8 - 23 MG/DL    Creatinine 1.35 0.8 - 1.5 MG/DL    GFR est AA >60 >60 ml/min/1.73m2    GFR est non-AA 54 (L) >60 ml/min/1.73m2    Calcium 8.3 8.3 - 10.4 MG/DL   GLUCOSE, POC    Collection Time: 10/07/19  7:18 AM   Result Value Ref Range    Glucose (POC) 177 (H) 65 - 100 mg/dL   GLUCOSE, POC    Collection Time: 10/07/19 11:17 AM   Result Value Ref Range    Glucose (POC) 191 (H) 65 - 100 mg/dL   CBC WITH AUTOMATED DIFF    Collection Time: 10/07/19  3:10 PM   Result Value Ref Range    WBC 7.4 4.3 - 11.1 K/uL    RBC 3.54 (L) 4.23 - 5.6 M/uL    HGB 10.0 (L) 13.6 - 17.2 g/dL    HCT 32.2 (L) 41.1 - 50.3 %    MCV 91.0 79.6 - 97.8 FL    MCH 28.2 26.1 - 32.9 PG    MCHC 31.1 (L) 31.4 - 35.0 g/dL    RDW 16.2 (H) 11.9 - 14.6 %    PLATELET 347 196 - 490 K/uL    MPV 10.8 9.4 - 12.3 FL    ABSOLUTE NRBC 0.00 0.0 - 0.2 K/uL    DF AUTOMATED      NEUTROPHILS 55 43 - 78 %    LYMPHOCYTES 34 13 - 44 %    MONOCYTES 9 4.0 - 12.0 %    EOSINOPHILS 2 0.5 - 7.8 %    BASOPHILS 0 0.0 - 2.0 %    IMMATURE GRANULOCYTES 0 0.0 - 5.0 %    ABS. NEUTROPHILS 4.1 1.7 - 8.2 K/UL    ABS. LYMPHOCYTES 2.5 0.5 - 4.6 K/UL    ABS. MONOCYTES 0.6 0.1 - 1.3 K/UL    ABS. EOSINOPHILS 0.2 0.0 - 0.8 K/UL    ABS. BASOPHILS 0.0 0.0 - 0.2 K/UL    ABS. IMM.  GRANS. 0.0 0.0 - 0.5 K/UL   GLUCOSE, POC    Collection Time: 10/07/19  4:40 PM   Result Value Ref Range    Glucose (POC) 212 (H) 65 - 100 mg/dL   GLUCOSE, POC    Collection Time: 10/07/19  8:25 PM   Result Value Ref Range    Glucose (POC) 214 (H) 65 - 100 mg/dL   HGB & HCT    Collection Time: 10/08/19  6:02 AM   Result Value Ref Range    HGB 10.7 (L) 13.6 - 17.2 g/dL    HCT 34.1 (L) 41.1 - 50.3 %   GLUCOSE, POC    Collection Time: 10/08/19  7:35 AM   Result Value Ref Range    Glucose (POC) 134 (H) 65 - 100 mg/dL   GLUCOSE, POC    Collection Time: 10/08/19 11:10 AM   Result Value Ref Range    Glucose (POC) 206 (H) 65 - 100 mg/dL   GLUCOSE, POC    Collection Time: 10/08/19  5:01 PM   Result Value Ref Range    Glucose (POC) 188 (H) 65 - 100 mg/dL   GLUCOSE, POC    Collection Time: 10/08/19  9:10 PM   Result Value Ref Range    Glucose (POC) 219 (H) 65 - 100 mg/dL   GLUCOSE, POC    Collection Time: 10/09/19  6:39 AM   Result Value Ref Range    Glucose (POC) 138 (H) 65 - 100 mg/dL       Assessment:     Problem List as of 10/9/2019 Date Reviewed: 9/18/2019          Codes Class Noted - Resolved    Cecal diverticulitis ICD-10-CM: K57.32  ICD-9-CM: 562.11  9/29/2019 - Present        RLQ abdominal pain ICD-10-CM: R10.31  ICD-9-CM: 789.03  9/28/2019 - Present        CKD (chronic kidney disease) stage 3, GFR 30-59 ml/min (HCC) (Chronic) ICD-10-CM: N18.3  ICD-9-CM: 585.3  6/24/2019 - Present        Cerebrovascular accident (CVA) ([facility] ICD-10-CM: I63.9  ICD-9-CM: 434.91  3/21/2019 - Present        Type 2 diabetes mellitus without complication ([facility] LXA-38-FC: E11.9  ICD-9-CM: 250.00  3/21/2019 - Present        Hemiparesis of left dominant side ([facility] ICD-10-CM: G81.92  ICD-9-CM: 342.91  3/7/2019 - Present        Type 2 diabetes with nephropathy ([facility] ICD-10-CM: E11.21  ICD-9-CM: 250.40, 583.81  5/11/2018 - Present        Morbid obesity due to excess calories ([facility] ICD-10-CM: E66.01  ICD-9-CM: 278.01  11/9/2017 - Present        Sleep apnea ICD-10-CM: G47.30  ICD-9-CM: 780.57  11/9/2017 - Present        Esophageal dysphagia ICD-10-CM: R13.10  ICD-9-CM: 787.20  11/9/2017 - Present        TIA (transient ischemic attack) ICD-10-CM: G45.9  ICD-9-CM: 435.9  11/9/2017 - Present Mild intermittent asthma without complication QVL-13-PP: S21.19  ICD-9-CM: 493.90  8/24/2017 - Present        Diabetic neuropathy associated with type 2 diabetes mellitus (HCC) (Chronic) ICD-10-CM: E11.40  ICD-9-CM: 250.60, 357.2  7/18/2017 - Present        Diastolic CHF, chronic (HCC) (Chronic) ICD-10-CM: I50.32  ICD-9-CM: 428.32, 428.0  3/20/2017 - Present        Mixed hyperlipidemia (Chronic) ICD-10-CM: Q11.6  ICD-9-CM: 272.2  11/17/2016 - Present        Essential hypertension with goal blood pressure less than 130/85 (Chronic) ICD-10-CM: I10  ICD-9-CM: 401.9  7/5/2016 - Present        Pulmonary hypertension (HCC) (Chronic) ICD-10-CM: I27.20  ICD-9-CM: 416.8  10/14/2015 - Present        Mild diastolic dysfunction (Chronic) ICD-10-CM: I51.9  ICD-9-CM: 429.9  7/17/2015 - Present        Gastroesophageal reflux disease without esophagitis (Chronic) ICD-10-CM: K21.9  ICD-9-CM: 530.81  4/21/2015 - Present        Hyperlipidemia with target LDL less than 70 (Chronic) ICD-10-CM: E78.5  ICD-9-CM: 272.4  Unknown - Present        Essential hypertension, benign (Chronic) ICD-10-CM: I10  ICD-9-CM: 401.1  1/22/2015 - Present        Chronic constipation (Chronic) ICD-10-CM: K59.09  ICD-9-CM: 564.00  1/22/2015 - Present        Type 2 diabetes, uncontrolled, with neuropathy (HCC) (Chronic) ICD-10-CM: E11.40, E11.65  ICD-9-CM: 250.62, 357.2  1/22/2015 - Present        Obstructive sleep apnea of adult (Chronic) ICD-10-CM: G47.33  ICD-9-CM: 327.23  1/22/2015 - Present        Morbid obesity ([facility] (Chronic) ICD-10-CM: E66.01  ICD-9-CM: 278.01  6/27/2011 - Present        RESOLVED: Sepsis ([address] ICD-10-CM: A41.9  ICD-9-CM: 038.9, 995.91  9/30/2019 - 9/30/2019        RESOLVED: Hyperkalemia ICD-10-CM: E87.5  ICD-9-CM: 276.7  9/27/2019 - 9/27/2019        RESOLVED: Acute metabolic encephalopathy ITV-63-RD: G93.41  ICD-9-CM: 348.31  9/25/2019 - 9/27/2019        RESOLVED: Postural dizziness ICD-10-CM: R42  ICD-9-CM: 780.4  5/11/2018 - 7/16/2018 RESOLVED: Chest pain ICD-10-CM: R07.9  ICD-9-CM: 786.50  5/11/2018 - 7/16/2018        RESOLVED: Type 2 diabetes mellitus without complication, with long-term current use of insulin (HCC) ICD-10-CM: E11.9, Z79.4  ICD-9-CM: 250.00, V58.67  11/9/2017 - 7/16/2018        RESOLVED: H/O colonoscopy (Chronic) ICD-10-CM: P27.740  ICD-9-CM: V45.89  7/18/2017 - 7/16/2018    Overview Signed 7/18/2017  7:06 AM by Angelo Pappas     Colonoscopy in 7/29/10 Dr Yoni Abbasi with repeat in 7/2020 [facility] at [facility] Dr Lamin Sousa: Type 2 diabetes mellitus without complication, without long-term current use of insulin ([facility] ICD-10-CM: E11.9  ICD-9-CM: 250.00  3/20/2017 - 7/18/2017        RESOLVED: Sleep apnea ICD-10-CM: G47.30  ICD-9-CM: 780.57  3/20/2017 - 7/18/2017        RESOLVED: Generalized edema ICD-10-CM: R60.1  ICD-9-CM: 782.3  7/5/2016 - 7/18/2017        RESOLVED: Type 2 diabetes mellitus without complication ([facility] MPY-53-ESPARZA: E11.9  ICD-9-CM: 250.00  7/5/2016 - 2/21/2017        RESOLVED: Sleep apnea ICD-10-CM: G47.30  ICD-9-CM: 780.57  7/5/2016 - 12/16/2016        RESOLVED: Peripheral neuropathy ([facility] ICD-10-CM: G62.9  ICD-9-CM: 356.9  1/22/2015 - 7/18/2017        RESOLVED: Chest pain, unspecified ICD-10-CM: R07.9  ICD-9-CM: 786.50  6/27/2011 - 1/22/2015        RESOLVED: HTN (hypertension) ICD-10-CM: I10  ICD-9-CM: 401.9  6/27/2011 - 1/22/2015        RESOLVED: DM circ dis type II ICD-10-CM: E11.51  ICD-9-CM: 250.70  6/27/2011 - 9/22/2017              Plan:     Rehabilitation Plan  The patient has shown the ability to tolerate and benefit from 3 hours of therapy daily and is being admitted to a comprehensive acute inpatient rehabilitation program consisting of at least 3 hours of combined physical and occupational therapies.   continue intensive Physical Therapy for a minimum of 1.5 hours a day, at least 5 out of 7 days per week to address bed mobility, transfers, ambulation, strengthening, balance, and endurance. continue intensive Occupational Therapy for a minimum of 1.5 hours a day, at least 5 out of 7 days per week to address ADL ( bathing, LE dressing, toileting) and adaptive equipment as needed.     Continue 24-hour skilled rehabilitation nursing for bowel and bladder management, skin care for decubitus ulcer prevention , pain management and ongoing medication administration      Continue daily physician medical management:  Cecal diverticulitis  - iv antibiotics transitioned to po Flagyl + po cipro x 7 days, until 10/5.   - 10/4 -No acute symptoms. - 10/7 BRBPR 10/5, improved, trace on 106. Monitor. Due to possibly known int/ ext hemorrhoids, diverticulitis. Monitor. GI of worse. Check hgb today. 10/8 - hbg stable . No report of blood tinged stool.      Acute metabolic encephalopathy  - monitor. Continue ST treatment. 10/7 - mental status  at baseline         CKD (chronic kidney disease) stage 3,   - monitor Cr. Steady improvement. 10/2 - 21/1.33  10/7 - 27/1. 35. Baseline.      CAD/ Diastolic CHF, chronic /Essential hypertension, benign (Chronic)/ Pulmonary hypertension (HCC) (Chronic)  - diovan 80  - lasix 40 bid  - aspirin/ plavix. - 10/2 continue lasix bid. Monitor renal funciton, may be able to reduce dose soon. 10/3 pt reports he has been on scheduled lasix, dose variable. Will continue lasix 40 bid and monitor volume status, renal funciton. No acute decompensation. 10/4 - monitor renal function. Continue current lasix dose. Check renaol function Monday. 10/7 - continue lasix 40mg bid  10/8 - patient has LE edema. Patient states edema he has presently is about where he is when at his best. Continue lasix 40mg po bid.  Steady control of volume status at this dose during his admission.      Obstructive sleep apnea of adult   - CPAP hs.  Add - prn albuterol inh.      Morbid obesity - Chronic  - dietary/ nutrition consult     Hyperlipidemia with target LDL less than 70 (Chronic)        Pneumonia prophylaxis- Insentive spirometer every hour while awake     DVT risk / DVT Prophylaxis- Will require daily physician exam to assess for signs and symptoms as patient is at increased risk for of thromboembolism. Mobilization as tolerated. Intermittent pneumatic compression devices when in bed Thigh-high or knee-high thromboembolic deterrent hose when out of bed.   - SCDs. Pain Control: diabetic neuropathy. no current joint or muscle symptoms, essentially pain-free. Will require regular pain assessment and comprenhensive pain management,   - lyrica  - occasional norco for chronic LBP.      anxiety   - Buspar     Diabetes mellitus - Uncontrolled. poor glycemic control. Will require daily, close FSG monitoring and medication adjustment to optimize glycemic control in setting of acute illness and hospitalization.   - Diabetic neuropathy associated with type 2 diabetes mellitus ([facility] (Chronic)  10/2 - Pt was taking tresiba + scheduled 8u novolog at mealtime and SSI coverage. BS - [phone]. Continue SSI coverage. Resume metformin. Start slow, 500 bid. 10/3 metformin started. Monitor response. 10/4 increase metformin to home dose 1000 bid. 10/8 - glycemic control better on usual home regimen.      Urinary retention/ neurogenic bladder - schedule voids q6-8 hrs. Check post-void residual every shift; In and Out catheterize if post-void residual is more than 400 cc.  - flomax.      bowel program - + constipation.   -Miralax, dialy. Colace daily  - dulcolax supp prn     GERD - resume PPI. At times may need additional antacids, Maalox prn. Time spent was 25 minutes with over 1/2 in direct patient care/examination, consultation and coordination of care.      Signed By: Lorrie Parker MD     October 9, 2019

## 2019-10-09 NOTE — DISCHARGE SUMMARY
REHABILITATION DISCHARGE SUMMARY     Patient: Patty Rai MRN: [id number]  SSN: [id number]    YOB: 1940  Age: 70 y.o. Sex: male      Date: 10/9/2019  Admit Date: 10/1/2019  Discharge Date: 10/9/2019    Admitting Physician: Mary Alice Andrew MD   Primary Care Physician: Tiffanie Lin MD     Admission Condition: good    Chief Complaint : Gait dysfunction secondary to below. Admit Diagnosis: other neurologic condition   Cecal diverticulitis  Acute metabolic encephalopathy  CKD (chronic kidney disease) stage 3,   Diabetic neuropathy associated with type 2 diabetes mellitus (HCC) (Chronic)  Diastolic CHF, chronic (HCC) (Chronic)  Essential hypertension, benign (Chronic)  Obstructive sleep apnea of adult   Pulmonary hypertension (HCC) (Chronic)  Morbid obesity (HCC) (Chronic  Hyperlipidemia with target LDL less than 70 (Chronic)  constipation  Acute Rehab Dx:  Gait impairment/ gait dysfunction  Debility    deconditioning  Mobility and ambulation deficits  Self Care/ADL deficits       HPI: Patty Rai is a 70 y.o. male patient at [address] [place] [address] who was admitted on 10/1/2019  by Mary Alice Andrew MD with below mentioned medical history ,is being seen for Physical Medicine and Rehabilitation.       Patty Rai who has history of CKD Stage III, IDDM type II, diastolic heart failure, GERD, HTN, HLD, previous TIAs, prior pulmonary embolism, MARLEEN, morbid obesity presented from home to the ER not being able to talk well, grunting and groaning confused. Patient was very slow to answer questions, difficulty finding words, difficulty ambulating. Code S was called in the ER. CT scan of the head was unremarkable. Patient was not given tPA. He was evaluated by neurology with low suspicion for CVA. Patient was admitted with diagnosis of metabolic and cephalopathy. WBC elevated at 11.9 prolactin of 0.8 potassium of 5.6 inseam creatinine of 1.8 which is above baseline. Patient serum ammonia level was 20.  CT abdomen due to abdominal pain taken demonstrated focal inflammation in wall thickening of the cecum. There were a few scattered diverticula acute SQL diverticulitis was suspected. Patient is placed on IV anabiotics initially transition to PO anabiotics at admission to [facility]. Patient is continued on medical management for CHF, edema. Patient noted to be severely constipated has not had bowel movements for three days. Patient continued on medical management for diabetes mellitus. Management for hypertension required. Physical therapy was initiated and patient was starting to mobilize. However, Patient shows significant functional deficits due to prolonged immobility and hospitalization. Our service was consulted for rehab needs and we recommended inpatient hospital rehabilitation is reasonable and necessary due to ongoing acute medical issues which have not changed since initial pre-admission evaluation. I reviewed the preadmission screening and have approved for admission to the [facility]. The patient was cleared for transfer to rehab today. Patient continues to have ongoing  medical issues outlined above requiring physician medical supervision and functional deficits which would benefit from continued intensive therapies.        Rehabiitation Course:      Patient was admitted to acute medical rehab unit continued on medical management for diverticulitis continued on peel Flagyl and Cipro until October 5. Patient did have one episode of BRBPR on 10/5 but with resolution, no subsequent Recurrence. Patients encephalopathy improve slowly. Now at baseline,appears have been resolved at discharge. Patient was continued on close medical management for CHF, chronic hypertension. Adema, volume status has been maintained fairly controlled on Lasix 40 b.i.d. patient will continue on this diuretic at discharge. Patient has made slow steady functional gains.  Strength and endurance and activity tolerance has improved significantly and IRC. Patient will continue outpatient PT/OT in the community. Presently patient is safe for home discharge with family supervision.     Home Architecture: Home Situation  Home Environment: Apartment (10/02/19 0800)  # Steps to Enter: 4 (10/02/19 0800)  Rails to Enter: Yes (10/02/19 0800)  Hand Rails : Bilateral (10/02/19 0800)  Wheelchair Ramp: No (10/02/19 0800)  One/Two Story Residence: One story (10/02/19 0800)  Living Alone: No (10/02/19 0800)  Support Systems: Family member(s) (10/02/19 0800)  Patient Expects to be Discharged to[de-identified] Private residence (10/02/19 0800)  Current DME Used/Available at Home: Cane, straight;Walker, rollator;Walker, rolling;Hospital bed (10/02/19 0800)  Tub or Shower Type: Tub/Shower combination (10/02/19 0800)     Past Medical History:   Diagnosis Date    Cervical stenosis of spine     Chronic kidney disease     Stage 3    Degenerative disc disease, lumbar     Diabetes mellitus type II     uncontrolled-insulin and oral med. 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 ([address]     R - Moderate, L - Mild    Diastolic congestive heart failure (HCC)     Diverticulosis of colon June 2010    DVT (deep venous thrombosis) (HCC)     RLE    Extrinsic allergic asthma     GERD (gastroesophageal reflux disease)     Hx of colonic polyps 07/29/2010    Hyperplastic     Hyperlipidemia LDL goal < 70     Hypertension     Obstructive sleep apnea     Non-Compliant with CPAP    Perennial allergic rhinitis with seasonal variation     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 ([address]     Pulmonary embolism ([address] Mar 2014    Bilateral - Completed 6 months on Xarelto    TIA (transient ischemic attack) 11/9/2017    Type 2 diabetes, uncontrolled, with neuropathy ([address] 1/22/2015      Past Surgical History:   Procedure Laterality Date    COLONOSCOPY  07/29/2010    Diverticulosis & Colon/Rectal Polyps - Due 2020    EGD  5/9/2011         HX BUNIONECTOMY Bilateral     HX HERNIA REPAIR Bilateral     Inguinal, Repeat on Both Sides Separately    HX KNEE ARTHROSCOPY  1991    x 3    SINUS SURGERY PROC UNLISTED      TOTAL KNEE ARTHROPLASTY Right 2006      Family History   Problem Relation Age of Onset    Stroke Mother     Diabetes Mother     Heart Disease Mother     Diabetes Maternal Grandmother     Glaucoma Maternal Grandmother     Stroke Father     Diabetes Father     Diabetes Paternal Aunt     Cancer Paternal Aunt     Diabetes Paternal Uncle     Cancer Paternal Uncle         Colon    Heart Attack Sister 76    Cancer Sister     Prostate Cancer Brother     Heart Disease Sister 48    Prostate Cancer Brother       Social History     Tobacco Use    Smoking status: Never Smoker    Smokeless tobacco: Never Used   Substance Use Topics    Alcohol use: No       Allergies   Allergen Reactions    Vasotec [Enalapril Maleate] Shortness of Breath and Cough       Prior to Admission medications    Medication Sig Start Date End Date Taking? Authorizing Provider   bisacodyl (DULCOLAX) 10 mg suppository Insert 10 mg into rectum daily as needed (Constipation). 10/1/19  Yes Le Ya MD   ciprofloxacin HCl (CIPRO) 500 mg tablet Take 1 Tab by mouth every twelve (12) hours for 7 days. 10/1/19 10/8/19 Yes Le Ya MD   docusate sodium (COLACE) 100 mg capsule Take 1 Cap by mouth daily for 90 days. 10/2/19 12/31/19 Yes Le Ya MD   metroNIDAZOLE (FLAGYL) 500 mg tablet Take 1 Tab by mouth every twelve (12) hours for 7 days. 10/1/19 10/8/19 Yes Le Ya MD   busPIRone (BUSPAR) 15 mg tablet Take 1 Tab by mouth daily. 7/17/19  Yes Nate Rausch MD   atorvastatin (LIPITOR) 80 mg tablet Take 1 Tab by mouth nightly. 6/24/19  Yes Lakshmi Russ MD   furosemide (LASIX) 40 mg tablet Take 1 Tab by mouth three (3) times daily.  5/23/19  Yes Maribel Pearson PA-C ezetimibe (ZETIA) 10 mg tablet Take 1 Tab by mouth nightly. 3/7/19  Yes Sarah Fang MD   clopidogrel (PLAVIX) 75 mg tab Take 1 Tab by mouth daily. 3/7/19  Yes Sarah Fang MD   albuterol (VENTOLIN HFA) 90 mcg/actuation inhaler Take 2 Puffs by inhalation every four (4) hours as needed for Wheezing or Shortness of Breath. 8/27/18  Yes Juanis Pearson PA-C   aspirin 81 mg tablet Take 81 mg by mouth daily. 7/28/10  Yes Provider, Historical   cyanocobalamin (VITAMIN B12) 500 mcg tablet Take 1 Tab by mouth daily. Indications: b12 deficiency 9/27/19   Anand Feliz MD   [facility]) 0.4 mg capsule Take 1 Cap by mouth daily. 9/18/19   Kelley Braga MD   spironolactone (ALDACTONE) 25 mg tablet TAKE 1 TABLET BY MOUTH EVERY DAY 8/26/19   Keara Darling MD   metFORMIN (GLUCOPHAGE) 1,000 mg tablet Take 1 Tab by mouth two (2) times daily (with meals). 7/17/19   Keara Darling MD   potassium chloride (KLOR-CON M20) 20 mEq tablet TAKE 1 TABLET TWICE A DAY 7/17/19   Keara Darling MD   Insulin Needles, Disposable, (1ST TIER UNIFINE PENTIPS) 32 gauge x 5/32\" ndle Use to inject insulin 6/24/19   Sarah Fang MD   insulin syr/ndl U100 half rashida 0.3 mL 31 gauge x 15/64\" syrg Use to inject insulin 6/24/19   Sarah Fang MD   polyethylene glycol (MIRALAX) 17 gram/dose powder Take 17 g by mouth daily. 6/24/19   Sarah Fang MD   lancets misc Use to test glucose 4 times/day. Dx: E11.51, I10 5/23/19   Juanis Pearson PA-C   glucose blood VI test strips (FREESTYLE LITE STRIPS) strip Use to check glucose 4 times/day. Dx: E11.51, I10 5/23/19   Tanya Pearson PA-C   insulin aspart U-100 (NOVOLOG FLEXPEN U-100 INSULIN) 100 unit/mL (3 mL) inpn Inject 8 units standing dose + additional insulin according to sliding scale (up to 16 units) subQ before each meal 5/23/19   Juanis Pearson, QUINCY   carvedilol (COREG) 25 mg tablet Take 0.5 Tabs by mouth two (2) times daily (with meals). 3/25/19   Yuliet Carmichael MD   raNITIdine (ZANTAC) 150 mg tablet Take 150 mg by mouth two (2) times a day. Provider, Historical   insulin degludec (TRESIBA FLEXTOUCH U-100) 100 unit/mL (3 mL) inpn by SubCUTAneous route. Provider, Historical   pantoprazole (PROTONIX) 40 mg tablet Take 1 tablet po 30 minutes before breakfast 3/7/19   Rosa Marti MD   irbesartan (AVAPRO) 150 mg tablet Take 1 Tab by mouth nightly. 3/7/19   Rosa Marti MD   Insulin Syringe-Needle U-100 (BD INSULIN SYRINGE ULTRA-FINE) 0.3 mL 31 gauge x 5/16\" syrg Use to injection insulin 3 times/day at meals. Dx: E11.40 3/7/19   Rosa Marti MD   Lancets misc Use to check glucose 4 times/day as directed. Dx: E11.40 12/5/18   Dominique Claire MD   cloNIDine HCl (CATAPRES) 0.1 mg tablet Take 1 Tab by mouth two (2) times a day. 8/27/18   Juanis Pearson PA-C   pregabalin (LYRICA) 150 mg capsule Take 150 mg by mouth two (2) times a day. Provider, [facility]  Dx: Mild Diastolic Dysfunction (IUJ-32 I51.9)  Pulmonary Hypertension (ICD-10 I27.2)  Morbid Obesity (ICD-10 E66.01)  Lumbar DDD (ICD-10 M51.36)  Obstructive Sleep Apnea (ICD-10 G47.33) 8/11/17   Alethea Pearson PA-C   loratadine (CLARITIN) 10 mg tablet Take 10 mg by mouth daily. Provider, Historical   multivit-min-FA-lycopen-lutein (CENTRUM SILVER) 0.4-300-250 mg-mcg-mcg tab Take  by mouth. Provider, Historical   HYDROcodone-acetaminophen (NORCO)  mg tablet Take 1 Tab by mouth every six (6) hours as needed for Pain.  10/17/12   RAISA Sunshine       Current Medications:  Current Facility-Administered Medications   Medication Dose Route Frequency Provider Last Rate Last Dose    ondansetron (ZOFRAN ODT) tablet 8 mg  8 mg Oral Q8H PRN Tremayne PRESCOTT MD   8 mg at 10/08/19 1504    simethicone (MYLICON) tablet 80 mg  80 mg Oral Q6H PRN Adele Puri MD   80 mg at 10/07/19 2122    insulin regular (Rose Jesus R, HUMULIN R) injection 8 Units  8 Units SubCUTAneous TIDAC Adele Franco MD   8 Units at 10/09/19 0732    metFORMIN (GLUCOPHAGE) tablet 1,000 mg  1,000 mg Oral BID WITH MEALS Sheba PRESCOTT MD   1,000 mg at 10/09/19 0737    albuterol (PROVENTIL VENTOLIN) nebulizer solution 2.5 mg  2.5 mg Nebulization Q6H PRN Gaby Matos MD   2.5 mg at 10/03/19 0029    acetaminophen (TYLENOL) tablet 650 mg  650 mg Oral Q6H PRN Gaby Matos MD        albuterol (PROVENTIL VENTOLIN) nebulizer solution 2.5 mg  2.5 mg Nebulization Q4H PRN Gaby Matos MD        aspirin delayed-release tablet 81 mg  81 mg Oral DAILY Gaby Matos MD   81 mg at 10/09/19 0740    atorvastatin (LIPITOR) tablet 80 mg  80 mg Oral QHS Gaby Matos MD   80 mg at 10/08/19 2120    bisacodyl (DULCOLAX) suppository 10 mg  10 mg Rectal DAILY PRN Gaby Matos MD        busPIRone (BUSPAR) tablet 15 mg  15 mg Oral DAILY Gaby Matos MD   15 mg at 10/09/19 0741    clopidogrel (PLAVIX) tablet 75 mg  75 mg Oral DAILY Gaby Matos MD   75 mg at 10/09/19 0740    ezetimibe (ZETIA) tablet 10 mg  10 mg Oral QHS Sheba PRESCOTT MD   10 mg at 10/08/19 2120    furosemide (LASIX) tablet 40 mg  40 mg Oral ACB&D Gaby Matos MD   40 mg at 10/09/19 0736    HYDROcodone-acetaminophen (NORCO) 7.5-325 mg per tablet 1 Tab  1 Tab Oral Q6H PRN Gaby Matos MD   1 Tab at 10/08/19 2120    insulin glargine (LANTUS) injection 20 Units  20 Units SubCUTAneous QHS Gaby Matos MD   20 Units at 10/08/19 2121    insulin lispro (HUMALOG) injection 0-10 Units  0-10 Units SubCUTAneous AC&HS Gaby Matos MD   4 Units at 10/08/19 2121    lip protectant (BLISTEX) ointment 1 Each  1 Each Topical PRN Gaby Matos MD        nystatin (MYCOSTATIN) 100,000 unit/gram powder   Topical BID Sheba PRESCOTT MD   1 g at 10/08/19 1732    pantoprazole (PROTONIX) tablet 40 mg  40 mg Oral ACB Sheba PRESCOTT MD   40 mg at 10/09/19 0641    polyethylene glycol (MIRALAX) packet 17 g 17 g Oral DAILY Farhan Louise MD   17 g at 10/09/19 0744    potassium chloride (K-DUR, KLOR-CON) SR tablet 20 mEq  20 mEq Oral DAILY Farhan Louise MD   20 mEq at 10/09/19 0739    pregabalin (LYRICA) capsule 150 mg  150 mg Oral BID Farhan Louise MD   150 mg at 10/09/19 0740    tamsulosin (FLOMAX) capsule 0.4 mg  0.4 mg Oral DAILY Regina PRESCOTT MD   0.4 mg at 10/09/19 0736    valsartan (DIOVAN) tablet 80 mg  80 mg Oral DAILY Farhan Louise MD   80 mg at 10/09/19 0740    sodium chloride (NS) flush 5-40 mL  5-40 mL IntraVENous PRN Farhan Louise MD        docusate sodium (COLACE) capsule 100 mg  100 mg Oral DAILY Regina PRESCOTT MD   100 mg at 10/09/19 0738    lactulose (CHRONULAC) 10 gram/15 mL solution 30 mL  30 mL Oral DAILY PRN Farhan Louise MD   30 mL at 10/01/19 2142        Review of Systems: Denies chest pain, shortness of breath, cough, headache, visual problems, abdominal pain, dysurea, calf pain. Pertinent positives are as noted in the medical records and unremarkable otherwise. Vital Signs:   Patient Vitals for the past 8 hrs:   BP Temp Pulse Resp SpO2   10/09/19 0734 114/60 98.5 °F (36.9 °C) (!) 103 16 95 %         Physical Exam:   General: Alert and age appropriately oriented. Pleasant. Obese. HEENT: Normocephalic, no JVD   Lungs: Clear to auscultation    Heart: Regular rate and rhythm, S1, S2   No  murmurs, clicks, rub or gallops   Abdomen: Softly distended, non-tender to palpation in all four quadrants. Bowel sounds present.      Genitourinary: defer   Neuromuscular:        PERRL, EOMI  LUE     Shoulder abduction  5- /5              Elbow flexion: 5-  /5               Wrist extension: 5- /5              Finger flexion;   5-/5  RUE    Shoulder abduction: 5- /5                Elbow flexion:  5- /5               Wrist extension: 5- /5              Finger flexion:  5- /5  LLE     Hip flexion:  3+/5              Knee extension:   4+/5               Ankle dorsiflexion:  4 /5              Ankle plantarflexion:   4/5                                         RLE     Hip flexion:  3+ /5              Knee extension:  4+ /5               Ankle dorsiflexion:  4 /5              Ankle plantarflexion:  4 /5  Sensory - intact      Skin/extremity: ntact, dry, good skin turgor, age related changes present   Edema: 1+ BLE edema          Lab Review:   Recent Results (from the past 72 hour(s))   GLUCOSE, POC    Collection Time: 10/06/19 11:21 AM   Result Value Ref Range    Glucose (POC) 245 (H) 65 - 100 mg/dL   GLUCOSE, POC    Collection Time: 10/06/19  4:57 PM   Result Value Ref Range    Glucose (POC) 216 (H) 65 - 100 mg/dL   GLUCOSE, POC    Collection Time: 10/06/19  9:05 PM   Result Value Ref Range    Glucose (POC) 177 (H) 65 - 096 mg/dL   METABOLIC PANEL, BASIC    Collection Time: 10/07/19  6:57 AM   Result Value Ref Range    Sodium 142 136 - 145 mmol/L    Potassium 3.9 3.5 - 5.1 mmol/L    Chloride 108 (H) 98 - 107 mmol/L    CO2 29 21 - 32 mmol/L    Anion gap 5 (L) 7 - 16 mmol/L    Glucose 168 (H) 65 - 100 mg/dL    BUN 27 (H) 8 - 23 MG/DL    Creatinine 1.35 0.8 - 1.5 MG/DL    GFR est AA >60 >60 ml/min/1.73m2    GFR est non-AA 54 (L) >60 ml/min/1.73m2    Calcium 8.3 8.3 - 10.4 MG/DL   GLUCOSE, POC    Collection Time: 10/07/19  7:18 AM   Result Value Ref Range    Glucose (POC) 177 (H) 65 - 100 mg/dL   GLUCOSE, POC    Collection Time: 10/07/19 11:17 AM   Result Value Ref Range    Glucose (POC) 191 (H) 65 - 100 mg/dL   CBC WITH AUTOMATED DIFF    Collection Time: 10/07/19  3:10 PM   Result Value Ref Range    WBC 7.4 4.3 - 11.1 K/uL    RBC 3.54 (L) 4.23 - 5.6 M/uL    HGB 10.0 (L) 13.6 - 17.2 g/dL    HCT 32.2 (L) 41.1 - 50.3 %    MCV 91.0 79.6 - 97.8 FL    MCH 28.2 26.1 - 32.9 PG    MCHC 31.1 (L) 31.4 - 35.0 g/dL    RDW 16.2 (H) 11.9 - 14.6 %    PLATELET 717 075 - 202 K/uL    MPV 10.8 9.4 - 12.3 FL    ABSOLUTE NRBC 0.00 0.0 - 0.2 K/uL    DF AUTOMATED      NEUTROPHILS 55 43 - 78 %    LYMPHOCYTES 34 13 - 44 %    MONOCYTES 9 4.0 - 12.0 %    EOSINOPHILS 2 0.5 - 7.8 %    BASOPHILS 0 0.0 - 2.0 %    IMMATURE GRANULOCYTES 0 0.0 - 5.0 %    ABS. NEUTROPHILS 4.1 1.7 - 8.2 K/UL    ABS. LYMPHOCYTES 2.5 0.5 - 4.6 K/UL    ABS. MONOCYTES 0.6 0.1 - 1.3 K/UL    ABS. EOSINOPHILS 0.2 0.0 - 0.8 K/UL    ABS. BASOPHILS 0.0 0.0 - 0.2 K/UL    ABS. IMM.  GRANS. 0.0 0.0 - 0.5 K/UL   GLUCOSE, POC    Collection Time: 10/07/19  4:40 PM   Result Value Ref Range    Glucose (POC) 212 (H) 65 - 100 mg/dL   GLUCOSE, POC    Collection Time: 10/07/19  8:25 PM   Result Value Ref Range    Glucose (POC) 214 (H) 65 - 100 mg/dL   HGB & HCT    Collection Time: 10/08/19  6:02 AM   Result Value Ref Range    HGB 10.7 (L) 13.6 - 17.2 g/dL    HCT 34.1 (L) 41.1 - 50.3 %   GLUCOSE, POC    Collection Time: 10/08/19  7:35 AM   Result Value Ref Range    Glucose (POC) 134 (H) 65 - 100 mg/dL   GLUCOSE, POC    Collection Time: 10/08/19 11:10 AM   Result Value Ref Range    Glucose (POC) 206 (H) 65 - 100 mg/dL   GLUCOSE, POC    Collection Time: 10/08/19  5:01 PM   Result Value Ref Range    Glucose (POC) 188 (H) 65 - 100 mg/dL   GLUCOSE, POC    Collection Time: 10/08/19  9:10 PM   Result Value Ref Range    Glucose (POC) 219 (H) 65 - 100 mg/dL   GLUCOSE, POC    Collection Time: 10/09/19  6:39 AM   Result Value Ref Range    Glucose (POC) 138 (H) 65 - 100 mg/dL       PT Initial  PT Most Recent   AROM: Generally decreased, functional (10/04/19 1200) Generally decreased, functional (10/04/19 1500)         Strength: Generally decreased, functional (10/04/19 1200) Generally decreased, functional (10/04/19 1500)               Sensation: Impaired (10/04/19 1200) Impaired (10/04/19 1500)   Amount of Assistance: 4 (Contact guard assistance) (10/02/19 1600) 5 (Supervision/setup) (10/08/19 1500)   Distance (ft): 175 Feet (ft) (10/01/19 1700) 150 Feet (ft)(150ft x 1  75 ft x 1) (10/08/19 1500)   Assistive Device: Walker, rolling;Gait belt (10/01/19 1700) Cane, straight (10/08/19 1500)       OT Initial OT Most Recent                                               ST Initial ST Most Recent          Aspiration Signs/Symptoms: None (10/02/19 1221) None (10/02/19 1221)           Active Problems:    * No active hospital problems. *        Condition on discharge :  good      Discharge Instructions:  Cecal diverticulitis  - iv antibiotics transitioned to po Flagyl + po cipro x 7 days, until 10/5.   - 10/4 -No acute symptoms. - 10/7 BRBPR 10/5, improved, trace on 106. Monitor. Due to possibly known int/ ext hemorrhoids, diverticulitis. Monitor. GI of worse. Check hgb today. 10/8 - hbg stable . No report of blood tinged stool.      Acute metabolic encephalopathy  - monitor. Continue ST treatment.   10/7 - mental status  at baseline         CKD (chronic kidney disease) stage 3,   - monitor Cr. Steady improvement.   10/2 - 21/1.33  10/7 - 27/1. 35. Baseline.      CAD/ Diastolic CHF, chronic /Essential hypertension, benign (Chronic)/ Pulmonary hypertension (HCC) (Chronic)  - diovan 80  - lasix 40 bid  - aspirin/ plavix.   - 10/2 continue lasix bid. Monitor renal funciton, may be able to reduce dose soon. 10/3 pt reports he has been on scheduled lasix, dose variable. Will continue lasix 40 bid and monitor volume status, renal funciton. No acute decompensation. 10/4 - monitor renal function. Continue current lasix dose. Check renaol function Monday. 10/7 - continue lasix 40mg bid  10/8 - patient has LE edema. Patient states edema he has presently is about where he is when at his best. Continue lasix 40mg po bid. Steady control of volume status at this dose during his admission.      Obstructive sleep apnea of adult   - CPAP hs.  Add - prn albuterol inh.      Morbid obesity - Chronic  - dietary/ nutrition consult     Hyperlipidemia with target LDL less than 70 (Chronic)       Pain Control: diabetic neuropathy. no current joint or muscle symptoms, essentially pain-free.  Will require regular pain assessment and comprenhensive pain management  - lyrica  - occasional norco for chronic LBP.      anxiety   - Buspar     Diabetes mellitus - Uncontrolled. poor glycemic control. Will require daily, close FSG monitoring and medication adjustment to optimize glycemic control in setting of acute illness and hospitalization.   - Diabetic neuropathy associated with type 2 diabetes mellitus ([facility] (Chronic)  10/2 - Pt was taking tresiba + scheduled 8u novolog at mealtime and SSI coverage. BS - [phone]. Continue SSI coverage. Resume metformin. Start slow, 500 bid. 10/3 metformin started. Monitor response. 10/4 increase metformin to home dose 1000 bid. 10/8 - glycemic control better on usual home regimen.      Urinary retention  - schedule voids q6-8 hrs. Check post-void residual every shift; In and Out catheterize if post-void residual is more than 400 cc.  - flomax.         Follow up with pcp per instructions. Discharge Medications:            HYDROmorphone (DILAUDID) tablet 2 mg 1 Tab  Ordered Dose: 2mg Route: Oral Frequency: EVERY 3 HOURS  as needed for pain      acetaminophen (TYLENOL) tablet 1,000 mg Ordered Dose: 1,000 mg Route: Oral Frequency: EVERY 8 HOURS x 1 week then change to prn. HYDROcodone-acetaminophen (NORCO)  mg tablet 1 Tab  Ordered Dose: 10/325 mg Route: Oral Frequency: EVERY 3 HOURS  as needed for pain       aspirin delayed-release tablet 325 mg Ordered Dose: 325 mg Route: Oral Frequency: EVERY 12 HOURS x 30 days then stop. Take with food or milk or full glass of water. senna-docusate (PERICOLACE) 8.6-50 mg per tablet 2 Tab Ordered Dose: 2 Tab Route: Oral Frequency: DAILY       Current Discharge Medication List      CONTINUE these medications which have NOT CHANGED    Details   bisacodyl (DULCOLAX) 10 mg suppository Insert 10 mg into rectum daily as needed (Constipation). busPIRone (BUSPAR) 15 mg tablet Take 1 Tab by mouth daily.        Associated Diagnoses: Mood disorder ([facility] atorvastatin (LIPITOR) 80 mg tablet Take 1 Tab by mouth nightly. Associated Diagnoses: Hyperlipidemia LDL goal <70      furosemide (LASIX) 40 mg tablet Take 1 Tab by mouth  (2) two times daily. ezetimibe (ZETIA) 10 mg tablet Take 1 Tab by mouth nightly. Associated Diagnoses: Hyperlipidemia LDL goal <70      clopidogrel (PLAVIX) 75 mg tab Take 1 Tab by mouth daily. albuterol (VENTOLIN HFA) 90 mcg/actuation inhaler Take 2 Puffs by inhalation every four (4) hours as needed for Wheezing or Shortness of Breath. Associated Diagnoses: Extrinsic asthma, moderate persistent, uncomplicated      aspirin 81 mg tablet Take 81 mg by mouth daily. cyanocobalamin (VITAMIN B12) 500 mcg tablet Take 1 Tab by mouth daily. Indications: b12 deficiency         tamsulosin (FLOMAX) 0.4 mg capsule Take 1 Cap by mouth daily. Associated Diagnoses: Benign prostatic hyperplasia with urinary frequency      spironolactone (ALDACTONE) 25 mg tablet TAKE 1 TABLET BY MOUTH EVERY DAY   HOLD.       metFORMIN (GLUCOPHAGE) 1,000 mg tablet Take 1 Tab by mouth two (2) times daily (with meals). Associated Diagnoses: Type 2 diabetes with nephropathy (HCC)      potassium chloride (KLOR-CON M20) 20 mEq tablet TAKE 1 TABLET TWICE A DAY         Insulin Needles, Disposable, (1ST TIER UNIFINE PENTIPS) 32 gauge x 5/32\" ndle Use to inject insulin  Qty: 200 Pen Needle, Refills: 1    Associated Diagnoses: Type 2 diabetes with nephropathy (HCC)      insulin syr/ndl U100 half rashida 0.3 mL 31 gauge x 15/64\" syrg Use to inject insulin  Qty: 200 Syringe, Refills: 1    Associated Diagnoses: Type 2 diabetes with nephropathy (HCC)      polyethylene glycol (MIRALAX) 17 gram/dose powder Take 17 g by mouth daily as needed. !! lancets misc Use to test glucose 4 times/day.   Dx: E11.51, I10       Associated Diagnoses: Type 2 diabetes, uncontrolled, with neuropathy (HCC)      glucose blood VI test strips (FREESTYLE LITE STRIPS) strip Use to check glucose 4 times/day. Dx: E11.51, I10   1    Associated Diagnoses: Type 2 diabetes, uncontrolled, with neuropathy (HCC)      insulin aspart U-100 (NOVOLOG FLEXPEN U-100 INSULIN) 100 unit/mL (3 mL) inpn Inject 8 units standing dose + additional insulin according to sliding scale (up to 16 units) subQ before each meal  Qty: 15 Adjustable Dose Pre-filled Pen Syringe, Refills: 3    Associated Diagnoses: Type 2 diabetes, uncontrolled, with neuropathy (HCC)      carvedilol (COREG) 25 mg tablet Take 0.5 Tabs by mouth two (2) times daily (with meals). HOLD.       raNITIdine (ZANTAC) 150 mg tablet Take 150 mg by mouth two (2) times a day. insulin degludec (TRESIBA FLEXTOUCH U-100) 100 unit/mL (3 mL) inpn by SubCUTAneous route. pantoprazole (PROTONIX) 40 mg tablet Take 1 tablet po 30 minutes before breakfast       Associated Diagnoses: Chronic GERD; Esophageal dysphagia      irbesartan (AVAPRO) 150 mg tablet Take 1 Tab by mouth nightly. Insulin Syringe-Needle U-100 (BD INSULIN SYRINGE ULTRA-FINE) 0.3 mL 31 gauge x 5/16\" syrg Use to injection insulin 3 times/day at meals. Dx: E11.40  Qty: 270 Syringe, Refills: 3    Associated Diagnoses: Type 2 diabetes, uncontrolled, with neuropathy ([address]      ! ! Lancets misc Use to check glucose 4 times/day as directed. Dx: E11.40  Qty: 120 Each, Refills: 11    Associated Diagnoses: Type 2 diabetes, uncontrolled, with neuropathy (HCC)      cloNIDine HCl (CATAPRES) 0.1 mg tablet Take 1 Tab by mouth two (2) times a day. HOLD.       pregabalin (LYRICA) 150 mg capsule Take 150 mg by mouth two (2) times a day. OTHER Semi-Electric Hospital Bed  Dx: Mild Diastolic Dysfunction (OIJ-96 I51.9)  Pulmonary Hypertension (ICD-10 I27.2)  Morbid Obesity (ICD-10 E66.01)  Lumbar DDD (ICD-10 M51.36)  Obstructive Sleep Apnea (ICD-10 G47.33)  Qty: 1 Device, Refills: 0      loratadine (CLARITIN) 10 mg tablet Take 10 mg by mouth daily. multivit-min-FA-lycopen-lutein (CENTRUM SILVER) 0.4-300-250 mg-mcg-mcg tab Take  by mouth. HYDROcodone-acetaminophen (NORCO)  mg tablet Take 1 Tab by mouth every six (6) hours as needed for Pain. HOLD. !! - Potential duplicate medications found. Please discuss with provider. STOP taking these medications       ciprofloxacin HCl (CIPRO) 500 mg tablet Comments:   Reason for Stopping:         metroNIDAZOLE (FLAGYL) 500 mg tablet Comments:   Reason for Stopping:         cephALEXin (KEFLEX) 500 mg capsule Comments:   Reason for Stopping:               Discharge time: 35 minutes.     Signed By: Lorrie Parker MD     October 9, 2019

## 2019-10-10 ENCOUNTER — HOME CARE VISIT (OUTPATIENT)
Dept: SCHEDULING | Facility: HOME HEALTH | Age: 79
End: 2019-10-10
Payer: MEDICARE

## 2019-10-10 ENCOUNTER — PATIENT OUTREACH (OUTPATIENT)
Dept: CASE MANAGEMENT | Age: 79
End: 2019-10-10

## 2019-10-10 PROCEDURE — G0299 HHS/HOSPICE OF RN EA 15 MIN: HCPCS

## 2019-10-10 PROCEDURE — 3331090002 HH PPS REVENUE DEBIT

## 2019-10-10 PROCEDURE — 3331090001 HH PPS REVENUE CREDIT

## 2019-10-10 PROCEDURE — 400013 HH SOC

## 2019-10-10 NOTE — PROGRESS NOTES
This note will not be viewable in [address] What was the patient hospitalized for? Wilmarel Diverticulitis  10/10/19 1:00 PM   Consent for [facility] Call       Does the patient understand his/her diagnosis and/or treatment and what happened during the hospitalization? Yes, patient is in agreement to [facility] outreach call. Yes, patient understands his diagnosis and treatment and what happened during the hospitalization. Did the patient receive discharge instructions? Yes   CM Assessed Risk for Readmission:     Patient stated Risk for Readmission:  Patient is a low/medium risk for readmission    No stated concerns at time of this [facility] call    Review any discharge instructions (see discharge instructions/AVS in [address] [facility]). Ask patient if they understand these. Do they have any questions? Reviewed DC instructions    No questions at time of call   Were home services ordered (nursing, PT, OT, ST, etc.)? Yes PT/OP, NSG, Aide   If so, has the first visit occurred? If not, why? (Assist with coordination of services if necessary.)   Came out today 10/10/19   Was any DME ordered? No   If so, has it been received? If not, why?  (Assist patient in obtaining DME orders &/or equipment if necessary.) N/A     Complete a review of all medications (new, continued and discontinued meds per the D/C instructions and medication tab in [address] [facility]). No new prescriptions since discharge from rehab   Were all new prescriptions filled? If not, why?  (Assist patient in obtaining medications if necessary  escalate for CCM &/or SW if ongoing issues are verbalized by pt or anticipated)   None    Does the patient understand the purpose and dosing instructions for all medications? (If patient has questions, provide explanation and education.)   Yes   Does the patient have any problems in performing ADLs? (If patient is unable to perform ADLs  what is the limiting factor(s)?   Do they have a support system that can assist? If no support system is present, discuss possible assistance that they may be able to obtain. Escalate for CCM/SW if ongoing issues are verbalized by pt or anticipated)   Patient independent with ADLs. Also has family support if needed. Does the patient have all follow-up appointments scheduled? 7 day f/up with PCP?   (f/up with PCP may be w/in 14 days if patient has a f/up with their specialist w/in 7 days)    7-14 day f/up with specialist?   (or per discharge instructions)    If f/up has not been made  what actions has the care coordinator made to accomplish this? Has transportation been arranged? Yes        Dr Eppie Crigler 10/16/19 @ 10 AM                    Yes, no concerns. Any other questions or concerns expressed by the patient? There are no concerns voiced at this time. Schedule next appointment with LEONEL Barkley or refer to RN Case Manager/ per the workflow guidelines. When is care coordinators next follow-up call scheduled? If referred for CCM  what RN care manager was the referral assigned? Within 14 days    Care Coordinator contact information provided should a need arise.     15-30 days      N/A   [facility] Call Completed By: Anh Wade LPN   Care Coordinator

## 2019-10-10 NOTE — THERAPY DISCHARGE
Fab Wiseman : 1940 [place] Secondary: Deepak Kenney at [place] [id number] [facility], [address], [place] [address]. [phone]   [phone] OUTPATIENT PHYSICAL THERAPY:Discontinuation Summary ICD-10: Treatment Diagnosis:  
· Other abnormalities of gait and mobility (R26.89) · Difficulty in walking, not elsewhere classified (R26.2) Precautions/Allergies:  
Alexei Andrea maleate] TREATMENT PLAN: 
Effective Dates: 2019 TO 10/10/2019 (90 days). Frequency/Duration: 2 times a week for 90 Day(s) MEDICAL/REFERRING DIAGNOSIS: 
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 Jillian Theodore presented with decreased mobility, decreased strength, and ambulation secondary to previous CVA. Patient attended a total of 9 scheduled physical therapy visits including initial evaluation on 2019. Treatment has consisted of strengthening and mobility activities to improve overall mobility, safety, and performance with activities of daily living. Patient did not attend any additional physical therapy visits secondary to hospitalization. Patient's therapy will be discontinued at this time. We will be happy to re-assess him with a change in his status and a new order from his doctor. Thank you for the opportunity to serve this patient. GOALS: (Goals have been discussed and agreed upon with patient.) Short-Term Functional Goals: Time Frame: 30 days 1. Patient will be independent with home exercise program without exacerbation of symptoms or cueing needed--goal met. Discharge Goals: Time Frame: 90 days 1.  Patient will be independent with all ADLs with minimal fear of falling and loss of balance with daily tasks--goal unmet. 2. Patient will report no fear avoidance with social or recreational activities due to fear of falling--goal unmet. 3. Patient will score greater than or equal to 45/56 on Suarez Balance Scale with minimal effect of imbalance or weakness on patient's ability to manage every day life activities--goal unmet. OUTCOME MEASURE:  
Tool Used: Suarez Balance Scale Score:  Initial: 25/56 Most Recent: X/56 (Date: -- ) Interpretation of Score: Each section is scored on a 0-4 scale, 0 representing the patients inability to perform the task and 4 representing independence. The scores of each section are added together for a total score of 56. The higher the patients score, the more independent the patient is. Any score below 45 indicates increased risk for falls. EXAMINATION:  
Observation/Orthostatic Postural Assessment:   
      Posture Assessment: Rounded shoulders, Forward head Functional Mobility:  
      Gait/Mobility:  
 Base of Support: Center of gravity altered Speed/Sharon: Pace decreased (<100 feet/min) Step Length: Left shortened, Right shortened Swing Pattern: Left symmetrical, Right symmetrical 
Stance: Left increased, Right increased Gait Abnormalities: Antalgic, Path deviations Ambulation - Level of Assistance: Modified independent Assistive Device: Cane, straight · Interventions: Verbal cues, Safety awareness training Transfers:   
 Sit to Stand: Modified independent Stand to Sit: Modified independent Stand Pivot Transfers: Modified independent Bed to Chair: Modified independent Lateral Transfers: Modified independent Bed Mobility:   
 Rolling: Independent Supine to Sit: Independent Sit to Supine: Independent Scooting: Independent Strength:   
      UE STRENGTH: 3/5 shoulder flexion, 3/5 shoulder abduction, 3/5 shoulder extension, 3/5 elbow flexion, 3/5 elbow extension. LE STRENGTH:  3/5 hip flexion, 3/5 hip abduction, 3/5 hip adduction, 3/5 hip extension, 3/5 knee extension, 3/5 knee flexion, 3/5 ankle dorsiflexion, 3/5 ankle plantarflexion, 3/5 ankle inversion, 3/5 ankle eversion. Sensation:  
      Intact Postural Control & Balance: · Suarez Balance Scale:  25/56.   (A score less than 45/56 indicates high risk of falls)

## 2019-10-10 NOTE — PROGRESS NOTES
This note will not be viewable in [address] What was the patient hospitalized for? Robin Diverticulitis  10/10/19 1:00 PM  
Consent for [facility] Call Does the patient understand his/her diagnosis and/or treatment and what happened during the hospitalization? Yes, patient is in agreement to [facility] outreach call. Yes, patient understands his diagnosis and treatment and what happened during the hospitalization. Did the patient receive discharge instructions? Yes  
CM Assessed Risk for Readmission:  
 
Patient stated Risk for Readmission:  Patient is a medium/high risk for readmission No stated concerns at time of this RAD[facility] call Review any discharge instructions (see discharge instructions/AVS in [facility]). Ask patient if they understand these. Do they have any questions? Reviewed DC instructions No questions at time of call Were home services ordered (nursing, PT, OT, ST, etc.)? Yes PT/OP, NSG, Aide If so, has the first visit occurred? If not, why? (Assist with coordination of services if necessary.) Came out today 10/10/19 Was any DME ordered? No  
If so, has it been received? If not, why?  (Assist patient in obtaining DME orders &/or equipment if necessary.) N/A Complete a review of all medications (new, continued and discontinued meds per the D/C instructions and medication tab in [facility]). No new prescriptions since discharge from rehab Were all new prescriptions filled? If not, why?  (Assist patient in obtaining medications if necessary  escalate for CCM &/or SW if ongoing issues are verbalized by pt or anticipated) None Does the patient understand the purpose and dosing instructions for all medications? (If patient has questions, provide explanation and education.) 
 yes Does the patient have any problems in performing ADLs? (If patient is unable to perform ADLs  what is the limiting factor(s)?   Do they have a support system that can assist? If no support system is present, discuss possible assistance that they may be able to obtain. Escalate for CCM/SW if ongoing issues are verbalized by pt or anticipated) Patient independent with ADLs. Also has family support if needed. Does the patient have all follow-up appointments scheduled? 7 day f/up with PCP?  
(f/up with PCP may be w/in 14 days if patient has a f/up with their specialist w/in 7 days) 7-14 day f/up with specialist?  
(or per discharge instructions) If f/up has not been made  what actions has the care coordinator made to accomplish this? Has transportation been arranged? Yes Dr Eppie Crigler 10/16/19 @ 10 AM 
 
 
 
 
 
 
 
 
 
Yes, no concerns. She has her neighbors to transport her to her appointments. Any other questions or concerns expressed by the patient? There are no concerns voiced at this time. Schedule next appointment with LEONEL Barkley or refer to RN Case Manager/ per the workflow guidelines. When is care coordinators next follow-up call scheduled? If referred for CCM  what RN care manager was the referral assigned? Within 14 days Care Coordinator contact information provided should a need arise. 15-30 days N/A  
[facility] Call Completed By: Anh Wade LPN Care Coordinator

## 2019-10-11 VITALS
DIASTOLIC BLOOD PRESSURE: 62 MMHG | TEMPERATURE: 98.3 F | SYSTOLIC BLOOD PRESSURE: 116 MMHG | OXYGEN SATURATION: 98 % | HEART RATE: 73 BPM | RESPIRATION RATE: 18 BRPM

## 2019-10-11 PROCEDURE — 3331090001 HH PPS REVENUE CREDIT

## 2019-10-11 PROCEDURE — 3331090002 HH PPS REVENUE DEBIT

## 2019-10-12 PROCEDURE — 3331090001 HH PPS REVENUE CREDIT

## 2019-10-12 PROCEDURE — 3331090002 HH PPS REVENUE DEBIT

## 2019-10-13 PROCEDURE — 3331090001 HH PPS REVENUE CREDIT

## 2019-10-13 PROCEDURE — 3331090002 HH PPS REVENUE DEBIT

## 2019-10-14 ENCOUNTER — HOME CARE VISIT (OUTPATIENT)
Dept: SCHEDULING | Facility: HOME HEALTH | Age: 79
End: 2019-10-14
Payer: MEDICARE

## 2019-10-14 VITALS
HEART RATE: 67 BPM | SYSTOLIC BLOOD PRESSURE: 121 MMHG | RESPIRATION RATE: 18 BRPM | TEMPERATURE: 98.7 F | DIASTOLIC BLOOD PRESSURE: 65 MMHG

## 2019-10-14 PROCEDURE — G0151 HHCP-SERV OF PT,EA 15 MIN: HCPCS

## 2019-10-14 PROCEDURE — 3331090001 HH PPS REVENUE CREDIT

## 2019-10-14 PROCEDURE — 3331090002 HH PPS REVENUE DEBIT

## 2019-10-15 ENCOUNTER — HOSPITAL ENCOUNTER (INPATIENT)
Age: 79
LOS: 4 days | Discharge: HOME HEALTH | DRG: 356 | End: 2019-10-19
Attending: EMERGENCY MEDICINE | Admitting: HOSPITALIST
Payer: MEDICARE

## 2019-10-15 ENCOUNTER — HOME CARE VISIT (OUTPATIENT)
Dept: SCHEDULING | Facility: HOME HEALTH | Age: 79
End: 2019-10-15
Payer: MEDICARE

## 2019-10-15 VITALS
TEMPERATURE: 97.7 F | SYSTOLIC BLOOD PRESSURE: 110 MMHG | HEART RATE: 91 BPM | RESPIRATION RATE: 16 BRPM | DIASTOLIC BLOOD PRESSURE: 78 MMHG

## 2019-10-15 VITALS
DIASTOLIC BLOOD PRESSURE: 66 MMHG | RESPIRATION RATE: 18 BRPM | TEMPERATURE: 97.8 F | HEART RATE: 78 BPM | OXYGEN SATURATION: 98 % | SYSTOLIC BLOOD PRESSURE: 118 MMHG

## 2019-10-15 DIAGNOSIS — K92.2 ACUTE LOWER GI BLEEDING: Primary | ICD-10-CM

## 2019-10-15 LAB
ALBUMIN SERPL-MCNC: 2.8 G/DL (ref 3.2–4.6)
ALBUMIN/GLOB SERPL: 0.8 {RATIO} (ref 1.2–3.5)
ALP SERPL-CCNC: 86 U/L (ref 50–136)
ALT SERPL-CCNC: 19 U/L (ref 12–65)
ANION GAP SERPL CALC-SCNC: 4 MMOL/L (ref 7–16)
APTT PPP: 25.2 SEC (ref 24.7–39.8)
AST SERPL-CCNC: 25 U/L (ref 15–37)
ATRIAL RATE: 81 BPM
BASOPHILS # BLD: 0 K/UL (ref 0–0.2)
BASOPHILS NFR BLD: 0 % (ref 0–2)
BILIRUB SERPL-MCNC: 0.2 MG/DL (ref 0.2–1.1)
BUN SERPL-MCNC: 18 MG/DL (ref 8–23)
CALCIUM SERPL-MCNC: 8.5 MG/DL (ref 8.3–10.4)
CALCULATED P AXIS, ECG09: 30 DEGREES
CALCULATED R AXIS, ECG10: -28 DEGREES
CALCULATED T AXIS, ECG11: 7 DEGREES
CHLORIDE SERPL-SCNC: 107 MMOL/L (ref 98–107)
CO2 SERPL-SCNC: 25 MMOL/L (ref 21–32)
CREAT SERPL-MCNC: 1.2 MG/DL (ref 0.8–1.5)
DIAGNOSIS, 93000: NORMAL
DIFFERENTIAL METHOD BLD: ABNORMAL
EOSINOPHIL # BLD: 0.3 K/UL (ref 0–0.8)
EOSINOPHIL NFR BLD: 3 % (ref 0.5–7.8)
ERYTHROCYTE [DISTWIDTH] IN BLOOD BY AUTOMATED COUNT: 16.9 % (ref 11.9–14.6)
GLOBULIN SER CALC-MCNC: 3.5 G/DL (ref 2.3–3.5)
GLUCOSE SERPL-MCNC: 153 MG/DL (ref 65–100)
HCT VFR BLD AUTO: 28 % (ref 41.1–50.3)
HGB BLD-MCNC: 8.6 G/DL (ref 13.6–17.2)
IMM GRANULOCYTES # BLD AUTO: 0 K/UL (ref 0–0.5)
IMM GRANULOCYTES NFR BLD AUTO: 0 % (ref 0–5)
INR PPP: 1.1
LYMPHOCYTES # BLD: 3.3 K/UL (ref 0.5–4.6)
LYMPHOCYTES NFR BLD: 36 % (ref 13–44)
MCH RBC QN AUTO: 28.5 PG (ref 26.1–32.9)
MCHC RBC AUTO-ENTMCNC: 30.7 G/DL (ref 31.4–35)
MCV RBC AUTO: 92.7 FL (ref 79.6–97.8)
MONOCYTES # BLD: 0.7 K/UL (ref 0.1–1.3)
MONOCYTES NFR BLD: 8 % (ref 4–12)
NEUTS SEG # BLD: 5 K/UL (ref 1.7–8.2)
NEUTS SEG NFR BLD: 53 % (ref 43–78)
NRBC # BLD: 0 K/UL (ref 0–0.2)
P-R INTERVAL, ECG05: 158 MS
PLATELET # BLD AUTO: 257 K/UL (ref 150–450)
PMV BLD AUTO: 10.1 FL (ref 9.4–12.3)
POTASSIUM SERPL-SCNC: 4.3 MMOL/L (ref 3.5–5.1)
PROT SERPL-MCNC: 6.3 G/DL (ref 6.3–8.2)
PROTHROMBIN TIME: 14.8 SEC (ref 11.7–14.5)
Q-T INTERVAL, ECG07: 350 MS
QRS DURATION, ECG06: 88 MS
QTC CALCULATION (BEZET), ECG08: 406 MS
RBC # BLD AUTO: 3.02 M/UL (ref 4.23–5.6)
SODIUM SERPL-SCNC: 136 MMOL/L (ref 136–145)
VENTRICULAR RATE, ECG03: 81 BPM
WBC # BLD AUTO: 9.3 K/UL (ref 4.3–11.1)

## 2019-10-15 PROCEDURE — 65270000029 HC RM PRIVATE

## 2019-10-15 PROCEDURE — 85610 PROTHROMBIN TIME: CPT

## 2019-10-15 PROCEDURE — 86900 BLOOD TYPING SEROLOGIC ABO: CPT

## 2019-10-15 PROCEDURE — 3331090001 HH PPS REVENUE CREDIT

## 2019-10-15 PROCEDURE — 93005 ELECTROCARDIOGRAM TRACING: CPT | Performed by: EMERGENCY MEDICINE

## 2019-10-15 PROCEDURE — G0299 HHS/HOSPICE OF RN EA 15 MIN: HCPCS

## 2019-10-15 PROCEDURE — 3331090002 HH PPS REVENUE DEBIT

## 2019-10-15 PROCEDURE — G0152 HHCP-SERV OF OT,EA 15 MIN: HCPCS

## 2019-10-15 PROCEDURE — 86923 COMPATIBILITY TEST ELECTRIC: CPT

## 2019-10-15 PROCEDURE — 85018 HEMOGLOBIN: CPT

## 2019-10-15 PROCEDURE — 85025 COMPLETE CBC W/AUTO DIFF WBC: CPT

## 2019-10-15 PROCEDURE — 85730 THROMBOPLASTIN TIME PARTIAL: CPT

## 2019-10-15 PROCEDURE — 96374 THER/PROPH/DIAG INJ IV PUSH: CPT | Performed by: EMERGENCY MEDICINE

## 2019-10-15 PROCEDURE — 99285 EMERGENCY DEPT VISIT HI MDM: CPT | Performed by: EMERGENCY MEDICINE

## 2019-10-15 PROCEDURE — G0155 HHCP-SVS OF CSW,EA 15 MIN: HCPCS

## 2019-10-15 PROCEDURE — 80053 COMPREHEN METABOLIC PANEL: CPT

## 2019-10-15 PROCEDURE — 74011250636 HC RX REV CODE- 250/636: Performed by: EMERGENCY MEDICINE

## 2019-10-15 RX ORDER — SODIUM CHLORIDE 9 MG/ML
75 INJECTION, SOLUTION INTRAVENOUS CONTINUOUS
Status: DISCONTINUED | OUTPATIENT
Start: 2019-10-15 | End: 2019-10-19

## 2019-10-15 RX ORDER — ONDANSETRON 2 MG/ML
4 INJECTION INTRAMUSCULAR; INTRAVENOUS
Status: COMPLETED | OUTPATIENT
Start: 2019-10-15 | End: 2019-10-15

## 2019-10-15 RX ADMIN — ONDANSETRON 4 MG: 2 INJECTION INTRAMUSCULAR; INTRAVENOUS at 20:40

## 2019-10-15 RX ADMIN — SODIUM CHLORIDE 150 ML/HR: 900 INJECTION, SOLUTION INTRAVENOUS at 20:40

## 2019-10-15 NOTE — ED TRIAGE NOTES
Patient comes via EMS from home. Patient co several bloody stools that started today.   Patient was recently discharge from hospital 10-1-19

## 2019-10-15 NOTE — ED PROVIDER NOTES
Patient presents with complaints of rectal bleeding. Patient was recently hospitalized and discharged from inpatient rehab after episode of diverticulitis and UTI. Reports having had one bloody bowel movement while hospitalized a week ago just prior to discharge. He been having normal bowel movements at home since until today when he had 2 dark red and foul-smelling bowel movements at home and patient also just had a third in the emergency department. He complains of feeling lightheaded but denies any chest pain or shortness of breath. He has no prior history of GI bleeding. Abdominal pain. The history is provided by the patient and medical records. Rectal Bleeding    This is a new problem. The current episode started 3 to 5 hours ago. Stool description: dark red blood; little to no stool. Pertinent negatives include no abdominal pain, no flatus, no dysuria, no abdominal distention, no chills, no fever, no nausea, no back pain, no vomiting, no diarrhea and no constipation. Risk factors include a recent illness. He has tried nothing for the symptoms. The treatment provided no relief. Past medical history comments: Diverticular disease.         Past Medical History:   Diagnosis Date    Cervical stenosis of spine     Chronic kidney disease     Stage 3    Degenerative disc disease, lumbar     Diabetes mellitus type II     uncontrolled-insulin and oral med. 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 [facility]     Pulmonary embolism [facility]) Mar 2014    Bilateral - Completed 6 months on Xarelto    TIA (transient ischemic attack) 11/9/2017    Type 2 diabetes, uncontrolled, with neuropathy ([facility] 1/22/2015       Past Surgical History:   Procedure Laterality Date    COLONOSCOPY  07/29/2010    Diverticulosis & Colon/Rectal Polyps - Due 2020    EGD  5/9/2011         HX BUNIONECTOMY Bilateral     HX HERNIA REPAIR Bilateral     Inguinal, Repeat on Both Sides Separately    HX KNEE ARTHROSCOPY  1991    x 3    SINUS SURGERY PROC UNLISTED      TOTAL KNEE ARTHROPLASTY Right 2006         Family History:   Problem Relation Age of Onset    Stroke Mother     Diabetes Mother     Heart Disease Mother     Diabetes Maternal Grandmother     Glaucoma Maternal Grandmother     Stroke Father     Diabetes Father     Diabetes Paternal Aunt     Cancer Paternal Aunt     Diabetes Paternal Uncle     Cancer Paternal Uncle         Colon    Heart Attack Sister 76    Cancer Sister     Prostate Cancer Brother     Heart Disease Sister 48    Prostate Cancer Brother        Social History     Socioeconomic History    Marital status: SINGLE     Spouse name: Not on file    Number of children: Not on file    Years of education: Not on file    Highest education level: Not on file   Occupational History    Not on file   Social Needs    Financial resource strain: Not on file    Food insecurity:     Worry: Not on file     Inability: Not on file    Transportation needs:     Medical: Not on file     Non-medical: Not on file   Tobacco Use    Smoking status: Never Smoker    Smokeless tobacco: Never Used   Substance and Sexual Activity    Alcohol use: No    Drug use: No    Sexual activity: Not on file   Lifestyle    Physical activity:     Days per week: Not on file     Minutes per session: Not on file    Stress: Not on file Relationships    Social connections:     Talks on phone: Not on file     Gets together: Not on file     Attends Judaism service: Not on file     Active member of club or organization: Not on file     Attends meetings of clubs or organizations: Not on file     Relationship status: Not on file    Intimate partner violence:     Fear of current or ex partner: Not on file     Emotionally abused: Not on file     Physically abused: Not on file     Forced sexual activity: Not on file   Other Topics Concern    Not on file   Social History Narrative    Not on file         ALLERGIES: Vasotec [enalapril maleate]    Review of Systems   Constitutional: Negative for chills and fever. Gastrointestinal: Positive for anal bleeding. Negative for abdominal distention, abdominal pain, constipation, diarrhea, flatus, nausea and vomiting. Genitourinary: Negative for dysuria. Musculoskeletal: Negative for back pain. All other systems reviewed and are negative. Vitals:    10/15/19 1915   Resp: 16   Temp: 97.9 °F (36.6 °C)   SpO2: 98%   Weight: 124.7 kg (275 lb)   Height: 5' 6\" (1.676 m)            Physical Exam   Constitutional: He is oriented to person, place, and time. He appears well-developed and well-nourished. No distress. HENT:   Head: Normocephalic and atraumatic. Eyes: Pupils are equal, round, and reactive to light. EOM are normal.   Conjunctiva are pale   Neck: Normal range of motion. Neck supple. Cardiovascular: Normal rate, regular rhythm, normal heart sounds and intact distal pulses. Pulmonary/Chest: Effort normal and breath sounds normal.   Abdominal: Soft. Bowel sounds are normal.   Musculoskeletal: Normal range of motion. He exhibits edema. Patient has 4+ pitting edema in the bilateral lower extremities which is chronic   Neurological: He is alert and oriented to person, place, and time. Skin: Skin is warm. Capillary refill takes less than 2 seconds. He is not diaphoretic. No pallor.    Mildly diaphoretic   Psychiatric: He has a normal mood and affect. His behavior is normal.   Nursing note and vitals reviewed. MDM  Number of Diagnoses or Management Options  Diagnosis management comments: Patient has a significant lower GI bleeding while currently taking Plavix and aspirin for cardiac condition. Patient is symptomatic with lightheadedness and will require admission.   Will discuss with GI and hospitalist.       Amount and/or Complexity of Data Reviewed  Clinical lab tests: ordered and reviewed  Independent visualization of images, tracings, or specimens: yes    Risk of Complications, Morbidity, and/or Mortality  Presenting problems: moderate  Diagnostic procedures: moderate  Management options: moderate    Patient Progress  Patient progress: stable         Procedures

## 2019-10-16 LAB
ERYTHROCYTE [DISTWIDTH] IN BLOOD BY AUTOMATED COUNT: 16 % (ref 11.9–14.6)
GLUCOSE BLD STRIP.AUTO-MCNC: 167 MG/DL (ref 65–100)
GLUCOSE BLD STRIP.AUTO-MCNC: 65 MG/DL (ref 65–100)
GLUCOSE BLD STRIP.AUTO-MCNC: 80 MG/DL (ref 65–100)
GLUCOSE BLD STRIP.AUTO-MCNC: 88 MG/DL (ref 65–100)
HCT VFR BLD AUTO: 24.9 % (ref 41.1–50.3)
HCT VFR BLD AUTO: 25 % (ref 41.1–50.3)
HGB BLD-MCNC: 7.7 G/DL (ref 13.6–17.2)
HGB BLD-MCNC: 7.8 G/DL (ref 13.6–17.2)
HGB BLD-MCNC: 7.9 G/DL (ref 13.6–17.2)
HGB BLD-MCNC: 8.1 G/DL (ref 13.6–17.2)
MCH RBC QN AUTO: 28.4 PG (ref 26.1–32.9)
MCHC RBC AUTO-ENTMCNC: 31.2 G/DL (ref 31.4–35)
MCV RBC AUTO: 90.9 FL (ref 79.6–97.8)
NRBC # BLD: 0.02 K/UL (ref 0–0.2)
PLATELET # BLD AUTO: 184 K/UL (ref 150–450)
PMV BLD AUTO: 10.1 FL (ref 9.4–12.3)
RBC # BLD AUTO: 2.75 M/UL (ref 4.23–5.6)
WBC # BLD AUTO: 6.2 K/UL (ref 4.3–11.1)

## 2019-10-16 PROCEDURE — 85018 HEMOGLOBIN: CPT

## 2019-10-16 PROCEDURE — 36430 TRANSFUSION BLD/BLD COMPNT: CPT

## 2019-10-16 PROCEDURE — 74011250636 HC RX REV CODE- 250/636: Performed by: EMERGENCY MEDICINE

## 2019-10-16 PROCEDURE — 74011250637 HC RX REV CODE- 250/637: Performed by: HOSPITALIST

## 2019-10-16 PROCEDURE — C9113 INJ PANTOPRAZOLE SODIUM, VIA: HCPCS | Performed by: HOSPITALIST

## 2019-10-16 PROCEDURE — 82962 GLUCOSE BLOOD TEST: CPT

## 2019-10-16 PROCEDURE — 36415 COLL VENOUS BLD VENIPUNCTURE: CPT

## 2019-10-16 PROCEDURE — P9016 RBC LEUKOCYTES REDUCED: HCPCS

## 2019-10-16 PROCEDURE — 74011000250 HC RX REV CODE- 250: Performed by: HOSPITALIST

## 2019-10-16 PROCEDURE — 3331090001 HH PPS REVENUE CREDIT

## 2019-10-16 PROCEDURE — 74011250636 HC RX REV CODE- 250/636: Performed by: HOSPITALIST

## 2019-10-16 PROCEDURE — 85027 COMPLETE CBC AUTOMATED: CPT

## 2019-10-16 PROCEDURE — 65660000000 HC RM CCU STEPDOWN

## 2019-10-16 PROCEDURE — 77030040361 HC SLV COMPR DVT MDII -B

## 2019-10-16 PROCEDURE — 77030020263 HC SOL INJ SOD CL0.9% LFCR 1000ML

## 2019-10-16 PROCEDURE — 3331090002 HH PPS REVENUE DEBIT

## 2019-10-16 RX ORDER — ACETAMINOPHEN 325 MG/1
650 TABLET ORAL ONCE
Status: COMPLETED | OUTPATIENT
Start: 2019-10-16 | End: 2019-10-16

## 2019-10-16 RX ORDER — ONDANSETRON 2 MG/ML
4 INJECTION INTRAMUSCULAR; INTRAVENOUS
Status: DISCONTINUED | OUTPATIENT
Start: 2019-10-16 | End: 2019-10-19 | Stop reason: HOSPADM

## 2019-10-16 RX ORDER — DEXTROSE 40 %
15 GEL (GRAM) ORAL AS NEEDED
Status: DISCONTINUED | OUTPATIENT
Start: 2019-10-16 | End: 2019-10-19 | Stop reason: HOSPADM

## 2019-10-16 RX ORDER — ACETAMINOPHEN 500 MG
500 TABLET ORAL
Status: DISCONTINUED | OUTPATIENT
Start: 2019-10-16 | End: 2019-10-19

## 2019-10-16 RX ORDER — ONDANSETRON 4 MG/1
4 TABLET, ORALLY DISINTEGRATING ORAL
Status: COMPLETED | OUTPATIENT
Start: 2019-10-16 | End: 2019-10-16

## 2019-10-16 RX ORDER — DEXTROSE 50 % IN WATER (D50W) INTRAVENOUS SYRINGE
25-50 AS NEEDED
Status: DISCONTINUED | OUTPATIENT
Start: 2019-10-16 | End: 2019-10-19 | Stop reason: HOSPADM

## 2019-10-16 RX ORDER — SODIUM CHLORIDE 0.9 % (FLUSH) 0.9 %
5-40 SYRINGE (ML) INJECTION EVERY 8 HOURS
Status: DISCONTINUED | OUTPATIENT
Start: 2019-10-16 | End: 2019-10-19 | Stop reason: HOSPADM

## 2019-10-16 RX ORDER — INSULIN LISPRO 100 [IU]/ML
INJECTION, SOLUTION INTRAVENOUS; SUBCUTANEOUS
Status: DISCONTINUED | OUTPATIENT
Start: 2019-10-16 | End: 2019-10-19 | Stop reason: HOSPADM

## 2019-10-16 RX ORDER — SODIUM CHLORIDE 0.9 % (FLUSH) 0.9 %
5-40 SYRINGE (ML) INJECTION AS NEEDED
Status: DISCONTINUED | OUTPATIENT
Start: 2019-10-16 | End: 2019-10-19 | Stop reason: HOSPADM

## 2019-10-16 RX ORDER — INSULIN LISPRO 100 [IU]/ML
INJECTION, SOLUTION INTRAVENOUS; SUBCUTANEOUS
Status: DISCONTINUED | OUTPATIENT
Start: 2019-10-16 | End: 2019-10-16

## 2019-10-16 RX ORDER — SODIUM CHLORIDE 9 MG/ML
250 INJECTION, SOLUTION INTRAVENOUS AS NEEDED
Status: DISCONTINUED | OUTPATIENT
Start: 2019-10-16 | End: 2019-10-19 | Stop reason: HOSPADM

## 2019-10-16 RX ADMIN — SODIUM CHLORIDE 250 ML: 900 INJECTION, SOLUTION INTRAVENOUS at 01:49

## 2019-10-16 RX ADMIN — SODIUM CHLORIDE 40 MG: 9 INJECTION, SOLUTION INTRAMUSCULAR; INTRAVENOUS; SUBCUTANEOUS at 09:19

## 2019-10-16 RX ADMIN — Medication 5 ML: at 02:00

## 2019-10-16 RX ADMIN — ACETAMINOPHEN 500 MG: 500 TABLET, FILM COATED ORAL at 23:46

## 2019-10-16 RX ADMIN — SODIUM CHLORIDE 75 ML/HR: 900 INJECTION, SOLUTION INTRAVENOUS at 12:03

## 2019-10-16 RX ADMIN — ONDANSETRON 4 MG: 2 INJECTION INTRAMUSCULAR; INTRAVENOUS at 12:03

## 2019-10-16 RX ADMIN — Medication 1 TUBE: at 09:17

## 2019-10-16 RX ADMIN — ACETAMINOPHEN 650 MG: 325 TABLET, FILM COATED ORAL at 04:25

## 2019-10-16 RX ADMIN — ACETAMINOPHEN 500 MG: 500 TABLET, FILM COATED ORAL at 12:28

## 2019-10-16 RX ADMIN — ONDANSETRON 4 MG: 4 TABLET, ORALLY DISINTEGRATING ORAL at 22:07

## 2019-10-16 RX ADMIN — SODIUM CHLORIDE 40 MG: 9 INJECTION, SOLUTION INTRAMUSCULAR; INTRAVENOUS; SUBCUTANEOUS at 21:33

## 2019-10-16 RX ADMIN — SODIUM CHLORIDE 40 MG: 9 INJECTION, SOLUTION INTRAMUSCULAR; INTRAVENOUS; SUBCUTANEOUS at 01:41

## 2019-10-16 RX ADMIN — SODIUM CHLORIDE 150 ML/HR: 900 INJECTION, SOLUTION INTRAVENOUS at 01:46

## 2019-10-16 RX ADMIN — Medication 10 ML: at 22:00

## 2019-10-16 RX ADMIN — Medication 10 ML: at 17:51

## 2019-10-16 RX ADMIN — Medication 10 ML: at 05:55

## 2019-10-16 NOTE — PROGRESS NOTES
Shift assessment completed and charted. He is alert and oriented. He does have a slight delay in responding to questions. His heart sounds are regular. S1 and S2 auscultated, systolic murmur heard. His last bloody bowel movement was in the ER. He has not had the need to evacuate his bowels since he has been in the room. Patient is aware of his NPO status. Re oriented patient to room and call light. He is aware to call for any needs. Will continue to follow patient through hourly rounding. Will meet all patient's needs as requested and appropriate for his care.

## 2019-10-16 NOTE — PROGRESS NOTES
10/16/19 0110   Dual Skin Pressure Injury Assessment   Dual Skin Pressure Injury Assessment WDL   Second Care Provider (Based on [address] [place] [address]) Molly Gonzales RN   Skin Integumentary   Skin Integumentary (WDL) WDL   Skin Color Appropriate for ethnicity   Skin Condition/Temp Warm;Dry   Skin Integrity Scars (comment)  (L knee)

## 2019-10-16 NOTE — PROGRESS NOTES
Shift assessment completed and charted. He is alert and oriented x 4. He has no current complaints at this time other than being NPO. Heart sounds regular in rate and rhythm. S1 & S2 auscultated. He is currently on cardiac monitor. Lungs clear bilaterally. Patient has generalized edema which is most prominent in his lower limbs.

## 2019-10-16 NOTE — PROGRESS NOTES
Hospitalist Note     Admit Date:  10/15/2019  7:11 PM   Name:  Farshad Zepeda   Age:  70 y.o.  :  1940   MRN:  [id number]   PCP:  Adi Weinberg MD  Treatment Team: Attending Provider: Giovanny Mayfield MD; Consulting Provider: Daniel Rea MD; Utilization Review: Alejandra Barber RN    subj:     Patient is a 69 y/o male with hx DM, DVT/PE, diastolic CHF, CKD 3, asthma who presents to ED with several episodes of copious blood and clots per rectum. No pain but has had episodes of acute urgency. He was admitted -10/1 for cecal diverticulitis. He has completed antibiotics. Last hemoglobin checked was 10.7 on 10/8. Today is 8.6 with gross blood and clots seen in bedside commode. His last colonoscopy over 10 years ago and he had polyps. He takes aspirin and plavix. No chest pain or shortness of breath. GI has been notified. He will be admitted for further workup. Today, no BM or bleeding. Has some nausea.     Objective:     Patient Vitals for the past 24 hrs:   Temp Pulse Resp BP SpO2   10/16/19 1629 98.8 °F (37.1 °C) 88 18 146/69 94 %   10/16/19 1214 97.1 °F (36.2 °C) 97 18 165/74 96 %   10/16/19 0814 96.3 °F (35.7 °C) 90 18 119/76 92 %   10/16/19 0434 98.8 °F (37.1 °C) 93 20 133/85 95 %   10/16/19 0352 98.1 °F (36.7 °C) 100 19 149/82 96 %   10/16/19 0252 97.9 °F (36.6 °C) 100 20 159/88 97 %   10/16/19 0207 98.3 °F (36.8 °C) (!) 101 20 121/62 97 %   10/16/19 0151 98.4 °F (36.9 °C) 97 20 131/79 99 %   10/16/19 0125 98.7 °F (37.1 °C) 100 20 125/69 97 %   10/15/19 2346  (!) 105 21 134/60 95 %   10/15/19 2130  86 17 142/63 96 %   10/15/19 2100  81 27 142/63 (!) 88 %   10/15/19 2048  90 16 119/60    10/15/19 2030    127/64    10/15/19 2000    105/55    10/15/19 194    129/58    10/15/19 1915 97.9 °F (36.6 °C) 92 16 146/62 97 %     Oxygen Therapy  O2 Sat (%): 94 % (10/16/19 1629)  Pulse via Oximetry: 105 beats per minute (10/15/19 2346)  O2 Device: Room air (10/16/19 9163)    Intake/Output Summary (Last 24 hours) at 10/16/2019 1656  Last data filed at 10/16/2019 0918  Gross per 24 hour   Intake 845 ml   Output 600 ml   Net 245 ml       Physical Exam:  General:    Well nourished. Alert. Pale obese  CV:   RRR. No murmur, rub, or gallop. Lungs:  CTAB. No wheezing, rhonchi, or rales. Abdomen: Soft, not distended, active bowel sounds, non tender  Extremities: Warm and dry. No cyanosis or edema. Neurologic: grossly intact. Skin:     No rashes or jaundice. No wounds. Psych:  Normal mood and affect. I reviewed the labs, imaging, EKGs, telemetry, and other studies done this admission. Data Review:   Recent Results (from the past 24 hour(s))   CBC WITH AUTOMATED DIFF    Collection Time: 10/15/19  8:18 PM   Result Value Ref Range    WBC 9.3 4.3 - 11.1 K/uL    RBC 3.02 (L) 4.23 - 5.6 M/uL    HGB 8.6 (L) 13.6 - 17.2 g/dL    HCT 28.0 (L) 41.1 - 50.3 %    MCV 92.7 79.6 - 97.8 FL    MCH 28.5 26.1 - 32.9 PG    MCHC 30.7 (L) 31.4 - 35.0 g/dL    RDW 16.9 (H) 11.9 - 14.6 %    PLATELET 063 673 - 098 K/uL    MPV 10.1 9.4 - 12.3 FL    ABSOLUTE NRBC 0.00 0.0 - 0.2 K/uL    DF AUTOMATED      NEUTROPHILS 53 43 - 78 %    LYMPHOCYTES 36 13 - 44 %    MONOCYTES 8 4.0 - 12.0 %    EOSINOPHILS 3 0.5 - 7.8 %    BASOPHILS 0 0.0 - 2.0 %    IMMATURE GRANULOCYTES 0 0.0 - 5.0 %    ABS. NEUTROPHILS 5.0 1.7 - 8.2 K/UL    ABS. LYMPHOCYTES 3.3 0.5 - 4.6 K/UL    ABS. MONOCYTES 0.7 0.1 - 1.3 K/UL    ABS. EOSINOPHILS 0.3 0.0 - 0.8 K/UL    ABS. BASOPHILS 0.0 0.0 - 0.2 K/UL    ABS. IMM.  GRANS. 0.0 0.0 - 0.5 K/UL   METABOLIC PANEL, COMPREHENSIVE    Collection Time: 10/15/19  8:18 PM   Result Value Ref Range    Sodium 136 136 - 145 mmol/L    Potassium 4.3 3.5 - 5.1 mmol/L    Chloride 107 98 - 107 mmol/L    CO2 25 21 - 32 mmol/L    Anion gap 4 (L) 7 - 16 mmol/L    Glucose 153 (H) 65 - 100 mg/dL    BUN 18 8 - 23 MG/DL    Creatinine 1.20 0.8 - 1.5 MG/DL    GFR est AA >60 >60 ml/min/1.73m2    GFR est non-AA >60 >60 ml/min/1.73m2    Calcium 8.5 8.3 - 10.4 MG/DL    Bilirubin, total 0.2 0.2 - 1.1 MG/DL    ALT (SGPT) 19 12 - 65 U/L    AST (SGOT) 25 15 - 37 U/L    Alk. phosphatase 86 50 - 136 U/L    Protein, total 6.3 6.3 - 8.2 g/dL    Albumin 2.8 (L) 3.2 - 4.6 g/dL    Globulin 3.5 2.3 - 3.5 g/dL    A-G Ratio 0.8 (L) 1.2 - 3.5     TYPE & SCREEN    Collection Time: 10/15/19  8:18 PM   Result Value Ref Range    Crossmatch Expiration 10/18/2019     ABO/Rh(D) O POSITIVE     Antibody screen NEG     Unit number [id number]     Blood component type [facility]     Unit division 00     Status of unit ALLOCATED     Crossmatch result Compatible     Unit number [id number]     Blood component type [facility]     Unit division 00     Status of unit ISSUED     Crossmatch result Compatible    EKG, 12 LEAD, INITIAL    Collection Time: 10/15/19  8:42 PM   Result Value Ref Range    Ventricular Rate 81 BPM    Atrial Rate 81 BPM    P-R Interval 158 ms    QRS Duration 88 ms    Q-T Interval 350 ms    QTC Calculation (Bezet) 406 ms    Calculated P Axis 30 degrees    Calculated R Axis -28 degrees    Calculated T Axis 7 degrees    Diagnosis       !! AGE AND GENDER SPECIFIC ECG ANALYSIS !!   Normal sinus rhythm  Possible Anterior infarct , age undetermined  Abnormal ECG  When compared with ECG of 25-SEP-2019 14:16,  Borderline criteria for Anterior infarct are now Present  Nonspecific T wave abnormality, worse in Anterior leads  Confirmed by ST JAE MEDINA MD (), ELVIRA BUCKLEY ([id number]) on 10/15/2019 10:02:36 PM     PROTHROMBIN TIME + INR    Collection Time: 10/15/19 10:07 PM   Result Value Ref Range    Prothrombin time 14.8 (H) 11.7 - 14.5 sec    INR 1.1     PTT    Collection Time: 10/15/19 10:07 PM   Result Value Ref Range    aPTT 25.2 24.7 - 39.8 SEC   HGB & HCT    Collection Time: 10/15/19 11:50 PM   Result Value Ref Range    HGB 7.7 (L) 13.6 - 17.2 g/dL    HCT 24.9 (L) 41.1 - 50.3 %   CBC W/O DIFF    Collection Time: 10/16/19  5:34 AM   Result Value Ref Range WBC 6.2 4.3 - 11.1 K/uL    RBC 2.75 (L) 4.23 - 5.6 M/uL    HGB 7.8 (L) 13.6 - 17.2 g/dL    HCT 25.0 (L) 41.1 - 50.3 %    MCV 90.9 79.6 - 97.8 FL    MCH 28.4 26.1 - 32.9 PG    MCHC 31.2 (L) 31.4 - 35.0 g/dL    RDW 16.0 (H) 11.9 - 14.6 %    PLATELET 784 096 - 990 K/uL    MPV 10.1 9.4 - 12.3 FL    ABSOLUTE NRBC 0.02 0.0 - 0.2 K/uL   GLUCOSE, POC    Collection Time: 10/16/19  8:44 AM   Result Value Ref Range    Glucose (POC) 65 65 - 100 mg/dL   GLUCOSE, POC    Collection Time: 10/16/19  9:43 AM   Result Value Ref Range    Glucose (POC) 88 65 - 100 mg/dL   GLUCOSE, POC    Collection Time: 10/16/19 12:30 PM   Result Value Ref Range    Glucose (POC) 80 65 - 100 mg/dL   HEMOGLOBIN    Collection Time: 10/16/19  1:05 PM   Result Value Ref Range    HGB 7.9 (L) 13.6 - 17.2 g/dL       Imaging Studies:  CXR Results  (Last 48 hours)    None        CT Results  (Last 48 hours)    None          Assessment and Plan:     Hospital Problems as of 10/16/2019 Date Reviewed: 9/18/2019          Codes Class Noted - Resolved POA    * (Principal) GI bleed ICD-10-CM: K92.2  ICD-9-CM: 578.9  10/15/2019 - Present Yes        Cecal diverticulitis ICD-10-CM: K57.32  ICD-9-CM: 562.11  9/29/2019 - Present Yes        [facility] without complication [facility] ECM-55-ZX: E11.9  ICD-9-CM: 250.00  3/21/2019 - Present Yes        Sleep apnea ICD-10-CM: G47.30  ICD-9-CM: 780.57  11/9/2017 - Present Yes        Diastolic CHF, chronic (HCC) (Chronic) ICD-10-CM: I50.32  ICD-9-CM: 428.32, 428.0  3/20/2017 - Present Yes        Essential hypertension, benign (Chronic) ICD-10-CM: I10  ICD-9-CM: 401.1  1/22/2015 - Present Yes        Morbid obesity ([facility] (Chronic) ICD-10-CM: E66.01  ICD-9-CM: 278.01  6/27/2011 - Present Yes              PLAN:    Monitor Hb  Liquid diet, no active bleeding at this time  Has low normal blood glucose, asymptomatic  PPI iv  Seen by Gi, to decide need for endoscopy    Dispo: home     Signed:  Angelo Ocampo MD

## 2019-10-16 NOTE — PROGRESS NOTES
Problem: Diabetes Self-Management  Goal: *Disease process and treatment process  Description  Define diabetes and identify own type of diabetes; list 3 options for treating diabetes. Outcome: Progressing Towards Goal  Goal: *Incorporating nutritional management into lifestyle  Description  Describe effect of type, amount and timing of food on blood glucose; list 3 methods for planning meals. Outcome: Progressing Towards Goal  Goal: *Incorporating physical activity into lifestyle  Description  State effect of exercise on blood glucose levels. Outcome: Progressing Towards Goal  Goal: *Developing strategies to promote health/change behavior  Description  Define the ABC's of diabetes; identify appropriate screenings, schedule and personal plan for screenings. Outcome: Progressing Towards Goal  Goal: *Using medications safely  Description  State effect of diabetes medications on diabetes; name diabetes medication taking, action and side effects. Outcome: Progressing Towards Goal  Goal: *Monitoring blood glucose, interpreting and using results  Description  Identify recommended blood glucose targets  and personal targets. Outcome: Progressing Towards Goal  Goal: *Prevention, detection, treatment of acute complications  Description  List symptoms of hyper- and hypoglycemia; describe how to treat low blood sugar and actions for lowering  high blood glucose level. Outcome: Progressing Towards Goal  Goal: *Prevention, detection and treatment of chronic complications  Description  Define the natural course of diabetes and describe the relationship of blood glucose levels to long term complications of diabetes.   Outcome: Progressing Towards Goal  Goal: *Developing strategies to address psychosocial issues  Description  Describe feelings about living with diabetes; identify support needed and support network  Outcome: Progressing Towards Goal  Goal: *Insulin pump training  Outcome: Progressing Towards Goal  Goal: *Sick day guidelines  Outcome: Progressing Towards Goal  Goal: *Patient Specific Goal (EDIT GOAL, INSERT TEXT)  Outcome: Progressing Towards Goal     Problem: Patient Education: Go to Patient Education Activity  Goal: Patient/Family Education  Outcome: Progressing Towards Goal     Problem: Falls - Risk of  Goal: *Absence of Falls  Description  Document Prabha Heredia Fall Risk and appropriate interventions in the flowsheet. Outcome: Progressing Towards Goal  Note:   Fall Risk Interventions:  Mobility Interventions: Bed/chair exit alarm, Patient to call before getting OOB    Medication Interventions: Bed/chair exit alarm, Teach patient to arise slowly    Elimination Interventions: Bed/chair exit alarm, Call light in reach, Patient to call for help with toileting needs    History of Falls Interventions: Bed/chair exit alarm  Problem: Patient Education: Go to Patient Education Activity  Goal: Patient/Family Education  Outcome: Progressing Towards Goal     Problem: Pressure Injury - Risk of  Goal: *Prevention of pressure injury  Description  Document Odell Scale and appropriate interventions in the flowsheet.   Outcome: Progressing Towards Goal  Note:   Pressure Injury Interventions:  Sensory Interventions: Pressure redistribution bed/mattress (bed type)    Activity Interventions: Increase time out of bed, Pressure redistribution bed/mattress(bed type), PT/OT evaluation    Mobility Interventions: Pressure redistribution bed/mattress (bed type), PT/OT evaluation    Nutrition Interventions: Document food/fluid/supplement intake    Problem: Patient Education: Go to Patient Education Activity  Goal: Patient/Family Education  Outcome: Progressing Towards Goal

## 2019-10-16 NOTE — PROGRESS NOTES
Blood transfusion of 1unit PRBC completed. Patient denies unusual feeling. No signs of acute distress. Will continue to monitor.

## 2019-10-16 NOTE — PROGRESS NOTES
Patient with complaints of nausea and headache. Medicated patient with Zofran 4 mg IV. Called Dr. Emeli Olson and received order for Tylenol 500mg q6 hours prn mild pain, headache, fever.

## 2019-10-16 NOTE — PROGRESS NOTES
Interdisciplinary team rounds were held 10/16/2019 with the following team members:Care Management, Nursing, Nutrition, Pharmacy, Physical Therapy and Physician. Plan of care discussed. See clinical pathway and/or care plan for interventions and desired outcomes.

## 2019-10-16 NOTE — H&P
Hospitalist H&P/Consult Note     Admit Date:  10/15/2019  7:11 PM   Name:  Paula Javier   Age:  70 y.o.  :  1940   MRN:  [id number]   PCP:  Zeke Mcqueen MD  Treatment Team: Attending Provider: Rambo Sanchez MD; Primary Nurse: Riddhi Gunn RN    HPI:   Patient is a 71 y/o male with hx DM, DVT/PE, diastolic CHF, CKD 3, asthma who presents to ED with several episodes of copious blood and clots per rectum. No pain but has had episodes of acute urgency. He was admitted -10/1 for cecal diverticulitis. He has completed antibiotics. Last hemoglobin checked was 10.7 on 10/8. Today is 8.6 with gross blood and clots seen in bedside commode. His last colonoscopy over 10 years ago and he had polyps. He takes aspirin and plavix. No chest pain or shortness of breath. GI has been notified. He will be admitted for further workup. 10 systems reviewed and negative except as noted in HPI.   Past Medical History:   Diagnosis Date    Cervical stenosis of spine     Chronic kidney disease     Stage 3    Degenerative disc disease, lumbar     Diabetes mellitus type II     uncontrolled-insulin and oral med. 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 (HCC)     R - Moderate, L - Mild    Diastolic congestive heart failure (HCC)     Diverticulosis of colon 2010    DVT (deep venous thrombosis) (HCC)     RLE    Extrinsic allergic asthma     GERD (gastroesophageal reflux disease)     Hx of colonic polyps 2010    Hyperplastic     Hyperlipidemia LDL goal < 70     Hypertension     Obstructive sleep apnea     Non-Compliant with CPAP    Perennial allergic rhinitis with seasonal variation     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 ([address]     Pulmonary embolism ([address] Mar 2014    Bilateral - Completed 6 months on Xarelto    TIA (transient ischemic attack) 2017    Type 2 diabetes, uncontrolled, with neuropathy ([facility] 1/22/2015      Past Surgical History:   Procedure Laterality Date    COLONOSCOPY  07/29/2010    Diverticulosis & Colon/Rectal Polyps - Due 2020    EGD  5/9/2011         HX BUNIONECTOMY Bilateral     HX HERNIA REPAIR Bilateral     Inguinal, Repeat on Both Sides Separately    HX KNEE ARTHROSCOPY  1991    x 3    SINUS SURGERY PROC UNLISTED      TOTAL KNEE ARTHROPLASTY Right 2006      Prior to Admission Medications   Prescriptions Last Dose Informant Patient Reported? Taking? Insulin Needles, Disposable, (1ST TIER UNIFINE PENTIPS) 32 gauge x 5/32\" ndle   No No   Sig: Use to inject insulin   Insulin Syringe-Needle U-100 (BD INSULIN SYRINGE ULTRA-FINE) 0.3 mL 31 gauge x 5/16\" syrg   No No   Sig: Use to injection insulin 3 times/day at meals. Dx: E11.40   Lancets misc   No No   Sig: Use to check glucose 4 times/day as directed. Dx: E11.40   OTHER   No No   Sig: Semi-Electric Hospital Bed  Dx: Mild Diastolic Dysfunction (ZES-56 I51.9)  Pulmonary Hypertension (ICD-10 I27.2)  Morbid Obesity (ICD-10 E66.01)  Lumbar DDD (ICD-10 M51.36)  Obstructive Sleep Apnea (ICD-10 G47.33)   albuterol (VENTOLIN HFA) 90 mcg/actuation inhaler   No No   Sig: Take 2 Puffs by inhalation every four (4) hours as needed for Wheezing or Shortness of Breath. aspirin delayed-release 81 mg tablet   Yes No   Sig: Take 81 mg by mouth daily. atorvastatin (LIPITOR) 80 mg tablet   No No   Sig: Take 1 Tab by mouth nightly. bisacodyl (DULCOLAX) 10 mg suppository   No No   Sig: Insert 10 mg into rectum daily as needed (Constipation). busPIRone (BUSPAR) 15 mg tablet   No No   Sig: Take 1 Tab by mouth daily. clopidogrel (PLAVIX) 75 mg tab   No No   Sig: Take 1 Tab by mouth daily. cyanocobalamin (VITAMIN B12) 500 mcg tablet   No No   Sig: Take 1 Tab by mouth daily. Indications: b12 deficiency   ezetimibe (ZETIA) 10 mg tablet   No No   Sig: Take 1 Tab by mouth nightly.    furosemide (LASIX) 20 mg tablet   No No   Sig: Take 2 Tabs by mouth two (2) times a day. furosemide (LASIX) 40 mg tablet   No No   Sig: Take 1 Tab by mouth three (3) times daily. glucose blood VI test strips (FREESTYLE LITE STRIPS) strip   No No   Sig: Use to check glucose 4 times/day. Dx: E11.51, I10   insulin aspart U-100 (NOVOLOG FLEXPEN U-100 INSULIN) 100 unit/mL (3 mL) inpn   No No   Sig: Inject 8 units standing dose + additional insulin according to sliding scale (up to 16 units) subQ before each meal   Patient taking differently: Inject 8 units standing dose + additional insulin according to sliding scale (up to 16 units) subQ before each meal    151-200 = 2 units  201-250 = 4 units  251-300 = 6 units  201-350 = 8 units   insulin degludec (TRESIBA FLEXTOUCH U-100) 100 unit/mL (3 mL) inpn   Yes No   Si Units by SubCUTAneous route daily. insulin syr/ndl U100 half rashida 0.3 mL 31 gauge x 15/64\" syrg   No No   Sig: Use to inject insulin   irbesartan (AVAPRO) 150 mg tablet   No No   Sig: Take 1 Tab by mouth nightly. Indications: chronic heart failure, high blood pressure   lancets misc   No No   Sig: Use to test glucose 4 times/day. Dx: E11.51, I10   loratadine (CLARITIN) 10 mg tablet   Yes No   Sig: Take 10 mg by mouth daily. metFORMIN (GLUCOPHAGE) 1,000 mg tablet   No No   Sig: Take 1 Tab by mouth two (2) times daily (with meals). multivit-min-FA-lycopen-lutein (CENTRUM SILVER) 0.4-300-250 mg-mcg-mcg tab   Yes No   Sig: Take  by mouth.   pantoprazole (PROTONIX) 40 mg tablet   No No   Sig: Take 1 tablet po 30 minutes before breakfast   polyethylene glycol (MIRALAX) 17 gram/dose powder   No No   Sig: Take 17 g by mouth daily. potassium chloride (KLOR-CON M20) 20 mEq tablet   No No   Sig: TAKE 1 TABLET TWICE A DAY   pregabalin (LYRICA) 150 mg capsule   Yes No   Sig: Take 150 mg by mouth two (2) times a day. raNITIdine (ZANTAC) 150 mg tablet   Yes No   Sig: Take 150 mg by mouth two (2) times a day.    tamsulosin (FLOMAX) 0.4 mg capsule   No No   Sig: Take 1 Cap by mouth daily. Facility-Administered Medications: None     Allergies   Allergen Reactions    Vasotec [Enalapril Maleate] Shortness of Breath and Cough      Social History     Tobacco Use    Smoking status: Never Smoker    Smokeless tobacco: Never Used   Substance Use Topics    Alcohol use: No      Family History   Problem Relation Age of Onset    Stroke Mother     Diabetes Mother     Heart Disease Mother     Diabetes Maternal Grandmother     Glaucoma Maternal Grandmother     Stroke Father     Diabetes Father     Diabetes Paternal Aunt     Cancer Paternal Aunt     Diabetes Paternal Uncle     Cancer Paternal Uncle         Colon    Heart Attack Sister 76    Cancer Sister     Prostate Cancer Brother     Heart Disease Sister 48    Prostate Cancer Brother       Immunization History   Administered Date(s) Administered    Influenza High Dose Vaccine PF 09/23/2011, 08/21/2016, 08/24/2017, 08/27/2018    Influenza Vaccine 09/12/2012, 10/01/2015    Pneumococcal Conjugate (PCV-13) 09/11/2015    Pneumococcal Polysaccharide (PPSV-23) 09/13/2010, 11/01/2010    TB Skin Test (PPD) Intradermal 09/27/2019    Tdap 06/22/2017    Zoster Recombinant 09/02/2018    Zoster Vaccine, Live 01/10/2013       Objective:     Patient Vitals for the past 24 hrs:   Temp Pulse Resp BP SpO2   10/15/19 2346  (!) 105 21 134/60 95 %   10/15/19 2130  86 17 142/63 96 %   10/15/19 2100  81 27 142/63 (!) 88 %   10/15/19 2048  90 16 119/60    10/15/19 2030    127/64    10/15/19 2000    105/55    10/15/19 1948    129/58    10/15/19 1915 97.9 °F (36.6 °C) 92 16 146/62 97 %     Oxygen Therapy  O2 Sat (%): 95 % (10/15/19 2346)  Pulse via Oximetry: 105 beats per minute (10/15/19 2346)  O2 Device: Room air (10/15/19 1915)  No intake or output data in the 24 hours ending 10/16/19 0033    Physical Exam:  General:    Well nourished. Alert. Eyes:   Normal sclera.   Extraocular movements intact. ENT:  Normocephalic, atraumatic. Moist mucous membranes  CV:   RRR. No murmur, rub, or gallop. Lungs:  CTAB. No wheezing, rhonchi, or rales. Abdomen: Soft, distended, active bowel sounds  Gross blood in commode  Extremities: Warm and dry. No cyanosis or edema. Neurologic: CN II-XII grossly intact. Sensation intact. Skin:     No rashes or jaundice. No wounds. Psych:  Normal mood and affect. I reviewed the labs, imaging, EKGs, telemetry, and other studies done this admission. Data Review:   Recent Results (from the past 24 hour(s))   CBC WITH AUTOMATED DIFF    Collection Time: 10/15/19  8:18 PM   Result Value Ref Range    WBC 9.3 4.3 - 11.1 K/uL    RBC 3.02 (L) 4.23 - 5.6 M/uL    HGB 8.6 (L) 13.6 - 17.2 g/dL    HCT 28.0 (L) 41.1 - 50.3 %    MCV 92.7 79.6 - 97.8 FL    MCH 28.5 26.1 - 32.9 PG    MCHC 30.7 (L) 31.4 - 35.0 g/dL    RDW 16.9 (H) 11.9 - 14.6 %    PLATELET 732 017 - 270 K/uL    MPV 10.1 9.4 - 12.3 FL    ABSOLUTE NRBC 0.00 0.0 - 0.2 K/uL    DF AUTOMATED      NEUTROPHILS 53 43 - 78 %    LYMPHOCYTES 36 13 - 44 %    MONOCYTES 8 4.0 - 12.0 %    EOSINOPHILS 3 0.5 - 7.8 %    BASOPHILS 0 0.0 - 2.0 %    IMMATURE GRANULOCYTES 0 0.0 - 5.0 %    ABS. NEUTROPHILS 5.0 1.7 - 8.2 K/UL    ABS. LYMPHOCYTES 3.3 0.5 - 4.6 K/UL    ABS. MONOCYTES 0.7 0.1 - 1.3 K/UL    ABS. EOSINOPHILS 0.3 0.0 - 0.8 K/UL    ABS. BASOPHILS 0.0 0.0 - 0.2 K/UL    ABS. IMM.  GRANS. 0.0 0.0 - 0.5 K/UL   METABOLIC PANEL, COMPREHENSIVE    Collection Time: 10/15/19  8:18 PM   Result Value Ref Range    Sodium 136 136 - 145 mmol/L    Potassium 4.3 3.5 - 5.1 mmol/L    Chloride 107 98 - 107 mmol/L    CO2 25 21 - 32 mmol/L    Anion gap 4 (L) 7 - 16 mmol/L    Glucose 153 (H) 65 - 100 mg/dL    BUN 18 8 - 23 MG/DL    Creatinine 1.20 0.8 - 1.5 MG/DL    GFR est AA >60 >60 ml/min/1.73m2    GFR est non-AA >60 >60 ml/min/1.73m2    Calcium 8.5 8.3 - 10.4 MG/DL    Bilirubin, total 0.2 0.2 - 1.1 MG/DL    ALT (SGPT) 19 12 - 65 U/L    AST (SGOT) 25 15 - 37 U/L    Alk. phosphatase 86 50 - 136 U/L    Protein, total 6.3 6.3 - 8.2 g/dL    Albumin 2.8 (L) 3.2 - 4.6 g/dL    Globulin 3.5 2.3 - 3.5 g/dL    A-G Ratio 0.8 (L) 1.2 - 3.5     TYPE & SCREEN    Collection Time: 10/15/19  8:18 PM   Result Value Ref Range    Crossmatch Expiration 10/18/2019     ABO/Rh(D) O POSITIVE     Antibody screen NEG    EKG, 12 LEAD, INITIAL    Collection Time: 10/15/19  8:42 PM   Result Value Ref Range    Ventricular Rate 81 BPM    Atrial Rate 81 BPM    P-R Interval 158 ms    QRS Duration 88 ms    Q-T Interval 350 ms    QTC Calculation (Bezet) 406 ms    Calculated P Axis 30 degrees    Calculated R Axis -28 degrees    Calculated T Axis 7 degrees    Diagnosis       !! AGE AND GENDER SPECIFIC ECG ANALYSIS !!   Normal sinus rhythm  Possible Anterior infarct , age undetermined  Abnormal ECG  When compared with ECG of 25-SEP-2019 14:16,  Borderline criteria for Anterior infarct are now Present  Nonspecific T wave abnormality, worse in Anterior leads  Confirmed by ST JAE MEDINA MD (), ELVIRA BUCKLEY ([id number]) on 10/15/2019 10:02:36 PM     PROTHROMBIN TIME + INR    Collection Time: 10/15/19 10:07 PM   Result Value Ref Range    Prothrombin time 14.8 (H) 11.7 - 14.5 sec    INR 1.1     PTT    Collection Time: 10/15/19 10:07 PM   Result Value Ref Range    aPTT 25.2 24.7 - 39.8 SEC   HGB & HCT    Collection Time: 10/15/19 11:50 PM   Result Value Ref Range    HGB 7.7 (L) 13.6 - 17.2 g/dL    HCT 24.9 (L) 41.1 - 50.3 %       Imaging Studies:  CXR Results  (Last 48 hours)    None        CT Results  (Last 48 hours)    None          Assessment and Plan:     Hospital Problems as of 10/16/2019 Date Reviewed: 9/18/2019          Codes Class Noted - Resolved POA    * (Principal) GI bleed ICD-10-CM: K92.2  ICD-9-CM: 578.9  10/15/2019 - Present Yes        Cecal diverticulitis ICD-10-CM: B26.21  ICD-9-CM: 562.11  9/29/2019 - Present Yes        Type 2 diabetes mellitus without complication ([facility] MGY-45-SE: E11.9  ICD-9-CM: 250.00  3/21/2019 - Present Yes        Sleep apnea ICD-10-CM: G47.30  ICD-9-CM: 780.57  11/9/2017 - Present Yes        Diastolic CHF, chronic (HCC) (Chronic) ICD-10-CM: I50.32  ICD-9-CM: 428.32, 428.0  3/20/2017 - Present Yes        Essential hypertension, benign (Chronic) ICD-10-CM: I10  ICD-9-CM: 401.1  1/22/2015 - Present Yes        Morbid obesity ([facility] [address] (Chronic) ICD-10-CM: E66.01  ICD-9-CM: 278.01  6/27/2011 - Present Yes              PLAN:  · Admit inpatient to remote telemetry  · Bedrest with bedside commode  · Keep NPO for now  · Type and screen. · Serial CBC.  Transfuse if necessary  · IV protonix 40 mg BID  · Stop asa and plavix  · SCDs for DVT prophylaxis  · GI consult in am for upper/lower endoscopies  · If bleeding brisk may require bleeding scan/IR      Estimated LOS:  2 or more midnights    Signed:  Edgar Davis MD

## 2019-10-16 NOTE — ED NOTES
TRANSFER - OUT REPORT:    Verbal report given to Negra Hogue RN(name) on Ciro Rodríguez  being transferred to [facility](unit) for routine progression of care       Report consisted of patients Situation, Background, Assessment and   Recommendations(SBAR). Information from the following report(s) SBAR was reviewed with the receiving nurse. Lines:   Peripheral IV 10/15/19 Right Hand (Active)   Site Assessment Clean, dry, & intact 10/15/2019  8:22 PM   Phlebitis Assessment 0 10/15/2019  8:22 PM   Infiltration Assessment 0 10/15/2019  8:22 PM   Dressing Status Clean, dry, & intact 10/15/2019  8:22 PM       Peripheral IV 10/15/19 Right Antecubital (Active)   Site Assessment Clean, dry, & intact 10/15/2019  8:48 PM   Phlebitis Assessment 0 10/15/2019  8:48 PM   Infiltration Assessment 0 10/15/2019  8:48 PM   Action Taken Catheter retaped 10/15/2019  8:48 PM        Opportunity for questions and clarification was provided.       Patient transported with:   Nurse

## 2019-10-16 NOTE — PROGRESS NOTES
Care Management Interventions  PCP Verified by CM: Yes  Transition of Care Consult (CM Consult): Discharge Planning, [address]: Yes  Current Support Network: Own Home  Confirm Follow Up Transport: Family  Plan discussed with Pt/Family/Caregiver: Yes  Freedom of Choice Offered: Yes  Discharge Location  Discharge Placement: Home with home health      Chart reviewed. SW spoke with pt who is A&O. PTA pt indep with ADL'S. Pt lives in a 1st story apt, pt's brother also lives  there ( he is employed ). Pt has walker, BSC, cane at home. Pt is current with [facility] for ( RN, PT, OT, [facility] aide, and [facility]  ). Pt is also followed by community . SW will continue to follow.

## 2019-10-16 NOTE — CONSULTS
[facility] Consult Note       Primary GI Physician: Dr. Nati Gutierrez    Referring Provider:  Dr. Jonh Rueda Date:  10/16/2019    Admit Date:  10/15/2019    Chief Complaint:  GI bleeding    Subjective:     History of Present Illness:  Patient is a 70 y.o. male with PMH of but not limited to CKD III, DM II, diabetic retinopathy, diastolic CHF - on ASA and Plavix, DVT, PE, diverticulosis, colon polyps, sleep apnea, who is seen in consultation at the request of Dr. Kalyn Null for GI bleeding. He was admitted after presenting to the ER with rectal bleeding, having 2 large bloody BMs yesterday at home, becoming diaphoretic, sweaty, and feeling like he was going to pass out. He called EMS and was brought to the ER, having a large bloody BM with clots in the ER. He had recently been admitted 25 Sept 2019 to 1 Oct 2019 for cecal diverticulitis, treated with ATBXs. He had 2 episodes of rectal bleeding on Saturday, but it resolved and had he was feeling ok until yesterday. He was noted to have hgb 8.6 in the ER, down from 10.7 on 8 Oct 2019, and has decreased to 7.7 and was 7.9 on last check. His ASA and Plavix are on hold and he has been NPO. He has not had further bleeding since the episode in the ER. He denies any abd pain, nausea, vomiting, heartburn, chest pain, chronic cough, or difficulty swallowing. His last colonoscopy was 7/29/2010 for screening purposes with Dr. Saman Red which revealed small colorectal polyps and left-sided diverticulosis.      PMH:  Past Medical History:   Diagnosis Date    Cervical stenosis of spine     Chronic kidney disease     Stage 3    Degenerative disc disease, lumbar     Diabetes mellitus type II     uncontrolled-insulin and oral med. highest ;lowest 62; this am 110    Diabetic retinopathy of both eyes without macular edema associated with type 2 diabetes mellitus (HCC)     R - Moderate, L - Mild    Diastolic congestive heart failure (HCC)     Diverticulosis of colon June 2010    DVT (deep venous thrombosis) (HCC)     RLE    Extrinsic allergic asthma     GERD (gastroesophageal reflux disease)     Hx of colonic polyps 07/29/2010    Hyperplastic     Hyperlipidemia LDL goal < 70     Hypertension     Obstructive sleep apnea     Non-Compliant with CPAP    Perennial allergic rhinitis with seasonal variation     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 [address] [address]     Pulmonary embolism ([facility] [address] Mar 2014    Bilateral - Completed 6 months on Xarelto    TIA (transient ischemic attack) 11/9/2017    Type 2 diabetes, uncontrolled, with neuropathy ([facility] [address] 1/22/2015     Colonoscopy 7/29/2010 for screening purposes with Dr. Darwin Phelan which revealed small colorectal polyps and left-sided diverticulosis. DIAGNOSIS       A:  \"DISTAL ASCENDING COLON POLYP\":  FRAGMENTS OF HYPERPLASTIC POLYP.       B:  \"DISTAL RECTAL POLYP\":  FRAGMENTS OF HYPERPLASTIC POLYP. EGD 9 May 2011 with Dr. Darwin Phelan with ASSESSMENT/PLAN: The examination is normal.  His symptoms may be due to diabetic gastroparesis. - Follow up with primary care physician  - Follow up with me. - Consider trial of domperidone 10 to 20 mg ac and hs    PSH:  Past Surgical History:   Procedure Laterality Date    COLONOSCOPY  07/29/2010    Diverticulosis & Colon/Rectal Polyps - Due 2020    EGD  5/9/2011         HX BUNIONECTOMY Bilateral     HX HERNIA REPAIR Bilateral     Inguinal, Repeat on Both Sides Separately    HX KNEE ARTHROSCOPY  1991    x 3    SINUS SURGERY PROC UNLISTED      TOTAL KNEE ARTHROPLASTY Right 2006       Allergies: Allergies   Allergen Reactions    Vasotec [Enalapril Maleate] Shortness of Breath and Cough       Home Medications:  Prior to Admission medications    Medication Sig Start Date End Date Taking? Authorizing Provider   aspirin delayed-release 81 mg tablet Take 81 mg by mouth daily. Provider, Historical   furosemide (LASIX) 20 mg tablet Take 2 Tabs by mouth two (2) times a day. 10/9/19   Jaycee Finch MD   irbesartan (AVAPRO) 150 mg tablet Take 1 Tab by mouth nightly. Indications: chronic heart failure, high blood pressure 10/9/19   Jaycee Finch MD   bisacodyl (DULCOLAX) 10 mg suppository Insert 10 mg into rectum daily as needed (Constipation). 10/1/19   Guillermo Fajardo MD   cyanocobalamin (VITAMIN B12) 500 mcg tablet Take 1 Tab by mouth daily. Indications: b12 deficiency 9/27/19   Mirta Will MD   [facility]) 0.4 mg capsule Take 1 Cap by mouth daily. 9/18/19   Abram Suarez MD   busPIRone (BUSPAR) 15 mg tablet Take 1 Tab by mouth daily. 7/17/19   Bobbi Brenner MD   metFORMIN (GLUCOPHAGE) 1,000 mg tablet Take 1 Tab by mouth two (2) times daily (with meals). 7/17/19   Bobbi Brenner MD   potassium chloride (KLOR-CON M20) 20 mEq tablet TAKE 1 TABLET TWICE A DAY 7/17/19   Bobbi Brenner MD   atorvastatin (LIPITOR) 80 mg tablet Take 1 Tab by mouth nightly. 6/24/19   Tho Oscar MD   Insulin Needles, Disposable, (1ST TIER UNIFINE PENTIPS) 32 gauge x 5/32\" ndle Use to inject insulin 6/24/19   Tho Oscar MD   insulin syr/ndl U100 half rashida 0.3 mL 31 gauge x 15/64\" syrg Use to inject insulin 6/24/19   Tho Oscar MD   polyethylene glycol (MIRALAX) 17 gram/dose powder Take 17 g by mouth daily. 6/24/19   Tho Oscar MD   lancets misc Use to test glucose 4 times/day. Dx: E11.51, I10 5/23/19   Juanis Pearson PA-C   glucose blood VI test strips (FREESTYLE LITE STRIPS) strip Use to check glucose 4 times/day.   Dx: E11.51, I10 5/23/19   Juanis Pearson PA-C   insulin aspart U-100 (NOVOLOG FLEXPEN U-100 INSULIN) 100 unit/mL (3 mL) inpn Inject 8 units standing dose + additional insulin according to sliding scale (up to 16 units) subQ before each meal  Patient taking differently: Inject 8 units standing dose + additional insulin according to sliding scale (up to 16 units) subQ before each meal    151-200 = 2 units  201-250 = 4 units  251-300 = 6 units  201-350 = 8 units 5/23/19   Juanis Pearson PA-C   furosemide (LASIX) 40 mg tablet Take 1 Tab by mouth three (3) times daily. 5/23/19   Juanis Pearson PA-C   raNITIdine (ZANTAC) 150 mg tablet Take 150 mg by mouth two (2) times a day. Provider, Historical   insulin degludec (TRESIBA FLEXTOUCH U-100) 100 unit/mL (3 mL) inpn 135 Units by SubCUTAneous route daily. Provider, Historical   pantoprazole (PROTONIX) 40 mg tablet Take 1 tablet po 30 minutes before breakfast 3/7/19   Tiffanie Lin MD   ezetimibe (ZETIA) 10 mg tablet Take 1 Tab by mouth nightly. 3/7/19   Tiffanie Lin MD   clopidogrel (PLAVIX) 75 mg tab Take 1 Tab by mouth daily. 3/7/19   Tiffanie Lin MD   Insulin Syringe-Needle U-100 (BD INSULIN SYRINGE ULTRA-FINE) 0.3 mL 31 gauge x 5/16\" syrg Use to injection insulin 3 times/day at meals. Dx: E11.40 3/7/19   Tiffanie Lin MD   Lancets misc Use to check glucose 4 times/day as directed. Dx: E11.40 12/5/18   Shanique Grant MD   albuterol (VENTOLIN HFA) 90 mcg/actuation inhaler Take 2 Puffs by inhalation every four (4) hours as needed for Wheezing or Shortness of Breath. 8/27/18   Danyelle Pearson PA-C   pregabalin (LYRICA) 150 mg capsule Take 150 mg by mouth two (2) times a day. Provider, [facility]  Dx: Mild Diastolic Dysfunction (HSR-25 I51.9)  Pulmonary Hypertension (ICD-10 I27.2)  Morbid Obesity (ICD-10 E66.01)  Lumbar DDD (ICD-10 M51.36)  Obstructive Sleep Apnea (ICD-10 G47.33) 8/11/17   Danyelle Pearson PA-C   loratadine (CLARITIN) 10 mg tablet Take 10 mg by mouth daily. Provider, Historical   multivit-min-FA-lycopen-lutein (CENTRUM SILVER) 0.4-300-250 mg-mcg-mcg tab Take  by mouth.     Provider, Historical       Hospital Medications:  Current Facility-Administered Medications   Medication Dose Route Frequency    dextrose 40% (GLUTOSE) oral gel 1 Tube  15 g Oral PRN    glucagon (GLUCAGEN) injection 1 mg  1 mg IntraMUSCular PRN    dextrose (D50W) injection syrg 12.5-25 g  25-50 mL IntraVENous PRN    sodium chloride (NS) flush 5-40 mL  5-40 mL IntraVENous Q8H    sodium chloride (NS) flush 5-40 mL  5-40 mL IntraVENous PRN    ondansetron (ZOFRAN) injection 4 mg  4 mg IntraVENous Q4H PRN    pantoprazole (PROTONIX) 40 mg in sodium chloride 0.9% 10 mL injection  40 mg IntraVENous Q12H    0.9% sodium chloride infusion 250 mL  250 mL IntraVENous PRN    insulin lispro (HUMALOG) injection   SubCUTAneous AC&HS    acetaminophen (TYLENOL) tablet 500 mg  500 mg Oral Q6H PRN    0.9% sodium chloride infusion  75 mL/hr IntraVENous CONTINUOUS       Social History:  Social History     Tobacco Use    Smoking status: Never Smoker    Smokeless tobacco: Never Used   Substance Use Topics    Alcohol use: No       Family History:  Family History   Problem Relation Age of Onset    Stroke Mother     Diabetes Mother     Heart Disease Mother     Diabetes Maternal Grandmother     Glaucoma Maternal Grandmother     Stroke Father     Diabetes Father     Diabetes Paternal Aunt     Cancer Paternal Aunt     Diabetes Paternal Uncle     Cancer Paternal Uncle         Colon    Heart Attack Sister 76    Cancer Sister     Prostate Cancer Brother     Heart Disease Sister 48    Prostate Cancer Brother        Review of Systems:  A detailed 10 system ROS is obtained, with pertinent positives as listed above. All others are negative. Diet:  Clear liquids    Objective:     Physical Exam:  Vitals:  Visit Vitals  /74 (BP 1 Location: Left arm, BP Patient Position: At rest;Supine)   Pulse 97   Temp 97.1 °F (36.2 °C)   Resp 18   Ht 5' 6\" (1.676 m)   Wt 124.7 kg (275 lb)   SpO2 96%   BMI 44.39 kg/m²     Gen:  Pt is alert, cooperative, no acute distress, lying in bed, slow to respond to questions at times. Skin:  Extremities and face reveal no rashes. HEENT: Sclerae anicteric. Extra-occular muscles are intact. No oral ulcers.   No abnormal pigmentation of the lips. The neck is supple. Cardiovascular: Regular rate and rhythm. No murmurs, gallops, or rubs. Respiratory:  Comfortable breathing with no accessory muscle use. Clear breath sounds anteriorly with no wheezes, rales, or rhonchi. GI:  Abdomen nondistended, soft, and nontender. Normal active bowel sounds. No enlargement of the liver or spleen. No masses palpable. Rectal:  Deferred  Musculoskeletal:  No pitting edema of the lower legs. Neurological:  Gross memory appears intact. Patient is alert and oriented. Psychiatric:  Mood appears appropriate with judgement intact. Lymphatic:  No cervical or supraclavicular adenopathy. Laboratory:    Recent Labs     10/16/19  1305 10/16/19  0534 10/15/19  2350 10/15/19  2207 10/15/19  2018   WBC  --  6.2  --   --  9.3   HGB 7.9* 7.8* 7.7*  --  8.6*   HCT  --  25.0* 24.9*  --  28.0*   PLT  --  184  --   --  257   MCV  --  90.9  --   --  92.7   NA  --   --   --   --  136   K  --   --   --   --  4.3   CL  --   --   --   --  107   CO2  --   --   --   --  25   BUN  --   --   --   --  18   CREA  --   --   --   --  1.20   CA  --   --   --   --  8.5   GLU  --   --   --   --  153*   AP  --   --   --   --  86   SGOT  --   --   --   --  25   ALT  --   --   --   --  19   TBILI  --   --   --   --  0.2   ALB  --   --   --   --  2.8*   TP  --   --   --   --  6.3   PTP  --   --   --  14.8*  --    INR  --   --   --  1.1  --    APTT  --   --   --  25.2  --      CT OF THE ABDOMEN AND PELVIS WITH CONTRAST. 28 Sept 2019   CLINICAL INDICATION: Right lower quadrant abdominal pain   PROCEDURE: Serial thin section axial images obtained from the lung bases through  the proximal femurs following the administration of 100 cc of Isovue 370  intravenous contrast.  Radiation dose reduction techniques were used for this  study.  Our CT scanners use one or all of the following: Automated exposure  control, adjusted of the mA and/or kV according to patient size, iterative  reconstruction   COMPARISON: No prior   FINDINGS:    CT ABDOMEN: There is dependent atelectasis the right lung base. The liver and  spleen are normal. The adrenal glands and kidneys are unremarkable. There is no  hydronephrosis. The pancreas is unremarkable. There is marked inflammation and  wall thickening noted of the cecum. The appendix is visualized and normal. There  is thickening to the paracolic fascia. The remainder of the colon is  unremarkable with thin walls. There is a large amount of stool in the colon. The  small bowel is normal and nondistended. No free air present. Scattered  diverticula are present in the location of the cecum.   CT PELVIS: The bladder is normal. The sigmoid colon and rectum are unremarkable. No free fluid accumulating dependently in the pelvis. There is a small  fat-containing umbilical hernia.   No aggressive osseous is identified.   IMPRESSION  IMPRESSION:  Focal inflammation and wall thickening of the cecum. There are a few scattered  diverticula. Acute cecal diverticulitis is suspected. The appendix is visualized  and normal.    Assessment:     Principal Problem:    GI bleed (10/15/2019)    Active Problems: Morbid obesity ([address] (6/27/2011)      Essential hypertension, benign (7/85/1480)      Diastolic CHF, chronic (HCC) (3/20/2017)      Sleep apnea (11/9/2017)      Type 2 diabetes mellitus without complication ([address] (3/87/5856)      Cecal diverticulitis (9/29/2019)      69 yo male patient of Dr. Leopoldo Deans with PMH of but not limited to CKD III, DM II, diabetic retinopathy, diastolic CHF - on ASA and Plavix, DVT, PE, diverticulosis, colon polyps, sleep apnea, who is seen in consultation 16 Oct 2019 at the request of Dr. Yary Hidalgo for GI bleeding, who has been recently been admitted 25 Sept 2019 to 1 Oct 2019 for cecal diverticulitis, treated with ATBXs, and has had rectal bleeding since 15 Oct 2019.   He was noted to have hgb 8.6 in the ER, down from 10.7 on 8 Oct 2019, and has decreased to 7.7 and was 7.9 on last check. His ASA and Plavix are on hold and he has been NPO. He has not had further bleeding since the episode in the ER. His last colonoscopy was 7/29/2010 for screening purposes with Dr. Yi Lira which revealed small colorectal polyps and left-sided diverticulosis. Concern for diverticular bleeding. Could also be related to AVMs, polyps, etc.    Plan:     -Supportive care, IVF. -ASA and Plavix on hold for now. -Follow HGB and transfuse as needed if hgb <7.  -If any brisk bleeding, STAT NM bleeding scan to help locate and possible IR.  -Will hold off on colonoscopy for now. Consider colonoscopy if becomes more emergent or not improving as would expect. -Clear liquids.  -Follow. Patient is seen and examined in collaboration with Dr. Romelia Montelongo. Assessment and plan as per Dr. Romelia Montelongo. Norm[facility]  [facility]

## 2019-10-16 NOTE — PROGRESS NOTES
Patient's follow up blood sugar is 88. Patient is alert and oriented. Otherwise, patient is without complaint.

## 2019-10-16 NOTE — PROGRESS NOTES
Problem: Diabetes Self-Management  Goal: *Disease process and treatment process  Description  Define diabetes and identify own type of diabetes; list 3 options for treating diabetes. 10/16/2019 0247 by Leland Hunt I  Outcome: Progressing Towards Goal  10/16/2019 0247 by Leland Hunt I  Outcome: Progressing Towards Goal  Goal: *Incorporating nutritional management into lifestyle  Description  Describe effect of type, amount and timing of food on blood glucose; list 3 methods for planning meals. 10/16/2019 0247 by Leland Hunt I  Outcome: Progressing Towards Goal  10/16/2019 0247 by Leland Hunt I  Outcome: Progressing Towards Goal  Goal: *Incorporating physical activity into lifestyle  Description  State effect of exercise on blood glucose levels. 10/16/2019 0247 by Leland Hunt I  Outcome: Progressing Towards Goal  10/16/2019 0247 by Leland Hunt I  Outcome: Progressing Towards Goal  Goal: *Developing strategies to promote health/change behavior  Description  Define the ABC's of diabetes; identify appropriate screenings, schedule and personal plan for screenings. 10/16/2019 0247 by Leland Hunt I  Outcome: Progressing Towards Goal  10/16/2019 0247 by Leland Hunt I  Outcome: Progressing Towards Goal  Goal: *Using medications safely  Description  State effect of diabetes medications on diabetes; name diabetes medication taking, action and side effects. 10/16/2019 0247 by Leland Hunt I  Outcome: Progressing Towards Goal  10/16/2019 0247 by Leland Hunt I  Outcome: Progressing Towards Goal  Goal: *Monitoring blood glucose, interpreting and using results  Description  Identify recommended blood glucose targets  and personal targets.   10/16/2019 0247 by Leland Hunt I  Outcome: Progressing Towards Goal  10/16/2019 0247 by Leland Hunt I  Outcome: Progressing Towards Goal  Goal: *Prevention, detection, treatment of acute complications  Description  List symptoms of hyper- and hypoglycemia; describe how to treat low blood sugar and actions for lowering  high blood glucose level. 10/16/2019 0247 by Adrián Christianson I  Outcome: Progressing Towards Goal  10/16/2019 0247 by Adrián Christianson I  Outcome: Progressing Towards Goal  Goal: *Prevention, detection and treatment of chronic complications  Description  Define the natural course of diabetes and describe the relationship of blood glucose levels to long term complications of diabetes. 10/16/2019 0247 by Adrián Christianson I  Outcome: Progressing Towards Goal  10/16/2019 0247 by Adrián Christianson I  Outcome: Progressing Towards Goal  Goal: *Developing strategies to address psychosocial issues  Description  Describe feelings about living with diabetes; identify support needed and support network  Outcome: Progressing Towards Goal     Problem: Falls - Risk of  Goal: *Absence of Falls  Description  Document Jayy Morris Fall Risk and appropriate interventions in the flowsheet. Outcome: Progressing Towards Goal  Note:   Fall Risk Interventions:  Mobility Interventions: Assess mobility with egress test, Patient to call before getting OOB, PT Consult for mobility concerns, PT Consult for assist device competence         Medication Interventions: Assess postural VS orthostatic hypotension, Patient to call before getting OOB, Teach patient to arise slowly    Elimination Interventions: Call light in reach, Patient to call for help with toileting needs, Stay With Me (per policy), Urinal in reach              Problem: Pressure Injury - Risk of  Goal: *Prevention of pressure injury  Description  Document Odell Scale and appropriate interventions in the flowsheet.   Outcome: Progressing Towards Goal  Note:   Pressure Injury Interventions:  Sensory Interventions: Assess changes in LOC, Check visual cues for pain, Discuss PT/OT consult with provider, Keep linens dry and wrinkle-free         Activity Interventions: Increase time out of bed, Pressure redistribution bed/mattress(bed type), PT/OT evaluation    Mobility Interventions: Float heels, HOB 30 degrees or less, PT/OT evaluation    Nutrition Interventions: Document food/fluid/supplement intake, Offer support with meals,snacks and hydration

## 2019-10-16 NOTE — PROGRESS NOTES
Admission assessment done. Received from ER, alert and oriented x4. Respirations even and unlabored. HR regular. Abdomen semi-soft, tender and distended. Oriented to the room. Denies needs and pain this time. Call light in reach. Safety measures provided. Will continue to monitor.

## 2019-10-17 ENCOUNTER — HOME CARE VISIT (OUTPATIENT)
Dept: HOME HEALTH SERVICES | Facility: HOME HEALTH | Age: 79
End: 2019-10-17
Payer: MEDICARE

## 2019-10-17 LAB
GLUCOSE BLD STRIP.AUTO-MCNC: 143 MG/DL (ref 65–100)
GLUCOSE BLD STRIP.AUTO-MCNC: 145 MG/DL (ref 65–100)
GLUCOSE BLD STRIP.AUTO-MCNC: 159 MG/DL (ref 65–100)
GLUCOSE BLD STRIP.AUTO-MCNC: 94 MG/DL (ref 65–100)
HGB BLD-MCNC: 6.6 G/DL (ref 13.6–17.2)
HGB BLD-MCNC: 8.8 G/DL (ref 13.6–17.2)
HGB BLD-MCNC: 8.9 G/DL (ref 13.6–17.2)

## 2019-10-17 PROCEDURE — 85018 HEMOGLOBIN: CPT

## 2019-10-17 PROCEDURE — 74011250637 HC RX REV CODE- 250/637: Performed by: HOSPITALIST

## 2019-10-17 PROCEDURE — 74011636637 HC RX REV CODE- 636/637: Performed by: HOSPITALIST

## 2019-10-17 PROCEDURE — C9113 INJ PANTOPRAZOLE SODIUM, VIA: HCPCS | Performed by: HOSPITALIST

## 2019-10-17 PROCEDURE — 3331090002 HH PPS REVENUE DEBIT

## 2019-10-17 PROCEDURE — 74011250636 HC RX REV CODE- 250/636: Performed by: FAMILY MEDICINE

## 2019-10-17 PROCEDURE — 36415 COLL VENOUS BLD VENIPUNCTURE: CPT

## 2019-10-17 PROCEDURE — 74011250636 HC RX REV CODE- 250/636: Performed by: HOSPITALIST

## 2019-10-17 PROCEDURE — 65660000000 HC RM CCU STEPDOWN

## 2019-10-17 PROCEDURE — 82962 GLUCOSE BLOOD TEST: CPT

## 2019-10-17 PROCEDURE — 3331090001 HH PPS REVENUE CREDIT

## 2019-10-17 PROCEDURE — P9016 RBC LEUKOCYTES REDUCED: HCPCS

## 2019-10-17 PROCEDURE — 36430 TRANSFUSION BLD/BLD COMPNT: CPT

## 2019-10-17 RX ORDER — SODIUM CHLORIDE 9 MG/ML
250 INJECTION, SOLUTION INTRAVENOUS AS NEEDED
Status: DISCONTINUED | OUTPATIENT
Start: 2019-10-17 | End: 2019-10-19 | Stop reason: HOSPADM

## 2019-10-17 RX ORDER — PREGABALIN 150 MG/1
150 CAPSULE ORAL 2 TIMES DAILY
Status: DISCONTINUED | OUTPATIENT
Start: 2019-10-17 | End: 2019-10-19 | Stop reason: HOSPADM

## 2019-10-17 RX ADMIN — PREGABALIN 150 MG: 150 CAPSULE ORAL at 16:56

## 2019-10-17 RX ADMIN — Medication 10 ML: at 06:06

## 2019-10-17 RX ADMIN — SODIUM CHLORIDE 40 MG: 9 INJECTION, SOLUTION INTRAMUSCULAR; INTRAVENOUS; SUBCUTANEOUS at 22:12

## 2019-10-17 RX ADMIN — INSULIN LISPRO 1 UNITS: 100 INJECTION, SOLUTION INTRAVENOUS; SUBCUTANEOUS at 22:12

## 2019-10-17 RX ADMIN — PREGABALIN 150 MG: 150 CAPSULE ORAL at 06:01

## 2019-10-17 RX ADMIN — SODIUM CHLORIDE 75 ML/HR: 900 INJECTION, SOLUTION INTRAVENOUS at 15:18

## 2019-10-17 RX ADMIN — SODIUM CHLORIDE 250 ML: 900 INJECTION, SOLUTION INTRAVENOUS at 09:00

## 2019-10-17 RX ADMIN — ACETAMINOPHEN 500 MG: 500 TABLET, FILM COATED ORAL at 13:39

## 2019-10-17 RX ADMIN — SODIUM CHLORIDE 250 ML: 900 INJECTION, SOLUTION INTRAVENOUS at 07:37

## 2019-10-17 NOTE — PROGRESS NOTES
Problem: Patient Education: Go to Patient Education Activity  Goal: Patient/Family Education  Outcome: Progressing Towards Goal     Problem: Falls - Risk of  Goal: *Absence of Falls  Description  Document Fran Rosas Fall Risk and appropriate interventions in the flowsheet. 10/17/2019 0258 by Emmanuel Reyes RN  Outcome: Progressing Towards Goal  Note:   Fall Risk Interventions:  Mobility Interventions: Bed/chair exit alarm, Patient to call before getting OOB         Medication Interventions: Bed/chair exit alarm, Teach patient to arise slowly    Elimination Interventions: Bed/chair exit alarm, Call light in reach, Patient to call for help with toileting needs    History of Falls Interventions: Bed/chair exit alarm      10/17/2019 0255 by Emmanuel Reyes RN  Outcome: Progressing Towards Goal  Note:   Fall Risk Interventions:  Mobility Interventions: Bed/chair exit alarm, Patient to call before getting OOB         Medication Interventions: Bed/chair exit alarm, Teach patient to arise slowly    Elimination Interventions: Bed/chair exit alarm, Call light in reach, Patient to call for help with toileting needs    History of Falls Interventions: Bed/chair exit alarm         Problem: Patient Education: Go to Patient Education Activity  Goal: Patient/Family Education  10/17/2019 0258 by Emmanuel Reyes RN  Outcome: Progressing Towards Goal  10/17/2019 0255 by Emmanuel Reyes RN  Outcome: Progressing Towards Goal     Problem: Pressure Injury - Risk of  Goal: *Prevention of pressure injury  Description  Document Odell Scale and appropriate interventions in the flowsheet.   10/17/2019 0258 by Emmanuel Reyes RN  Outcome: Progressing Towards Goal  Note:   Pressure Injury Interventions:  Sensory Interventions: Pressure redistribution bed/mattress (bed type)         Activity Interventions: Increase time out of bed, Pressure redistribution bed/mattress(bed type), PT/OT evaluation    Mobility Interventions: Pressure redistribution bed/mattress (bed type), PT/OT evaluation    Nutrition Interventions: Document food/fluid/supplement intake                  10/17/2019 0255 by Tremayne White RN  Outcome: Progressing Towards Goal  Note:   Pressure Injury Interventions:  Sensory Interventions: Pressure redistribution bed/mattress (bed type)         Activity Interventions: Increase time out of bed, Pressure redistribution bed/mattress(bed type), PT/OT evaluation    Mobility Interventions: Pressure redistribution bed/mattress (bed type), PT/OT evaluation    Nutrition Interventions: Document food/fluid/supplement intake                     Problem: Patient Education: Go to Patient Education Activity  Goal: Patient/Family Education  10/17/2019 0258 by Tremayne White RN  Outcome: Progressing Towards Goal  10/17/2019 0255 by Tremayne White RN  Outcome: Progressing Towards Goal

## 2019-10-17 NOTE — PROGRESS NOTES
[facility] Progress Note         Admit Date:  10/15/2019    Today's Date:  10/17/2019    CC:  GI bleeding    Subjective:     Patient had 2 bloody BMs overnight, last one about 9:30 pm, and hgb decreased to 6.6 from 8.1. He is receiving 2nd of 2 units of PRBCs. He has not had a BM or bleeding since then. He has had bloating over his abdomen, but denies any abd pain, nausea, or vomiting. He did feel some distal substernal chest pressure dull pain off and on. Medications:   Current Facility-Administered Medications   Medication Dose Route Frequency    pregabalin (LYRICA) capsule 150 mg  150 mg Oral BID    0.9% sodium chloride infusion 250 mL  250 mL IntraVENous PRN    0.9% sodium chloride infusion 250 mL  250 mL IntraVENous PRN    dextrose 40% (GLUTOSE) oral gel 1 Tube  15 g Oral PRN    glucagon (GLUCAGEN) injection 1 mg  1 mg IntraMUSCular PRN    dextrose (D50W) injection syrg 12.5-25 g  25-50 mL IntraVENous PRN    sodium chloride (NS) flush 5-40 mL  5-40 mL IntraVENous Q8H    sodium chloride (NS) flush 5-40 mL  5-40 mL IntraVENous PRN    ondansetron (ZOFRAN) injection 4 mg  4 mg IntraVENous Q4H PRN    pantoprazole (PROTONIX) 40 mg in sodium chloride 0.9% 10 mL injection  40 mg IntraVENous Q12H    0.9% sodium chloride infusion 250 mL  250 mL IntraVENous PRN    insulin lispro (HUMALOG) injection   SubCUTAneous AC&HS    acetaminophen (TYLENOL) tablet 500 mg  500 mg Oral Q6H PRN    0.9% sodium chloride infusion  75 mL/hr IntraVENous CONTINUOUS       Review of Systems:  ROS was obtained, with pertinent positives as listed above. No SOB. Diet:  Clear liquids    Objective:   Vitals:  Visit Vitals  /66 (BP 1 Location: Right arm, BP Patient Position: At rest)   Pulse 87   Temp 98.3 °F (36.8 °C)   Resp 18   Ht 5' 6\" (1.676 m)   Wt 124.7 kg (275 lb)   SpO2 100%   BMI 44.39 kg/m²     Intake/Output:  No intake/output data recorded.   10/15 1901 - 10/17 0700  In: 845 [I.V.:845]  Out: 1700 [Urine:1700]  Exam:  General appearance: alert, cooperative, no distress, lying in bed, O2 NC  Lungs: clear to auscultation bilaterally anteriorly, a few wheezes  Heart: regular rate and rhythm  Abdomen: soft, distended, non-tender. Bowel sounds normal. No masses, no organomegaly  Neuro:  alert and oriented    Data Review (Labs):    Recent Labs     10/17/19  0428 10/16/19  2043 10/16/19  1305 10/16/19  0534 10/15/19  2350 10/15/19  2207 10/15/19  2018   WBC  --   --   --  6.2  --   --  9.3   HGB 6.6* 8.1* 7.9* 7.8* 7.7*  --  8.6*   HCT  --   --   --  25.0* 24.9*  --  28.0*   PLT  --   --   --  184  --   --  257   MCV  --   --   --  90.9  --   --  92.7   NA  --   --   --   --   --   --  136   K  --   --   --   --   --   --  4.3   CL  --   --   --   --   --   --  107   CO2  --   --   --   --   --   --  25   BUN  --   --   --   --   --   --  18   CREA  --   --   --   --   --   --  1.20   CA  --   --   --   --   --   --  8.5   GLU  --   --   --   --   --   --  153*   AP  --   --   --   --   --   --  86   SGOT  --   --   --   --   --   --  25   ALT  --   --   --   --   --   --  19   TBILI  --   --   --   --   --   --  0.2   ALB  --   --   --   --   --   --  2.8*   TP  --   --   --   --   --   --  6.3   PTP  --   --   --   --   --  14.8*  --    INR  --   --   --   --   --  1.1  --    APTT  --   --   --   --   --  25.2  --      CT OF THE ABDOMEN AND PELVIS WITH CONTRAST. 28 Sept 2019   CLINICAL INDICATION: Right lower quadrant abdominal pain   PROCEDURE: Serial thin section axial images obtained from the lung bases through  the proximal femurs following the administration of 100 cc of Isovue 370  intravenous contrast.  Radiation dose reduction techniques were used for this  study.  Our CT scanners use one or all of the following: Automated exposure  control, adjusted of the mA and/or kV according to patient size, iterative  reconstruction   COMPARISON: No prior   FINDINGS:    CT ABDOMEN: There is dependent atelectasis the right lung base. The liver and  spleen are normal. The adrenal glands and kidneys are unremarkable. There is no  hydronephrosis. The pancreas is unremarkable. There is marked inflammation and  wall thickening noted of the cecum. The appendix is visualized and normal. There  is thickening to the paracolic fascia. The remainder of the colon is  unremarkable with thin walls. There is a large amount of stool in the colon. The  small bowel is normal and nondistended. No free air present. Scattered  diverticula are present in the location of the cecum.   CT PELVIS: The bladder is normal. The sigmoid colon and rectum are unremarkable. No free fluid accumulating dependently in the pelvis. There is a small  fat-containing umbilical hernia.   No aggressive osseous is identified.   IMPRESSION  IMPRESSION:  Focal inflammation and wall thickening of the cecum. There are a few scattered  diverticula. Acute cecal diverticulitis is suspected. The appendix is visualized  and normal.    Assessment:     Principal Problem:    GI bleed (10/15/2019)    Active Problems: Morbid obesity ([facility] [address] (6/27/2011)      Essential hypertension, benign (4/74/2630)      Diastolic CHF, chronic (HCC) (3/20/2017)      Sleep apnea (11/9/2017)      Type 2 diabetes mellitus without complication ([facility] [address] (1/74/9537)      Cecal diverticulitis (9/29/2019)      71 yo male patient of Dr. Juve Daniel with PMH of but not limited to CKD III, DM II, diabetic retinopathy, diastolic CHF - on ASA and Plavix, DVT, PE, diverticulosis, colon polyps, sleep apnea, who was seen in consultation 16 Oct 2019 at the request of Dr. Heidi Méndez for GI bleeding, who had been recently been admitted 25 Sept 2019 to 1 Oct 2019 for cecal diverticulitis, treated with ATBXs, and has had rectal bleeding since 15 Oct 2019. He was noted to have hgb 8.6 in the ER, down from 10.7 on 8 Oct 2019,  His ASA and Plavix have been held.   His last colonoscopy was 7/29/2010 for screening purposes with Dr. Fauzia Vaca which revealed small colorectal polyps and left-sided diverticulosis. Concern for diverticular bleeding. Could also be related to AVMs, polyps, etc.  His hgb decreased to 6.6 today on recheck, after having 2 bloody BMs overnight, but no further BMs or bleeding since then. Plan:     -Supportive care. -Continue to hold ASA and Plavix.  -Completing transfusion. Continue to follow HGB and transfuse to keep HGB >7.  -STAT NM acute GI bleeding scan with brisk bleeding.  -Hold off on colonoscopy for now. -Clear liquids.  -Follow. Patient is seen and examined in collaboration with Dr. Ronny Gibson. Assessment and plan as per Dr. Peri Kessler. Norma Collins [facility]  [facility]

## 2019-10-17 NOTE — PROGRESS NOTES
Interdisciplinary team rounds were held 10/17/2019 with the following team members:Care Management, Nursing, Nurse Practitioner, Pharmacy, Physical Therapy and Physician. Plan of care discussed. See clinical pathway and/or care plan for interventions and desired outcomes.

## 2019-10-17 NOTE — PROGRESS NOTES
Assessment done via doc flow sheet. Pt resting in bed, alert & oriented x4, resp easy & regular, lungs CTAB, denies pain. First unit of blood infusing at this time. No BM at this time. Call light within reach, pt instructed to call for assistance as needed.

## 2019-10-17 NOTE — PROGRESS NOTES
Problem: Falls - Risk of  Goal: *Absence of Falls  Description  Document Clark Lawrence Fall Risk and appropriate interventions in the flowsheet. Outcome: Progressing Towards Goal  Note:   Fall Risk Interventions:  Mobility Interventions: Bed/chair exit alarm, Patient to call before getting OOB         Medication Interventions: Bed/chair exit alarm, Teach patient to arise slowly    Elimination Interventions: Bed/chair exit alarm, Call light in reach, Patient to call for help with toileting needs    History of Falls Interventions: Bed/chair exit alarm         Problem: Patient Education: Go to Patient Education Activity  Goal: Patient/Family Education  Outcome: Progressing Towards Goal     Problem: Pressure Injury - Risk of  Goal: *Prevention of pressure injury  Description  Document Odell Scale and appropriate interventions in the flowsheet.   Outcome: Progressing Towards Goal  Note:   Pressure Injury Interventions:  Sensory Interventions: Pressure redistribution bed/mattress (bed type)         Activity Interventions: Increase time out of bed, Pressure redistribution bed/mattress(bed type), PT/OT evaluation    Mobility Interventions: Pressure redistribution bed/mattress (bed type), PT/OT evaluation    Nutrition Interventions: Document food/fluid/supplement intake                     Problem: Patient Education: Go to Patient Education Activity  Goal: Patient/Family Education  Outcome: Progressing Towards Goal

## 2019-10-18 ENCOUNTER — APPOINTMENT (OUTPATIENT)
Dept: NUCLEAR MEDICINE | Age: 79
DRG: 356 | End: 2019-10-18
Attending: PHYSICIAN ASSISTANT
Payer: MEDICARE

## 2019-10-18 ENCOUNTER — HOSPITAL ENCOUNTER (OUTPATIENT)
Dept: NUCLEAR MEDICINE | Age: 79
Discharge: HOME | DRG: 356 | End: 2019-10-18
Attending: PHYSICIAN ASSISTANT
Payer: MEDICARE

## 2019-10-18 LAB
GLUCOSE BLD STRIP.AUTO-MCNC: 115 MG/DL (ref 65–100)
GLUCOSE BLD STRIP.AUTO-MCNC: 134 MG/DL (ref 65–100)
GLUCOSE BLD STRIP.AUTO-MCNC: 144 MG/DL (ref 65–100)
GLUCOSE BLD STRIP.AUTO-MCNC: 162 MG/DL (ref 65–100)
HGB BLD-MCNC: 6.7 G/DL (ref 13.6–17.2)
HGB BLD-MCNC: 8.1 G/DL (ref 13.6–17.2)
HGB BLD-MCNC: 8.1 G/DL (ref 13.6–17.2)

## 2019-10-18 PROCEDURE — 86900 BLOOD TYPING SEROLOGIC ABO: CPT

## 2019-10-18 PROCEDURE — 74011000250 HC RX REV CODE- 250: Performed by: HOSPITALIST

## 2019-10-18 PROCEDURE — 36430 TRANSFUSION BLD/BLD COMPNT: CPT

## 2019-10-18 PROCEDURE — 82962 GLUCOSE BLOOD TEST: CPT

## 2019-10-18 PROCEDURE — 74011250636 HC RX REV CODE- 250/636: Performed by: HOSPITALIST

## 2019-10-18 PROCEDURE — 74011250637 HC RX REV CODE- 250/637: Performed by: HOSPITALIST

## 2019-10-18 PROCEDURE — 86923 COMPATIBILITY TEST ELECTRIC: CPT

## 2019-10-18 PROCEDURE — 3331090002 HH PPS REVENUE DEBIT

## 2019-10-18 PROCEDURE — 65660000000 HC RM CCU STEPDOWN

## 2019-10-18 PROCEDURE — C9113 INJ PANTOPRAZOLE SODIUM, VIA: HCPCS | Performed by: HOSPITALIST

## 2019-10-18 PROCEDURE — 74011636637 HC RX REV CODE- 636/637: Performed by: HOSPITALIST

## 2019-10-18 PROCEDURE — 85018 HEMOGLOBIN: CPT

## 2019-10-18 PROCEDURE — 78278 ACUTE GI BLOOD LOSS IMAGING: CPT

## 2019-10-18 PROCEDURE — P9016 RBC LEUKOCYTES REDUCED: HCPCS

## 2019-10-18 PROCEDURE — 36416 COLLJ CAPILLARY BLOOD SPEC: CPT

## 2019-10-18 PROCEDURE — 3331090001 HH PPS REVENUE CREDIT

## 2019-10-18 RX ORDER — SODIUM CHLORIDE 9 MG/ML
250 INJECTION, SOLUTION INTRAVENOUS AS NEEDED
Status: DISCONTINUED | OUTPATIENT
Start: 2019-10-18 | End: 2019-10-19 | Stop reason: HOSPADM

## 2019-10-18 RX ADMIN — SODIUM CHLORIDE 75 ML/HR: 900 INJECTION, SOLUTION INTRAVENOUS at 06:15

## 2019-10-18 RX ADMIN — PREGABALIN 150 MG: 150 CAPSULE ORAL at 17:05

## 2019-10-18 RX ADMIN — PREGABALIN 150 MG: 150 CAPSULE ORAL at 05:47

## 2019-10-18 RX ADMIN — ACETAMINOPHEN 500 MG: 500 TABLET, FILM COATED ORAL at 05:47

## 2019-10-18 RX ADMIN — Medication 5 ML: at 21:21

## 2019-10-18 RX ADMIN — INSULIN LISPRO 1 UNITS: 100 INJECTION, SOLUTION INTRAVENOUS; SUBCUTANEOUS at 17:10

## 2019-10-18 RX ADMIN — SODIUM CHLORIDE 40 MG: 9 INJECTION, SOLUTION INTRAMUSCULAR; INTRAVENOUS; SUBCUTANEOUS at 09:09

## 2019-10-18 RX ADMIN — ONDANSETRON 4 MG: 2 INJECTION INTRAMUSCULAR; INTRAVENOUS at 17:27

## 2019-10-18 RX ADMIN — ACETAMINOPHEN 500 MG: 500 TABLET, FILM COATED ORAL at 17:27

## 2019-10-18 RX ADMIN — SODIUM CHLORIDE 40 MG: 9 INJECTION, SOLUTION INTRAMUSCULAR; INTRAVENOUS; SUBCUTANEOUS at 21:19

## 2019-10-18 NOTE — PROGRESS NOTES
Hospitalist Note     Admit Date:  10/15/2019  7:11 PM   Name:  Bea Blank   Age:  70 y.o.  :  1940   MRN:  [id number]   PCP:  Casey Verma MD  Treatment Team: Attending Provider: Giovani Cabezas MD; Consulting Provider: Nigel Alpers, MD; Utilization Review: Brittney Castrejon RN; : Sophia Spain, [facility]    subj:     Patient is a 71 y/o male with hx DM, DVT/PE, diastolic CHF, CKD 3, asthma who presents to ED with several episodes of copious blood and clots per rectum. No pain but has had episodes of acute urgency. He was admitted -10/1 for cecal diverticulitis. He has completed antibiotics. Last hemoglobin checked was 10.7 on 10/8. Today is 8.6 with gross blood and clots seen in bedside commode. His last colonoscopy over 10 years ago and he had polyps. He takes aspirin and plavix. No chest pain or shortness of breath. GI has been notified. He will be admitted for further workup. 10/18  Bled again overnight, but TRBC scan not done until morning, and had no active bleed. Hg slight drop overnight. GI holding off on colonoscopy. No abd pain. Pt expresses frustration.      Objective:     Patient Vitals for the past 24 hrs:   Temp Pulse Resp BP SpO2   10/18/19 1152 98.5 °F (36.9 °C) 93 18 125/76 100 %   10/18/19 0812 97.4 °F (36.3 °C) 88 18 121/88 100 %   10/18/19 0435  92 20 123/73 95 %   10/18/19 0314 97.8 °F (36.6 °C) 79 18 140/70 98 %   10/17/19 2344 98 °F (36.7 °C) 75 18 148/75 99 %   10/17/19 2000 98.9 °F (37.2 °C) 71 19 143/80 99 %   10/17/19 1504 98.3 °F (36.8 °C) 70 18 142/73 100 %   10/17/19 1405 97.4 °F (36.3 °C) 83 18 138/75 100 %   10/17/19 1304 96.5 °F (35.8 °C) 79 18 144/76 100 %     Oxygen Therapy  O2 Sat (%): 100 % (10/18/19 1152)  Pulse via Oximetry: 105 beats per minute (10/15/19 2346)  O2 Device: Nasal cannula (10/17/19 0742)  O2 Flow Rate (L/min): 2 l/min (10/17/19 0742)    Intake/Output Summary (Last 24 hours) at 10/18/2019 1228  Last data filed at 10/18/2019 1155  Gross per 24 hour   Intake    Output 575 ml   Net -575 ml       Physical Exam:  General:    Well nourished. Alert. Pale obese  CV:   RRR. No murmur, rub, or gallop. Lungs:  CTAB. No wheezing, rhonchi, or rales. Abdomen: Soft, not distended, active bowel sounds, non tender  Extremities: Warm and dry. No cyanosis or edema. Neurologic: grossly intact. Skin:     No rashes or jaundice. No wounds. Psych:  Normal mood and affect. I reviewed the labs, imaging, EKGs, telemetry, and other studies done this admission.   Data Review:   Recent Results (from the past 24 hour(s))   HEMOGLOBIN    Collection Time: 10/17/19  4:40 PM   Result Value Ref Range    HGB 8.9 (L) 13.6 - 17.2 g/dL   GLUCOSE, POC    Collection Time: 10/17/19  5:04 PM   Result Value Ref Range    Glucose (POC) 145 (H) 65 - 100 mg/dL   HEMOGLOBIN    Collection Time: 10/17/19  8:49 PM   Result Value Ref Range    HGB 8.8 (L) 13.6 - 17.2 g/dL   GLUCOSE, POC    Collection Time: 10/17/19  9:57 PM   Result Value Ref Range    Glucose (POC) 159 (H) 65 - 100 mg/dL   HEMOGLOBIN    Collection Time: 10/18/19  5:19 AM   Result Value Ref Range    HGB 8.1 (L) 13.6 - 17.2 g/dL   GLUCOSE, POC    Collection Time: 10/18/19  7:19 AM   Result Value Ref Range    Glucose (POC) 134 (H) 65 - 100 mg/dL   GLUCOSE, POC    Collection Time: 10/18/19 11:53 AM   Result Value Ref Range    Glucose (POC) 115 (H) 65 - 100 mg/dL       Imaging Studies:  CXR Results  (Last 48 hours)    None        CT Results  (Last 48 hours)    None          Assessment and Plan:     Hospital Problems as of 10/18/2019 Date Reviewed: 9/18/2019          Codes Class Noted - Resolved POA    * (Principal) GI bleed ICD-10-CM: K92.2  ICD-9-CM: 578.9  10/15/2019 - Present Yes        Cecal diverticulitis ICD-10-CM: K57.32  ICD-9-CM: 562.11  9/29/2019 - Present Yes        Type 2 diabetes mellitus without complication ([facility] FBB-81-LW: E11.9  ICD-9-CM: 250.00  3/21/2019 - Present Yes        Sleep apnea ICD-10-CM: G47.30  ICD-9-CM: 780.57  11/9/2017 - Present Yes        Diastolic CHF, chronic (HCC) (Chronic) ICD-10-CM: I50.32  ICD-9-CM: 428.32, 428.0  3/20/2017 - Present Yes        Essential hypertension, benign (Chronic) ICD-10-CM: I10  ICD-9-CM: 401.1  1/22/2015 - Present Yes        Morbid obesity ([address] (Chronic) ICD-10-CM: E66.01  ICD-9-CM: 278.01  6/27/2011 - Present Yes              PLAN:    Monitor Hb - stable now after transfusion at 8.1   Liquid diet, no active bleeding at this time - TRBC did not catch bleeding. Suspect diverticular bleed, defer endoscopy for now.   PPI iv  Appreciate GI assistance    Dispo: home     Signed:  Mike Rothman MD

## 2019-10-18 NOTE — PROGRESS NOTES
Patient had another episode of bloody loose, watery stool with minimal clots. Will continue to monitor.

## 2019-10-18 NOTE — PROGRESS NOTES
Received call from [facility] [place] from [address] and was told that STAT NM GI bleed scan will be done tomorrow morning since it requires a different set up.

## 2019-10-18 NOTE — PROGRESS NOTES
Shift assessment done. Pt in bed, watching tv. Resp even and unlabored. Denies needs and pain. No acute signs of distress noted. No BM at this time. Call light in reach. Instructed to call for assistance when needed. Safety measures provided.

## 2019-10-18 NOTE — PROGRESS NOTES
Interdisciplinary team rounds were held 10/18/2019 with the following team members:Care Management, Nursing, Nutrition, Pharmacy, Physical Therapy and Physician. Plan of care discussed. See clinical pathway and/or care plan for interventions and desired outcomes.

## 2019-10-18 NOTE — PROGRESS NOTES
Problem: Diabetes Self-Management  Goal: *Disease process and treatment process  Description  Define diabetes and identify own type of diabetes; list 3 options for treating diabetes. Outcome: Progressing Towards Goal  Goal: *Incorporating nutritional management into lifestyle  Description  Describe effect of type, amount and timing of food on blood glucose; list 3 methods for planning meals. Outcome: Progressing Towards Goal  Goal: *Incorporating physical activity into lifestyle  Description  State effect of exercise on blood glucose levels. Outcome: Progressing Towards Goal  Goal: *Developing strategies to promote health/change behavior  Description  Define the ABC's of diabetes; identify appropriate screenings, schedule and personal plan for screenings. Outcome: Progressing Towards Goal  Goal: *Monitoring blood glucose, interpreting and using results  Description  Identify recommended blood glucose targets  and personal targets. Outcome: Progressing Towards Goal  Goal: *Prevention, detection, treatment of acute complications  Description  List symptoms of hyper- and hypoglycemia; describe how to treat low blood sugar and actions for lowering  high blood glucose level. Outcome: Progressing Towards Goal  Goal: *Prevention, detection and treatment of chronic complications  Description  Define the natural course of diabetes and describe the relationship of blood glucose levels to long term complications of diabetes. Outcome: Progressing Towards Goal  Goal: *Developing strategies to address psychosocial issues  Description  Describe feelings about living with diabetes; identify support needed and support network  Outcome: Progressing Towards Goal     Problem: Pressure Injury - Risk of  Goal: *Prevention of pressure injury  Description  Document Odell Scale and appropriate interventions in the flowsheet.   Outcome: Progressing Towards Goal  Note:   Pressure Injury Interventions:  Sensory Interventions: Assess changes in LOC, Float heels         Activity Interventions: PT/OT evaluation    Mobility Interventions: Float heels, HOB 30 degrees or less    Nutrition Interventions: Document food/fluid/supplement intake                     Problem: Falls - Risk of  Goal: *Absence of Falls  Description  Document Elias Heard Fall Risk and appropriate interventions in the flowsheet.   Outcome: Progressing Towards Goal  Note:   Fall Risk Interventions:  Mobility Interventions: Bed/chair exit alarm, Patient to call before getting OOB         Medication Interventions: Bed/chair exit alarm, Patient to call before getting OOB    Elimination Interventions: Call light in reach, Patient to call for help with toileting needs    History of Falls Interventions: Bed/chair exit alarm

## 2019-10-18 NOTE — PROGRESS NOTES
[facility] Progress Note         Admit Date:  10/15/2019    Today's Date:  10/18/2019    CC:  GI bleeding    Subjective:     Patient had 2 bloody BMs overnight and STAT acute GI bleeding scan was ordered, but not completed until 1109 am (per RN notes). He denies any bloody stools since 0300. Denies any abdominal pain, nausea, or vomiting. He is wanting to go home. Medications:   Current Facility-Administered Medications   Medication Dose Route Frequency    pregabalin (LYRICA) capsule 150 mg  150 mg Oral BID    0.9% sodium chloride infusion 250 mL  250 mL IntraVENous PRN    0.9% sodium chloride infusion 250 mL  250 mL IntraVENous PRN    dextrose 40% (GLUTOSE) oral gel 1 Tube  15 g Oral PRN    glucagon (GLUCAGEN) injection 1 mg  1 mg IntraMUSCular PRN    dextrose (D50W) injection syrg 12.5-25 g  25-50 mL IntraVENous PRN    sodium chloride (NS) flush 5-40 mL  5-40 mL IntraVENous Q8H    sodium chloride (NS) flush 5-40 mL  5-40 mL IntraVENous PRN    ondansetron (ZOFRAN) injection 4 mg  4 mg IntraVENous Q4H PRN    pantoprazole (PROTONIX) 40 mg in sodium chloride 0.9% 10 mL injection  40 mg IntraVENous Q12H    0.9% sodium chloride infusion 250 mL  250 mL IntraVENous PRN    insulin lispro (HUMALOG) injection   SubCUTAneous AC&HS    acetaminophen (TYLENOL) tablet 500 mg  500 mg Oral Q6H PRN    0.9% sodium chloride infusion  75 mL/hr IntraVENous CONTINUOUS       Review of Systems:  ROS was obtained, with pertinent positives as listed above. No chest pain or SOB.     Diet:  Clear liquids    Objective:   Vitals:  Visit Vitals  /76 (BP 1 Location: Left arm, BP Patient Position: At rest;Supine)   Pulse 93   Temp 98.5 °F (36.9 °C)   Resp 18   Ht 5' 6\" (1.676 m)   Wt 124.7 kg (275 lb)   SpO2 100%   BMI 44.39 kg/m²     Intake/Output:  10/18 0701 - 10/18 1900  In: -   Out: 200 [Urine:200]  10/16 1901 - 10/18 0700  In: -   Out: 4049 [Urine:1475]  Exam:  General appearance: alert, cooperative, no distress  Lungs: clear to auscultation bilaterally anteriorly  Heart: regular rate and rhythm  Abdomen: soft, distended, non-tender.  Bowel sounds normal. No masses, no organomegaly  Neuro:  alert and oriented    Data Review (Labs):    Recent Labs     10/18/19  0519 10/17/19  2049 10/17/19  1640 10/17/19  0428 10/16/19  2043 10/16/19  1305 10/16/19  0534 10/15/19  2350 10/15/19  2207 10/15/19  2018   WBC  --   --   --   --   --   --  6.2  --   --  9.3   HGB 8.1* 8.8* 8.9* 6.6* 8.1* 7.9* 7.8* 7.7*  --  8.6*   HCT  --   --   --   --   --   --  25.0* 24.9*  --  28.0*   PLT  --   --   --   --   --   --  184  --   --  257   MCV  --   --   --   --   --   --  90.9  --   --  92.7   NA  --   --   --   --   --   --   --   --   --  136   K  --   --   --   --   --   --   --   --   --  4.3   CL  --   --   --   --   --   --   --   --   --  107   CO2  --   --   --   --   --   --   --   --   --  25   BUN  --   --   --   --   --   --   --   --   --  18   CREA  --   --   --   --   --   --   --   --   --  1.20   CA  --   --   --   --   --   --   --   --   --  8.5   GLU  --   --   --   --   --   --   --   --   --  153*   AP  --   --   --   --   --   --   --   --   --  86   SGOT  --   --   --   --   --   --   --   --   --  25   ALT  --   --   --   --   --   --   --   --   --  19   TBILI  --   --   --   --   --   --   --   --   --  0.2   ALB  --   --   --   --   --   --   --   --   --  2.8*   TP  --   --   --   --   --   --   --   --   --  6.3   PTP  --   --   --   --   --   --   --   --  14.8*  --    INR  --   --   --   --   --   --   --   --  1.1  --    APTT  --   --   --   --   --   --   --   --  25.2  --      CT OF THE ABDOMEN AND PELVIS WITH CONTRAST. 28 Sept 2019   CLINICAL INDICATION: Right lower quadrant abdominal pain   PROCEDURE: Serial thin section axial images obtained from the lung bases through  the proximal femurs following the administration of 100 cc of Isovue 370  intravenous contrast.  Radiation dose reduction techniques were used for this  study. Our CT scanners use one or all of the following: Automated exposure  control, adjusted of the mA and/or kV according to patient size, iterative  reconstruction   COMPARISON: No prior   FINDINGS:    CT ABDOMEN: There is dependent atelectasis the right lung base. The liver and  spleen are normal. The adrenal glands and kidneys are unremarkable. There is no  hydronephrosis. The pancreas is unremarkable. There is marked inflammation and  wall thickening noted of the cecum. The appendix is visualized and normal. There  is thickening to the paracolic fascia. The remainder of the colon is  unremarkable with thin walls. There is a large amount of stool in the colon. The  small bowel is normal and nondistended. No free air present. Scattered  diverticula are present in the location of the cecum.   CT PELVIS: The bladder is normal. The sigmoid colon and rectum are unremarkable. No free fluid accumulating dependently in the pelvis. There is a small  fat-containing umbilical hernia.   No aggressive osseous is identified.   IMPRESSION  IMPRESSION:  Focal inflammation and wall thickening of the cecum. There are a few scattered  diverticula. Acute cecal diverticulitis is suspected. The appendix is visualized  and normal.    Rectal bleeding 18 Oct 2019 (1109)  EXAM: Nuclear medicine GI bleed scan   TECHNIQUE: 27.0 mCi technetium 99m tagged red blood cells is administered. Images are obtained for one hour.   FINDINGS: There is normal biodistribution of radiotracer. There is progressive  filling of the bladder on the latter images. No scintigraphic evidence of GI  bleed demonstrated.   IMPRESSION  IMPRESSION: No scintigraphic evidence of GI bleed. Assessment:     Principal Problem:    GI bleed (10/15/2019)    Active Problems:     Morbid obesity ([address] (6/27/2011)      Essential hypertension, benign (2/90/6590)      Diastolic CHF, chronic ([address] (3/20/2017)      Sleep apnea (11/9/2017)      Type 2 diabetes mellitus without complication ([facility] (9/03/4420)      Cecal diverticulitis (9/29/2019)    78 yo male patient of Dr. Marquise Joshua PMH of but not limited to CKD III, DM II, diabetic retinopathy, diastolic CHF - on ASA and Plavix, DVT, PE, diverticulosis, colon polyps, sleep apnea, who was seen in consultation 16 Oct 2019 at the request of Dr. Walton for GI bleeding, who had been recently been admitted 25 Sept 2019 to 1 Oct 2019 for cecal diverticulitis, treated with ATBXs, and has had rectal bleeding since 15 Oct 2019. Jordyn Rodney was noted to have hgb 8.6 in the ER, down from 10.7 on 8 Oct 2019, [facility] ASA and Plavix have been held. [facility] last colonoscopy was 7/29/2010 for screening purposes with Dr. Latoya Bautista small colorectal polyps and left-sided diverticulosis.  Concern for diverticular bleeding.  Could also be related to AVMs, polyps, etc.  His hgb decreased to 6.6 on 17 Oct 2019 from 8.1 after recurrent bleeding, but acute GI bleeding scan was not obtained. He has received 3 units of PRBCs over this admission and hgb increased to 8.9. It has drifted to 8.1 today. He had 2 bloody BMs overnight and STAT GI bleeding scan was ordered, but no obtained until today, and was negative. Plan:     -Supportive care. -Continue to hold ASA and Plavix.  -Continue to follow HGB and transfuse to keep HGB >7.  -Holding off on colonoscopy for now. -Clear liquids.  -Follow. Triny Zhao NP      Patient is seen and examined in collaboration with Dr. Peri Kessler. Assessment and plan as per Dr. Peri Kessler. Patient seen and examined, plan reviewed. Agree with the exception of any noted changes/additions. Patient had blood bowel movements last night and the RBC scan was ordered by nursing as STAT but it was not completed until this morning. Patient had no active bleeding on that scan.   He has not had any further bleeding since about 3 AM.     If he rebleeds, please obtain a STAT scan meaning at that time of the bleed instead of waiting until the morning. IR should also be consulted in case of a recurrent bleed. Monitor H/H and transfuse for Hg < 7.        Choco Ha MD  [facility], [place]

## 2019-10-18 NOTE — PROGRESS NOTES
Dr. Akanksha Spears, the on call GI doctor called back and was informed of the bloody stool episode. No orders made. Will continue to monitor the patient.

## 2019-10-19 ENCOUNTER — APPOINTMENT (OUTPATIENT)
Dept: CT IMAGING | Age: 79
DRG: 356 | End: 2019-10-19
Attending: FAMILY MEDICINE
Payer: MEDICARE

## 2019-10-19 ENCOUNTER — APPOINTMENT (OUTPATIENT)
Dept: CT IMAGING | Age: 79
DRG: 356 | End: 2019-10-19
Attending: HOSPITALIST
Payer: MEDICARE

## 2019-10-19 ENCOUNTER — HOSPITAL ENCOUNTER (INPATIENT)
Age: 79
LOS: 7 days | Discharge: HOME HEALTH | DRG: 356 | End: 2019-10-26
Attending: HOSPITALIST | Admitting: FAMILY MEDICINE
Payer: MEDICARE

## 2019-10-19 VITALS
OXYGEN SATURATION: 96 % | BODY MASS INDEX: 44.2 KG/M2 | HEART RATE: 75 BPM | TEMPERATURE: 97.5 F | DIASTOLIC BLOOD PRESSURE: 65 MMHG | HEIGHT: 66 IN | SYSTOLIC BLOOD PRESSURE: 138 MMHG | RESPIRATION RATE: 18 BRPM | WEIGHT: 275 LBS

## 2019-10-19 DIAGNOSIS — G47.01 INSOMNIA DUE TO MEDICAL CONDITION: Primary | ICD-10-CM

## 2019-10-19 DIAGNOSIS — K21.9 CHRONIC GERD: ICD-10-CM

## 2019-10-19 DIAGNOSIS — R13.19 ESOPHAGEAL DYSPHAGIA: ICD-10-CM

## 2019-10-19 LAB
ABO + RH BLD: NORMAL
ANION GAP SERPL CALC-SCNC: 8 MMOL/L (ref 7–16)
BLD PROD TYP BPU: NORMAL
BLOOD GROUP ANTIBODIES SERPL: NORMAL
BPU ID: NORMAL
BUN SERPL-MCNC: 6 MG/DL (ref 8–23)
CALCIUM SERPL-MCNC: 8.3 MG/DL (ref 8.3–10.4)
CHLORIDE SERPL-SCNC: 117 MMOL/L (ref 98–107)
CO2 SERPL-SCNC: 21 MMOL/L (ref 21–32)
CREAT SERPL-MCNC: 1.02 MG/DL (ref 0.8–1.5)
CROSSMATCH RESULT,%XM: NORMAL
GLUCOSE BLD STRIP.AUTO-MCNC: 125 MG/DL (ref 65–100)
GLUCOSE BLD STRIP.AUTO-MCNC: 126 MG/DL (ref 65–100)
GLUCOSE BLD STRIP.AUTO-MCNC: 71 MG/DL (ref 65–100)
GLUCOSE BLD STRIP.AUTO-MCNC: 93 MG/DL (ref 65–100)
GLUCOSE SERPL-MCNC: 106 MG/DL (ref 65–100)
HGB BLD-MCNC: 10.2 G/DL (ref 13.6–17.2)
HGB BLD-MCNC: 9.4 G/DL (ref 13.6–17.2)
HGB BLD-MCNC: 9.5 G/DL (ref 13.6–17.2)
POTASSIUM SERPL-SCNC: 4.2 MMOL/L (ref 3.5–5.1)
SODIUM SERPL-SCNC: 146 MMOL/L (ref 136–145)
SPECIMEN EXP DATE BLD: NORMAL
STATUS OF UNIT,%ST: NORMAL
UNIT DIVISION, %UDIV: 0

## 2019-10-19 PROCEDURE — 80048 BASIC METABOLIC PNL TOTAL CA: CPT

## 2019-10-19 PROCEDURE — 74011636320 HC RX REV CODE- 636/320: Performed by: FAMILY MEDICINE

## 2019-10-19 PROCEDURE — 74011250637 HC RX REV CODE- 250/637: Performed by: HOSPITALIST

## 2019-10-19 PROCEDURE — 74011000258 HC RX REV CODE- 258: Performed by: FAMILY MEDICINE

## 2019-10-19 PROCEDURE — 36430 TRANSFUSION BLD/BLD COMPNT: CPT

## 2019-10-19 PROCEDURE — 3331090002 HH PPS REVENUE DEBIT

## 2019-10-19 PROCEDURE — C9113 INJ PANTOPRAZOLE SODIUM, VIA: HCPCS | Performed by: HOSPITALIST

## 2019-10-19 PROCEDURE — 36416 COLLJ CAPILLARY BLOOD SPEC: CPT

## 2019-10-19 PROCEDURE — 85018 HEMOGLOBIN: CPT

## 2019-10-19 PROCEDURE — 94761 N-INVAS EAR/PLS OXIMETRY MLT: CPT

## 2019-10-19 PROCEDURE — 74011250637 HC RX REV CODE- 250/637: Performed by: FAMILY MEDICINE

## 2019-10-19 PROCEDURE — 74011636320 HC RX REV CODE- 636/320: Performed by: HOSPITALIST

## 2019-10-19 PROCEDURE — 65660000000 HC RM CCU STEPDOWN

## 2019-10-19 PROCEDURE — 74174 CTA ABD&PLVS W/CONTRAST: CPT

## 2019-10-19 PROCEDURE — 3331090001 HH PPS REVENUE CREDIT

## 2019-10-19 PROCEDURE — P9016 RBC LEUKOCYTES REDUCED: HCPCS

## 2019-10-19 PROCEDURE — 74011000258 HC RX REV CODE- 258: Performed by: HOSPITALIST

## 2019-10-19 PROCEDURE — 82962 GLUCOSE BLOOD TEST: CPT

## 2019-10-19 PROCEDURE — 74011250637 HC RX REV CODE- 250/637: Performed by: INTERNAL MEDICINE

## 2019-10-19 PROCEDURE — 74011000250 HC RX REV CODE- 250: Performed by: HOSPITALIST

## 2019-10-19 PROCEDURE — 74011250636 HC RX REV CODE- 250/636: Performed by: HOSPITALIST

## 2019-10-19 PROCEDURE — 77010033678 HC OXYGEN DAILY

## 2019-10-19 RX ORDER — FUROSEMIDE 40 MG/1
40 TABLET ORAL 2 TIMES DAILY
Status: DISCONTINUED | OUTPATIENT
Start: 2019-10-20 | End: 2019-10-26 | Stop reason: HOSPADM

## 2019-10-19 RX ORDER — INSULIN LISPRO 100 [IU]/ML
INJECTION, SOLUTION INTRAVENOUS; SUBCUTANEOUS
Status: CANCELLED | OUTPATIENT
Start: 2019-10-19

## 2019-10-19 RX ORDER — SODIUM CHLORIDE 0.9 % (FLUSH) 0.9 %
5-40 SYRINGE (ML) INJECTION EVERY 8 HOURS
Status: DISCONTINUED | OUTPATIENT
Start: 2019-10-20 | End: 2019-10-26 | Stop reason: HOSPADM

## 2019-10-19 RX ORDER — DEXTROSE 50 % IN WATER (D50W) INTRAVENOUS SYRINGE
25-50 AS NEEDED
Status: CANCELLED | OUTPATIENT
Start: 2019-10-19

## 2019-10-19 RX ORDER — ACETAMINOPHEN 500 MG
1000 TABLET ORAL
Status: DISCONTINUED | OUTPATIENT
Start: 2019-10-19 | End: 2019-10-26 | Stop reason: HOSPADM

## 2019-10-19 RX ORDER — ATORVASTATIN CALCIUM 40 MG/1
80 TABLET, FILM COATED ORAL
Status: DISCONTINUED | OUTPATIENT
Start: 2019-10-19 | End: 2019-10-26 | Stop reason: HOSPADM

## 2019-10-19 RX ORDER — SODIUM CHLORIDE 9 MG/ML
250 INJECTION, SOLUTION INTRAVENOUS AS NEEDED
Status: DISCONTINUED | OUTPATIENT
Start: 2019-10-19 | End: 2019-10-26 | Stop reason: HOSPADM

## 2019-10-19 RX ORDER — ALBUTEROL SULFATE 0.83 MG/ML
2.5 SOLUTION RESPIRATORY (INHALATION)
Status: CANCELLED | OUTPATIENT
Start: 2019-10-19

## 2019-10-19 RX ORDER — TAMSULOSIN HYDROCHLORIDE 0.4 MG/1
0.4 CAPSULE ORAL DAILY
Status: DISCONTINUED | OUTPATIENT
Start: 2019-10-20 | End: 2019-10-26 | Stop reason: HOSPADM

## 2019-10-19 RX ORDER — SODIUM CHLORIDE 0.9 % (FLUSH) 0.9 %
10 SYRINGE (ML) INJECTION
Status: COMPLETED | OUTPATIENT
Start: 2019-10-19 | End: 2019-10-19

## 2019-10-19 RX ORDER — DEXTROSE 40 %
15 GEL (GRAM) ORAL AS NEEDED
Status: DISCONTINUED | OUTPATIENT
Start: 2019-10-19 | End: 2019-10-26 | Stop reason: HOSPADM

## 2019-10-19 RX ORDER — ONDANSETRON 2 MG/ML
4 INJECTION INTRAMUSCULAR; INTRAVENOUS
Status: CANCELLED | OUTPATIENT
Start: 2019-10-19

## 2019-10-19 RX ORDER — PANTOPRAZOLE SODIUM 40 MG/1
40 TABLET, DELAYED RELEASE ORAL
Status: CANCELLED | OUTPATIENT
Start: 2019-10-20

## 2019-10-19 RX ORDER — INSULIN LISPRO 100 [IU]/ML
INJECTION, SOLUTION INTRAVENOUS; SUBCUTANEOUS
Status: DISCONTINUED | OUTPATIENT
Start: 2019-10-20 | End: 2019-10-26 | Stop reason: HOSPADM

## 2019-10-19 RX ORDER — SODIUM CHLORIDE 0.9 % (FLUSH) 0.9 %
10 SYRINGE (ML) INJECTION
Status: COMPLETED | OUTPATIENT
Start: 2019-10-20 | End: 2019-10-19

## 2019-10-19 RX ORDER — SODIUM CHLORIDE 0.9 % (FLUSH) 0.9 %
5-40 SYRINGE (ML) INJECTION EVERY 8 HOURS
Status: CANCELLED | OUTPATIENT
Start: 2019-10-19

## 2019-10-19 RX ORDER — TAMSULOSIN HYDROCHLORIDE 0.4 MG/1
0.4 CAPSULE ORAL DAILY
Status: CANCELLED | OUTPATIENT
Start: 2019-10-20

## 2019-10-19 RX ORDER — PANTOPRAZOLE SODIUM 40 MG/1
40 TABLET, DELAYED RELEASE ORAL
Status: DISCONTINUED | OUTPATIENT
Start: 2019-10-20 | End: 2019-10-26 | Stop reason: HOSPADM

## 2019-10-19 RX ORDER — FUROSEMIDE 40 MG/1
40 TABLET ORAL 2 TIMES DAILY
Status: CANCELLED | OUTPATIENT
Start: 2019-10-20

## 2019-10-19 RX ORDER — ALBUTEROL SULFATE 0.83 MG/ML
2.5 SOLUTION RESPIRATORY (INHALATION)
Status: DISCONTINUED | OUTPATIENT
Start: 2019-10-19 | End: 2019-10-26 | Stop reason: HOSPADM

## 2019-10-19 RX ORDER — DEXTROSE 40 %
15 GEL (GRAM) ORAL AS NEEDED
Status: CANCELLED | OUTPATIENT
Start: 2019-10-19

## 2019-10-19 RX ORDER — PREGABALIN 75 MG/1
150 CAPSULE ORAL 2 TIMES DAILY
Status: DISCONTINUED | OUTPATIENT
Start: 2019-10-20 | End: 2019-10-19 | Stop reason: SDUPTHER

## 2019-10-19 RX ORDER — METOCLOPRAMIDE 10 MG/1
10 TABLET ORAL
Status: DISCONTINUED | OUTPATIENT
Start: 2019-10-20 | End: 2019-10-26 | Stop reason: HOSPADM

## 2019-10-19 RX ORDER — FUROSEMIDE 40 MG/1
40 TABLET ORAL 2 TIMES DAILY
Status: DISCONTINUED | OUTPATIENT
Start: 2019-10-20 | End: 2019-10-19 | Stop reason: HOSPADM

## 2019-10-19 RX ORDER — PANTOPRAZOLE SODIUM 40 MG/1
40 TABLET, DELAYED RELEASE ORAL
Status: DISCONTINUED | OUTPATIENT
Start: 2019-10-19 | End: 2019-10-19 | Stop reason: HOSPADM

## 2019-10-19 RX ORDER — PREGABALIN 150 MG/1
150 CAPSULE ORAL 2 TIMES DAILY
Status: CANCELLED | OUTPATIENT
Start: 2019-10-19

## 2019-10-19 RX ORDER — SODIUM CHLORIDE 0.9 % (FLUSH) 0.9 %
5-40 SYRINGE (ML) INJECTION AS NEEDED
Status: CANCELLED | OUTPATIENT
Start: 2019-10-19

## 2019-10-19 RX ORDER — SODIUM CHLORIDE 0.9 % (FLUSH) 0.9 %
5-40 SYRINGE (ML) INJECTION AS NEEDED
Status: DISCONTINUED | OUTPATIENT
Start: 2019-10-19 | End: 2019-10-26 | Stop reason: HOSPADM

## 2019-10-19 RX ORDER — ONDANSETRON 2 MG/ML
4 INJECTION INTRAMUSCULAR; INTRAVENOUS
Status: DISCONTINUED | OUTPATIENT
Start: 2019-10-19 | End: 2019-10-26 | Stop reason: HOSPADM

## 2019-10-19 RX ORDER — FUROSEMIDE 20 MG/1
20 TABLET ORAL DAILY
Status: DISCONTINUED | OUTPATIENT
Start: 2019-10-19 | End: 2019-10-19

## 2019-10-19 RX ORDER — METOCLOPRAMIDE 10 MG/1
10 TABLET ORAL
Status: DISCONTINUED | OUTPATIENT
Start: 2019-10-19 | End: 2019-10-19 | Stop reason: HOSPADM

## 2019-10-19 RX ORDER — METOCLOPRAMIDE 10 MG/1
10 TABLET ORAL
Status: CANCELLED | OUTPATIENT
Start: 2019-10-19

## 2019-10-19 RX ORDER — ATORVASTATIN CALCIUM 40 MG/1
80 TABLET, FILM COATED ORAL
Status: CANCELLED | OUTPATIENT
Start: 2019-10-19

## 2019-10-19 RX ORDER — BUSPIRONE HYDROCHLORIDE 5 MG/1
15 TABLET ORAL DAILY
Status: CANCELLED | OUTPATIENT
Start: 2019-10-20

## 2019-10-19 RX ORDER — PREGABALIN 75 MG/1
150 CAPSULE ORAL 2 TIMES DAILY
Status: DISCONTINUED | OUTPATIENT
Start: 2019-10-20 | End: 2019-10-26 | Stop reason: HOSPADM

## 2019-10-19 RX ORDER — ACETAMINOPHEN 500 MG
1000 TABLET ORAL
Status: CANCELLED | OUTPATIENT
Start: 2019-10-19

## 2019-10-19 RX ORDER — DEXTROSE 50 % IN WATER (D50W) INTRAVENOUS SYRINGE
25-50 AS NEEDED
Status: DISCONTINUED | OUTPATIENT
Start: 2019-10-19 | End: 2019-10-26 | Stop reason: HOSPADM

## 2019-10-19 RX ORDER — SODIUM CHLORIDE 9 MG/ML
250 INJECTION, SOLUTION INTRAVENOUS AS NEEDED
Status: CANCELLED | OUTPATIENT
Start: 2019-10-19

## 2019-10-19 RX ORDER — ACETAMINOPHEN 500 MG
1000 TABLET ORAL
Status: DISCONTINUED | OUTPATIENT
Start: 2019-10-19 | End: 2019-10-19 | Stop reason: HOSPADM

## 2019-10-19 RX ORDER — PREGABALIN 150 MG/1
150 CAPSULE ORAL 2 TIMES DAILY
Status: CANCELLED | OUTPATIENT
Start: 2019-10-20

## 2019-10-19 RX ADMIN — PANTOPRAZOLE SODIUM 40 MG: 40 TABLET, DELAYED RELEASE ORAL at 15:19

## 2019-10-19 RX ADMIN — Medication 10 ML: at 19:58

## 2019-10-19 RX ADMIN — PREGABALIN 150 MG: 150 CAPSULE ORAL at 17:11

## 2019-10-19 RX ADMIN — METOCLOPRAMIDE 10 MG: 10 TABLET ORAL at 15:20

## 2019-10-19 RX ADMIN — SODIUM CHLORIDE 100 ML: 900 INJECTION, SOLUTION INTRAVENOUS at 19:59

## 2019-10-19 RX ADMIN — SODIUM CHLORIDE 100 ML: 900 INJECTION, SOLUTION INTRAVENOUS at 23:23

## 2019-10-19 RX ADMIN — FUROSEMIDE 20 MG: 20 TABLET ORAL at 15:19

## 2019-10-19 RX ADMIN — IOPAMIDOL 100 ML: 755 INJECTION, SOLUTION INTRAVENOUS at 23:23

## 2019-10-19 RX ADMIN — ACETAMINOPHEN 500 MG: 500 TABLET, FILM COATED ORAL at 05:44

## 2019-10-19 RX ADMIN — Medication 10 ML: at 23:23

## 2019-10-19 RX ADMIN — PREGABALIN 150 MG: 150 CAPSULE ORAL at 05:44

## 2019-10-19 RX ADMIN — IOPAMIDOL 100 ML: 755 INJECTION, SOLUTION INTRAVENOUS at 19:58

## 2019-10-19 RX ADMIN — SODIUM CHLORIDE 40 MG: 9 INJECTION, SOLUTION INTRAMUSCULAR; INTRAVENOUS; SUBCUTANEOUS at 09:13

## 2019-10-19 NOTE — PROGRESS NOTES
Shift assessment done. Pt AAO x4. Respirations even and unlabored. No acute signs of distress. Denies needs and pain this time. Call light in reach. Safety measures provided. Will continue to monitor.

## 2019-10-19 NOTE — PROGRESS NOTES
Problem: Diabetes Self-Management  Goal: *Disease process and treatment process  Description  Define diabetes and identify own type of diabetes; list 3 options for treating diabetes. 10/19/2019 0120 by Bland Aase I  Outcome: Progressing Towards Goal  10/19/2019 0120 by Bland Aase I  Outcome: Progressing Towards Goal  Goal: *Incorporating nutritional management into lifestyle  Description  Describe effect of type, amount and timing of food on blood glucose; list 3 methods for planning meals. 10/19/2019 0120 by Bland Aase I  Outcome: Progressing Towards Goal  10/19/2019 0120 by Bland Aase I  Outcome: Progressing Towards Goal  Goal: *Incorporating physical activity into lifestyle  Description  State effect of exercise on blood glucose levels. 10/19/2019 0120 by Bland Aase I  Outcome: Progressing Towards Goal  10/19/2019 0120 by Bland Aase I  Outcome: Progressing Towards Goal  Goal: *Developing strategies to promote health/change behavior  Description  Define the ABC's of diabetes; identify appropriate screenings, schedule and personal plan for screenings. 10/19/2019 0120 by Bland Aase I  Outcome: Progressing Towards Goal  10/19/2019 0120 by Bland Aase I  Outcome: Progressing Towards Goal  Goal: *Using medications safely  Description  State effect of diabetes medications on diabetes; name diabetes medication taking, action and side effects. 10/19/2019 0120 by Bland Aase I  Outcome: Progressing Towards Goal  10/19/2019 0120 by Bland Aase I  Outcome: Progressing Towards Goal  Goal: *Monitoring blood glucose, interpreting and using results  Description  Identify recommended blood glucose targets  and personal targets.   10/19/2019 0120 by Bland Aase I  Outcome: Progressing Towards Goal  10/19/2019 0120 by Bland Aase I  Outcome: Progressing Towards Goal  Goal: *Prevention, detection, treatment of acute complications  Description  List symptoms of hyper- and hypoglycemia; describe how to treat low blood sugar and actions for lowering  high blood glucose level. 10/19/2019 0120 by Pelon Ventura I  Outcome: Progressing Towards Goal  10/19/2019 0120 by Pelon Ventura I  Outcome: Progressing Towards Goal  Goal: *Prevention, detection and treatment of chronic complications  Description  Define the natural course of diabetes and describe the relationship of blood glucose levels to long term complications of diabetes. Outcome: Progressing Towards Goal     Problem: Falls - Risk of  Goal: *Absence of Falls  Description  Document Bren Baxter Fall Risk and appropriate interventions in the flowsheet. Outcome: Progressing Towards Goal  Note:   Fall Risk Interventions:  Mobility Interventions: Bed/chair exit alarm, Patient to call before getting OOB, PT Consult for mobility concerns         Medication Interventions: Patient to call before getting OOB, Bed/chair exit alarm    Elimination Interventions: Call light in reach, Patient to call for help with toileting needs    History of Falls Interventions: Room close to nurse's station, Bed/chair exit alarm         Problem: Pressure Injury - Risk of  Goal: *Prevention of pressure injury  Description  Document Odell Scale and appropriate interventions in the flowsheet.   Outcome: Progressing Towards Goal  Note:   Pressure Injury Interventions:  Sensory Interventions: Assess changes in LOC         Activity Interventions: PT/OT evaluation    Mobility Interventions: Float heels, HOB 30 degrees or less    Nutrition Interventions: Document food/fluid/supplement intake

## 2019-10-19 NOTE — PROGRESS NOTES
Patient had a large bloody stool. Steps taken to start the Nuclear Medicine Tag Red Blood Cell Study. All parties notified.

## 2019-10-19 NOTE — PROGRESS NOTES
Am assessment completed. Pt is alert and oriented x 4, lungs clear. Breathing non-labored. Bowel sounds + q 4 continent of bowel and bladder. Verbalizes needs well. Continue to monitor.

## 2019-10-19 NOTE — H&P (VIEW-ONLY)
10/19/2019 Admit Date: 10/15/2019 Subjective: CHIEF COMPLAINT- swelling HPI Overall- stable- no bleed in 24 hr 
         Diet-clear Appetite-poor Nausea-yes Vomiting-no Pain-min BM-no Bleeding-no Medications-reviewed and adjusted as appropriate IV FLUIDS-reviewed FAM HX-per H&P SH-per H&P 
 tob-no 
          etoh-no Past Medical History:  
Diagnosis Date  Cervical stenosis of spine  Chronic kidney disease Stage 3  Degenerative disc disease, lumbar  Diabetes mellitus type II   
 uncontrolled-insulin and oral med. highest ;lowest 57; this am 110  
 Diabetic retinopathy of both eyes without macular edema associated with type 2 diabetes mellitus ([address] R - Moderate, L - Mild  Diastolic congestive heart failure ([address]  Diverticulosis of colon June 2010  DVT (deep venous thrombosis) ([address] RLE  
 Extrinsic allergic asthma  GERD (gastroesophageal reflux disease)  Hx of colonic polyps 07/29/2010 Hyperplastic  Hyperlipidemia LDL goal < 70   
 Hypertension  Obstructive sleep apnea Non-Compliant with CPAP  
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  
Procedure Laterality Date  COLONOSCOPY  07/29/2010 Diverticulosis & Colon/Rectal Polyps - Due 2020  EGD  5/9/2011  HX BUNIONECTOMY Bilateral   
 HX HERNIA REPAIR Bilateral   
 Inguinal, Repeat on Both Sides Separately  HX KNEE ARTHROSCOPY  1991  
 x 3  
 SINUS SURGERY PROC UNLISTED  TOTAL KNEE ARTHROPLASTY Right 2006 ROS-- 
               RESP-neg CARDIAC-neg -neg- less urine Further ROS as per PMH and PSH- see problem list     
                   
 
Objective:  
 
Visit Vitals /66 (BP 1 Location: Right arm, BP Patient Position: At rest;Supine) Pulse 74 Temp 97.3 °F (36.3 °C) Resp 15 Ht 5' 6\" (1.676 m) Wt 124.7 kg (275 lb) SpO2 98% BMI 44.39 kg/m² Intake/Output Summary (Last 24 hours) at 10/19/2019 1157 Last data filed at 10/19/2019 7449 Gross per 24 hour Intake  Output 400 ml Net -400 ml EXAM:   
 NEURO-a&o HEENT-wnl LUNGS-clear Karen Billow ABD-soft , min tenderness, no rebound, good bs EXT-no edema LABS- 
Lab Results Component Value Date/Time WBC 6.2 10/16/2019 05:34 AM  
 RBC 2.75 (L) 10/16/2019 05:34 AM  
 HGB 9.4 (L) 10/19/2019 05:39 AM  
 HCT 25.0 (L) 10/16/2019 05:34 AM  
 PLATELET 337 21/36/4515 05:34 AM  
 
Lab Results Component Value Date/Time Sodium 136 10/15/2019 08:18 PM  
 Potassium 4.3 10/15/2019 08:18 PM  
 Chloride 107 10/15/2019 08:18 PM  
 CO2 25 10/15/2019 08:18 PM  
 Anion gap 4 (L) 10/15/2019 08:18 PM  
 Glucose 153 (H) 10/15/2019 08:18 PM  
 BUN 18 10/15/2019 08:18 PM  
 Creatinine 1.20 10/15/2019 08:18 PM  
 GFR est AA >60 10/15/2019 08:18 PM  
 GFR est non-AA >60 10/15/2019 08:18 PM  
 Calcium 8.5 10/15/2019 08:18 PM  
 Magnesium 2.3 10/02/2019 07:04 AM  
 Albumin 2.8 (L) 10/15/2019 08:18 PM  
 Bilirubin, total 0.2 10/15/2019 08:18 PM  
 Protein, total 6.3 10/15/2019 08:18 PM  
 Globulin 3.5 10/15/2019 08:18 PM  
 A-G Ratio 0.8 (L) 10/15/2019 08:18 PM  
 AST (SGOT) 25 10/15/2019 08:18 PM  
 ALT (SGPT) 19 10/15/2019 08:18 PM  
 
 
   
TRANSFUSION- total of 5 units since admit Assessment:  
 
Principal Problem: 
  GI bleed (10/15/2019) Active Problems: Morbid obesity ([facility] (6/27/2011) Essential hypertension, benign (1/22/2015) Diastolic CHF, chronic ([facility] (3/20/2017) Sleep apnea (11/9/2017) Type 2 diabetes mellitus without complication ([facility] (2/64/1506) Cecal diverticulitis (9/29/2019) 
 
probable diverticular bleed Asa and plavix on hold Wants to restart his lasix Doesn't like zofran 
 
Plan:  
 
Advance diet He wants full liquid Restart lasix PT SEEN AND EXAMINED AND PLAN DISCUSSED AND IMPLEMENTED.  
Mery Quinones MD

## 2019-10-19 NOTE — PROGRESS NOTES
Hospitalist Note     Admit Date:  10/15/2019  7:11 PM   Name:  Quan Solorzano   Age:  70 y.o.  :  1940   MRN:  [id number]   PCP:  Vivian Rodríguez MD  Treatment Team: Attending Provider: Macy Tao MD; Consulting Provider: Stephan Wolff MD; Utilization Review: Bren Lazo RN; : GISELLA Gonzalez    subj:     Patient is a 69 y/o male with hx DM, DVT/PE, diastolic CHF, CKD 3, asthma who presents to ED with several episodes of copious blood and clots per rectum. No pain but has had episodes of acute urgency. He was admitted -10/1 for cecal diverticulitis. He has completed antibiotics. Last hemoglobin checked was 10.7 on 10/8. Today is 8.6 with gross blood and clots seen in bedside commode. His last colonoscopy over 10 years ago and he had polyps. He takes aspirin and plavix. No chest pain or shortness of breath. GI has been notified. He will be admitted for further workup. 10/19  Transfused overnight for hg 6.7. Appropriate rise. No bloody BMs. Plan TRBC for active bleeding. Pt currently refusing IV access, risks reviewed regarding need for repeat transfusions, potentially urgently.      Objective:     Patient Vitals for the past 24 hrs:   Temp Pulse Resp BP SpO2   10/19/19 0807 97.3 °F (36.3 °C) 74 15 129/66 98 %   10/19/19 0350 98 °F (36.7 °C) 74 18 146/75 94 %   10/19/19 0309 97.8 °F (36.6 °C) 71 17 143/76 96 %   10/19/19 0209 97.9 °F (36.6 °C) 71 18 146/74 98 %   10/19/19 0124 98.1 °F (36.7 °C) 83 18 151/69 100 %   10/19/19 0105 96.9 °F (36.1 °C) 77 18 134/63 99 %   10/19/19 0043 97.9 °F (36.6 °C) 76 18 142/76 98 %   10/19/19 0008 97.6 °F (36.4 °C) 77 18 148/78 99 %   10/18/19 2305 97 °F (36.1 °C) 88 18 144/74 100 %   10/18/19 2220 97.1 °F (36.2 °C) 76 18 128/70 99 %   10/18/19 2159 97.8 °F (36.6 °C) 78 18 135/64 96 %   10/18/19 1929 97.2 °F (36.2 °C) 78 18 119/63 99 %   10/18/19 1612 98.2 °F (36.8 °C) 70 18 139/82 92 %   10/18/19 1152 98.5 °F (36.9 °C) 93 18 125/76 100 %     Oxygen Therapy  O2 Sat (%): 98 % (10/19/19 0807)  Pulse via Oximetry: 105 beats per minute (10/15/19 2346)  O2 Device: Nasal cannula (10/17/19 0742)  O2 Flow Rate (L/min): 2 l/min (10/17/19 0742)    Intake/Output Summary (Last 24 hours) at 10/19/2019 1022  Last data filed at 10/19/2019 0355  Gross per 24 hour   Intake    Output 600 ml   Net -600 ml       Physical Exam:  General:    Well nourished. Alert. Pale obese  CV:   RRR. No murmur, rub, or gallop. Lungs:  CTAB. No wheezing, rhonchi, or rales. Abdomen: Soft, not distended, active bowel sounds, non tender  Extremities: Warm and dry. No cyanosis or edema. Neurologic: grossly intact. Skin:     No rashes or jaundice. No wounds. Psych:  Normal mood and affect. I reviewed the labs, imaging, EKGs, telemetry, and other studies done this admission.   Data Review:   Recent Results (from the past 24 hour(s))   GLUCOSE, POC    Collection Time: 10/18/19 11:53 AM   Result Value Ref Range    Glucose (POC) 115 (H) 65 - 100 mg/dL   HEMOGLOBIN    Collection Time: 10/18/19  1:59 PM   Result Value Ref Range    HGB 8.1 (L) 13.6 - 17.2 g/dL   GLUCOSE, POC    Collection Time: 10/18/19  3:31 PM   Result Value Ref Range    Glucose (POC) 162 (H) 65 - 100 mg/dL   HEMOGLOBIN    Collection Time: 10/18/19  8:27 PM   Result Value Ref Range    HGB 6.7 (LL) 13.6 - 17.2 g/dL   GLUCOSE, POC    Collection Time: 10/18/19  9:16 PM   Result Value Ref Range    Glucose (POC) 144 (H) 65 - 100 mg/dL   TYPE + CROSSMATCH    Collection Time: 10/18/19  9:39 PM   Result Value Ref Range    Crossmatch Expiration 10/21/2019     ABO/Rh(D) O POSITIVE     Antibody screen NEG     Unit number [id number]     Blood component type RC LR     Unit division 00     Status of unit ISSUED     Crossmatch result Compatible    HEMOGLOBIN    Collection Time: 10/19/19  5:39 AM   Result Value Ref Range    HGB 9.4 (L) 13.6 - 17.2 g/dL   GLUCOSE, POC    Collection Time: 10/19/19  8:01 AM Result Value Ref Range    Glucose (POC) 71 65 - 100 mg/dL       Imaging Studies:  CXR Results  (Last 48 hours)    None        CT Results  (Last 48 hours)    None          Assessment and Plan:     Hospital Problems as of 10/19/2019 Date Reviewed: 9/18/2019          Codes Class Noted - Resolved POA    * (Principal) GI bleed ICD-10-CM: K92.2  ICD-9-CM: 578.9  10/15/2019 - Present Yes        Cecal diverticulitis ICD-10-CM: K57.32  ICD-9-CM: 562.11  9/29/2019 - Present Yes        Type 2 diabetes mellitus without complication ([facility] [id number]: E11.9  ICD-9-CM: 250.00  3/21/2019 - Present Yes        Sleep apnea ICD-10-CM: G47.30  ICD-9-CM: 780.57  11/9/2017 - Present Yes        Diastolic CHF, chronic (HCC) (Chronic) ICD-10-CM: I50.32  ICD-9-CM: 428.32, 428.0  3/20/2017 - Present Yes        Essential hypertension, benign (Chronic) ICD-10-CM: I10  ICD-9-CM: 401.1  1/22/2015 - Present Yes        Morbid obesity ([facility] (Chronic) ICD-10-CM: E66.01  ICD-9-CM: 278.01  6/27/2011 - Present Yes              PLAN:    Monitor Hb - stable now after transfusion  Liquid diet, no active bleeding at this time - TRBC did not catch bleeding. Suspect diverticular bleed, defer endoscopy for now.   PPI   Appreciate GI assistance     Dispo: home pending treatment bleed     Signed:  Verdene Lefort, MD Bilobed Flap Text: The defect edges were debeveled with a #15 scalpel blade.  Given the location of the defect and the proximity to free margins a bilobe flap was deemed most appropriate.  Using a sterile surgical marker, an appropriate bilobe flap drawn around the defect.    The area thus outlined was incised deep to adipose tissue with a #15 scalpel blade.  The skin margins were undermined to an appropriate distance in all directions utilizing iris scissors.

## 2019-10-19 NOTE — PROGRESS NOTES
10/19/2019    Admit Date: 10/15/2019    Subjective:   CHIEF COMPLAINT- swelling  HPI      Overall- stable- no bleed in 24 hr           Diet-clear    Appetite-poor     Nausea-yes   Vomiting-no          Pain-min            BM-no   Bleeding-no    Medications-reviewed and adjusted as appropriate   IV FLUIDS-reviewed      FAM HX-per H&P   SH-per H&P   tob-no            etoh-no      Past Medical History:   Diagnosis Date    Cervical stenosis of spine     Chronic kidney disease     Stage 3    Degenerative disc disease, lumbar     Diabetes mellitus type II     uncontrolled-insulin and oral med. 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 (HCC)     R - Moderate, L - Mild    Diastolic congestive heart failure (HCC)     Diverticulosis of colon June 2010    DVT (deep venous thrombosis) ([facility])     RLE    Extrinsic allergic asthma     GERD (gastroesophageal reflux disease)     Hx of colonic polyps 07/29/2010    Hyperplastic     Hyperlipidemia LDL goal < 70     Hypertension     Obstructive sleep apnea     Non-Compliant with CPAP    Perennial allergic rhinitis with seasonal variation     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 ([address]     Pulmonary embolism ([address] Mar 2014    Bilateral - Completed 6 months on Xarelto    TIA (transient ischemic attack) 11/9/2017    Type 2 diabetes, uncontrolled, with neuropathy ([address] 1/22/2015               Past Surgical History:   Procedure Laterality Date    COLONOSCOPY  07/29/2010    Diverticulosis & Colon/Rectal Polyps - Due 2020    EGD  5/9/2011         HX BUNIONECTOMY Bilateral     HX HERNIA REPAIR Bilateral     Inguinal, Repeat on Both Sides Separately    HX KNEE ARTHROSCOPY  1991    x 3    SINUS SURGERY PROC UNLISTED      TOTAL KNEE ARTHROPLASTY Right 2006       ROS--                 RESP-neg            CARDIAC-neg                       -neg- less urine Further ROS as per PMH and PSH- see problem list                            Objective:     Visit Vitals  /66 (BP 1 Location: Right arm, BP Patient Position: At rest;Supine)   Pulse 74   Temp 97.3 °F (36.3 °C)   Resp 15   Ht 5' 6\" (1.676 m)   Wt 124.7 kg (275 lb)   SpO2 98%   BMI 44.39 kg/m²         Intake/Output Summary (Last 24 hours) at 10/19/2019 1157  Last data filed at 10/19/2019 0355  Gross per 24 hour   Intake    Output 400 ml   Net -400 ml       EXAM:     NEURO-a&o    HEENT-wnl    LUNGS-clear       COR-rrr        ABD-soft , min tenderness, no rebound, good bs        EXT-no edema                         LABS-  Lab Results   Component Value Date/Time    WBC 6.2 10/16/2019 05:34 AM    RBC 2.75 (L) 10/16/2019 05:34 AM    HGB 9.4 (L) 10/19/2019 05:39 AM    HCT 25.0 (L) 10/16/2019 05:34 AM    PLATELET 467 89/53/4354 05:34 AM     Lab Results   Component Value Date/Time    Sodium 136 10/15/2019 08:18 PM    Potassium 4.3 10/15/2019 08:18 PM    Chloride 107 10/15/2019 08:18 PM    CO2 25 10/15/2019 08:18 PM    Anion gap 4 (L) 10/15/2019 08:18 PM    Glucose 153 (H) 10/15/2019 08:18 PM    BUN 18 10/15/2019 08:18 PM    Creatinine 1.20 10/15/2019 08:18 PM    GFR est AA >60 10/15/2019 08:18 PM    GFR est non-AA >60 10/15/2019 08:18 PM    Calcium 8.5 10/15/2019 08:18 PM    Magnesium 2.3 10/02/2019 07:04 AM    Albumin 2.8 (L) 10/15/2019 08:18 PM    Bilirubin, total 0.2 10/15/2019 08:18 PM    Protein, total 6.3 10/15/2019 08:18 PM    Globulin 3.5 10/15/2019 08:18 PM    A-G Ratio 0.8 (L) 10/15/2019 08:18 PM    AST (SGOT) 25 10/15/2019 08:18 PM    ALT (SGPT) 19 10/15/2019 08:18 PM           TRANSFUSION- total of 5 units since admit    Assessment:     Principal Problem:    GI bleed (10/15/2019)    Active Problems:     Morbid obesity ([facility] (6/27/2011)      Essential hypertension, benign (3/81/0011)      Diastolic CHF, chronic ([facility] (3/20/2017)      Sleep apnea (11/9/2017)      Type 2 diabetes mellitus without complication ([facility] (0/63/8713)      Cecal diverticulitis (9/29/2019)    probable diverticular bleed  Asa and plavix on hold  Wants to restart his lasix  Doesn't like zofran    Plan:     Advance diet   He wants full liquid  Restart lasix       PT SEEN AND EXAMINED AND PLAN DISCUSSED AND IMPLEMENTED.   Janene Juan MD

## 2019-10-20 ENCOUNTER — APPOINTMENT (OUTPATIENT)
Dept: ULTRASOUND IMAGING | Age: 79
DRG: 356 | End: 2019-10-20
Attending: FAMILY MEDICINE
Payer: MEDICARE

## 2019-10-20 LAB
ABO + RH BLD: NORMAL
ANION GAP SERPL CALC-SCNC: 8 MMOL/L (ref 7–16)
BLD PROD TYP BPU: NORMAL
BLOOD GROUP ANTIBODIES SERPL: NORMAL
BPU ID: NORMAL
BUN SERPL-MCNC: 5 MG/DL (ref 8–23)
CALCIUM SERPL-MCNC: 7.9 MG/DL (ref 8.3–10.4)
CHLORIDE SERPL-SCNC: 116 MMOL/L (ref 98–107)
CO2 SERPL-SCNC: 24 MMOL/L (ref 21–32)
CREAT SERPL-MCNC: 0.87 MG/DL (ref 0.8–1.5)
CROSSMATCH RESULT,%XM: NORMAL
ERYTHROCYTE [DISTWIDTH] IN BLOOD BY AUTOMATED COUNT: 15.9 % (ref 11.9–14.6)
GLUCOSE BLD STRIP.AUTO-MCNC: 146 MG/DL (ref 65–100)
GLUCOSE BLD STRIP.AUTO-MCNC: 152 MG/DL (ref 65–100)
GLUCOSE BLD STRIP.AUTO-MCNC: 85 MG/DL (ref 65–100)
GLUCOSE BLD STRIP.AUTO-MCNC: 94 MG/DL (ref 65–100)
GLUCOSE BLD STRIP.AUTO-MCNC: 97 MG/DL (ref 65–100)
GLUCOSE SERPL-MCNC: 70 MG/DL (ref 65–100)
HCT VFR BLD AUTO: 29 % (ref 41.1–50.3)
HGB BLD-MCNC: 9.1 G/DL (ref 13.6–17.2)
HGB BLD-MCNC: 9.1 G/DL (ref 13.6–17.2)
HGB BLD-MCNC: 9.9 G/DL (ref 13.6–17.2)
MCH RBC QN AUTO: 30 PG (ref 26.1–32.9)
MCHC RBC AUTO-ENTMCNC: 31.4 G/DL (ref 31.4–35)
MCV RBC AUTO: 95.7 FL (ref 79.6–97.8)
NRBC # BLD: 0 K/UL (ref 0–0.2)
PLATELET # BLD AUTO: 145 K/UL (ref 150–450)
PMV BLD AUTO: 10.1 FL (ref 9.4–12.3)
POTASSIUM SERPL-SCNC: 3.5 MMOL/L (ref 3.5–5.1)
RBC # BLD AUTO: 3.03 M/UL (ref 4.23–5.6)
SODIUM SERPL-SCNC: 148 MMOL/L (ref 136–145)
SPECIMEN EXP DATE BLD: NORMAL
STATUS OF UNIT,%ST: NORMAL
UNIT DIVISION, %UDIV: 0
WBC # BLD AUTO: 5.4 K/UL (ref 4.3–11.1)

## 2019-10-20 PROCEDURE — 3331090002 HH PPS REVENUE DEBIT

## 2019-10-20 PROCEDURE — 82962 GLUCOSE BLOOD TEST: CPT

## 2019-10-20 PROCEDURE — 80048 BASIC METABOLIC PNL TOTAL CA: CPT

## 2019-10-20 PROCEDURE — 65660000000 HC RM CCU STEPDOWN

## 2019-10-20 PROCEDURE — 3331090001 HH PPS REVENUE CREDIT

## 2019-10-20 PROCEDURE — 85027 COMPLETE CBC AUTOMATED: CPT

## 2019-10-20 PROCEDURE — 74011250636 HC RX REV CODE- 250/636: Performed by: FAMILY MEDICINE

## 2019-10-20 PROCEDURE — 85018 HEMOGLOBIN: CPT

## 2019-10-20 PROCEDURE — 36415 COLL VENOUS BLD VENIPUNCTURE: CPT

## 2019-10-20 PROCEDURE — 74011636637 HC RX REV CODE- 636/637: Performed by: FAMILY MEDICINE

## 2019-10-20 PROCEDURE — 93970 EXTREMITY STUDY: CPT

## 2019-10-20 PROCEDURE — 74011250637 HC RX REV CODE- 250/637: Performed by: FAMILY MEDICINE

## 2019-10-20 RX ADMIN — Medication 10 ML: at 22:14

## 2019-10-20 RX ADMIN — ATORVASTATIN CALCIUM 80 MG: 40 TABLET, FILM COATED ORAL at 22:14

## 2019-10-20 RX ADMIN — METOCLOPRAMIDE HYDROCHLORIDE 10 MG: 10 TABLET ORAL at 05:26

## 2019-10-20 RX ADMIN — ACETAMINOPHEN 1000 MG: 500 TABLET, FILM COATED ORAL at 08:08

## 2019-10-20 RX ADMIN — METOCLOPRAMIDE HYDROCHLORIDE 10 MG: 10 TABLET ORAL at 11:12

## 2019-10-20 RX ADMIN — TAMSULOSIN HYDROCHLORIDE 0.4 MG: 0.4 CAPSULE ORAL at 08:09

## 2019-10-20 RX ADMIN — PANTOPRAZOLE SODIUM 40 MG: 40 TABLET, DELAYED RELEASE ORAL at 17:10

## 2019-10-20 RX ADMIN — Medication 10 ML: at 13:10

## 2019-10-20 RX ADMIN — ACETAMINOPHEN 1000 MG: 500 TABLET, FILM COATED ORAL at 22:15

## 2019-10-20 RX ADMIN — ONDANSETRON 4 MG: 2 INJECTION INTRAMUSCULAR; INTRAVENOUS at 08:08

## 2019-10-20 RX ADMIN — METOCLOPRAMIDE HYDROCHLORIDE 10 MG: 10 TABLET ORAL at 22:14

## 2019-10-20 RX ADMIN — Medication 10 ML: at 05:26

## 2019-10-20 RX ADMIN — FUROSEMIDE 40 MG: 40 TABLET ORAL at 17:10

## 2019-10-20 RX ADMIN — METOCLOPRAMIDE HYDROCHLORIDE 10 MG: 10 TABLET ORAL at 17:10

## 2019-10-20 RX ADMIN — ATORVASTATIN CALCIUM 80 MG: 40 TABLET, FILM COATED ORAL at 02:01

## 2019-10-20 RX ADMIN — PANTOPRAZOLE SODIUM 40 MG: 40 TABLET, DELAYED RELEASE ORAL at 05:26

## 2019-10-20 RX ADMIN — PREGABALIN 150 MG: 75 CAPSULE ORAL at 08:09

## 2019-10-20 RX ADMIN — FUROSEMIDE 40 MG: 40 TABLET ORAL at 08:09

## 2019-10-20 RX ADMIN — PREGABALIN 150 MG: 75 CAPSULE ORAL at 17:10

## 2019-10-20 RX ADMIN — BUSPIRONE HYDROCHLORIDE 15 MG: 5 TABLET ORAL at 08:09

## 2019-10-20 RX ADMIN — INSULIN LISPRO 1 UNITS: 100 INJECTION, SOLUTION INTRAVENOUS; SUBCUTANEOUS at 17:10

## 2019-10-20 NOTE — PROGRESS NOTES
This is a patient transferred over from [place] due to a GI bleed. CT abdomen and pelvis was done, and it showed pulmonary emboli in the right lung. Also showed possible colonic mass which is probably what is responsible for his bleeding. Venous Dopplers were ordered for in the morning to see if that is where the clot is coming from. Due to his GI bleeding and recent need for transfusion he has not started on anticoagulation at this time. He is asymptomatic from his PE. If he does have lower extremity DVT probably require an IVC filter.     Vianca Broussard MD

## 2019-10-20 NOTE — PROGRESS NOTES
TRANSFER - IN REPORT:    Verbal report received from Morteza Briceño RN(name) on Farshad Zepeda  being received from [facility] room 348(unit) for routine progression of care      Report consisted of patients Situation, Background, Assessment and   Recommendations(SBAR). Information from the following report(s) SBAR, Kardex and Recent Results was reviewed with the receiving nurse. Opportunity for questions and clarification was provided. Assessment completed upon patients arrival to unit and care assumed.

## 2019-10-20 NOTE — DISCHARGE SUMMARY
Hospitalist Discharge Summary     Admit Date:  10/15/2019  7:11 PM   Name:  Buster Kussmaul   Age:  70 y.o.  :  1940   MRN:  [id number]   PCP:  Jenny Rangel MD  Treatment Team: Attending Provider: Ivet Lipscomb MD; Consulting Provider: Oumou Herrera MD; Utilization Review: Eric Baker RN; : Anita Blunt    Problem List for this Hospitalization:  Hospital Problems as of 10/19/2019 Date Reviewed: 2019          Codes Class Noted - Resolved POA    * (Principal) GI bleed ICD-10-CM: K92.2  ICD-9-CM: 578.9  10/15/2019 - Present Yes        Cecal diverticulitis ICD-10-CM: K57.32  ICD-9-CM: 562.11  2019 - Present Yes        Type 2 diabetes mellitus without complication ([facility] CPT-87-VZ: E11.9  ICD-9-CM: 250.00  3/21/2019 - Present Yes        Sleep apnea ICD-10-CM: G47.30  ICD-9-CM: 780.57  2017 - Present Yes        Diastolic CHF, chronic (HCC) (Chronic) ICD-10-CM: I50.32  ICD-9-CM: 428.32, 428.0  3/20/2017 - Present Yes        Essential hypertension, benign (Chronic) ICD-10-CM: I10  ICD-9-CM: 401.1  2015 - Present Yes        Morbid obesity ([address] (Chronic) ICD-10-CM: E66.01  ICD-9-CM: 278.01  2011 - Present Yes                Admission HPI from 10/15/2019:    Patient is a 71 y/o male with hx DM, DVT/PE, diastolic CHF, CKD 3, asthma who presents to ED with several episodes of copious blood and clots per rectum. No pain but has had episodes of acute urgency. He was admitted -10/1 for cecal diverticulitis. He has completed antibiotics. Last hemoglobin checked was 10.7 on 10/8. Today is 8.6 with gross blood and clots seen in bedside commode. His last colonoscopy over 10 years ago and he had polyps. He takes aspirin and plavix. No chest pain or shortness of breath. GI has been notified. He will be admitted for further workup. Hospital Course:  Admitted for GI bleed- with recent diverticulitis felt probably diverticular bleed.  GI - no colonoscopy secondary to recent diverticulitis. Had NM red blood tag scan- by the time it was dont pt dint have any bleeding and scan was normal. Pt had in total 4 units of blood. Bleed 10/18,10/19. Shruthi Geoffrey recommended ct abd pelvis with contrast - secondary to the bleed pt had this evening. Spoke to [employer] - as pt had been having bleeding at least one episode since past 2- 3 days and already required 4 untis of prbs- and may require Interventional radiology services- hence pt would be transferred to [facility].  The reason for transfer was explained to pt and pts sister at bedside.  [employer] [facility] was notified about the transfer. Follow up instructions below. Plan was discussed with pt and pts sister. All questions answered. Patient was stable at time of discharge and was instructed to call or return if there are any concerns or recurrence of symptoms. Diagnostic Imaging/Tests:   Nm Acute Gi Bleed Scan    Result Date: 10/18/2019  History: Rectal bleeding EXAM: Nuclear medicine GI bleed scan TECHNIQUE: 27.0 mCi technetium 99m tagged red blood cells is administered. Images are obtained for one hour. FINDINGS: There is normal biodistribution of radiotracer. There is progressive filling of the bladder on the latter images. No scintigraphic evidence of GI bleed demonstrated. IMPRESSION: No scintigraphic evidence of GI bleed. Echocardiogram results:  No results found for this visit on 10/15/19. All Micro Results     None          Labs: Results:       BMP, Mg, Phos Recent Labs     10/19/19  1852   *   K 4.2   *   CO2 21   AGAP 8   BUN 6*   CREA 1.02   CA 8.3   *      CBC Recent Labs     10/19/19  1249 10/19/19  0539 10/18/19  2027   HGB 10.2* 9.4* 6.7*      LFT No results for input(s): SGOT, ALT, TBIL, AP, TP, ALB, GLOB, AGRAT, GPT in the last 72 hours.    Cardiac Testing Lab Results   Component Value Date/Time    BNP 4 06/12/2017 12:59 PM    BNP 26 03/21/2013 10:36 AM    BNP 4 10/03/2012 10:03 AM    BNP <5 05/09/2012 01:34 AM    Troponin-I <0.05 05/09/2012 01:34 AM    Troponin-I <0.05 05/09/2012 01:15 AM    Troponin-I <0.05 06/26/2011 12:36 AM    Troponin-I, Qt. <0.02 (L) 09/25/2019 02:15 PM    Troponin-I, Qt. <0.04 (L) 06/26/2011 03:20 PM    Troponin-I, Qt. <0.04 (L) 06/26/2011 07:15 AM      Coagulation Tests Lab Results   Component Value Date/Time    Prothrombin time 14.8 (H) 10/15/2019 10:07 PM    Prothrombin time 11.5 06/26/2011 12:22 AM    INR 1.1 10/15/2019 10:07 PM    INR 1.1 06/26/2011 12:22 AM    aPTT 25.2 10/15/2019 10:07 PM      A1c Lab Results   Component Value Date/Time    Hemoglobin A1c 8.4 (H) 09/26/2019 05:50 AM    Hemoglobin A1c 10.8 (H) 06/15/2017 09:35 AM    Hemoglobin A1c 10.7 (H) 04/23/2013 09:09 AM      Lipid Panel Lab Results   Component Value Date/Time    Cholesterol, total 108 09/26/2019 05:50 AM    HDL Cholesterol 55 09/26/2019 05:50 AM    LDL, calculated 41.4 09/26/2019 05:50 AM    VLDL, calculated 11.6 09/26/2019 05:50 AM    Triglyceride 58 09/26/2019 05:50 AM    CHOL/HDL Ratio 2.0 09/26/2019 05:50 AM      Thyroid Panel Lab Results   Component Value Date/Time    TSH 0.254 (L) 09/26/2019 05:50 AM    TSH 0.871 04/23/2013 09:09 AM        Most Recent UA Lab Results   Component Value Date/Time    Color YELLOW 09/25/2019 07:00 PM    Appearance CLEAR 09/25/2019 07:00 PM    Specific gravity 1.018 09/25/2019 07:00 PM    pH (UA) 7.0 09/25/2019 07:00 PM    Protein NEGATIVE  09/25/2019 07:00 PM    Glucose >1,000 09/25/2019 07:00 PM    Ketone NEGATIVE  09/25/2019 07:00 PM    Bilirubin NEGATIVE  09/25/2019 07:00 PM    Blood NEGATIVE  09/25/2019 07:00 PM    Urobilinogen 1.0 09/25/2019 07:00 PM    Nitrites NEGATIVE  09/25/2019 07:00 PM    Leukocyte Esterase NEGATIVE  09/25/2019 07:00 PM        Allergies   Allergen Reactions    Vasotec [Enalapril Maleate] Shortness of Breath and Cough     Immunization History   Administered Date(s) Administered    Influenza High Dose Vaccine PF 09/23/2011, 08/21/2016, 08/24/2017, 08/27/2018    Influenza Vaccine 09/12/2012, 10/01/2015    Pneumococcal Conjugate (PCV-13) 09/11/2015    Pneumococcal Polysaccharide (PPSV-23) 09/13/2010, 11/01/2010    TB Skin Test (PPD) Intradermal 09/27/2019    Tdap 06/22/2017    Zoster Recombinant 09/02/2018    Zoster Vaccine, Live 01/10/2013       All Labs from Last 24 Hrs:  Recent Results (from the past 24 hour(s))   HEMOGLOBIN    Collection Time: 10/18/19  8:27 PM   Result Value Ref Range    HGB 6.7 (LL) 13.6 - 17.2 g/dL   GLUCOSE, POC    Collection Time: 10/18/19  9:16 PM   Result Value Ref Range    Glucose (POC) 144 (H) 65 - 100 mg/dL   TYPE + CROSSMATCH    Collection Time: 10/18/19  9:39 PM   Result Value Ref Range    Crossmatch Expiration 10/21/2019     ABO/Rh(D) O POSITIVE     Antibody screen NEG     Unit number [id number]     Blood component type RC LR     Unit division 00     Status of unit ISSUED     Crossmatch result Compatible    HEMOGLOBIN    Collection Time: 10/19/19  5:39 AM   Result Value Ref Range    HGB 9.4 (L) 13.6 - 17.2 g/dL   GLUCOSE, POC    Collection Time: 10/19/19  8:01 AM   Result Value Ref Range    Glucose (POC) 71 65 - 100 mg/dL   GLUCOSE, POC    Collection Time: 10/19/19 11:47 AM   Result Value Ref Range    Glucose (POC) 126 (H) 65 - 100 mg/dL   HEMOGLOBIN    Collection Time: 10/19/19 12:49 PM   Result Value Ref Range    HGB 10.2 (L) 13.6 - 17.2 g/dL   GLUCOSE, POC    Collection Time: 10/19/19  4:49 PM   Result Value Ref Range    Glucose (POC) 125 (H) 65 - 873 mg/dL   METABOLIC PANEL, BASIC    Collection Time: 10/19/19  6:52 PM   Result Value Ref Range    Sodium 146 (H) 136 - 145 mmol/L    Potassium 4.2 3.5 - 5.1 mmol/L    Chloride 117 (H) 98 - 107 mmol/L    CO2 21 21 - 32 mmol/L    Anion gap 8 7 - 16 mmol/L    Glucose 106 (H) 65 - 100 mg/dL    BUN 6 (L) 8 - 23 MG/DL    Creatinine 1.02 0.8 - 1.5 MG/DL    GFR est AA >60 >60 ml/min/1.73m2    GFR est non-AA >60 >60 ml/min/1.73m2    Calcium 8.3 8.3 - 10.4 MG/DL       Discharge Exam:  Patient Vitals for the past 24 hrs:   Temp Pulse Resp BP SpO2   10/19/19 1713  89 18 185/87 98 %   10/19/19 1235 95.6 °F (35.3 °C) (!) 103 17 148/85 94 %   10/19/19 0807 97.3 °F (36.3 °C) 74 15 129/66 98 %   10/19/19 0350 98 °F (36.7 °C) 74 18 146/75 94 %   10/19/19 0309 97.8 °F (36.6 °C) 71 17 143/76 96 %   10/19/19 0209 97.9 °F (36.6 °C) 71 18 146/74 98 %   10/19/19 0124 98.1 °F (36.7 °C) 83 18 151/69 100 %   10/19/19 0105 96.9 °F (36.1 °C) 77 18 134/63 99 %   10/19/19 0043 97.9 °F (36.6 °C) 76 18 142/76 98 %   10/19/19 0008 97.6 °F (36.4 °C) 77 18 148/78 99 %   10/18/19 2305 97 °F (36.1 °C) 88 18 144/74 100 %   10/18/19 2220 97.1 °F (36.2 °C) 76 18 128/70 99 %   10/18/19 2159 97.8 °F (36.6 °C) 78 18 135/64 96 %     Oxygen Therapy  O2 Sat (%): 98 % (10/19/19 1713)  Pulse via Oximetry: 105 beats per minute (10/15/19 2346)  O2 Device: Nasal cannula (10/17/19 0742)  O2 Flow Rate (L/min): 2 l/min (10/17/19 0742)    Intake/Output Summary (Last 24 hours) at 10/19/2019 2003  Last data filed at 10/19/2019 0355  Gross per 24 hour   Intake    Output 400 ml   Net -400 ml       General:    Well nourished. Alert. No distress. c/o abdominal bloating sensation  Eyes:   Normal sclera. Extraocular movements intact. ENT:  Normocephalic, atraumatic. Moist mucous membranes  CV:   Regular rate and rhythm. No murmur, rub, or gallop. Lungs:  Clear to auscultation bilaterally. No wheezing, rhonchi, or rales. Abdomen: Soft, nontender, nondistended. Bowel sounds normal. obese  Extremities: Warm and dry. No cyanosis. Bilateral leg pitting edema. Neurologic: CN II-XII grossly intact. Sensation intact. Skin:     No rashes or jaundice. Psych:  Normal mood and affect.     Discharge Info:   Current Discharge Medication List            Disposition: [facility]   Activity: bed rest  Diet: DIET CLEAR LIQUID    No orders of the defined types were placed in this encounter. Time spent in patient discharge planning and coordination 35 minutes.     Signed:  Jordin Hale MD

## 2019-10-20 NOTE — ROUTINE PROCESS
Patient IV line went bad. CT tech can't perform CTA unless patient get a new line in his [facility] or above AC.

## 2019-10-20 NOTE — PROGRESS NOTES
[address] ambulance service notified of need for transfer to [facility] room 622. ETA [id number].

## 2019-10-20 NOTE — H&P
Hospitalist H&P Note     Admit Date:  No admission date for patient encounter. Name:  Chris Montero   Age:  70 y.o.  :  1940   MRN:  [id number]   PCP:  Patricio Sen MD  Treatment Team: Attending Provider: Theodis Holstein, MD  Gi bleed  HPI:   Admission HPI from 10/15/2019:    Patient is a 69 y/o male with hx DM, DVT/PE, diastolic CHF, CKD 3, asthma who presents to ED with several episodes of copious blood and clots per rectum. No pain but has had episodes of acute urgency. He was admitted -10/1 for cecal diverticulitis. He has completed antibiotics. Last hemoglobin checked was 10.7 on 10/8. Today is 8.6 with gross blood and clots seen in bedside commode. His last colonoscopy over 10 years ago and he had polyps. He takes aspirin and plavix. No chest pain or shortness of breath. GI has been notified. He will be admitted for further workup.   [facility] Course:  Admitted for GI bleed- with recent diverticulitis felt probably diverticular bleed. GI - no colonoscopy secondary to recent diverticulitis. Had NM red blood tag scan- by the time it was dont pt dint have any bleeding and scan was normal. Pt had in total 4 units of blood. Bleed 10/18,10/19. Lucendia Sameer recommended ct abd pelvis with contrast - secondary to the bleed pt had this evening. Spoke to Probity - as pt had been having bleeding at least one episode since past 2- 3 days and already required 4 untis of prbs- and may require Interventional radiology services- hence pt would be transferred to [facility].  The reason for transfer was explained to pt and pts sister at bedside.  [employer] [facility] was notified about the transfer. 10 systems reviewed and negative except as noted in HPI.   Past Medical History:   Diagnosis Date    Cervical stenosis of spine     Chronic kidney disease     Stage 3    Degenerative disc disease, lumbar     Diabetes mellitus type II     uncontrolled-insulin and oral med. 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 ([address]     Pulmonary embolism ([address] Mar 2014    Bilateral - Completed 6 months on Xarelto    TIA (transient ischemic attack) 11/9/2017    Type 2 diabetes, uncontrolled, with neuropathy ([address] 1/22/2015      Past Surgical History:   Procedure Laterality Date    COLONOSCOPY  07/29/2010    Diverticulosis & Colon/Rectal Polyps - Due 2020    EGD  5/9/2011         HX BUNIONECTOMY Bilateral     HX HERNIA REPAIR Bilateral     Inguinal, Repeat on Both Sides Separately    HX KNEE ARTHROSCOPY  1991    x 3    SINUS SURGERY PROC UNLISTED      TOTAL KNEE ARTHROPLASTY Right 2006      Allergies   Allergen Reactions    Vasotec [Enalapril Maleate] Shortness of Breath and Cough      Social History     Tobacco Use    Smoking status: Never Smoker    Smokeless tobacco: Never Used   Substance Use Topics    Alcohol use: No      Family History   Problem Relation Age of Onset    Stroke Mother     Diabetes Mother     Heart Disease Mother     Diabetes Maternal Grandmother     Glaucoma Maternal Grandmother     Stroke Father     Diabetes Father     Diabetes Paternal Aunt     Cancer Paternal Aunt     Diabetes Paternal Uncle     Cancer Paternal Uncle         Colon    Heart Attack Sister 76    Cancer Sister     Prostate Cancer Brother     Heart Disease Sister 48    Prostate Cancer Brother       Immunization History   Administered Date(s) Administered    Influenza High Dose Vaccine PF 09/23/2011, 08/21/2016, 08/24/2017, 08/27/2018    Influenza Vaccine 09/12/2012, 10/01/2015    Pneumococcal Conjugate (PCV-13) 09/11/2015    Pneumococcal Polysaccharide (PPSV-23) 09/13/2010, 11/01/2010    TB Skin Test (PPD) Intradermal 09/27/2019    Tdap 06/22/2017    Zoster Recombinant 09/02/2018    Zoster Vaccine, Live 01/10/2013     PTA Medications:  Cannot display prior to admission medications because the patient has not been admitted in this contact. Objective:   No data found. Intake/Output Summary (Last 24 hours) at 10/19/2019 2012  Last data filed at 10/19/2019 0355  Gross per 24 hour   Intake    Output 400 ml   Net -400 ml       Physical Exam:  General:    Well nourished. Alert. C/o abdominal bloating sensation, no pain or nausea or vomiting  Eyes:   Normal sclera. Extraocular movements intact. ENT:  Normocephalic, atraumatic. Moist mucous membranes  CV:   RRR. No murmur, rub, or gallop. Lungs:  CTAB. No wheezing, rhonchi, or rales. Abdomen: Soft, nontender, nondistended. Bowel sounds normal. obese  Extremities: Warm and dry. No cyanosis. Bilateral lower extremity edema. Neurologic: CN II-XII grossly intact. Sensation intact. Skin:     No rashes or jaundice. Psych:  Normal mood and affect. I reviewed the labs.   Data Review:   Recent Results (from the past 24 hour(s))   HEMOGLOBIN    Collection Time: 10/18/19  8:27 PM   Result Value Ref Range    HGB 6.7 (LL) 13.6 - 17.2 g/dL   GLUCOSE, POC    Collection Time: 10/18/19  9:16 PM   Result Value Ref Range    Glucose (POC) 144 (H) 65 - 100 mg/dL   TYPE + CROSSMATCH    Collection Time: 10/18/19  9:39 PM   Result Value Ref Range    Crossmatch Expiration 10/21/2019     ABO/Rh(D) O POSITIVE     Antibody screen NEG     Unit number [id number]     Blood component type RC LR     Unit division 00     Status of unit ISSUED     Crossmatch result Compatible    HEMOGLOBIN    Collection Time: 10/19/19  5:39 AM   Result Value Ref Range    HGB 9.4 (L) 13.6 - 17.2 g/dL   GLUCOSE, POC    Collection Time: 10/19/19  8:01 AM   Result Value Ref Range    Glucose (POC) 71 65 - 100 mg/dL   GLUCOSE, POC    Collection Time: 10/19/19 11:47 AM   Result Value Ref Range    Glucose (POC) 126 (H) 65 - 100 mg/dL   HEMOGLOBIN    Collection Time: 10/19/19 12:49 PM   Result Value Ref Range    HGB 10.2 (L) 13.6 - 17.2 g/dL   GLUCOSE, POC    Collection Time: 10/19/19  4:49 PM   Result Value Ref Range    Glucose (POC) 125 (H) 65 - 995 mg/dL   METABOLIC PANEL, BASIC    Collection Time: 10/19/19  6:52 PM   Result Value Ref Range    Sodium 146 (H) 136 - 145 mmol/L    Potassium 4.2 3.5 - 5.1 mmol/L    Chloride 117 (H) 98 - 107 mmol/L    CO2 21 21 - 32 mmol/L    Anion gap 8 7 - 16 mmol/L    Glucose 106 (H) 65 - 100 mg/dL    BUN 6 (L) 8 - 23 MG/DL    Creatinine 1.02 0.8 - 1.5 MG/DL    GFR est AA >60 >60 ml/min/1.73m2    GFR est non-AA >60 >60 ml/min/1.73m2    Calcium 8.3 8.3 - 10.4 MG/DL       All Micro Results     None          Other Studies:  No results found.     Assessment and Plan:     Hospital Problems as of 10/19/2019 Date Reviewed: 9/18/2019          Codes Class Noted - Resolved POA    * (Principal) GI bleed ICD-10-CM: K92.2  ICD-9-CM: 578.9  10/15/2019 - Present Yes        Type 2 diabetes with nephropathy ([facility] ICD-10-CM: E11.21  ICD-9-CM: 250.40, 583.81  5/11/2018 - Present Yes        Sleep apnea ICD-10-CM: G47.30  ICD-9-CM: 780.57  11/9/2017 - Present Yes        Diastolic CHF, chronic (HCC) (Chronic) ICD-10-CM: I50.32  ICD-9-CM: 428.32, 428.0  3/20/2017 - Present Yes        Pulmonary hypertension (HCC) (Chronic) ICD-10-CM: I27.20  ICD-9-CM: 416.8  10/14/2015 - Present Yes        Mild diastolic dysfunction (Chronic) ICD-10-CM: I51.9  ICD-9-CM: 429.9  7/17/2015 - Present         Essential hypertension, benign (Chronic) ICD-10-CM: I10  ICD-9-CM: 401.1  1/22/2015 - Present Yes        Type 2 diabetes, uncontrolled, with neuropathy (HCC) (Chronic) ICD-10-CM: E11.40, E11.65  ICD-9-CM: 250.62, 357.2  1/22/2015 - Present Yes              PLAN:  Gi bleed- s/p 4 units of prbc during stay- ct abd pelvis pending- recent diverticulitis- finished course of antibiotics- GI following- may need Interventional radiology consult depending on ct results. off plavix and aspirin. Clear liquids, cont protonix.   Anemia acute blood loss  Chronic diastolic chf- on lasix  Dm type 2 on insulin  htn        DVT ppx:  scd  Anticipated DC needs:    Code status:  Full  Estimated LOS:  Greater than 2 midnights  Risk:  high    Signed:  Milagro Milner MD

## 2019-10-20 NOTE — PROGRESS NOTES
10/19/19 2499   Peripheral Vascular   Peripheral Vascular (WDL) X   Edema   LUE 2+   LLE 3+;Pitting   RUE 2+   RLE 3+;Pitting   Dual Skin Pressure Injury Assessment   Dual Skin Pressure Injury Assessment WDL   Second Care Provider (Based on [address] [place] [address]) GARY Shaw   Skin Integumentary   Skin Integumentary (WDL) X   Skin Color Appropriate for ethnicity   Skin Condition/Temp Warm;Dry   Skin Integrity Intact;Scars (comment)  (Scars to BL knees, mid to lower back and scattered abd scars)   Turgor Non-tenting   Hair Growth Present   Varicosities Absent    Pressure  Injury Documentation No Pressure Injury Noted-Pressure Ulcer Prevention Initiated

## 2019-10-20 NOTE — PHYSICIAN ADVISORY
Letter of Status Determination: Current Status INPATIENT is Appropriate Pt Name:  Quan Solorzano MR#  [id number] [facility]#   [id number] Room and Hospital  622/01  @ [address] [place] [address] Hospitalization date  10/19/2019 10:44 PM  
Current Attending Physician  Park Loya MD  
Principal diagnosis  GI bleed PE Clinicals  70 y.o. y.o  male hx DM, DVT/PE, diastolic CHF, CKD 3, asthma who presents to ED with several episodes of copious blood and clots per rectum. Initially admitted on 10/15 at OSH and required 4 units of blood transfusion and hemoglobin dropped after transfusion to 6.7 and transferred to higher level of care for possible IR intervention. Further w/u is significant for PE for which pt is getting IVC filter. GI on board and planned for colonoscopy tomorrow. [facility] criteria Does  NOT apply STATUS DETERMINATION  This patient is at high risk of adverse events and deterioration based on documented clinical data, comorbid conditions and current acute care course. Mr. Quan Solorzano is expected to meet Inpatient Admission status criteria in accordance with CMS regulation Section 43 .3. Specifically, due to medical necessity the patient's stay is expected to exceed Two Midnights. On the basis of clinical data, available documentaion, we believe that the current status of this patient as INPATIENT is Appropriate The final decision of the patient's hospitalization status depends on the attending physician's judgment. Additional comments [place]  [place] / [place] / Product Type: Medicare / Insurance Information [place] [address] [facility] MEDICARE PART A AND B Phone:   
 Subscriber: Janeal All Subscriber#: [id number] Group#:  Precert#:   
   
 [employer]/BS[employer] [address] MEDICARE Phone:   
 Subscriber: [employer] All Subscriber#: [id number]  Group#: [id number] Precert#:   
  
 
 
 The information in this document is a recommendation to be used for utilization review and utilization management purposes only. This recommendation is not an order. The recommendation is made based on the information reviewed at the time of the referral, is pursuant to the [facility] Conditions of Participation (42 CFR Part 482), and is neither a judgment nor an assessment with regard to the appropriateness or quality of clinical care. For all Managed Care patients: The Criteria are intended solely for use as screening guidelines with respect to the medical appropriateness of healthcare services and not for final clinical or payment determinations concerning the type or level of medical care provided, or proposed to be provided, to a patient. They help the reviewers determine whether a patient is appropriate for observation or inpatient admission at the time a decision to admit the patient is being made. All efforts are made to apply the pertinent payor criteria ([employer] or [employer]) as well as the clinical judgements based on the information reviewed at the time of the referral.\" Nothing in this document may be used to limit, alter, or affect clinical services provided to the patient named below. Opal Wolfe MD 
Physician Advisor [address] C:  
[email or web address] 
 
 
 
1:34 PM 10/20/2019

## 2019-10-20 NOTE — PROGRESS NOTES
Problem: Diabetes Self-Management  Goal: *Monitoring blood glucose, interpreting and using results  Description  Identify recommended blood glucose targets  and personal targets. Outcome: Progressing Towards Goal     Problem: Falls - Risk of  Goal: *Absence of Falls  Description  Document Bren Baxter Fall Risk and appropriate interventions in the flowsheet.   Outcome: Progressing Towards Goal

## 2019-10-20 NOTE — PROGRESS NOTES
TRANSFER - OUT REPORT:    Verbal report given to Radha RN(name) on Stewart Garcia  being transferred to 6th Floor [place](unit) for routine progression of care. Report consisted of patients Situation, Background, Assessment and   Recommendations(SBAR). Information from the following report(s) SBAR, Kardex, Procedure Summary, Intake/Output and Recent Results was reviewed with the receiving nurse. Lines:   Peripheral IV 56/61/34 Left Cephalic (Active)        Opportunity for questions and clarification was provided.       Patient transported with:   EMS

## 2019-10-20 NOTE — PROGRESS NOTES
Hourly rounds completed this shift. All needs met. Bed low/locked. No c/o pain. No c/o SOB. Call light in reach. Will continue to monitor pt and give report to oncoming RN.      Peripheral IV 31/31/33 Left Cephalic (Active)   Site Assessment Clean, dry, & intact 10/20/2019  2:23 PM   Phlebitis Assessment 0 10/20/2019  2:23 PM   Infiltration Assessment 0 10/20/2019  2:23 PM   Dressing Status Clean, dry, & intact 10/20/2019  2:23 PM   Dressing Type Transparent;Tape 10/20/2019  2:23 PM   Hub Color/Line Status Flushed;Patent 10/20/2019  2:23 PM   Action Taken Dressing changed 10/20/2019  8:45 AM   Alcohol Cap Used No 10/20/2019 10:51 AM

## 2019-10-20 NOTE — PROGRESS NOTES
Nursing called -pt has only one iv access- flushing. CT department want iv access at or above cubital fossa- nursing staff and er nurse tried but couldn't get iv access. Called anesthesia - said is going for epidural and will come as soon as possible. Spoke to Zayda- explained the situation.   Pt will be transferred to [facility].

## 2019-10-20 NOTE — PROGRESS NOTES
10/20/2019    Admit Date: 10/19/2019    Subjective:   CHIEF COMPLAINT- swelling  HPI      Overall- stable- 1 stool           Diet-clear    Appetite-poor     Nausea-yes- on reglan   Vomiting-no          Pain-min            BM-1 formed black stool   Bleeding-not active    Medications-reviewed and adjusted as appropriate   IV FLUIDS-reviewed      FAM HX-per H&P   SH-per H&P   tob-no            etoh-no      Past Medical History:   Diagnosis Date    Cervical stenosis of spine     Chronic kidney disease     Stage 3    Degenerative disc disease, lumbar     Diabetes mellitus type II     uncontrolled-insulin and oral med. 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 (HCC)     R - Moderate, L - Mild    Diastolic congestive heart failure (HCC)     Diverticulosis of colon June 2010    DVT (deep venous thrombosis) ([facility])     RLE    Extrinsic allergic asthma     GERD (gastroesophageal reflux disease)     Hx of colonic polyps 07/29/2010    Hyperplastic     Hyperlipidemia LDL goal < 70     Hypertension     Obstructive sleep apnea     Non-Compliant with CPAP    Perennial allergic rhinitis with seasonal variation     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 ([address]     Pulmonary embolism ([address] Mar 2014    Bilateral - Completed 6 months on Xarelto    TIA (transient ischemic attack) 11/9/2017    Type 2 diabetes, uncontrolled, with neuropathy ([address] 1/22/2015               Past Surgical History:   Procedure Laterality Date    COLONOSCOPY  07/29/2010    Diverticulosis & Colon/Rectal Polyps - Due 2020    EGD  5/9/2011         HX BUNIONECTOMY Bilateral     HX HERNIA REPAIR Bilateral     Inguinal, Repeat on Both Sides Separately    HX KNEE ARTHROSCOPY  1991    x 3    SINUS SURGERY PROC UNLISTED      TOTAL KNEE ARTHROPLASTY Right 2006       ROS--                 RESP-neg            CARDIAC-neg -neg- less urine             Further ROS as per PMH and PSH- see problem list                            Objective:     Visit Vitals  /63 (BP 1 Location: Right arm)   Pulse 73   Temp 98.4 °F (36.9 °C)   Resp 18   Wt 125.3 kg (276 lb 3.2 oz)   SpO2 97%   BMI 44.58 kg/m²         Intake/Output Summary (Last 24 hours) at 10/20/2019 0824  Last data filed at 10/20/2019 3110  Gross per 24 hour   Intake    Output 200 ml   Net -200 ml       EXAM:     NEURO-a&o    HEENT-wnl    LUNGS-clear       COR-rrr        ABD-soft , min tenderness, no rebound, good bs        EXT-no edema                         LABS-  Lab Results   Component Value Date/Time    WBC 5.4 10/20/2019 05:39 AM    RBC 3.03 (L) 10/20/2019 05:39 AM    HGB 9.1 (L) 10/20/2019 05:39 AM    HCT 29.0 (L) 10/20/2019 05:39 AM    PLATELET 723 (L) 69/72/5729 05:39 AM     Lab Results   Component Value Date/Time    Sodium 148 (H) 10/20/2019 05:39 AM    Potassium 3.5 10/20/2019 05:39 AM    Chloride 116 (H) 10/20/2019 05:39 AM    CO2 24 10/20/2019 05:39 AM    Anion gap 8 10/20/2019 05:39 AM    Glucose 70 10/20/2019 05:39 AM    BUN 5 (L) 10/20/2019 05:39 AM    Creatinine 0.87 10/20/2019 05:39 AM    GFR est AA >60 10/20/2019 05:39 AM    GFR est non-AA >60 10/20/2019 05:39 AM    Calcium 7.9 (L) 10/20/2019 05:39 AM    Magnesium 2.3 10/02/2019 07:04 AM    Albumin 2.8 (L) 10/15/2019 08:18 PM    Bilirubin, total 0.2 10/15/2019 08:18 PM    Protein, total 6.3 10/15/2019 08:18 PM    Globulin 3.5 10/15/2019 08:18 PM    A-G Ratio 0.8 (L) 10/15/2019 08:18 PM    AST (SGOT) 25 10/15/2019 08:18 PM    ALT (SGPT) 19 10/15/2019 08:18 PM           TRANSFUSION- total of 5 units since admit    Assessment:     Principal Problem:    GI bleed (10/15/2019)    Active Problems:    Essential hypertension, benign (1/22/2015)      Type 2 diabetes, uncontrolled, with neuropathy ([facility] (1/22/2015)      Pulmonary hypertension ([facility] (72/00/1048)      Diastolic CHF, chronic ([facility] (3/20/2017)      Sleep apnea (11/9/2017)      Type 2 diabetes with nephropathy ([facility] (5/11/2018)    10/19/19  probable diverticular bleed  Asa and plavix on hold  Wants to restart his lasix  Doesn't like zofran    10/20/19  Probable cecal mass which makes more sense than cecal diverticulitis but it is unusual for have this type of bleed from a mass w/o anticoagulation- he was on plavix though  Could be 2 separate issues-   At this point he will need a colonoscopy and probable resection but needs IVC first  Fully discussed with pt    Plan:     After IVC will prep for colo and get surgery involved if needed   Dr. Luis Prude will follow up tomorrow       [address]   Gustavo Wallace MD

## 2019-10-20 NOTE — PROGRESS NOTES
Radiologist notified this nurse that Pt has a Right Lung PE and he would post rest of the finding with in 5 mins.  notified, no orders received at this time.

## 2019-10-20 NOTE — PROGRESS NOTES
This nurse tried to insert a new IV on the Right AC but failed. Called nursing supervisor to start a new IV line but didn't find a vein. Casey Cabrera RN from ER, tried twice but failed. 2036 Talked to Dr. Melany Sanchez and informed him that no IV line hasn't been accessed in his AC's. Asked if he wants to call anaesthesiologist to try insert a line or transport patient to DT and do the CTA in there. Dr. Melany Sanchez will call Anaesthesiologist but may proceed to call transport. If transport comes and Anaesthesiologist is not yet in the floor, proceed to transport the DT.

## 2019-10-20 NOTE — PROGRESS NOTES
Hospitalist Progress Note    10/20/2019  Admit Date: 10/19/2019 10:44 PM   NAME: George Toro   :  1940   MRN:  [id number]   Attending: Chris Vera MD  PCP:  Ibrahima Armenta MD    SUBJECTIVE:   George Toro is a 70 y.o. male with hx DM, DVT/PE, diastolic CHF, CKD 3, asthma who presents to ED with several episodes of copious blood and clots per rectum. No pain but has had episodes of acute urgency. He was admitted -10/1 for cecal diverticulitis. He has completed antibiotics. Last hemoglobin checked was 10.7 on 10/8. On admission was 8.6 with gross blood and clots seen in bedside commode. His last colonoscopy over 10 years ago and he had polyps. He takes aspirin and plavix. No chest pain or shortness of breath. GI consulted and patient was admitted for concern for diverticular bleed. GI - no colonoscopy secondary to recent diverticulitis. Had NM red blood tag scan- by the time it was dont pt dint have any bleeding and scan was normal. Pt had in total 4 units of blood. Bleed 10/18,10/19. The patient transferred over from [place] to . CT abdomen and pelvis was done, and it showed pulmonary emboli in the right lung. Also showed possible colonic mass which is probably what is responsible for his bleeding. Venous Dopplers ordered to see if that is where the clot is coming from. Due to his GI bleeding and recent need for transfusion he was not started on anticoagulation. He is asymptomatic from his PE. If he does have lower extremity DVT probably requires an IVC filter. 10/20/2019: No Events over night. States abd pain that was in rlq is resolved. No further gross bleeding. +Fatigue, weakness, slight dizziness upon standing. No CP, SOB. No other new complaints.      Review of Systems negative with exception of pertinent positives noted above  PHYSICAL EXAM     Visit Vitals  /67 (BP 1 Location: Right leg)   Pulse 77   Temp 97.5 °F (36.4 °C)   Resp 18   Wt 125.3 kg (276 lb 3.2 oz)   SpO2 94%   BMI 44.58 kg/m²      Temp (24hrs), Av °F (36.7 °C), Min:97.5 °F (36.4 °C), Max:98.5 °F (36.9 °C)    Oxygen Therapy  O2 Sat (%): 94 % (10/20/19 1106)    Intake/Output Summary (Last 24 hours) at 10/20/2019 1356  Last data filed at 10/20/2019 1104  Gross per 24 hour   Intake    Output 950 ml   Net -950 ml      General: Alert and oriented, No acute distress. Morbidly obese  Eye: EOMI, Normal conjunctiva, Vision Unchanged. HENT: Normocephalic, atraumatic, Normal hearing, dry mucous membranes. Neck: Supple, Non-tender. Respiratory: Lungs are clear to auscultation, Respirations are non-labored. Cardiovascular: Normal rate, Regular rhythm, No murmur. Gastrointestinal: Soft, Non-tender, +distended, positive bowel sounds   Musculoskeletal: No cyanosis, clubbing. 1+ BL LE edema. Integumentary: Warm, Dry, Pink, no rash. Neurologic: Alert, Oriented, No focal deficits. Cognition and Speech: Oriented, Speech clear and coherent. Psychiatric: Cooperative, Appropriate mood & affect. ASSESSMENT      GI BLEED with Acute Blood Loss Anemia  - GI Following  - s/p 4 units PBRCs  - holding anticoagulation, asa and plavix  - clear liquids  - colonoscopy anticipated  - ppi  - concern for cecal mass and probable resection needed    PE, asymptomatic   - (history of PE completed 6 mo of xarelto)  - IVC Filter planned  - high risk of bleed on anticoagulation  - lower ext dopplers negative    Chronic Diastolic Congestive heart failure. - lasix    DM II with neuropathy.   - ACs qAC & qHS. - ISS. - Hold oral hypoglycemic medications. - Adjust medications as necessary based on each 24 hour insulin requirements. - A1C 8.4      Hypertension  -  Continue home antihypertensives. -  Monitor and trend BP. Morbid Obesity   - Body mass index is 44.58 kg/m². - due to patient's BMI; diet, exercise and lifestyle changes advised.     HLD  - Continue Statin    Anxiety and Depression  - continue home medications     DVT ppx:  scd  Code status:  Full  Total Time spent: 30 minutes. Counseling & coordinating care dominated the encounter >55%. Counseled patient regarding dx, rx, tx. Reviewed prior records, labs, vitals, diagnostic tests, flow sheet, home medications, inpatient medications, nursing and provider notes.     Lin Choi PA-C, MPAS

## 2019-10-21 ENCOUNTER — PATIENT OUTREACH (OUTPATIENT)
Dept: CASE MANAGEMENT | Age: 79
End: 2019-10-21

## 2019-10-21 LAB
CEA SERPL-MCNC: 2 NG/ML (ref 0–3)
GLUCOSE BLD STRIP.AUTO-MCNC: 103 MG/DL (ref 65–100)
GLUCOSE BLD STRIP.AUTO-MCNC: 114 MG/DL (ref 65–100)
GLUCOSE BLD STRIP.AUTO-MCNC: 115 MG/DL (ref 65–100)
HCT VFR BLD AUTO: 31.4 % (ref 41.1–50.3)
HGB BLD-MCNC: 10.1 G/DL (ref 13.6–17.2)
HGB BLD-MCNC: 9.7 G/DL (ref 13.6–17.2)
HGB BLD-MCNC: 9.9 G/DL (ref 13.6–17.2)

## 2019-10-21 PROCEDURE — 85014 HEMATOCRIT: CPT

## 2019-10-21 PROCEDURE — 36415 COLL VENOUS BLD VENIPUNCTURE: CPT

## 2019-10-21 PROCEDURE — 82962 GLUCOSE BLOOD TEST: CPT

## 2019-10-21 PROCEDURE — 3331090002 HH PPS REVENUE DEBIT

## 2019-10-21 PROCEDURE — 3331090001 HH PPS REVENUE CREDIT

## 2019-10-21 PROCEDURE — 65660000000 HC RM CCU STEPDOWN

## 2019-10-21 PROCEDURE — 93306 TTE W/DOPPLER COMPLETE: CPT

## 2019-10-21 PROCEDURE — 82378 CARCINOEMBRYONIC ANTIGEN: CPT

## 2019-10-21 PROCEDURE — 74011250637 HC RX REV CODE- 250/637: Performed by: FAMILY MEDICINE

## 2019-10-21 PROCEDURE — 85018 HEMOGLOBIN: CPT

## 2019-10-21 RX ORDER — POLYETHYLENE GLYCOL 3350 17 G/17G
17 POWDER, FOR SOLUTION ORAL DAILY
Status: DISCONTINUED | OUTPATIENT
Start: 2019-10-21 | End: 2019-10-26 | Stop reason: HOSPADM

## 2019-10-21 RX ADMIN — METOCLOPRAMIDE HYDROCHLORIDE 10 MG: 10 TABLET ORAL at 05:35

## 2019-10-21 RX ADMIN — PANTOPRAZOLE SODIUM 40 MG: 40 TABLET, DELAYED RELEASE ORAL at 17:10

## 2019-10-21 RX ADMIN — FUROSEMIDE 40 MG: 40 TABLET ORAL at 08:54

## 2019-10-21 RX ADMIN — BUSPIRONE HYDROCHLORIDE 15 MG: 5 TABLET ORAL at 08:53

## 2019-10-21 RX ADMIN — FUROSEMIDE 40 MG: 40 TABLET ORAL at 17:09

## 2019-10-21 RX ADMIN — PREGABALIN 150 MG: 75 CAPSULE ORAL at 08:54

## 2019-10-21 RX ADMIN — TAMSULOSIN HYDROCHLORIDE 0.4 MG: 0.4 CAPSULE ORAL at 08:58

## 2019-10-21 RX ADMIN — PREGABALIN 150 MG: 75 CAPSULE ORAL at 17:09

## 2019-10-21 RX ADMIN — METOCLOPRAMIDE HYDROCHLORIDE 10 MG: 10 TABLET ORAL at 17:09

## 2019-10-21 RX ADMIN — PANTOPRAZOLE SODIUM 40 MG: 40 TABLET, DELAYED RELEASE ORAL at 05:35

## 2019-10-21 RX ADMIN — Medication 10 ML: at 05:34

## 2019-10-21 RX ADMIN — Medication 10 ML: at 21:57

## 2019-10-21 RX ADMIN — ATORVASTATIN CALCIUM 80 MG: 40 TABLET, FILM COATED ORAL at 21:56

## 2019-10-21 RX ADMIN — Medication 10 ML: at 13:08

## 2019-10-21 RX ADMIN — METOCLOPRAMIDE HYDROCHLORIDE 10 MG: 10 TABLET ORAL at 21:57

## 2019-10-21 NOTE — PROGRESS NOTES
Care Management Interventions  PCP Verified by CM: Yes(was to see but unable to see due to being hospitalized)  Palliative Care Criteria Met (RRAT>21 & CHF Dx)?: No(RRAT 53 Dx GI bleed)  Transition of Care Consult (CM Consult): Discharge Planning  Discharge Durable Medical Equipment: No(Walker, cane, BSC)  Physical Therapy Consult: No  Occupational Therapy Consult: No  Speech Therapy Consult: No  Current Support Network: Relative's Home(lives with brother in an apartment )  Confirm Follow Up Transport: Family(sister and brother)  Plan discussed with Pt/Family/Caregiver: Yes  Freedom of Choice Offered: Yes  Discharge Location  Discharge Placement: Home with home health  Met with patient for d/c planning. Patient alert and oriented x 3, independent of ADL's and lives with his brother in an apartment. DME includes walker, cane and BSC. His transportation is provided by his sister or brother. He is current with [facility] for RN/PT/SW and also followed by ambulatory care. He obtains medications at [facility] in [place]. Current d/c plan is home with [facility] [place] [facility] resumption.

## 2019-10-21 NOTE — PROGRESS NOTES
[address] October 21, 2019       RE: Stewart Garcia      To Whom It May Concern,    This is to certify that Ms Talita Gu was here for MrPili Valdiviaeagleana rosa JWM may return to work on 10/22/19. Please feel free to contact my office if you have any questions or concerns. Thank you for your assistance in this matter.       Sincerely,  Luz Lazaro) [place], [address] [place] [address]

## 2019-10-21 NOTE — CONSULTS
Department of Interventional Radiology  [phone]        Consult Note     Patient: Andres Torres MRN: [id number]  SSN: [id number]    YOB: 1940  Age: 70 y.o. Sex: male      Referring Physician: Hospitalist    Consult Date: 10/21/2019     Subjective:     Chief Complaint: PE    History of Present Illness: Andres Torres is a 70 y.o. male who is seen in consultation for consideration of IVC filter placement. Pt presented to ED with bleeding from rectum. Imaging reveals likely a cecal mass and PE.  LE US negative for PE. Colonoscopy will likely be performed in the next couple of days. Last bloody stool was 2 days ago. He had been Plavix and aspirin for TIAs since 2017. Hx RLE DVT on at least 2 occasions-by history-I see no record of this but he states first event was prior to his halfway in 2005. He does not recall hx PE; although, this is documented in his chart. He will need to be treated for his PE but perioperatively will need to be bridged with an IVC filter, assuming that he will ultimately need a colon resection.       Past Medical History:   Diagnosis Date    Cervical stenosis of spine     Chronic kidney disease     Stage 3    Degenerative disc disease, lumbar     Diabetes mellitus type II     uncontrolled-insulin and oral med. 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 ([facility]     Pulmonary embolism ([facility] Mar 2014    Bilateral - Completed 6 months on Xarelto    TIA (transient ischemic attack) 11/9/2017    Type 2 diabetes, uncontrolled, with neuropathy ([facility] 1/22/2015     Past Surgical History:   Procedure Laterality Date    COLONOSCOPY  07/29/2010    Diverticulosis & Colon/Rectal Polyps - Due 2020    EGD  5/9/2011         HX BUNIONECTOMY Bilateral     HX HERNIA REPAIR Bilateral     Inguinal, Repeat on Both Sides Separately    HX KNEE ARTHROSCOPY  1991    x 3    SINUS SURGERY PROC UNLISTED      TOTAL KNEE ARTHROPLASTY Right 2006      Family History   Problem Relation Age of Onset    Stroke Mother     Diabetes Mother     Heart Disease Mother     Diabetes Maternal Grandmother     Glaucoma Maternal Grandmother     Stroke Father     Diabetes Father     Diabetes Paternal Aunt     Cancer Paternal Aunt     Diabetes Paternal Uncle     Cancer Paternal Uncle         Colon    Heart Attack Sister 76    Cancer Sister     Prostate Cancer Brother     Heart Disease Sister 48    Prostate Cancer Brother      Social History     Tobacco Use    Smoking status: Never Smoker    Smokeless tobacco: Never Used   Substance Use Topics    Alcohol use: No      Allergies   Allergen Reactions    Vasotec [Enalapril Maleate] Shortness of Breath and Cough     Current Facility-Administered Medications   Medication Dose Route Frequency Provider Last Rate Last Dose    polyethylene glycol (MIRALAX) packet 17 g  17 g Oral DAILY Angel Luis Sierra NP   Stopped at 10/21/19 1000    0.9% sodium chloride infusion 250 mL  250 mL IntraVENous PRN Bebeto Peña MD        ondansetron (ZOFRAN) injection 4 mg  4 mg IntraVENous Q4H PRN Bebeto Peña MD   4 mg at 10/20/19 0808    sodium chloride (NS) flush 5-40 mL  5-40 mL IntraVENous Q8H Sue Lau MD   10 mL at 10/21/19 1308    sodium chloride (NS) flush 5-40 mL  5-40 mL IntraVENous PRN Bebeto Peña MD        dextrose (D50W) injection syrg 12.5-25 g  25-50 mL IntraVENous PRN Alex Pal MD        dextrose 40% (GLUTOSE) oral gel 1 Tube  15 g Oral PRN Alex Pal MD        glucagon (GLUCAGEN) injection 1 mg  1 mg IntraMUSCular PRN Alex Pal MD        acetaminophen (TYLENOL) tablet 1,000 mg  1,000 mg Oral Q6H PRN Alex Pal MD   1,000 mg at 10/20/19 2215    albuterol (PROVENTIL VENTOLIN) nebulizer solution 2.5 mg  2.5 mg Nebulization Q4H PRN Alex Pal MD        atorvastatin (LIPITOR) tablet 80 mg  80 mg Oral QHS Alex Pal MD   80 mg at 10/20/19 2214    busPIRone (BUSPAR) tablet 15 mg  15 mg Oral DAILY Alex Pal MD   15 mg at 10/21/19 0853    furosemide (LASIX) tablet 40 mg  40 mg Oral BID Alex Pal MD   40 mg at 10/21/19 0854    insulin lispro (HUMALOG) injection   SubCUTAneous AC&HS Alex Pal MD   Stopped at 10/20/19 2200    metoclopramide HCl (REGLAN) tablet 10 mg  10 mg Oral AC&HS Alex Pal MD   Stopped at 10/21/19 1130    pantoprazole (PROTONIX) tablet 40 mg  40 mg Oral ACB&D Alex Pal MD   40 mg at 10/21/19 0535    pregabalin (LYRICA) capsule 150 mg  150 mg Oral BID Alex Pal MD   150 mg at 10/21/19 0854    tamsulosin (FLOMAX) capsule 0.4 mg  0.4 mg Oral DAILY Alex Pal MD   0.4 mg at 10/21/19 0858       Medications Prior to Admission   Medication Sig    aspirin delayed-release 81 mg tablet Take 81 mg by mouth daily.  furosemide (LASIX) 20 mg tablet Take 2 Tabs by mouth two (2) times a day.  irbesartan (AVAPRO) 150 mg tablet Take 1 Tab by mouth nightly. Indications: chronic heart failure, high blood pressure    tamsulosin (FLOMAX) 0.4 mg capsule Take 1 Cap by mouth daily.  busPIRone (BUSPAR) 15 mg tablet Take 1 Tab by mouth daily.  metFORMIN (GLUCOPHAGE) 1,000 mg tablet Take 1 Tab by mouth two (2) times daily (with meals).     potassium chloride (KLOR-CON M20) 20 mEq tablet TAKE 1 TABLET TWICE A DAY    atorvastatin (LIPITOR) 80 mg tablet Take 1 Tab by mouth nightly.  Insulin Needles, Disposable, (1ST TIER UNIFINE PENTIPS) 32 gauge x 5/32\" ndle Use to inject insulin    lancets misc Use to test glucose 4 times/day. Dx: E11.51, I10    glucose blood VI test strips (FREESTYLE LITE STRIPS) strip Use to check glucose 4 times/day. Dx: E11.51, I10    insulin aspart U-100 (NOVOLOG FLEXPEN U-100 INSULIN) 100 unit/mL (3 mL) inpn Inject 8 units standing dose + additional insulin according to sliding scale (up to 16 units) subQ before each meal (Patient taking differently: Inject 8 units standing dose + additional insulin according to sliding scale (up to 16 units) subQ before each meal    151-200 = 2 units  201-250 = 4 units  251-300 = 6 units  201-350 = 8 units)    furosemide (LASIX) 40 mg tablet Take 1 Tab by mouth three (3) times daily.  raNITIdine (ZANTAC) 150 mg tablet Take 150 mg by mouth two (2) times a day.  insulin degludec (TRESIBA FLEXTOUCH U-100) 100 unit/mL (3 mL) inpn 135 Units by SubCUTAneous route daily.  pantoprazole (PROTONIX) 40 mg tablet Take 1 tablet po 30 minutes before breakfast    ezetimibe (ZETIA) 10 mg tablet Take 1 Tab by mouth nightly.  clopidogrel (PLAVIX) 75 mg tab Take 1 Tab by mouth daily.  Insulin Syringe-Needle U-100 (BD INSULIN SYRINGE ULTRA-FINE) 0.3 mL 31 gauge x 5/16\" syrg Use to injection insulin 3 times/day at meals. Dx: E11.40    Lancets misc Use to check glucose 4 times/day as directed. Dx: E11.40    pregabalin (LYRICA) 150 mg capsule Take 150 mg by mouth two (2) times a day.  loratadine (CLARITIN) 10 mg tablet Take 10 mg by mouth daily.  bisacodyl (DULCOLAX) 10 mg suppository Insert 10 mg into rectum daily as needed (Constipation).  cyanocobalamin (VITAMIN B12) 500 mcg tablet Take 1 Tab by mouth daily.  Indications: b12 deficiency    insulin syr/ndl U100 half rashida 0.3 mL 31 gauge x 15/64\" syrg Use to inject insulin    polyethylene glycol (MIRALAX) 17 gram/dose powder Take 17 g by mouth daily.  albuterol (VENTOLIN HFA) 90 mcg/actuation inhaler Take 2 Puffs by inhalation every four (4) hours as needed for Wheezing or Shortness of Breath. [address] [facility] 1690 Bed  Dx: Mild Diastolic Dysfunction (HMJ-13 I51.9)  Pulmonary Hypertension (ICD-10 I27.2)  Morbid Obesity (ICD-10 E66.01)  Lumbar DDD (ICD-10 M51.36)  Obstructive Sleep Apnea (ICD-10 G47.33)    multivit-min-FA-lycopen-lutein (CENTRUM SILVER) 0.4-300-250 mg-mcg-mcg tab Take 1 Tab by mouth daily. Allergies   Allergen Reactions    Vasotec [Enalapril Maleate] Shortness of Breath and Cough       Review of Systems:  A detailed 10 organ review of systems is obtained with pertinent positives as listed in the History of Present Illness and Past Medical History. All others are negative. Objective:     Physical Exam:  Vitals:    10/21/19 0003 10/21/19 0448 10/21/19 0853 10/21/19 1045   BP: 125/60 109/57 149/76 132/60   Pulse: 95 90 90 89   Resp: 20 18  18   Temp: 98.3 °F (36.8 °C) 98.1 °F (36.7 °C)  98 °F (36.7 °C)   SpO2: 94% 98%  98%   Weight:            Pain Assessment  Pain Intensity 1: 0 (10/21/19 0700)  Pain Location 1: Head  Pain Intervention(s) 1: Medication (see MAR)  Patient Stated Pain Goal: 0      HEART: regular rate and rhythm  LUNG: clear to auscultation bilaterally  ABDOMEN: normal findings: soft, non-tender, obese  EXTREMITIES: warm, + nonpitting edema  Lab/Data Review:  CMP: No results found for: NA, K, CL, CO2, AGAP, GLU, BUN, CREA, GFRAA, GFRNA, CA, MG, PHOS, ALB, TBIL, TP, ALB, GLOB, AGRAT, SGOT, ALT, GPT  CBC:   Lab Results   Component Value Date/Time    HGB 9.7 (L) 10/21/2019 05:20 AM     COAGS: No results found for: APTT, PTP, INR, INREXT, INREXT      Assessment/Plan:   PE, GI bleed, cecal mass. Plavix held. Colonoscopy will be in the next couple of days, and he will likely need surgery. We have recommended placement of an IVC filter perioperatively.  After answering all of his questions, he agrees to proceed. Procedure has been planned for tomorrow.      Dilan Holley [facility] Problems  Date Reviewed: 9/18/2019          Codes Class Noted POA    * (Principal) GI bleed ICD-10-CM: K92.2  ICD-9-CM: 578.9  10/15/2019 Yes        Type 2 diabetes with nephropathy ([facility] ICD-10-CM: E11.21  ICD-9-CM: 250.40, 583.81  5/11/2018 Yes        Sleep apnea ICD-10-CM: G47.30  ICD-9-CM: 780.57  11/9/2017 Yes        Diastolic CHF, chronic (HCC) (Chronic) ICD-10-CM: I50.32  ICD-9-CM: 428.32, 428.0  3/20/2017 Yes        Pulmonary hypertension (HCC) (Chronic) ICD-10-CM: I27.20  ICD-9-CM: 416.8  10/14/2015 Yes        Hyperlipidemia with target LDL less than 70 (Chronic) ICD-10-CM: E78.5  ICD-9-CM: 272.4  Unknown Yes        Essential hypertension, benign (Chronic) ICD-10-CM: I10  ICD-9-CM: 401.1  1/22/2015 Yes        Type 2 diabetes, uncontrolled, with neuropathy (HCC) (Chronic) ICD-10-CM: E11.40, E11.65  ICD-9-CM: 250.62, 357.2  1/22/2015 Yes        Morbid obesity ([address] (Chronic) ICD-10-CM: E66.01  ICD-9-CM: 278.01  6/27/2011 Yes

## 2019-10-21 NOTE — PROGRESS NOTES
[facility] Progress Note         Admit Date:  10/19/2019    Today's Date:  10/21/2019    CC: GIB; cecal mass    Subjective:     Patient: Patient unsure if he wants an IVC filter; He is agreeable to colonoscopy. Denies any abdominal pain. He has some nausea, but no vomiting. He has had constipation since admission. No BM in 3 days. Medications:   Current Facility-Administered Medications   Medication Dose Route Frequency    0.9% sodium chloride infusion 250 mL  250 mL IntraVENous PRN    ondansetron (ZOFRAN) injection 4 mg  4 mg IntraVENous Q4H PRN    sodium chloride (NS) flush 5-40 mL  5-40 mL IntraVENous Q8H    sodium chloride (NS) flush 5-40 mL  5-40 mL IntraVENous PRN    dextrose (D50W) injection syrg 12.5-25 g  25-50 mL IntraVENous PRN    dextrose 40% (GLUTOSE) oral gel 1 Tube  15 g Oral PRN    glucagon (GLUCAGEN) injection 1 mg  1 mg IntraMUSCular PRN    acetaminophen (TYLENOL) tablet 1,000 mg  1,000 mg Oral Q6H PRN    albuterol (PROVENTIL VENTOLIN) nebulizer solution 2.5 mg  2.5 mg Nebulization Q4H PRN    atorvastatin (LIPITOR) tablet 80 mg  80 mg Oral QHS    busPIRone (BUSPAR) tablet 15 mg  15 mg Oral DAILY    furosemide (LASIX) tablet 40 mg  40 mg Oral BID    insulin lispro (HUMALOG) injection   SubCUTAneous AC&HS    metoclopramide HCl (REGLAN) tablet 10 mg  10 mg Oral AC&HS    pantoprazole (PROTONIX) tablet 40 mg  40 mg Oral ACB&D    pregabalin (LYRICA) capsule 150 mg  150 mg Oral BID    tamsulosin (FLOMAX) capsule 0.4 mg  0.4 mg Oral DAILY       Review of Systems:  ROS was obtained, with pertinent positives as listed above. No chest pain or SOB. Diet:  Clear liquid diet    Objective:   Vitals:  Visit Vitals  /57 (BP 1 Location: Right arm, BP Patient Position: At rest)   Pulse 90   Temp 98.1 °F (36.7 °C)   Resp 18   Wt 125.3 kg (276 lb 3.2 oz)   SpO2 98%   BMI 44.58 kg/m²     Intake/Output:  No intake/output data recorded.   10/19 1901 - 10/21 0700  In: - Out: 950 [Urine:950]  Exam:  General appearance: alert, cooperative, no distress OVERWEIGHT  Lungs: clear to auscultation bilaterally anteriorly  Heart: regular rate and rhythm  Abdomen: soft, non-tender. Bowel sounds normal. No masses, no organomegaly  Extremities: extremities normal, atraumatic, no cyanosis or BILATERAL   Neuro:  alert and oriented    Data Review (Labs):    Recent Labs     10/21/19  0520 10/20/19  2159 10/20/19  1353 10/20/19  0539 10/19/19  2134 10/19/19  1852 10/19/19  1249 10/19/19  0539 10/18/19  2027 10/18/19  1359   WBC  --   --   --  5.4  --   --   --   --   --   --    HGB 9.7* 9.1* 9.9* 9.1* 9.5*  --  10.2* 9.4* 6.7* 8.1*   HCT  --   --   --  29.0*  --   --   --   --   --   --    PLT  --   --   --  145*  --   --   --   --   --   --    MCV  --   --   --  95.7  --   --   --   --   --   --    NA  --   --   --  148*  --  146*  --   --   --   --    K  --   --   --  3.5  --  4.2  --   --   --   --    CL  --   --   --  116*  --  117*  --   --   --   --    CO2  --   --   --  24  --  21  --   --   --   --    BUN  --   --   --  5*  --  6*  --   --   --   --    CREA  --   --   --  0.87  --  1.02  --   --   --   --    CA  --   --   --  7.9*  --  8.3  --   --   --   --    GLU  --   --   --  70  --  106*  --   --   --   --        Assessment:     Principal Problem:    GI bleed (10/15/2019)    Active Problems:     Morbid obesity ([facility] (6/27/2011)      Essential hypertension, benign (1/22/2015)      Type 2 diabetes, uncontrolled, with neuropathy ([address] (1/22/2015)      Hyperlipidemia with target LDL less than 70 ()      Pulmonary hypertension ([facility] (90/74/0080)      Diastolic CHF, chronic (HCC) (3/20/2017)      Sleep apnea (11/9/2017)      Type 2 diabetes with nephropathy ([facility] (5/11/2018)    71 yo male patient of Dr. Beckie You with PMH of but not limited to CKD III, DM II, diabetic retinopathy, diastolic CHF - on ASA and Plavix, DVT, PE, diverticulosis, colon polyps, sleep apnea, who we are following for GI bleeding and probable cecal mass on CT 10/19. He was admitted 25 Sept 2019 to 1 Oct 2019 for cecal diverticulitis, treated with ATBXs, and has had rectal bleeding since 15 Oct 2019. He was noted to have hgb 8.6 in the ER, down from 10.7 on 8 Oct 2019, and has decreased to 7.7 now 9.7 on 10/21. His ASA and Plavix are on hold with plans for IVC filter. He has a new PE on CT as well. No DVT on US. Plan:     -Plan for IVC filter per primary team-Discussed with Fran Alvarez, NP. She will discuss importance of IVC filter with patient  -Will need colonoscopy once IVC complete  -Miralax 17 gm daily  -Monitor H&H and transfuse as needed.  -Call for increased bleeding  -Keep on clear liquid diet    Seng Abreu.  Geovanna Kelley in collaboration with Dr. Martin Bedolla  [facility]

## 2019-10-21 NOTE — PROGRESS NOTES
This note will not be viewable in [address] Opened chart for [employer] call. Patient is noted to be current  Inpatient. Patient to be reassigned pending discharge disposition.

## 2019-10-21 NOTE — PROGRESS NOTES
Problem: Falls - Risk of  Goal: *Absence of Falls  Description  Document Lisha Frye Fall Risk and appropriate interventions in the flowsheet.   Outcome: Progressing Towards Goal  Note:   Fall Risk Interventions:  Mobility Interventions: PT Consult for mobility concerns, OT consult for ADLs, Patient to call before getting OOB         Medication Interventions: Patient to call before getting OOB    Elimination Interventions: Call light in reach, Patient to call for help with toileting needs, Stay With Me (per policy), Toileting schedule/hourly rounds, Urinal in reach, Toilet paper/wipes in reach    History of Falls Interventions: Door open when patient unattended, Investigate reason for fall, Room close to nurse's station         Problem: Patient Education: Go to Patient Education Activity  Goal: Patient/Family Education  Outcome: Progressing Towards Goal

## 2019-10-21 NOTE — PROGRESS NOTES
Interdisciplinary Rounds completed 10/21/19. Nursing, Case Management, Physician and PT present. Plan of care reviewed and updated.

## 2019-10-21 NOTE — PROGRESS NOTES
Initial visit by  to convey care and concern and encourage patient that  services are available if desired. Offered spiritual interventions during the visit. Abbreviated visit to allow patient care. Chaplains remain available for support.      Ziggy Salamanca 70  Board Certified

## 2019-10-21 NOTE — PROGRESS NOTES
Patient transferred from [facility]. Per previous CM note, no needs identified at this time.

## 2019-10-21 NOTE — PROGRESS NOTES
Hospitalist Progress Note    Subjective:   Daily Progress Note: 10/21/2019 1602    Patient presented to  ER 10/1 with complaints of rectal bleeding episodes x 2 including copious blood clots over the prior 24 hours with diaphoresis and lightheadedness. Another bloody BM with clots on arrival to ER. Adm Hgb 8.6, down from 10.7 a couple of weeks ago. Also admitted 9/25-10/1 for cecal diverticulitis and UTI. No colonoscopy due to diverticulitis. On ASA and plavix. Last colonoscopy 2010 with colorectal polyps and left sided diverticulosis. EGD 2011 normal.   10/16:  Transfused. GI in. Hgb down to 7.7, holding ASA and plavix. 10/17:  Hgb: 6.6, transfused 2 units. 10/18:  More bleeding. Tagged RBC scan done after,was negative. 10/19:  Total 5 units PRBC transfused. Lasix restarted. Transferred to [place] [id number] for ongoing GI care. 10/20: Incidental finding of right lung PE. Also found possible colonic mass. anticoags not begun at present due to bleeding. Asymptomatic from PE.     10/21:  Up in chair. Frustrated regarding time here and diagnosis. Annoyed in general.  Plans for IVC filter after much reluctance on patient's part. Extensive education by multiple disciplines. GI with plans for colo after ivc placement. No bleeding today. ADDITIONAL HISTORY: DM II, DVT, PE, Diastolic CHF, CKD III, asthma, morbid obesity, diabetic retinopathy, cervical stenosis, hyperlipidemia, hypertension, MARLEEN: non compliant with CPAP.      Current Facility-Administered Medications   Medication Dose Route Frequency    0.9% sodium chloride infusion 250 mL  250 mL IntraVENous PRN    ondansetron (ZOFRAN) injection 4 mg  4 mg IntraVENous Q4H PRN    sodium chloride (NS) flush 5-40 mL  5-40 mL IntraVENous Q8H    sodium chloride (NS) flush 5-40 mL  5-40 mL IntraVENous PRN    dextrose (D50W) injection syrg 12.5-25 g  25-50 mL IntraVENous PRN    dextrose 40% (GLUTOSE) oral gel 1 Tube  15 g Oral PRN    glucagon (GLUCAGEN) injection 1 mg  1 mg IntraMUSCular PRN    acetaminophen (TYLENOL) tablet 1,000 mg  1,000 mg Oral Q6H PRN    albuterol (PROVENTIL VENTOLIN) nebulizer solution 2.5 mg  2.5 mg Nebulization Q4H PRN    atorvastatin (LIPITOR) tablet 80 mg  80 mg Oral QHS    busPIRone (BUSPAR) tablet 15 mg  15 mg Oral DAILY    furosemide (LASIX) tablet 40 mg  40 mg Oral BID    insulin lispro (HUMALOG) injection   SubCUTAneous AC&HS    metoclopramide HCl (REGLAN) tablet 10 mg  10 mg Oral AC&HS    pantoprazole (PROTONIX) tablet 40 mg  40 mg Oral ACB&D    pregabalin (LYRICA) capsule 150 mg  150 mg Oral BID    tamsulosin (FLOMAX) capsule 0.4 mg  0.4 mg Oral DAILY        Review of Systems  A comprehensive review of systems was negative except for that written in the HPI. Objective:     Visit Vitals  /57 (BP 1 Location: Right arm, BP Patient Position: At rest)   Pulse 90   Temp 98.1 °F (36.7 °C)   Resp 18   Wt 125.3 kg (276 lb 3.2 oz)   SpO2 98%   BMI 44.58 kg/m²           Temp (24hrs), Av °F (36.7 °C), Min:97.4 °F (36.3 °C), Max:98.9 °F (37.2 °C)    10/19 1901 - 10/21 0700  In: -   Out: 950 [Urine:950]    General appearance: Oriented, alert, up in chair. Questioning all treatments and findings. multiple of the same questions answered by multiple staff members. Sour in general.  Angry. Wants to eat. Morbidly obese. Head: Normocephalic, without obvious abnormality, atraumatic  Throat: Lips, mucosa, and tongue normal. Teeth and gums normal  Neck: Thick, supple, symmetrical, trachea midline, and no JVD  Lungs: clear to auscultation bilaterally, diminished in bases. Heart: regular rate and rhythm, S1, S2 normal, no murmur, click, rub or gallop  Abdomen: Protuberant, soft, non-tender. Bowel sounds normal. No masses,  no organomegaly  Extremities: extremities normal, atraumatic, no cyanosis.   Dependent distal edema  Skin: Skin color, texture, turgor normal. No rashes or lesions  Neurologic: Grossly normal    Additional comments: Notes,orders, test results, vitals reviewed    Data Review  Recent Results (from the past 24 hour(s))   GLUCOSE, POC    Collection Time: 10/20/19 11:08 AM   Result Value Ref Range    Glucose (POC) 97 65 - 100 mg/dL   HEMOGLOBIN    Collection Time: 10/20/19  1:53 PM   Result Value Ref Range    HGB 9.9 (L) 13.6 - 17.2 g/dL   GLUCOSE, POC    Collection Time: 10/20/19  4:06 PM   Result Value Ref Range    Glucose (POC) 152 (H) 65 - 100 mg/dL   GLUCOSE, POC    Collection Time: 10/20/19  9:11 PM   Result Value Ref Range    Glucose (POC) 146 (H) 65 - 100 mg/dL   HEMOGLOBIN    Collection Time: 10/20/19  9:59 PM   Result Value Ref Range    HGB 9.1 (L) 13.6 - 17.2 g/dL   HEMOGLOBIN    Collection Time: 10/21/19  5:20 AM   Result Value Ref Range    HGB 9.7 (L) 13.6 - 17.2 g/dL   CEA    Collection Time: 10/21/19  5:20 AM   Result Value Ref Range    CEA 2.0 0.0 - 3.0 ng/mL   GLUCOSE, POC    Collection Time: 10/21/19  7:19 AM   Result Value Ref Range    Glucose (POC) 103 (H) 65 - 100 mg/dL     10/18:  RBC LABEL SCAN: No scintigraphic evidence of GI bleed. 10/19:  CT ABDOMEN AND PELVIS: Right upper and middle lobe pulmonary emboli, captured at the periphery of the imaging field of view. Wall thickening of the cecum, suspicious for neoplasm. Correlation with colonoscopy should be considered. No evidence of appendicitis, colitis, bowel obstruction or bowel perforation    10/20:  BILATERAL LE U/S: No evidence of deep venous thrombosis in either lower extremity.      Assessment/Plan:   Acute GI Bleed with hi story of same   5 units PRBC transfused to date   Monitor closely   GI on board with plans for colo Thursday, Oct  24   CT with findings of mass  Incidental finding PE, no DVT   No anticoagulation now due to acute GI bleed   IVC filter planned for tomorrow  Morbid obesity    Concern for [place] due to body habitus  and current situation    Get overnight sleep oximetry prior to  discharge  Essential hypertension: Home meds   Monitor  IDDM II:  A1C: 8.4   Holding all po diabetic meds   Continue SSI and diabetic diet when eating  Hyperlipidemia:  Home meds  Pulmonary hypertension    Lasix reinstated   Monitor  Chronic Diastolic CHF   Monitor   Observe for fluid overload  MARLEEN without CPAP   Classic for [place]   Overnight sleep study   HS oxygen   Elevate HOB when sleeping    Care Plan discussed with: Patient, CM, GI and Nurse    Signed By: Oswaldo Anguiano NP     October 21, 2019

## 2019-10-21 NOTE — CALL BACK NOTE
Received message from nursing that patient upset that GI didn't explain why he needs IVC filter and is refusing consent until we speak to him again. Long discussion with patient about history of anticoagulation with ASA and plavix now with new PE that needs treatment. Discussed cecal mass and need for colonoscopy to evaluate further. Discussed increased risk of bleeding of the mass with previous recent bleed. He wants details of the procedure. IR notified. They agree to send someone to his room to discuss the procedure with him. He will base his decision on this. If he receives IVC filter today, we can plan on colonoscopy on Wed. [place]. Lawyer [place] in collaboration with Dr. Nanci Christianson [facility]

## 2019-10-22 ENCOUNTER — APPOINTMENT (OUTPATIENT)
Dept: INTERVENTIONAL RADIOLOGY/VASCULAR | Age: 79
DRG: 356 | End: 2019-10-22
Attending: PHYSICIAN ASSISTANT
Payer: MEDICARE

## 2019-10-22 LAB
GLUCOSE BLD STRIP.AUTO-MCNC: 140 MG/DL (ref 65–100)
GLUCOSE BLD STRIP.AUTO-MCNC: 179 MG/DL (ref 65–100)
GLUCOSE BLD STRIP.AUTO-MCNC: 86 MG/DL (ref 65–100)
HGB BLD-MCNC: 10.8 G/DL (ref 13.6–17.2)
HGB BLD-MCNC: 8.9 G/DL (ref 13.6–17.2)

## 2019-10-22 PROCEDURE — 74011250637 HC RX REV CODE- 250/637: Performed by: FAMILY MEDICINE

## 2019-10-22 PROCEDURE — 77030037400 HC ADH TISS HI VISC EXOFIN CHMP -B

## 2019-10-22 PROCEDURE — 77030004629 HC CATH ANGI SZ AUR COOK -B

## 2019-10-22 PROCEDURE — C1769 GUIDE WIRE: HCPCS

## 2019-10-22 PROCEDURE — 85018 HEMOGLOBIN: CPT

## 2019-10-22 PROCEDURE — 06H03DZ INSERTION OF INTRALUMINAL DEVICE INTO INFERIOR VENA CAVA, PERCUTANEOUS APPROACH: ICD-10-PCS | Performed by: RADIOLOGY

## 2019-10-22 PROCEDURE — 74011636320 HC RX REV CODE- 636/320: Performed by: RADIOLOGY

## 2019-10-22 PROCEDURE — 82962 GLUCOSE BLOOD TEST: CPT

## 2019-10-22 PROCEDURE — 74011636637 HC RX REV CODE- 636/637: Performed by: FAMILY MEDICINE

## 2019-10-22 PROCEDURE — C1894 INTRO/SHEATH, NON-LASER: HCPCS

## 2019-10-22 PROCEDURE — 65660000000 HC RM CCU STEPDOWN

## 2019-10-22 PROCEDURE — 36415 COLL VENOUS BLD VENIPUNCTURE: CPT

## 2019-10-22 PROCEDURE — 74011000250 HC RX REV CODE- 250: Performed by: RADIOLOGY

## 2019-10-22 PROCEDURE — 3331090002 HH PPS REVENUE DEBIT

## 2019-10-22 PROCEDURE — 99152 MOD SED SAME PHYS/QHP 5/>YRS: CPT

## 2019-10-22 PROCEDURE — C1880 VENA CAVA FILTER: HCPCS

## 2019-10-22 PROCEDURE — 3331090001 HH PPS REVENUE CREDIT

## 2019-10-22 PROCEDURE — 74011250636 HC RX REV CODE- 250/636: Performed by: RADIOLOGY

## 2019-10-22 RX ORDER — SODIUM CHLORIDE 9 MG/ML
25 INJECTION, SOLUTION INTRAVENOUS ONCE
Status: COMPLETED | OUTPATIENT
Start: 2019-10-22 | End: 2019-10-22

## 2019-10-22 RX ORDER — LIDOCAINE HYDROCHLORIDE 20 MG/ML
20-200 INJECTION, SOLUTION INFILTRATION; PERINEURAL
Status: DISCONTINUED | OUTPATIENT
Start: 2019-10-22 | End: 2019-10-22 | Stop reason: ALTCHOICE

## 2019-10-22 RX ORDER — MIDAZOLAM HYDROCHLORIDE 1 MG/ML
.25-2 INJECTION, SOLUTION INTRAMUSCULAR; INTRAVENOUS
Status: DISCONTINUED | OUTPATIENT
Start: 2019-10-22 | End: 2019-10-22 | Stop reason: ALTCHOICE

## 2019-10-22 RX ORDER — FENTANYL CITRATE 50 UG/ML
25-100 INJECTION, SOLUTION INTRAMUSCULAR; INTRAVENOUS
Status: DISCONTINUED | OUTPATIENT
Start: 2019-10-22 | End: 2019-10-22 | Stop reason: ALTCHOICE

## 2019-10-22 RX ORDER — HEPARIN SODIUM 200 [USP'U]/100ML
1000 INJECTION, SOLUTION INTRAVENOUS
Status: DISCONTINUED | OUTPATIENT
Start: 2019-10-22 | End: 2019-10-22 | Stop reason: ALTCHOICE

## 2019-10-22 RX ADMIN — FUROSEMIDE 40 MG: 40 TABLET ORAL at 17:43

## 2019-10-22 RX ADMIN — MIDAZOLAM 0.5 MG: 1 INJECTION INTRAMUSCULAR; INTRAVENOUS at 08:38

## 2019-10-22 RX ADMIN — BUSPIRONE HYDROCHLORIDE 15 MG: 5 TABLET ORAL at 07:40

## 2019-10-22 RX ADMIN — TAMSULOSIN HYDROCHLORIDE 0.4 MG: 0.4 CAPSULE ORAL at 07:40

## 2019-10-22 RX ADMIN — IOPAMIDOL 36 ML: 612 INJECTION, SOLUTION INTRAVENOUS at 08:44

## 2019-10-22 RX ADMIN — ATORVASTATIN CALCIUM 80 MG: 40 TABLET, FILM COATED ORAL at 21:11

## 2019-10-22 RX ADMIN — PREGABALIN 150 MG: 75 CAPSULE ORAL at 07:40

## 2019-10-22 RX ADMIN — LIDOCAINE HYDROCHLORIDE 100 MG: 20 INJECTION, SOLUTION INFILTRATION; PERINEURAL at 08:33

## 2019-10-22 RX ADMIN — SODIUM CHLORIDE 25 ML/HR: 900 INJECTION, SOLUTION INTRAVENOUS at 08:17

## 2019-10-22 RX ADMIN — FENTANYL CITRATE 25 MCG: 50 INJECTION INTRAMUSCULAR; INTRAVENOUS at 08:38

## 2019-10-22 RX ADMIN — PREGABALIN 150 MG: 75 CAPSULE ORAL at 17:43

## 2019-10-22 RX ADMIN — MIDAZOLAM 0.5 MG: 1 INJECTION INTRAMUSCULAR; INTRAVENOUS at 08:32

## 2019-10-22 RX ADMIN — PANTOPRAZOLE SODIUM 40 MG: 40 TABLET, DELAYED RELEASE ORAL at 16:12

## 2019-10-22 RX ADMIN — ACETAMINOPHEN 1000 MG: 500 TABLET, FILM COATED ORAL at 16:12

## 2019-10-22 RX ADMIN — PANTOPRAZOLE SODIUM 40 MG: 40 TABLET, DELAYED RELEASE ORAL at 07:39

## 2019-10-22 RX ADMIN — ACETAMINOPHEN 1000 MG: 500 TABLET, FILM COATED ORAL at 01:23

## 2019-10-22 RX ADMIN — METOCLOPRAMIDE HYDROCHLORIDE 10 MG: 10 TABLET ORAL at 21:11

## 2019-10-22 RX ADMIN — Medication 10 ML: at 14:00

## 2019-10-22 RX ADMIN — MIDAZOLAM 1 MG: 1 INJECTION INTRAMUSCULAR; INTRAVENOUS at 08:24

## 2019-10-22 RX ADMIN — METOCLOPRAMIDE HYDROCHLORIDE 10 MG: 10 TABLET ORAL at 11:24

## 2019-10-22 RX ADMIN — FENTANYL CITRATE 25 MCG: 50 INJECTION INTRAMUSCULAR; INTRAVENOUS at 08:32

## 2019-10-22 RX ADMIN — Medication 10 ML: at 21:11

## 2019-10-22 RX ADMIN — Medication 10 ML: at 05:46

## 2019-10-22 RX ADMIN — ACETAMINOPHEN 1000 MG: 500 TABLET, FILM COATED ORAL at 21:10

## 2019-10-22 RX ADMIN — METOCLOPRAMIDE HYDROCHLORIDE 10 MG: 10 TABLET ORAL at 07:40

## 2019-10-22 RX ADMIN — INSULIN LISPRO 2 UNITS: 100 INJECTION, SOLUTION INTRAVENOUS; SUBCUTANEOUS at 16:33

## 2019-10-22 RX ADMIN — METOCLOPRAMIDE HYDROCHLORIDE 10 MG: 10 TABLET ORAL at 16:12

## 2019-10-22 RX ADMIN — FENTANYL CITRATE 50 MCG: 50 INJECTION INTRAMUSCULAR; INTRAVENOUS at 08:22

## 2019-10-22 RX ADMIN — FUROSEMIDE 40 MG: 40 TABLET ORAL at 07:39

## 2019-10-22 NOTE — PROGRESS NOTES
Follow-up visit requested by Mr. Lisa Thomason to convey care and concern. Mr. Lisa Thomason stated he was very satisfied by staff's care with his procedure today.      Ziggy Campbell 70  Board Certified

## 2019-10-22 NOTE — PROGRESS NOTES
Hourly rounds completed this shift. All needs met at this time. Pt sitting up in bed watching T.V. Bed low/locked. Call light within reach. Pt received PRN tylenol 1x this shift. Will continue to monitor and give report to oncoming nurse.

## 2019-10-22 NOTE — PROGRESS NOTES
[facility] Progress Note         Admit Date:  10/19/2019    Today's Date:  10/22/2019    CC:  Cecal mass; GIB    Subjective:     Patient: Had IVC filter placed today. Wants to eat and postpone his colonoscopy to Thursday. No BM this am. Denies any rectal bleeding, N&V. Medications:   Current Facility-Administered Medications   Medication Dose Route Frequency    polyethylene glycol (MIRALAX) packet 17 g  17 g Oral DAILY    0.9% sodium chloride infusion 250 mL  250 mL IntraVENous PRN    ondansetron (ZOFRAN) injection 4 mg  4 mg IntraVENous Q4H PRN    sodium chloride (NS) flush 5-40 mL  5-40 mL IntraVENous Q8H    sodium chloride (NS) flush 5-40 mL  5-40 mL IntraVENous PRN    dextrose (D50W) injection syrg 12.5-25 g  25-50 mL IntraVENous PRN    dextrose 40% (GLUTOSE) oral gel 1 Tube  15 g Oral PRN    glucagon (GLUCAGEN) injection 1 mg  1 mg IntraMUSCular PRN    acetaminophen (TYLENOL) tablet 1,000 mg  1,000 mg Oral Q6H PRN    albuterol (PROVENTIL VENTOLIN) nebulizer solution 2.5 mg  2.5 mg Nebulization Q4H PRN    atorvastatin (LIPITOR) tablet 80 mg  80 mg Oral QHS    busPIRone (BUSPAR) tablet 15 mg  15 mg Oral DAILY    furosemide (LASIX) tablet 40 mg  40 mg Oral BID    insulin lispro (HUMALOG) injection   SubCUTAneous AC&HS    metoclopramide HCl (REGLAN) tablet 10 mg  10 mg Oral AC&HS    pantoprazole (PROTONIX) tablet 40 mg  40 mg Oral ACB&D    pregabalin (LYRICA) capsule 150 mg  150 mg Oral BID    tamsulosin (FLOMAX) capsule 0.4 mg  0.4 mg Oral DAILY       Review of Systems:  ROS was obtained, with pertinent positives as listed above. No chest pain or SOB. Diet:  Diabetic consistent carb    Objective:   Vitals:  Visit Vitals  /69   Pulse 78   Temp 97.9 °F (36.6 °C)   Resp 20   Wt 119.3 kg (263 lb 1.6 oz)   SpO2 93%   BMI 42.47 kg/m²     Intake/Output:  No intake/output data recorded.   10/20 1901 - 10/22 0700  In: -   Out: 2420 [Urine:2420]  Exam:  General appearance: alert, cooperative, no distress Overweight  Lungs: clear to auscultation bilaterally anteriorly  Heart: regular rate and rhythm  Abdomen: soft, non-tender. Bowel sounds normal. No masses, no organomegaly  Extremities: extremities normal, atraumatic, no cyanosis BILATERAL EDEMA  Neuro:  alert and oriented    Data Review (Labs):    Recent Labs     10/22/19  0448 10/21/19  2138 10/21/19  1409 10/21/19  0520 10/20/19  2159 10/20/19  1353 10/20/19  0539 10/19/19  2134 10/19/19  1852 10/19/19  1249   WBC  --   --   --   --   --   --  5.4  --   --   --    HGB 8.9* 9.9* 10.1* 9.7* 9.1* 9.9* 9.1* 9.5*  --  10.2*   HCT  --  31.4*  --   --   --   --  29.0*  --   --   --    PLT  --   --   --   --   --   --  145*  --   --   --    MCV  --   --   --   --   --   --  95.7  --   --   --    NA  --   --   --   --   --   --  148*  --  146*  --    K  --   --   --   --   --   --  3.5  --  4.2  --    CL  --   --   --   --   --   --  116*  --  117*  --    CO2  --   --   --   --   --   --  24  --  21  --    BUN  --   --   --   --   --   --  5*  --  6*  --    CREA  --   --   --   --   --   --  0.87  --  1.02  --    CA  --   --   --   --   --   --  7.9*  --  8.3  --    GLU  --   --   --   --   --   --  70  --  106*  --        Assessment:     Principal Problem:    GI bleed (10/15/2019)    Active Problems: Morbid obesity ([facility] (6/27/2011)      Essential hypertension, benign (1/22/2015)      Type 2 diabetes, uncontrolled, with neuropathy ([facility] (1/22/2015)      Hyperlipidemia with target LDL less than 70 ()      Pulmonary hypertension ([facility] (60/29/7867)      Diastolic CHF, chronic (HCC) (3/20/2017)      Sleep apnea (11/9/2017)      Type 2 diabetes with nephropathy ([facility] (5/11/2018)    71 yo male patient of Dr. Divina Lenz [facility] of but not limited to CKD III, DM II, diabetic retinopathy, diastolic CHF - on ASA and Plavix, DVT, PE, diverticulosis, colon polyps, sleep apnea, who we are following for GI bleeding and probable cecal mass on CT 10/19.  He was admitted 25 Sept 2019 to 1 Oct 2019 for cecal diverticulitis, treated with ATBXs, readmitted for rectal bleeding. His current Hgb is trending down, but without current bleeding.  His ASA and Plavix are on hold and IVC filter was placed today 10/21 for new PE on CT. Plan:     -will plan on clear liquid diet tomorrow with bowel prep  -Colonoscopy on Thursday 10/24/19  -Monitor H&H and transfuse as needed  -call for increased bleeding  -Increase Miralax to bid  Aryan Lr.  Db Johnston in collaboration with Virgen Hart  [facility]

## 2019-10-22 NOTE — PROGRESS NOTES
Problem: Diabetes Self-Management  Goal: *Disease process and treatment process  Description  Define diabetes and identify own type of diabetes; list 3 options for treating diabetes. Outcome: Progressing Towards Goal  Goal: *Incorporating nutritional management into lifestyle  Description  Describe effect of type, amount and timing of food on blood glucose; list 3 methods for planning meals. Outcome: Progressing Towards Goal  Goal: *Incorporating physical activity into lifestyle  Description  State effect of exercise on blood glucose levels. Outcome: Progressing Towards Goal  Goal: *Developing strategies to promote health/change behavior  Description  Define the ABC's of diabetes; identify appropriate screenings, schedule and personal plan for screenings. Outcome: Progressing Towards Goal  Goal: *Using medications safely  Description  State effect of diabetes medications on diabetes; name diabetes medication taking, action and side effects. Outcome: Progressing Towards Goal  Goal: *Monitoring blood glucose, interpreting and using results  Description  Identify recommended blood glucose targets  and personal targets. Outcome: Progressing Towards Goal  Goal: *Prevention, detection, treatment of acute complications  Description  List symptoms of hyper- and hypoglycemia; describe how to treat low blood sugar and actions for lowering  high blood glucose level. Outcome: Progressing Towards Goal  Goal: *Prevention, detection and treatment of chronic complications  Description  Define the natural course of diabetes and describe the relationship of blood glucose levels to long term complications of diabetes.   Outcome: Progressing Towards Goal  Goal: *Developing strategies to address psychosocial issues  Description  Describe feelings about living with diabetes; identify support needed and support network  Outcome: Progressing Towards Goal  Goal: *Insulin pump training  Outcome: Progressing Towards Goal  Goal: *Sick day guidelines  Outcome: Progressing Towards Goal  Goal: *Patient Specific Goal (EDIT GOAL, INSERT TEXT)  Outcome: Progressing Towards Goal     Problem: Patient Education: Go to Patient Education Activity  Goal: Patient/Family Education  Outcome: Progressing Towards Goal     Problem: Falls - Risk of  Goal: *Absence of Falls  Description  Document Day Lawson Fall Risk and appropriate interventions in the flowsheet.   Outcome: Progressing Towards Goal  Note:   Fall Risk Interventions:  Mobility Interventions: Bed/chair exit alarm, Patient to call before getting OOB         Medication Interventions: Bed/chair exit alarm, Evaluate medications/consider consulting pharmacy, Patient to call before getting OOB    Elimination Interventions: Call light in reach, Patient to call for help with toileting needs, Stay With Me (per policy), Toileting schedule/hourly rounds, Urinal in reach, Toilet paper/wipes in reach    History of Falls Interventions: Consult care management for discharge planning, Door open when patient unattended, Evaluate medications/consider consulting pharmacy, Investigate reason for fall         Problem: Patient Education: Go to Patient Education Activity  Goal: Patient/Family Education  Outcome: Progressing Towards Goal

## 2019-10-22 NOTE — PROGRESS NOTES
Hourly rounds completed this shift, pt colonoscopy post pone for the am. Pt has tolerated diet well. Incision to neck secured with skin glue.

## 2019-10-22 NOTE — PROGRESS NOTES
TRANSFER - OUT REPORT:    Verbal report given to John Paul Sandoval RN (name) on Russ Do  being transferred to 6th floor (unit) for routine progression of care       Report consisted of patients Situation, Background, Assessment and   Recommendations(SBAR). Information from the following report(s) Procedure Summary and MAR was reviewed with the receiving nurse. Opportunity for questions and clarification was provided. Conscious Sedation:   100 Mcg of Fentanyl administered  2 Mg of Versed administered    Pt tolerated procedure well.      Visit Vitals  /77   Pulse 83   Temp 97.9 °F (36.6 °C)   Resp 13   Wt 119.3 kg (263 lb 1.6 oz)   SpO2 98%   BMI 42.47 kg/m²     Past Medical History:   Diagnosis Date    Cervical stenosis of spine     Chronic kidney disease     Stage 3    Degenerative disc disease, lumbar     Diabetes mellitus type II     uncontrolled-insulin and oral med. 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 ([address]     Pulmonary embolism ([address] Mar 2014    Bilateral - Completed 6 months on Xarelto    TIA (transient ischemic attack) 11/9/2017    Type 2 diabetes, uncontrolled, with neuropathy ([address] 1/22/2015     Peripheral IV 45/37/09 Left Cephalic (Active)   Site Assessment Clean, dry, & intact 10/22/2019  3:06 AM   Phlebitis Assessment 0 10/22/2019  3:06 AM   Infiltration Assessment 0 10/22/2019 3:06 AM   Dressing Status Clean, dry, & intact 10/22/2019  3:06 AM   Dressing Type Tape;Transparent 10/22/2019  3:06 AM   Hub Color/Line Status Flushed 10/22/2019  3:06 AM   Action Taken Dressing changed 10/20/2019  8:45 AM   Alcohol Cap Used No 10/22/2019  3:06 AM

## 2019-10-22 NOTE — PROGRESS NOTES
TRANSFER - OUT REPORT:    Verbal report given to GARY Pal (name) on Paula End  being transferred to IR recovery (unit) for routine progression of care       Report consisted of patients Situation, Background, Assessment and   Recommendations(SBAR). Information from the following report(s) Procedure Summary, Intake/Output and MAR was reviewed with the receiving nurse. Opportunity for questions and clarification was provided. Conscious Sedation:   100 Mcg of Fentanyl administered  2 Mg of Versed administered    Pt tolerated procedure well.      Visit Vitals  /77   Pulse 83   Temp 97.9 °F (36.6 °C)   Resp 13   Wt 119.3 kg (263 lb 1.6 oz)   SpO2 98%   BMI 42.47 kg/m²     Past Medical History:   Diagnosis Date    Cervical stenosis of spine     Chronic kidney disease     Stage 3    Degenerative disc disease, lumbar     Diabetes mellitus type II     uncontrolled-insulin and oral med. 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 (HCC)     R - Moderate, L - Mild    Diastolic congestive heart failure (HCC)     Diverticulosis of colon June 2010    DVT (deep venous thrombosis) ([facility])     RLE    Extrinsic allergic asthma     GERD (gastroesophageal reflux disease)     Hx of colonic polyps 07/29/2010    Hyperplastic     Hyperlipidemia LDL goal < 70     Hypertension     Obstructive sleep apnea     Non-Compliant with CPAP    Perennial allergic rhinitis with seasonal variation     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 ([address]     Pulmonary embolism ([address] Mar 2014    Bilateral - Completed 6 months on Xarelto    TIA (transient ischemic attack) 11/9/2017    Type 2 diabetes, uncontrolled, with neuropathy ([address] 1/22/2015     Peripheral IV 37/84/12 Left Cephalic (Active)   Site Assessment Clean, dry, & intact 10/22/2019  3:06 AM   Phlebitis Assessment 0 10/22/2019  3:06 AM   Infiltration Assessment 0 10/22/2019  3:06 AM   Dressing Status Clean, dry, & intact 10/22/2019  3:06 AM   Dressing Type Tape;Transparent 10/22/2019  3:06 AM   Hub Color/Line Status Flushed 10/22/2019  3:06 AM   Action Taken Dressing changed 10/20/2019  8:45 AM   Alcohol Cap Used No 10/22/2019  3:06 AM

## 2019-10-22 NOTE — PROGRESS NOTES
Problem: Diabetes Self-Management  Goal: *Using medications safely  Description  State effect of diabetes medications on diabetes; name diabetes medication taking, action and side effects.   Outcome: Progressing Towards Goal

## 2019-10-22 NOTE — PROCEDURES
Department of Interventional Radiology  [phone]        Interventional Radiology Brief Procedure Note    Patient: Emmanuelle Sheth MRN: [id number]  SSN: [id number]    YOB: 1940  Age: 70 y.o.   Sex: male      Date of Procedure: 10/22/2019    Pre-Procedure Diagnosis: PE and GI bleed    Post-Procedure Diagnosis: SAME    Procedure(s): IVC filter placement     Brief Description of Procedure: as above    Performed By: Lily Heath MD     Assistants: None    Anesthesia:Moderate Sedation    Estimated Blood Loss: Less than 10ml    Specimens:  None    Implants:  Celect IVC filter    Findings: no caval thrombus    Complications: None    Recommendations: routine post care     Follow Up: in my office in 1-2 months    Signed By: Lily Heath MD     October 22, 2019

## 2019-10-23 ENCOUNTER — ANESTHESIA EVENT (OUTPATIENT)
Dept: ENDOSCOPY | Age: 79
DRG: 356 | End: 2019-10-23
Payer: MEDICARE

## 2019-10-23 LAB
ALBUMIN SERPL-MCNC: 2.7 G/DL (ref 3.2–4.6)
ALBUMIN/GLOB SERPL: 0.8 {RATIO} (ref 1.2–3.5)
ALP SERPL-CCNC: 88 U/L (ref 50–136)
ALT SERPL-CCNC: 15 U/L (ref 12–65)
ANION GAP SERPL CALC-SCNC: 6 MMOL/L (ref 7–16)
AST SERPL-CCNC: 22 U/L (ref 15–37)
BASOPHILS # BLD: 0 K/UL (ref 0–0.2)
BASOPHILS NFR BLD: 1 % (ref 0–2)
BILIRUB SERPL-MCNC: 0.5 MG/DL (ref 0.2–1.1)
BUN SERPL-MCNC: 7 MG/DL (ref 8–23)
CALCIUM SERPL-MCNC: 8.2 MG/DL (ref 8.3–10.4)
CHLORIDE SERPL-SCNC: 107 MMOL/L (ref 98–107)
CO2 SERPL-SCNC: 31 MMOL/L (ref 21–32)
CREAT SERPL-MCNC: 1.09 MG/DL (ref 0.8–1.5)
DIFFERENTIAL METHOD BLD: ABNORMAL
EOSINOPHIL # BLD: 0.2 K/UL (ref 0–0.8)
EOSINOPHIL NFR BLD: 4 % (ref 0.5–7.8)
ERYTHROCYTE [DISTWIDTH] IN BLOOD BY AUTOMATED COUNT: 15.7 % (ref 11.9–14.6)
GLOBULIN SER CALC-MCNC: 3.5 G/DL (ref 2.3–3.5)
GLUCOSE BLD STRIP.AUTO-MCNC: 108 MG/DL (ref 65–100)
GLUCOSE BLD STRIP.AUTO-MCNC: 121 MG/DL (ref 65–100)
GLUCOSE BLD STRIP.AUTO-MCNC: 148 MG/DL (ref 65–100)
GLUCOSE BLD STRIP.AUTO-MCNC: 297 MG/DL (ref 65–100)
GLUCOSE BLD STRIP.AUTO-MCNC: 93 MG/DL (ref 65–100)
GLUCOSE SERPL-MCNC: 82 MG/DL (ref 65–100)
HCT VFR BLD AUTO: 30.7 % (ref 41.1–50.3)
HGB BLD-MCNC: 9.6 G/DL (ref 13.6–17.2)
IMM GRANULOCYTES # BLD AUTO: 0 K/UL (ref 0–0.5)
IMM GRANULOCYTES NFR BLD AUTO: 0 % (ref 0–5)
LYMPHOCYTES # BLD: 2 K/UL (ref 0.5–4.6)
LYMPHOCYTES NFR BLD: 37 % (ref 13–44)
MAGNESIUM SERPL-MCNC: 1.5 MG/DL (ref 1.8–2.4)
MCH RBC QN AUTO: 29.3 PG (ref 26.1–32.9)
MCHC RBC AUTO-ENTMCNC: 31.3 G/DL (ref 31.4–35)
MCV RBC AUTO: 93.6 FL (ref 79.6–97.8)
MONOCYTES # BLD: 0.6 K/UL (ref 0.1–1.3)
MONOCYTES NFR BLD: 11 % (ref 4–12)
NEUTS SEG # BLD: 2.5 K/UL (ref 1.7–8.2)
NEUTS SEG NFR BLD: 47 % (ref 43–78)
NRBC # BLD: 0 K/UL (ref 0–0.2)
PLATELET # BLD AUTO: 161 K/UL (ref 150–450)
PMV BLD AUTO: 9.9 FL (ref 9.4–12.3)
POTASSIUM SERPL-SCNC: 3 MMOL/L (ref 3.5–5.1)
PROT SERPL-MCNC: 6.2 G/DL (ref 6.3–8.2)
RBC # BLD AUTO: 3.28 M/UL (ref 4.23–5.6)
SODIUM SERPL-SCNC: 144 MMOL/L (ref 136–145)
WBC # BLD AUTO: 5.4 K/UL (ref 4.3–11.1)

## 2019-10-23 PROCEDURE — 97530 THERAPEUTIC ACTIVITIES: CPT

## 2019-10-23 PROCEDURE — 74011636637 HC RX REV CODE- 636/637: Performed by: FAMILY MEDICINE

## 2019-10-23 PROCEDURE — 97161 PT EVAL LOW COMPLEX 20 MIN: CPT

## 2019-10-23 PROCEDURE — 85025 COMPLETE CBC W/AUTO DIFF WBC: CPT

## 2019-10-23 PROCEDURE — 3331090002 HH PPS REVENUE DEBIT

## 2019-10-23 PROCEDURE — 65660000000 HC RM CCU STEPDOWN

## 2019-10-23 PROCEDURE — 74011250636 HC RX REV CODE- 250/636: Performed by: FAMILY MEDICINE

## 2019-10-23 PROCEDURE — 74011250637 HC RX REV CODE- 250/637: Performed by: FAMILY MEDICINE

## 2019-10-23 PROCEDURE — 80053 COMPREHEN METABOLIC PANEL: CPT

## 2019-10-23 PROCEDURE — 74011250636 HC RX REV CODE- 250/636: Performed by: NURSE PRACTITIONER

## 2019-10-23 PROCEDURE — 36415 COLL VENOUS BLD VENIPUNCTURE: CPT

## 2019-10-23 PROCEDURE — 83735 ASSAY OF MAGNESIUM: CPT

## 2019-10-23 PROCEDURE — 3331090001 HH PPS REVENUE CREDIT

## 2019-10-23 PROCEDURE — 74011250637 HC RX REV CODE- 250/637: Performed by: NURSE PRACTITIONER

## 2019-10-23 RX ORDER — POLYETHYLENE GLYCOL 3350 17 G/17G
238 POWDER, FOR SOLUTION ORAL ONCE
Status: COMPLETED | OUTPATIENT
Start: 2019-10-23 | End: 2019-10-23

## 2019-10-23 RX ORDER — POTASSIUM CHLORIDE 20 MEQ/1
40 TABLET, EXTENDED RELEASE ORAL 3 TIMES DAILY
Status: COMPLETED | OUTPATIENT
Start: 2019-10-23 | End: 2019-10-24

## 2019-10-23 RX ORDER — MAGNESIUM SULFATE HEPTAHYDRATE 40 MG/ML
2 INJECTION, SOLUTION INTRAVENOUS ONCE
Status: DISCONTINUED | OUTPATIENT
Start: 2019-10-23 | End: 2019-10-23

## 2019-10-23 RX ORDER — MAGNESIUM SULFATE HEPTAHYDRATE 40 MG/ML
2 INJECTION, SOLUTION INTRAVENOUS ONCE
Status: COMPLETED | OUTPATIENT
Start: 2019-10-23 | End: 2019-10-23

## 2019-10-23 RX ORDER — BISACODYL 5 MG
10 TABLET, DELAYED RELEASE (ENTERIC COATED) ORAL ONCE
Status: COMPLETED | OUTPATIENT
Start: 2019-10-23 | End: 2019-10-23

## 2019-10-23 RX ADMIN — INSULIN LISPRO 4 UNITS: 100 INJECTION, SOLUTION INTRAVENOUS; SUBCUTANEOUS at 22:06

## 2019-10-23 RX ADMIN — POLYETHYLENE GLYCOL 3350 17 G: 17 POWDER, FOR SOLUTION ORAL at 08:32

## 2019-10-23 RX ADMIN — ATORVASTATIN CALCIUM 80 MG: 40 TABLET, FILM COATED ORAL at 19:51

## 2019-10-23 RX ADMIN — Medication 10 ML: at 05:45

## 2019-10-23 RX ADMIN — METOCLOPRAMIDE HYDROCHLORIDE 10 MG: 10 TABLET ORAL at 16:48

## 2019-10-23 RX ADMIN — Medication 10 ML: at 16:48

## 2019-10-23 RX ADMIN — METOCLOPRAMIDE HYDROCHLORIDE 10 MG: 10 TABLET ORAL at 22:06

## 2019-10-23 RX ADMIN — POTASSIUM CHLORIDE 40 MEQ: 20 TABLET, EXTENDED RELEASE ORAL at 22:05

## 2019-10-23 RX ADMIN — ACETAMINOPHEN 1000 MG: 500 TABLET, FILM COATED ORAL at 08:32

## 2019-10-23 RX ADMIN — BISACODYL 10 MG: 5 TABLET, COATED ORAL at 16:48

## 2019-10-23 RX ADMIN — METOCLOPRAMIDE HYDROCHLORIDE 10 MG: 10 TABLET ORAL at 11:42

## 2019-10-23 RX ADMIN — Medication 10 ML: at 22:07

## 2019-10-23 RX ADMIN — PREGABALIN 150 MG: 75 CAPSULE ORAL at 08:30

## 2019-10-23 RX ADMIN — BUSPIRONE HYDROCHLORIDE 15 MG: 5 TABLET ORAL at 08:31

## 2019-10-23 RX ADMIN — POLYETHYLENE GLYCOL 3350 238 G: 17 POWDER, FOR SOLUTION ORAL at 16:46

## 2019-10-23 RX ADMIN — MAGNESIUM SULFATE HEPTAHYDRATE 2 G: 40 INJECTION, SOLUTION INTRAVENOUS at 19:39

## 2019-10-23 RX ADMIN — PANTOPRAZOLE SODIUM 40 MG: 40 TABLET, DELAYED RELEASE ORAL at 16:48

## 2019-10-23 RX ADMIN — PANTOPRAZOLE SODIUM 40 MG: 40 TABLET, DELAYED RELEASE ORAL at 05:44

## 2019-10-23 RX ADMIN — POTASSIUM CHLORIDE 40 MEQ: 20 TABLET, EXTENDED RELEASE ORAL at 16:48

## 2019-10-23 RX ADMIN — FUROSEMIDE 40 MG: 40 TABLET ORAL at 16:47

## 2019-10-23 RX ADMIN — METOCLOPRAMIDE HYDROCHLORIDE 10 MG: 10 TABLET ORAL at 05:44

## 2019-10-23 RX ADMIN — ONDANSETRON 4 MG: 2 INJECTION INTRAMUSCULAR; INTRAVENOUS at 12:13

## 2019-10-23 RX ADMIN — BUSPIRONE HYDROCHLORIDE 15 MG: 5 TABLET ORAL at 16:47

## 2019-10-23 RX ADMIN — TAMSULOSIN HYDROCHLORIDE 0.4 MG: 0.4 CAPSULE ORAL at 08:30

## 2019-10-23 RX ADMIN — PREGABALIN 150 MG: 75 CAPSULE ORAL at 16:46

## 2019-10-23 RX ADMIN — FUROSEMIDE 40 MG: 40 TABLET ORAL at 08:30

## 2019-10-23 RX ADMIN — ONDANSETRON 4 MG: 2 INJECTION INTRAMUSCULAR; INTRAVENOUS at 19:39

## 2019-10-23 NOTE — PROGRESS NOTES
[facility] Progress Note         Admit Date:  10/19/2019    Today's Date:  10/23/2019    CC:  Cecal mass; GIB    Subjective:     Patient: Patient without BM yesterday or today. Denies any abdominal pain, rectal bleeding., N&V. Medications:   Current Facility-Administered Medications   Medication Dose Route Frequency    polyethylene glycol (MIRALAX) packet 17 g  17 g Oral DAILY    0.9% sodium chloride infusion 250 mL  250 mL IntraVENous PRN    ondansetron (ZOFRAN) injection 4 mg  4 mg IntraVENous Q4H PRN    sodium chloride (NS) flush 5-40 mL  5-40 mL IntraVENous Q8H    sodium chloride (NS) flush 5-40 mL  5-40 mL IntraVENous PRN    dextrose (D50W) injection syrg 12.5-25 g  25-50 mL IntraVENous PRN    dextrose 40% (GLUTOSE) oral gel 1 Tube  15 g Oral PRN    glucagon (GLUCAGEN) injection 1 mg  1 mg IntraMUSCular PRN    acetaminophen (TYLENOL) tablet 1,000 mg  1,000 mg Oral Q6H PRN    albuterol (PROVENTIL VENTOLIN) nebulizer solution 2.5 mg  2.5 mg Nebulization Q4H PRN    atorvastatin (LIPITOR) tablet 80 mg  80 mg Oral QHS    busPIRone (BUSPAR) tablet 15 mg  15 mg Oral DAILY    furosemide (LASIX) tablet 40 mg  40 mg Oral BID    insulin lispro (HUMALOG) injection   SubCUTAneous AC&HS    metoclopramide HCl (REGLAN) tablet 10 mg  10 mg Oral AC&HS    pantoprazole (PROTONIX) tablet 40 mg  40 mg Oral ACB&D    pregabalin (LYRICA) capsule 150 mg  150 mg Oral BID    tamsulosin (FLOMAX) capsule 0.4 mg  0.4 mg Oral DAILY       Review of Systems:  ROS was obtained, with pertinent positives as listed above. No chest pain or SOB. Diet:  Clear liquid diet    Objective:   Vitals:  Visit Vitals  /66   Pulse 100   Temp 97.7 °F (36.5 °C)   Resp 18   Wt 118.3 kg (260 lb 14.4 oz)   SpO2 100%   BMI 42.11 kg/m²     Intake/Output:  No intake/output data recorded.   10/21 1901 - 10/23 0700  In: -   Out: 2370 [Urine:2370]  Exam:  General appearance: alert, cooperative, no distress SITTING UP IN CHAIR; NURSING AT BEDSIDE  Lungs: clear to auscultation bilaterally anteriorly  Heart: regular rate and rhythm  Abdomen: soft, non-tender. Bowel sounds normal. No masses, no organomegaly  Extremities: extremities normal, atraumatic, no cyanosis or BILATERAL EDEMA  Neuro:  alert and oriented    Data Review (Labs):    Recent Labs     10/23/19  0612 10/22/19  1300 10/22/19  0448 10/21/19  2138 10/21/19  1409 10/21/19  0520 10/20/19  2159 10/20/19  1353   WBC 5.4  --   --   --   --   --   --   --    HGB 9.6* 10.8* 8.9* 9.9* 10.1* 9.7* 9.1* 9.9*   HCT 30.7*  --   --  31.4*  --   --   --   --      --   --   --   --   --   --   --    MCV 93.6  --   --   --   --   --   --   --      --   --   --   --   --   --   --    K 3.0*  --   --   --   --   --   --   --      --   --   --   --   --   --   --    CO2 31  --   --   --   --   --   --   --    BUN 7*  --   --   --   --   --   --   --    CREA 1.09  --   --   --   --   --   --   --    CA 8.2*  --   --   --   --   --   --   --    MG 1.5*  --   --   --   --   --   --   --    GLU 82  --   --   --   --   --   --   --    AP 88  --   --   --   --   --   --   --    SGOT 22  --   --   --   --   --   --   --    ALT 15  --   --   --   --   --   --   --    TBILI 0.5  --   --   --   --   --   --   --    ALB 2.7*  --   --   --   --   --   --   --    TP 6.2*  --   --   --   --   --   --   --        Assessment:     Principal Problem:    GI bleed (10/15/2019)    Active Problems:     Morbid obesity ([facility] (6/27/2011)      Essential hypertension, benign (1/22/2015)      Type 2 diabetes, uncontrolled, with neuropathy ([facility] (1/22/2015)      Hyperlipidemia with target LDL less than 70 ()      Pulmonary hypertension ([facility] (53/82/6835)      Diastolic CHF, chronic (HCC) (3/20/2017)      Sleep apnea (11/9/2017)      Type 2 diabetes with nephropathy ([facility] (5/11/2018)    71 yo male patient of Dr. Raquel Acosta [facility] of but not limited to CKD III, DM II, diabetic retinopathy, diastolic CHF - on ASA and Plavix, DVT, PE, diverticulosis, colon polyps, sleep apnea, who we are following for GI bleeding and probable cecal mass on CT 10/19. He was admitted 25 Sept 2019 to 1 Oct 2019 for cecal diverticulitis, treated with ATBXs, readmitted for rectal bleeding. His current Hgb is trending down, but without current bleeding.  His ASA and Plavix are on hold and IVC filter was placed today 10/21 for new PE on CT. Plan:     -Plan for colonoscopy tomorrow with Dr. Cameron Gomes 10/24/19  -Clear liquid diet  -NPO after midnight  -Serial H&H and transfuse as needed  -Continue Miralax 17gm bid until bowel prep  Cephus [place].  Vale Krfat in collaboration with Dr. Cameron Gomes  [facility]

## 2019-10-23 NOTE — PROGRESS NOTES
Hourly rounds completed this shift. All needs met at this time. Pt laying in bed watching T.V. Bed low/locked. Call light within reach. Will continue to monitor and give bedside shift report to oncoming nurse.

## 2019-10-23 NOTE — PROGRESS NOTES
Hospitalist Progress Note    Subjective:   Daily Progress Note: 10/22/2019 1256    Patient presented to  ER 10/1 with complaints of rectal bleeding episodes x 2 including copious blood clots over the prior 24 hours with diaphoresis and lightheadedness. Another bloody BM with clots on arrival to ER. Adm Hgb 8.6, down from 10.7 a couple of weeks ago. Also admitted 9/25-10/1 for cecal diverticulitis and UTI. No colonoscopy due to diverticulitis. On ASA and plavix. Last colonoscopy 2010 with colorectal polyps and left sided diverticulosis. EGD 2011 normal.   10/16:  Transfused. GI in. Hgb down to 7.7, holding ASA and plavix. 10/17:  Hgb: 6.6, transfused 2 units. 10/18:  More bleeding. Tagged RBC scan done after,was negative. 10/19:  Total 5 units PRBC transfused. Lasix restarted. Transferred to [place] [id number] for ongoing GI care. 10/20: Incidental finding of right lung PE. Also found possible colonic mass. anticoags not begun at present due to bleeding. Asymptomatic from PE.    10/21:  Up in chair. Frustrated regarding time here and diagnosis. Annoyed in general.  Plans for IVC filter after much reluctance on patient's part. Extensive education by multiple disciplines. GI with plans for colo after ivc placement. No bleeding today.      10/22:  After multiple discussions by multiple providers, patient IVC filter placed today. Wants to eat and delay colonoscopy. GI ok with same, to have clears and bowel prep tomorrow, [place] thurs 10/24. Obstinate, unhappy and angry in general.  Hgb drifting down.  8.9 today    ADDITIONAL HISTORY: DM II, DVT, PE, Diastolic CHF, CKD III, asthma, morbid obesity, diabetic retinopathy, cervical stenosis, hyperlipidemia, hypertension, MARLEEN: non compliant with CPAP.        Current Facility-Administered Medications   Medication Dose Route Frequency    polyethylene glycol (MIRALAX) packet 17 g  17 g Oral DAILY    0.9% sodium chloride infusion 250 mL  250 mL IntraVENous PRN    ondansetron (ZOFRAN) injection 4 mg  4 mg IntraVENous Q4H PRN    sodium chloride (NS) flush 5-40 mL  5-40 mL IntraVENous Q8H    sodium chloride (NS) flush 5-40 mL  5-40 mL IntraVENous PRN    dextrose (D50W) injection syrg 12.5-25 g  25-50 mL IntraVENous PRN    dextrose 40% (GLUTOSE) oral gel 1 Tube  15 g Oral PRN    glucagon (GLUCAGEN) injection 1 mg  1 mg IntraMUSCular PRN    acetaminophen (TYLENOL) tablet 1,000 mg  1,000 mg Oral Q6H PRN    albuterol (PROVENTIL VENTOLIN) nebulizer solution 2.5 mg  2.5 mg Nebulization Q4H PRN    atorvastatin (LIPITOR) tablet 80 mg  80 mg Oral QHS    busPIRone (BUSPAR) tablet 15 mg  15 mg Oral DAILY    furosemide (LASIX) tablet 40 mg  40 mg Oral BID    insulin lispro (HUMALOG) injection   SubCUTAneous AC&HS    metoclopramide HCl (REGLAN) tablet 10 mg  10 mg Oral AC&HS    pantoprazole (PROTONIX) tablet 40 mg  40 mg Oral ACB&D    pregabalin (LYRICA) capsule 150 mg  150 mg Oral BID    tamsulosin (FLOMAX) capsule 0.4 mg  0.4 mg Oral DAILY      Review of Systems  A comprehensive review of systems was negative except for that written in the HPI. Objective:     Visit Vitals  /73 (BP 1 Location: Right arm, BP Patient Position: At rest)   Pulse 84   Temp 97.4 °F (36.3 °C)   Resp 18   Wt 119.3 kg (263 lb 1.6 oz)   SpO2 95%   BMI 42.47 kg/m²    O2 Flow Rate (L/min): 3 l/min O2 Device: Hi flow nasal cannula    Temp (24hrs), Av °F (36.7 °C), Min:97.2 °F (36.2 °C), Max:98.7 °F (37.1 °C)    10/21 0701 - 10/22 1900  In: -   Out: 0271 [[facility]:0556]    General appearance: Oriented, alert, up in chair. Questioning all treatments and findings. multiple of the same questions answered by multiple staff members. Sour in general.  Angry. Wants to eat. Morbidly obese.     Head: Normocephalic, without obvious abnormality, atraumatic  Throat: Lips, mucosa, and tongue normal. Teeth and gums normal  Neck: Thick, supple, symmetrical, trachea midline, and no JVD  Lungs: clear to auscultation bilaterally, diminished in bases. Heart: regular rate and rhythm, S1, S2 normal, no murmur, click, rub or gallop  Abdomen: Protuberant, soft, non-tender. Bowel sounds normal. No masses,  no organomegaly  Extremities: extremities normal, atraumatic, no cyanosis. Dependent distal edema  Skin: Skin color, texture, turgor normal. No rashes or lesions  Neurologic: Grossly normal    Data Review  Recent Results (from the past 24 hour(s))   HEMOGLOBIN    Collection Time: 10/22/19  4:48 AM   Result Value Ref Range    HGB 8.9 (L) 13.6 - 17.2 g/dL   GLUCOSE, POC    Collection Time: 10/22/19 10:34 AM   Result Value Ref Range    Glucose (POC) 86 65 - 100 mg/dL   HEMOGLOBIN    Collection Time: 10/22/19  1:00 PM   Result Value Ref Range    HGB 10.8 (L) 13.6 - 17.2 g/dL   GLUCOSE, POC    Collection Time: 10/22/19  3:49 PM   Result Value Ref Range    Glucose (POC) 179 (H) 65 - 100 mg/dL   GLUCOSE, POC    Collection Time: 10/22/19  8:11 PM   Result Value Ref Range    Glucose (POC) 140 (H) 65 - 100 mg/dL   10/18:  RBC LABEL SCAN: No scintigraphic evidence of GI bleed.     10/19:  CT ABDOMEN AND PELVIS: Right upper and middle lobe pulmonary emboli, captured at the periphery of the imaging field of view. Wall thickening of the cecum, suspicious for neoplasm. Correlation with colonoscopy should be considered. No evidence of appendicitis, colitis, bowel obstruction or bowel perforation     10/20:  BILATERAL LE U/S: No evidence of deep venous thrombosis in either lower extremity.       Assessment/Plan:   Acute lower GI Bleed with history of same              5 units PRBC transfused to date              Monitor closely              GI on board with plans for colo Thursday, Oct           24              CT with findings of mass  Incidental finding PE, no DVT              No anticoagulation now due to acute GI bleed              IVC filter planned for tomorrow  Morbid obesity               Concern for [place] due to body habitus          and current situation               Get overnight sleep oximetry prior to             discharge  Essential hypertension:  Home meds              Monitor  IDDM II:  A1C: 8.4              Holding all po diabetic meds              Continue SSI and diabetic diet when eating  Hyperlipidemia:  Home meds  Pulmonary hypertension               Lasix reinstated              Monitor  Chronic Diastolic CHF              Monitor              Observe for fluid overload  MARLEEN without CPAP              Classic for [place]              Overnight sleep study              HS oxygen              Elevate HOB when sleeping    Care Plan discussed with: Patient, CM, GI, RN    Signed By: Pam Tucker NP     October 22, 2019

## 2019-10-23 NOTE — PROGRESS NOTES
Hospitalist Progress Note    Subjective:   Daily Progress Note: 10/23/2019 2:49 PM  Patient presented to  ER 10/1 with complaints of rectal bleeding episodes x 2 including copious blood clots over the prior 24 hours with diaphoresis and lightheadedness.  Another bloody BM with clots on arrival to ER.  Adm Hgb 8.6, down from 10.7 a couple of weeks ago. Marlin Shipman admitted 9/25-10/1 for cecal diverticulitis and UTI. No colonoscopy due to diverticulitis. On ASA and plavix.  Last colonoscopy 2010 with colorectal polyps and left sided diverticulosis.  EGD 2011 normal.   10/16:  Transfused.  GI in.  Hgb down to 7.7, holding ASA and plavix. 10/17:  Hgb: 6.6, transfused 2 units. 10/18:  More bleeding. Tagged RBC scan done after,was negative. 10/19:  Total 5 units PRBC transfused. Lasix restarted. Transferred to [place] [id number] for ongoing GI care. 10/20:  Incidental finding of right lung PE. Also found possible colonic mass. anticoags not begun at present due to bleeding. Asymptomatic from PE.    10/21: Versie Bosworth in chair.  Frustrated regarding time here and diagnosis.  Annoyed in general.  Plans for IVC filter after much reluctance on patient's part. Extensive education by multiple disciplines. Pippa Baxter with plans for colo after ivc placement. No bleeding today.      10/22:  After multiple discussions by multiple providers, patient IVC filter placed today. Wants to eat and delay colonoscopy. GI ok with same, to have clears and bowel prep tomorrow, [place] thurs 10/24. Obstinate, unhappy and angry in general.  Hgb drifting down. 8.9 today    8/23:  Up in chair, complaining about clear liquid diet. Starting prep.  No pain, no bleeding, no BM x 2 days.     ADDITIONAL HISTORY: DM II, DVT, PE, Diastolic CHF, CKD III, asthma, morbid obesity, diabetic retinopathy, cervical stenosis, hyperlipidemia, hypertension, MARLEEN: non compliant with CPAP.      Current Facility-Administered Medications   Medication Dose Route Frequency    polyethylene glycol (MIRALAX) powder 238 g  238 g Oral ONCE    bisacodyl (DULCOLAX) tablet 10 mg  10 mg Oral ONCE    potassium chloride (K-DUR, KLOR-CON) SR tablet 40 mEq  40 mEq Oral TID    magnesium sulfate 2 g/50 ml IVPB (premix or compounded)  2 g IntraVENous ONCE    polyethylene glycol (MIRALAX) packet 17 g  17 g Oral DAILY    0.9% sodium chloride infusion 250 mL  250 mL IntraVENous PRN    ondansetron (ZOFRAN) injection 4 mg  4 mg IntraVENous Q4H PRN    sodium chloride (NS) flush 5-40 mL  5-40 mL IntraVENous Q8H    sodium chloride (NS) flush 5-40 mL  5-40 mL IntraVENous PRN    dextrose (D50W) injection syrg 12.5-25 g  25-50 mL IntraVENous PRN    dextrose 40% (GLUTOSE) oral gel 1 Tube  15 g Oral PRN    glucagon (GLUCAGEN) injection 1 mg  1 mg IntraMUSCular PRN    acetaminophen (TYLENOL) tablet 1,000 mg  1,000 mg Oral Q6H PRN    albuterol (PROVENTIL VENTOLIN) nebulizer solution 2.5 mg  2.5 mg Nebulization Q4H PRN    atorvastatin (LIPITOR) tablet 80 mg  80 mg Oral QHS    busPIRone (BUSPAR) tablet 15 mg  15 mg Oral DAILY    furosemide (LASIX) tablet 40 mg  40 mg Oral BID    insulin lispro (HUMALOG) injection   SubCUTAneous AC&HS    metoclopramide HCl (REGLAN) tablet 10 mg  10 mg Oral AC&HS    pantoprazole (PROTONIX) tablet 40 mg  40 mg Oral ACB&D    pregabalin (LYRICA) capsule 150 mg  150 mg Oral BID    tamsulosin (FLOMAX) capsule 0.4 mg  0.4 mg Oral DAILY      Review of Systems  A comprehensive review of systems was negative except for that written in the HPI.     Objective:     Visit Vitals  /72   Pulse 84   Temp 97.9 °F (36.6 °C)   Resp 18   Wt 118.3 kg (260 lb 14.4 oz)   SpO2 99%   BMI 42.11 kg/m²    O2 Flow Rate (L/min): 3 l/min O2 Device: Hi flow nasal cannula    Temp (24hrs), Av.8 °F (36.6 °C), Min:97.2 °F (36.2 °C), Max:98.7 °F (37.1 °C)    10/23 0701 - 10/23 1900  In: -   Out: 400 [Urine:400]  10/21 1901 - 10/23 0700  In: -   Out: 0028 [Urine:2370]    General appearance: Oriented, alert, up in chair.  Questioning all treatments and findings.  multiple of the same questions answered by multiple staff members.  Sour in general. Lenord Wharton. Wants to eat. Morbidly obese.    Head: Normocephalic, without obvious abnormality, atraumatic  Throat: Lips, mucosa, and tongue normal. Teeth and gums normal  Neck: Thick, supple, symmetrical, trachea midline, and no JVD  Lungs: clear to auscultation bilaterally, diminished in bases.    Heart: regular rate and rhythm, S1, S2 normal, no murmur, click, rub or gallop  Abdomen: Protuberant, soft, non-tender. Bowel sounds normal. No masses,  no organomegaly  Extremities: extremities normal, atraumatic, no cyanosis.  Dependent distal edema  Skin: Skin color, texture, turgor normal. No rashes or lesions  Neurologic: Grossly normal    Data Review  Recent Results (from the past 24 hour(s))   GLUCOSE, POC    Collection Time: 10/22/19  3:49 PM   Result Value Ref Range    Glucose (POC) 179 (H) 65 - 100 mg/dL   GLUCOSE, POC    Collection Time: 10/22/19  8:11 PM   Result Value Ref Range    Glucose (POC) 140 (H) 65 - 529 mg/dL   METABOLIC PANEL, COMPREHENSIVE    Collection Time: 10/23/19  6:12 AM   Result Value Ref Range    Sodium 144 136 - 145 mmol/L    Potassium 3.0 (L) 3.5 - 5.1 mmol/L    Chloride 107 98 - 107 mmol/L    CO2 31 21 - 32 mmol/L    Anion gap 6 (L) 7 - 16 mmol/L    Glucose 82 65 - 100 mg/dL    BUN 7 (L) 8 - 23 MG/DL    Creatinine 1.09 0.8 - 1.5 MG/DL    GFR est AA >60 >60 ml/min/1.73m2    GFR est non-AA >60 >60 ml/min/1.73m2    Calcium 8.2 (L) 8.3 - 10.4 MG/DL    Bilirubin, total 0.5 0.2 - 1.1 MG/DL    ALT (SGPT) 15 12 - 65 U/L    AST (SGOT) 22 15 - 37 U/L    Alk.  phosphatase 88 50 - 136 U/L    Protein, total 6.2 (L) 6.3 - 8.2 g/dL    Albumin 2.7 (L) 3.2 - 4.6 g/dL    Globulin 3.5 2.3 - 3.5 g/dL    A-G Ratio 0.8 (L) 1.2 - 3.5     MAGNESIUM    Collection Time: 10/23/19  6:12 AM   Result Value Ref Range    Magnesium 1.5 (L) 1.8 - 2.4 mg/dL CBC WITH AUTOMATED DIFF    Collection Time: 10/23/19  6:12 AM   Result Value Ref Range    WBC 5.4 4.3 - 11.1 K/uL    RBC 3.28 (L) 4.23 - 5.6 M/uL    HGB 9.6 (L) 13.6 - 17.2 g/dL    HCT 30.7 (L) 41.1 - 50.3 %    MCV 93.6 79.6 - 97.8 FL    MCH 29.3 26.1 - 32.9 PG    MCHC 31.3 (L) 31.4 - 35.0 g/dL    RDW 15.7 (H) 11.9 - 14.6 %    PLATELET 968 089 - 479 K/uL    MPV 9.9 9.4 - 12.3 FL    ABSOLUTE NRBC 0.00 0.0 - 0.2 K/uL    DF AUTOMATED      NEUTROPHILS 47 43 - 78 %    LYMPHOCYTES 37 13 - 44 %    MONOCYTES 11 4.0 - 12.0 %    EOSINOPHILS 4 0.5 - 7.8 %    BASOPHILS 1 0.0 - 2.0 %    IMMATURE GRANULOCYTES 0 0.0 - 5.0 %    ABS. NEUTROPHILS 2.5 1.7 - 8.2 K/UL    ABS. LYMPHOCYTES 2.0 0.5 - 4.6 K/UL    ABS. MONOCYTES 0.6 0.1 - 1.3 K/UL    ABS. EOSINOPHILS 0.2 0.0 - 0.8 K/UL    ABS. BASOPHILS 0.0 0.0 - 0.2 K/UL    ABS. IMM. GRANS. 0.0 0.0 - 0.5 K/UL   GLUCOSE, POC    Collection Time: 10/23/19  7:10 AM   Result Value Ref Range    Glucose (POC) 93 65 - 100 mg/dL   GLUCOSE, POC    Collection Time: 10/23/19 10:57 AM   Result Value Ref Range    Glucose (POC) 148 (H) 65 - 100 mg/dL     10/18:  RBC LABEL SCAN: No scintigraphic evidence of GI bleed.     10/19:  CT ABDOMEN AND PELVIS: Right upper and middle lobe pulmonary emboli, captured at the periphery of the imaging field of view.  Wall thickening of the cecum, suspicious for neoplasm. Correlation with colonoscopy should be considered.  No evidence of appendicitis, colitis, bowel obstruction or bowel perforation     10/20:  BILATERAL LE U/S: No evidence of deep venous thrombosis in either lower extremity.       Assessment/Plan:   Acute lower GI Bleed with history of same              5 units PRBC transfused to date              Monitor closely              GI on board with plans for colo  tomorrow              CT with findings of mass  Incidental finding PE, no DVT              No anticoagulation now due to acute  GI bleed              IVC filter in   Morbid obesity   Essential hypertension:  Home meds              Monitor  IDDM II:  A1C: 8.4              Holding all po diabetic meds              Continue SSI and diabetic diet when  eating  Hyperlipidemia:  Home meds  Pulmonary hypertension               Lasix reinstated              Monitor  Chronic Diastolic CHF              Monitor              Observe for fluid overload  MARLEEN without CPAP              Classic for [place]              Overnight sleep study prior to  discharge              HS oxygen              Elevate HOB when sleeping    Care Plan discussed with: Patient and Nurse    Signed By: Aubree Floyd NP     October 23, 2019

## 2019-10-23 NOTE — PROGRESS NOTES
Problem: Mobility Impaired (Adult and Pediatric)  Goal: *Acute Goals and Plan of Care (Insert Text)  Description  LTG:  (1.)Mr. To will move from supine to sit and sit to supine, scoot up and down and roll side to side INDEPENDENTLY with bed flat within 7 treatment day(s). (2.)Mr. To will transfer from bed to chair and chair to bed INDEPENDENTLY within 7 treatment day(s). (3.)Mr. To will ambulate with MODIFIED INDEPENDENCE for 250+ feet with the least restrictive device within 7 treatment day(s). (4.)Mr. Seng Melendez will ascend and descend 4 steps with STAND BY ASSIST using handrail(s) within 7 days. (5.)Mr. To will independently perform exercises per HEP to improve strength and mobility within 7 days. ________________________________________________________________________________________________   Outcome: Progressing Towards Goal     PHYSICAL THERAPY: Initial Assessment and AM 10/23/2019  INPATIENT: PT Visit Days : 1  [place] / [place] / Product Type: Medicare /       NAME/AGE/GENDER: Paula Javier is a 70 y.o. male   PRIMARY DIAGNOSIS: GI bleed [K92.2] GI bleed   GI bleed    Procedure(s) (LRB):  COLONOSCOPY/ 42/ 622 (N/A)     ICD-10: Treatment Diagnosis:    Generalized Muscle Weakness (M62.81)  Difficulty in walking, Not elsewhere classified (R26.2)  Other abnormalities of gait and mobility (R26.89)   Precaution/Allergies:  Belenda Freed maleate]      ASSESSMENT:     Mr. Seng Melendez is a 70year old male admitted from [facility] for GI bleed; plans noted for colonoscopy tomorrow. He lives at home in single story apartment and has brother who lives with him. Has been to [facility] recently and was ambulating with rollator at home, cane in community. Pt presents sitting up in chair without complaints; agreeable to therapy assessment. Requires CGA/SBA for sit<->stand transfers from chair. Provided with walker for balance, safety.  Worked on standing static and dynamic activities in room in preparation for ambulation. Navigates total of 100 ft in room and hallway with walker and CGA for safety demonstrating mildly forward flexed posture, slow pace, and increased lateral trunk sway at times. No major loss of balance noted. Returned to room and up to chair after activity. O2 sats 90-91% initially on room air. Instructed on slow, pursed lip breathing and quickly improves to 97%. Pt performs below LE exercises with good participation for strengthening. Left sitting up with needs in reach. Errol Blakely is functioning below baseline with strength, mobility, balance, and activity tolerance and will benefit from continued therapy during hospital stay to address deficits/maximize independence with mobility. Anticipate return home at [place] with [facility]. This section established at most recent assessment   PROBLEM LIST (Impairments causing functional limitations):  Decreased Strength  Decreased Transfer Abilities  Decreased Ambulation Ability/Technique  Decreased Balance  Decreased Activity Tolerance   INTERVENTIONS PLANNED: (Benefits and precautions of physical therapy have been discussed with the patient.)  Balance Exercise  Bed Mobility  Gait Training  Home Exercise Program (HEP)  Therapeutic Activites  Therapeutic Exercise/Strengthening  Transfer Training     TREATMENT PLAN: Frequency/Duration: 3 times a week for duration of hospital stay  Rehabilitation Potential For Stated Goals: Good     REHAB RECOMMENDATIONS (at time of discharge pending progress):    Placement: It is my opinion, based on this patient's performance to date, that Mr. Ed Shipman may benefit from [address] after discharge due to the functional deficits listed above that are likely to improve with skilled rehabilitation because he/she has multiple medical issues that affect his/her functional mobility in the community.   Equipment:   None at this time              HISTORY:   History of Present Injury/Illness (Reason for Referral):  Per H&P, \"Admission HPI from 10/15/2019:    Patient is a 71 y/o male with hx DM, DVT/PE, diastolic CHF, CKD 3, asthma who presents to ED with several episodes of copious blood and clots per rectum. No pain but has had episodes of acute urgency. He was admitted 9/25-10/1 for cecal diverticulitis. He has completed antibiotics. Last hemoglobin checked was 10.7 on 10/8. Today is 8.6 with gross blood and clots seen in bedside commode. His last colonoscopy over 10 years ago and he had polyps. He takes aspirin and plavix. No chest pain or shortness of breath. GI has been notified. He will be admitted for further workup. Hospital Course:  Admitted for GI bleed- with recent diverticulitis felt probably diverticular bleed. GI - no colonoscopy secondary to recent diverticulitis. Had NM red blood tag scan- by the time it was dont pt dint have any bleeding and scan was normal. Pt had in total 4 units of blood. Bleed 10/18,10/19. Kirby Rae recommended ct abd pelvis with contrast - secondary to the bleed pt had this evening. Spoke to [place] - as pt had been having bleeding at least one episode since past 2- 3 days and already required 4 untis of prbs- and may require Interventional radiology services- hence pt would be transferred to [facility].  The reason for transfer was explained to pt and pts sister at bedside.    [employer] [facility] was notified about the transfer\"    Past Medical History/Comorbidities:   Mr. Carleen Richardson  has a past medical history of Cervical stenosis of spine, Chronic kidney disease, Degenerative disc disease, lumbar, Diabetes mellitus type II, Diabetic retinopathy of both eyes without macular edema associated with type 2 diabetes mellitus ([address] Diastolic congestive heart failure ([address] Diverticulosis of colon (June 2010), DVT (deep venous thrombosis) ([address] Extrinsic allergic asthma, GERD (gastroesophageal reflux disease), colonic polyps (07/29/2010), Hyperlipidemia LDL goal < 70, Hypertension, Obstructive sleep apnea, Perennial allergic rhinitis with seasonal variation, 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 ([facility] [address] Pulmonary embolism ([address] (Mar 2014), TIA (transient ischemic attack) (11/9/2017), and Type 2 diabetes, uncontrolled, with neuropathy ([address] (1/22/2015). He also has no past medical history of Aneurysm ([address] Arrhythmia, Autoimmune disease ([address] CAD (coronary artery disease), Cancer ([address] Coagulation defects, COPD, Liver disease, Psychiatric disorder, PUD (peptic ulcer disease), or Seizures ([address] Mr. Marily Hutchinson  has a past surgical history that includes colonoscopy (07/29/2010); egd (5/9/2011); pr sinus surgery proc unlisted; hx knee arthroscopy (1991); pr total knee arthroplasty (Right, 2006); hx bunionectomy (Bilateral); hx hernia repair (Bilateral); and ir ivc filter (10/22/2019). Social History/Living Environment:   Home Environment: Apartment  # Steps to Enter: 4  Rails to Enter: Yes  Hand Rails : Bilateral  Wheelchair Ramp: No  One/Two Story Residence: One story  Living Alone: No  Support Systems: Family member(s)  Patient Expects to be Discharged to[de-identified] Private residence  Current DME Used/Available at Home: Walker, rollator, Cane, straight  Prior Level of Function/Work/Activity:  Lives in first floor, single story apartment with 4 steps to enter. Has been mod I with mobility/ambulation using rollator in home (mostly to help with carrying things per his report) and cane in community. Recently was at [facility]. Number of Personal Factors/Comorbidities that affect the Plan of Care: 1-2: MODERATE COMPLEXITY   EXAMINATION:   Most Recent Physical Functioning:   Gross Assessment:  AROM: Within functional limits  Strength: Generally decreased, functional  Coordination: Within functional limits               Posture:  Posture (WDL): Exceptions to WDL  Posture Assessment:  Forward head, Rounded shoulders  Balance:  Sitting: Intact; With support  Standing: Impaired  Standing - Static: Fair(+)  Standing - Dynamic : Fair(to fair+ at times) Bed Mobility:     Wheelchair Mobility:     Transfers:  Sit to Stand: Contact guard assistance  Stand to Sit: Stand-by assistance  Bed to Chair: Contact guard assistance  Interventions: Verbal cues; Safety awareness training; Tactile cues  Duration: 10 Minutes  Gait:     Base of Support: Center of gravity altered; Widened  Speed/Sharon: Pace decreased (<100 feet/min); Slow  Step Length: Left shortened;Right shortened  Gait Abnormalities: Trunk sway increased;Decreased step clearance; Path deviations  Distance (ft): 100 Feet (ft)  Assistive Device: Walker, rolling  Ambulation - Level of Assistance: Contact guard assistance  Interventions: Verbal cues; Safety awareness training; Tactile cues      Body Structures Involved:  Muscles Body Functions Affected: Movement Related  Digestive Activities and Participation Affected:  General Tasks and Demands  Mobility  Domestic Life  Community, Social and [place]   Number of elements that affect the Plan of Care: 4+: HIGH COMPLEXITY   CLINICAL PRESENTATION:   Presentation: Stable and uncomplicated: LOW COMPLEXITY   CLINICAL DECISION MAKIN [facility] Inpatient Short Form  How much difficulty does the patient currently have. .. Unable A Lot A Little None   1. Turning over in bed (including adjusting bedclothes, sheets and blankets)? ? 1   ? 2   ? 3   ? 4   2. Sitting down on and standing up from a chair with arms ( e.g., wheelchair, bedside commode, etc.)   ? 1   ? 2   ? 3   ? 4   3. Moving from lying on back to sitting on the side of the bed?   ? 1   ? 2   ? 3   ? 4   How much help from another person does the patient currently need. .. Total A Lot A Little None   4. Moving to and from a bed to a chair (including a wheelchair)? ? 1   ? 2   ? 3   ? 4   5. Need to walk in hospital room? ? 1   ? 2   ? 3   ? 4   6. Climbing 3-5 steps with a railing? ? 1   ? 2   ? 3   ? 4   © 2007, Trustees of [address] [place] [address] [address] [id number], under license to [employer]. All rights reserved      Score:  Initial: 21 Most Recent: X (Date: -- )    Interpretation of Tool:  Represents activities that are increasingly more difficult (i.e. Bed mobility, Transfers, Gait). Medical Necessity:     Patient demonstrates good   rehab potential due to higher previous functional level. Reason for Services/Other Comments:  Patient continues to demonstrate capacity to improve strength, mobility, balance, transfers, activity tolerance which will increase independence, decrease amount of assistance required from caregiver and increase safety  . Use of outcome tool(s) and clinical judgement create a POC that gives a: Clear prediction of patient's progress: LOW COMPLEXITY            TREATMENT:   (In addition to Assessment/Re-Assessment sessions the following treatments were rendered)   Pre-treatment Symptoms/Complaints:  \"thank you\"  Pain: Initial:   Pain Intensity 1: 0  Post Session:  0/10     Therapeutic Activity: (  10 Minutes ):  Therapeutic activities including Chair transfers, standing balance activities, Ambulation on level ground and seated LE exercises in chair to improve mobility, strength, balance, coordination and activity tolerance . Required minimal Verbal cues; Safety awareness training; Tactile cues to promote static and dynamic balance in standing and promote motor control of bilateral, lower extremity(s).       Date:  10/23/19 Date:   Date:     Activity/Exercise Parameters Parameters Parameters   LAQ 15x AB     Seated marching 15x AB     Seated hip aBd 15x AB     Toe taps 15x AB     Heel taps 15x AB                     Braces/Orthotics/Lines/Etc:   O2 Device: Room air  Treatment/Session Assessment:    Response to Treatment:  pt performs mobility in room and hallway with CGA/RW  Interdisciplinary Collaboration:   Physical Therapist  Registered Nurse  After treatment position/precautions:   Up in chair  Bed/Chair-wheels locked  Bed in low position  Call light within reach   Compliance with Program/Exercises: Will assess as treatment progresses  Recommendations/Intent for next treatment session: \"Next visit will focus on advancements to more challenging activities and reduction in assistance provided\".   Total Treatment Duration:  PT Patient Time In/Time Out  Time In: 1525  Time Out: [address], DPT

## 2019-10-23 NOTE — PROGRESS NOTES
Hourly rounds completed this shift. Prep started for colonoscopy in the am.  Pt sitting up in the chair. Pt tolerating clear liquids well. . Consent signed and place on the chart. All needs have been met. Will continue to manage.

## 2019-10-23 NOTE — PROGRESS NOTES
Problem: Diabetes Self-Management  Goal: *Disease process and treatment process  Description  Define diabetes and identify own type of diabetes; list 3 options for treating diabetes. Outcome: Progressing Towards Goal  Goal: *Incorporating nutritional management into lifestyle  Description  Describe effect of type, amount and timing of food on blood glucose; list 3 methods for planning meals. Outcome: Progressing Towards Goal  Goal: *Incorporating physical activity into lifestyle  Description  State effect of exercise on blood glucose levels. Outcome: Progressing Towards Goal  Goal: *Developing strategies to promote health/change behavior  Description  Define the ABC's of diabetes; identify appropriate screenings, schedule and personal plan for screenings. Outcome: Progressing Towards Goal  Goal: *Using medications safely  Description  State effect of diabetes medications on diabetes; name diabetes medication taking, action and side effects. Outcome: Progressing Towards Goal  Goal: *Monitoring blood glucose, interpreting and using results  Description  Identify recommended blood glucose targets  and personal targets. Outcome: Progressing Towards Goal  Goal: *Prevention, detection, treatment of acute complications  Description  List symptoms of hyper- and hypoglycemia; describe how to treat low blood sugar and actions for lowering  high blood glucose level. Outcome: Progressing Towards Goal  Goal: *Prevention, detection and treatment of chronic complications  Description  Define the natural course of diabetes and describe the relationship of blood glucose levels to long term complications of diabetes.   Outcome: Progressing Towards Goal  Goal: *Developing strategies to address psychosocial issues  Description  Describe feelings about living with diabetes; identify support needed and support network  Outcome: Progressing Towards Goal  Goal: *Insulin pump training  Outcome: Progressing Towards Goal  Goal: *Sick day guidelines  Outcome: Progressing Towards Goal  Goal: *Patient Specific Goal (EDIT GOAL, INSERT TEXT)  Outcome: Progressing Towards Goal     Problem: Patient Education: Go to Patient Education Activity  Goal: Patient/Family Education  Outcome: Progressing Towards Goal     Problem: Falls - Risk of  Goal: *Absence of Falls  Description  Document Maycol Pickard Fall Risk and appropriate interventions in the flowsheet.   Outcome: Progressing Towards Goal  Note:   Fall Risk Interventions:  Mobility Interventions: Bed/chair exit alarm, Patient to call before getting OOB         Medication Interventions: Bed/chair exit alarm, Evaluate medications/consider consulting pharmacy, Patient to call before getting OOB    Elimination Interventions: Call light in reach, Patient to call for help with toileting needs, Stay With Me (per policy), Toileting schedule/hourly rounds, Urinal in reach    History of Falls Interventions: Consult care management for discharge planning, Door open when patient unattended, Evaluate medications/consider consulting pharmacy, Investigate reason for fall         Problem: Patient Education: Go to Patient Education Activity  Goal: Patient/Family Education  Outcome: Progressing Towards Goal

## 2019-10-24 ENCOUNTER — ANESTHESIA (OUTPATIENT)
Dept: ENDOSCOPY | Age: 79
DRG: 356 | End: 2019-10-24
Payer: MEDICARE

## 2019-10-24 LAB
ALBUMIN SERPL-MCNC: 2.5 G/DL (ref 3.2–4.6)
ALBUMIN/GLOB SERPL: 0.8 {RATIO} (ref 1.2–3.5)
ALP SERPL-CCNC: 90 U/L (ref 50–136)
ALT SERPL-CCNC: 15 U/L (ref 12–65)
ANION GAP SERPL CALC-SCNC: 8 MMOL/L (ref 7–16)
AST SERPL-CCNC: 23 U/L (ref 15–37)
BILIRUB SERPL-MCNC: 0.4 MG/DL (ref 0.2–1.1)
BUN SERPL-MCNC: 6 MG/DL (ref 8–23)
CALCIUM SERPL-MCNC: 7.7 MG/DL (ref 8.3–10.4)
CHLORIDE SERPL-SCNC: 106 MMOL/L (ref 98–107)
CO2 SERPL-SCNC: 28 MMOL/L (ref 21–32)
CREAT SERPL-MCNC: 1.07 MG/DL (ref 0.8–1.5)
ERYTHROCYTE [DISTWIDTH] IN BLOOD BY AUTOMATED COUNT: 15.2 % (ref 11.9–14.6)
GLOBULIN SER CALC-MCNC: 3.3 G/DL (ref 2.3–3.5)
GLUCOSE BLD STRIP.AUTO-MCNC: 146 MG/DL (ref 65–100)
GLUCOSE BLD STRIP.AUTO-MCNC: 217 MG/DL (ref 65–100)
GLUCOSE BLD STRIP.AUTO-MCNC: 257 MG/DL (ref 65–100)
GLUCOSE SERPL-MCNC: 126 MG/DL (ref 65–100)
HCT VFR BLD AUTO: 28.6 % (ref 41.1–50.3)
HGB BLD-MCNC: 9.1 G/DL (ref 13.6–17.2)
MAGNESIUM SERPL-MCNC: 1.7 MG/DL (ref 1.8–2.4)
MCH RBC QN AUTO: 29.1 PG (ref 26.1–32.9)
MCHC RBC AUTO-ENTMCNC: 31.8 G/DL (ref 31.4–35)
MCV RBC AUTO: 91.4 FL (ref 79.6–97.8)
NRBC # BLD: 0 K/UL (ref 0–0.2)
PLATELET # BLD AUTO: 165 K/UL (ref 150–450)
PMV BLD AUTO: 10.5 FL (ref 9.4–12.3)
POTASSIUM SERPL-SCNC: 3 MMOL/L (ref 3.5–5.1)
PROT SERPL-MCNC: 5.8 G/DL (ref 6.3–8.2)
RBC # BLD AUTO: 3.13 M/UL (ref 4.23–5.6)
SODIUM SERPL-SCNC: 142 MMOL/L (ref 136–145)
WBC # BLD AUTO: 4.9 K/UL (ref 4.3–11.1)

## 2019-10-24 PROCEDURE — 97110 THERAPEUTIC EXERCISES: CPT

## 2019-10-24 PROCEDURE — 97530 THERAPEUTIC ACTIVITIES: CPT

## 2019-10-24 PROCEDURE — 74011250636 HC RX REV CODE- 250/636: Performed by: ANESTHESIOLOGY

## 2019-10-24 PROCEDURE — 74011250637 HC RX REV CODE- 250/637: Performed by: NURSE PRACTITIONER

## 2019-10-24 PROCEDURE — 36415 COLL VENOUS BLD VENIPUNCTURE: CPT

## 2019-10-24 PROCEDURE — 85027 COMPLETE CBC AUTOMATED: CPT

## 2019-10-24 PROCEDURE — 74011250637 HC RX REV CODE- 250/637: Performed by: FAMILY MEDICINE

## 2019-10-24 PROCEDURE — 74011250636 HC RX REV CODE- 250/636: Performed by: FAMILY MEDICINE

## 2019-10-24 PROCEDURE — 77030021593 HC FCPS BIOP ENDOSC BSC -A: Performed by: INTERNAL MEDICINE

## 2019-10-24 PROCEDURE — 83735 ASSAY OF MAGNESIUM: CPT

## 2019-10-24 PROCEDURE — 74011000250 HC RX REV CODE- 250: Performed by: REGISTERED NURSE

## 2019-10-24 PROCEDURE — 82962 GLUCOSE BLOOD TEST: CPT

## 2019-10-24 PROCEDURE — 3331090002 HH PPS REVENUE DEBIT

## 2019-10-24 PROCEDURE — 74011636637 HC RX REV CODE- 636/637: Performed by: FAMILY MEDICINE

## 2019-10-24 PROCEDURE — 80053 COMPREHEN METABOLIC PANEL: CPT

## 2019-10-24 PROCEDURE — 74011250636 HC RX REV CODE- 250/636: Performed by: REGISTERED NURSE

## 2019-10-24 PROCEDURE — 65660000000 HC RM CCU STEPDOWN

## 2019-10-24 PROCEDURE — 76060000032 HC ANESTHESIA 0.5 TO 1 HR: Performed by: INTERNAL MEDICINE

## 2019-10-24 PROCEDURE — 0DBK8ZZ EXCISION OF ASCENDING COLON, VIA NATURAL OR ARTIFICIAL OPENING ENDOSCOPIC: ICD-10-PCS | Performed by: INTERNAL MEDICINE

## 2019-10-24 PROCEDURE — 77030013991 HC SNR POLYP ENDOSC BSC -A: Performed by: INTERNAL MEDICINE

## 2019-10-24 PROCEDURE — 3331090001 HH PPS REVENUE CREDIT

## 2019-10-24 PROCEDURE — 88305 TISSUE EXAM BY PATHOLOGIST: CPT

## 2019-10-24 PROCEDURE — 76040000025: Performed by: INTERNAL MEDICINE

## 2019-10-24 RX ORDER — SODIUM CHLORIDE, SODIUM LACTATE, POTASSIUM CHLORIDE, CALCIUM CHLORIDE 600; 310; 30; 20 MG/100ML; MG/100ML; MG/100ML; MG/100ML
100 INJECTION, SOLUTION INTRAVENOUS CONTINUOUS
Status: DISCONTINUED | OUTPATIENT
Start: 2019-10-24 | End: 2019-10-26 | Stop reason: HOSPADM

## 2019-10-24 RX ORDER — LIDOCAINE HYDROCHLORIDE 20 MG/ML
INJECTION, SOLUTION EPIDURAL; INFILTRATION; INTRACAUDAL; PERINEURAL AS NEEDED
Status: DISCONTINUED | OUTPATIENT
Start: 2019-10-24 | End: 2019-10-24 | Stop reason: HOSPADM

## 2019-10-24 RX ORDER — PROPOFOL 10 MG/ML
INJECTION, EMULSION INTRAVENOUS AS NEEDED
Status: DISCONTINUED | OUTPATIENT
Start: 2019-10-24 | End: 2019-10-24 | Stop reason: HOSPADM

## 2019-10-24 RX ORDER — PROPOFOL 10 MG/ML
INJECTION, EMULSION INTRAVENOUS
Status: DISCONTINUED | OUTPATIENT
Start: 2019-10-24 | End: 2019-10-24 | Stop reason: HOSPADM

## 2019-10-24 RX ORDER — TEMAZEPAM 15 MG/1
15 CAPSULE ORAL
Status: DISCONTINUED | OUTPATIENT
Start: 2019-10-24 | End: 2019-10-26 | Stop reason: HOSPADM

## 2019-10-24 RX ADMIN — ONDANSETRON 4 MG: 2 INJECTION INTRAMUSCULAR; INTRAVENOUS at 17:36

## 2019-10-24 RX ADMIN — INSULIN LISPRO 2 UNITS: 100 INJECTION, SOLUTION INTRAVENOUS; SUBCUTANEOUS at 21:44

## 2019-10-24 RX ADMIN — PANTOPRAZOLE SODIUM 40 MG: 40 TABLET, DELAYED RELEASE ORAL at 08:11

## 2019-10-24 RX ADMIN — TAMSULOSIN HYDROCHLORIDE 0.4 MG: 0.4 CAPSULE ORAL at 08:11

## 2019-10-24 RX ADMIN — Medication 10 ML: at 21:06

## 2019-10-24 RX ADMIN — PROPOFOL 40 MG: 10 INJECTION, EMULSION INTRAVENOUS at 10:50

## 2019-10-24 RX ADMIN — METOCLOPRAMIDE HYDROCHLORIDE 10 MG: 10 TABLET ORAL at 08:11

## 2019-10-24 RX ADMIN — LIDOCAINE HYDROCHLORIDE 60 MG: 20 INJECTION, SOLUTION EPIDURAL; INFILTRATION; INTRACAUDAL; PERINEURAL at 10:49

## 2019-10-24 RX ADMIN — ATORVASTATIN CALCIUM 80 MG: 40 TABLET, FILM COATED ORAL at 21:05

## 2019-10-24 RX ADMIN — FUROSEMIDE 40 MG: 40 TABLET ORAL at 17:00

## 2019-10-24 RX ADMIN — METOCLOPRAMIDE HYDROCHLORIDE 10 MG: 10 TABLET ORAL at 16:59

## 2019-10-24 RX ADMIN — ACETAMINOPHEN 1000 MG: 500 TABLET, FILM COATED ORAL at 04:04

## 2019-10-24 RX ADMIN — ACETAMINOPHEN 1000 MG: 500 TABLET, FILM COATED ORAL at 21:43

## 2019-10-24 RX ADMIN — Medication 10 ML: at 13:13

## 2019-10-24 RX ADMIN — Medication 10 ML: at 05:06

## 2019-10-24 RX ADMIN — TEMAZEPAM 15 MG: 15 CAPSULE ORAL at 22:00

## 2019-10-24 RX ADMIN — PANTOPRAZOLE SODIUM 40 MG: 40 TABLET, DELAYED RELEASE ORAL at 16:59

## 2019-10-24 RX ADMIN — PREGABALIN 150 MG: 75 CAPSULE ORAL at 08:10

## 2019-10-24 RX ADMIN — POTASSIUM CHLORIDE 40 MEQ: 20 TABLET, EXTENDED RELEASE ORAL at 08:11

## 2019-10-24 RX ADMIN — SODIUM CHLORIDE, SODIUM LACTATE, POTASSIUM CHLORIDE, AND CALCIUM CHLORIDE 100 ML/HR: 600; 310; 30; 20 INJECTION, SOLUTION INTRAVENOUS at 09:39

## 2019-10-24 RX ADMIN — PROPOFOL 150 MCG/KG/MIN: 10 INJECTION, EMULSION INTRAVENOUS at 10:50

## 2019-10-24 RX ADMIN — INSULIN LISPRO 3 UNITS: 100 INJECTION, SOLUTION INTRAVENOUS; SUBCUTANEOUS at 16:58

## 2019-10-24 RX ADMIN — METOCLOPRAMIDE HYDROCHLORIDE 10 MG: 10 TABLET ORAL at 21:06

## 2019-10-24 RX ADMIN — PREGABALIN 150 MG: 75 CAPSULE ORAL at 17:00

## 2019-10-24 RX ADMIN — BUSPIRONE HYDROCHLORIDE 15 MG: 5 TABLET ORAL at 08:11

## 2019-10-24 NOTE — CONSULTS
H&P/Consult Note/Progress Note/Office Note:   Andres Torres  MRN: [id number]  VPT:6/99/1599  Age:79 y.o.    HPI: Andres Torres is a 70 y.o. male who has a PMHx of obesity, HTN, DM2, HLD, pulmonary HTN, CHF, MARLEEN who we are asked by Dr. Eh Pittman to see for a cecal mass. Pt presented to the  ED on 10/15/19 with a rectal bleed, lightheadedness, and anemia. He was transferred downtown for further workup. He is s/p multiple transfusions. He had recently been hospitalization where he was treated for diverticulitis and UTI and was recently discharged from inpatient rehab. He did not have a colonoscopy at that time due to diverticulitis. He underwent a bleeding scan on 10/18/19 with no evidence of GI bleed. He then underwent CTAP which revealed cecal thickening and incidental PE. Pt is s/p IVC filter placement. He had a colonoscopy today with Dr. Eh Pittman which revealed an ascending colon polyp and a large fungating mass in the cecum. Surgery was consulted for resection of cecal mass. Pt reports 26lb weight loss. He denies night sweats, fevers, or chills. He reports nausea without vomiting. He has occasional distention. He is having BMs and passing flatus. His prior abdominal surgical history includes bilateral inguinal hernia repairs done at [facility] a number of years ago. CTAP 10/19/19:  IMPRESSION:     1. Right upper and middle lobe pulmonary emboli, captured at the periphery of  the imaging field of view.     2. Wall thickening of the cecum, suspicious for neoplasm. Correlation with  colonoscopy should be considered.     3. No evidence of appendicitis, colitis, bowel obstruction or bowel perforation. Colonoscopy 10/24/19:  Findings:   COLON:  The colonic mucosa from the cecum to the rectum was carefully examined. The mucosa appeared normal with normal vascularity. There was no  inflammatory changes, , raised lesions, vascular ectasias or abnormal pigmentation.   ANUS/RECTUM: Anal exam reveals no masses or hemorrhoids, sphincter tone is normal. Rectal exam reveals no masses or hemorrhoids. Prostate exam was unremarkable for enlargement or nodules. Direct and retroflexed views of the rectum were normal without significant hemorrhoidal engorgement or inflammation, polyps or masses.    SIGMOID- Normal  DESC- Normal  Transverse- Normal  Asc Colon-  8mm polyp snared  Cecum-  Large fungating mass biopsied    Past Medical History:   Diagnosis Date    Cervical stenosis of spine     Chronic kidney disease     Stage 3    Degenerative disc disease, lumbar     Diabetes mellitus type II     uncontrolled-insulin and oral med. 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 ([address]     Pulmonary embolism ([address] Mar 2014    Bilateral - Completed 6 months on Xarelto    TIA (transient ischemic attack) 11/9/2017    Type 2 diabetes, uncontrolled, with neuropathy ([address] 1/22/2015     Past Surgical History:   Procedure Laterality Date    COLONOSCOPY  07/29/2010    Diverticulosis & Colon/Rectal Polyps - Due 2020    EGD  5/9/2011         HX BUNIONECTOMY Bilateral     HX HERNIA REPAIR Bilateral     Inguinal, Repeat on Both Sides Separately    HX KNEE ARTHROSCOPY  1991    x 3    IR IVC FILTER  10/22/2019    SINUS SURGERY PROC UNLISTED      TOTAL KNEE ARTHROPLASTY Right 2006     Current Facility-Administered Medications   Medication Dose Route Frequency    lactated Ringers infusion  100 mL/hr IntraVENous CONTINUOUS    lactated Ringers infusion  100 mL/hr IntraVENous CONTINUOUS    polyethylene glycol (MIRALAX) packet 17 g  17 g Oral DAILY    0.9% sodium chloride infusion 250 mL  250 mL IntraVENous PRN    ondansetron (ZOFRAN) injection 4 mg  4 mg IntraVENous Q4H PRN    sodium chloride (NS) flush 5-40 mL  5-40 mL IntraVENous Q8H    sodium chloride (NS) flush 5-40 mL  5-40 mL IntraVENous PRN    dextrose (D50W) injection syrg 12.5-25 g  25-50 mL IntraVENous PRN    dextrose 40% (GLUTOSE) oral gel 1 Tube  15 g Oral PRN    glucagon (GLUCAGEN) injection 1 mg  1 mg IntraMUSCular PRN    acetaminophen (TYLENOL) tablet 1,000 mg  1,000 mg Oral Q6H PRN    albuterol (PROVENTIL VENTOLIN) nebulizer solution 2.5 mg  2.5 mg Nebulization Q4H PRN    atorvastatin (LIPITOR) tablet 80 mg  80 mg Oral QHS    busPIRone (BUSPAR) tablet 15 mg  15 mg Oral DAILY    furosemide (LASIX) tablet 40 mg  40 mg Oral BID    insulin lispro (HUMALOG) injection   SubCUTAneous AC&HS    metoclopramide HCl (REGLAN) tablet 10 mg  10 mg Oral AC&HS    pantoprazole (PROTONIX) tablet 40 mg  40 mg Oral ACB&D    pregabalin (LYRICA) capsule 150 mg  150 mg Oral BID    tamsulosin (FLOMAX) capsule 0.4 mg  0.4 mg Oral DAILY     Vasotec [enalapril maleate]  Social History     Socioeconomic History    Marital status:      Spouse name: Not on file    Number of children: Not on file    Years of education: Not on file    Highest education level: Not on file   Tobacco Use    Smoking status: Never Smoker    Smokeless tobacco: Never Used   Substance and Sexual Activity    Alcohol use: No    Drug use: No     Social History     Tobacco Use   Smoking Status Never Smoker   Smokeless Tobacco Never Used     Family History   Problem Relation Age of Onset    Stroke Mother     Diabetes Mother     Heart Disease Mother     Diabetes Maternal Grandmother     Glaucoma Maternal Grandmother     Stroke Father     Diabetes Father     Diabetes Paternal Aunt     Cancer Paternal Aunt     Diabetes Paternal Uncle     Cancer Paternal Uncle         Colon    Heart Attack Sister 76    Cancer Sister     Prostate Cancer Brother     Heart Disease Sister 48    Prostate Cancer Brother      ROS: The patient has no difficulty with chest pain or shortness of breath. No fever or chills. Comprehensive review of systems was otherwise unremarkable except as noted above. Physical Exam:   Visit Vitals  /59   Pulse 82   Temp 97.8 °F (36.6 °C)   Resp 18   Wt 262 lb 4.8 oz (119 kg)   SpO2 97%   BMI 42.34 kg/m²     Constitutional: Alert, oriented, cooperative patient in no acute distress; appears stated age    Eyes:Sclera are clear. EOMs intact  ENMT: no external lesions gross hearing normal; no obvious neck masses, no ear or lip lesions, nares normal  CV: RRR. Normal perfusion  Resp: No JVD. Breathing is  non-labored; no audible wheezing. CTAB. GI: obese, soft and mildly-distended, non tender. + BS. Umbilical hernia present. Musculoskeletal: unremarkable with normal function. No embolic signs or cyanosis. + bilateral LE edema.    Neuro:  Oriented; moves all 4; no focal deficits  Psychiatric: normal affect and mood, no memory impairment    Recent vitals (if inpt):  Patient Vitals for the past 24 hrs:   BP Temp Pulse Resp SpO2 Weight   10/24/19 1233 128/59 97.8 °F (36.6 °C) 82 18 97 %    10/24/19 1154 132/62  85  95 %    10/24/19 1144 109/54  86 12 100 %    10/24/19 1134 116/54  83 12 98 %    10/24/19 1120 113/56 97.7 °F (36.5 °C) 90 15 97 %    10/24/19 0919 135/59 97.6 °F (36.4 °C) 85 20 97 %    10/24/19 0755 133/72 98.8 °F (37.1 °C) 89 18 99 %    10/24/19 0613      262 lb 4.8 oz (119 kg)   10/24/19 0415 121/69 98.7 °F (37.1 °C) 92 17 95 %    10/24/19 0023 133/69 98.5 °F (36.9 °C) 87 17 97 %    10/23/19 1932 150/59 98.4 °F (36.9 °C) 97 17 97 %    10/23/19 1600   91    10/23/19 1551     97 %    10/23/19 1528 146/74 97.6 °F (36.4 °C) 85 16 94 %        Labs:  Recent Labs     10/24/19  0531   WBC 4.9   HGB 9.1*         K 3.0*      CO2 28   BUN 6*   CREA 1.07   *   TBILI 0.4   SGOT 23   ALT 15   AP 90       Lab Results   Component Value Date/Time    WBC 4.9 10/24/2019 05:31 AM    HGB 9.1 (L) 10/24/2019 05:31 AM    PLATELET 616 84/93/3788 05:31 AM    Sodium 142 10/24/2019 05:31 AM    Potassium 3.0 (L) 10/24/2019 05:31 AM    Chloride 106 10/24/2019 05:31 AM    CO2 28 10/24/2019 05:31 AM    BUN 6 (L) 10/24/2019 05:31 AM    Creatinine 1.07 10/24/2019 05:31 AM    Glucose 126 (H) 10/24/2019 05:31 AM    INR 1.1 10/15/2019 10:07 PM    aPTT 25.2 10/15/2019 10:07 PM    Bilirubin, total 0.4 10/24/2019 05:31 AM    Bilirubin, direct 0.2 09/26/2019 05:50 AM    AST (SGOT) 23 10/24/2019 05:31 AM    ALT (SGPT) 15 10/24/2019 05:31 AM    Alk. phosphatase 90 10/24/2019 05:31 AM    Lipase 91 11/02/2017 03:28 PM    Ammonia 20 09/25/2019 03:18 PM    Troponin-I <0.05 05/09/2012 01:34 AM    Troponin-I, Qt. <0.02 (L) 09/25/2019 02:15 PM       I reviewed recent labs and recent radiologic studies. I independently reviewed radiology images for studies I described above or studies I have ordered.    Admission date (for inpatients): 10/19/2019   Day of Surgery  Procedure(s):  COLONOSCOPY/ 42/ 622  ENDOSCOPIC POLYPECTOMY  COLON BIOPSY    ASSESSMENT/PLAN:  Problem List  Date Reviewed: 9/18/2019          Codes Class Noted    * (Principal) GI bleed ICD-10-CM: K92.2  ICD-9-CM: 578.9  10/15/2019        Cecal diverticulitis ICD-10-CM: K57.32  ICD-9-CM: 562.11  9/29/2019        RLQ abdominal pain ICD-10-CM: R10.31  ICD-9-CM: 789.03  9/28/2019        CKD (chronic kidney disease) stage 3, GFR 30-59 ml/min (HCC) (Chronic) ICD-10-CM: N18.3  ICD-9-CM: 585.3  6/24/2019        C[facility]brovascular accident ([facility]) [facility] ICD-10-CM: I63.9  ICD-9-CM: 434.91  3/21/2019        Type 2 diabetes mellitus without complication ([place] [address] CLEMENTE-11-BQ: E11.9  ICD-9-CM: 250.00  3/21/2019        Hemiparesis of left dominant side ([facility] ICD-10-CM: G81.92  ICD-9-CM: 342.91  3/7/2019        Type 2 diabetes with nephropathy ([facility] ICD-10-CM: E11.21  ICD-9-CM: 250.40, 583.81  5/11/2018        Morbid obesity due to excess calories ([facility] ICD-10-CM: E66.01  ICD-9-CM: 278.01  11/9/2017        Sleep apnea ICD-10-CM: G47.30  ICD-9-CM: 780.57  11/9/2017        Esophageal dysphagia ICD-10-CM: R13.10  ICD-9-CM: 787.20  11/9/2017        TIA (transient ischemic attack) ICD-10-CM: G45.9  ICD-9-CM: 435.9  11/9/2017        Mild intermittent asthma without complication K-01-FH: R66.77  ICD-9-CM: 493.90  8/24/2017        Diabetic neuropathy associated with type 2 diabetes mellitus (HCC) (Chronic) ICD-10-CM: E11.40  ICD-9-CM: 250.60, 357.2  3/23/0882        Diastolic CHF, chronic (HCC) (Chronic) ICD-10-CM: I50.32  ICD-9-CM: 428.32, 428.0  3/20/2017        Mixed hyperlipidemia (Chronic) ICD-10-CM: Q03.2  ICD-9-CM: 272.2  11/17/2016        Essential hypertension with goal blood pressure less than 130/85 (Chronic) ICD-10-CM: I10  ICD-9-CM: 401.9  7/5/2016        Pulmonary hypertension (HCC) (Chronic) ICD-10-CM: I27.20  ICD-9-CM: 416.8  10/14/2015        Mild diastolic dysfunction (Chronic) ICD-10-CM: I51.9  ICD-9-CM: 429.9  7/17/2015        Gastroesophageal reflux disease without esophagitis (Chronic) ICD-10-CM: K21.9  ICD-9-CM: 530.81  4/21/2015        Hyperlipidemia with target LDL less than 70 (Chronic) ICD-10-CM: E78.5  ICD-9-CM: 272.4  Unknown        Essential hypertension, benign (Chronic) ICD-10-CM: I10  ICD-9-CM: 401.1  1/22/2015        Chronic constipation (Chronic) ICD-10-CM: K59.09  ICD-9-CM: 564.00  1/22/2015        Type 2 diabetes, uncontrolled, with neuropathy (HCC) (Chronic) ICD-10-CM: E11.40, E11.65  ICD-9-CM: 250.62, 357.2  1/22/2015        Obstructive sleep apnea of adult (Chronic) ICD-10-CM: G47.33  ICD-9-CM: 327.23  1/22/2015 Morbid obesity ([facility] (Chronic) ICD-10-CM: E66.01  ICD-9-CM: 278.01  6/27/2011            Principal Problem:    GI bleed (10/15/2019)    Active Problems:     Morbid obesity ([address] (6/27/2011)      Essential hypertension, benign (1/22/2015)      Type 2 diabetes, uncontrolled, with neuropathy ([address] (1/22/2015)      Hyperlipidemia with target LDL less than 70 ()      Pulmonary hypertension ([facility] (02/28/9711)      Diastolic CHF, chronic ([facility] (3/20/2017)      Sleep apnea (11/9/2017)      Type 2 diabetes with nephropathy ([address] (5/11/2018)       Plan:  Timing for resection of cecal mass per Dr. Lizette Tang      Signed:  RAISA Dubose

## 2019-10-24 NOTE — PROGRESS NOTES
Problem: Diabetes Self-Management  Goal: *Disease process and treatment process  Description  Define diabetes and identify own type of diabetes; list 3 options for treating diabetes. Outcome: Progressing Towards Goal  Goal: *Incorporating nutritional management into lifestyle  Description  Describe effect of type, amount and timing of food on blood glucose; list 3 methods for planning meals. Outcome: Progressing Towards Goal  Goal: *Incorporating physical activity into lifestyle  Description  State effect of exercise on blood glucose levels. Outcome: Progressing Towards Goal  Goal: *Developing strategies to promote health/change behavior  Description  Define the ABC's of diabetes; identify appropriate screenings, schedule and personal plan for screenings. Outcome: Progressing Towards Goal  Goal: *Using medications safely  Description  State effect of diabetes medications on diabetes; name diabetes medication taking, action and side effects. Outcome: Progressing Towards Goal  Goal: *Monitoring blood glucose, interpreting and using results  Description  Identify recommended blood glucose targets  and personal targets. Outcome: Progressing Towards Goal  Goal: *Prevention, detection, treatment of acute complications  Description  List symptoms of hyper- and hypoglycemia; describe how to treat low blood sugar and actions for lowering  high blood glucose level. Outcome: Progressing Towards Goal  Goal: *Prevention, detection and treatment of chronic complications  Description  Define the natural course of diabetes and describe the relationship of blood glucose levels to long term complications of diabetes.   Outcome: Progressing Towards Goal  Goal: *Developing strategies to address psychosocial issues  Description  Describe feelings about living with diabetes; identify support needed and support network  Outcome: Progressing Towards Goal  Goal: *Insulin pump training  Outcome: Progressing Towards Goal  Goal: *Sick day guidelines  Outcome: Progressing Towards Goal  Goal: *Patient Specific Goal (EDIT GOAL, INSERT TEXT)  Outcome: Progressing Towards Goal     Problem: Patient Education: Go to Patient Education Activity  Goal: Patient/Family Education  Outcome: Progressing Towards Goal     Problem: Falls - Risk of  Goal: *Absence of Falls  Description  Document Jayy Morris Fall Risk and appropriate interventions in the flowsheet.   Outcome: Progressing Towards Goal  Note:   Fall Risk Interventions:  Mobility Interventions: Bed/chair exit alarm, OT consult for ADLs, Patient to call before getting OOB, PT Consult for mobility concerns, PT Consult for assist device competence, Strengthening exercises (ROM-active/passive)         Medication Interventions: Bed/chair exit alarm, Evaluate medications/consider consulting pharmacy, Patient to call before getting OOB, Teach patient to arise slowly    Elimination Interventions: Call light in reach, Patient to call for help with toileting needs, Stay With Me (per policy), Toilet paper/wipes in reach, Toileting schedule/hourly rounds, Urinal in reach    History of Falls Interventions: Consult care management for discharge planning, Door open when patient unattended, Evaluate medications/consider consulting pharmacy, Investigate reason for fall         Problem: Patient Education: Go to Patient Education Activity  Goal: Patient/Family Education  Outcome: Progressing Towards Goal     Problem: Patient Education: Go to Patient Education Activity  Goal: Patient/Family Education  Outcome: Progressing Towards Goal

## 2019-10-24 NOTE — PROGRESS NOTES
Hourly rounds completed this shift. All needs met. Bed low/locked. No c/o pain. Pt up in recliner. Tele DC this shift, pt has been NSR since admission. Call light in reach. Will continue to monitor pt and give report to oncoming RN.      Peripheral IV 73/77/04 Left Cephalic (Active)   Site Assessment Clean, dry, & intact 10/24/2019  2:21 PM   Phlebitis Assessment 0 10/24/2019  2:21 PM   Infiltration Assessment 0 10/24/2019  2:21 PM   Dressing Status Clean, dry, & intact 10/24/2019  2:21 PM   Dressing Type Transparent;Tape 10/24/2019  2:21 PM   Hub Color/Line Status Capped 10/24/2019  2:21 PM   Action Taken Dressing changed 10/23/2019  7:33 AM   Alcohol Cap Used No 10/24/2019  2:30 AM

## 2019-10-24 NOTE — PROGRESS NOTES
TRANSFER - OUT REPORT:    Verbal report given to Chloe(name) on Christian Chen  being transferred to [facility](unit) for routine post - op       Report consisted of patients Situation, Background, Assessment and   Recommendations(SBAR). Information from the following report(s) SBAR was reviewed with the receiving nurse. Lines:   Peripheral IV 95/89/39 Left Cephalic (Active)   Site Assessment Clean, dry, & intact 10/24/2019  9:23 AM   Phlebitis Assessment 0 10/24/2019  9:23 AM   Infiltration Assessment 0 10/24/2019  9:23 AM   Dressing Status Clean, dry, & intact 10/24/2019  9:23 AM   Dressing Type Transparent;Tape 10/24/2019  9:23 AM   Hub Color/Line Status Gray;Patent; Infusing 10/24/2019  9:23 AM   Action Taken Dressing changed 10/23/2019  7:33 AM   Alcohol Cap Used No 10/24/2019  2:30 AM        Opportunity for questions and clarification was provided.

## 2019-10-24 NOTE — PROGRESS NOTES
TRANSFER - OUT REPORT:    Verbal report given to Camelia Pablo RN(name) on Paula End  being transferred to GI Lab(unit) for ordered procedure       Report consisted of patients Situation, Background, Assessment and   Recommendations(SBAR). Information from the following report(s) SBAR and Kardex was reviewed with the receiving nurse. Lines:   Peripheral IV 29/21/20 Left Cephalic (Active)   Site Assessment Clean, dry, & intact 10/24/2019  7:10 AM   Phlebitis Assessment 0 10/24/2019  7:10 AM   Infiltration Assessment 0 10/24/2019  7:10 AM   Dressing Status Clean, dry, & intact 10/24/2019  7:10 AM   Dressing Type Transparent;Tape 10/24/2019  7:10 AM   Hub Color/Line Status Capped 10/24/2019  7:10 AM   Action Taken Dressing changed 10/23/2019  7:33 AM   Alcohol Cap Used No 10/24/2019  2:30 AM        Opportunity for questions and clarification was provided.       Patient transported with:

## 2019-10-24 NOTE — ANESTHESIA POSTPROCEDURE EVALUATION
Procedure(s):  COLONOSCOPY/ 42/ 622  ENDOSCOPIC POLYPECTOMY  COLON BIOPSY. total IV anesthesia    Anesthesia Post Evaluation        Patient location during evaluation: PACU  Patient participation: complete - patient participated  Level of consciousness: awake and alert  Pain management: adequate  Airway patency: patent  Anesthetic complications: no  Cardiovascular status: acceptable  Respiratory status: acceptable  Hydration status: acceptable  Post anesthesia nausea and vomiting:  none      Vitals Value Taken Time   /54 10/24/2019 11:44 AM   Temp     Pulse 85 10/24/2019 11:53 AM   Resp 12 10/24/2019 11:34 AM   SpO2 95 % 10/24/2019 11:53 AM   Vitals shown include unvalidated device data.

## 2019-10-24 NOTE — PROGRESS NOTES
Hourly rounds completed this shift. All needs met at this time. Pt has been NPO since midnight, ready for colonoscopy. Prep complete. Bed low/locked. Call light within reach. Will continue to monitor and give bedside report to oncoming nurse.

## 2019-10-24 NOTE — ANESTHESIA PREPROCEDURE EVALUATION
Relevant Problems   No relevant active problems       Anesthetic History   No history of anesthetic complications            Review of Systems / Medical History  Patient summary reviewed and pertinent labs reviewed    Pulmonary        Sleep apnea    Asthma     Comments: PE in 2014 - brief course of anticoagulation    Neuro/Psych       CVA (residual L sided weakness)  TIA    Comments: Cervical stenosis Cardiovascular    Hypertension              Exercise tolerance: >4 METS: He denied chest pain or syncope. Intermittent SOB  Comments: Echo 10/2019 - normal LV function, no RWMA, mildly dilated RV but normal function, RVSP 45-50, no significant valvular abnormalities    GI/Hepatic/Renal     GERD    Renal disease: CRI      Comments: H/o rectal mass Endo/Other    Diabetes    Morbid obesity, arthritis and anemia     Other Findings   Comments: Colonoscopy for rectal bleeding (Hb 9.1 this morning)         Physical Exam    Airway  Mallampati: III  TM Distance: 4 - 6 cm  Neck ROM: decreased range of motion       Comments: Short thick neck with mildly decreased neck ROM Cardiovascular  Regular rate and rhythm,  S1 and S2 normal,  no murmur, click, rub, or gallop             Dental  No notable dental hx       Pulmonary                Comments: Shallow breaths.  No appreciable wheezing or rales Abdominal         Other Findings            Anesthetic Plan    ASA: 3  Anesthesia type: total IV anesthesia          Induction: Intravenous  Anesthetic plan and risks discussed with: Patient

## 2019-10-24 NOTE — PROGRESS NOTES
Problem: Mobility Impaired (Adult and Pediatric)  Goal: *Acute Goals and Plan of Care (Insert Text)  Description  LTG:  (1.)Mr. To will move from supine to sit and sit to supine, scoot up and down and roll side to side INDEPENDENTLY with bed flat within 7 treatment day(s). (2.)Mr. To will transfer from bed to chair and chair to bed INDEPENDENTLY within 7 treatment day(s). (3.)Mr. To will ambulate with MODIFIED INDEPENDENCE for 250+ feet with the least restrictive device within 7 treatment day(s). (4.)Mr. Tawnya Cameron will ascend and descend 4 steps with STAND BY ASSIST using handrail(s) within 7 days. (5.)Mr. To will independently perform exercises per HEP to improve strength and mobility within 7 days. ________________________________________________________________________________________________   Outcome: Progressing Towards Goal     PHYSICAL THERAPY: Daily Note and PM 10/24/2019  INPATIENT: PT Visit Days : 2  [place] / [place] B / Product Type: Medicare /       NAME/AGE/GENDER: Kristen Wise is a 70 y.o. male   PRIMARY DIAGNOSIS: GI bleed [K92.2] GI bleed   GI bleed    Procedure(s) (LRB):  COLONOSCOPY/ 42/ 622 (N/A)  ENDOSCOPIC POLYPECTOMY (N/A)  COLON BIOPSY (N/A)  Day of Surgery  ICD-10: Treatment Diagnosis:    · Generalized Muscle Weakness (M62.81)  · Difficulty in walking, Not elsewhere classified (R26.2)  · Other abnormalities of gait and mobility (R26.89)   Precaution/Allergies:  Johanne Anguiano maleate]      ASSESSMENT:     Mr. Tawnya Cameron is a 70year old male admitted from [facility] for GI bleed and is s/p colonoscopy today. Presents up in chair and is agreeable to therapy treatment. Performs transfers with SBA. Ambulates total of 250 ft in hallway with RW, CGA/SBA, and min verbal cues for gait safety, walker management, and sequencing. Pt demonstrates slow gait pace and mild trunk sway, no major loss of balance noted or physical assistance needed. Returned to room and up to chair for short rest break, then performs below LE exercises with good participation. Mobility and transfers in room with SBA-supervision for return to bed. Positions himself comfortably with needs in reachPili Garcia is progressing well with therapy and was able to increase gait distance/independence today. Plans to return home at [place] with [place] [facility]. This section established at most recent assessment   PROBLEM LIST (Impairments causing functional limitations):  1. Decreased Strength  2. Decreased Transfer Abilities  3. Decreased Ambulation Ability/Technique  4. Decreased Balance  5. Decreased Activity Tolerance   INTERVENTIONS PLANNED: (Benefits and precautions of physical therapy have been discussed with the patient.)  1. Balance Exercise  2. Bed Mobility  3. Gait Training  4. Home Exercise Program (HEP)  5. Therapeutic Activites  6. Therapeutic Exercise/Strengthening  7. Transfer Training     TREATMENT PLAN: Frequency/Duration: 3 times a week for duration of hospital stay  Rehabilitation Potential For Stated Goals: Good     REHAB RECOMMENDATIONS (at time of discharge pending progress):    Placement: It is my opinion, based on this patient's performance to date, that Mr. Ned Delatorre may benefit from [address] after discharge due to the functional deficits listed above that are likely to improve with skilled rehabilitation because he/she has multiple medical issues that affect his/her functional mobility in the community. Equipment:    None at this time              HISTORY:   History of Present Injury/Illness (Reason for Referral):  Per H&P, \"Admission HPI from 10/15/2019:    Patient is a 71 y/o male with hx DM, DVT/PE, diastolic CHF, CKD 3, asthma who presents to ED with several episodes of copious blood and clots per rectum. No pain but has had episodes of acute urgency. He was admitted 9/25-10/1 for cecal diverticulitis. He has completed antibiotics.  Last hemoglobin checked was 10.7 on 10/8. Today is 8.6 with gross blood and clots seen in bedside commode. His last colonoscopy over 10 years ago and he had polyps. He takes aspirin and plavix. No chest pain or shortness of breath. GI has been notified. He will be admitted for further workup. Hospital Course:  Admitted for GI bleed- with recent diverticulitis felt probably diverticular bleed. GI - no colonoscopy secondary to recent diverticulitis. Had NM red blood tag scan- by the time it was dont pt dint have any bleeding and scan was normal. Pt had in total 4 units of blood. Bleed 10/18,10/19. Rocio Zapien recommended ct abd pelvis with contrast - secondary to the bleed pt had this evening. Spoke to [facility] - as pt had been having bleeding at least one episode since past 2- 3 days and already required 4 untis of prbs- and may require Interventional radiology services- hence pt would be transferred to [facility].  The reason for transfer was explained to pt and pts sister at bedside.  [facility] was notified about the transfer\"    Past Medical History/Comorbidities:   Mr. Pili Waite  has a past medical history of Cervical stenosis of spine, Chronic kidney disease, Degenerative disc disease, lumbar, Diabetes mellitus type II, Diabetic retinopathy of both eyes without macular edema associated with type 2 diabetes mellitus ([address] Diastolic congestive heart failure ([address] Diverticulosis of colon (June 2010), DVT (deep venous thrombosis) ([address] Extrinsic allergic asthma, GERD (gastroesophageal reflux disease), colonic polyps (07/29/2010), Hyperlipidemia LDL goal < 70, Hypertension, Obstructive sleep apnea, Perennial allergic rhinitis with seasonal variation, 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 ([address] Pulmonary embolism ([address] (Mar 2014), TIA (transient ischemic attack) (11/9/2017), and Type 2 diabetes, uncontrolled, with neuropathy ([address] (1/22/2015).  He also has no past medical history of Aneurysm ([facility] Arrhythmia, Autoimmune disease ([facility] CAD (coronary artery disease), Cancer ([facility] Coagulation defects, COPD, Liver disease, Psychiatric disorder, PUD (peptic ulcer disease), or Seizures ([facility] Mr. Seng Melendez  has a past surgical history that includes colonoscopy (07/29/2010); egd (5/9/2011); pr sinus surgery proc unlisted; hx knee arthroscopy (1991); pr total knee arthroplasty (Right, 2006); hx bunionectomy (Bilateral); hx hernia repair (Bilateral); and ir ivc filter (10/22/2019). Social History/Living Environment:   Home Environment: Apartment  # Steps to Enter: 4  Rails to Enter: Yes  Hand Rails : Bilateral  Wheelchair Ramp: No  One/Two Story Residence: One story  Living Alone: No  Support Systems: Family member(s)  Patient Expects to be Discharged to[de-identified] Private residence  Current DME Used/Available at Home: Walker, rollator, Cane, straight  Prior Level of Function/Work/Activity:  Lives in first floor, single story apartment with 4 steps to enter. Has been mod I with mobility/ambulation using rollator in home (mostly to help with carrying things per his report) and cane in community. Recently was at [facility]. Number of Personal Factors/Comorbidities that affect the Plan of Care: 1-2: MODERATE COMPLEXITY   EXAMINATION:   Most Recent Physical Functioning:   Gross Assessment:  AROM: Within functional limits  Strength: Generally decreased, functional  Coordination: Within functional limits               Posture:  Posture (WDL): Exceptions to WDL  Posture Assessment: Forward head, Rounded shoulders  Balance:  Sitting: Intact  Standing: Impaired  Standing - Static: Good  Standing - Dynamic : Fair(+ with walker) Bed Mobility:  Sit to Supine: Supervision  Scooting: Supervision  Wheelchair Mobility:     Transfers:  Sit to Stand: Stand-by assistance  Stand to Sit: Stand-by assistance  Bed to Chair: Stand-by assistance  Interventions: Verbal cues; Safety awareness training  Duration: 16 Minutes  Gait:     Base of Support: Center of gravity altered  Speed/Sharon: Pace decreased (<100 feet/min); Slow  Step Length: Left shortened;Right shortened  Gait Abnormalities: Trunk sway increased;Decreased step clearance; Path deviations  Distance (ft): 250 Feet (ft)  Assistive Device: Walker, rolling  Ambulation - Level of Assistance: Contact guard assistance;Stand-by assistance  Interventions: Verbal cues; Safety awareness training      Body Structures Involved:  1. Muscles Body Functions Affected:  1. Movement Related  2. Digestive Activities and Participation Affected:  1. General Tasks and Demands  2. Mobility  3. Domestic Life  4. Community, Social and [facility]    Number of elements that affect the Plan of Care: 4+: HIGH COMPLEXITY   CLINICAL PRESENTATION:   Presentation: Stable and uncomplicated: LOW COMPLEXITY   CLINICAL DECISION MAKIN [facility] Short Form  How much difficulty does the patient currently have. .. Unable A Lot A Little None   1. Turning over in bed (including adjusting bedclothes, sheets and blankets)? ? 1   ? 2   ? 3   ? 4   2. Sitting down on and standing up from a chair with arms ( e.g., wheelchair, bedside commode, etc.)   ? 1   ? 2   ? 3   ? 4   3. Moving from lying on back to sitting on the side of the bed?   ? 1   ? 2   ? 3   ? 4   How much help from another person does the patient currently need. .. Total A Lot A Little None   4. Moving to and from a bed to a chair (including a wheelchair)? ? 1   ? 2   ? 3   ? 4   5. Need to walk in hospital room? ? 1   ? 2   ? 3   ? 4   6. Climbing 3-5 steps with a railing? ? 1   ? 2   ? 3   ? 4   © , Trustees of [address] [place] [address] [address] [address], under license to [employer]. All rights reserved      Score:  Initial: 21 Most Recent: X (Date: -- )    Interpretation of Tool:  Represents activities that are increasingly more difficult (i.e. Bed mobility, Transfers, Gait).     Medical Necessity:     · Patient demonstrates good  ·  rehab potential due to higher previous functional level. Reason for Services/Other Comments:  · Patient continues to demonstrate capacity to improve strength, mobility, balance, transfers, activity tolerance which will increase independence, decrease amount of assistance required from caregiver and increase safety  · . Use of outcome tool(s) and clinical judgement create a POC that gives a: Clear prediction of patient's progress: LOW COMPLEXITY            TREATMENT:   (In addition to Assessment/Re-Assessment sessions the following treatments were rendered)   Pre-treatment Symptoms/Complaints:  \"tomorrow maybe I'll go all the way around the rouse\"  Pain: Initial:   Pain Intensity 1: 0  Post Session:  0/10     Therapeutic Activity: (  16 Minutes ):  Therapeutic activities including Chair transfer, standing balance activities, Ambulation on level ground in room and hallway, and bed mobility (sit to supine) to improve mobility, strength, balance, coordination and activity tolerance . Required minimal Verbal cues; Safety awareness training to promote static and dynamic balance in standing and promote motor control of bilateral, lower extremity(s). Therapeutic Exercise: (8 Minutes):  Exercises per grid below to improve mobility, strength and balance. Required minimal visual and verbal cues to promote proper body mechanics and exercise form. Progressed range and repetitions as indicated.      Date:  10/23/19 Date:  10/24/19 Date:     Activity/Exercise Parameters Parameters Parameters   LAQ 15x AB 15x AB    Seated marching 15x AB 15xAB    Seated hip aBd 15x AB 15x AB    Toe taps 15x AB 15x AB    Heel taps 15x AB 15x AB                    Braces/Orthotics/Lines/Etc:   · O2 Device: Room air  Treatment/Session Assessment:    · Response to Treatment:  pt performs mobility in room and hallway with CGA/SBA  · Interdisciplinary Collaboration:   o Physical Therapist  o Registered Nurse  · After treatment position/precautions:   o Supine in bed  o Bed/Chair-wheels locked  o Bed in low position  o Call light within reach   · Compliance with Program/Exercises: Will assess as treatment progresses  · Recommendations/Intent for next treatment session: \"Next visit will focus on advancements to more challenging activities and reduction in assistance provided\".   Total Treatment Duration:  PT Patient Time In/Time Out  Time In: 1450  Time Out: 715 Adry Orr DPT

## 2019-10-24 NOTE — PROGRESS NOTES
TRANSFER - IN REPORT:    Verbal report received from [address] VEGA(name) on Yunior Ochoa  being received from GI Lab(unit) for routine progression of care      Report consisted of patients Situation, Background, Assessment and   Recommendations(SBAR). Information from the following report(s) SBAR was reviewed with the receiving nurse. Opportunity for questions and clarification was provided. Assessment completed upon patients arrival to unit and care assumed.

## 2019-10-24 NOTE — PROGRESS NOTES
Interdisciplinary Rounds completed 10/24/19. Nursing, Case Management, Physician  present. Plan of care reviewed and updated. Surgery, GI following.

## 2019-10-24 NOTE — PROGRESS NOTES
TRANSFER - IN REPORT:    Verbal report received from [facility] on [facility]  being received from 622(unit) for ordered procedure      Report consisted of patients Situation, Background, Assessment and   Recommendations(SBAR). Information from the following report(s) SBAR and MAR was reviewed with the receiving nurse. Opportunity for questions and clarification was provided.

## 2019-10-24 NOTE — OP NOTES
Colonoscopy Procedure Note    Procedure: Colonoscopy    Pre-operative Diagnosis: cecal mass     Post-operative Diagnosis: Diverticulosis,   Recommendations:  See inpt notes. Surveillance interval: 1y    Anesthesia/Sedation MAC,, (see separate report). Procedure Details:  Informed consent was obtained for the procedure, including anesthesia. Risks of perforation, hemorrhage, adverse drug reaction and aspiration were discussed. The patient was placed in the left lateral decubitus position. Based on the pre-procedure assessment, including review of the patient's medical history, medications, allergies, and review of systems, he had been deemed to be an appropriate candidate for this procedure. A rectal examination was performed. The colonoscope was inserted into the rectum and advanced under direct vision to the cecum. The quality of the colonic preparation was adequate. A careful inspection was made as the colonoscope was withdrawn; findings and interventions are described below. Findings:   COLON:  The colonic mucosa from the cecum to the rectum was carefully examined. The mucosa appeared normal with normal vascularity. There was no  inflammatory changes, , raised lesions, vascular ectasias or abnormal pigmentation. ANUS/RECTUM: Anal exam reveals no masses or hemorrhoids, sphincter tone is normal. Rectal exam reveals no masses or hemorrhoids. Prostate exam was unremarkable for enlargement or nodules. Direct and retroflexed views of the rectum were normal without significant hemorrhoidal engorgement or inflammation, polyps or masses. SIGMOID- Normal  DESC- Normal  Transverse- Normal  Asc Colon-  8mm polyp snared  Cecum-  Large fungating mass biopsied    EBL: minimal           PATH:  Cecal mass, asc colon polyp    Complications: None; patient tolerated the procedure well. Attending Attestation: I performed the procedure.     Grayson Mcfarland MD

## 2019-10-25 LAB
ALBUMIN SERPL-MCNC: 2.6 G/DL (ref 3.2–4.6)
ALBUMIN/GLOB SERPL: 0.8 {RATIO} (ref 1.2–3.5)
ALP SERPL-CCNC: 93 U/L (ref 50–136)
ALT SERPL-CCNC: 17 U/L (ref 12–65)
ANION GAP SERPL CALC-SCNC: 7 MMOL/L (ref 7–16)
AST SERPL-CCNC: 22 U/L (ref 15–37)
BASOPHILS # BLD: 0 K/UL (ref 0–0.2)
BASOPHILS NFR BLD: 0 % (ref 0–2)
BILIRUB SERPL-MCNC: 0.4 MG/DL (ref 0.2–1.1)
BUN SERPL-MCNC: 6 MG/DL (ref 8–23)
CALCIUM SERPL-MCNC: 7.8 MG/DL (ref 8.3–10.4)
CHLORIDE SERPL-SCNC: 109 MMOL/L (ref 98–107)
CO2 SERPL-SCNC: 29 MMOL/L (ref 21–32)
CREAT SERPL-MCNC: 1.1 MG/DL (ref 0.8–1.5)
DIFFERENTIAL METHOD BLD: ABNORMAL
EOSINOPHIL # BLD: 0.2 K/UL (ref 0–0.8)
EOSINOPHIL NFR BLD: 4 % (ref 0.5–7.8)
ERYTHROCYTE [DISTWIDTH] IN BLOOD BY AUTOMATED COUNT: 15.2 % (ref 11.9–14.6)
GLOBULIN SER CALC-MCNC: 3.3 G/DL (ref 2.3–3.5)
GLUCOSE BLD STRIP.AUTO-MCNC: 174 MG/DL (ref 65–100)
GLUCOSE BLD STRIP.AUTO-MCNC: 265 MG/DL (ref 65–100)
GLUCOSE BLD STRIP.AUTO-MCNC: 290 MG/DL (ref 65–100)
GLUCOSE BLD STRIP.AUTO-MCNC: 320 MG/DL (ref 65–100)
GLUCOSE SERPL-MCNC: 138 MG/DL (ref 65–100)
HCT VFR BLD AUTO: 30 % (ref 41.1–50.3)
HGB BLD-MCNC: 9.3 G/DL (ref 13.6–17.2)
IMM GRANULOCYTES # BLD AUTO: 0 K/UL (ref 0–0.5)
IMM GRANULOCYTES NFR BLD AUTO: 0 % (ref 0–5)
LYMPHOCYTES # BLD: 2 K/UL (ref 0.5–4.6)
LYMPHOCYTES NFR BLD: 43 % (ref 13–44)
MAGNESIUM SERPL-MCNC: 1.8 MG/DL (ref 1.8–2.4)
MCH RBC QN AUTO: 29.4 PG (ref 26.1–32.9)
MCHC RBC AUTO-ENTMCNC: 31 G/DL (ref 31.4–35)
MCV RBC AUTO: 94.9 FL (ref 79.6–97.8)
MONOCYTES # BLD: 0.6 K/UL (ref 0.1–1.3)
MONOCYTES NFR BLD: 14 % (ref 4–12)
NEUTS SEG # BLD: 1.8 K/UL (ref 1.7–8.2)
NEUTS SEG NFR BLD: 39 % (ref 43–78)
NRBC # BLD: 0 K/UL (ref 0–0.2)
PLATELET # BLD AUTO: 178 K/UL (ref 150–450)
PMV BLD AUTO: 10.3 FL (ref 9.4–12.3)
POTASSIUM SERPL-SCNC: 3.5 MMOL/L (ref 3.5–5.1)
PROT SERPL-MCNC: 5.9 G/DL (ref 6.3–8.2)
RBC # BLD AUTO: 3.16 M/UL (ref 4.23–5.6)
SODIUM SERPL-SCNC: 145 MMOL/L (ref 136–145)
WBC # BLD AUTO: 4.6 K/UL (ref 4.3–11.1)

## 2019-10-25 PROCEDURE — 77030027138 HC INCENT SPIROMETER -A

## 2019-10-25 PROCEDURE — 36415 COLL VENOUS BLD VENIPUNCTURE: CPT

## 2019-10-25 PROCEDURE — 83735 ASSAY OF MAGNESIUM: CPT

## 2019-10-25 PROCEDURE — 82962 GLUCOSE BLOOD TEST: CPT

## 2019-10-25 PROCEDURE — 65660000000 HC RM CCU STEPDOWN

## 2019-10-25 PROCEDURE — 85025 COMPLETE CBC W/AUTO DIFF WBC: CPT

## 2019-10-25 PROCEDURE — 74011250637 HC RX REV CODE- 250/637: Performed by: NURSE PRACTITIONER

## 2019-10-25 PROCEDURE — 80053 COMPREHEN METABOLIC PANEL: CPT

## 2019-10-25 PROCEDURE — 3331090002 HH PPS REVENUE DEBIT

## 2019-10-25 PROCEDURE — 74011250637 HC RX REV CODE- 250/637: Performed by: FAMILY MEDICINE

## 2019-10-25 PROCEDURE — 74011636637 HC RX REV CODE- 636/637: Performed by: FAMILY MEDICINE

## 2019-10-25 PROCEDURE — 3331090001 HH PPS REVENUE CREDIT

## 2019-10-25 RX ORDER — INSULIN GLARGINE 100 [IU]/ML
68 INJECTION, SOLUTION SUBCUTANEOUS DAILY
Status: DISCONTINUED | OUTPATIENT
Start: 2019-10-26 | End: 2019-10-26 | Stop reason: HOSPADM

## 2019-10-25 RX ORDER — LANOLIN ALCOHOL/MO/W.PET/CERES
400 CREAM (GRAM) TOPICAL 2 TIMES DAILY
Status: DISCONTINUED | OUTPATIENT
Start: 2019-10-25 | End: 2019-10-26 | Stop reason: HOSPADM

## 2019-10-25 RX ORDER — INSULIN GLARGINE 100 [IU]/ML
67 INJECTION, SOLUTION SUBCUTANEOUS DAILY
Status: DISCONTINUED | OUTPATIENT
Start: 2019-10-26 | End: 2019-10-25 | Stop reason: CLARIF

## 2019-10-25 RX ORDER — INSULIN GLARGINE 100 [IU]/ML
67 INJECTION, SOLUTION SUBCUTANEOUS DAILY
Status: DISCONTINUED | OUTPATIENT
Start: 2019-10-26 | End: 2019-10-26 | Stop reason: HOSPADM

## 2019-10-25 RX ORDER — POTASSIUM CHLORIDE 20 MEQ/1
40 TABLET, EXTENDED RELEASE ORAL 3 TIMES DAILY
Status: COMPLETED | OUTPATIENT
Start: 2019-10-25 | End: 2019-10-25

## 2019-10-25 RX ADMIN — PREGABALIN 150 MG: 75 CAPSULE ORAL at 08:20

## 2019-10-25 RX ADMIN — POTASSIUM CHLORIDE 20 MEQ: 20 TABLET, EXTENDED RELEASE ORAL at 17:05

## 2019-10-25 RX ADMIN — PANTOPRAZOLE SODIUM 40 MG: 40 TABLET, DELAYED RELEASE ORAL at 17:05

## 2019-10-25 RX ADMIN — FUROSEMIDE 40 MG: 40 TABLET ORAL at 17:05

## 2019-10-25 RX ADMIN — METOCLOPRAMIDE HYDROCHLORIDE 10 MG: 10 TABLET ORAL at 21:11

## 2019-10-25 RX ADMIN — ACETAMINOPHEN 1000 MG: 500 TABLET, FILM COATED ORAL at 15:55

## 2019-10-25 RX ADMIN — Medication 10 ML: at 14:00

## 2019-10-25 RX ADMIN — METOCLOPRAMIDE HYDROCHLORIDE 10 MG: 10 TABLET ORAL at 05:28

## 2019-10-25 RX ADMIN — INSULIN LISPRO 4 UNITS: 100 INJECTION, SOLUTION INTRAVENOUS; SUBCUTANEOUS at 17:01

## 2019-10-25 RX ADMIN — TEMAZEPAM 15 MG: 15 CAPSULE ORAL at 21:08

## 2019-10-25 RX ADMIN — METOCLOPRAMIDE HYDROCHLORIDE 10 MG: 10 TABLET ORAL at 17:02

## 2019-10-25 RX ADMIN — Medication 400 MG: at 08:19

## 2019-10-25 RX ADMIN — INSULIN LISPRO 1 UNITS: 100 INJECTION, SOLUTION INTRAVENOUS; SUBCUTANEOUS at 08:21

## 2019-10-25 RX ADMIN — ATORVASTATIN CALCIUM 80 MG: 40 TABLET, FILM COATED ORAL at 21:09

## 2019-10-25 RX ADMIN — Medication 10 ML: at 21:29

## 2019-10-25 RX ADMIN — POTASSIUM CHLORIDE 40 MEQ: 20 TABLET, EXTENDED RELEASE ORAL at 08:20

## 2019-10-25 RX ADMIN — TAMSULOSIN HYDROCHLORIDE 0.4 MG: 0.4 CAPSULE ORAL at 08:19

## 2019-10-25 RX ADMIN — PANTOPRAZOLE SODIUM 40 MG: 40 TABLET, DELAYED RELEASE ORAL at 05:28

## 2019-10-25 RX ADMIN — Medication 10 ML: at 05:28

## 2019-10-25 RX ADMIN — POTASSIUM CHLORIDE 20 MEQ: 20 TABLET, EXTENDED RELEASE ORAL at 21:09

## 2019-10-25 RX ADMIN — POLYETHYLENE GLYCOL 3350 17 G: 17 POWDER, FOR SOLUTION ORAL at 08:21

## 2019-10-25 RX ADMIN — BUSPIRONE HYDROCHLORIDE 15 MG: 5 TABLET ORAL at 08:17

## 2019-10-25 RX ADMIN — ACETAMINOPHEN 1000 MG: 500 TABLET, FILM COATED ORAL at 21:11

## 2019-10-25 RX ADMIN — METOCLOPRAMIDE HYDROCHLORIDE 10 MG: 10 TABLET ORAL at 11:55

## 2019-10-25 RX ADMIN — INSULIN LISPRO 4 UNITS: 100 INJECTION, SOLUTION INTRAVENOUS; SUBCUTANEOUS at 21:29

## 2019-10-25 RX ADMIN — INSULIN LISPRO 4 UNITS: 100 INJECTION, SOLUTION INTRAVENOUS; SUBCUTANEOUS at 11:54

## 2019-10-25 RX ADMIN — PREGABALIN 150 MG: 75 CAPSULE ORAL at 17:02

## 2019-10-25 RX ADMIN — FUROSEMIDE 40 MG: 40 TABLET ORAL at 08:19

## 2019-10-25 NOTE — PROGRESS NOTES
Interdisciplinary Rounds completed 10/25/19. Nursing, Case Management, Physician and PT present. Plan of care reviewed and updated. Surgery and GI following.

## 2019-10-25 NOTE — PROGRESS NOTES
arrived to assist with HCPoA. Discussing with Pt, realization was made that Pt is looking for a Durable Power of .  informed Pt that the hospital is unable to provide that service and this would need to be done through an .  suggested that there may be some attorneys who are willing to travel, but did not know any and that using a search engine may assist with locating one.  explained the purpose of the HCPoA. Pt will discuss with his sister prior to filling out and will inform nurse/Spiritual Care when he is ready to sign. No further needs at this time. Please consult Spiritual Care as needed. Marija Arshad, [employer].

## 2019-10-25 NOTE — PROGRESS NOTES
Progress Note    10/25/2019  Admit Date: 10/19/2019 10:44 PM   NAME: Errol Blakely   :  1940   MRN:  [id number]   Attending: Elsy Schumacher MD  PCP:  Ivelisse Resendiz MD  Treatment Team: Attending Provider: Elsy Schumacher MD; Utilization Review: Derrick Rosas RN; Consulting Provider: Rowdy Molina MD; Care Manager: Janice Stovall RN    Full Code   SUBJECTIVE:   Mr. Ed Shipman is a 69 yo male with PMH of CKD 3, DM II, diastolic CHF, HTN, GERD, hx PE/DVT completed 6 months xarelto, TIA, MARLEEN non compliant with CPAP who presented to [facility] with c/o rectal bleeding, diaphoresis, light headedness. Found to have hgb 8.6 down from 10.7 a few weeks prior. Had admission -10/1 for cecal diverticulitis, UTI. On ASA, plavix which was held on admission. Also c/o 26 lb unintentional weight loss. Required total of 5 units PRBC. Transferred to [facility]. CTA abd showed cecal mass suspicious for neoplasm. Also found to have right upper/middle lobe PE.  LE duplex neg for DVT. Echo with EF 55-60%. 10/22 had IVC filter placed. 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 General Surgery consulted for resection. Very unhappy, angry in general. Passing gas. Denies CP, SOB, abd pain.        Past Medical History:   Diagnosis Date    Cervical stenosis of spine     Chronic kidney disease     Stage 3    Degenerative disc disease, lumbar     Diabetes mellitus type II     uncontrolled-insulin and oral med. 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 (HCC)     R - Moderate, L - Mild    Diastolic congestive heart failure (HCC)     Diverticulosis of colon 2010    DVT (deep venous thrombosis) (HCC)     RLE    Extrinsic allergic asthma     GERD (gastroesophageal reflux disease)     Hx of colonic polyps 2010    Hyperplastic     Hyperlipidemia LDL goal < 79     Hypertension     Obstructive sleep apnea     Non-Compliant with CPAP    Perennial allergic rhinitis with seasonal variation     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 [facility] [address]     Pulmonary embolism ([facility] [address] Mar 2014    Bilateral - Completed 6 months on Xarelto    TIA (transient ischemic attack) 11/9/2017    Type 2 diabetes, uncontrolled, with neuropathy ([facility] [address] 1/22/2015     Recent Results (from the past 24 hour(s))   GLUCOSE, POC    Collection Time: 10/24/19  3:31 PM   Result Value Ref Range    Glucose (POC) 257 (H) 65 - 100 mg/dL   GLUCOSE, POC    Collection Time: 10/24/19  9:29 PM   Result Value Ref Range    Glucose (POC) 217 (H) 65 - 100 mg/dL   CBC WITH AUTOMATED DIFF    Collection Time: 10/25/19  5:57 AM   Result Value Ref Range    WBC 4.6 4.3 - 11.1 K/uL    RBC 3.16 (L) 4.23 - 5.6 M/uL    HGB 9.3 (L) 13.6 - 17.2 g/dL    HCT 30.0 (L) 41.1 - 50.3 %    MCV 94.9 79.6 - 97.8 FL    MCH 29.4 26.1 - 32.9 PG    MCHC 31.0 (L) 31.4 - 35.0 g/dL    RDW 15.2 (H) 11.9 - 14.6 %    PLATELET 342 340 - 793 K/uL    MPV 10.3 9.4 - 12.3 FL    ABSOLUTE NRBC 0.00 0.0 - 0.2 K/uL    DF AUTOMATED      NEUTROPHILS 39 (L) 43 - 78 %    LYMPHOCYTES 43 13 - 44 %    MONOCYTES 14 (H) 4.0 - 12.0 %    EOSINOPHILS 4 0.5 - 7.8 %    BASOPHILS 0 0.0 - 2.0 %    IMMATURE GRANULOCYTES 0 0.0 - 5.0 %    ABS. NEUTROPHILS 1.8 1.7 - 8.2 K/UL    ABS. LYMPHOCYTES 2.0 0.5 - 4.6 K/UL    ABS. MONOCYTES 0.6 0.1 - 1.3 K/UL    ABS. EOSINOPHILS 0.2 0.0 - 0.8 K/UL    ABS. BASOPHILS 0.0 0.0 - 0.2 K/UL    ABS. IMM.  GRANS. 0.0 0.0 - 0.5 K/UL   METABOLIC PANEL, COMPREHENSIVE    Collection Time: 10/25/19  5:57 AM   Result Value Ref Range    Sodium 145 136 - 145 mmol/L    Potassium 3.5 3.5 - 5.1 mmol/L    Chloride 109 (H) 98 - 107 mmol/L    CO2 29 21 - 32 mmol/L    Anion gap 7 7 - 16 mmol/L    Glucose 138 (H) 65 - 100 mg/dL    BUN 6 (L) 8 - 23 MG/DL    Creatinine 1.10 0.8 - 1.5 MG/DL    GFR est AA >60 >60 ml/min/1.73m2    GFR est non-AA >60 >60 ml/min/1.73m2    Calcium 7.8 (L) 8.3 - 10.4 MG/DL    Bilirubin, total 0.4 0.2 - 1.1 MG/DL    ALT (SGPT) 17 12 - 65 U/L    AST (SGOT) 22 15 - 37 U/L    Alk.  phosphatase 93 50 - 136 U/L    Protein, total 5.9 (L) 6.3 - 8.2 g/dL    Albumin 2.6 (L) 3.2 - 4.6 g/dL    Globulin 3.3 2.3 - 3.5 g/dL    A-G Ratio 0.8 (L) 1.2 - 3.5     MAGNESIUM    Collection Time: 10/25/19  5:57 AM   Result Value Ref Range    Magnesium 1.8 1.8 - 2.4 mg/dL   GLUCOSE, POC    Collection Time: 10/25/19  7:54 AM   Result Value Ref Range    Glucose (POC) 174 (H) 65 - 100 mg/dL   GLUCOSE, POC    Collection Time: 10/25/19 11:07 AM   Result Value Ref Range    Glucose (POC) 290 (H) 65 - 100 mg/dL     Allergies   Allergen Reactions    Vasotec [Enalapril Maleate] Shortness of Breath and Cough     Current Facility-Administered Medications   Medication Dose Route Frequency Provider Last Rate Last Dose    magnesium oxide (MAG-OX) tablet 400 mg  400 mg Oral BID Beena Bazan NP   400 mg at 10/25/19 0819    potassium chloride (K-DUR, KLOR-CON) SR tablet 40 mEq  40 mEq Oral TID Ivan Bazan NP   40 mEq at 10/25/19 0820    lactated Ringers infusion  100 mL/hr IntraVENous CONTINUOUS Lobous Ricci  mL/hr at 10/24/19 0939 100 mL/hr at 10/24/19 9221    lactated Ringers infusion  100 mL/hr IntraVENous CONTINUOUS Lobo MD Ricci        temazepam (RESTORIL) capsule 15 mg  15 mg Oral QHS Beena Bazan NP   15 mg at 10/24/19 2200    polyethylene glycol (MIRALAX) packet 17 g  17 g Oral DAILY Brad Ramos NP   17 g at 10/25/19 7743    0.9% sodium chloride infusion 250 mL  250 mL IntraVENous PRN Savita Logan MD        ondansetron [facility]) injection 4 mg  4 mg IntraVENous Q4H PRN Savita Logan MD   4 mg at 10/24/19 1736    sodium chloride (NS) flush 5-40 mL  5-40 mL IntraVENous Q8H Savita Logan MD   10 mL at 10/25/19 0528    sodium chloride (NS) flush 5-40 mL  5-40 mL IntraVENous PRN Savita Logan MD        dextrose (D50W) injection syrg 12.5-25 g  25-50 mL IntraVENous PRN Chris Otero MD        dextrose 40% (GLUTOSE) oral gel 1 Tube  15 g Oral PRN Chris Otero MD        glucagon (GLUCAGEN) injection 1 mg  1 mg IntraMUSCular PRN Chris Otero MD        acetaminophen (TYLENOL) tablet 1,000 mg  1,000 mg Oral Q6H PRN Chris Otero MD   1,000 mg at 10/24/19 2143    albuterol (PROVENTIL VENTOLIN) nebulizer solution 2.5 mg  2.5 mg Nebulization Q4H PRN Chris Otero MD        atorvastatin (LIPITOR) tablet 80 mg  80 mg Oral QHS Chris Otero MD   80 mg at 10/24/19 2105    busPIRone (BUSPAR) tablet 15 mg  15 mg Oral DAILY Chris Otero MD   15 mg at 10/25/19 0817    furosemide (LASIX) tablet 40 mg  40 mg Oral BID Chris Otero MD   40 mg at 10/25/19 2604    insulin lispro (HUMALOG) injection   SubCUTAneous AC&HS Chris Otero MD   4 Units at 10/25/19 1154    metoclopramide HCl (REGLAN) tablet 10 mg  10 mg Oral AC&BRINA Otero MD   10 mg at 10/25/19 1155    pantoprazole (PROTONIX) tablet 40 mg  40 mg Oral ACB&D Chris Otero MD   40 mg at 10/25/19 0528    pregabalin (LYRICA) capsule 150 mg  150 mg Oral BID Chris Otero MD   150 mg at 10/25/19 0820    tamsulosin (FLOMAX) capsule 0.4 mg  0.4 mg Oral DAILY Chris Otero MD   0.4 mg at 10/25/19 0584       Review of Systems negative with exception of pertinent positives noted above  PHYSICAL EXAM     Visit Vitals  /72 (BP 1 Location: Right arm, BP Patient Position: Sitting)   Pulse 95   Temp 98.8 °F (37.1 °C)   Resp 18   Ht 5' 6\" (1.676 m)   Wt 119.1 kg (262 lb 9.6 oz)   SpO2 94%   BMI 42.38 kg/m²      Temp (24hrs), Av.6 °F (37 °C), Min:98 °F (36.7 °C), Max:98.9 °F (37.2 °C)    Oxygen Therapy  O2 Sat (%): 94 % (10/25/19 1100)  Pulse via Oximetry: 86 beats per minute (10/24/19 1154)  O2 Device: Room air (10/24/19 1514)  O2 Flow Rate (L/min): 4 l/min (10/24/19 1134)    Intake/Output Summary (Last 24 hours) at 10/25/2019 1415  Last data filed at 10/25/2019 0534  Gross per 24 hour   Intake    Output 500 ml   Net -500 ml      General: No acute distress    Lungs: CTA bilaterally. resp even and nonlabored  Heart:  S1S2 present without murmurs rubs gallops. RRR. No LE edema  Abdomen: Distended, round, soft, BS present. Non tender  Extremities: Moves ext spontaneously. No cyanosis  Neurologic:  A/O X3.  No focal deficits     Results summary of Diagnostic Studies/Procedures copied from within [facility] EMR:        Butch Sweet 96 Problems    Diagnosis Date Noted    GI bleed 10/15/2019    Type 2 diabetes with nephropathy ([facility] [address] 05/11/2018    Sleep apnea 79/03/0790    Diastolic CHF, chronic ([facility] [address] 03/20/2017    Pulmonary hypertension ([facility] [address] 10/14/2015    Hyperlipidemia with target LDL less than 70     Essential hypertension, benign 01/22/2015    Type 2 diabetes, uncontrolled, with neuropathy ([facility] [address] 01/22/2015    Morbid obesity ([facility] [address] 06/27/2011     Plan:    Acute lower GI bleed/cecal mass  Total 5 units PRBC transfused  GI following  10/24 colonoscopy with findings of cecal mass, path pending  General Surgery consulted for resection, attempting to schedule  Follow H/H  CEA 2.0    PE  Duplex LE US neg DVT  Holding anticoagulation due to GI bleed  S/p IVC filter placed    Morbid obesity  Lifestyle modifications discussed    HTN  Continue home meds    DM II  A1C 8.4 last month  BGL elevated  Start lantus  Continue SSI    Hyperlipidemia   Continue home meds    Chronic diastolic CHF  Watch for fluid overload  Continue lasix    MARLEEN noncompliant with CPAP  Needs outpatient f/u  Discussed need for compliance    Hypokalemia  Replaced  Resolved    Hypomagnesemia  Replaced  Resolved      Notes, labs, VS, diagnostic testing reviewed  Time spent with pt 20 min      DVT Prophylaxis: SCDs  Plan of Care Discussed with: Supervising MD  Dr. Akua Rollins, care team, pt      Myrtle Peñaloza NP

## 2019-10-25 NOTE — PROGRESS NOTES
Hospitalist Progress Note    Subjective:   Daily Progress Note: 10/24/2019 1920    Patient presented to  ER 10/1 with complaints of rectal bleeding x 2 with copious blood clots x 24 hours with diaphoresis and lightheadedness.  Bloody BM with clots on arrival to ER.  Adm Hgb 8.6, down from 10.7 a couple of weeks ago. Michela Collazo admitted 9/25-10/1 for cecal diverticulitis and UTI. No colonoscopy due to diverticulitis. On ASA and plavix.  Last colonoscopy 2010 with colorectal polyps and left sided diverticulosis.  EGD 2011 normal.  26# unintentional weight loss in last 2 months. 10/16:  Transfused.  GI in.  Hgb down to 7.7, holding ASA and plavix. 10/17:  Hgb: 6.6, transfused 2 units. 10/18:  More bleeding. Tagged RBC scan done after,was negative. 10/19:  Total 5 units PRBC transfused. Lasix restarted. Transferred to . [place] [id number] for ongoing GI care. 10/20:  Incidental finding of right lung PE. Also found possible colonic mass. anticoags not begun at present due to bleeding. Asymptomatic from PE.    10/21: Eleamelia Passer in chair.  Frustrated regarding time here and diagnosis.  Annoyed in general.  Plans for IVC filter after much reluctance on patient's part. Extensive education by multiple disciplines. Kristyn Minor with plans for colo after ivc placement. No bleeding today.    10/22:  After multiple discussions by multiple providers, patient IVC filter placed. Wants to eat and delay colonoscopy. GI ok with same, to have clears and bowel prep tomorrow, [place] thurs 10/24. Obstinate, unhappy and angry in general.  Hgb drifting down. 8.9 today   10/23:  Up in chair, complaining about clear liquid diet. Starting prep. No pain, no bleeding, no BM x 2 days.     10/24:  [place] per Dr Clementine Homans with findings of large cecal  fungating mass and 8 mm polyp in ascending colon. Patho pending. Surgery in for consult for resection of mass,attempting to find surgical time. Patient informs me he wants to get surgery over with while he is here.  No pain. No bleeding. Improved spirits today. ADDITIONAL HISTORY: DM II, DVT, PE, Diastolic CHF, CKD III, asthma, morbid obesity, diabetic retinopathy, cervical stenosis, hyperlipidemia, hypertension, MARLEEN: non compliant with CPAP.     Current Facility-Administered Medications   Medication Dose Route Frequency    lactated Ringers infusion  100 mL/hr IntraVENous CONTINUOUS    lactated Ringers infusion  100 mL/hr IntraVENous CONTINUOUS    temazepam (RESTORIL) capsule 15 mg  15 mg Oral QHS    polyethylene glycol (MIRALAX) packet 17 g  17 g Oral DAILY    0.9% sodium chloride infusion 250 mL  250 mL IntraVENous PRN    ondansetron (ZOFRAN) injection 4 mg  4 mg IntraVENous Q4H PRN    sodium chloride (NS) flush 5-40 mL  5-40 mL IntraVENous Q8H    sodium chloride (NS) flush 5-40 mL  5-40 mL IntraVENous PRN    dextrose (D50W) injection syrg 12.5-25 g  25-50 mL IntraVENous PRN    dextrose 40% (GLUTOSE) oral gel 1 Tube  15 g Oral PRN    glucagon (GLUCAGEN) injection 1 mg  1 mg IntraMUSCular PRN    acetaminophen (TYLENOL) tablet 1,000 mg  1,000 mg Oral Q6H PRN    albuterol (PROVENTIL VENTOLIN) nebulizer solution 2.5 mg  2.5 mg Nebulization Q4H PRN    atorvastatin (LIPITOR) tablet 80 mg  80 mg Oral QHS    busPIRone (BUSPAR) tablet 15 mg  15 mg Oral DAILY    furosemide (LASIX) tablet 40 mg  40 mg Oral BID    insulin lispro (HUMALOG) injection   SubCUTAneous AC&HS    metoclopramide HCl (REGLAN) tablet 10 mg  10 mg Oral AC&HS    pantoprazole (PROTONIX) tablet 40 mg  40 mg Oral ACB&D    pregabalin (LYRICA) capsule 150 mg  150 mg Oral BID    tamsulosin (FLOMAX) capsule 0.4 mg  0.4 mg Oral DAILY      Review of Systems  A comprehensive review of systems was negative except for that written in the HPI.     Objective:     Visit Vitals  /64   Pulse 82   Temp 98.7 °F (37.1 °C)   Resp 18   Ht 5' 6\" (1.676 m)   Wt 119.1 kg (262 lb 9.6 oz)   SpO2 95%   BMI 42.38 kg/m²    O2 Flow Rate (L/min): 4 l/min O2 Device: Room air    Temp (24hrs), Av.3 °F (36.8 °C), Min:97.6 °F (36.4 °C), Max:98.9 °F (37.2 °C)    10/23 0701 - 10/24 1900  In: 100 [I.V.:100]  Out: 400 [Urine:400]    General appearance: Oriented, alert, up in chair.  Mood improved today. Morbidly obese.    Head: Normocephalic, without obvious abnormality, atraumatic  Throat: Lips, mucosa, and tongue normal. Teeth and gums normal  Neck: Thick, supple, symmetrical, trachea midline, and no JVD  Lungs: clear to auscultation bilaterally, diminished in bases.    Heart: regular rate and rhythm, S1, S2 normal, no murmur, click, rub or gallop  Abdomen: Protuberant, soft, non-tender. Bowel sounds normal. No masses,  no organomegaly  Extremities: extremities normal, atraumatic, no cyanosis.  Dependent distal edema  Skin: Skin color, texture, turgor normal. No rashes or lesions  Neurologic: Grossly normal    Data Review  Recent Results (from the past 24 hour(s))   MAGNESIUM    Collection Time: 10/24/19  5:31 AM   Result Value Ref Range    Magnesium 1.7 (L) 1.8 - 2.4 mg/dL   METABOLIC PANEL, COMPREHENSIVE    Collection Time: 10/24/19  5:31 AM   Result Value Ref Range    Sodium 142 136 - 145 mmol/L    Potassium 3.0 (L) 3.5 - 5.1 mmol/L    Chloride 106 98 - 107 mmol/L    CO2 28 21 - 32 mmol/L    Anion gap 8 7 - 16 mmol/L    Glucose 126 (H) 65 - 100 mg/dL    BUN 6 (L) 8 - 23 MG/DL    Creatinine 1.07 0.8 - 1.5 MG/DL    GFR est AA >60 >60 ml/min/1.73m2    GFR est non-AA >60 >60 ml/min/1.73m2    Calcium 7.7 (L) 8.3 - 10.4 MG/DL    Bilirubin, total 0.4 0.2 - 1.1 MG/DL    ALT (SGPT) 15 12 - 65 U/L    AST (SGOT) 23 15 - 37 U/L    Alk.  phosphatase 90 50 - 136 U/L    Protein, total 5.8 (L) 6.3 - 8.2 g/dL    Albumin 2.5 (L) 3.2 - 4.6 g/dL    Globulin 3.3 2.3 - 3.5 g/dL    A-G Ratio 0.8 (L) 1.2 - 3.5     CBC W/O DIFF    Collection Time: 10/24/19  5:31 AM   Result Value Ref Range    WBC 4.9 4.3 - 11.1 K/uL    RBC 3.13 (L) 4.23 - 5.6 M/uL    HGB 9.1 (L) 13.6 - 17.2 g/dL    HCT 28.6 (L) 41.1 - 50.3 %    MCV 91.4 79.6 - 97.8 FL    MCH 29.1 26.1 - 32.9 PG    MCHC 31.8 31.4 - 35.0 g/dL    RDW 15.2 (H) 11.9 - 14.6 %    PLATELET 224 303 - 545 K/uL    MPV 10.5 9.4 - 12.3 FL    ABSOLUTE NRBC 0.00 0.0 - 0.2 K/uL   GLUCOSE, POC    Collection Time: 10/24/19  7:25 AM   Result Value Ref Range    Glucose (POC) 146 (H) 65 - 100 mg/dL   GLUCOSE, POC    Collection Time: 10/24/19  3:31 PM   Result Value Ref Range    Glucose (POC) 257 (H) 65 - 100 mg/dL   GLUCOSE, POC    Collection Time: 10/24/19  9:29 PM   Result Value Ref Range    Glucose (POC) 217 (H) 65 - 100 mg/dL      10/18:  RBC LABEL SCAN: No scintigraphic evidence of GI bleed.     10/19:  CT ABDOMEN AND PELVIS: Right upper and middle lobe pulmonary emboli, captured at the periphery of the imaging field of view.  Wall thickening of the cecum, suspicious for neoplasm. Correlation with colonoscopy should be considered. No evidence of appendicitis, colitis, bowel obstruction or bowel perforation     10/20:  BILATERAL LE U/S: No evidence of deep venous thrombosis in either lower extremity.         Assessment/Plan:   Acute lower GI Bleed with history of same              5 units PRBC transfused to date              Monitor closely              GI on board, colo 10/24, see above,    Pathos pending              XE with findings of mass   Follow H/H   Surgical consult 10/24:  Colon resection   pending  Incidental finding PE, no DVT              No anticoagulation now due to acute             GI bleed              IVC filter in   Morbid obesity   Essential hypertension:  Home meds              Monitor  IDDM II:  A1C: 8.4              Holding all po diabetic meds              Continue SSI and diabetic diet when             eating  Hyperlipidemia:  Home meds  Pulmonary hypertension               Lasix reinstated              Monitor  Chronic Diastolic CHF              Monitor, lasix              Observe for fluid overload  MARLEEN without CPAP              Classic for [place]              [id number] sleep study prior to            discharge              HS oxygen              Elevate HOB when sleeping  Hypokalemia   Replace and recheck  Mild hypomagnesemia   Replace and recheck    Care Plan discussed with: Patient and Nurse    Signed By: Medina Dietz NP     October 24, 2019

## 2019-10-25 NOTE — PROGRESS NOTES
[facility] Progress Note         Admit Date:  10/19/2019    Today's Date:  10/25/2019    CC: Cecal mass; GIB    Subjective:     Patient: No BM since colonoscopy. Passing flatus. Denies any abdominal pain except for gas pain. Has nausea that is relieved with antiemetics and denies any vomiting. Denies any hematochezia. Procedure: Colonoscopy     Pre-operative Diagnosis: cecal mass      Post-operative Diagnosis: Diverticulosis,   Recommendations:  See inpt notes.     Surveillance interval: 1y     Anesthesia/Sedation MAC,, (see separate report).     Procedure Details:  Informed consent was obtained for the procedure, including anesthesia. Risks of perforation, hemorrhage, adverse drug reaction and aspiration were discussed. The patient was placed in the left lateral decubitus position. Based on the pre-procedure assessment, including review of the patient's medical history, medications, allergies, and review of systems, he had been deemed to be an appropriate candidate for this procedure. A rectal examination was performed. The colonoscope was inserted into the rectum and advanced under direct vision to the cecum. The quality of the colonic preparation was adequate. A careful inspection was made as the colonoscope was withdrawn; findings and interventions are described below.      Findings:   COLON:  The colonic mucosa from the cecum to the rectum was carefully examined. The mucosa appeared normal with normal vascularity. There was no  inflammatory changes, , raised lesions, vascular ectasias or abnormal pigmentation. ANUS/RECTUM: Anal exam reveals no masses or hemorrhoids, sphincter tone is normal. Rectal exam reveals no masses or hemorrhoids. Prostate exam was unremarkable for enlargement or nodules. Direct and retroflexed views of the rectum were normal without significant hemorrhoidal engorgement or inflammation, polyps or masses.    SIGMOID- Normal  DESC- Normal  Transverse- Normal  Asc Colon-  8mm polyp snared  Cecum-  Large fungating mass biopsied     EBL: minimal           PATH:  Cecal mass, asc colon polyp     Complications: None; patient tolerated the procedure well. Attending Attestation: I performed the procedure.     Gillian Caldera MD    Medications:   Current Facility-Administered Medications   Medication Dose Route Frequency    magnesium oxide (MAG-OX) tablet 400 mg  400 mg Oral BID    potassium chloride (K-DUR, KLOR-CON) SR tablet 40 mEq  40 mEq Oral TID    lactated Ringers infusion  100 mL/hr IntraVENous CONTINUOUS    lactated Ringers infusion  100 mL/hr IntraVENous CONTINUOUS    temazepam (RESTORIL) capsule 15 mg  15 mg Oral QHS    polyethylene glycol (MIRALAX) packet 17 g  17 g Oral DAILY    0.9% sodium chloride infusion 250 mL  250 mL IntraVENous PRN    ondansetron (ZOFRAN) injection 4 mg  4 mg IntraVENous Q4H PRN    sodium chloride (NS) flush 5-40 mL  5-40 mL IntraVENous Q8H    sodium chloride (NS) flush 5-40 mL  5-40 mL IntraVENous PRN    dextrose (D50W) injection syrg 12.5-25 g  25-50 mL IntraVENous PRN    dextrose 40% (GLUTOSE) oral gel 1 Tube  15 g Oral PRN    glucagon (GLUCAGEN) injection 1 mg  1 mg IntraMUSCular PRN    acetaminophen (TYLENOL) tablet 1,000 mg  1,000 mg Oral Q6H PRN    albuterol (PROVENTIL VENTOLIN) nebulizer solution 2.5 mg  2.5 mg Nebulization Q4H PRN    atorvastatin (LIPITOR) tablet 80 mg  80 mg Oral QHS    busPIRone (BUSPAR) tablet 15 mg  15 mg Oral DAILY    furosemide (LASIX) tablet 40 mg  40 mg Oral BID    insulin lispro (HUMALOG) injection   SubCUTAneous AC&HS    metoclopramide HCl (REGLAN) tablet 10 mg  10 mg Oral AC&HS    pantoprazole (PROTONIX) tablet 40 mg  40 mg Oral ACB&D    pregabalin (LYRICA) capsule 150 mg  150 mg Oral BID    tamsulosin (FLOMAX) capsule 0.4 mg  0.4 mg Oral DAILY       Review of Systems:  ROS was obtained, with pertinent positives as listed above. No chest pain or SOB.     Diet: Cardiac diet    Objective:   Vitals:  Visit Vitals  /58 (BP 1 Location: Right arm, BP Patient Position: Sitting)   Pulse 100   Temp 98.6 °F (37 °C)   Resp 18   Ht 5' 6\" (1.676 m)   Wt 119.1 kg (262 lb 9.6 oz)   SpO2 91%   BMI 42.38 kg/m²     Intake/Output:  No intake/output data recorded. 10/23 1901 - 10/25 0700  In: 100 [I.V.:100]  Out: 500 [Urine:500]  Exam:  General appearance: alert, cooperative, no distress SITTING UP IN CHAIR  Lungs: clear to auscultation bilaterally anteriorly  Heart: regular rate and rhythm  Abdomen: soft, non-tender. Bowel sounds normal. No masses, no organomegaly  Extremities: extremities normal, atraumatic, no cyanosis or mild bilateral edema  Neuro:  alert and oriented    Data Review (Labs):    Recent Labs     10/25/19  0557 10/24/19  0531 10/23/19  0612 10/22/19  1300   WBC 4.6 4.9 5.4  --    HGB 9.3* 9.1* 9.6* 10.8*   HCT 30.0* 28.6* 30.7*  --     165 161  --    MCV 94.9 91.4 93.6  --     142 144  --    K 3.5 3.0* 3.0*  --    * 106 107  --    CO2 29 28 31  --    BUN 6* 6* 7*  --    CREA 1.10 1.07 1.09  --    CA 7.8* 7.7* 8.2*  --    MG 1.8 1.7* 1.5*  --    * 126* 82  --    AP 93 90 88  --    SGOT 22 23 22  --    ALT 17 15 15  --    TBILI 0.4 0.4 0.5  --    ALB 2.6* 2.5* 2.7*  --    TP 5.9* 5.8* 6.2*  --        Assessment:     Principal Problem:    GI bleed (10/15/2019)    Active Problems:     Morbid obesity ([facility] (6/27/2011)      Essential hypertension, benign (1/22/2015)      Type 2 diabetes, uncontrolled, with neuropathy ([facility] (1/22/2015)      Hyperlipidemia with target LDL less than 70 ()      Pulmonary hypertension ([facility] (53/30/9420)      Diastolic CHF, chronic (HCC) (3/20/2017)      Sleep apnea (11/9/2017)      Type 2 diabetes with nephropathy ([facility] (5/11/2018)    69 yo male patient of Dr. Kim Ferguson PMH of but not limited to CKD III, DM II, diabetic retinopathy, diastolic CHF - on ASA and Plavix, DVT, PE, diverticulosis, colon polyps, sleep ulisses, who we are following for GI bleeding and probable cecal mass on CT 10/19. He was admitted 25 Sept 2019 to 1 Oct 2019 for cecal diverticulitis, treated with ATBXs, readmitted for rectal bleeding. His current Hgb is trending down, but without current bleeding.  His ASA and Plavix are on hold and IVC filter was placed today 10/21 for new PE on CT.  Colonoscopy on 10/24/19 by Dr. Clementine Homans revealed a large fungating mass in the cecum and an 8mm polyp in the ascending colon. Awaiting pathology-hopefully will have today. Surgery on board. CEA normal 2.0 on 10/21. Plan:     -Await pathology  -Serial H&H and transfuse as needed  -Appreciate surgery  [facility].  Lynette Shore in collaboration with Dr. Kita Do  [facility]

## 2019-10-25 NOTE — PROGRESS NOTES
Path report back revealing cecal mass with ulceration and active acute colitis, but without definitive malignancy. Still think surgical resection is necessary. Patient given the results and verbalized that he is still agreeable to surgery. 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 This was removed, but he will need close surveillance. Seng Abreu.  Geovanna Kelley in collaboration with Dr. Martin Bedolla  [facility]

## 2019-10-25 NOTE — PROGRESS NOTES
Hourly rounds done. Pt c/o headache, medicated per MAR. Denies nausea, vomiting. Voiding yellow/straw/clear. No BM. Pt voiced agreement with possible Bowel Resection Surgery. All needs met at this time.

## 2019-10-25 NOTE — PROGRESS NOTES
Nutrition  Reason for assessment: Length of stay    Assessment:   Medical History: Admitted with GI bleed, colonoscopy on 10/24/19 by Dr. Todd Herring revealed a large fungating mass in the cecum and an 8mm polyp in the ascending colon. Bx and surgical plan pending. PMH of but not limited to CKD III, DM II, diabetic retinopathy, diastolic CHF - on ASA and Plavix, DVT, PE, diverticulosis, colon polyps, sleep apnea  Food/Nutrition Patient History:  Pt up to chair at RD visit. He reports varied po intake and has multiple questions about various diets he has been served here. He also has questions regarding his usual home intake indicating he recently starting limiting cho more in attempts to control his blood sugar. DIET CARDIAC    POC glucose: 10/22:  mg/dl; 10/23:  mg/dL; 10/24: 146-257mg/dL; 10/25: 174-290mg/dL. Anthropometrics:Height: 5' 6\" (167.6 cm),  Weight: 119.1 kg (262 lb 9.6 oz), Weight Source: Standing scale (comment), Body mass index is 42.38 kg/m². BMI class of morbid obesity. Macronutrient needs: 119 kg listed weight   EER:  7741-0442 kcal /day (15-20 kcal/kg)  EPR:   grams protein/day (20% kcal)    Intake/Comparatie Standards: Recorded meal(s): none. Pt reports consuming % of meals. This potentially meets ~75% of kcal and ~75% of protein needs    Nutrition Diagnosis: Inadequate oral intake related to varied intake as evidenced by recall of intake meeting ~75% estimated needs. Intervention:  Meals and snacks: Change to consistent cho to promote improved blood glucose control. Nutrition Supplement Therapy: Ensure High Protein once daily. Discharge Nutrition Plan: Too soon to determine.      [place], [place], [address]

## 2019-10-25 NOTE — PROGRESS NOTES
H&P/Consult Note/Progress Note/Office Note:   Errol Blakely  MRN: [id number]  LUZ MARINA:[birth year]  Age:79 y.o.    HPI: Errol Blakely is a 70 y.o. male who has a PMHx of obesity, HTN, DM2, HLD, pulmonary HTN, CHF, MARLEEN who we are asked by Dr. Beatris Padilla to see for a cecal mass. Pt presented to the  ED on 10/15/19 with a rectal bleed, lightheadedness, and anemia. He was transferred downtown for further workup. He is s/p multiple transfusions. He had recently been hospitalization where he was treated for diverticulitis and UTI and was recently discharged from inpatient rehab. He did not have a colonoscopy at that time due to diverticulitis. He underwent a bleeding scan on 10/18/19 with no evidence of GI bleed. He then underwent CTAP which revealed cecal thickening and incidental PE. Pt is s/p IVC filter placement. He had a colonoscopy today with Dr. Beatris Padilla which revealed an ascending colon polyp and a large fungating mass in the cecum. Surgery was consulted for resection of cecal mass. Pt reports 26lb weight loss. He denies night sweats, fevers, or chills. He reports nausea without vomiting. He has occasional distention. He is having BMs and passing flatus. His prior abdominal surgical history includes bilateral inguinal hernia repairs done at [facility]SH a number of years ago. CTAP 10/19/19:  IMPRESSION:     1. Right upper and middle lobe pulmonary emboli, captured at the periphery of  the imaging field of view.     2. Wall thickening of the cecum, suspicious for neoplasm. Correlation with  colonoscopy should be considered.     3. No evidence of appendicitis, colitis, bowel obstruction or bowel perforation. Colonoscopy 10/24/19:  Findings:   COLON:  The colonic mucosa from the cecum to the rectum was carefully examined. The mucosa appeared normal with normal vascularity. There was no  inflammatory changes, , raised lesions, vascular ectasias or abnormal pigmentation.   ANUS/RECTUM: Anal exam reveals no masses or hemorrhoids, sphincter tone is normal. Rectal exam reveals no masses or hemorrhoids. Prostate exam was unremarkable for enlargement or nodules. Direct and retroflexed views of the rectum were normal without significant hemorrhoidal engorgement or inflammation, polyps or masses. SIGMOID- Normal  DESC- Normal  Transverse- Normal  Asc Colon-  8mm polyp snared  Cecum-  Large fungating mass biopsied        10/25/19 H/H 9/30, stable X 3. CEA 2.     Past Medical History:   Diagnosis Date    Cervical stenosis of spine     Chronic kidney disease     Stage 3    Degenerative disc disease, lumbar     Diabetes mellitus type II     uncontrolled-insulin and oral med. 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 ([address]     Pulmonary embolism ([address] Mar 2014    Bilateral - Completed 6 months on Xarelto    TIA (transient ischemic attack) 11/9/2017    Type 2 diabetes, uncontrolled, with neuropathy ([address] 1/22/2015     Past Surgical History:   Procedure Laterality Date    COLONOSCOPY  07/29/2010    Diverticulosis & Colon/Rectal Polyps - Due 2020    COLONOSCOPY N/A 10/24/2019    COLONOSCOPY/ 42/ 622 performed by Amy Dooley MD at [facility] ENDOSCOPY    EGD  5/9/2011         HX BUNIONECTOMY Bilateral     HX HERNIA REPAIR Bilateral     Inguinal, Repeat on Both Sides Separately    HX KNEE ARTHROSCOPY  1991    x 3    IR IVC FILTER  10/22/2019    SINUS SURGERY PROC UNLISTED      TOTAL KNEE ARTHROPLASTY Right 2006     Current Facility-Administered Medications   Medication Dose Route Frequency    magnesium oxide (MAG-OX) tablet 400 mg  400 mg Oral BID    potassium chloride (K-DUR, KLOR-CON) SR tablet 40 mEq  40 mEq Oral TID    lactated Ringers infusion  100 mL/hr IntraVENous CONTINUOUS    lactated Ringers infusion  100 mL/hr IntraVENous CONTINUOUS    temazepam (RESTORIL) capsule 15 mg  15 mg Oral QHS    polyethylene glycol (MIRALAX) packet 17 g  17 g Oral DAILY    0.9% sodium chloride infusion 250 mL  250 mL IntraVENous PRN    ondansetron (ZOFRAN) injection 4 mg  4 mg IntraVENous Q4H PRN    sodium chloride (NS) flush 5-40 mL  5-40 mL IntraVENous Q8H    sodium chloride (NS) flush 5-40 mL  5-40 mL IntraVENous PRN    dextrose (D50W) injection syrg 12.5-25 g  25-50 mL IntraVENous PRN    dextrose 40% (GLUTOSE) oral gel 1 Tube  15 g Oral PRN    glucagon (GLUCAGEN) injection 1 mg  1 mg IntraMUSCular PRN    acetaminophen (TYLENOL) tablet 1,000 mg  1,000 mg Oral Q6H PRN    albuterol (PROVENTIL VENTOLIN) nebulizer solution 2.5 mg  2.5 mg Nebulization Q4H PRN    atorvastatin (LIPITOR) tablet 80 mg  80 mg Oral QHS    busPIRone (BUSPAR) tablet 15 mg  15 mg Oral DAILY    furosemide (LASIX) tablet 40 mg  40 mg Oral BID    insulin lispro (HUMALOG) injection   SubCUTAneous AC&HS    metoclopramide HCl (REGLAN) tablet 10 mg  10 mg Oral AC&HS    pantoprazole (PROTONIX) tablet 40 mg  40 mg Oral ACB&D    pregabalin (LYRICA) capsule 150 mg  150 mg Oral BID    tamsulosin (FLOMAX) capsule 0.4 mg  0.4 mg Oral DAILY     Vasotec [enalapril maleate]  Social History     Socioeconomic History    Marital status:      Spouse name: Not on file    Number of children: Not on file    Years of education: Not on file    Highest education level: Not on file   Tobacco Use    Smoking status: Never Smoker    Smokeless tobacco: Never Used Substance and Sexual Activity    Alcohol use: No    Drug use: No     Social History     Tobacco Use   Smoking Status Never Smoker   Smokeless Tobacco Never Used     Family History   Problem Relation Age of Onset    Stroke Mother     Diabetes Mother     Heart Disease Mother     Diabetes Maternal Grandmother     Glaucoma Maternal Grandmother     Stroke Father     Diabetes Father     Diabetes Paternal Aunt     Cancer Paternal Aunt     Diabetes Paternal Uncle     Cancer Paternal Uncle         Colon    Heart Attack Sister 76    Cancer Sister     Prostate Cancer Brother     Heart Disease Sister 48    Prostate Cancer Brother      ROS: The patient has no difficulty with chest pain or shortness of breath. No fever or chills. Comprehensive review of systems was otherwise unremarkable except as noted above. Physical Exam:   Visit Vitals  /72 (BP 1 Location: Right arm, BP Patient Position: Sitting)   Pulse 95   Temp 98.8 °F (37.1 °C)   Resp 18   Ht 5' 6\" (1.676 m)   Wt 262 lb 9.6 oz (119.1 kg)   SpO2 94%   BMI 42.38 kg/m²     Constitutional: Alert, oriented, cooperative patient in no acute distress; appears stated age    Eyes:Sclera are clear. EOMs intact  ENMT: no external lesions gross hearing normal; no obvious neck masses, no ear or lip lesions, nares normal  CV: RRR. Normal perfusion  Resp: No JVD. Breathing is  non-labored; no audible wheezing. CTAB. GI: obese, soft and mildly-distended, non tender. + BS. Umbilical hernia present. Musculoskeletal: unremarkable with normal function. No embolic signs or cyanosis. + bilateral LE edema.    Neuro:  Oriented; moves all 4; no focal deficits  Psychiatric: normal affect and mood, no memory impairment    Recent vitals (if inpt):  Patient Vitals for the past 24 hrs:   BP Temp Pulse Resp SpO2 Height Weight   10/25/19 1100 139/72 98.8 °F (37.1 °C) 95 18 94 %     10/25/19 0858 131/58 98.6 °F (37 °C) 100 18 91 %     10/25/19 0522  Mitcheal Kohut   5' 6\" (1.676 m) 262 lb 9.6 oz (119.1 kg)   10/25/19 0503 128/64 98.7 °F (37.1 °C) 82 18 95 %     10/25/19 0024 120/59 98.7 °F (37.1 °C) 89 18 94 %     10/24/19 2031 120/51 98 °F (36.7 °C) (!) 107 18 94 %     10/24/19 1534 134/64 98.9 °F (37.2 °C) 87 18 91 %         Labs:  Recent Labs     10/25/19  0557   WBC 4.6   HGB 9.3*         K 3.5   *   CO2 29   BUN 6*   CREA 1.10   *   TBILI 0.4   SGOT 22   ALT 17   AP 93       Lab Results   Component Value Date/Time    WBC 4.6 10/25/2019 05:57 AM    HGB 9.3 (L) 10/25/2019 05:57 AM    PLATELET 720 83/34/0610 05:57 AM    Sodium 145 10/25/2019 05:57 AM    Potassium 3.5 10/25/2019 05:57 AM    Chloride 109 (H) 10/25/2019 05:57 AM    CO2 29 10/25/2019 05:57 AM    BUN 6 (L) 10/25/2019 05:57 AM    Creatinine 1.10 10/25/2019 05:57 AM    Glucose 138 (H) 10/25/2019 05:57 AM    INR 1.1 10/15/2019 10:07 PM    aPTT 25.2 10/15/2019 10:07 PM    Bilirubin, total 0.4 10/25/2019 05:57 AM    Bilirubin, direct 0.2 09/26/2019 05:50 AM    AST (SGOT) 22 10/25/2019 05:57 AM    ALT (SGPT) 17 10/25/2019 05:57 AM    Alk. phosphatase 93 10/25/2019 05:57 AM    Lipase 91 11/02/2017 03:28 PM    Ammonia 20 09/25/2019 03:18 PM    Troponin-I <0.05 05/09/2012 01:34 AM    Troponin-I, Qt. <0.02 (L) 09/25/2019 02:15 PM       I reviewed recent labs and recent radiologic studies. I independently reviewed radiology images for studies I described above or studies I have ordered.    Admission date (for inpatients): 10/19/2019   Day of Surgery  Procedure(s):  COLONOSCOPY/ 42/ 622  ENDOSCOPIC POLYPECTOMY  COLON BIOPSY    ASSESSMENT/PLAN:  Problem List  Date Reviewed: 9/18/2019          Codes Class Noted    * (Principal) GI bleed ICD-10-CM: K92.2  ICD-9-CM: 578.9  10/15/2019        Cecal diverticulitis ICD-10-CM: K57.32  ICD-9-CM: 562.11  9/29/2019        RLQ abdominal pain ICD-10-CM: R10.31  ICD-9-CM: 789.03  9/28/2019        CKD (chronic kidney disease) stage 3, GFR 30-59 ml/min ([facility]) (Chronic) ICD-10-CM: N18.3  ICD-9-CM: 585.3  6/24/2019        Cerebrovascular accident (CVA) ([facility] ICD-10-CM: I63.9  ICD-9-CM: 434.91  3/21/2019        Type 2 diabetes mellitus without complication ([place] [address] JACKIE-49-FA: E11.9  ICD-9-CM: 250.00  3/21/2019        Hemiparesis of left dominant side ([facility] ICD-10-CM: G81.92  ICD-9-CM: 342.91  3/7/2019        Type 2 diabetes with nephropathy ([place] [address] ICD-10-CM: E11.21  ICD-9-CM: 250.40, 583.81  5/11/2018        Morbid obesity due to excess calories ([place] [address] ICD-10-CM: E66.01  ICD-9-CM: 278.01  11/9/2017        Sleep apnea ICD-10-CM: G47.30  ICD-9-CM: 780.57  11/9/2017        Esophageal dysphagia ICD-10-CM: R13.10  ICD-9-CM: 787.20  11/9/2017        TIA (transient ischemic attack) ICD-10-CM: G45.9  ICD-9-CM: 435.9  11/9/2017        Mild intermittent asthma without complication NSD-06-MV: W38.14  ICD-9-CM: 493.90  8/24/2017        Diabetic neuropathy associated with type 2 diabetes mellitus (HCC) (Chronic) ICD-10-CM: E11.40  ICD-9-CM: 250.60, 357.2  2/34/2303        Diastolic CHF, chronic (HCC) (Chronic) ICD-10-CM: I50.32  ICD-9-CM: 428.32, 428.0  3/20/2017        Mixed hyperlipidemia (Chronic) ICD-10-CM: B16.4  ICD-9-CM: 272.2  11/17/2016        Essential hypertension with goal blood pressure less than 130/85 (Chronic) ICD-10-CM: I10  ICD-9-CM: 401.9  7/5/2016        Pulmonary hypertension (HCC) (Chronic) ICD-10-CM: I27.20  ICD-9-CM: 416.8  10/14/2015        Mild diastolic dysfunction (Chronic) ICD-10-CM: I51.9  ICD-9-CM: 429.9  7/17/2015        Gastroesophageal reflux disease without esophagitis (Chronic) ICD-10-CM: K21.9  ICD-9-CM: 530.81  4/21/2015        Hyperlipidemia with target LDL less than 70 (Chronic) ICD-10-CM: E78.5  ICD-9-CM: 272.4  Unknown        Essential hypertension, benign (Chronic) ICD-10-CM: I10  ICD-9-CM: 401.1  1/22/2015        Chronic constipation (Chronic) ICD-10-CM: K59.09  ICD-9-CM: 564.00  1/22/2015        Type 2 diabetes, uncontrolled, with neuropathy ([facility] (Chronic) ICD-10-CM: E11.40, E11.65  ICD-9-CM: 250.62, 357.2  1/22/2015        Obstructive sleep apnea of adult (Chronic) ICD-10-CM: G47.33  ICD-9-CM: 327.23  1/22/2015        Morbid obesity (HCC) (Chronic) ICD-10-CM: E66.01  ICD-9-CM: 278.01  6/27/2011            Principal Problem:    GI bleed (10/15/2019)    Active Problems: Morbid obesity ([address] (6/27/2011)      Essential hypertension, benign (1/22/2015)      Type 2 diabetes, uncontrolled, with neuropathy ([address] (1/22/2015)      Hyperlipidemia with target LDL less than 70 ()      Pulmonary hypertension ([address] (50/31/5122)      Diastolic CHF, chronic ([address] (3/20/2017)      Sleep apnea (11/9/2017)      Type 2 diabetes with nephropathy ([address] (5/11/2018)       Plan:  Timing for resection of cecal mass per Dr. Christel Woods. Path shows no malignancy. Discussed with . Explained this still may be Ca and recommend surgical resection. He wants to go home. I will see him next week in the office and he will give me his decision on how he wishes to proceed. Signed:  Jurgen Bauman Jr. M.D.

## 2019-10-26 VITALS
DIASTOLIC BLOOD PRESSURE: 69 MMHG | HEART RATE: 92 BPM | HEIGHT: 66 IN | WEIGHT: 265.5 LBS | BODY MASS INDEX: 42.67 KG/M2 | RESPIRATION RATE: 18 BRPM | TEMPERATURE: 98.2 F | SYSTOLIC BLOOD PRESSURE: 126 MMHG | OXYGEN SATURATION: 97 %

## 2019-10-26 PROBLEM — K92.2 GI BLEED: Status: RESOLVED | Noted: 2019-10-15 | Resolved: 2019-10-26

## 2019-10-26 PROBLEM — K63.89 MASS OF CECUM: Chronic | Status: ACTIVE | Noted: 2019-10-26

## 2019-10-26 LAB
ANION GAP SERPL CALC-SCNC: 6 MMOL/L (ref 7–16)
BASOPHILS # BLD: 0 K/UL (ref 0–0.2)
BASOPHILS NFR BLD: 1 % (ref 0–2)
BUN SERPL-MCNC: 10 MG/DL (ref 8–23)
CALCIUM SERPL-MCNC: 8.1 MG/DL (ref 8.3–10.4)
CHLORIDE SERPL-SCNC: 108 MMOL/L (ref 98–107)
CO2 SERPL-SCNC: 27 MMOL/L (ref 21–32)
CREAT SERPL-MCNC: 1.2 MG/DL (ref 0.8–1.5)
DIFFERENTIAL METHOD BLD: ABNORMAL
EOSINOPHIL # BLD: 0.3 K/UL (ref 0–0.8)
EOSINOPHIL NFR BLD: 5 % (ref 0.5–7.8)
ERYTHROCYTE [DISTWIDTH] IN BLOOD BY AUTOMATED COUNT: 15.3 % (ref 11.9–14.6)
GLUCOSE BLD STRIP.AUTO-MCNC: 201 MG/DL (ref 65–100)
GLUCOSE SERPL-MCNC: 188 MG/DL (ref 65–100)
HCT VFR BLD AUTO: 32.3 % (ref 41.1–50.3)
HGB BLD-MCNC: 10.1 G/DL (ref 13.6–17.2)
IMM GRANULOCYTES # BLD AUTO: 0 K/UL (ref 0–0.5)
IMM GRANULOCYTES NFR BLD AUTO: 0 % (ref 0–5)
LYMPHOCYTES # BLD: 2.5 K/UL (ref 0.5–4.6)
LYMPHOCYTES NFR BLD: 45 % (ref 13–44)
MCH RBC QN AUTO: 29.4 PG (ref 26.1–32.9)
MCHC RBC AUTO-ENTMCNC: 31.3 G/DL (ref 31.4–35)
MCV RBC AUTO: 94.2 FL (ref 79.6–97.8)
MONOCYTES # BLD: 0.7 K/UL (ref 0.1–1.3)
MONOCYTES NFR BLD: 12 % (ref 4–12)
NEUTS SEG # BLD: 2 K/UL (ref 1.7–8.2)
NEUTS SEG NFR BLD: 37 % (ref 43–78)
NRBC # BLD: 0.02 K/UL (ref 0–0.2)
PLATELET # BLD AUTO: 196 K/UL (ref 150–450)
PMV BLD AUTO: 10.5 FL (ref 9.4–12.3)
POTASSIUM SERPL-SCNC: 4 MMOL/L (ref 3.5–5.1)
RBC # BLD AUTO: 3.43 M/UL (ref 4.23–5.6)
SODIUM SERPL-SCNC: 141 MMOL/L (ref 136–145)
WBC # BLD AUTO: 5.5 K/UL (ref 4.3–11.1)

## 2019-10-26 PROCEDURE — 82962 GLUCOSE BLOOD TEST: CPT

## 2019-10-26 PROCEDURE — 74011636637 HC RX REV CODE- 636/637: Performed by: FAMILY MEDICINE

## 2019-10-26 PROCEDURE — 36415 COLL VENOUS BLD VENIPUNCTURE: CPT

## 2019-10-26 PROCEDURE — 74011250636 HC RX REV CODE- 250/636: Performed by: FAMILY MEDICINE

## 2019-10-26 PROCEDURE — 80048 BASIC METABOLIC PNL TOTAL CA: CPT

## 2019-10-26 PROCEDURE — 3331090001 HH PPS REVENUE CREDIT

## 2019-10-26 PROCEDURE — 3331090002 HH PPS REVENUE DEBIT

## 2019-10-26 PROCEDURE — 74011636637 HC RX REV CODE- 636/637: Performed by: NURSE PRACTITIONER

## 2019-10-26 PROCEDURE — 74011250637 HC RX REV CODE- 250/637: Performed by: FAMILY MEDICINE

## 2019-10-26 PROCEDURE — 85025 COMPLETE CBC W/AUTO DIFF WBC: CPT

## 2019-10-26 PROCEDURE — 74011250637 HC RX REV CODE- 250/637: Performed by: NURSE PRACTITIONER

## 2019-10-26 RX ORDER — TEMAZEPAM 15 MG/1
15 CAPSULE ORAL
Qty: 30 CAP | Refills: 0 | Status: SHIPPED | OUTPATIENT
Start: 2019-10-26 | End: 2019-11-25

## 2019-10-26 RX ORDER — PANTOPRAZOLE SODIUM 40 MG/1
40 TABLET, DELAYED RELEASE ORAL DAILY
Qty: 60 TAB | Refills: 3 | Status: SHIPPED | OUTPATIENT
Start: 2019-10-26 | End: 2020-01-30 | Stop reason: SDUPTHER

## 2019-10-26 RX ADMIN — Medication 400 MG: at 08:30

## 2019-10-26 RX ADMIN — BUSPIRONE HYDROCHLORIDE 15 MG: 5 TABLET ORAL at 08:29

## 2019-10-26 RX ADMIN — Medication 10 ML: at 05:02

## 2019-10-26 RX ADMIN — ONDANSETRON 4 MG: 2 INJECTION INTRAMUSCULAR; INTRAVENOUS at 04:49

## 2019-10-26 RX ADMIN — TAMSULOSIN HYDROCHLORIDE 0.4 MG: 0.4 CAPSULE ORAL at 08:30

## 2019-10-26 RX ADMIN — INSULIN GLARGINE 68 UNITS: 100 INJECTION, SOLUTION SUBCUTANEOUS at 09:00

## 2019-10-26 RX ADMIN — FUROSEMIDE 40 MG: 40 TABLET ORAL at 08:30

## 2019-10-26 RX ADMIN — METOCLOPRAMIDE HYDROCHLORIDE 10 MG: 10 TABLET ORAL at 06:00

## 2019-10-26 RX ADMIN — INSULIN GLARGINE 67 UNITS: 100 INJECTION, SOLUTION SUBCUTANEOUS at 09:00

## 2019-10-26 RX ADMIN — PREGABALIN 150 MG: 75 CAPSULE ORAL at 08:29

## 2019-10-26 RX ADMIN — INSULIN LISPRO 2 UNITS: 100 INJECTION, SOLUTION INTRAVENOUS; SUBCUTANEOUS at 07:30

## 2019-10-26 RX ADMIN — PANTOPRAZOLE SODIUM 40 MG: 40 TABLET, DELAYED RELEASE ORAL at 05:01

## 2019-10-26 RX ADMIN — POLYETHYLENE GLYCOL 3350 17 G: 17 POWDER, FOR SOLUTION ORAL at 09:00

## 2019-10-26 NOTE — DISCHARGE SUMMARY
Discharge Summary     Patient: Buster Kussmaul MRN: [id number]  SSN: [id number]    YOB: 1940  Age: 70 y.o.   Sex: male       Admit Date: 10/19/2019    Discharge Date: 10/26/2019      Admission Diagnoses: GI bleed [K92.2]    Discharge Diagnoses:   Problem List as of 10/26/2019 Date Reviewed: 9/18/2019          Codes Class Noted - Resolved    * (Principal) Mass of cecum (Chronic) ICD-10-CM: K63.89  ICD-9-CM: 569.89  10/26/2019 - Present        GI bleed ICD-10-CM: K92.2  ICD-9-CM: 578.9  10/15/2019 - Present        Cecal diverticulitis ICD-10-CM: K57.32  ICD-9-CM: 562.11  9/29/2019 - Present        RLQ abdominal pain ICD-10-CM: R10.31  ICD-9-CM: 789.03  9/28/2019 - Present        CKD (chronic kidney disease) stage 3, GFR 30-59 ml/min (HCC) (Chronic) ICD-10-CM: N18.3  ICD-9-CM: 585.3  6/24/2019 - Present        Cerebrovascular accident (CVA) ([facility] ICD-10-CM: I63.9  ICD-9-CM: 434.91  3/21/2019 - Present        Type 2 diabetes mellitus without complication ([facility] [id number]: E11.9  ICD-9-CM: 250.00  3/21/2019 - Present        Hemiparesis of left dominant side ([facility] ICD-10-CM: G81.92  ICD-9-CM: 342.91  3/7/2019 - Present        Type 2 diabetes with nephropathy ([facility] ICD-10-CM: E11.21  ICD-9-CM: 250.40, 583.81  5/11/2018 - Present        Morbid obesity due to excess calories ([facility] ICD-10-CM: E66.01  ICD-9-CM: 278.01  11/9/2017 - Present        Sleep apnea ICD-10-CM: G47.30  ICD-9-CM: 780.57  11/9/2017 - Present        Esophageal dysphagia ICD-10-CM: R13.10  ICD-9-CM: 787.20  11/9/2017 - Present        TIA (transient ischemic attack) ICD-10-CM: G45.9  ICD-9-CM: 435.9  11/9/2017 - Present        Mild intermittent asthma without complication [facility]: A30.40  ICD-9-CM: 493.90  8/24/2017 - Present        Diabetic neuropathy associated with type 2 diabetes mellitus (HCC) (Chronic) ICD-10-CM: E11.40  ICD-9-CM: 250.60, 357.2  7/18/2017 - Present        Diastolic CHF, chronic (HCC) (Chronic) ICD-10-CM: I50.32  ICD-9-CM: 428.32, 428.0  3/20/2017 - Present        Mixed hyperlipidemia (Chronic) ICD-10-CM: E78.2  ICD-9-CM: 272.2  11/17/2016 - Present        Essential hypertension with goal blood pressure less than 130/85 (Chronic) ICD-10-CM: I10  ICD-9-CM: 401.9  7/5/2016 - Present        Pulmonary hypertension (HCC) (Chronic) ICD-10-CM: I27.20  ICD-9-CM: 416.8  10/14/2015 - Present        Mild diastolic dysfunction (Chronic) ICD-10-CM: I51.9  ICD-9-CM: 429.9  7/17/2015 - Present        Gastroesophageal reflux disease without esophagitis (Chronic) ICD-10-CM: K21.9  ICD-9-CM: 530.81  4/21/2015 - Present        Hyperlipidemia with target LDL less than 70 (Chronic) ICD-10-CM: E78.5  ICD-9-CM: 272.4  Unknown - Present        Essential hypertension, benign (Chronic) ICD-10-CM: I10  ICD-9-CM: 401.1  1/22/2015 - Present        Chronic constipation (Chronic) ICD-10-CM: K59.09  ICD-9-CM: 564.00  1/22/2015 - Present        Type 2 diabetes, uncontrolled, with neuropathy (HCC) (Chronic) ICD-10-CM: E11.40, E11.65  ICD-9-CM: 250.62, 357.2  1/22/2015 - Present        Obstructive sleep apnea of adult (Chronic) ICD-10-CM: G47.33  ICD-9-CM: 327.23  1/22/2015 - Present        Morbid obesity ([facility] (Chronic) ICD-10-CM: E66.01  ICD-9-CM: 278.01  6/27/2011 - Present        RESOLVED: Sepsis ([facility] ICD-10-CM: A41.9  ICD-9-CM: 038.9, 995.91  9/30/2019 - 9/30/2019        RESOLVED: Hyperkalemia ICD-10-CM: E87.5  ICD-9-CM: 276.7  9/27/2019 - 9/27/2019        RESOLVED: Acute metabolic encephalopathy S-77-ZK: G93.41  ICD-9-CM: 348.31  9/25/2019 - 9/27/2019        RESOLVED: Postural dizziness ICD-10-CM: R42  ICD-9-CM: 780.4  5/11/2018 - 7/16/2018        RESOLVED: Chest pain ICD-10-CM: R07.9  ICD-9-CM: 786.50  5/11/2018 - 7/16/2018        RESOLVED: Type 2 diabetes mellitus without complication, with long-term current use of insulin ([facility] ICD-10-CM: E11.9, Z79.4  ICD-9-CM: 250.00, V58.67  11/9/2017 - 7/16/2018        RESOLVED: H/O colonoscopy (Chronic) ICD-10-CM: J76.429  ICD-9-CM: V45.89  7/18/2017 - 7/16/2018    Overview Signed 7/18/2017  7:06 AM by Angelica Cancer     Colonoscopy in 7/29/10 Dr Stacie Akhtar with repeat in 7/2020 GI associates at [facility] Dr Reno Ruffin: Type 2 diabetes mellitus without complication, without long-term current use of insulin ([facility] ICD-10-CM: E11.9  ICD-9-CM: 250.00  3/20/2017 - 7/18/2017        RESOLVED: Sleep apnea ICD-10-CM: G47.30  ICD-9-CM: 780.57  3/20/2017 - 7/18/2017        RESOLVED: Generalized edema ICD-10-CM: R60.1  ICD-9-CM: 782.3  7/5/2016 - 7/18/2017        RESOLVED: Type 2 diabetes mellitus without complication ([facility] PQN-36-ZB: E11.9  ICD-9-CM: 250.00  7/5/2016 - 2/21/2017        RESOLVED: Sleep apnea ICD-10-CM: G47.30  ICD-9-CM: 780.57  7/5/2016 - 12/16/2016        RESOLVED: Peripheral neuropathy ([facility] ICD-10-CM: G62.9  ICD-9-CM: 356.9  1/22/2015 - 7/18/2017        RESOLVED: Chest pain, unspecified ICD-10-CM: R07.9  ICD-9-CM: 786.50  6/27/2011 - 1/22/2015        RESOLVED: HTN (hypertension) ICD-10-CM: I10  ICD-9-CM: 401.9  6/27/2011 - 1/22/2015        RESOLVED: DM circ dis type II ICD-10-CM: E11.51  ICD-9-CM: 250.70  6/27/2011 - 9/22/2017               Discharge Condition: Good    Hospital Course: Mr. Edouard Anne is a 69 yo male with PMH of CKD 3, DM II, diastolic CHF, HTN, GERD, hx PE/DVT completed 6 months xarelto, TIA, MARLEEN non compliant with CPAP who presented to [facility] with c/o rectal bleeding, diaphoresis, light headedness on 10/15/19. Found to have hgb 8.6 down from 10.7 a few weeks prior. Had admission 9/25-10/1 for cecal diverticulitis, UTI. On ASA, plavix which was held on admission. Also c/o 26 lb unintentional weight loss. Required total of 5 units PRBC. Transferred to [facility] on 10/19/19 for further care. CTA abd showed cecal mass suspicious for neoplasm. Also found to have right upper/middle lobe PE.  LE duplex neg for DVT. Echo with EF 55-60%. 10/22 had IVC filter placed.  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 General Surgery consulted for resection, recommending resection, but can be seen as outpatient. On day of discharge, pt without complaint. States no recurrent hematochezia. Consults: Gastroenterology and General Surgery    Significant Diagnostic Studies: as per above and below    Physical exam:  Visit Vitals  /69 (BP 1 Location: Right arm, BP Patient Position: Sitting)   Pulse 92   Temp 98.2 °F (36.8 °C)   Resp 18   Ht 5' 6\" (1.676 m)   Wt 120.4 kg (265 lb 8 oz)   SpO2 97%   BMI 42.85 kg/m²     General:  Alert, cooperative, no distress. Head:  Normocephalic, without obvious abnormality, atraumatic. Eyes:  Conjunctivae/corneas clear. Pupils equal, round, reactive to light. Extraocular movements intact. Lungs:   Clear to auscultation bilaterally. Chest wall:  No tenderness or deformity. Heart:  Regular rate and rhythm, S1, S2 normal, no murmur, click, rub, or gallop. Abdomen:   Obese, Soft, non-tender. Bowel sounds normal. No masses. No organomegaly. Extremities: Extremities normal, atraumatic, no cyanosis. + edema b/l LEs   Pulses: 2+ and symmetric all extremities. Skin: Skin color, texture, turgor normal. No rashes or lesions. Lymph nodes: Cervical, supraclavicular, and axillary nodes normal.   Neurologic: CNII-XII intact. Normal strength, sensation, and reflexes throughout.    Data Review:  I have reviewed all labs, meds, telemetry events, and studies from the last 24 hours:    Recent Results (from the past 24 hour(s))   GLUCOSE, POC    Collection Time: 10/25/19  3:47 PM   Result Value Ref Range    Glucose (POC) 265 (H) 65 - 100 mg/dL   GLUCOSE, POC    Collection Time: 10/25/19  9:04 PM   Result Value Ref Range    Glucose (POC) 320 (H) 65 - 100 mg/dL   CBC WITH AUTOMATED DIFF    Collection Time: 10/26/19  5:30 AM   Result Value Ref Range    WBC 5.5 4.3 - 11.1 K/uL    RBC 3.43 (L) 4.23 - 5.6 M/uL    HGB 10.1 (L) 13.6 - 17.2 g/dL HCT 32.3 (L) 41.1 - 50.3 %    MCV 94.2 79.6 - 97.8 FL    MCH 29.4 26.1 - 32.9 PG    MCHC 31.3 (L) 31.4 - 35.0 g/dL    RDW 15.3 (H) 11.9 - 14.6 %    PLATELET 984 876 - 788 K/uL    MPV 10.5 9.4 - 12.3 FL    ABSOLUTE NRBC 0.02 0.0 - 0.2 K/uL    DF AUTOMATED      NEUTROPHILS 37 (L) 43 - 78 %    LYMPHOCYTES 45 (H) 13 - 44 %    MONOCYTES 12 4.0 - 12.0 %    EOSINOPHILS 5 0.5 - 7.8 %    BASOPHILS 1 0.0 - 2.0 %    IMMATURE GRANULOCYTES 0 0.0 - 5.0 %    ABS. NEUTROPHILS 2.0 1.7 - 8.2 K/UL    ABS. LYMPHOCYTES 2.5 0.5 - 4.6 K/UL    ABS. MONOCYTES 0.7 0.1 - 1.3 K/UL    ABS. EOSINOPHILS 0.3 0.0 - 0.8 K/UL    ABS. BASOPHILS 0.0 0.0 - 0.2 K/UL    ABS. IMM.  GRANS. 0.0 0.0 - 0.5 K/UL   METABOLIC PANEL, BASIC    Collection Time: 10/26/19  5:30 AM   Result Value Ref Range    Sodium 141 136 - 145 mmol/L    Potassium 4.0 3.5 - 5.1 mmol/L    Chloride 108 (H) 98 - 107 mmol/L    CO2 27 21 - 32 mmol/L    Anion gap 6 (L) 7 - 16 mmol/L    Glucose 188 (H) 65 - 100 mg/dL    BUN 10 8 - 23 MG/DL    Creatinine 1.20 0.8 - 1.5 MG/DL    GFR est AA >60 >60 ml/min/1.73m2    GFR est non-AA >60 >60 ml/min/1.73m2    Calcium 8.1 (L) 8.3 - 10.4 MG/DL   GLUCOSE, POC    Collection Time: 10/26/19  7:34 AM   Result Value Ref Range    Glucose (POC) 201 (H) 65 - 100 mg/dL        All Micro Results     None          Results for orders placed or performed during the hospital encounter of 10/19/19   2D ECHO COMPLETE ADULT (TTE) W OR [address]  [address] [facility], [address]  [phone]    Transthoracic Echocardiogram  2D, M-mode, Doppler, and Color Doppler    Patient: Asmita Cardoza  MR #: [id number]  : 1940  Age: 70 years  Gender: Male  Study date: 21-Oct-2019  Account #: [de-identified]  Height: 66 in  Weight: 275.4 lb  BSA: 2.29 mï¾²  Status:Routine  Location: [facility]  BP: 132/ 60    Allergies: ENALAPRIL MALEATE    Sonographer:  Leann Person RDCS  Group:  [facility]  Referring Physician:  Christa Rivas [id number]  Reading Physician:  Radhames Jeronimo. Phil Borja MD [facility]    INDICATIONS: Chest pain, acute, pulmonary embolism suspected; also has a   tumor  that needs to be researched. PROCEDURE: This was a routine study. A transthoracic echocardiogram was  performed. The study included complete 2D imaging, M-mode, complete spectral  Doppler, and color Doppler. Image quality was adequate. LEFT VENTRICLE: Size was normal. Systolic function was normal. Ejection  fraction was estimated in the range of 55 % to 60 %. There were no regional  wall motion abnormalities. There was mild concentric hypertrophy. Avg. E/e'=  9.25. Left ventricular diastolic function parameters were normal.    RIGHT VENTRICLE: The ventricle was mildly dilated. Systolic function was  normal. Estimated peak pressure was in the range of 45-50 mmHg. LEFT ATRIUM: Size was normal.    RIGHT ATRIUM: Size was normal.    SYSTEMIC VEINS: IVC: The inferior vena cava was not well visualized. AORTIC VALVE: The valve was structurally normal, tri-commissural. There was   no  evidence for stenosis. There was no insufficiency. MITRAL VALVE: Valve structure was normal. There was no evidence for stenosis. There was no regurgitation. TRICUSPID VALVE: The valve structure was normal. There was no evidence for  stenosis. There was mild regurgitation. PULMONIC VALVE: Not well visualized. There was no evidence for stenosis. There  was trivial regurgitation. PERICARDIUM: There was no pericardial effusion. AORTA: The root exhibited normal size. SUMMARY:    -  Left ventricle: Systolic function was normal. Ejection fraction was  estimated in the range of 55 % to 60 %. There were no regional wall motion  abnormalities. There was mild concentric hypertrophy.    -  Right ventricle: The ventricle was mildly dilated. -  Tricuspid valve: There was mild regurgitation.     SYSTEM MEASUREMENT TABLES    2D mode  AoR Diam (2D): 3 cm  LA Dimension (2D): 3.4 cm  Left Atrium Systolic Volume Index; Method of Disks, Biplane; 2D mode;: 22.7  ml/m2  IVS/LVPW (2D): 1  IVSd (2D): 1.2 cm  LVIDd (2D): 4.1 cm  LVIDs (2D): 2.3 cm  LVOT Area (2D): 3.5 cm2  LVPWd (2D): 1.2 cm  RVIDd (2D): 3.5 cm    Unspecified Scan Mode  Peak Grad; Mean; Antegrade Flow: 10 mm[Hg]  Vmax; Antegrade Flow: 155 cm/s  LVOT Diam: 2.1 cm  MV Peak Parker/LV Peak Tissue Parker E-Wave; Lateral MA: 8.5  MV Peak Parker/LV Peak Tissue Parker E-Wave; Medial MA: 10  MV E/A: 0.8    Prepared and signed by    Quin Gowers.  MD Bartolo [facility]  Signed 21-Oct-2019 14:55:01         Current Meds:  Current Facility-Administered Medications   Medication Dose Route Frequency    magnesium oxide (MAG-OX) tablet 400 mg  400 mg Oral BID    insulin glargine (LANTUS) injection 67 Units  67 Units SubCUTAneous DAILY    And    insulin glargine (LANTUS) injection 68 Units  68 Units SubCUTAneous DAILY    lactated Ringers infusion  100 mL/hr IntraVENous CONTINUOUS    lactated Ringers infusion  100 mL/hr IntraVENous CONTINUOUS    temazepam (RESTORIL) capsule 15 mg  15 mg Oral QHS    polyethylene glycol (MIRALAX) packet 17 g  17 g Oral DAILY    0.9% sodium chloride infusion 250 mL  250 mL IntraVENous PRN    ondansetron (ZOFRAN) injection 4 mg  4 mg IntraVENous Q4H PRN    sodium chloride (NS) flush 5-40 mL  5-40 mL IntraVENous Q8H    sodium chloride (NS) flush 5-40 mL  5-40 mL IntraVENous PRN    dextrose (D50W) injection syrg 12.5-25 g  25-50 mL IntraVENous PRN    dextrose 40% (GLUTOSE) oral gel 1 Tube  15 g Oral PRN    glucagon (GLUCAGEN) injection 1 mg  1 mg IntraMUSCular PRN    acetaminophen (TYLENOL) tablet 1,000 mg  1,000 mg Oral Q6H PRN    albuterol (PROVENTIL VENTOLIN) nebulizer solution 2.5 mg  2.5 mg Nebulization Q4H PRN    atorvastatin (LIPITOR) tablet 80 mg  80 mg Oral QHS    busPIRone (BUSPAR) tablet 15 mg  15 mg Oral DAILY    furosemide (LASIX) tablet 40 mg  40 mg Oral BID    insulin lispro (HUMALOG) injection   SubCUTAneous AC&HS    metoclopramide HCl (REGLAN) tablet 10 mg  10 mg Oral AC&HS    pantoprazole (PROTONIX) tablet 40 mg  40 mg Oral ACB&D    pregabalin (LYRICA) capsule 150 mg  150 mg Oral BID    tamsulosin (FLOMAX) capsule 0.4 mg  0.4 mg Oral DAILY     Current Outpatient Medications   Medication Sig    pantoprazole (PROTONIX) 40 mg tablet Take 1 Tab by mouth daily. Take 1 tablet po 30 minutes before breakfast    temazepam (RESTORIL) 15 mg capsule Take 1 Cap by mouth nightly as needed for Sleep for up to 30 days. Max Daily Amount: 15 mg.    furosemide (LASIX) 20 mg tablet Take 2 Tabs by mouth two (2) times a day.  irbesartan (AVAPRO) 150 mg tablet Take 1 Tab by mouth nightly. Indications: chronic heart failure, high blood pressure    tamsulosin (FLOMAX) 0.4 mg capsule Take 1 Cap by mouth daily.  busPIRone (BUSPAR) 15 mg tablet Take 1 Tab by mouth daily.  metFORMIN (GLUCOPHAGE) 1,000 mg tablet Take 1 Tab by mouth two (2) times daily (with meals).  potassium chloride (KLOR-CON M20) 20 mEq tablet TAKE 1 TABLET TWICE A DAY    atorvastatin (LIPITOR) 80 mg tablet Take 1 Tab by mouth nightly.  Insulin Needles, Disposable, (1ST TIER UNIFINE PENTIPS) 32 gauge x 5/32\" ndle Use to inject insulin    glucose blood VI test strips (FREESTYLE LITE STRIPS) strip Use to check glucose 4 times/day. Dx: E11.51, I10    insulin aspart U-100 (NOVOLOG FLEXPEN U-100 INSULIN) 100 unit/mL (3 mL) inpn Inject 8 units standing dose + additional insulin according to sliding scale (up to 16 units) subQ before each meal (Patient taking differently: Inject 8 units standing dose + additional insulin according to sliding scale (up to 16 units) subQ before each meal    151-200 = 2 units  201-250 = 4 units  251-300 = 6 units  201-350 = 8 units)    raNITIdine (ZANTAC) 150 mg tablet Take 150 mg by mouth two (2) times a day.     insulin degludec (TRESIBA FLEXTOUCH U-100) 100 unit/mL (3 mL) inpn 135 Units by SubCUTAneous route daily.  ezetimibe (ZETIA) 10 mg tablet Take 1 Tab by mouth nightly.  Insulin Syringe-Needle U-100 (BD INSULIN SYRINGE ULTRA-FINE) 0.3 mL 31 gauge x 5/16\" syrg Use to injection insulin 3 times/day at meals. Dx: E11.40    Lancets misc Use to check glucose 4 times/day as directed. Dx: E11.40    pregabalin (LYRICA) 150 mg capsule Take 150 mg by mouth two (2) times a day.  loratadine (CLARITIN) 10 mg tablet Take 10 mg by mouth daily.  bisacodyl (DULCOLAX) 10 mg suppository Insert 10 mg into rectum daily as needed (Constipation).  cyanocobalamin (VITAMIN B12) 500 mcg tablet Take 1 Tab by mouth daily. Indications: b12 deficiency    insulin syr/ndl U100 half rashida 0.3 mL 31 gauge x 15/64\" syrg Use to inject insulin    polyethylene glycol (MIRALAX) 17 gram/dose powder Take 17 g by mouth daily.  albuterol (VENTOLIN HFA) 90 mcg/actuation inhaler Take 2 Puffs by inhalation every four (4) hours as needed for Wheezing or Shortness of Breath. [address] [facility] 1690 Bed  Dx: Mild Diastolic Dysfunction (EKN-56 I51.9)  Pulmonary Hypertension (ICD-10 I27.2)  Morbid Obesity (ICD-10 E66.01)  Lumbar DDD (ICD-10 M51.36)  Obstructive Sleep Apnea (ICD-10 G47.33)    multivit-min-FA-lycopen-lutein (CENTRUM SILVER) 0.4-300-250 mg-mcg-mcg tab Take 1 Tab by mouth daily. Other Studies (last 24 hours):  No results found. Disposition: home    Discharge Medications:   Current Discharge Medication List      START taking these medications    Details   temazepam (RESTORIL) 15 mg capsule Take 1 Cap by mouth nightly as needed for Sleep for up to 30 days. Max Daily Amount: 15 mg.  Qty: 30 Cap, Refills: 0    Associated Diagnoses: Insomnia due to medical condition         CONTINUE these medications which have CHANGED    Details   pantoprazole (PROTONIX) 40 mg tablet Take 1 Tab by mouth daily.  Take 1 tablet po 30 minutes before breakfast  Qty: 60 Tab, Refills: 3    Associated Diagnoses: Chronic GERD; Esophageal dysphagia         CONTINUE these medications which have NOT CHANGED    Details   furosemide (LASIX) 20 mg tablet Take 2 Tabs by mouth two (2) times a day. Qty: 60 Tab, Refills: 2      irbesartan (AVAPRO) 150 mg tablet Take 1 Tab by mouth nightly. Indications: chronic heart failure, high blood pressure  Qty: 90 Tab, Refills: 3      tamsulosin (FLOMAX) 0.4 mg capsule Take 1 Cap by mouth daily. Qty: 90 Cap, Refills: 3    Associated Diagnoses: Benign prostatic hyperplasia with urinary frequency      busPIRone (BUSPAR) 15 mg tablet Take 1 Tab by mouth daily. Qty: 90 Tab, Refills: 0    Associated Diagnoses: Mood disorder ([address]      metFORMIN (GLUCOPHAGE) 1,000 mg tablet Take 1 Tab by mouth two (2) times daily (with meals). Qty: 180 Tab, Refills: 0    Associated Diagnoses: Type 2 diabetes with nephropathy (HCC)      potassium chloride (KLOR-CON M20) 20 mEq tablet TAKE 1 TABLET TWICE A DAY  Qty: 180 Tab, Refills: 3      atorvastatin (LIPITOR) 80 mg tablet Take 1 Tab by mouth nightly. Qty: 90 Tab, Refills: 3    Associated Diagnoses: Hyperlipidemia LDL goal <70      Insulin Needles, Disposable, (1ST TIER UNIFINE PENTIPS) 32 gauge x 5/32\" ndle Use to inject insulin  Qty: 200 Pen Needle, Refills: 1    Associated Diagnoses: Type 2 diabetes with nephropathy (HCC)      glucose blood VI test strips (FREESTYLE LITE STRIPS) strip Use to check glucose 4 times/day. Dx: E11.51, I10  Qty: 150 Strip, Refills: 11    Associated Diagnoses: Type 2 diabetes, uncontrolled, with neuropathy (HCC)      insulin aspart U-100 (NOVOLOG FLEXPEN U-100 INSULIN) 100 unit/mL (3 mL) inpn Inject 8 units standing dose + additional insulin according to sliding scale (up to 16 units) subQ before each meal  Qty: 15 Adjustable Dose Pre-filled Pen Syringe, Refills: 3    Associated Diagnoses: Type 2 diabetes, uncontrolled, with neuropathy (HCC)      raNITIdine (ZANTAC) 150 mg tablet Take 150 mg by mouth two (2) times a day.       insulin degludec (TRESIBA FLEXTOUCH U-100) 100 unit/mL (3 mL) inpn 135 Units by SubCUTAneous route daily. ezetimibe (ZETIA) 10 mg tablet Take 1 Tab by mouth nightly. Qty: 90 Tab, Refills: 3    Associated Diagnoses: Hyperlipidemia LDL goal <70      Insulin Syringe-Needle U-100 (BD INSULIN SYRINGE ULTRA-FINE) 0.3 mL 31 gauge x 5/16\" syrg Use to injection insulin 3 times/day at meals. Dx: E11.40  Qty: 270 Syringe, Refills: 3    Associated Diagnoses: Type 2 diabetes, uncontrolled, with neuropathy (HCC)      Lancets misc Use to check glucose 4 times/day as directed. Dx: E11.40  Qty: 120 Each, Refills: 11    Associated Diagnoses: Type 2 diabetes, uncontrolled, with neuropathy (HCC)      pregabalin (LYRICA) 150 mg capsule Take 150 mg by mouth two (2) times a day. loratadine (CLARITIN) 10 mg tablet Take 10 mg by mouth daily. bisacodyl (DULCOLAX) 10 mg suppository Insert 10 mg into rectum daily as needed (Constipation). Qty: 10 Suppository, Refills: 0      cyanocobalamin (VITAMIN B12) 500 mcg tablet Take 1 Tab by mouth daily. Indications: b12 deficiency  Qty: 30 Tab, Refills: 2      insulin syr/ndl U100 half rashida 0.3 mL 31 gauge x 15/64\" syrg Use to inject insulin  Qty: 200 Syringe, Refills: 1    Associated Diagnoses: Type 2 diabetes with nephropathy (HCC)      polyethylene glycol (MIRALAX) 17 gram/dose powder Take 17 g by mouth daily. Qty: 17 g, Refills: 5      albuterol (VENTOLIN HFA) 90 mcg/actuation inhaler Take 2 Puffs by inhalation every four (4) hours as needed for Wheezing or Shortness of Breath.   Qty: 1 Inhaler, Refills: 3    Associated Diagnoses: Extrinsic asthma, moderate persistent, uncomplicated      OTHER Semi-Electric Hospital Bed  Dx: Mild Diastolic Dysfunction (BLE-10 I51.9)  Pulmonary Hypertension (ICD-10 I27.2)  Morbid Obesity (ICD-10 E66.01)  Lumbar DDD (ICD-10 M51.36)  Obstructive Sleep Apnea (ICD-10 G47.33)  Qty: 1 Device, Refills: 0      multivit-min-FA-lycopen-lutein (CENTRUM SILVER) 0.4-300-250 mg-mcg-mcg tab Take 1 Tab by mouth daily. STOP taking these medications       aspirin delayed-release 81 mg tablet Comments:   Reason for Stopping:         clopidogrel (PLAVIX) 75 mg tab Comments:   Reason for Stopping:               Activity: Activity as tolerated  Diet: Diabetic Diet  Wound Care: None needed    Pt to f/u with Dr. Seun Villanueva of surgery and with his PCP within one week of discharge.      Signed By: RAISA Valera     October 26, 2019

## 2019-10-26 NOTE — PROGRESS NOTES
[facility] Progress Note         Admit Date:  10/19/2019    Today's Date:  10/26/2019    CC: Cecal mass; GIB    Subjective:     Patient: small BM last night green in color. No abdominal pain, N&V, hemtochezia. Medications:   Current Facility-Administered Medications   Medication Dose Route Frequency    magnesium oxide (MAG-OX) tablet 400 mg  400 mg Oral BID    insulin glargine (LANTUS) injection 67 Units  67 Units SubCUTAneous DAILY    And    insulin glargine (LANTUS) injection 68 Units  68 Units SubCUTAneous DAILY    lactated Ringers infusion  100 mL/hr IntraVENous CONTINUOUS    lactated Ringers infusion  100 mL/hr IntraVENous CONTINUOUS    temazepam (RESTORIL) capsule 15 mg  15 mg Oral QHS    polyethylene glycol (MIRALAX) packet 17 g  17 g Oral DAILY    0.9% sodium chloride infusion 250 mL  250 mL IntraVENous PRN    ondansetron (ZOFRAN) injection 4 mg  4 mg IntraVENous Q4H PRN    sodium chloride (NS) flush 5-40 mL  5-40 mL IntraVENous Q8H    sodium chloride (NS) flush 5-40 mL  5-40 mL IntraVENous PRN    dextrose (D50W) injection syrg 12.5-25 g  25-50 mL IntraVENous PRN    dextrose 40% (GLUTOSE) oral gel 1 Tube  15 g Oral PRN    glucagon (GLUCAGEN) injection 1 mg  1 mg IntraMUSCular PRN    acetaminophen (TYLENOL) tablet 1,000 mg  1,000 mg Oral Q6H PRN    albuterol (PROVENTIL VENTOLIN) nebulizer solution 2.5 mg  2.5 mg Nebulization Q4H PRN    atorvastatin (LIPITOR) tablet 80 mg  80 mg Oral QHS    busPIRone (BUSPAR) tablet 15 mg  15 mg Oral DAILY    furosemide (LASIX) tablet 40 mg  40 mg Oral BID    insulin lispro (HUMALOG) injection   SubCUTAneous AC&HS    metoclopramide HCl (REGLAN) tablet 10 mg  10 mg Oral AC&HS    pantoprazole (PROTONIX) tablet 40 mg  40 mg Oral ACB&D    pregabalin (LYRICA) capsule 150 mg  150 mg Oral BID    tamsulosin (FLOMAX) capsule 0.4 mg  0.4 mg Oral DAILY       Review of Systems:  ROS was obtained, with pertinent positives as listed above.   No chest pain or SOB. Diet:  Consistent carb    Objective:   Vitals:  Visit Vitals  /69 (BP 1 Location: Right arm, BP Patient Position: Sitting)   Pulse 92   Temp 98.2 °F (36.8 °C)   Resp 18   Ht 5' 6\" (1.676 m)   Wt 120.4 kg (265 lb 8 oz)   SpO2 97%   BMI 42.85 kg/m²     Intake/Output:  No intake/output data recorded. 10/24 1901 - 10/26 0700  In: -   Out: 725 [Urine:725]  Exam:  General appearance: alert, cooperative, no distress UP TO CHAIR; NURSING AT BEDSIDE  Lungs: clear to auscultation bilaterally anteriorly  Heart: regular rate and rhythm  Abdomen: soft, non-tender. Bowel sounds normal. No masses, no organomegaly  Extremities: extremities normal, atraumatic, no cyanosis or 2+ BILATERAL EDEMA  Neuro:  alert and oriented X4    Data Review (Labs):    Recent Labs     10/26/19  0530 10/25/19  0557 10/24/19  0531   WBC 5.5 4.6 4.9   HGB 10.1* 9.3* 9.1*   HCT 32.3* 30.0* 28.6*    178 165   MCV 94.2 94.9 91.4    145 142   K 4.0 3.5 3.0*   * 109* 106   CO2 27 29 28   BUN 10 6* 6*   CREA 1.20 1.10 1.07   CA 8.1* 7.8* 7.7*   MG  --  1.8 1.7*   * 138* 126*   AP  --  93 90   SGOT  --  22 23   ALT  --  17 15   TBILI  --  0.4 0.4   ALB  --  2.6* 2.5*   TP  --  5.9* 5.8*       Assessment:     Principal Problem: Mass of cecum (10/26/2019)    Active Problems:     Morbid obesity ([facility] (6/27/2011)      Essential hypertension, benign (1/22/2015)      Type 2 diabetes, uncontrolled, with neuropathy ([facility] (1/22/2015)      Hyperlipidemia with target LDL less than 70 ()      Pulmonary hypertension ([facility] (64/70/6943)      Diastolic CHF, chronic (HCC) (3/20/2017)      Sleep apnea (11/9/2017)      Type 2 diabetes with nephropathy ([facility] (5/11/2018)      GI bleed (10/15/2019)    71 yo male patient of Dr. Feliberto Lamar [facility] of but not limited to CKD III, DM II, diabetic retinopathy, diastolic CHF - on ASA and Plavix, DVT, PE, diverticulosis, colon polyps, sleep apnea, who we are following for GI bleeding and cecal mass on CT 10/19. He was admitted 25 Sept 2019 to 1 Oct 2019 for cecal diverticulitis, treated with ATBXs, readmitted for rectal bleeding. His current Hgb is now stable. [address] filter was placed 10/21 for new PE on CT-Plavix and ASA on hold.  Colonoscopy on 10/24/19 by Dr. Susan Jack revealed a large fungating mass in the cecum and an 8mm polyp in the ascending colon. Pathology revealed no definitive malignancy, but ulceration and active colitis. Also had an ascending tubulovillous adenoma. Recommend surgical resection due to recurrent bleeding and progression on CT scan. Patient is agreeable and Dr. Molly Aguilar is following. Plans for discharge home with appt with Dr. Molly Aguilar next week to discuss surgical date. CEA normal 2.0 on 10/21. Plan:     -Surgery per Dr. Molly Aguilar  -Serial H&H and transfuse as needed  -Continue Miralax 17gm bid  -We will sign off and follow up in the office as an outpatient. Please call for any questions. Jose De Jesus Allen.  Melony [address] in collaboration with / Rachael Robertson  [facility]

## 2019-10-26 NOTE — PROGRESS NOTES
Pt is for discharge home with [facility] to resume his home care services.   Care Management Interventions  PCP Verified by CM: Yes(was to see but unable to see due to being hospitalized)  Palliative Care Criteria Met (RRAT>21 & CHF Dx)?: No(RRAT 53 Dx GI bleed)  Transition of Care Consult (CM Consult): Discharge Planning  Discharge Durable Medical Equipment: No(Walker, cane, BSC)  Physical Therapy Consult: No  Occupational Therapy Consult: No  Speech Therapy Consult: No  Current Support Network: Relative's Home(lives with brother in an apartment )  Confirm Follow Up Transport: Family(sister and brother)  Plan discussed with Pt/Family/Caregiver: Yes  Freedom of Choice Offered: Yes  Discharge Location  Discharge Placement: Home with home health

## 2019-10-26 NOTE — PROGRESS NOTES
Hourly rounds done. Pt denies pain, nausea, vomiting. Pt w/ adamant request for Restoril prescription upon DC. All needs met at this time.

## 2019-10-26 NOTE — DISCHARGE INSTRUCTIONS
Patient Education        Colonoscopy: What to Expect at Home  Your Recovery  After you have a colonoscopy, you will stay at the clinic for 1 to 2 hours until the medicines wear off. Then you can go home. But you will need to arrange for a ride. Your doctor will tell you when you can eat and do your other usual activities. Your doctor will talk to you about when you will need your next colonoscopy. Your doctor can help you decide how often you need to be checked. This will depend on the results of your test and your risk for colorectal cancer. After the test, you may be bloated or have gas pains. You may need to pass gas. If a biopsy was done or a polyp was removed, you may have streaks of blood in your stool (feces) for a few days. Problems such as heavy rectal bleeding may not occur until several weeks after the test. This isn't common. But it can happen after polyps are removed. This care sheet gives you a general idea about how long it will take for you to recover. But each person recovers at a different pace. Follow the steps below to get better as quickly as possible. How can you care for yourself at home? Activity    · Rest when you feel tired.     · You can do your normal activities when it feels okay to do so. Diet    · Follow your doctor's directions for eating.     · Unless your doctor has told you not to, drink plenty of fluids. This helps to replace the fluids that were lost during the colon prep.     · Do not drink alcohol. Medicines    · Your doctor will tell you if and when you can restart your medicines. He or she will also give you instructions about taking any new medicines.     · If you take blood thinners, such as warfarin (Coumadin), clopidogrel (Plavix), or aspirin, be sure to talk to your doctor. He or she will tell you if and when to start taking those medicines again.  Make sure that you understand exactly what your doctor wants you to do.     · If polyps were removed or a biopsy was done during the test, your doctor may tell you not to take aspirin or other anti-inflammatory medicines for a few days. These include ibuprofen (Advil, Motrin) and naproxen (Aleve). Other instructions    · For your safety, do not drive or operate machinery until the medicine wears off and you can think clearly. Your doctor may tell you not to drive or operate machinery until the day after your test.     · Do not sign legal documents or make major decisions until the medicine wears off and you can think clearly. The anesthesia can make it hard for you to fully understand what you are agreeing to. Follow-up care is a key part of your treatment and safety. Be sure to make and go to all appointments, and call your doctor if you are having problems. It's also a good idea to know your test results and keep a list of the medicines you take. When should you call for help? Call 911 anytime you think you may need emergency care. For example, call if:    · You passed out (lost consciousness).     · You pass maroon or bloody stools.     · You have trouble breathing.    Call your doctor now or seek immediate medical care if:    · You have pain that does not get better after you take pain medicine.     · You are sick to your stomach or cannot drink fluids.     · You have new or worse belly pain.     · You have blood in your stools.     · You have a fever.     · You cannot pass stools or gas.    Watch closely for changes in your health, and be sure to contact your doctor if you have any problems. Where can you learn more? Go to http://jono-zuleima.info/. Enter E264 in the search box to learn more about \"Colonoscopy: What to Expect at Home. \"  Current as of: December 19, 2018  Content Version: 12.2  © 8615-3308 [employer]. Care instructions adapted under license by [employer] (which disclaims liability or warranty for this information).  If you have questions about a medical condition or this instruction, always ask your healthcare professional. [address] any warranty or liability for your use of this information. Patient Education        Gastrointestinal Bleeding: Care Instructions  Your Care Instructions    The digestive or gastrointestinal tract goes from the mouth to the anus. It is often called the GI tract. Bleeding can happen anywhere in the GI tract. It may be caused by an ulcer, an infection, or cancer. It may also be caused by medicines such as aspirin or ibuprofen. Light bleeding may not cause any symptoms at first. But if you continue to bleed for a while, you may feel very weak or tired. Sudden, heavy bleeding means you need to see a doctor right away. This kind of bleeding can be very dangerous. But it can usually be cured or controlled. The doctor may do some tests to find the cause of your bleeding. Follow-up care is a key part of your treatment and safety. Be sure to make and go to all appointments, and call your doctor if you are having problems. It's also a good idea to know your test results and keep a list of the medicines you take. How can you care for yourself at home? · Be safe with medicines. Take your medicines exactly as prescribed. Call your doctor if you think you are having a problem with your medicine. You will get more details on the specific medicines your doctor prescribes. · Do not take aspirin or other anti-inflammatory medicines, such as naproxen (Aleve) or ibuprofen (Advil, Motrin), without talking to your doctor first. Ask your doctor if it is okay to use acetaminophen (Tylenol). · Do not drink alcohol. · The bleeding may make you lose iron. So it's important to eat foods that have a lot of iron. These include red meat, shellfish, poultry, and eggs. They also include beans, raisins, whole-grain breads, and leafy green vegetables.  If you want help planning meals, you can make an appointment with a dietitian. When should you call for help? Call 911 anytime you think you may need emergency care. For example, call if:    · You have sudden, severe belly pain.     · You vomit blood or what looks like coffee grounds.     · You passed out (lost consciousness).     · Your stools are maroon or very bloody.    Call your doctor now or seek immediate medical care if:    · You are dizzy or lightheaded, or you feel like you may faint.     · Your stools are black and look like tar, or they have streaks of blood.     · You have belly pain.     · You vomit or have nausea.     · You have trouble swallowing, or it hurts when you swallow.    Watch closely for changes in your health, and be sure to contact your doctor if:    · You do not get better as expected. Where can you learn more? Go to http://jono-zuleima.info/. Enter Z671 in the search box to learn more about \"Gastrointestinal Bleeding: Care Instructions. \"  Current as of: June 26, 2019  Content Version: 12.2  © 3760-7209 [employer]. Care instructions adapted under license by [employer] (which disclaims liability or warranty for this information). If you have questions about a medical condition or this instruction, always ask your healthcare professional. [address] any warranty or liability for your use of this information. DISCHARGE SUMMARY from Nurse    PATIENT INSTRUCTIONS:    After general anesthesia or intravenous sedation, for 24 hours or while taking prescription Narcotics:  · Limit your activities  · Do not drive and operate hazardous machinery  · Do not make important personal or business decisions  · Do  not drink alcoholic beverages  · If you have not urinated within 8 hours after discharge, please contact your surgeon on call.     Report the following to your surgeon:  · Excessive pain, swelling, redness or odor of or around the surgical area  · Temperature over 100.5  · Nausea and vomiting lasting longer than 4 hours or if unable to take medications  · Any signs of decreased circulation or nerve impairment to extremity: change in color, persistent  numbness, tingling, coldness or increase pain  · Any questions    What to do at Home:  Recommended activity: Activity as tolerated, per MD instructions    If you experience any of the following symptoms fever > 100.5, nausea, vomiting, abdominal pain call MD, chest pain and/or shortness of breath, rectal bleeding to ER please follow up with MD.    *  Please give a list of your current medications to your Primary Care Provider. *  Please update this list whenever your medications are discontinued, doses are      changed, or new medications (including over-the-counter products) are added. *  Please carry medication information at all times in case of emergency situations. These are general instructions for a healthy lifestyle:    No smoking/ No tobacco products/ Avoid exposure to second hand smoke  Surgeon General's Warning:  Quitting smoking now greatly reduces serious risk to your health. Obesity, smoking, and sedentary lifestyle greatly increases your risk for illness    A healthy diet, regular physical exercise & weight monitoring are important for maintaining a healthy lifestyle    You may be retaining fluid if you have a history of heart failure or if you experience any of the following symptoms:  Weight gain of 3 pounds or more overnight or 5 pounds in a week, increased swelling in our hands or feet or shortness of breath while lying flat in bed. Please call your doctor as soon as you notice any of these symptoms; do not wait until your next office visit. The discharge information has been reviewed with the patient. The patient verbalized understanding.   Discharge medications reviewed with the patient and appropriate educational materials and side effects teaching were provided.   ___________________________________________________________________________________________________________________________________

## 2019-10-27 PROCEDURE — 3331090001 HH PPS REVENUE CREDIT

## 2019-10-27 PROCEDURE — 3331090002 HH PPS REVENUE DEBIT

## 2019-10-28 ENCOUNTER — PATIENT OUTREACH (OUTPATIENT)
Dept: CASE MANAGEMENT | Age: 79
End: 2019-10-28

## 2019-10-28 ENCOUNTER — HOME CARE VISIT (OUTPATIENT)
Dept: SCHEDULING | Facility: HOME HEALTH | Age: 79
End: 2019-10-28
Payer: MEDICARE

## 2019-10-28 VITALS
RESPIRATION RATE: 16 BRPM | DIASTOLIC BLOOD PRESSURE: 76 MMHG | OXYGEN SATURATION: 95 % | HEART RATE: 80 BPM | SYSTOLIC BLOOD PRESSURE: 130 MMHG | TEMPERATURE: 97.7 F

## 2019-10-28 PROCEDURE — G0299 HHS/HOSPICE OF RN EA 15 MIN: HCPCS

## 2019-10-28 PROCEDURE — 3331090001 HH PPS REVENUE CREDIT

## 2019-10-28 PROCEDURE — 3331090002 HH PPS REVENUE DEBIT

## 2019-10-28 NOTE — PROGRESS NOTES
This note will not be viewable in [address] Date/Time of Call: 10/28/19 1057am   What was the patient hospitalized for? Mass of Cecum    Consent for LEONEL SUAREZ Call       Does the patient understand his/her diagnosis and/or treatment and what happened during the hospitalization? Called and spoke with patient. He agrees to call and states that he is doing good. Yes      Did the patient receive discharge instructions? Yes   CM Assessed Risk for Readmission:       Patient stated Risk for Readmission:  Patient is a moderate to high risk for readmission    No concerns are voiced at this time    Review any discharge instructions (see discharge instructions/AVS in [facility]). Ask patient if they understand these. Do they have any questions? reviewed DC instructions   Were home services ordered (nursing, PT, OT, ST, etc.)? Yes    If so, has the first visit occurred? If not, why? (Assist with coordination of services if necessary.)   [facility] 10/28/19   Was any DME ordered? No    If so, has it been received? If not, why?  (Assist patient in obtaining DME orders &/or equipment if necessary.) Patient has a walker, cane and BSC in the home    Complete a review of all medications (new, continued and discontinued meds per the D/C instructions and medication tab in [facility]). Medications Reviewed     START taking:  temazepam 15 mg capsule (RESTORIL)  CHANGE how you take:  furosemide 20 mg tablet (LASIX)  lancets Misc  pantoprazole 40 mg tablet (PROTONIX)  STOP taking:  aspirin delayed-release 81 mg tablet  clopidogrel 75 mg Tab (PLAVIX)   Were all new prescriptions filled? If not, why?  (Assist patient in obtaining medications if necessary  escalate for CCM &/or SW if ongoing issues are verbalized by pt or anticipated)   Yes    Does the patient understand the purpose and dosing instructions for all medications?   (If patient has questions, provide explanation and education.)   Patient verbalizes understanding of medications Does the patient have any problems in performing ADLs? (If patient is unable to perform ADLs  what is the limiting factor(s)? Do they have a support system that can assist? If no support system is present, discuss possible assistance that they may be able to obtain. Escalate for CCM/SW if ongoing issues are verbalized by pt or anticipated)   Patient independent with ADLs, sisters assists PRN    Does the patient have all follow-up appointments scheduled? 7 day f/up with PCP?   (f/up with PCP may be w/in 14 days if patient has a f/up with their specialist w/in 7 days)    7-14 day f/up with specialist?   (or per discharge instructions)    If f/up has not been made  what actions has the care coordinator made to accomplish this? Has transportation been arranged? yes       10/29/19 at 240pm        Neurology 11/07/19 at 10am, Surgery 10/31/19 at 1015am    Reviewed appointment information with patient       Yes, no concerns    Any other questions or concerns expressed by the patient? Patient denies any questions or concerns at this time. Care Coordinator contact information provided should needs arise. Schedule next appointment with LEONEL SUAREZ Coordinator or refer to RN Case Manager/ per the workflow guidelines. When is care coordinators next follow-up call scheduled? If referred for CCM  what RN care manager was the referral assigned?  Within 7  14 days           Within 7  14 days       NA   RADHA Call Completed By: Hannah Dotson, [facility] Coordinator

## 2019-10-29 PROCEDURE — 3331090001 HH PPS REVENUE CREDIT

## 2019-10-29 PROCEDURE — 3331090002 HH PPS REVENUE DEBIT

## 2019-10-30 ENCOUNTER — HOME CARE VISIT (OUTPATIENT)
Dept: SCHEDULING | Facility: HOME HEALTH | Age: 79
End: 2019-10-30
Payer: MEDICARE

## 2019-10-30 VITALS
HEART RATE: 86 BPM | SYSTOLIC BLOOD PRESSURE: 112 MMHG | DIASTOLIC BLOOD PRESSURE: 62 MMHG | TEMPERATURE: 98.2 F | RESPIRATION RATE: 18 BRPM

## 2019-10-30 VITALS
DIASTOLIC BLOOD PRESSURE: 52 MMHG | SYSTOLIC BLOOD PRESSURE: 112 MMHG | HEART RATE: 82 BPM | RESPIRATION RATE: 16 BRPM | TEMPERATURE: 96.8 F

## 2019-10-30 PROCEDURE — 3331090001 HH PPS REVENUE CREDIT

## 2019-10-30 PROCEDURE — G0152 HHCP-SERV OF OT,EA 15 MIN: HCPCS

## 2019-10-30 PROCEDURE — 3331090002 HH PPS REVENUE DEBIT

## 2019-10-30 PROCEDURE — G0151 HHCP-SERV OF PT,EA 15 MIN: HCPCS

## 2019-10-31 PROCEDURE — 3331090001 HH PPS REVENUE CREDIT

## 2019-10-31 PROCEDURE — 3331090002 HH PPS REVENUE DEBIT

## 2019-11-01 ENCOUNTER — HOME CARE VISIT (OUTPATIENT)
Dept: SCHEDULING | Facility: HOME HEALTH | Age: 79
End: 2019-11-01
Payer: MEDICARE

## 2019-11-01 PROCEDURE — G0156 HHCP-SVS OF AIDE,EA 15 MIN: HCPCS

## 2019-11-01 PROCEDURE — 3331090001 HH PPS REVENUE CREDIT

## 2019-11-01 PROCEDURE — 3331090002 HH PPS REVENUE DEBIT

## 2019-11-02 PROCEDURE — 3331090001 HH PPS REVENUE CREDIT

## 2019-11-02 PROCEDURE — 3331090002 HH PPS REVENUE DEBIT

## 2019-11-03 PROCEDURE — 3331090002 HH PPS REVENUE DEBIT

## 2019-11-03 PROCEDURE — 3331090001 HH PPS REVENUE CREDIT

## 2019-11-04 ENCOUNTER — HOME CARE VISIT (OUTPATIENT)
Dept: SCHEDULING | Facility: HOME HEALTH | Age: 79
End: 2019-11-04
Payer: MEDICARE

## 2019-11-04 VITALS
HEART RATE: 78 BPM | RESPIRATION RATE: 18 BRPM | DIASTOLIC BLOOD PRESSURE: 62 MMHG | OXYGEN SATURATION: 98 % | SYSTOLIC BLOOD PRESSURE: 116 MMHG | TEMPERATURE: 97.8 F

## 2019-11-04 PROCEDURE — G0299 HHS/HOSPICE OF RN EA 15 MIN: HCPCS

## 2019-11-04 PROCEDURE — 3331090002 HH PPS REVENUE DEBIT

## 2019-11-04 PROCEDURE — 3331090001 HH PPS REVENUE CREDIT

## 2019-11-05 ENCOUNTER — HOME CARE VISIT (OUTPATIENT)
Dept: SCHEDULING | Facility: HOME HEALTH | Age: 79
End: 2019-11-05
Payer: MEDICARE

## 2019-11-05 PROCEDURE — 3331090002 HH PPS REVENUE DEBIT

## 2019-11-05 PROCEDURE — G0155 HHCP-SVS OF CSW,EA 15 MIN: HCPCS

## 2019-11-05 PROCEDURE — 3331090001 HH PPS REVENUE CREDIT

## 2019-11-06 ENCOUNTER — HOME CARE VISIT (OUTPATIENT)
Dept: SCHEDULING | Facility: HOME HEALTH | Age: 79
End: 2019-11-06
Payer: MEDICARE

## 2019-11-06 PROCEDURE — 3331090001 HH PPS REVENUE CREDIT

## 2019-11-06 PROCEDURE — 3331090002 HH PPS REVENUE DEBIT

## 2019-11-06 PROCEDURE — G0151 HHCP-SERV OF PT,EA 15 MIN: HCPCS

## 2019-11-07 ENCOUNTER — HOME CARE VISIT (OUTPATIENT)
Dept: SCHEDULING | Facility: HOME HEALTH | Age: 79
End: 2019-11-07
Payer: MEDICARE

## 2019-11-07 PROCEDURE — G0299 HHS/HOSPICE OF RN EA 15 MIN: HCPCS

## 2019-11-07 PROCEDURE — 3331090001 HH PPS REVENUE CREDIT

## 2019-11-07 PROCEDURE — 3331090002 HH PPS REVENUE DEBIT

## 2019-11-08 ENCOUNTER — HOME CARE VISIT (OUTPATIENT)
Dept: SCHEDULING | Facility: HOME HEALTH | Age: 79
End: 2019-11-08
Payer: MEDICARE

## 2019-11-08 VITALS
OXYGEN SATURATION: 98 % | DIASTOLIC BLOOD PRESSURE: 64 MMHG | TEMPERATURE: 98.2 F | RESPIRATION RATE: 18 BRPM | HEART RATE: 65 BPM | SYSTOLIC BLOOD PRESSURE: 124 MMHG

## 2019-11-08 VITALS
DIASTOLIC BLOOD PRESSURE: 68 MMHG | SYSTOLIC BLOOD PRESSURE: 123 MMHG | RESPIRATION RATE: 18 BRPM | TEMPERATURE: 97.9 F | HEART RATE: 76 BPM

## 2019-11-08 PROCEDURE — 3331090001 HH PPS REVENUE CREDIT

## 2019-11-08 PROCEDURE — 3331090002 HH PPS REVENUE DEBIT

## 2019-11-08 PROCEDURE — G0151 HHCP-SERV OF PT,EA 15 MIN: HCPCS

## 2019-11-09 PROCEDURE — 3331090001 HH PPS REVENUE CREDIT

## 2019-11-09 PROCEDURE — 3331090002 HH PPS REVENUE DEBIT

## 2019-11-10 PROCEDURE — 3331090002 HH PPS REVENUE DEBIT

## 2019-11-10 PROCEDURE — 3331090001 HH PPS REVENUE CREDIT

## 2019-11-11 ENCOUNTER — PATIENT OUTREACH (OUTPATIENT)
Dept: CASE MANAGEMENT | Age: 79
End: 2019-11-11

## 2019-11-11 PROCEDURE — 3331090001 HH PPS REVENUE CREDIT

## 2019-11-11 PROCEDURE — 3331090002 HH PPS REVENUE DEBIT

## 2019-11-11 NOTE — PROGRESS NOTES
This note will not be viewable in [address] Transitions of Care  Follow up Outreach Note   Outreach type Phone call: Spoke with patient   Date/Time of Outreach: 11/11/19 at 1153am     Has patient attended PCP or specialist follow-up appointments since last contact? What was outcome of appointment? When is next follow-up scheduled? Patient states that he is doing okay but a little sad today. He states that he has completed his FU with Surgery and is scheduled for another procedure on 12/06/19   Review medications. Any medication changes since last outreach? Does patient have any questions or issues related to their medications? Patient has medications, no significant changes, no questions or concerns      Home health active? If yes  any issue? Progress? HH currently active     Referrals needed?  (CM, SW, HH, etc.)   NA   Other issues/Miscellaneous? (Transportation, access to meals, ability to perform ADLs, adequate caregiver support, etc.) No questions or concerns are voiced at this time. Care Coordinator contact information provided should needs arise   Next Outreach Scheduled?     Graduation from program?   NA     Yes      Next Steps/Goals (if applicable):   NA     Outreach completed by:   [address] Coordinator

## 2019-11-12 ENCOUNTER — HOME CARE VISIT (OUTPATIENT)
Dept: SCHEDULING | Facility: HOME HEALTH | Age: 79
End: 2019-11-12
Payer: MEDICARE

## 2019-11-12 VITALS
DIASTOLIC BLOOD PRESSURE: 68 MMHG | HEART RATE: 76 BPM | RESPIRATION RATE: 18 BRPM | TEMPERATURE: 98.2 F | SYSTOLIC BLOOD PRESSURE: 118 MMHG

## 2019-11-12 VITALS
DIASTOLIC BLOOD PRESSURE: 76 MMHG | SYSTOLIC BLOOD PRESSURE: 134 MMHG | RESPIRATION RATE: 18 BRPM | OXYGEN SATURATION: 98 % | HEART RATE: 99 BPM | TEMPERATURE: 98.2 F

## 2019-11-12 PROCEDURE — 3331090001 HH PPS REVENUE CREDIT

## 2019-11-12 PROCEDURE — G0151 HHCP-SERV OF PT,EA 15 MIN: HCPCS

## 2019-11-12 PROCEDURE — G0299 HHS/HOSPICE OF RN EA 15 MIN: HCPCS

## 2019-11-12 PROCEDURE — 3331090002 HH PPS REVENUE DEBIT

## 2019-11-13 PROCEDURE — 3331090002 HH PPS REVENUE DEBIT

## 2019-11-13 PROCEDURE — 3331090001 HH PPS REVENUE CREDIT

## 2019-11-14 ENCOUNTER — HOME CARE VISIT (OUTPATIENT)
Dept: SCHEDULING | Facility: HOME HEALTH | Age: 79
End: 2019-11-14
Payer: MEDICARE

## 2019-11-14 PROCEDURE — 3331090001 HH PPS REVENUE CREDIT

## 2019-11-14 PROCEDURE — G0155 HHCP-SVS OF CSW,EA 15 MIN: HCPCS

## 2019-11-14 PROCEDURE — 3331090002 HH PPS REVENUE DEBIT

## 2019-11-15 ENCOUNTER — HOME CARE VISIT (OUTPATIENT)
Dept: HOME HEALTH SERVICES | Facility: HOME HEALTH | Age: 79
End: 2019-11-15
Payer: MEDICARE

## 2019-11-15 ENCOUNTER — HOME CARE VISIT (OUTPATIENT)
Dept: SCHEDULING | Facility: HOME HEALTH | Age: 79
End: 2019-11-15
Payer: MEDICARE

## 2019-11-15 VITALS
RESPIRATION RATE: 18 BRPM | TEMPERATURE: 97.9 F | HEART RATE: 83 BPM | SYSTOLIC BLOOD PRESSURE: 122 MMHG | DIASTOLIC BLOOD PRESSURE: 70 MMHG

## 2019-11-15 VITALS
OXYGEN SATURATION: 98 % | DIASTOLIC BLOOD PRESSURE: 76 MMHG | SYSTOLIC BLOOD PRESSURE: 134 MMHG | RESPIRATION RATE: 18 BRPM | HEART RATE: 91 BPM | TEMPERATURE: 98 F

## 2019-11-15 PROCEDURE — 3331090002 HH PPS REVENUE DEBIT

## 2019-11-15 PROCEDURE — 3331090001 HH PPS REVENUE CREDIT

## 2019-11-15 PROCEDURE — G0151 HHCP-SERV OF PT,EA 15 MIN: HCPCS

## 2019-11-15 PROCEDURE — G0299 HHS/HOSPICE OF RN EA 15 MIN: HCPCS

## 2019-11-16 PROCEDURE — 3331090002 HH PPS REVENUE DEBIT

## 2019-11-16 PROCEDURE — 3331090001 HH PPS REVENUE CREDIT

## 2019-11-17 PROCEDURE — 3331090002 HH PPS REVENUE DEBIT

## 2019-11-17 PROCEDURE — 3331090001 HH PPS REVENUE CREDIT

## 2019-11-18 ENCOUNTER — HOSPITAL ENCOUNTER (OUTPATIENT)
Dept: ULTRASOUND IMAGING | Age: 79
Discharge: HOME | End: 2019-11-18
Attending: INTERNAL MEDICINE
Payer: MEDICARE

## 2019-11-18 DIAGNOSIS — M79.89 LEG SWELLING: ICD-10-CM

## 2019-11-18 PROCEDURE — 93970 EXTREMITY STUDY: CPT

## 2019-11-18 PROCEDURE — 3331090001 HH PPS REVENUE CREDIT

## 2019-11-18 PROCEDURE — 3331090002 HH PPS REVENUE DEBIT

## 2019-11-19 ENCOUNTER — HOME CARE VISIT (OUTPATIENT)
Dept: SCHEDULING | Facility: HOME HEALTH | Age: 79
End: 2019-11-19
Payer: MEDICARE

## 2019-11-19 VITALS
RESPIRATION RATE: 18 BRPM | TEMPERATURE: 98.1 F | HEART RATE: 91 BPM | DIASTOLIC BLOOD PRESSURE: 78 MMHG | SYSTOLIC BLOOD PRESSURE: 132 MMHG

## 2019-11-19 PROCEDURE — 3331090001 HH PPS REVENUE CREDIT

## 2019-11-19 PROCEDURE — G0151 HHCP-SERV OF PT,EA 15 MIN: HCPCS

## 2019-11-19 PROCEDURE — 3331090002 HH PPS REVENUE DEBIT

## 2019-11-20 ENCOUNTER — HOSPITAL ENCOUNTER (OUTPATIENT)
Dept: LAB | Age: 79
Discharge: HOME | End: 2019-11-20
Attending: INTERNAL MEDICINE
Payer: MEDICARE

## 2019-11-20 DIAGNOSIS — M79.89 LEG SWELLING: ICD-10-CM

## 2019-11-20 PROBLEM — I27.82 CHRONIC PULMONARY EMBOLISM (HCC): Status: ACTIVE | Noted: 2019-11-20

## 2019-11-20 LAB
ANION GAP SERPL CALC-SCNC: 7 MMOL/L (ref 7–16)
BUN SERPL-MCNC: 9 MG/DL (ref 8–23)
CALCIUM SERPL-MCNC: 8.6 MG/DL (ref 8.3–10.4)
CHLORIDE SERPL-SCNC: 108 MMOL/L (ref 98–107)
CO2 SERPL-SCNC: 28 MMOL/L (ref 21–32)
CREAT SERPL-MCNC: 1.1 MG/DL (ref 0.8–1.5)
GLUCOSE SERPL-MCNC: 88 MG/DL (ref 65–100)
POTASSIUM SERPL-SCNC: 3.8 MMOL/L (ref 3.5–5.1)
SODIUM SERPL-SCNC: 143 MMOL/L (ref 136–145)

## 2019-11-20 PROCEDURE — 80048 BASIC METABOLIC PNL TOTAL CA: CPT

## 2019-11-20 PROCEDURE — 36415 COLL VENOUS BLD VENIPUNCTURE: CPT

## 2019-11-20 PROCEDURE — 3331090001 HH PPS REVENUE CREDIT

## 2019-11-20 PROCEDURE — 3331090002 HH PPS REVENUE DEBIT

## 2019-11-21 ENCOUNTER — HOME CARE VISIT (OUTPATIENT)
Dept: HOME HEALTH SERVICES | Facility: HOME HEALTH | Age: 79
End: 2019-11-21
Payer: MEDICARE

## 2019-11-21 ENCOUNTER — HOME CARE VISIT (OUTPATIENT)
Dept: SCHEDULING | Facility: HOME HEALTH | Age: 79
End: 2019-11-21
Payer: MEDICARE

## 2019-11-21 VITALS
HEART RATE: 71 BPM | TEMPERATURE: 98.2 F | DIASTOLIC BLOOD PRESSURE: 78 MMHG | RESPIRATION RATE: 18 BRPM | SYSTOLIC BLOOD PRESSURE: 138 MMHG | OXYGEN SATURATION: 98 %

## 2019-11-21 PROCEDURE — 3331090001 HH PPS REVENUE CREDIT

## 2019-11-21 PROCEDURE — 3331090002 HH PPS REVENUE DEBIT

## 2019-11-21 PROCEDURE — G0299 HHS/HOSPICE OF RN EA 15 MIN: HCPCS

## 2019-11-22 PROCEDURE — 3331090001 HH PPS REVENUE CREDIT

## 2019-11-22 PROCEDURE — 3331090002 HH PPS REVENUE DEBIT

## 2019-11-22 RX ORDER — SODIUM CHLORIDE 0.9 % (FLUSH) 0.9 %
5-40 SYRINGE (ML) INJECTION EVERY 8 HOURS
Status: CANCELLED | OUTPATIENT
Start: 2019-11-22

## 2019-11-22 RX ORDER — SODIUM CHLORIDE 0.9 % (FLUSH) 0.9 %
5-40 SYRINGE (ML) INJECTION AS NEEDED
Status: CANCELLED | OUTPATIENT
Start: 2019-11-22

## 2019-11-23 PROCEDURE — 3331090002 HH PPS REVENUE DEBIT

## 2019-11-23 PROCEDURE — 3331090001 HH PPS REVENUE CREDIT

## 2019-11-24 PROCEDURE — 3331090001 HH PPS REVENUE CREDIT

## 2019-11-24 PROCEDURE — 3331090002 HH PPS REVENUE DEBIT

## 2019-11-25 ENCOUNTER — HOME CARE VISIT (OUTPATIENT)
Dept: SCHEDULING | Facility: HOME HEALTH | Age: 79
End: 2019-11-25
Payer: MEDICARE

## 2019-11-25 VITALS
HEART RATE: 67 BPM | SYSTOLIC BLOOD PRESSURE: 128 MMHG | DIASTOLIC BLOOD PRESSURE: 70 MMHG | TEMPERATURE: 97.8 F | RESPIRATION RATE: 17 BRPM

## 2019-11-25 PROCEDURE — G0151 HHCP-SERV OF PT,EA 15 MIN: HCPCS

## 2019-11-25 PROCEDURE — 3331090002 HH PPS REVENUE DEBIT

## 2019-11-25 PROCEDURE — 3331090001 HH PPS REVENUE CREDIT

## 2019-11-26 PROCEDURE — 3331090001 HH PPS REVENUE CREDIT

## 2019-11-26 PROCEDURE — 3331090002 HH PPS REVENUE DEBIT

## 2019-11-27 ENCOUNTER — HOME CARE VISIT (OUTPATIENT)
Dept: SCHEDULING | Facility: HOME HEALTH | Age: 79
End: 2019-11-27
Payer: MEDICARE

## 2019-11-27 PROCEDURE — 3331090001 HH PPS REVENUE CREDIT

## 2019-11-27 PROCEDURE — 3331090002 HH PPS REVENUE DEBIT

## 2019-11-28 PROCEDURE — 3331090001 HH PPS REVENUE CREDIT

## 2019-11-28 PROCEDURE — 3331090002 HH PPS REVENUE DEBIT

## 2019-11-29 PROCEDURE — 3331090002 HH PPS REVENUE DEBIT

## 2019-11-29 PROCEDURE — 3331090001 HH PPS REVENUE CREDIT

## 2019-11-30 PROCEDURE — 3331090002 HH PPS REVENUE DEBIT

## 2019-11-30 PROCEDURE — 3331090001 HH PPS REVENUE CREDIT

## 2019-12-01 PROCEDURE — 3331090002 HH PPS REVENUE DEBIT

## 2019-12-01 PROCEDURE — 3331090001 HH PPS REVENUE CREDIT

## 2019-12-02 ENCOUNTER — HOSPITAL ENCOUNTER (OUTPATIENT)
Dept: SURGERY | Age: 79
Discharge: HOME | End: 2019-12-02
Payer: MEDICARE

## 2019-12-02 VITALS
SYSTOLIC BLOOD PRESSURE: 164 MMHG | HEART RATE: 99 BPM | TEMPERATURE: 97.4 F | WEIGHT: 284.06 LBS | BODY MASS INDEX: 44.58 KG/M2 | HEIGHT: 67 IN | RESPIRATION RATE: 16 BRPM | DIASTOLIC BLOOD PRESSURE: 86 MMHG | OXYGEN SATURATION: 95 %

## 2019-12-02 LAB
ANION GAP SERPL CALC-SCNC: 6 MMOL/L (ref 7–16)
BUN SERPL-MCNC: 19 MG/DL (ref 8–23)
CALCIUM SERPL-MCNC: 8.9 MG/DL (ref 8.3–10.4)
CHLORIDE SERPL-SCNC: 108 MMOL/L (ref 98–107)
CO2 SERPL-SCNC: 32 MMOL/L (ref 21–32)
CREAT SERPL-MCNC: 1.26 MG/DL (ref 0.8–1.5)
ERYTHROCYTE [DISTWIDTH] IN BLOOD BY AUTOMATED COUNT: 15.5 % (ref 11.9–14.6)
GLUCOSE BLD STRIP.AUTO-MCNC: 75 MG/DL (ref 65–100)
GLUCOSE SERPL-MCNC: 69 MG/DL (ref 65–100)
HCT VFR BLD AUTO: 32.3 % (ref 41.1–50.3)
HGB BLD-MCNC: 9.8 G/DL (ref 13.6–17.2)
MCH RBC QN AUTO: 28.1 PG (ref 26.1–32.9)
MCHC RBC AUTO-ENTMCNC: 30.3 G/DL (ref 31.4–35)
MCV RBC AUTO: 92.6 FL (ref 79.6–97.8)
NRBC # BLD: 0 K/UL (ref 0–0.2)
PLATELET # BLD AUTO: 250 K/UL (ref 150–450)
PMV BLD AUTO: 10.6 FL (ref 9.4–12.3)
POTASSIUM SERPL-SCNC: 4.3 MMOL/L (ref 3.5–5.1)
RBC # BLD AUTO: 3.49 M/UL (ref 4.23–5.6)
SODIUM SERPL-SCNC: 146 MMOL/L (ref 136–145)
WBC # BLD AUTO: 7.5 K/UL (ref 4.3–11.1)

## 2019-12-02 PROCEDURE — 3331090002 HH PPS REVENUE DEBIT

## 2019-12-02 PROCEDURE — 3331090001 HH PPS REVENUE CREDIT

## 2019-12-02 PROCEDURE — 80048 BASIC METABOLIC PNL TOTAL CA: CPT

## 2019-12-02 PROCEDURE — 82962 GLUCOSE BLOOD TEST: CPT

## 2019-12-02 PROCEDURE — 85027 COMPLETE CBC AUTOMATED: CPT

## 2019-12-02 RX ORDER — BUSPIRONE HYDROCHLORIDE 15 MG/1
15 TABLET ORAL DAILY
COMMUNITY
End: 2020-01-30 | Stop reason: SDUPTHER

## 2019-12-02 RX ORDER — INSULIN ASPART 100 [IU]/ML
INJECTION, SOLUTION INTRAVENOUS; SUBCUTANEOUS
COMMUNITY
End: 2021-03-18 | Stop reason: SDUPTHER

## 2019-12-02 NOTE — PERIOP NOTES
Patient verified name and . Patient provided medical/health information and PTA medications to the best of their ability. TYPE  CASE:3  Order for consent Orders found in EHR and matches case posting. Labs per surgeon:none  Labs per anesthesia protocol: CBC, BMP,T/S DOS. Results pending  EKG  :  Records in [facility]    POC glucose 75  . Instructed Patient that if blood sugar 300 or > , surgery may be cancelled. Pt voice understanding. SN instruct pt to contact [phone] for any complications. Patient provided with and instructed on education handouts including Guide to Surgery, blood transfusions, pain management, and hand hygiene for the family and community, and [facility] brochure. Hibiclens and instructions given per hospital policy. Instructed patient to continue previous medications as prescribed prior to surgery unless otherwise directed and to take the following medications the day of surgery according to anesthesia guidelines : Buspar, Claritin,Protonix,Lyrica,Flomax . Instructed patient to hold  the following medications: Vitamin B12 and all other vitamins, supplements, and NSAIDs. Pt instructed to take Tresibe 108 Units of insulin the am of surg. Pt instructed to closely monitor BS and contact [phone] for any complications or questions. Pt voice understanding. Original medication prescription bottles were not  visualized during patient appointment. Medication profile updated and reviewed with patient. Patient teach back successful and patient demonstrates knowledge of instruction. Pt voice understanding of bowel prep instructions.

## 2019-12-02 NOTE — PERIOP NOTES
Recent Results (from the past 12 hour(s))   GLUCOSE, POC    Collection Time: 12/02/19 12:01 PM   Result Value Ref Range    Glucose (POC) 75 65 - 100 mg/dL   CBC W/O DIFF    Collection Time: 12/02/19 12:02 PM   Result Value Ref Range    WBC 7.5 4.3 - 11.1 K/uL    RBC 3.49 (L) 4.23 - 5.6 M/uL    HGB 9.8 (L) 13.6 - 17.2 g/dL    HCT 32.3 (L) 41.1 - 50.3 %    MCV 92.6 79.6 - 97.8 FL    MCH 28.1 26.1 - 32.9 PG    MCHC 30.3 (L) 31.4 - 35.0 g/dL    RDW 15.5 (H) 11.9 - 14.6 %    PLATELET 656 987 - 283 K/uL    MPV 10.6 9.4 - 12.3 FL    ABSOLUTE NRBC 0.00 0.0 - 0.2 K/uL   METABOLIC PANEL, BASIC    Collection Time: 12/02/19 12:02 PM   Result Value Ref Range    Sodium 146 (H) 136 - 145 mmol/L    Potassium 4.3 3.5 - 5.1 mmol/L    Chloride 108 (H) 98 - 107 mmol/L    CO2 32 21 - 32 mmol/L    Anion gap 6 (L) 7 - 16 mmol/L    Glucose 69 65 - 100 mg/dL    BUN 19 8 - 23 MG/DL    Creatinine 1.26 0.8 - 1.5 MG/DL    GFR est AA >60 >60 ml/min/1.73m2    GFR est non-AA 59 (L) >60 ml/min/1.73m2    Calcium 8.9 8.3 - 10.4 MG/DL

## 2019-12-02 NOTE — PERIOP NOTES
All labs reviewed and routed to surgeon office. Sodium 146-reported to Dr Elisha Pittman, no new orders received.

## 2019-12-03 ENCOUNTER — HOME CARE VISIT (OUTPATIENT)
Dept: SCHEDULING | Facility: HOME HEALTH | Age: 79
End: 2019-12-03
Payer: MEDICARE

## 2019-12-03 VITALS
DIASTOLIC BLOOD PRESSURE: 72 MMHG | TEMPERATURE: 98.2 F | HEART RATE: 86 BPM | RESPIRATION RATE: 18 BRPM | SYSTOLIC BLOOD PRESSURE: 138 MMHG

## 2019-12-03 PROCEDURE — 3331090002 HH PPS REVENUE DEBIT

## 2019-12-03 PROCEDURE — G0151 HHCP-SERV OF PT,EA 15 MIN: HCPCS

## 2019-12-03 PROCEDURE — 3331090001 HH PPS REVENUE CREDIT

## 2019-12-04 ENCOUNTER — HOME CARE VISIT (OUTPATIENT)
Dept: SCHEDULING | Facility: HOME HEALTH | Age: 79
End: 2019-12-04
Payer: MEDICARE

## 2019-12-04 VITALS
RESPIRATION RATE: 18 BRPM | HEART RATE: 85 BPM | TEMPERATURE: 98.2 F | DIASTOLIC BLOOD PRESSURE: 72 MMHG | SYSTOLIC BLOOD PRESSURE: 160 MMHG

## 2019-12-04 PROCEDURE — G0299 HHS/HOSPICE OF RN EA 15 MIN: HCPCS

## 2019-12-04 PROCEDURE — 3331090001 HH PPS REVENUE CREDIT

## 2019-12-04 PROCEDURE — 3331090002 HH PPS REVENUE DEBIT

## 2019-12-05 ENCOUNTER — ANESTHESIA EVENT (OUTPATIENT)
Dept: SURGERY | Age: 79
DRG: 330 | End: 2019-12-05
Payer: MEDICARE

## 2019-12-05 PROCEDURE — 3331090002 HH PPS REVENUE DEBIT

## 2019-12-05 PROCEDURE — 3331090001 HH PPS REVENUE CREDIT

## 2019-12-06 ENCOUNTER — ANESTHESIA (OUTPATIENT)
Dept: SURGERY | Age: 79
DRG: 330 | End: 2019-12-06
Payer: MEDICARE

## 2019-12-06 ENCOUNTER — HOSPITAL ENCOUNTER (INPATIENT)
Age: 79
LOS: 5 days | Discharge: HOME HEALTH | DRG: 330 | End: 2019-12-11
Attending: SURGERY | Admitting: SURGERY
Payer: MEDICARE

## 2019-12-06 DIAGNOSIS — K63.89 CECUM MASS: Primary | ICD-10-CM

## 2019-12-06 LAB
ABO + RH BLD: NORMAL
BLOOD GROUP ANTIBODIES SERPL: NORMAL
GLUCOSE BLD STRIP.AUTO-MCNC: 102 MG/DL (ref 65–100)
GLUCOSE BLD STRIP.AUTO-MCNC: 130 MG/DL (ref 65–100)
GLUCOSE BLD STRIP.AUTO-MCNC: 72 MG/DL (ref 65–100)
GLUCOSE BLD STRIP.AUTO-MCNC: 80 MG/DL (ref 65–100)
GLUCOSE BLD STRIP.AUTO-MCNC: 86 MG/DL (ref 65–100)
GLUCOSE BLD STRIP.AUTO-MCNC: 89 MG/DL (ref 65–100)
GLUCOSE BLD STRIP.AUTO-MCNC: 96 MG/DL (ref 65–100)
SPECIMEN EXP DATE BLD: NORMAL

## 2019-12-06 PROCEDURE — 77030012770 HC TRCR OPT FX AMR -B: Performed by: SURGERY

## 2019-12-06 PROCEDURE — 77030035278 HC STPLR SEAL ENDOWR INTU -B: Performed by: SURGERY

## 2019-12-06 PROCEDURE — 74011250636 HC RX REV CODE- 250/636: Performed by: ANESTHESIOLOGY

## 2019-12-06 PROCEDURE — 77030021678 HC GLIDESCP STAT DISP VERT -B: Performed by: ANESTHESIOLOGY

## 2019-12-06 PROCEDURE — 77030036731 HC STPLR ENDOSC J&J -F: Performed by: SURGERY

## 2019-12-06 PROCEDURE — 77030031139 HC SUT VCRL2 J&J -A: Performed by: SURGERY

## 2019-12-06 PROCEDURE — 3331090003 HH PPS REVENUE ADJ

## 2019-12-06 PROCEDURE — 88305 TISSUE EXAM BY PATHOLOGIST: CPT

## 2019-12-06 PROCEDURE — 77030008606 HC TRCR ENDOSC KII AMR -B: Performed by: SURGERY

## 2019-12-06 PROCEDURE — 77030011264 HC ELECTRD BLD EXT COVD -A: Performed by: SURGERY

## 2019-12-06 PROCEDURE — 77030035277 HC OBTRTR BLDELSS DISP INTU -B: Performed by: SURGERY

## 2019-12-06 PROCEDURE — 74011000250 HC RX REV CODE- 250: Performed by: SURGERY

## 2019-12-06 PROCEDURE — 76210000017 HC OR PH I REC 1.5 TO 2 HR: Performed by: SURGERY

## 2019-12-06 PROCEDURE — 77030027138 HC INCENT SPIROMETER -A

## 2019-12-06 PROCEDURE — 77030037088 HC TUBE ENDOTRACH ORAL NSL COVD-A: Performed by: ANESTHESIOLOGY

## 2019-12-06 PROCEDURE — 76010000877 HC OR TIME 2.5 TO 3HR INTENSV - TIER 2: Performed by: SURGERY

## 2019-12-06 PROCEDURE — 77030020263 HC SOL INJ SOD CL0.9% LFCR 1000ML

## 2019-12-06 PROCEDURE — 77030009979 HC RELD STPLR TCR J&J -C: Performed by: SURGERY

## 2019-12-06 PROCEDURE — 77030031492 HC PRT ACC BLNT AIRSEAL CNMD -B: Performed by: SURGERY

## 2019-12-06 PROCEDURE — 65270000029 HC RM PRIVATE

## 2019-12-06 PROCEDURE — 77030035574: Performed by: SURGERY

## 2019-12-06 PROCEDURE — 3331090001 HH PPS REVENUE CREDIT

## 2019-12-06 PROCEDURE — 82962 GLUCOSE BLOOD TEST: CPT

## 2019-12-06 PROCEDURE — 74011250636 HC RX REV CODE- 250/636: Performed by: PHYSICIAN ASSISTANT

## 2019-12-06 PROCEDURE — 74011250636 HC RX REV CODE- 250/636: Performed by: SURGERY

## 2019-12-06 PROCEDURE — 8E0W3CZ ROBOTIC ASSISTED PROCEDURE OF TRUNK REGION, PERCUTANEOUS APPROACH: ICD-10-PCS | Performed by: SURGERY

## 2019-12-06 PROCEDURE — 77030040922 HC BLNKT HYPOTHRM STRY -A: Performed by: ANESTHESIOLOGY

## 2019-12-06 PROCEDURE — 88307 TISSUE EXAM BY PATHOLOGIST: CPT

## 2019-12-06 PROCEDURE — 74011250637 HC RX REV CODE- 250/637: Performed by: ANESTHESIOLOGY

## 2019-12-06 PROCEDURE — 3331090002 HH PPS REVENUE DEBIT

## 2019-12-06 PROCEDURE — 77030021158 HC TRCR BLN GELPRT AMR -B: Performed by: SURGERY

## 2019-12-06 PROCEDURE — 86900 BLOOD TYPING SEROLOGIC ABO: CPT

## 2019-12-06 PROCEDURE — 77030002996 HC SUT SLK J&J -A: Performed by: SURGERY

## 2019-12-06 PROCEDURE — 76060000036 HC ANESTHESIA 2.5 TO 3 HR: Performed by: SURGERY

## 2019-12-06 PROCEDURE — 77030002966 HC SUT PDS J&J -A: Performed by: SURGERY

## 2019-12-06 PROCEDURE — 77030040361 HC SLV COMPR DVT MDII -B: Performed by: SURGERY

## 2019-12-06 PROCEDURE — 0DTF0ZZ RESECTION OF RIGHT LARGE INTESTINE, OPEN APPROACH: ICD-10-PCS | Performed by: SURGERY

## 2019-12-06 PROCEDURE — 77030016151 HC PROTCTR LNS DFOG COVD -B: Performed by: SURGERY

## 2019-12-06 PROCEDURE — 77030019908 HC STETH ESOPH SIMS -A: Performed by: ANESTHESIOLOGY

## 2019-12-06 PROCEDURE — 0WJP4ZZ INSPECTION OF GASTROINTESTINAL TRACT, PERCUTANEOUS ENDOSCOPIC APPROACH: ICD-10-PCS | Performed by: SURGERY

## 2019-12-06 PROCEDURE — 77030032522 HC SHT STPL PK ENDOWR INTU -B: Performed by: SURGERY

## 2019-12-06 PROCEDURE — 74011250637 HC RX REV CODE- 250/637: Performed by: PHYSICIAN ASSISTANT

## 2019-12-06 PROCEDURE — 74011250636 HC RX REV CODE- 250/636: Performed by: NURSE ANESTHETIST, CERTIFIED REGISTERED

## 2019-12-06 PROCEDURE — 74011000258 HC RX REV CODE- 258: Performed by: ANESTHESIOLOGY

## 2019-12-06 PROCEDURE — 77030008462 HC STPLR SKN PROX J&J -A: Performed by: SURGERY

## 2019-12-06 PROCEDURE — 77030008522 HC TBNG INSUF LAPRO STRY -B: Performed by: SURGERY

## 2019-12-06 PROCEDURE — 74011000250 HC RX REV CODE- 250: Performed by: NURSE ANESTHETIST, CERTIFIED REGISTERED

## 2019-12-06 PROCEDURE — 77030022704 HC SUT VLOC COVD -B: Performed by: SURGERY

## 2019-12-06 RX ORDER — IRBESARTAN 150 MG/1
300 TABLET ORAL
Status: DISCONTINUED | OUTPATIENT
Start: 2019-12-06 | End: 2019-12-07

## 2019-12-06 RX ORDER — ONDANSETRON 2 MG/ML
4 INJECTION INTRAMUSCULAR; INTRAVENOUS
Status: DISCONTINUED | OUTPATIENT
Start: 2019-12-06 | End: 2019-12-11 | Stop reason: HOSPADM

## 2019-12-06 RX ORDER — METRONIDAZOLE 500 MG/100ML
500 INJECTION, SOLUTION INTRAVENOUS
Status: COMPLETED | OUTPATIENT
Start: 2019-12-06 | End: 2019-12-06

## 2019-12-06 RX ORDER — HYDROMORPHONE HYDROCHLORIDE 1 MG/ML
1 INJECTION, SOLUTION INTRAMUSCULAR; INTRAVENOUS; SUBCUTANEOUS
Status: DISCONTINUED | OUTPATIENT
Start: 2019-12-06 | End: 2019-12-11 | Stop reason: HOSPADM

## 2019-12-06 RX ORDER — SODIUM CHLORIDE 0.9 % (FLUSH) 0.9 %
5-40 SYRINGE (ML) INJECTION EVERY 8 HOURS
Status: DISCONTINUED | OUTPATIENT
Start: 2019-12-06 | End: 2019-12-06 | Stop reason: HOSPADM

## 2019-12-06 RX ORDER — SODIUM CHLORIDE 0.9 % (FLUSH) 0.9 %
5-40 SYRINGE (ML) INJECTION AS NEEDED
Status: DISCONTINUED | OUTPATIENT
Start: 2019-12-06 | End: 2019-12-11 | Stop reason: HOSPADM

## 2019-12-06 RX ORDER — PREGABALIN 150 MG/1
150 CAPSULE ORAL 2 TIMES DAILY
Status: DISCONTINUED | OUTPATIENT
Start: 2019-12-06 | End: 2019-12-11 | Stop reason: HOSPADM

## 2019-12-06 RX ORDER — ATORVASTATIN CALCIUM 40 MG/1
80 TABLET, FILM COATED ORAL
Status: DISCONTINUED | OUTPATIENT
Start: 2019-12-06 | End: 2019-12-11 | Stop reason: HOSPADM

## 2019-12-06 RX ORDER — PROPOFOL 10 MG/ML
INJECTION, EMULSION INTRAVENOUS AS NEEDED
Status: DISCONTINUED | OUTPATIENT
Start: 2019-12-06 | End: 2019-12-06 | Stop reason: HOSPADM

## 2019-12-06 RX ORDER — LIDOCAINE HYDROCHLORIDE 20 MG/ML
INJECTION, SOLUTION EPIDURAL; INFILTRATION; INTRACAUDAL; PERINEURAL AS NEEDED
Status: DISCONTINUED | OUTPATIENT
Start: 2019-12-06 | End: 2019-12-06 | Stop reason: HOSPADM

## 2019-12-06 RX ORDER — GABAPENTIN 300 MG/1
300 CAPSULE ORAL ONCE
Status: COMPLETED | OUTPATIENT
Start: 2019-12-06 | End: 2019-12-06

## 2019-12-06 RX ORDER — FENTANYL CITRATE 50 UG/ML
INJECTION, SOLUTION INTRAMUSCULAR; INTRAVENOUS AS NEEDED
Status: DISCONTINUED | OUTPATIENT
Start: 2019-12-06 | End: 2019-12-06 | Stop reason: HOSPADM

## 2019-12-06 RX ORDER — SUCCINYLCHOLINE CHLORIDE 20 MG/ML
INJECTION INTRAMUSCULAR; INTRAVENOUS AS NEEDED
Status: DISCONTINUED | OUTPATIENT
Start: 2019-12-06 | End: 2019-12-06 | Stop reason: HOSPADM

## 2019-12-06 RX ORDER — SODIUM CHLORIDE, SODIUM LACTATE, POTASSIUM CHLORIDE, CALCIUM CHLORIDE 600; 310; 30; 20 MG/100ML; MG/100ML; MG/100ML; MG/100ML
100 INJECTION, SOLUTION INTRAVENOUS CONTINUOUS
Status: DISCONTINUED | OUTPATIENT
Start: 2019-12-06 | End: 2019-12-06 | Stop reason: HOSPADM

## 2019-12-06 RX ORDER — ROPIVACAINE HYDROCHLORIDE 5 MG/ML
INJECTION, SOLUTION EPIDURAL; INFILTRATION; PERINEURAL
Status: DISCONTINUED | OUTPATIENT
Start: 2019-12-06 | End: 2019-12-06 | Stop reason: HOSPADM

## 2019-12-06 RX ORDER — ROCURONIUM BROMIDE 10 MG/ML
INJECTION, SOLUTION INTRAVENOUS AS NEEDED
Status: DISCONTINUED | OUTPATIENT
Start: 2019-12-06 | End: 2019-12-06 | Stop reason: HOSPADM

## 2019-12-06 RX ORDER — EPHEDRINE SULFATE/0.9% NACL/PF 50 MG/5 ML
SYRINGE (ML) INTRAVENOUS AS NEEDED
Status: DISCONTINUED | OUTPATIENT
Start: 2019-12-06 | End: 2019-12-06 | Stop reason: HOSPADM

## 2019-12-06 RX ORDER — ONDANSETRON 2 MG/ML
INJECTION INTRAMUSCULAR; INTRAVENOUS AS NEEDED
Status: DISCONTINUED | OUTPATIENT
Start: 2019-12-06 | End: 2019-12-06 | Stop reason: HOSPADM

## 2019-12-06 RX ORDER — KETAMINE HYDROCHLORIDE 50 MG/ML
INJECTION, SOLUTION INTRAMUSCULAR; INTRAVENOUS AS NEEDED
Status: DISCONTINUED | OUTPATIENT
Start: 2019-12-06 | End: 2019-12-06 | Stop reason: HOSPADM

## 2019-12-06 RX ORDER — SODIUM CHLORIDE 0.9 % (FLUSH) 0.9 %
5-40 SYRINGE (ML) INJECTION EVERY 8 HOURS
Status: DISCONTINUED | OUTPATIENT
Start: 2019-12-06 | End: 2019-12-11 | Stop reason: HOSPADM

## 2019-12-06 RX ORDER — NALOXONE HYDROCHLORIDE 0.4 MG/ML
0.4 INJECTION, SOLUTION INTRAMUSCULAR; INTRAVENOUS; SUBCUTANEOUS AS NEEDED
Status: DISCONTINUED | OUTPATIENT
Start: 2019-12-06 | End: 2019-12-11 | Stop reason: HOSPADM

## 2019-12-06 RX ORDER — ALBUTEROL SULFATE 0.83 MG/ML
2.5 SOLUTION RESPIRATORY (INHALATION)
Status: DISCONTINUED | OUTPATIENT
Start: 2019-12-06 | End: 2019-12-11 | Stop reason: HOSPADM

## 2019-12-06 RX ORDER — HEPARIN SODIUM 5000 [USP'U]/ML
5000 INJECTION, SOLUTION INTRAVENOUS; SUBCUTANEOUS EVERY 8 HOURS
Status: DISCONTINUED | OUTPATIENT
Start: 2019-12-07 | End: 2019-12-11 | Stop reason: HOSPADM

## 2019-12-06 RX ORDER — HYDROMORPHONE HYDROCHLORIDE 2 MG/ML
0.5 INJECTION, SOLUTION INTRAMUSCULAR; INTRAVENOUS; SUBCUTANEOUS
Status: DISCONTINUED | OUTPATIENT
Start: 2019-12-06 | End: 2019-12-06 | Stop reason: HOSPADM

## 2019-12-06 RX ORDER — DEXTROSE 40 %
15 GEL (GRAM) ORAL AS NEEDED
Status: DISCONTINUED | OUTPATIENT
Start: 2019-12-06 | End: 2019-12-11 | Stop reason: HOSPADM

## 2019-12-06 RX ORDER — SODIUM CHLORIDE 0.9 % (FLUSH) 0.9 %
5-40 SYRINGE (ML) INJECTION AS NEEDED
Status: DISCONTINUED | OUTPATIENT
Start: 2019-12-06 | End: 2019-12-06 | Stop reason: HOSPADM

## 2019-12-06 RX ORDER — DEXTROSE MONOHYDRATE AND SODIUM CHLORIDE 5; .9 G/100ML; G/100ML
125 INJECTION, SOLUTION INTRAVENOUS ONCE
Status: COMPLETED | OUTPATIENT
Start: 2019-12-06 | End: 2019-12-06

## 2019-12-06 RX ORDER — HYDROCODONE BITARTRATE AND ACETAMINOPHEN 10; 325 MG/1; MG/1
1 TABLET ORAL
Status: DISCONTINUED | OUTPATIENT
Start: 2019-12-06 | End: 2019-12-11 | Stop reason: HOSPADM

## 2019-12-06 RX ORDER — CLONIDINE HYDROCHLORIDE 0.1 MG/1
0.1 TABLET ORAL 2 TIMES DAILY
Status: DISCONTINUED | OUTPATIENT
Start: 2019-12-06 | End: 2019-12-07

## 2019-12-06 RX ORDER — OXYCODONE HYDROCHLORIDE 5 MG/1
10 TABLET ORAL
Status: DISCONTINUED | OUTPATIENT
Start: 2019-12-06 | End: 2019-12-06 | Stop reason: HOSPADM

## 2019-12-06 RX ORDER — DEXTROSE MONOHYDRATE 25 G/50ML
15 INJECTION, SOLUTION INTRAVENOUS
Status: COMPLETED | OUTPATIENT
Start: 2019-12-06 | End: 2019-12-06

## 2019-12-06 RX ORDER — BUSPIRONE HYDROCHLORIDE 5 MG/1
15 TABLET ORAL DAILY
Status: DISCONTINUED | OUTPATIENT
Start: 2019-12-07 | End: 2019-12-11 | Stop reason: HOSPADM

## 2019-12-06 RX ORDER — SODIUM CHLORIDE 9 MG/ML
100 INJECTION, SOLUTION INTRAVENOUS CONTINUOUS
Status: DISCONTINUED | OUTPATIENT
Start: 2019-12-06 | End: 2019-12-07

## 2019-12-06 RX ORDER — DEXTROSE 50 % IN WATER (D50W) INTRAVENOUS SYRINGE
25-50 AS NEEDED
Status: DISCONTINUED | OUTPATIENT
Start: 2019-12-06 | End: 2019-12-11 | Stop reason: HOSPADM

## 2019-12-06 RX ORDER — ACETAMINOPHEN 325 MG/1
650 TABLET ORAL
Status: DISCONTINUED | OUTPATIENT
Start: 2019-12-06 | End: 2019-12-11 | Stop reason: HOSPADM

## 2019-12-06 RX ORDER — ACETAMINOPHEN 10 MG/ML
INJECTION, SOLUTION INTRAVENOUS AS NEEDED
Status: DISCONTINUED | OUTPATIENT
Start: 2019-12-06 | End: 2019-12-06 | Stop reason: HOSPADM

## 2019-12-06 RX ORDER — METRONIDAZOLE 500 MG/100ML
500 INJECTION, SOLUTION INTRAVENOUS ONCE
Status: COMPLETED | OUTPATIENT
Start: 2019-12-06 | End: 2019-12-06

## 2019-12-06 RX ORDER — GLYCOPYRROLATE 0.2 MG/ML
INJECTION INTRAMUSCULAR; INTRAVENOUS AS NEEDED
Status: DISCONTINUED | OUTPATIENT
Start: 2019-12-06 | End: 2019-12-06 | Stop reason: HOSPADM

## 2019-12-06 RX ORDER — LIDOCAINE HYDROCHLORIDE 10 MG/ML
0.3 INJECTION INFILTRATION; PERINEURAL ONCE
Status: DISCONTINUED | OUTPATIENT
Start: 2019-12-06 | End: 2019-12-06 | Stop reason: HOSPADM

## 2019-12-06 RX ORDER — MIDAZOLAM HYDROCHLORIDE 1 MG/ML
2 INJECTION, SOLUTION INTRAMUSCULAR; INTRAVENOUS
Status: DISCONTINUED | OUTPATIENT
Start: 2019-12-06 | End: 2019-12-06 | Stop reason: HOSPADM

## 2019-12-06 RX ORDER — SODIUM CHLORIDE, SODIUM LACTATE, POTASSIUM CHLORIDE, CALCIUM CHLORIDE 600; 310; 30; 20 MG/100ML; MG/100ML; MG/100ML; MG/100ML
100 INJECTION, SOLUTION INTRAVENOUS CONTINUOUS
Status: ACTIVE | OUTPATIENT
Start: 2019-12-06 | End: 2019-12-06

## 2019-12-06 RX ORDER — DIPHENHYDRAMINE HYDROCHLORIDE 50 MG/ML
12.5 INJECTION, SOLUTION INTRAMUSCULAR; INTRAVENOUS
Status: DISCONTINUED | OUTPATIENT
Start: 2019-12-06 | End: 2019-12-11 | Stop reason: HOSPADM

## 2019-12-06 RX ORDER — TAMSULOSIN HYDROCHLORIDE 0.4 MG/1
0.4 CAPSULE ORAL DAILY
Status: DISCONTINUED | OUTPATIENT
Start: 2019-12-06 | End: 2019-12-11 | Stop reason: HOSPADM

## 2019-12-06 RX ORDER — INSULIN LISPRO 100 [IU]/ML
INJECTION, SOLUTION INTRAVENOUS; SUBCUTANEOUS
Status: DISCONTINUED | OUTPATIENT
Start: 2019-12-06 | End: 2019-12-11 | Stop reason: HOSPADM

## 2019-12-06 RX ADMIN — Medication 10 ML: at 23:11

## 2019-12-06 RX ADMIN — SODIUM CHLORIDE, SODIUM LACTATE, POTASSIUM CHLORIDE, AND CALCIUM CHLORIDE 100 ML/HR: 600; 310; 30; 20 INJECTION, SOLUTION INTRAVENOUS at 07:13

## 2019-12-06 RX ADMIN — ACETAMINOPHEN 1000 MG: 10 INJECTION, SOLUTION INTRAVENOUS at 09:42

## 2019-12-06 RX ADMIN — SUCCINYLCHOLINE CHLORIDE 200 MG: 20 INJECTION, SOLUTION INTRAMUSCULAR; INTRAVENOUS at 07:52

## 2019-12-06 RX ADMIN — SODIUM CHLORIDE 100 ML/HR: 900 INJECTION, SOLUTION INTRAVENOUS at 15:49

## 2019-12-06 RX ADMIN — CEFAZOLIN 3 G: 1 INJECTION, POWDER, FOR SOLUTION INTRAVENOUS at 15:40

## 2019-12-06 RX ADMIN — FENTANYL CITRATE 50 MCG: 50 INJECTION INTRAMUSCULAR; INTRAVENOUS at 08:24

## 2019-12-06 RX ADMIN — LIDOCAINE HYDROCHLORIDE 100 MG: 20 INJECTION, SOLUTION EPIDURAL; INFILTRATION; INTRACAUDAL; PERINEURAL at 07:52

## 2019-12-06 RX ADMIN — FAMOTIDINE 20 MG: 10 INJECTION INTRAVENOUS at 19:41

## 2019-12-06 RX ADMIN — ROPIVACAINE HYDROCHLORIDE 15 ML: 5 INJECTION, SOLUTION EPIDURAL; INFILTRATION; PERINEURAL at 10:12

## 2019-12-06 RX ADMIN — FENTANYL CITRATE 50 MCG: 50 INJECTION INTRAMUSCULAR; INTRAVENOUS at 07:52

## 2019-12-06 RX ADMIN — ROCURONIUM BROMIDE 10 MG: 10 INJECTION, SOLUTION INTRAVENOUS at 08:15

## 2019-12-06 RX ADMIN — HYDROMORPHONE HYDROCHLORIDE 0.5 MG: 2 INJECTION INTRAMUSCULAR; INTRAVENOUS; SUBCUTANEOUS at 11:24

## 2019-12-06 RX ADMIN — METRONIDAZOLE 500 MG: 500 INJECTION, SOLUTION INTRAVENOUS at 17:08

## 2019-12-06 RX ADMIN — SODIUM CHLORIDE, SODIUM LACTATE, POTASSIUM CHLORIDE, AND CALCIUM CHLORIDE: 600; 310; 30; 20 INJECTION, SOLUTION INTRAVENOUS at 08:53

## 2019-12-06 RX ADMIN — PHENYLEPHRINE HYDROCHLORIDE 100 MCG: 10 INJECTION INTRAVENOUS at 08:38

## 2019-12-06 RX ADMIN — ROPIVACAINE HYDROCHLORIDE 15 ML: 5 INJECTION, SOLUTION EPIDURAL; INFILTRATION; PERINEURAL at 10:08

## 2019-12-06 RX ADMIN — PHENYLEPHRINE HYDROCHLORIDE 100 MCG: 10 INJECTION INTRAVENOUS at 08:12

## 2019-12-06 RX ADMIN — PREGABALIN 150 MG: 150 CAPSULE ORAL at 17:10

## 2019-12-06 RX ADMIN — Medication 10 ML: at 15:41

## 2019-12-06 RX ADMIN — CEFAZOLIN 3 G: 1 INJECTION, POWDER, FOR SOLUTION INTRAVENOUS at 08:15

## 2019-12-06 RX ADMIN — PHENYLEPHRINE HYDROCHLORIDE 100 MCG: 10 INJECTION INTRAVENOUS at 08:53

## 2019-12-06 RX ADMIN — Medication 10 MG: at 08:48

## 2019-12-06 RX ADMIN — ONDANSETRON 4 MG: 2 INJECTION INTRAMUSCULAR; INTRAVENOUS at 09:12

## 2019-12-06 RX ADMIN — ROCURONIUM BROMIDE 10 MG: 10 INJECTION, SOLUTION INTRAVENOUS at 08:25

## 2019-12-06 RX ADMIN — Medication 10 MG: at 09:43

## 2019-12-06 RX ADMIN — DEXTROSE MONOHYDRATE 7.5 G: 25 INJECTION, SOLUTION INTRAVENOUS at 07:13

## 2019-12-06 RX ADMIN — ROCURONIUM BROMIDE 20 MG: 10 INJECTION, SOLUTION INTRAVENOUS at 08:01

## 2019-12-06 RX ADMIN — HYDROMORPHONE HYDROCHLORIDE 0.5 MG: 2 INJECTION INTRAMUSCULAR; INTRAVENOUS; SUBCUTANEOUS at 13:28

## 2019-12-06 RX ADMIN — HYDROMORPHONE HYDROCHLORIDE 0.5 MG: 2 INJECTION INTRAMUSCULAR; INTRAVENOUS; SUBCUTANEOUS at 11:14

## 2019-12-06 RX ADMIN — DEXTROSE MONOHYDRATE AND SODIUM CHLORIDE 125 ML/HR: 5; .9 INJECTION, SOLUTION INTRAVENOUS at 11:07

## 2019-12-06 RX ADMIN — GLYCOPYRROLATE 0.6 MG: 0.2 INJECTION, SOLUTION INTRAMUSCULAR; INTRAVENOUS at 09:51

## 2019-12-06 RX ADMIN — HYDROMORPHONE HYDROCHLORIDE 1 MG: 1 INJECTION, SOLUTION INTRAMUSCULAR; INTRAVENOUS; SUBCUTANEOUS at 19:39

## 2019-12-06 RX ADMIN — METRONIDAZOLE 500 MG: 500 INJECTION, SOLUTION INTRAVENOUS at 07:13

## 2019-12-06 RX ADMIN — Medication 10 MG: at 08:45

## 2019-12-06 RX ADMIN — KETAMINE HYDROCHLORIDE 30 MG: 50 INJECTION INTRAMUSCULAR; INTRAVENOUS at 07:52

## 2019-12-06 RX ADMIN — SODIUM CHLORIDE, SODIUM LACTATE, POTASSIUM CHLORIDE, AND CALCIUM CHLORIDE 100 ML/HR: 600; 310; 30; 20 INJECTION, SOLUTION INTRAVENOUS at 07:12

## 2019-12-06 RX ADMIN — KETAMINE HYDROCHLORIDE 15 MG: 50 INJECTION INTRAMUSCULAR; INTRAVENOUS at 08:52

## 2019-12-06 RX ADMIN — GABAPENTIN 300 MG: 300 CAPSULE ORAL at 07:14

## 2019-12-06 RX ADMIN — PROPOFOL 170 MG: 10 INJECTION, EMULSION INTRAVENOUS at 07:52

## 2019-12-06 RX ADMIN — ROCURONIUM BROMIDE 5 MG: 10 INJECTION, SOLUTION INTRAVENOUS at 07:52

## 2019-12-06 NOTE — OP NOTES
[facility]  OPERATIVE REPORT    Name:  Jo Ann Flowers  MR#:  [id number]  :  1940  ACCOUNT #:  [de-identified]  DATE OF SERVICE:  2019    PREOPERATIVE DIAGNOSIS:  Probable mass of the cecum. POSTOPERATIVE DIAGNOSIS:  Probable mass of the cecum. PROCEDURE PERFORMED:  Right colectomy. SURGEON:  Grady Moreno MD    ASSISTANT:  RAISA Bustos    ANESTHESIA:   General.    COMPLICATIONS:  None. COUNTS:  Correct. ESTIMATED BLOOD LOSS:  Minimal.    SPECIMENS REMOVED:  Right colon. IMPLANTS:  None. PROCEDURE:  After the patient was asleep, abdomen prepped and draped in the sterile fashion. Time-out was carried out and all were in agreement. The patient is very obese with a significant abdomen. Incision made periumbilically as he had an umbilical hernia. This then allowed me to place an 8 mm robotic trocar. Insufflation was done and the scope introduced. The patient had a large amount of omentum. Difficult to even see the bowel. I then placed another 5 mm trocar in the upper midline to allow me to a grasper in there. Manipulation was done and I quickly decided that this was not going to be accomplished robotically. Trocars were removed. A midline incision made beginning just below the umbilicus and carried through midway in the upper abdomen. Fascia was opened. Palpation did reveal a mass within the cecum. I then mobilized the white line of Toldt with Bovie cautery bringing the cecum and ascending colon and hepatic flexure well up into the wound. The patient had a significant amount of colon which made this quite easy once the white line of Toldt was divided. I then made a mesenteric window at the distal ileum. Endo-HOSSEIN used to divide the ileum. Mesentery was then taken with the Impact instrument all the way up to the proximal transverse colon. The colon was divided at this point with a HOSSEIN as well. The specimen was then removed completely.   A side-to-side anastomosis from the ileum to the transverse colon was then performed with a HOSSEIN 75. Anastomosis was completed with a HOSSEIN 75 firing. Two cross stitches were placed with 3-0 silks. 3-0 silk was then used to oversew the staple lines. Compression of the colon then revealed an excellent anastomosis which was wide open. No leakage and no bleeding. The mesenteric defect was then closed with a running 2-0 GI silk. Irrigation was then performed. Bovie cautery used to obtain hemostasis where some mild oozing was spotted along the white line of Toldt. At this time, the bowels were replaced within the abdominal cavity and double-stranded #1 PDS used to close the fascia and staples used to close the skin. The patient tolerated the procedure well. Rosalina Homans, PA was scrubbed and present during the entire procedure and was necessary for retraction secondary to the patient's body habitus as well as assisting with the anastomosis and closing.       Negar Strong MD      [id number]  D:  12/06/2019 10:02  T:  12/06/2019 18:31  JOB #:  [id number]

## 2019-12-06 NOTE — ANESTHESIA POSTPROCEDURE EVALUATION
Procedure(s):  RIGHT  COLECTOMY ROBOTIC ASSISTED ATTEMPTED; OPEN RIGHT COLECTOMY, EXCISION OF OMENTAL IMPLANT, UMBILICAL HERNIA REPAIR. Mihir Rose general    Anesthesia Post Evaluation      Multimodal analgesia: multimodal analgesia used between 6 hours prior to anesthesia start to PACU discharge  Patient location during evaluation: PACU  Patient participation: complete - patient participated  Level of consciousness: awake  Pain management: adequate  Airway patency: patent  Anesthetic complications: no  Cardiovascular status: acceptable  Respiratory status: acceptable  Hydration status: acceptable  Post anesthesia nausea and vomiting:  none      Vitals Value Taken Time   /59 12/6/2019  1:11 PM   Temp 36.7 °C (98.1 °F) 12/6/2019 12:11 PM   Pulse 83 12/6/2019  1:23 PM   Resp 16 12/6/2019 12:42 PM   SpO2 97 % 12/6/2019  1:23 PM   Vitals shown include unvalidated device data.

## 2019-12-06 NOTE — PROGRESS NOTES
TRANSFER - IN REPORT:    Verbal report received from  [address] GARY Hassan on Paula End  being received from PACU for routine progression of care      Report consisted of patients Situation, Background, Assessment and   Recommendations(SBAR). Information from the following report(s) SBAR, Kardex, Procedure Summary, Intake/Output and MAR was reviewed with the receiving nurse. Opportunity for questions and clarification was provided. Assessment completed upon patients arrival to unit and care assumed.

## 2019-12-06 NOTE — BRIEF OP NOTE
BRIEF OPERATIVE NOTE    Date of Procedure: 12/6/2019   Preoperative Diagnosis: Cecal lesion [K63.9]  Postoperative Diagnosis: Cecal lesion [K63.9]    Procedure(s):  RIGHT  COLECTOMY ROBOTIC ASSISTED ATTEMPTED; OPEN RIGHT COLECTOMY, EXCISION OF OMENTAL IMPLANT, UMBILICAL HERNIA REPAIR.   Surgeon(s) and Role:     * Gabriela Holt MD - Primary         Surgical Assistant: RAISA Johnson    Surgical Staff:  Circ-1: Marin Rutledge  Physician Assistant: RAISA Briscoe  Scrub Tech-1: Florentino Cage Tech-2: Charyl Mcardle F  Event Time In Time Out   Incision Start 8250    Incision Close 8533      Anesthesia: General   Estimated Blood Loss: minimal  Specimens:   ID Type Source Tests Collected by Time Destination   1 : Omental Implant Preservative Omentum  Gabriela Holt MD 12/6/2019 4124 Pathology   2 : Right Colon Fresh Colon  Gabriela Holt MD 12/6/2019 0908 Pathology      Findings: see operative report   Complications: see operative report  Implants: * No implants in log *

## 2019-12-06 NOTE — ANESTHESIA PREPROCEDURE EVALUATION
Relevant Problems   No relevant active problems       Anesthetic History   No history of anesthetic complications            Review of Systems / Medical History  Patient summary reviewed and pertinent labs reviewed    Pulmonary        Sleep apnea    Asthma     Comments: PE in 2014 - brief course of anticoagulation    Neuro/Psych       CVA (residual L sided weakness)  TIA    Comments: Cervical stenosis Cardiovascular    Hypertension              Exercise tolerance: >4 METS: He denied chest pain or syncope. Intermittent SOB  Comments: Echo 10/2019 - normal LV function, no RWMA, mildly dilated RV but normal function, RVSP 45-50, no significant valvular abnormalities    GI/Hepatic/Renal     GERD    Renal disease: CRI      Comments: H/o rectal mass Endo/Other    Diabetes    Morbid obesity, arthritis and anemia     Other Findings              Physical Exam    Airway  Mallampati: III  TM Distance: 4 - 6 cm  Neck ROM: decreased range of motion   Mouth opening: Diminished (comment)    Comments: Short thick neck with mildly decreased neck ROM Cardiovascular    Rhythm: regular  Rate: normal         Dental  No notable dental hx       Pulmonary                Comments: Shallow breaths.  No appreciable wheezing or rales Abdominal         Other Findings            Anesthetic Plan    ASA: 3  Anesthesia type: general          Induction: Intravenous  Anesthetic plan and risks discussed with: Patient

## 2019-12-06 NOTE — PERIOP NOTES
TRANSFER - OUT REPORT:    Verbal report given to [facility], RN on Fabio Barger  being transferred to [facility] for routine post - op       Report consisted of patients Situation, Background, Assessment and   Recommendations(SBAR). Information from the following report(s) SBAR, Kardex, OR Summary, Intake/Output, MAR and Cardiac Rhythm NSR was reviewed with the receiving nurse. Lines:   Peripheral IV 12/06/19 Right Hand (Active)   Site Assessment Clean, dry, & intact 12/6/2019 12:11 PM   Phlebitis Assessment 0 12/6/2019 12:11 PM   Infiltration Assessment 0 12/6/2019 12:11 PM   Dressing Status Clean, dry, & intact 12/6/2019 12:11 PM   Dressing Type Transparent 12/6/2019 12:11 PM   Hub Color/Line Status Infusing;Patent;Pink 12/6/2019 12:11 PM   Alcohol Cap Used No 12/6/2019 12:11 PM        Opportunity for questions and clarification was provided. Patient transported with:   O2 @ 2 liters    VTE prophylaxis orders have been written for Fabio Barger. Patient and family given floor number and nurses name. Family updated re: pt status after security code verified.

## 2019-12-06 NOTE — PERIOP NOTES
Dr. Gladys Mendez gave verbal order for pt to have 5% dextrose/ 0.9% NS IV infusion @ 125 ML/HR at this time

## 2019-12-06 NOTE — ANESTHESIA PROCEDURE NOTES
Right TAP block    Start time: 12/6/2019 10:04 AM  End time: 12/6/2019 10:08 AM  Performed by: Victoria Ayon MD  Authorized by: Victoria Ayon MD       Pre-procedure:    Indications: at surgeon's request and post-op pain management    Preanesthetic Checklist: patient identified, risks and benefits discussed, site marked, timeout performed, anesthesia consent given and patient being monitored    Timeout Time: 10:03          Block Type:   Block Type:  TAP  Laterality:  Right  Monitoring:  Standard ASA monitoring  Procedures: ultrasound guided    Patient Position: supine  Prep: chlorhexidine    Location:  Abdominal  Needle Gauge:  20 G  Needle Localization:  Ultrasound guidance    Assessment:  Number of attempts:  1  Injection Assessment:  Ultrasound image on chart, no intravascular symptoms and negative aspiration for blood  Patient tolerance:  Patient tolerated the procedure well with no immediate complications

## 2019-12-06 NOTE — PROGRESS NOTES
Prayer provided during Pre-op as requested by the the patient. Patient had some anxiety.       [employer]

## 2019-12-06 NOTE — H&P
Surgery History and Physical    Subjective:      Velma Laurent is a 70 y.o. male who presents for a robotic right colectomy. Initially presented with a GI bleed. Colonoscopy showed a friable mass in the cecum. Biopsies showed inflammation.     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 (HCC)     R - Moderate, L - Mild    Diastolic congestive heart failure (HCC)     Diverticulosis of colon June 2010    DVT (deep venous thrombosis) (HCC)     RLE    Extrinsic allergic asthma     GERD (gastroesophageal reflux disease)     managed well with meds    History of anticoagulant use     Xarelto discontinued    History of TIA (transient ischemic attack) 11/09/2017    slight weakness to LLE    Hx of colonic polyps 07/29/2010    Hyperplastic     Hyperlipidemia LDL goal < 70     Hypertension     Obstructive sleep apnea     Non-Compliant with CPAP    Perennial allergic rhinitis with seasonal variation     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 ([address]     Presence of IVC filter     Pulmonary embolism ([address] Mar 2014    Bilateral - Completed 6 months on Xarelto    Rectal mass     Type 2 diabetes, uncontrolled, with neuropathy ([address] 1/22/2015     Past Surgical History:   Procedure Laterality Date    COLONOSCOPY  07/29/2010    Diverticulosis & Colon/Rectal Polyps - Due 2020    COLONOSCOPY N/A 10/24/2019    COLONOSCOPY/ 42/ 622 performed by Yulissa Case MD at [facility] ENDOSCOPY    EGD  5/9/2011         HX BUNIONECTOMY Bilateral     HX CIRCUMCISION      HX HERNIA REPAIR Bilateral     Inguinal, Repeat on Both Sides Separately    HX KNEE ARTHROSCOPY Right 1991    x 3    IR IVC FILTER  10/22/2019    SINUS SURGERY PROC UNLISTED      TOTAL KNEE ARTHROPLASTY Right 2006      Family History   Problem Relation Age of Onset    Stroke Mother     Diabetes Mother     Heart Disease Mother     Diabetes Maternal Grandmother     Glaucoma Maternal Grandmother     Stroke Father     Diabetes Father     Diabetes Paternal Aunt     Cancer Paternal Aunt     Diabetes Paternal Uncle     Cancer Paternal Uncle         Colon    Heart Attack Sister 76    Cancer Sister     Prostate Cancer Brother     Heart Disease Sister 48    Prostate Cancer Brother      Social History     Tobacco Use    Smoking status: Never Smoker    Smokeless tobacco: Never Used   Substance Use Topics    Alcohol use: No      Prior to Admission medications    Medication Sig Start Date End Date Taking? Authorizing Provider   cloNIDine HCl (CATAPRES) 0.1 mg tablet Take 1 Tab by mouth two (2) times a day. 12/5/19  Yes Juanis Pearson PA-C   irbesartan (AVAPRO) 150 mg tablet Take 2 Tabs by mouth nightly. Indications: chronic heart failure, high blood pressure 12/4/19  Yes Bobbi Brenner MD   busPIRone (BUSPAR) 15 mg tablet Take 15 mg by mouth daily. Yes Provider, Historical   insulin aspart U-100 (NOVOLOG FLEXPEN U-100 INSULIN) 100 unit/mL (3 mL) inpn by SubCUTAneous route Before breakfast, lunch, and dinner. Inject 8 units standing dose + additional insulin according to sliding scale (up to 16 units) subQ before each meal    151-200 = 2 units  201-250 = 4 units  251-300 = 6 units  201-350 = 8 units   Yes Provider, Historical   furosemide (LASIX) 40 mg tablet Take 40 mg by mouth two (2) times a day. Yes Provider, Historical   raNITIdine (ZANTAC) 300 mg tab Take 300 mg by mouth nightly. Yes Provider, Historical   acetaminophen (TYLENOL) 500 mg tablet Take 1,000 mg by mouth every six (6) hours as needed for Fever or Pain.    Yes Provider, Historical pantoprazole (PROTONIX) 40 mg tablet Take 1 Tab by mouth daily. Take 1 tablet po 30 minutes before breakfast 10/26/19  Yes [place]   cyanocobalamin (VITAMIN B12) 500 mcg tablet Take 1 Tab by mouth daily. Indications: b12 deficiency 9/27/19  Yes Ale Chen MD   tamsulosin [facility]) 0.4 mg capsule Take 1 Cap by mouth daily. 9/18/19  Yes Mekhi Parrish MD   metFORMIN (GLUCOPHAGE) 1,000 mg tablet Take 1 Tab by mouth two (2) times daily (with meals). 7/17/19  Yes Jose Tapia MD   potassium chloride (KLOR-CON M20) 20 mEq tablet TAKE 1 TABLET TWICE A DAY 7/17/19  Yes Jose Tapia MD   atorvastatin (LIPITOR) 80 mg tablet Take 1 Tab by mouth nightly. 6/24/19  Yes Yazmin Rosenberg MD   polyethylene glycol (MIRALAX) 17 gram/dose powder Take 17 g by mouth daily. 6/24/19  Yes Yazmin Rosenberg MD   insulin degludec (TRESIBA FLEXTOUCH U-100) 100 unit/mL (3 mL) inpn 135 Units by SubCUTAneous route daily. Yes Provider, Historical   ezetimibe (ZETIA) 10 mg tablet Take 1 Tab by mouth nightly. 3/7/19  Yes Yazmin Rosenberg MD   pregabalin (LYRICA) 150 mg capsule Take 150 mg by mouth two (2) times a day. Yes Provider, Historical   loratadine (CLARITIN) 10 mg tablet Take 10 mg by mouth daily. Yes Provider, Historical   nitroglycerin (NITROSTAT) 0.4 mg SL tablet 1 Tab by SubLINGual route every five (5) minutes as needed for Chest Pain. Up to 3 doses. 11/20/19   Jose Tapia MD   albuterol (VENTOLIN HFA) 90 mcg/actuation inhaler Take 2 Puffs by inhalation every four (4) hours as needed for Wheezing or Shortness of Breath. 10/29/19   Coty Rocha NP      Allergies   Allergen Reactions    Vasotec [Enalapril Maleate] Shortness of Breath and Cough       Review of Systems   All other systems reviewed and are negative.       Objective:     Patient Vitals for the past 8 hrs:   BP Temp Pulse Resp SpO2 Height Weight   12/06/19 0657 165/75 98.7 °F (37.1 °C) 80 16 98 % 5' 7\" (1.702 m) 282 lb 14.4 oz (128.3 kg)       Temp (24hrs), Av.7 °F (37.1 °C), Min:98.7 °F (37.1 °C), Max:98.7 °F (37.1 °C)      Physical Exam  Constitutional:       Appearance: Normal appearance. He is obese. HENT:      Head: Normocephalic and atraumatic. Nose: Nose normal.   Eyes:      Extraocular Movements: Extraocular movements intact. Conjunctiva/sclera: Conjunctivae normal.      Pupils: Pupils are equal, round, and reactive to light. Neck:      Musculoskeletal: Normal range of motion. Cardiovascular:      Rate and Rhythm: Normal rate and regular rhythm. Pulmonary:      Effort: Pulmonary effort is normal. No respiratory distress. Breath sounds: Normal breath sounds. No stridor. No wheezing. Abdominal:      General: Bowel sounds are normal. There is no distension. Palpations: Abdomen is soft. There is no mass. Tenderness: There is no tenderness. There is no guarding or rebound. Hernia: No hernia is present. Musculoskeletal: Normal range of motion. Skin:     General: Skin is warm and dry. Coloration: Skin is not jaundiced. Neurological:      General: No focal deficit present. Mental Status: He is alert. Psychiatric:         Behavior: Behavior normal.         Thought Content: Thought content normal.         Judgment: Judgment normal.         Assessment:     Cecal mass    Plan:     Discussed the risk of surgery including but not limited to bleeding,hematoma,infection,possible open procedure,anastomotic leak,ureteral injury,  and the risks of general anesthetic. The patient understands the risks; any and all questions were answered to the patient's satisfaction. Proceed with robotic right colectomy.

## 2019-12-06 NOTE — ANESTHESIA PROCEDURE NOTES
Left TAP block    Start time: 12/6/2019 10:08 AM  End time: 12/6/2019 10:12 AM  Performed by: Michael Sanchez MD  Authorized by: Michael Sanchez MD       Pre-procedure:    Indications: at surgeon's request and post-op pain management    Preanesthetic Checklist: patient identified, risks and benefits discussed, site marked, timeout performed, anesthesia consent given and patient being monitored    Timeout Time: 10:03          Block Type:   Block Type:  TAP  Laterality:  Left  Monitoring:  Standard ASA monitoring  Injection Technique:  Single shot  Procedures: ultrasound guided    Patient Position: supine  Prep: chlorhexidine    Location:  Abdominal  Needle Gauge:  20 G  Needle Localization:  Ultrasound guidance    Assessment:  Number of attempts:  1  Injection Assessment:  Local visualized surrounding nerve on ultrasound, negative aspiration for blood, no intravascular symptoms, ultrasound image on chart and incremental injection every 5 mL  Patient tolerance:  Patient tolerated the procedure well with no immediate complications

## 2019-12-07 ENCOUNTER — HOME CARE VISIT (OUTPATIENT)
Dept: HOME HEALTH SERVICES | Facility: HOME HEALTH | Age: 79
End: 2019-12-07
Payer: MEDICARE

## 2019-12-07 LAB
ANION GAP SERPL CALC-SCNC: 5 MMOL/L (ref 7–16)
BASOPHILS # BLD: 0 K/UL (ref 0–0.2)
BASOPHILS NFR BLD: 0 % (ref 0–2)
BUN SERPL-MCNC: 7 MG/DL (ref 8–23)
CALCIUM SERPL-MCNC: 7.6 MG/DL (ref 8.3–10.4)
CHLORIDE SERPL-SCNC: 111 MMOL/L (ref 98–107)
CO2 SERPL-SCNC: 30 MMOL/L (ref 21–32)
CREAT SERPL-MCNC: 0.84 MG/DL (ref 0.8–1.5)
DIFFERENTIAL METHOD BLD: ABNORMAL
EOSINOPHIL # BLD: 0.1 K/UL (ref 0–0.8)
EOSINOPHIL NFR BLD: 1 % (ref 0.5–7.8)
ERYTHROCYTE [DISTWIDTH] IN BLOOD BY AUTOMATED COUNT: 15.5 % (ref 11.9–14.6)
GLUCOSE BLD STRIP.AUTO-MCNC: 109 MG/DL (ref 65–100)
GLUCOSE BLD STRIP.AUTO-MCNC: 56 MG/DL (ref 65–100)
GLUCOSE BLD STRIP.AUTO-MCNC: 73 MG/DL (ref 65–100)
GLUCOSE BLD STRIP.AUTO-MCNC: 75 MG/DL (ref 65–100)
GLUCOSE BLD STRIP.AUTO-MCNC: 76 MG/DL (ref 65–100)
GLUCOSE BLD STRIP.AUTO-MCNC: 95 MG/DL (ref 65–100)
GLUCOSE SERPL-MCNC: 57 MG/DL (ref 65–100)
HCT VFR BLD AUTO: 28.9 % (ref 41.1–50.3)
HGB BLD-MCNC: 8.6 G/DL (ref 13.6–17.2)
IMM GRANULOCYTES # BLD AUTO: 0 K/UL (ref 0–0.5)
IMM GRANULOCYTES NFR BLD AUTO: 0 % (ref 0–5)
LYMPHOCYTES # BLD: 1.8 K/UL (ref 0.5–4.6)
LYMPHOCYTES NFR BLD: 20 % (ref 13–44)
MCH RBC QN AUTO: 27 PG (ref 26.1–32.9)
MCHC RBC AUTO-ENTMCNC: 29.8 G/DL (ref 31.4–35)
MCV RBC AUTO: 90.9 FL (ref 79.6–97.8)
MONOCYTES # BLD: 0.8 K/UL (ref 0.1–1.3)
MONOCYTES NFR BLD: 9 % (ref 4–12)
NEUTS SEG # BLD: 6 K/UL (ref 1.7–8.2)
NEUTS SEG NFR BLD: 69 % (ref 43–78)
NRBC # BLD: 0 K/UL (ref 0–0.2)
PLATELET # BLD AUTO: 198 K/UL (ref 150–450)
PMV BLD AUTO: 10.5 FL (ref 9.4–12.3)
POTASSIUM SERPL-SCNC: 4 MMOL/L (ref 3.5–5.1)
RBC # BLD AUTO: 3.18 M/UL (ref 4.23–5.6)
SODIUM SERPL-SCNC: 146 MMOL/L (ref 136–145)
WBC # BLD AUTO: 8.7 K/UL (ref 4.3–11.1)

## 2019-12-07 PROCEDURE — 65270000029 HC RM PRIVATE

## 2019-12-07 PROCEDURE — 82962 GLUCOSE BLOOD TEST: CPT

## 2019-12-07 PROCEDURE — 77030020263 HC SOL INJ SOD CL0.9% LFCR 1000ML

## 2019-12-07 PROCEDURE — 77030020253 HC SOL INJ D545NS .05 DEX .45 SAL

## 2019-12-07 PROCEDURE — 74011000258 HC RX REV CODE- 258: Performed by: NURSE PRACTITIONER

## 2019-12-07 PROCEDURE — 74011250636 HC RX REV CODE- 250/636: Performed by: SURGERY

## 2019-12-07 PROCEDURE — 3331090002 HH PPS REVENUE DEBIT

## 2019-12-07 PROCEDURE — 80048 BASIC METABOLIC PNL TOTAL CA: CPT

## 2019-12-07 PROCEDURE — 74011250637 HC RX REV CODE- 250/637: Performed by: PHYSICIAN ASSISTANT

## 2019-12-07 PROCEDURE — 74011250637 HC RX REV CODE- 250/637: Performed by: SURGERY

## 2019-12-07 PROCEDURE — 74011000250 HC RX REV CODE- 250: Performed by: PHYSICIAN ASSISTANT

## 2019-12-07 PROCEDURE — 85025 COMPLETE CBC W/AUTO DIFF WBC: CPT

## 2019-12-07 PROCEDURE — 36415 COLL VENOUS BLD VENIPUNCTURE: CPT

## 2019-12-07 PROCEDURE — 74011250636 HC RX REV CODE- 250/636: Performed by: PHYSICIAN ASSISTANT

## 2019-12-07 PROCEDURE — 74011000250 HC RX REV CODE- 250: Performed by: SURGERY

## 2019-12-07 PROCEDURE — 3331090001 HH PPS REVENUE CREDIT

## 2019-12-07 RX ORDER — IRBESARTAN 150 MG/1
150 TABLET ORAL
Status: DISCONTINUED | OUTPATIENT
Start: 2019-12-07 | End: 2019-12-10

## 2019-12-07 RX ORDER — DEXTROSE MONOHYDRATE AND SODIUM CHLORIDE 5; .45 G/100ML; G/100ML
50 INJECTION, SOLUTION INTRAVENOUS CONTINUOUS
Status: DISCONTINUED | OUTPATIENT
Start: 2019-12-07 | End: 2019-12-11 | Stop reason: HOSPADM

## 2019-12-07 RX ADMIN — HEPARIN SODIUM 5000 UNITS: 5000 INJECTION INTRAVENOUS; SUBCUTANEOUS at 08:04

## 2019-12-07 RX ADMIN — Medication 10 ML: at 13:55

## 2019-12-07 RX ADMIN — DEXTROSE 50 % IN WATER (D50W) INTRAVENOUS SYRINGE 12.5 G: at 05:51

## 2019-12-07 RX ADMIN — FAMOTIDINE 20 MG: 10 INJECTION INTRAVENOUS at 21:48

## 2019-12-07 RX ADMIN — ATORVASTATIN CALCIUM 80 MG: 40 TABLET, FILM COATED ORAL at 21:48

## 2019-12-07 RX ADMIN — IRBESARTAN 150 MG: 150 TABLET ORAL at 21:52

## 2019-12-07 RX ADMIN — SODIUM CHLORIDE 100 ML/HR: 900 INJECTION, SOLUTION INTRAVENOUS at 02:12

## 2019-12-07 RX ADMIN — HYDROMORPHONE HYDROCHLORIDE 1 MG: 1 INJECTION, SOLUTION INTRAMUSCULAR; INTRAVENOUS; SUBCUTANEOUS at 09:50

## 2019-12-07 RX ADMIN — FAMOTIDINE 20 MG: 10 INJECTION INTRAVENOUS at 08:05

## 2019-12-07 RX ADMIN — Medication 1 EACH: at 08:07

## 2019-12-07 RX ADMIN — HEPARIN SODIUM 5000 UNITS: 5000 INJECTION INTRAVENOUS; SUBCUTANEOUS at 17:52

## 2019-12-07 RX ADMIN — Medication 10 ML: at 05:40

## 2019-12-07 RX ADMIN — PREGABALIN 150 MG: 150 CAPSULE ORAL at 08:05

## 2019-12-07 RX ADMIN — BUSPIRONE HYDROCHLORIDE 15 MG: 5 TABLET ORAL at 08:01

## 2019-12-07 RX ADMIN — DEXTROSE MONOHYDRATE AND SODIUM CHLORIDE 100 ML/HR: 5; .45 INJECTION, SOLUTION INTRAVENOUS at 17:51

## 2019-12-07 RX ADMIN — HYDROMORPHONE HYDROCHLORIDE 1 MG: 1 INJECTION, SOLUTION INTRAMUSCULAR; INTRAVENOUS; SUBCUTANEOUS at 21:48

## 2019-12-07 RX ADMIN — HYDROMORPHONE HYDROCHLORIDE 1 MG: 1 INJECTION, SOLUTION INTRAMUSCULAR; INTRAVENOUS; SUBCUTANEOUS at 17:56

## 2019-12-07 RX ADMIN — TAMSULOSIN HYDROCHLORIDE 0.4 MG: 0.4 CAPSULE ORAL at 08:06

## 2019-12-07 RX ADMIN — Medication 10 ML: at 21:49

## 2019-12-07 RX ADMIN — PREGABALIN 150 MG: 150 CAPSULE ORAL at 17:52

## 2019-12-07 RX ADMIN — HYDROMORPHONE HYDROCHLORIDE 1 MG: 1 INJECTION, SOLUTION INTRAMUSCULAR; INTRAVENOUS; SUBCUTANEOUS at 05:56

## 2019-12-07 RX ADMIN — DEXTROSE MONOHYDRATE AND SODIUM CHLORIDE 100 ML/HR: 5; .45 INJECTION, SOLUTION INTRAVENOUS at 08:01

## 2019-12-07 NOTE — PROGRESS NOTES
END OF SHIFT NOTE:    INTAKE/OUTPUT  12/06 0701 - 12/07 0700  In: 2803 [I.V.:2803]  Out: 6476 [Urine:1160]  Voiding: YES  Catheter: YES  Drain:              Flatus: Patient does not have flatus present. Stool:  0 occurrences. Characteristics:       Emesis: 0 occurrences. Characteristics:        VITAL SIGNS  Patient Vitals for the past 12 hrs:   Temp Pulse Resp BP SpO2   12/07/19 0253 97.8 °F (36.6 °C) 100 19 136/68 98 %   12/06/19 2233 98 °F (36.7 °C) (!) 106 18 146/78 97 %   12/06/19 2021 97.6 °F (36.4 °C) 93 18 182/79 98 %       Pain Assessment  Pain Intensity 1: 7 (12/07/19 0557)  Pain Location 1: Abdomen  Pain Intervention(s) 1: Medication (see MAR)  Patient Stated Pain Goal: 0    Ambulating  Yes    Shift report given to oncoming nurse at the bedside.     Sharonda Vivar RN

## 2019-12-07 NOTE — PROGRESS NOTES
PLAN:  Waiting on return of bowel function  NPO w/ sips and chips  IVF  Pain/ nausea control  SCD  IS  Keep faria today   Consult PT  OOB/ Chair      ASSESSMENT:  Admit Date: 12/6/2019   1 Day Post-Op  Procedure(s):  RIGHT  COLECTOMY ROBOTIC ASSISTED ATTEMPTED; OPEN RIGHT COLECTOMY, EXCISION OF OMENTAL IMPLANT, UMBILICAL HERNIA REPAIR. Principal Problem:    Cecum mass (12/6/2019)       SUBJECTIVE:  Pt awake in bed. No complaints. Pain controlled. -flatus/-BM. Faria patent. Pt requesting to keep faria one more day as he is not comfortable getting out of bed to bathroom. OBJECTIVE:  Constitutional: Alert, cooperative, no acute distress; appears stated age   Visit Vitals  /69   Pulse 88   Temp 98.3 °F (36.8 °C)   Resp 18   Ht 5' 7\" (1.702 m)   Wt 282 lb 14.4 oz (128.3 kg)   SpO2 95%   BMI 44.31 kg/m²     Eyes: Sclera are clear. ENMT: no external lesions gross hearing normal; no obvious neck masses, no ear or lip lesions  CV: RRR. Normal perfusion  Resp: No JVD. Breathing is  non-labored; no audible wheezing. GI: obese, soft and non-distended, post op dressing removed. Staples c/d/i. Abdominal binder in place. Musculoskeletal: unremarkable with normal function. No embolic signs or cyanosis.    Neuro:  Oriented; moves all 4; no focal deficits  Psychiatric: normal affect and mood, no memory impairment      Patient Vitals for the past 24 hrs:   BP Temp Pulse Resp SpO2   12/07/19 0722 133/69 98.3 °F (36.8 °C) 88 18 95 %   12/07/19 0253 136/68 97.8 °F (36.6 °C) 100 19 98 %   12/06/19 2233 146/78 98 °F (36.7 °C) (!) 106 18 97 %   12/06/19 2021 182/79 97.6 °F (36.4 °C) 93 18 98 %   12/06/19 1653 178/78       12/06/19 1531  97.2 °F (36.2 °C) 87 17 98 %   12/06/19 1411 121/61  86  96 %   12/06/19 1357 130/59  91 16 96 %   12/06/19 1342 119/58  87 15 95 %   12/06/19 1326 126/61  88 18 97 %   12/06/19 1311 118/59  82 18 98 %   12/06/19 1257 114/59  86 15 97 %   12/06/19 1242 119/60  82 16 99 % 12/06/19 1226 112/51  79 16 97 %   12/06/19 1221 117/56  79 15 97 %   12/06/19 1216 115/54  78 15 94 %   12/06/19 1211 128/61 98.1 °F (36.7 °C) 83 15 97 %   12/06/19 1207 125/60  87 16 94 %   12/06/19 1202 121/56  75 16 96 %   12/06/19 1157 120/58  77 15 96 %   12/06/19 1152 139/63  72 16 95 %   12/06/19 1147 135/63  79 16 95 %   12/06/19 1142 120/59  76 18 93 %   12/06/19 1136 142/64  76 16 94 %   12/06/19 1131 151/66  70 16 98 %   12/06/19 1121 152/64  75 14 92 %   12/06/19 1116 146/57  74 18 95 %   12/06/19 1111 146/65  76 16 97 %   12/06/19 1107 145/67  74 18 97 %   12/06/19 1102 135/63  75     12/06/19 1057 120/56  72     12/06/19 1031 142/63  79 15    12/06/19 1026 141/60  79 14 93 %   12/06/19 1023 153/64 97.9 °F (36.6 °C) 82 16 97 %   12/06/19 1021 153/64  83 14 94 %     Labs:    Recent Labs     12/07/19  0449   WBC 8.7   HGB 8.6*      *   K 4.0   *   CO2 30   BUN 7*   CREA 0.84   GLU 57*       Desmond Plank, NP

## 2019-12-08 LAB
GLUCOSE BLD STRIP.AUTO-MCNC: 105 MG/DL (ref 65–100)
GLUCOSE BLD STRIP.AUTO-MCNC: 113 MG/DL (ref 65–100)
GLUCOSE BLD STRIP.AUTO-MCNC: 149 MG/DL (ref 65–100)
GLUCOSE BLD STRIP.AUTO-MCNC: 168 MG/DL (ref 65–100)

## 2019-12-08 PROCEDURE — 74011250637 HC RX REV CODE- 250/637: Performed by: PHYSICIAN ASSISTANT

## 2019-12-08 PROCEDURE — 74011000250 HC RX REV CODE- 250: Performed by: SURGERY

## 2019-12-08 PROCEDURE — 74011250636 HC RX REV CODE- 250/636: Performed by: SURGERY

## 2019-12-08 PROCEDURE — 82962 GLUCOSE BLOOD TEST: CPT

## 2019-12-08 PROCEDURE — 3331090003 HH PPS REVENUE ADJ

## 2019-12-08 PROCEDURE — 74011636637 HC RX REV CODE- 636/637: Performed by: PHYSICIAN ASSISTANT

## 2019-12-08 PROCEDURE — 65270000029 HC RM PRIVATE

## 2019-12-08 PROCEDURE — 74011250636 HC RX REV CODE- 250/636: Performed by: PHYSICIAN ASSISTANT

## 2019-12-08 PROCEDURE — 74011000258 HC RX REV CODE- 258: Performed by: NURSE PRACTITIONER

## 2019-12-08 PROCEDURE — 77030020253 HC SOL INJ D545NS .05 DEX .45 SAL

## 2019-12-08 PROCEDURE — 3331090001 HH PPS REVENUE CREDIT

## 2019-12-08 PROCEDURE — 97161 PT EVAL LOW COMPLEX 20 MIN: CPT

## 2019-12-08 PROCEDURE — 3331090002 HH PPS REVENUE DEBIT

## 2019-12-08 PROCEDURE — 97530 THERAPEUTIC ACTIVITIES: CPT

## 2019-12-08 PROCEDURE — 74011250637 HC RX REV CODE- 250/637: Performed by: NURSE PRACTITIONER

## 2019-12-08 RX ORDER — IRBESARTAN 150 MG/1
150 TABLET ORAL ONCE
Status: COMPLETED | OUTPATIENT
Start: 2019-12-09 | End: 2019-12-08

## 2019-12-08 RX ORDER — FUROSEMIDE 40 MG/1
40 TABLET ORAL 2 TIMES DAILY
Status: DISCONTINUED | OUTPATIENT
Start: 2019-12-08 | End: 2019-12-11 | Stop reason: HOSPADM

## 2019-12-08 RX ORDER — METOPROLOL TARTRATE 5 MG/5ML
5 INJECTION INTRAVENOUS
Status: DISCONTINUED | OUTPATIENT
Start: 2019-12-08 | End: 2019-12-11 | Stop reason: HOSPADM

## 2019-12-08 RX ADMIN — HEPARIN SODIUM 5000 UNITS: 5000 INJECTION INTRAVENOUS; SUBCUTANEOUS at 01:07

## 2019-12-08 RX ADMIN — Medication 10 ML: at 05:07

## 2019-12-08 RX ADMIN — DEXTROSE MONOHYDRATE AND SODIUM CHLORIDE 100 ML/HR: 5; .45 INJECTION, SOLUTION INTRAVENOUS at 15:52

## 2019-12-08 RX ADMIN — Medication 10 ML: at 13:12

## 2019-12-08 RX ADMIN — INSULIN LISPRO 2 UNITS: 100 INJECTION, SOLUTION INTRAVENOUS; SUBCUTANEOUS at 17:22

## 2019-12-08 RX ADMIN — HYDROMORPHONE HYDROCHLORIDE 1 MG: 1 INJECTION, SOLUTION INTRAMUSCULAR; INTRAVENOUS; SUBCUTANEOUS at 05:07

## 2019-12-08 RX ADMIN — ATORVASTATIN CALCIUM 80 MG: 40 TABLET, FILM COATED ORAL at 20:37

## 2019-12-08 RX ADMIN — PREGABALIN 150 MG: 150 CAPSULE ORAL at 17:19

## 2019-12-08 RX ADMIN — FAMOTIDINE 20 MG: 10 INJECTION INTRAVENOUS at 08:19

## 2019-12-08 RX ADMIN — HEPARIN SODIUM 5000 UNITS: 5000 INJECTION INTRAVENOUS; SUBCUTANEOUS at 08:20

## 2019-12-08 RX ADMIN — TAMSULOSIN HYDROCHLORIDE 0.4 MG: 0.4 CAPSULE ORAL at 08:20

## 2019-12-08 RX ADMIN — IRBESARTAN 150 MG: 150 TABLET ORAL at 20:37

## 2019-12-08 RX ADMIN — PREGABALIN 150 MG: 150 CAPSULE ORAL at 08:20

## 2019-12-08 RX ADMIN — Medication 10 ML: at 21:17

## 2019-12-08 RX ADMIN — FUROSEMIDE 40 MG: 40 TABLET ORAL at 17:18

## 2019-12-08 RX ADMIN — BUSPIRONE HYDROCHLORIDE 15 MG: 5 TABLET ORAL at 08:19

## 2019-12-08 RX ADMIN — IRBESARTAN 150 MG: 150 TABLET ORAL at 23:52

## 2019-12-08 RX ADMIN — METOPROLOL TARTRATE 5 MG: 5 INJECTION INTRAVENOUS at 23:41

## 2019-12-08 RX ADMIN — HEPARIN SODIUM 5000 UNITS: 5000 INJECTION INTRAVENOUS; SUBCUTANEOUS at 17:19

## 2019-12-08 RX ADMIN — FAMOTIDINE 20 MG: 10 INJECTION INTRAVENOUS at 20:37

## 2019-12-08 RX ADMIN — ACETAMINOPHEN 650 MG: 325 TABLET, FILM COATED ORAL at 15:59

## 2019-12-08 NOTE — PROGRESS NOTES
Problem: Mobility Impaired (Adult and Pediatric)  Goal: *Acute Goals and Plan of Care (Insert Text)  Description  LTG:  (1.)Mr. To will move from supine to sit and sit to supine, scoot up and down and roll side to side INDEPENDENTLY with bed flat within 7 treatment day(s). (2.)Mr. To will transfer from bed to chair and chair to bed with MODIFIED INDEPENDENCE using the least restrictive device within 7 treatment day(s). (3.)Mr. To will ambulate with MODIFIED INDEPENDENCE for 450+ feet with the least restrictive device within 7 treatment day(s). (4.)Mr. Ed Shipman will ascend and descend 4 steps with SUPERVISION using handrail(s) within 7 days. (5.)Mr. To will perform exercises per HEP independently to improve strength and mobility within 7 days. ________________________________________________________________________________________________   Outcome: Progressing Towards Goal     PHYSICAL THERAPY: Initial Assessment and AM 12/8/2019  INPATIENT: PT Visit Days : 1  [place] / [place] / Product Type: Medicare /       NAME/AGE/GENDER: Errol Blakely is a 70 y.o. male   PRIMARY DIAGNOSIS: Cecal lesion [K63.9]  Cecum mass [K63.89] Cecum mass Cecum mass  Procedure(s) (LRB):  RIGHT  COLECTOMY ROBOTIC ASSISTED ATTEMPTED; OPEN RIGHT COLECTOMY, EXCISION OF OMENTAL IMPLANT, UMBILICAL HERNIA REPAIR. (Right)  2 Days Post-Op  ICD-10: Treatment Diagnosis:    Generalized Muscle Weakness (M62.81)  Difficulty in walking, Not elsewhere classified (R26.2)  Other abnormalities of gait and mobility (R26.89)   Precaution/Allergies:  Mora Prader maleate]      ASSESSMENT:     Mr. Ed Shipman is a pleasant 70year old male seen for initial therapy evaluation following above surgery. At baseline he is modified independent with mobility/ambulation using cane or walker and lives with his brother.  He presents in supine without specific complaints and is agreeable to therapy assessment. Reviewed bed mobility techniques to decreased post op pain; pt transfers to sitting with CGA/SBA and additional time. Intact seated balance noted. SBA for sit-stand transfer. Performs functional mobility and activity in room with SBA then ambulates for an additional 250 ft in hallway with walker, SBA-supervision, and min verbal cues for posture/gait safety. Pt demonstrates slow pace with slightly forward flexed posture. Returned to room and up to chair after activity. Performs LE exercises with cueing and demonstrates good participation, prior knowledge of exercises from therapy in the past. Positioned comfortably after session with legs elevated, needs in reach, sister present. Em Patiño is functioning below baseline with mobility, strength, and activity tolerance and will benefit from continued therapy during hospital stay to address deficits/maximize independence with mobility. Anticipate return home at [place] with family support and possible  PT. This section established at most recent assessment   PROBLEM LIST (Impairments causing functional limitations):  Decreased Strength  Decreased Transfer Abilities  Decreased Ambulation Ability/Technique  Decreased Balance  Increased Pain  Decreased Activity Tolerance   INTERVENTIONS PLANNED: (Benefits and precautions of physical therapy have been discussed with the patient.)  Balance Exercise  Bed Mobility  Gait Training  Home Exercise Program (HEP)  Therapeutic Activites  Therapeutic Exercise/Strengthening  Transfer Training     TREATMENT PLAN: Frequency/Duration: 3 times a week for duration of hospital stay  Rehabilitation Potential For Stated Goals: Good     REHAB RECOMMENDATIONS (at time of discharge pending progress):    Placement:   It is my opinion, based on this patient's performance to date, that Mr. Nancy Dugan may benefit from [address] after discharge due to the functional deficits listed above that are likely to improve with skilled rehabilitation because he/she has multiple medical issues that affect his/her functional mobility in the community. Equipment:   None at this time              HISTORY:   History of Present Injury/Illness (Reason for Referral):  Pt s/p above procedure  Past Medical History/Comorbidities:   Mr. Catia Jorge  has a past medical history of Anxiety, Cecal lesion, Cervical stenosis of spine, Chronic kidney disease, Claustrophobia, Degenerative disc disease, lumbar, Diabetes mellitus type II, Diabetic retinopathy of both eyes without macular edema associated with type 2 diabetes mellitus ([address] Diastolic congestive heart failure ([address] Diverticulosis of colon (June 2010), DVT (deep venous thrombosis) ([address] Extrinsic allergic asthma, GERD (gastroesophageal reflux disease), History of anticoagulant use, History of TIA (transient ischemic attack) (11/09/2017), colonic polyps (07/29/2010), Hyperlipidemia LDL goal < 70, Hypertension, Obstructive sleep apnea, Perennial allergic rhinitis with seasonal variation, 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 He also has no past medical history of Coagulation defects or COPD. Mr. Catia Jorge  has a past surgical history that includes colonoscopy (07/29/2010); egd (5/9/2011); pr sinus surgery proc unlisted; hx knee arthroscopy (Right, 1991); pr total knee arthroplasty (Right, 2006); hx bunionectomy (Bilateral); hx hernia repair (Bilateral); ir ivc filter (10/22/2019); colonoscopy (N/A, 10/24/2019); and hx circumcision.   Social History/Living Environment:   Home Environment: Apartment  # Steps to Enter: [phone] to Enter: Yes  One/Two Story Residence: One story  Living Alone: No  Support Systems: Family member(s)  Patient Expects to be Discharged to[de-identified] Apartment  Current DME Used/Available at Home: [employer] [place], straight, Julien Nickels, rolling  Prior Level of Function/Work/Activity:  Lives with brother in single story apartment with 4 steps to enter. Mod I with cane or walker. Does not drive     Number of Personal Factors/Comorbidities that affect the Plan of Care: 1-2: MODERATE COMPLEXITY   EXAMINATION:   Most Recent Physical Functioning:   Gross Assessment:  AROM: Within functional limits  Strength: Generally decreased, functional  Coordination: Within functional limits               Posture:  Posture (WDL): Exceptions to WDL  Posture Assessment: Forward head, Rounded shoulders  Balance:  Sitting: Intact  Standing: Impaired  Standing - Static: Good  Standing - Dynamic : Fair(+ with walker) Bed Mobility:  Rolling: Stand-by assistance  Supine to Sit: Stand-by assistance  Scooting: Stand-by assistance  Wheelchair Mobility:     Transfers:  Sit to Stand: Stand-by assistance  Stand to Sit: Stand-by assistance  Bed to Chair: Stand-by assistance  Interventions: Verbal cues; Safety awareness training  Duration: 10 Minutes  Gait:     Base of Support: Center of gravity altered  Speed/Sharon: Pace decreased (<100 feet/min)  Step Length: Left shortened;Right shortened  Gait Abnormalities: Trunk sway increased;Decreased step clearance  Distance (ft): 250 Feet (ft)  Assistive Device: Walker, rolling  Ambulation - Level of Assistance: Stand-by assistance;Supervision  Interventions: Verbal cues; Safety awareness training      Body Structures Involved:  Muscles Body Functions Affected: Movement Related  Skin Related Activities and Participation Affected:  General Tasks and Demands  Mobility  Domestic Life  Community, Social and [address] [place] [address]   Number of elements that affect the Plan of Care: 4+: HIGH COMPLEXITY   CLINICAL PRESENTATION:   Presentation: Stable and uncomplicated: LOW COMPLEXITY   CLINICAL DECISION MAKIN [facility] Mobility Inpatient Short Form  How much difficulty does the patient currently have. ..  Unable A Lot A Little None   1. Turning over in bed (including adjusting bedclothes, sheets and blankets)? [] 1   [] 2   [] 3   [x] 4   2. Sitting down on and standing up from a chair with arms ( e.g., wheelchair, bedside commode, etc.)   [] 1   [] 2   [x] 3   [] 4   3. Moving from lying on back to sitting on the side of the bed? [] 1   [] 2   [x] 3   [] 4   How much help from another person does the patient currently need. .. Total A Lot A Little None   4. Moving to and from a bed to a chair (including a wheelchair)? [] 1   [] 2   [x] 3   [] 4   5. Need to walk in hospital room? [] 1   [] 2   [x] 3   [] 4   6. Climbing 3-5 steps with a railing? [] 1   [] 2   [x] 3   [] 4   © 2007, Trustees of [address] [place] [address], under license to [employer]. All rights reserved      Score:  Initial: 19 Most Recent: X (Date: -- )    Interpretation of Tool:  Represents activities that are increasingly more difficult (i.e. Bed mobility, Transfers, Gait). Medical Necessity:     Patient demonstrates   good   rehab potential due to higher previous functional level. Reason for Services/Other Comments:  Patient   continues to demonstrate capacity to improve strength, mobility, balance, transfers, and activity tolerance which will   increase independence, decrease amount of assistance required from caregiver, and increase safety  . Use of outcome tool(s) and clinical judgement create a POC that gives a: Clear prediction of patient's progress: LOW COMPLEXITY            TREATMENT:   (In addition to Assessment/Re-Assessment sessions the following treatments were rendered)   Pre-treatment Symptoms/Complaints:  \"thank you\"  Pain: Initial:   Pain Intensity 1: 0  Post Session:  0/10     Therapeutic Activity: (  10 Minutes ):  Therapeutic activities including Bed transfers, Chair transfers, Ambulation on level ground, and exercises in chair to improve mobility, strength, balance, and activity tolerance . Required minimal Verbal cues; Safety awareness training to promote static and dynamic balance in standing and promote motor control of bilateral, lower extremity(s). Date:  12/8/19 Date:   Date:     Activity/Exercise Parameters Parameters Parameters   LAQ 10x AB     Seated marching 10x AB     Ankle pumps 10x AB                                 Braces/Orthotics/Lines/Etc:   IV  faria catheter  Abdominal binder   O2 Device: Room air  Treatment/Session Assessment:    Response to Treatment:  pt performs mobility with SBA-supervision in room and hallway using walker  Interdisciplinary Collaboration:   Physical Therapist  Registered Nurse  After treatment position/precautions:   Up in chair  Bed/Chair-wheels locked  Bed in low position  Call light within reach  Family at bedside   Compliance with Program/Exercises: Will assess as treatment progresses  Recommendations/Intent for next treatment session: \"Next visit will focus on advancements to more challenging activities and reduction in assistance provided\".   Total Treatment Duration:  PT Patient Time In/Time Out  Time In: 0830  Time Out: 202-206 [facility]

## 2019-12-08 NOTE — PROGRESS NOTES
Patients dressing C/D/I. Mehta draining and patent. All hourly rounds performed. Call light within reach. No complaints at this time. Will continue with plan of care and give report to oncoming nurse.

## 2019-12-08 NOTE — PROGRESS NOTES
PLAN:  Start Clear Liquids  IVF  Pain/ nausea control  SCD  IS  DC Mehta   Consult PT  OOB/ Chair      ASSESSMENT:  Admit Date: 12/6/2019   2 Day Post-Op  Procedure(s):  RIGHT  COLECTOMY ROBOTIC ASSISTED ATTEMPTED; OPEN RIGHT COLECTOMY, EXCISION OF OMENTAL IMPLANT, UMBILICAL HERNIA REPAIR. Principal Problem:    Cecum mass (12/6/2019)       SUBJECTIVE:  Pt awake in bed. No complaints. Pain controlled. +flatus/-BM. Mehta patent. Pt has been ambulating in halls. AF, NAD. OBJECTIVE:  Constitutional: Alert, cooperative, no acute distress; appears stated age   Visit Vitals  /81   Pulse 100   Temp 98.1 °F (36.7 °C)   Resp 18   Ht 5' 7\" (1.702 m)   Wt 280 lb 8 oz (127.2 kg)   SpO2 96%   BMI 43.93 kg/m²     Eyes: Sclera are clear. ENMT: no external lesions gross hearing normal; no obvious neck masses, no ear or lip lesions  CV: RRR. Normal perfusion  Resp: No JVD. Breathing is  non-labored; no audible wheezing. GI: obese, soft and non-distended, Dressing changed. Staples intact. Small amount of bloody drainage from base of incision. Abdominal binder in place. Musculoskeletal: unremarkable with normal function. No embolic signs or cyanosis.    Neuro:  Oriented; moves all 4; no focal deficits  Psychiatric: normal affect and mood, no memory impairment      Patient Vitals for the past 24 hrs:   BP Temp Pulse Resp SpO2 Weight   12/08/19 0718 183/81 98.1 °F (36.7 °C) 100 18 96 %    12/08/19 0428 137/66 97.9 °F (36.6 °C) 93 19 91 %    12/08/19 0420      280 lb 8 oz (127.2 kg)   12/07/19 2331 161/70 98.7 °F (37.1 °C) 96 19 91 %    12/07/19 2035 106/75 98.3 °F (36.8 °C) 89 19 98 %    12/07/19 1538 154/75 98 °F (36.7 °C) 82 18 99 %    12/07/19 1136 123/75 98.4 °F (36.9 °C) 84 18 95 %      Labs:    Recent Labs     12/07/19  0449   WBC 8.7   HGB 8.6*      *   K 4.0   *   CO2 30   BUN 7*   CREA 0.84   GLU 57*       Honey Pati, NP

## 2019-12-08 NOTE — PROGRESS NOTES
END OF SHIFT NOTE:    INTAKE/OUTPUT  12/06 0701 - 12/07 0700  In: 2803 [I.V.:2803]  Out: 2997 [Urine:1160]  Voiding: NO  Catheter: YES  Drain:              Flatus: Patient does not have flatus present. Stool:  0 occurrences. Characteristics:       Emesis: 0 occurrences. Characteristics:        VITAL SIGNS  Patient Vitals for the past 12 hrs:   Temp Pulse Resp BP SpO2   12/07/19 1538 98 °F (36.7 °C) 82 18 154/75 99 %   12/07/19 1136 98.4 °F (36.9 °C) 84 18 123/75 95 %       Pain Assessment  Pain Intensity 1: 0 (12/07/19 1840)  Pain Location 1: Abdomen  Pain Intervention(s) 1: Medication (see MAR)  Patient Stated Pain Goal: 0    Ambulating  Yes with assistance    Shift report given to oncoming nurse at the bedside.     Ami Dove RN

## 2019-12-09 ENCOUNTER — PATIENT OUTREACH (OUTPATIENT)
Dept: CASE MANAGEMENT | Age: 79
End: 2019-12-09

## 2019-12-09 LAB
GLUCOSE BLD STRIP.AUTO-MCNC: 115 MG/DL (ref 65–100)
GLUCOSE BLD STRIP.AUTO-MCNC: 130 MG/DL (ref 65–100)
GLUCOSE BLD STRIP.AUTO-MCNC: 136 MG/DL (ref 65–100)
GLUCOSE BLD STRIP.AUTO-MCNC: 143 MG/DL (ref 65–100)

## 2019-12-09 PROCEDURE — 82962 GLUCOSE BLOOD TEST: CPT

## 2019-12-09 PROCEDURE — 74011250636 HC RX REV CODE- 250/636: Performed by: PHYSICIAN ASSISTANT

## 2019-12-09 PROCEDURE — 94640 AIRWAY INHALATION TREATMENT: CPT

## 2019-12-09 PROCEDURE — 65270000029 HC RM PRIVATE

## 2019-12-09 PROCEDURE — 74011250637 HC RX REV CODE- 250/637: Performed by: NURSE PRACTITIONER

## 2019-12-09 PROCEDURE — 94760 N-INVAS EAR/PLS OXIMETRY 1: CPT

## 2019-12-09 PROCEDURE — 74011250637 HC RX REV CODE- 250/637: Performed by: SURGERY

## 2019-12-09 PROCEDURE — 76937 US GUIDE VASCULAR ACCESS: CPT

## 2019-12-09 PROCEDURE — 77030020253 HC SOL INJ D545NS .05 DEX .45 SAL

## 2019-12-09 PROCEDURE — 74011000258 HC RX REV CODE- 258: Performed by: NURSE PRACTITIONER

## 2019-12-09 PROCEDURE — 74011000250 HC RX REV CODE- 250: Performed by: PHYSICIAN ASSISTANT

## 2019-12-09 PROCEDURE — 74011000250 HC RX REV CODE- 250: Performed by: SURGERY

## 2019-12-09 PROCEDURE — 74011250637 HC RX REV CODE- 250/637: Performed by: PHYSICIAN ASSISTANT

## 2019-12-09 PROCEDURE — 74011250636 HC RX REV CODE- 250/636: Performed by: SURGERY

## 2019-12-09 RX ORDER — GUAIFENESIN 600 MG/1
600 TABLET, EXTENDED RELEASE ORAL EVERY 12 HOURS
Status: DISCONTINUED | OUTPATIENT
Start: 2019-12-09 | End: 2019-12-11 | Stop reason: HOSPADM

## 2019-12-09 RX ORDER — METOPROLOL TARTRATE 25 MG/1
25 TABLET, FILM COATED ORAL
Status: DISCONTINUED | OUTPATIENT
Start: 2019-12-09 | End: 2019-12-11 | Stop reason: HOSPADM

## 2019-12-09 RX ORDER — FAMOTIDINE 10 MG/1
20 TABLET ORAL 2 TIMES DAILY
Status: DISCONTINUED | OUTPATIENT
Start: 2019-12-09 | End: 2019-12-11 | Stop reason: HOSPADM

## 2019-12-09 RX ADMIN — FAMOTIDINE 20 MG: 20 TABLET ORAL at 17:01

## 2019-12-09 RX ADMIN — FUROSEMIDE 40 MG: 40 TABLET ORAL at 17:01

## 2019-12-09 RX ADMIN — Medication 10 ML: at 22:00

## 2019-12-09 RX ADMIN — DEXTROSE MONOHYDRATE AND SODIUM CHLORIDE 100 ML/HR: 5; .45 INJECTION, SOLUTION INTRAVENOUS at 02:17

## 2019-12-09 RX ADMIN — FAMOTIDINE 20 MG: 10 INJECTION INTRAVENOUS at 08:13

## 2019-12-09 RX ADMIN — PREGABALIN 150 MG: 150 CAPSULE ORAL at 17:01

## 2019-12-09 RX ADMIN — IRBESARTAN 150 MG: 150 TABLET ORAL at 21:01

## 2019-12-09 RX ADMIN — DEXTROSE MONOHYDRATE AND SODIUM CHLORIDE 100 ML/HR: 5; .45 INJECTION, SOLUTION INTRAVENOUS at 17:01

## 2019-12-09 RX ADMIN — Medication 10 ML: at 05:17

## 2019-12-09 RX ADMIN — ALBUTEROL SULFATE 2.5 MG: 2.5 SOLUTION RESPIRATORY (INHALATION) at 08:47

## 2019-12-09 RX ADMIN — GUAIFENESIN 600 MG: 600 TABLET ORAL at 21:01

## 2019-12-09 RX ADMIN — HEPARIN SODIUM 5000 UNITS: 5000 INJECTION INTRAVENOUS; SUBCUTANEOUS at 00:01

## 2019-12-09 RX ADMIN — TAMSULOSIN HYDROCHLORIDE 0.4 MG: 0.4 CAPSULE ORAL at 08:13

## 2019-12-09 RX ADMIN — ACETAMINOPHEN 650 MG: 325 TABLET, FILM COATED ORAL at 17:59

## 2019-12-09 RX ADMIN — METOPROLOL TARTRATE 5 MG: 5 INJECTION INTRAVENOUS at 17:08

## 2019-12-09 RX ADMIN — ATORVASTATIN CALCIUM 80 MG: 40 TABLET, FILM COATED ORAL at 21:01

## 2019-12-09 RX ADMIN — BUSPIRONE HYDROCHLORIDE 15 MG: 5 TABLET ORAL at 08:36

## 2019-12-09 RX ADMIN — GUAIFENESIN 600 MG: 600 TABLET ORAL at 10:46

## 2019-12-09 RX ADMIN — Medication 10 ML: at 14:00

## 2019-12-09 RX ADMIN — HEPARIN SODIUM 5000 UNITS: 5000 INJECTION INTRAVENOUS; SUBCUTANEOUS at 16:58

## 2019-12-09 RX ADMIN — FUROSEMIDE 40 MG: 40 TABLET ORAL at 08:13

## 2019-12-09 RX ADMIN — ACETAMINOPHEN 650 MG: 325 TABLET, FILM COATED ORAL at 08:16

## 2019-12-09 RX ADMIN — HEPARIN SODIUM 5000 UNITS: 5000 INJECTION INTRAVENOUS; SUBCUTANEOUS at 08:12

## 2019-12-09 RX ADMIN — PREGABALIN 150 MG: 150 CAPSULE ORAL at 08:36

## 2019-12-09 NOTE — PROGRESS NOTES
This note will not be viewable in [address] Care Transition Nurse [facility] Outreach      Date/Time:  2019 4:09 PM    Patient is identified as High Risk for Readmission. Medical History: dm, sarah, htn, dhf, cecal mass, ckd, tia- admit with GI bleed, Sgx. Cecal mass, R colectomy       Advanced Care Planning:    Does patient have an Advance Directive:  reviewed and current          Inpatient RRAT score: 64  Was this a readmission? yes       Care Transition Nurse (CTN) introduced self to the patient. Verified name and  with patient as identifiers. Provided explanation of the CTN role. Patients top risk factors for readmission: functional physical ability\",\"lack of knowledge about disease\",\",\"medical condition\",\"medication management\"  Medication Reconciliation:   Medication reconciliation was performed with patient, who verbalizes understanding of administration of home medications. There were no barriers to obtaining medications identified at this time. No current facility-administered medications for this visit. No current outpatient medications on file.      Facility-Administered Medications Ordered in Other Visits   Medication Dose Route Frequency    guaiFENesin ER (MUCINEX) tablet 600 mg  600 mg Oral Q12H    famotidine (PEPCID) tablet 20 mg  20 mg Oral BID    furosemide (LASIX) tablet 40 mg  40 mg Oral BID    metoprolol (LOPRESSOR) injection 5 mg  5 mg IntraVENous Q6H PRN    lip protectant (BLISTEX) ointment 1 Each  1 Each Topical PRN    dextrose 5 % - 0.45% NaCl infusion  100 mL/hr IntraVENous CONTINUOUS    irbesartan (AVAPRO) tablet 150 mg (Patient Supplied)  150 mg Oral QHS    albuterol (PROVENTIL VENTOLIN) nebulizer solution 2.5 mg  2.5 mg Nebulization Q6H PRN    atorvastatin (LIPITOR) tablet 80 mg  80 mg Oral QHS    busPIRone (BUSPAR) tablet 15 mg  15 mg Oral DAILY    pregabalin (LYRICA) capsule 150 mg  150 mg Oral BID    tamsulosin (FLOMAX) capsule 0.4 mg  0.4 mg Oral DAILY    sodium chloride (NS) flush 5-40 mL  5-40 mL IntraVENous Q8H    sodium chloride (NS) flush 5-40 mL  5-40 mL IntraVENous PRN    acetaminophen (TYLENOL) tablet 650 mg  650 mg Oral Q4H PRN    HYDROcodone-acetaminophen (NORCO)  mg tablet 1 Tab  1 Tab Oral Q4H PRN    HYDROmorphone (PF) (DILAUDID) injection 1 mg  1 mg IntraVENous Q4H PRN    naloxone (NARCAN) injection 0.4 mg  0.4 mg IntraVENous PRN    ondansetron (ZOFRAN) injection 4 mg  4 mg IntraVENous Q4H PRN    diphenhydrAMINE (BENADRYL) injection 12.5 mg  12.5 mg IntraVENous Q4H PRN    heparin (porcine) injection 5,000 Units  5,000 Units SubCUTAneous Q8H    dextrose 40% (GLUTOSE) oral gel 1 Tube  15 g Oral PRN    glucagon (GLUCAGEN) injection 1 mg  1 mg IntraMUSCular PRN    dextrose (D50W) injection syrg 12.5-25 g  25-50 mL IntraVENous PRN    insulin lispro (HUMALOG) injection   SubCUTAneous AC&HS       Discharge Plan: patient plan to d/c home where he lives with his brother. Patient is [facility] with [facility] and plans to continue upon d/c. Follow for RADHA after d/c. Goals      Facilitate transitions of care (ie. palliative care, assisted living, nursing home, changes in living arrangements).  Patient verbalizes understanding of self -management goals of living with Diabetes. Eat every 3-4 hours  Each meal/snack has protein and carb  No fruit juice.   If eat dessert, have it with a meal.

## 2019-12-09 NOTE — PROGRESS NOTES
Patient Vitals for the past 4 hrs:   Temp Pulse Resp BP SpO2   12/08/19 2302 98 °F (36.7 °C) 90 19 (!) 186/96 95 %       Dr Annabel Parnell paged regarding elevated BP. Orders received.

## 2019-12-09 NOTE — PROGRESS NOTES
Asked to start a PIV. Using U/S assistance, placed a 22g 1 3/4\" PIV, on 2nd attempt, in patients left forearm. Primary RN informed.   [place] RN, VAT

## 2019-12-09 NOTE — PROGRESS NOTES
PLAN:  Continue Clear Liquids  Keep abdominal binder  Mucinex  PTA HTN meds  Lopressor IV PRN  IVF  Pain/ nausea control  SCD  IS  Labs in AM  Consult PT  OOB/ Chair  Anticipate discharge in 1-2 days      ASSESSMENT:  Admit Date: 12/6/2019   3 Day Post-Op  Procedure(s):  RIGHT  COLECTOMY ROBOTIC ASSISTED ATTEMPTED; OPEN RIGHT COLECTOMY, EXCISION OF OMENTAL IMPLANT, UMBILICAL HERNIA REPAIR. Principal Problem:    Cecum mass (12/6/2019)       SUBJECTIVE:  Pt awake in bed. No complaints. Pain controlled. Occasional nausea. -V. +flatus/-BM. Voiding after faria removal. AF, HTN, on RA. 4.4L UOP/24h. OBJECTIVE:  Constitutional: Alert, cooperative, no acute distress; appears stated age   Visit Vitals  /78   Pulse 79   Temp 97.6 °F (36.4 °C)   Resp 20   Ht 5' 7\" (1.702 m)   Wt 277 lb 11.2 oz (126 kg)   SpO2 97%   BMI 43.49 kg/m²     Eyes: Sclera are clear. ENMT: no external lesions gross hearing normal; no obvious neck masses, no ear or lip lesions  CV: RRR. Normal perfusion  Resp: No JVD. Breathing is  non-labored; no audible wheezing. CTAB  GI: obese, soft and midlly distended, Dressing changed. Staples intact. Small amount of serosanguinous drainage from base of incision. Abdominal binder in place.   + BS. Musculoskeletal: unremarkable with normal function. No embolic signs or cyanosis.    Neuro:  Oriented; moves all 4; no focal deficits  Psychiatric: normal affect and mood, no memory impairment      Patient Vitals for the past 24 hrs:   BP Temp Pulse Resp SpO2 Weight   12/09/19 0850     97 %    12/09/19 0715 187/78 97.6 °F (36.4 °C) 79 20 99 %    12/09/19 0623      277 lb 11.2 oz (126 kg)   12/09/19 0338 174/74 97.9 °F (36.6 °C) 78 20 95 %    12/08/19 2302 (!) 186/96 98 °F (36.7 °C) 90 19 95 %    12/08/19 1910 183/80 97.8 °F (36.6 °C) 83 19 99 %    12/08/19 1530 184/82 98.1 °F (36.7 °C) 85 19 95 %    12/08/19 1145 179/84 97.7 °F (36.5 °C) 85 18 93 %      Labs:    Recent Labs 12/07/19  0449   WBC 8.7   HGB 8.6*      *   K 4.0   *   CO2 30   BUN 7*   CREA 0.84   GLU 57*       Mazin Ruiz

## 2019-12-09 NOTE — PROGRESS NOTES
END OF SHIFT NOTE:    INTAKE/OUTPUT  12/08 0701 - 12/09 0700  In: 9711 [I.V.:3657]  Out: 4400 [Urine:4400]  Voiding: YES  Catheter: NO  Drain:              Flatus: Patient does not have flatus present. Stool:  0 occurrences. Characteristics:       Emesis: 0 occurrences. Characteristics:        VITAL SIGNS  Patient Vitals for the past 12 hrs:   Temp Pulse Resp BP SpO2   12/09/19 0338 97.9 °F (36.6 °C) 78 20 174/74 95 %   12/08/19 2302 98 °F (36.7 °C) 90 19 (!) 186/96 95 %   12/08/19 1910 97.8 °F (36.6 °C) 83 19 183/80 99 %       Pain Assessment  Pain Intensity 1: 0 (12/08/19 1924)  Pain Location 1: Head  Pain Intervention(s) 1: Medication (see MAR)  Patient Stated Pain Goal: 0    Ambulating  No    Shift report given to oncoming nurse at the bedside.     João Hernandez RN

## 2019-12-09 NOTE — PROGRESS NOTES
Spoke to MrPili Campbell about Case Management and discharge planning. He says he lives in [place], [place] with his brother in a 2 bedroom apartment on the ground level with 4 steps up. He is fairly independent with ADLs, with use of a cane. He does not drive, but is able to ride with his brother to the grocery store. He is current with [facility]. RN, OT, PT, , and aide. At discharge, his plan is home and resume home health. Case Management to follow and assist with discharge planning. Care Management Interventions  PCP Verified by CM:  Yes  Transition of Care Consult (CM Consult): [address]: Yes  Physical Therapy Consult: Yes  Current Support Network: Own Home, Family Lives Nearby  Confirm Follow Up Transport: Family  Plan discussed with Pt/Family/Caregiver: Yes   Resource Information Provided?: No

## 2019-12-09 NOTE — PROGRESS NOTES
END OF SHIFT NOTE:    INTAKE/OUTPUT  12/07 0701 - 12/08 0700  In: 0931 [I.V.:1111]  Out: 850 [Urine:850]  Voiding: YES  Catheter: NO  Drain:              Flatus: Patient does have flatus present. Stool:  0 occurrences. Characteristics:       Emesis: 0 occurrences. Characteristics:        VITAL SIGNS  Patient Vitals for the past 12 hrs:   Temp Pulse Resp BP SpO2   12/08/19 1530 98.1 °F (36.7 °C) 85 19 184/82 95 %   12/08/19 1145 97.7 °F (36.5 °C) 85 18 179/84 93 %       Pain Assessment  Pain Intensity 1: 4 (12/08/19 1654)  Pain Location 1: Head  Pain Intervention(s) 1: Medication (see MAR)  Patient Stated Pain Goal: 0    Ambulating  Yes with assistance    Shift report given to oncoming nurse at the bedside.     Nadia Rodas RN

## 2019-12-10 LAB
ANION GAP SERPL CALC-SCNC: 3 MMOL/L (ref 7–16)
BASOPHILS # BLD: 0 K/UL (ref 0–0.2)
BASOPHILS NFR BLD: 0 % (ref 0–2)
BUN SERPL-MCNC: 5 MG/DL (ref 8–23)
CALCIUM SERPL-MCNC: 8.6 MG/DL (ref 8.3–10.4)
CHLORIDE SERPL-SCNC: 110 MMOL/L (ref 98–107)
CO2 SERPL-SCNC: 32 MMOL/L (ref 21–32)
CREAT SERPL-MCNC: 0.91 MG/DL (ref 0.8–1.5)
DIFFERENTIAL METHOD BLD: ABNORMAL
EOSINOPHIL # BLD: 0.3 K/UL (ref 0–0.8)
EOSINOPHIL NFR BLD: 5 % (ref 0.5–7.8)
ERYTHROCYTE [DISTWIDTH] IN BLOOD BY AUTOMATED COUNT: 15.4 % (ref 11.9–14.6)
GLUCOSE BLD STRIP.AUTO-MCNC: 124 MG/DL (ref 65–100)
GLUCOSE BLD STRIP.AUTO-MCNC: 139 MG/DL (ref 65–100)
GLUCOSE BLD STRIP.AUTO-MCNC: 160 MG/DL (ref 65–100)
GLUCOSE BLD STRIP.AUTO-MCNC: 192 MG/DL (ref 65–100)
GLUCOSE SERPL-MCNC: 110 MG/DL (ref 65–100)
HCT VFR BLD AUTO: 30 % (ref 41.1–50.3)
HGB BLD-MCNC: 9 G/DL (ref 13.6–17.2)
IMM GRANULOCYTES # BLD AUTO: 0 K/UL (ref 0–0.5)
IMM GRANULOCYTES NFR BLD AUTO: 0 % (ref 0–5)
LYMPHOCYTES # BLD: 2.2 K/UL (ref 0.5–4.6)
LYMPHOCYTES NFR BLD: 40 % (ref 13–44)
MCH RBC QN AUTO: 27 PG (ref 26.1–32.9)
MCHC RBC AUTO-ENTMCNC: 30 G/DL (ref 31.4–35)
MCV RBC AUTO: 90.1 FL (ref 79.6–97.8)
MONOCYTES # BLD: 0.6 K/UL (ref 0.1–1.3)
MONOCYTES NFR BLD: 11 % (ref 4–12)
NEUTS SEG # BLD: 2.4 K/UL (ref 1.7–8.2)
NEUTS SEG NFR BLD: 43 % (ref 43–78)
NRBC # BLD: 0 K/UL (ref 0–0.2)
PLATELET # BLD AUTO: 206 K/UL (ref 150–450)
PMV BLD AUTO: 10.5 FL (ref 9.4–12.3)
POTASSIUM SERPL-SCNC: 3.1 MMOL/L (ref 3.5–5.1)
RBC # BLD AUTO: 3.33 M/UL (ref 4.23–5.6)
SODIUM SERPL-SCNC: 145 MMOL/L (ref 136–145)
WBC # BLD AUTO: 5.5 K/UL (ref 4.3–11.1)

## 2019-12-10 PROCEDURE — 74011250636 HC RX REV CODE- 250/636: Performed by: PHYSICIAN ASSISTANT

## 2019-12-10 PROCEDURE — 74011000258 HC RX REV CODE- 258: Performed by: NURSE PRACTITIONER

## 2019-12-10 PROCEDURE — 74011250637 HC RX REV CODE- 250/637: Performed by: SURGERY

## 2019-12-10 PROCEDURE — 94760 N-INVAS EAR/PLS OXIMETRY 1: CPT

## 2019-12-10 PROCEDURE — 97530 THERAPEUTIC ACTIVITIES: CPT

## 2019-12-10 PROCEDURE — 94640 AIRWAY INHALATION TREATMENT: CPT

## 2019-12-10 PROCEDURE — 74011250637 HC RX REV CODE- 250/637: Performed by: PHYSICIAN ASSISTANT

## 2019-12-10 PROCEDURE — 85025 COMPLETE CBC W/AUTO DIFF WBC: CPT

## 2019-12-10 PROCEDURE — 82962 GLUCOSE BLOOD TEST: CPT

## 2019-12-10 PROCEDURE — 74011000250 HC RX REV CODE- 250: Performed by: PHYSICIAN ASSISTANT

## 2019-12-10 PROCEDURE — 80048 BASIC METABOLIC PNL TOTAL CA: CPT

## 2019-12-10 PROCEDURE — 77030040361 HC SLV COMPR DVT MDII -B

## 2019-12-10 PROCEDURE — 74011636637 HC RX REV CODE- 636/637: Performed by: PHYSICIAN ASSISTANT

## 2019-12-10 PROCEDURE — 65270000029 HC RM PRIVATE

## 2019-12-10 PROCEDURE — 74011000250 HC RX REV CODE- 250: Performed by: SURGERY

## 2019-12-10 PROCEDURE — 36415 COLL VENOUS BLD VENIPUNCTURE: CPT

## 2019-12-10 PROCEDURE — 74011250637 HC RX REV CODE- 250/637: Performed by: NURSE PRACTITIONER

## 2019-12-10 PROCEDURE — 77030020253 HC SOL INJ D545NS .05 DEX .45 SAL

## 2019-12-10 RX ORDER — IRBESARTAN 150 MG/1
300 TABLET ORAL
Status: DISCONTINUED | OUTPATIENT
Start: 2019-12-10 | End: 2019-12-11 | Stop reason: HOSPADM

## 2019-12-10 RX ORDER — POTASSIUM CHLORIDE 20 MEQ/1
40 TABLET, EXTENDED RELEASE ORAL
Status: COMPLETED | OUTPATIENT
Start: 2019-12-10 | End: 2019-12-10

## 2019-12-10 RX ADMIN — IRBESARTAN 300 MG: 150 TABLET ORAL at 21:00

## 2019-12-10 RX ADMIN — METOPROLOL TARTRATE 5 MG: 5 INJECTION INTRAVENOUS at 07:54

## 2019-12-10 RX ADMIN — PREGABALIN 150 MG: 150 CAPSULE ORAL at 08:04

## 2019-12-10 RX ADMIN — ALBUTEROL SULFATE 2.5 MG: 2.5 SOLUTION RESPIRATORY (INHALATION) at 20:19

## 2019-12-10 RX ADMIN — ACETAMINOPHEN 650 MG: 325 TABLET, FILM COATED ORAL at 07:48

## 2019-12-10 RX ADMIN — INSULIN LISPRO 2 UNITS: 100 INJECTION, SOLUTION INTRAVENOUS; SUBCUTANEOUS at 11:46

## 2019-12-10 RX ADMIN — TAMSULOSIN HYDROCHLORIDE 0.4 MG: 0.4 CAPSULE ORAL at 08:04

## 2019-12-10 RX ADMIN — Medication 10 ML: at 06:00

## 2019-12-10 RX ADMIN — GUAIFENESIN 600 MG: 600 TABLET ORAL at 08:04

## 2019-12-10 RX ADMIN — POTASSIUM CHLORIDE 40 MEQ: 20 TABLET, EXTENDED RELEASE ORAL at 08:03

## 2019-12-10 RX ADMIN — ACETAMINOPHEN 650 MG: 325 TABLET, FILM COATED ORAL at 17:17

## 2019-12-10 RX ADMIN — BUSPIRONE HYDROCHLORIDE 15 MG: 5 TABLET ORAL at 08:04

## 2019-12-10 RX ADMIN — FUROSEMIDE 40 MG: 40 TABLET ORAL at 08:04

## 2019-12-10 RX ADMIN — FAMOTIDINE 20 MG: 20 TABLET ORAL at 08:04

## 2019-12-10 RX ADMIN — HEPARIN SODIUM 5000 UNITS: 5000 INJECTION INTRAVENOUS; SUBCUTANEOUS at 17:17

## 2019-12-10 RX ADMIN — INSULIN LISPRO 2 UNITS: 100 INJECTION, SOLUTION INTRAVENOUS; SUBCUTANEOUS at 22:14

## 2019-12-10 RX ADMIN — Medication 10 ML: at 14:00

## 2019-12-10 RX ADMIN — FUROSEMIDE 40 MG: 40 TABLET ORAL at 17:17

## 2019-12-10 RX ADMIN — Medication 10 ML: at 22:15

## 2019-12-10 RX ADMIN — ALBUTEROL SULFATE 2.5 MG: 2.5 SOLUTION RESPIRATORY (INHALATION) at 12:00

## 2019-12-10 RX ADMIN — FAMOTIDINE 20 MG: 20 TABLET ORAL at 17:17

## 2019-12-10 RX ADMIN — METOPROLOL TARTRATE 25 MG: 25 TABLET ORAL at 00:45

## 2019-12-10 RX ADMIN — DEXTROSE MONOHYDRATE AND SODIUM CHLORIDE 100 ML/HR: 5; .45 INJECTION, SOLUTION INTRAVENOUS at 06:06

## 2019-12-10 RX ADMIN — PREGABALIN 150 MG: 150 CAPSULE ORAL at 17:18

## 2019-12-10 RX ADMIN — ATORVASTATIN CALCIUM 80 MG: 40 TABLET, FILM COATED ORAL at 22:14

## 2019-12-10 RX ADMIN — HEPARIN SODIUM 5000 UNITS: 5000 INJECTION INTRAVENOUS; SUBCUTANEOUS at 08:06

## 2019-12-10 RX ADMIN — HEPARIN SODIUM 5000 UNITS: 5000 INJECTION INTRAVENOUS; SUBCUTANEOUS at 00:46

## 2019-12-10 RX ADMIN — GUAIFENESIN 600 MG: 600 TABLET ORAL at 22:17

## 2019-12-10 NOTE — PROGRESS NOTES
Problem: Mobility Impaired (Adult and Pediatric)  Goal: *Acute Goals and Plan of Care (Insert Text)  Description  LTG:  (1.)Mr. To will move from supine to sit and sit to supine, scoot up and down and roll side to side INDEPENDENTLY with bed flat within 7 treatment day(s). (2.)Mr. To will transfer from bed to chair and chair to bed with MODIFIED INDEPENDENCE using the least restrictive device within 7 treatment day(s). (3.)Mr. To will ambulate with MODIFIED INDEPENDENCE for 450+ feet with the least restrictive device within 7 treatment day(s). (4.)Mr. Pili Waite will ascend and descend 4 steps with SUPERVISION using handrail(s) within 7 days. (5.)Mr. To will perform exercises per HEP independently to improve strength and mobility within 7 days. ________________________________________________________________________________________________   Outcome: Progressing Towards Goal     PHYSICAL THERAPY: Daily Note 12/10/2019  INPATIENT: PT Visit Days : 2  [place] / [place] / Product Type: Medicare /       NAME/AGE/GENDER: Andres Torres is a 70 y.o. male   PRIMARY DIAGNOSIS: Cecal lesion [K63.9]  Cecum mass [K63.89] Cecum mass Cecum mass  Procedure(s) (LRB):  RIGHT  COLECTOMY ROBOTIC ASSISTED ATTEMPTED; OPEN RIGHT COLECTOMY, EXCISION OF OMENTAL IMPLANT, UMBILICAL HERNIA REPAIR. (Right)  4 Days Post-Op  ICD-10: Treatment Diagnosis:    · Generalized Muscle Weakness (M62.81)  · Difficulty in walking, Not elsewhere classified (R26.2)  · Other abnormalities of gait and mobility (R26.89)   Precaution/Allergies:  Christiano Lover maleate]      ASSESSMENT:     Mr. Pili Waite is a pleasant 70year old male seen for initial therapy evaluation following above surgery. At baseline he is modified independent with mobility/ambulation using cane or walker and lives with his brother.    12/10 He presents sitting EOB without specific complaints and is agreeable to therapy with encouragement. Supervision  for sit-stand transfer then ambulates 250 ft in hallway with walker, SBA-supervision. Pt demonstrates slow pace with slightly forward flexed posture. Returned to room and sat EOB to rest. Performs LE exercises with cueing and demonstrates good participation. Left sitting EOB with needs in reach. Demonstrating slow progress with activity tolerance and exercises. Fany Young is functioning below baseline with mobility, strength, and activity tolerance and will benefit from continued therapy during hospital stay to address deficits/maximize independence with mobility. Anticipate return home at [place] with family support and possible  PT. This section established at most recent assessment   PROBLEM LIST (Impairments causing functional limitations):  1. Decreased Strength  2. Decreased Transfer Abilities  3. Decreased Ambulation Ability/Technique  4. Decreased Balance  5. Increased Pain  6. Decreased Activity Tolerance   INTERVENTIONS PLANNED: (Benefits and precautions of physical therapy have been discussed with the patient.)  1. Balance Exercise  2. Bed Mobility  3. Gait Training  4. Home Exercise Program (HEP)  5. Therapeutic Activites  6. Therapeutic Exercise/Strengthening  7. Transfer Training     TREATMENT PLAN: Frequency/Duration: 3 times a week for duration of hospital stay  Rehabilitation Potential For Stated Goals: Good     REHAB RECOMMENDATIONS (at time of discharge pending progress):    Placement: It is my opinion, based on this patient's performance to date, that Mr. Andre Maradiaga may benefit from [address] after discharge due to the functional deficits listed above that are likely to improve with skilled rehabilitation because he/she has multiple medical issues that affect his/her functional mobility in the community.   Equipment:    None at this time              HISTORY:   History of Present Injury/Illness (Reason for Referral):  Pt s/p above procedure  Past Medical History/Comorbidities:   Mr. Ganesh Leblanc  has a past medical history of Anxiety, Cecal lesion, Cervical stenosis of spine, Chronic kidney disease, Claustrophobia, Degenerative disc disease, lumbar, Diabetes mellitus type II, Diabetic retinopathy of both eyes without macular edema associated with type 2 diabetes mellitus ([address] Diastolic congestive heart failure ([address] Diverticulosis of colon (June 2010), DVT (deep venous thrombosis) ([address] Extrinsic allergic asthma, GERD (gastroesophageal reflux disease), History of anticoagulant use, History of TIA (transient ischemic attack) (11/09/2017), colonic polyps (07/29/2010), Hyperlipidemia LDL goal < 70, Hypertension, Obstructive sleep apnea, Perennial allergic rhinitis with seasonal variation, 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 He also has no past medical history of Coagulation defects or COPD. Mr. Ganesh Leblanc  has a past surgical history that includes colonoscopy (07/29/2010); egd (5/9/2011); pr sinus surgery proc unlisted; hx knee arthroscopy (Right, 1991); pr total knee arthroplasty (Right, 2006); hx bunionectomy (Bilateral); hx hernia repair (Bilateral); ir ivc filter (10/22/2019); colonoscopy (N/A, 10/24/2019); and hx circumcision. Social History/Living Environment:   Home Environment: Apartment  # Steps to Enter: 4  Rails to Enter: Yes  One/Two Story Residence: One story  Living Alone: No  Support Systems: Family member(s)  Patient Expects to be Discharged to[de-identified] Apartment  Current DME Used/Available at Home: Cane, straight, Walker, rolling  Prior Level of Function/Work/Activity:  Lives with brother in single story apartment with 4 steps to enter. Mod I with cane or walker.  Does not drive     Number of Personal Factors/Comorbidities that affect the Plan of Care: 1-2: MODERATE COMPLEXITY   EXAMINATION: Most Recent Physical Functioning:   Gross Assessment:                  Posture:     Balance:  Sitting: Intact  Standing: Impaired  Standing - Static: Good  Standing - Dynamic : Fair Bed Mobility:     Wheelchair Mobility:     Transfers:  Sit to Stand: Supervision  Stand to Sit: Supervision  Gait:     Base of Support: Center of gravity altered  Speed/Sharon: Slow  Step Length: Right shortened;Left shortened  Gait Abnormalities: Decreased step clearance  Distance (ft): 250 Feet (ft)  Assistive Device: Walker, rolling  Ambulation - Level of Assistance: Supervision  Interventions: Safety awareness training;Verbal cues      Body Structures Involved:  1. Muscles Body Functions Affected:  1. Movement Related  2. Skin Related Activities and Participation Affected:  1. General Tasks and Demands  2. Mobility  3. Domestic Life  4. Community, Social and [place]   Number of elements that affect the Plan of Care: 4+: HIGH COMPLEXITY   CLINICAL PRESENTATION:   Presentation: Stable and uncomplicated: LOW COMPLEXITY   CLINICAL DECISION MAKIN [facility] Inpatient Short Form  How much difficulty does the patient currently have. .. Unable A Lot A Little None   1. Turning over in bed (including adjusting bedclothes, sheets and blankets)? [] 1   [] 2   [] 3   [x] 4   2. Sitting down on and standing up from a chair with arms ( e.g., wheelchair, bedside commode, etc.)   [] 1   [] 2   [x] 3   [] 4   3. Moving from lying on back to sitting on the side of the bed? [] 1   [] 2   [x] 3   [] 4   How much help from another person does the patient currently need. .. Total A Lot A Little None   4. Moving to and from a bed to a chair (including a wheelchair)? [] 1   [] 2   [x] 3   [] 4   5. Need to walk in hospital room? [] 1   [] 2   [x] 3   [] 4   6. Climbing 3-5 steps with a railing? [] 1   [] 2   [x] 3   [] 4   © , Trustees of [address] [place] [address] [address] [address], under license to [employer]. All rights reserved      Score:  Initial: 19 Most Recent: X (Date: -- )    Interpretation of Tool:  Represents activities that are increasingly more difficult (i.e. Bed mobility, Transfers, Gait). Medical Necessity:     · Patient demonstrates   · good  ·  rehab potential due to higher previous functional level. Reason for Services/Other Comments:  · Patient   · continues to demonstrate capacity to improve strength, mobility, balance, transfers, and activity tolerance which will   · increase independence, decrease amount of assistance required from caregiver, and increase safety  · . Use of outcome tool(s) and clinical judgement create a POC that gives a: Clear prediction of patient's progress: LOW COMPLEXITY            TREATMENT:   (In addition to Assessment/Re-Assessment sessions the following treatments were rendered)   Pre-treatment Symptoms/Complaints:  \"thank you\"  Pain: Initial:   Pain Intensity 1: 0  Post Session:  0/10     Therapeutic Activity: (    38 minutes): Therapeutic activities including sit to stand transfers, Ambulation on level ground, and exercises in standing to improve mobility, strength, balance, and activity tolerance . Required minimal Safety awareness training;Verbal cues to promote static and dynamic balance in standing and promote motor control of bilateral, lower extremity(s). Hands on RW for balance during exercise.      Date:  12/8/19 Date:  12/10/19 Date:     Activity/Exercise Parameters Parameters Parameters   LAQ 10x AB     Seated marching 10x AB     Ankle pumps 10x AB     Standing heel raises  2x10    Standing toe raises  2x10 AB    Hip ab/ad in stand  2x10 AB            Key:  A=active, AA=active assisted, B=bilaterally, R=right, L=left  Braces/Orthotics/Lines/Etc:   · IV  · Abdominal binder  · O2 Device: Room air  Treatment/Session Assessment:    · Response to Treatment:  pt performs mobility with SBA-supervision in room and hallway using walker  · Interdisciplinary Collaboration:   o Physical Therapist  o Registered Nurse  o   · After treatment position/precautions:   o Bed/Chair-wheels locked  o Bed in low position  o Call light within reach  o sitting EOB   · Compliance with Program/Exercises: Will assess as treatment progresses  · Recommendations/Intent for next treatment session: \"Next visit will focus on advancements to more challenging activities and reduction in assistance provided\".   Total Treatment Duration:  PT Patient Time In/Time Out  Time In: 1257  Time Out: Phong [address], PT, DPT

## 2019-12-10 NOTE — PROGRESS NOTES
END OF SHIFT NOTE:    INTAKE/OUTPUT  12/09 0701 - 12/10 0700  In: 3687 [I.V.:1696]  Out: 3800 [Urine:3800]  Voiding: YES  Catheter: NO  Drain:              Flatus: Patient does have flatus present. Stool:  0 occurrences. Characteristics:       Emesis: 0 occurrences. Characteristics:        VITAL SIGNS  Patient Vitals for the past 12 hrs:   Temp Pulse Resp BP SpO2   12/10/19 0338 98.2 °F (36.8 °C) 68 18 167/79 94 %   12/10/19 0204 98.4 °F (36.9 °C) 69 19 167/83 92 %   12/09/19 2335 98.3 °F (36.8 °C) 72 20 182/79 93 %   12/09/19 1936 97.6 °F (36.4 °C) 76 20 181/78 93 %       Pain Assessment  Pain Intensity 1: 3 (12/09/19 2030)  Pain Location 1: Head  Pain Intervention(s) 1: Medication (see MAR)  Patient Stated Pain Goal: 2    Ambulating  Yes    Shift report given to oncoming nurse at the bedside.     Ben Snowden, RN

## 2019-12-10 NOTE — PROGRESS NOTES
Notified surgeon on call that pt lost IV placed by PICC team and pt has been stuck multiple times. BP currently 181/78 and pt denies pain. New order for PO metoprolol placed. Will continue to attempt new IV.

## 2019-12-10 NOTE — PROGRESS NOTES
PLAN:  Advance to FLD  Replace K+  Keep abdominal binder  Continue Mucinex/flutter valve  PTA HTN meds-- dose increased to 300mg   Lopressor IV PRN  IVF  Pain/ nausea control  SCD  IS  Labs/lytes in AM  Consult PT  OOB/Ambulate  Path pending  No anticoagulation due to h/o bleed  Anticipate discharge tomorrow       ASSESSMENT:  Admit Date: 12/6/2019   4 Day Post-Op  Procedure(s):  RIGHT  COLECTOMY ROBOTIC ASSISTED ATTEMPTED; OPEN RIGHT COLECTOMY, EXCISION OF OMENTAL IMPLANT, UMBILICAL HERNIA REPAIR. Principal Problem:    Cecum mass (12/6/2019)       SUBJECTIVE:  Pt awake in bed. No complaints. Pain controlled. Denies nausea. +flatus/-BM. Voiding. AF, HTN, on RA. 3.8L UOP/24h. K+ 3.1. H/H 9/30. OBJECTIVE:  Constitutional: Alert, cooperative, no acute distress; appears stated age   Visit Vitals  BP (!) 209/90   Pulse 71   Temp 97.8 °F (36.6 °C)   Resp 18   Ht 5' 7\" (1.702 m)   Wt 277 lb 9.6 oz (125.9 kg)   SpO2 97%   BMI 43.48 kg/m²     Eyes: Sclera are clear. ENMT: no external lesions gross hearing normal; no obvious neck masses, no ear or lip lesions  CV: RRR. Normal perfusion  Resp: No JVD. Breathing is  non-labored; no audible wheezing. CTAB  GI: obese, soft and midly distended, Dressing changed. Staples intact. Small amount of serosanguinous drainage from base of incision. Abdominal binder in place.   + BS. Musculoskeletal: unremarkable with normal function. No embolic signs or cyanosis.    Neuro:  Oriented; moves all 4; no focal deficits  Psychiatric: normal affect and mood, no memory impairment      Patient Vitals for the past 24 hrs:   BP Temp Pulse Resp SpO2 Weight   12/10/19 0753 (!) 209/90 97.8 °F (36.6 °C) 71 18 97 %    12/10/19 0525      277 lb 9.6 oz (125.9 kg)   12/10/19 0338 167/79 98.2 °F (36.8 °C) 68 18 94 %    12/10/19 0204 167/83 98.4 °F (36.9 °C) 69 19 92 %    12/09/19 2335 182/79 98.3 °F (36.8 °C) 72 20 93 %    12/09/19 1936 181/78 97.6 °F (36.4 °C) 76 20 93 %    12/09/19 1752 163/73  76      12/09/19 1701 191/83  77      12/09/19 1551 (!) 176/96 98.7 °F (37.1 °C) 82 20 95 %    12/09/19 1236 161/73 98.6 °F (37 °C) 75 20 97 %      Labs:    Recent Labs     12/10/19  0420   WBC 5.5   HGB 9.0*         K 3.1*   *   CO2 32   BUN 5*   CREA 0.91   *       Igor Nunez [place]

## 2019-12-10 NOTE — PROGRESS NOTES
END OF SHIFT NOTE:    INTAKE/OUTPUT  12/08 0701 - 12/09 0700  In: 1232 [I.V.:3657]  Out: 4400 [Urine:4400]  Voiding: YES  Catheter: NO  Drain:              Flatus: Patient does not have flatus present. Stool:  0 occurrences. Characteristics:       Emesis: 0 occurrences. Characteristics:        VITAL SIGNS  Patient Vitals for the past 12 hrs:   Temp Pulse Resp BP SpO2   12/09/19 1752  76  163/73    12/09/19 1701  77  191/83    12/09/19 1551 98.7 °F (37.1 °C) 82 20 (!) 176/96 95 %   12/09/19 1236 98.6 °F (37 °C) 75 20 161/73 97 %   12/09/19 0850     97 %       Pain Assessment  Pain Intensity 1: 2 (12/09/19 0826)  Pain Location 1: Head  Pain Intervention(s) 1: Medication (see MAR)  Patient Stated Pain Goal: 2    Ambulating  Yes x1 assist to chair. Shift report given to oncoming nurse at the bedside.     Marisel Arguello RN

## 2019-12-11 ENCOUNTER — HOME HEALTH ADMISSION (OUTPATIENT)
Dept: HOME HEALTH SERVICES | Facility: HOME HEALTH | Age: 79
End: 2019-12-11
Payer: MEDICARE

## 2019-12-11 ENCOUNTER — HOME CARE VISIT (OUTPATIENT)
Dept: HOME HEALTH SERVICES | Facility: HOME HEALTH | Age: 79
End: 2019-12-11

## 2019-12-11 VITALS
DIASTOLIC BLOOD PRESSURE: 69 MMHG | OXYGEN SATURATION: 97 % | SYSTOLIC BLOOD PRESSURE: 170 MMHG | BODY MASS INDEX: 43.95 KG/M2 | WEIGHT: 280 LBS | RESPIRATION RATE: 26 BRPM | HEART RATE: 71 BPM | HEIGHT: 67 IN | TEMPERATURE: 98.2 F

## 2019-12-11 LAB
ANION GAP SERPL CALC-SCNC: 6 MMOL/L (ref 7–16)
BUN SERPL-MCNC: 5 MG/DL (ref 8–23)
CALCIUM SERPL-MCNC: 8.7 MG/DL (ref 8.3–10.4)
CHLORIDE SERPL-SCNC: 109 MMOL/L (ref 98–107)
CO2 SERPL-SCNC: 30 MMOL/L (ref 21–32)
CREAT SERPL-MCNC: 0.91 MG/DL (ref 0.8–1.5)
GLUCOSE BLD STRIP.AUTO-MCNC: 102 MG/DL (ref 65–100)
GLUCOSE SERPL-MCNC: 97 MG/DL (ref 65–100)
MAGNESIUM SERPL-MCNC: 1.6 MG/DL (ref 1.8–2.4)
PHOSPHATE SERPL-MCNC: 2.8 MG/DL (ref 2.3–3.7)
POTASSIUM SERPL-SCNC: 3.1 MMOL/L (ref 3.5–5.1)
SODIUM SERPL-SCNC: 145 MMOL/L (ref 136–145)

## 2019-12-11 PROCEDURE — 82962 GLUCOSE BLOOD TEST: CPT

## 2019-12-11 PROCEDURE — 74011250637 HC RX REV CODE- 250/637: Performed by: SURGERY

## 2019-12-11 PROCEDURE — 74011250637 HC RX REV CODE- 250/637: Performed by: PHYSICIAN ASSISTANT

## 2019-12-11 PROCEDURE — 74011250636 HC RX REV CODE- 250/636: Performed by: PHYSICIAN ASSISTANT

## 2019-12-11 PROCEDURE — 84100 ASSAY OF PHOSPHORUS: CPT

## 2019-12-11 PROCEDURE — 74011250637 HC RX REV CODE- 250/637: Performed by: NURSE PRACTITIONER

## 2019-12-11 PROCEDURE — 80048 BASIC METABOLIC PNL TOTAL CA: CPT

## 2019-12-11 PROCEDURE — 83735 ASSAY OF MAGNESIUM: CPT

## 2019-12-11 PROCEDURE — 36415 COLL VENOUS BLD VENIPUNCTURE: CPT

## 2019-12-11 RX ORDER — HYDROCODONE BITARTRATE AND ACETAMINOPHEN 10; 325 MG/1; MG/1
1 TABLET ORAL
Qty: 30 TAB | Refills: 0 | Status: SHIPPED | OUTPATIENT
Start: 2019-12-11 | End: 2019-12-16

## 2019-12-11 RX ADMIN — BUSPIRONE HYDROCHLORIDE 15 MG: 5 TABLET ORAL at 08:09

## 2019-12-11 RX ADMIN — TAMSULOSIN HYDROCHLORIDE 0.4 MG: 0.4 CAPSULE ORAL at 08:11

## 2019-12-11 RX ADMIN — FUROSEMIDE 40 MG: 40 TABLET ORAL at 08:10

## 2019-12-11 RX ADMIN — HEPARIN SODIUM 5000 UNITS: 5000 INJECTION INTRAVENOUS; SUBCUTANEOUS at 01:08

## 2019-12-11 RX ADMIN — PREGABALIN 150 MG: 150 CAPSULE ORAL at 08:10

## 2019-12-11 RX ADMIN — FAMOTIDINE 20 MG: 20 TABLET ORAL at 08:15

## 2019-12-11 RX ADMIN — HEPARIN SODIUM 5000 UNITS: 5000 INJECTION INTRAVENOUS; SUBCUTANEOUS at 08:11

## 2019-12-11 RX ADMIN — Medication 10 ML: at 05:29

## 2019-12-11 RX ADMIN — METOPROLOL TARTRATE 25 MG: 25 TABLET ORAL at 05:29

## 2019-12-11 RX ADMIN — GUAIFENESIN 600 MG: 600 TABLET ORAL at 08:11

## 2019-12-11 NOTE — PROGRESS NOTES
END OF SHIFT NOTE:    INTAKE/OUTPUT  12/10 0701 - 12/11 0700  In: 1311 [I.V.:1311]  Out: 2570 [Urine:2570]  Voiding: YES  Catheter: NO  Drain:              Flatus: Patient does have flatus present. Stool:  0 occurrences. Characteristics:       Emesis: 0 occurrences. Characteristics:        VITAL SIGNS  Patient Vitals for the past 12 hrs:   Temp Pulse Resp BP SpO2   12/11/19 0500  68  168/90    12/11/19 0325 98.2 °F (36.8 °C) 84 18 168/77 94 %   12/10/19 2342 97 °F (36.1 °C) 79 18 164/71 93 %   12/10/19 2019     96 %   12/10/19 1937 98 °F (36.7 °C) 77 18 148/75 96 %       Pain Assessment  Pain Intensity 1: 2 (12/10/19 1714)  Pain Location 1: Abdomen  Pain Intervention(s) 1: Medication (see MAR)  Patient Stated Pain Goal: 2    Ambulating  Yes    Shift report given to oncoming nurse at the bedside.     Shahram Franco RN

## 2019-12-11 NOTE — PROGRESS NOTES
[address]  Face to Face Encounter    Patients Name: Harmeet Rivera    YOB: 1940    Ordering Physician: Dr. Unique Hodges MD     Primary Diagnosis: Cecal lesion [K63.9]  Cecum mass [K63.89]    Date of Face to Face:   12/11/2019                                  Face to Face Encounter findings are related to primary reason for home care:   yes. 1. I certify that the patient needs intermittent care as follows: skilled nursing care:  teaching/training of new medications  and skilled observation/assessment, patient education  physical therapy: strengthening, stretching/ROM, transfer training, gait/stair training, balance training and pt/caregiver education  occupational therapy:  ADL safety (ie. cooking, bathing, dressing), ROM and pt/caregiver education    2. I certify that this patient is homebound, that is: 1) patient requires the use of a cane device, special transportation, or assistance of another to leave the home; or 2) patient's condition makes leaving the home medically contraindicated; and 3) patient has a normal inability to leave the home and leaving the home requires considerable and taxing effort. Patient may leave the home for infrequent and short duration for medical reasons, and occasional absences for non-medical reasons. Homebound status is due to the following functional limitations: Patient currently under activity restrictions secondary to recent surgical procedure, this hinders their ability to safely leave the home. 3. I certify that this patient is under my care and that I, or a nurse practitioner or [facility], or clinical nurse specialist, or certified nurse midwife, working with me, had a Face-to-Face Encounter that meets the physician Face-to-Face Encounter requirements.   The following are the clinical findings from the [address] [place] [address] encounter that support the need for skilled services and is a summary of the encounter: see hospital chart    See hospital chart      Ameena Goddard RN  12/11/2019      THE FOLLOWING TO BE COMPLETED BY THE COMMUNITY PHYSICIAN:    I concur with the findings described above from the F2F encounter that this patient is homebound and in need of a skilled service.     Certifying Physician: _____________________________________      Printed Certifying Physician Name: _____________________________________    Date: _________________

## 2019-12-11 NOTE — DISCHARGE INSTRUCTIONS
Discharge Instructions/Follow-up Plans:   MD Instructions: Follow-up with Dr. Tommy Ferris in 1 week  Keep incisions clean and dry, may remain uncovered. Do not apply lotions, creams or ointments to incisions. Diet - full liquids; advance slowly as bowel function returns to GI soft  Activity - ambulate - as tolerated - no heavy lifting >10lb. May shower - no tub baths or soaking/submerging. No driving while taking narcotics. Do not drink alcohol while taking narcotics. Resume other home medications. If problems or questions arise, please call our office at [phone].      Greater than 30 minutes were spent discharging the patient

## 2019-12-11 NOTE — PROGRESS NOTES
Discharge instructions provided and explained to patient, patient voiced understanding. Medication side effect sheet reviewed with patient. No home medications or valuables to return. Opportunity for questions provided. Instructed to call once ready to leave the floor. Patient requesting yellow cab for transport home.

## 2019-12-11 NOTE — PROGRESS NOTES
Dispo update:  Spoke to Mr. Ned Delatorre in room 202 this morning. He agreed to resume [address] RN, OT, PT, SW, and aide. However, per call from Mr. Eric Hyde RN liaison for [facility], Mr. Fransisca Carrera home health cert period ended December 8, 2019, so this will actually be a new start of care (not resumption of care). Thus, order, face to face, and referral placed into [facility]/CC.

## 2019-12-11 NOTE — PROGRESS NOTES
END OF SHIFT NOTE:    INTAKE/OUTPUT  12/09 0701 - 12/10 0700  In: 1112 [I.V.:1696]  Out: 3800 [Urine:3800]  Voiding: YES  Catheter: NO  Drain:              Flatus: Patient does have flatus present. Stool:  0 occurrences. Characteristics:       Emesis: 0 occurrences. Characteristics:        VITAL SIGNS  Patient Vitals for the past 12 hrs:   Temp Pulse Resp BP SpO2   12/10/19 1714  88  159/73    12/10/19 1511 98.1 °F (36.7 °C) 71 18 146/72 96 %   12/10/19 1200     100 %   12/10/19 1119  64  164/81    12/10/19 1113    (!) 220/138    12/10/19 0753 97.8 °F (36.6 °C) 71 18 (!) 209/90 97 %       Pain Assessment  Pain Intensity 1: 2 (12/10/19 1714)  Pain Location 1: Abdomen  Pain Intervention(s) 1: Medication (see MAR)  Patient Stated Pain Goal: 2    Ambulating  Yes: ambulated with PT this afternoon. Shift report given to oncoming nurse at the bedside.     Juan Luis Lopez, RN

## 2019-12-11 NOTE — PROGRESS NOTES
D/C from unit with belongings and RX. Hospital personnel accompanied patient out to waiting taxi by wheel chair . No distress at time of D/C.

## 2019-12-11 NOTE — DISCHARGE SUMMARY
[address] [facility]  [phone]   Discharge Summary     Andres Torres  MRN: [id number]     : 1940     Age: 70 y.o. Admit date: 2019     Discharge date: 2019  Attending Physician: Mckayla Ley MD  Primary Discharge Diagnosis:   Principal Problem:    Cecum mass (2019)      Primary Operations or Procedures Performed :  Procedure(s):  RIGHT  COLECTOMY ROBOTIC ASSISTED ATTEMPTED; OPEN RIGHT COLECTOMY, EXCISION OF OMENTAL IMPLANT, UMBILICAL HERNIA REPAIR. Brief History and Reason for Admission: Andres Torres was admitted with the following history of present illness. Andres Torres is a 70 y.o. male who presents for a robotic right colectomy. Initially presented with a GI bleed. Colonoscopy showed a friable mass in the cecum. Biopsies showed inflammation. Hospital Course: The patient underwent Procedure(s):  RIGHT  COLECTOMY ROBOTIC ASSISTED ATTEMPTED; OPEN RIGHT COLECTOMY, EXCISION OF OMENTAL IMPLANT, UMBILICAL HERNIA REPAIR on 2019. The patient progressed satisfactorily meeting milestones necessary for successful discharge including tolerating a diet, adequate mobility, adequate pain control, and active bowel function. Patient was deemed a good candidate for discharge at the time of morning rounding. They are to follow up as indicated in their provided discharge paperwork. The patient helped develop and voices understanding with the plan of care. They are amenable without reservations at this time to moving forward with discharge. Condition at Discharge: Good    Discharge Medications:   Current Discharge Medication List      START taking these medications    Details   HYDROcodone-acetaminophen (NORCO)  mg tablet Take 1 Tab by mouth every four (4) hours as needed for Pain for up to 5 days. Max Daily Amount: 6 Tabs.   Qty: 30 Tab, Refills: 0    Associated Diagnoses: Cecum mass CONTINUE these medications which have NOT CHANGED    Details   irbesartan (AVAPRO) 150 mg tablet Take 2 Tabs by mouth nightly. Indications: chronic heart failure, high blood pressure  Qty: 180 Tab, Refills: 3      busPIRone (BUSPAR) 15 mg tablet Take 15 mg by mouth daily. insulin aspart U-100 (NOVOLOG FLEXPEN U-100 INSULIN) 100 unit/mL (3 mL) inpn by SubCUTAneous route Before breakfast, lunch, and dinner. Inject 8 units standing dose + additional insulin according to sliding scale (up to 16 units) subQ before each meal    151-200 = 2 units  201-250 = 4 units  251-300 = 6 units  201-350 = 8 units      furosemide (LASIX) 40 mg tablet Take 40 mg by mouth two (2) times a day. raNITIdine (ZANTAC) 300 mg tab Take 300 mg by mouth nightly. acetaminophen (TYLENOL) 500 mg tablet Take 1,000 mg by mouth every six (6) hours as needed for Fever or Pain.      pantoprazole (PROTONIX) 40 mg tablet Take 1 Tab by mouth daily. Take 1 tablet po 30 minutes before breakfast  Qty: 60 Tab, Refills: 3    Associated Diagnoses: Chronic GERD; Esophageal dysphagia      cyanocobalamin (VITAMIN B12) 500 mcg tablet Take 1 Tab by mouth daily. Indications: b12 deficiency  Qty: 30 Tab, Refills: 2      tamsulosin (FLOMAX) 0.4 mg capsule Take 1 Cap by mouth daily. Qty: 90 Cap, Refills: 3    Associated Diagnoses: Benign prostatic hyperplasia with urinary frequency      metFORMIN (GLUCOPHAGE) 1,000 mg tablet Take 1 Tab by mouth two (2) times daily (with meals). Qty: 180 Tab, Refills: 0    Associated Diagnoses: Type 2 diabetes with nephropathy (HCC)      potassium chloride (KLOR-CON M20) 20 mEq tablet TAKE 1 TABLET TWICE A DAY  Qty: 180 Tab, Refills: 3      atorvastatin (LIPITOR) 80 mg tablet Take 1 Tab by mouth nightly. Qty: 90 Tab, Refills: 3    Associated Diagnoses: Hyperlipidemia LDL goal <70      polyethylene glycol (MIRALAX) 17 gram/dose powder Take 17 g by mouth daily.   Qty: 17 g, Refills: 5      insulin degludec (TRESIBA FLEXTOUCH U-100) 100 unit/mL (3 mL) inpn 135 Units by SubCUTAneous route daily. ezetimibe (ZETIA) 10 mg tablet Take 1 Tab by mouth nightly. Qty: 90 Tab, Refills: 3    Associated Diagnoses: Hyperlipidemia LDL goal <70      pregabalin (LYRICA) 150 mg capsule Take 150 mg by mouth two (2) times a day. loratadine (CLARITIN) 10 mg tablet Take 10 mg by mouth daily. nitroglycerin (NITROSTAT) 0.4 mg SL tablet 1 Tab by SubLINGual route every five (5) minutes as needed for Chest Pain. Up to 3 doses. Qty: 30 Tab, Refills: 5      albuterol (VENTOLIN HFA) 90 mcg/actuation inhaler Take 2 Puffs by inhalation every four (4) hours as needed for Wheezing or Shortness of Breath. Qty: 1 Inhaler, Refills: 3    Associated Diagnoses: Extrinsic asthma, moderate persistent, uncomplicated         STOP taking these medications       cloNIDine HCl (CATAPRES) 0.1 mg tablet Comments:   Reason for Stopping:                 Disposition: Home    Discharge Instructions/Follow-up Care:      MD Instructions: Follow-up with Dr. Karyle Baumgarten in 1 week  Keep incisions clean and dry, may remain uncovered. Do not apply lotions, creams or ointments to incisions. Diet - full liquids; advance slowly as bowel function returns to GI soft  Activity - ambulate - as tolerated - no heavy lifting >10lb. May shower - no tub baths or soaking/submerging. No driving while taking narcotics. Do not drink alcohol while taking narcotics. Resume other home medications. If problems or questions arise, please call our office at [phone].          Signed:  Britney Hurtado NP   12/11/2019  8:26 AM

## 2019-12-12 ENCOUNTER — HOME CARE VISIT (OUTPATIENT)
Dept: SCHEDULING | Facility: HOME HEALTH | Age: 79
End: 2019-12-12
Payer: MEDICARE

## 2019-12-12 ENCOUNTER — PATIENT OUTREACH (OUTPATIENT)
Dept: CASE MANAGEMENT | Age: 79
End: 2019-12-12

## 2019-12-12 PROCEDURE — 3331090001 HH PPS REVENUE CREDIT

## 2019-12-12 PROCEDURE — G0299 HHS/HOSPICE OF RN EA 15 MIN: HCPCS

## 2019-12-12 PROCEDURE — 400013 HH SOC

## 2019-12-12 PROCEDURE — 3331090002 HH PPS REVENUE DEBIT

## 2019-12-12 NOTE — PROGRESS NOTES
This note will not be viewable in [address] Transition of Care Hospital Discharge Follow-Up      Date/Time:  2019 10:40 AM    Patient was admitted to [facility] on  and discharged on  for cecum mass. The physician discharge summary was available at the time of outreach. Patient was contacted within 1 business days of discharge. Advance Care Planning:   Does patient have an Advance Directive:  reviewed and current          Inpatient RRAT score: 64  Was this a readmission? no  Patient stated reason for the readmission: October last admission    Care Transition Nurse (CTN) contacted the patient by telephone to perform post hospital discharge assessment. Verified name and  with patient as identifiers. Provided introduction to self, and explanation of the CTN role. Patient received hospital discharge instructions. CTN reviewed discharge instructions and red flags with patient who verbalized understanding. Patient given an opportunity to ask questions and does not have any further questions or concerns at this time. The patient agrees to contact the PCP office for questions related to their healthcare. CTN provided contact information for future reference. Disease Specific:   cecum mass    Patients top risk factors for readmission:  medical condition, functional physical ability, lack of knowledge about disease    Home Health orders at discharge: PT\",\"OT\",\"SN  [address]: [facility]  Date of initial visit: Will be a new SOC episode for patient as his previous service ended on     Durable Medical Equipment ordered at discharge: none  [address] [facility] -  [id number] received: na    Medication(s):   Medications at Discharge: yes, reviewed changes     Medication reconciliation was performed with patient, who verbalizes understanding of administration of home medications.   There were no barriers to obtaining medications identified at this time.      Current Outpatient Medications   Medication Sig    HYDROcodone-acetaminophen (NORCO)  mg tablet Take 1 Tab by mouth every four (4) hours as needed for Pain for up to 5 days. Max Daily Amount: 6 Tabs.  irbesartan (AVAPRO) 150 mg tablet Take 2 Tabs by mouth nightly. Indications: chronic heart failure, high blood pressure    busPIRone (BUSPAR) 15 mg tablet Take 15 mg by mouth daily.  insulin aspart U-100 (NOVOLOG FLEXPEN U-100 INSULIN) 100 unit/mL (3 mL) inpn by SubCUTAneous route Before breakfast, lunch, and dinner. Inject 8 units standing dose + additional insulin according to sliding scale (up to 16 units) subQ before each meal    151-200 = 2 units  201-250 = 4 units  251-300 = 6 units  201-350 = 8 units    furosemide (LASIX) 40 mg tablet Take 40 mg by mouth two (2) times a day.  nitroglycerin (NITROSTAT) 0.4 mg SL tablet 1 Tab by SubLINGual route every five (5) minutes as needed for Chest Pain. Up to 3 doses.  raNITIdine (ZANTAC) 300 mg tab Take 300 mg by mouth nightly.  albuterol (VENTOLIN HFA) 90 mcg/actuation inhaler Take 2 Puffs by inhalation every four (4) hours as needed for Wheezing or Shortness of Breath.  acetaminophen (TYLENOL) 500 mg tablet Take 1,000 mg by mouth every six (6) hours as needed for Fever or Pain.  pantoprazole (PROTONIX) 40 mg tablet Take 1 Tab by mouth daily. Take 1 tablet po 30 minutes before breakfast    cyanocobalamin (VITAMIN B12) 500 mcg tablet Take 1 Tab by mouth daily. Indications: b12 deficiency    tamsulosin (FLOMAX) 0.4 mg capsule Take 1 Cap by mouth daily.  metFORMIN (GLUCOPHAGE) 1,000 mg tablet Take 1 Tab by mouth two (2) times daily (with meals).  potassium chloride (KLOR-CON M20) 20 mEq tablet TAKE 1 TABLET TWICE A DAY    atorvastatin (LIPITOR) 80 mg tablet Take 1 Tab by mouth nightly.  polyethylene glycol (MIRALAX) 17 gram/dose powder Take 17 g by mouth daily.     insulin degludec (TRESIBA FLEXTOUCH U-100) 100 unit/mL (3 mL) inpn 135 Units by SubCUTAneous route daily.  ezetimibe (ZETIA) 10 mg tablet Take 1 Tab by mouth nightly.  pregabalin (LYRICA) 150 mg capsule Take 150 mg by mouth two (2) times a day.  loratadine (CLARITIN) 10 mg tablet Take 10 mg by mouth daily. No current facility-administered medications for this visit. There are no discontinued medications. [facility] follow up appointment(s):   Future Appointments   Date Time Provider Lashay Kiser   12/12/2019  2:00 PM Alexandra Leger [facility]   12/17/2019 10:00 AM Cali Clark MD [facility] CSA CSA MAIN   1/30/2020  1:30 PM Patricia Shahid PA-C [facility] MAT MAT   2/4/2020 10:30 AM Dionna Méndez MD [facility] UCDG UCD   3/18/2020 10:45 AM Lisseth Galo MD [id number] PGU      Non-[facility] follow up appointment(s): na   7 Day follow up with PCP or Specialist: Dr Poppy Loya 12/17   Transportation: has family for transportation        Goals      Facilitate transitions of care (ie. palliative care, assisted living, nursing home, changes in living arrangements).  Patient verbalizes understanding of self -management goals of living with Diabetes. Eat every 3-4 hours  Each meal/snack has protein and carb  No fruit juice.   If eat dessert, have it with a meal.             Next Outreach Scheduled: 1 week,  to see, follow up appt 12/17

## 2019-12-13 PROCEDURE — 3331090002 HH PPS REVENUE DEBIT

## 2019-12-13 PROCEDURE — 3331090001 HH PPS REVENUE CREDIT

## 2019-12-14 PROCEDURE — 3331090002 HH PPS REVENUE DEBIT

## 2019-12-14 PROCEDURE — 3331090001 HH PPS REVENUE CREDIT

## 2019-12-15 VITALS
SYSTOLIC BLOOD PRESSURE: 140 MMHG | HEART RATE: 85 BPM | RESPIRATION RATE: 18 BRPM | DIASTOLIC BLOOD PRESSURE: 80 MMHG | TEMPERATURE: 98.2 F | OXYGEN SATURATION: 98 %

## 2019-12-15 PROCEDURE — 3331090001 HH PPS REVENUE CREDIT

## 2019-12-15 PROCEDURE — 3331090002 HH PPS REVENUE DEBIT

## 2019-12-16 PROCEDURE — 3331090002 HH PPS REVENUE DEBIT

## 2019-12-16 PROCEDURE — 3331090001 HH PPS REVENUE CREDIT

## 2019-12-17 PROCEDURE — 3331090001 HH PPS REVENUE CREDIT

## 2019-12-17 PROCEDURE — 3331090002 HH PPS REVENUE DEBIT

## 2019-12-18 ENCOUNTER — HOME CARE VISIT (OUTPATIENT)
Dept: HOME HEALTH SERVICES | Facility: HOME HEALTH | Age: 79
End: 2019-12-18
Payer: MEDICARE

## 2019-12-18 ENCOUNTER — PATIENT OUTREACH (OUTPATIENT)
Dept: CASE MANAGEMENT | Age: 79
End: 2019-12-18

## 2019-12-18 ENCOUNTER — HOME CARE VISIT (OUTPATIENT)
Dept: SCHEDULING | Facility: HOME HEALTH | Age: 79
End: 2019-12-18
Payer: MEDICARE

## 2019-12-18 VITALS
TEMPERATURE: 98 F | DIASTOLIC BLOOD PRESSURE: 74 MMHG | RESPIRATION RATE: 18 BRPM | HEART RATE: 70 BPM | SYSTOLIC BLOOD PRESSURE: 132 MMHG

## 2019-12-18 PROCEDURE — G0151 HHCP-SERV OF PT,EA 15 MIN: HCPCS

## 2019-12-18 PROCEDURE — 3331090002 HH PPS REVENUE DEBIT

## 2019-12-18 PROCEDURE — 3331090001 HH PPS REVENUE CREDIT

## 2019-12-18 NOTE — PROGRESS NOTES
This note will not be viewable in [address] Transition of Care Hospital Discharge Follow-Up      Date/Time:  2019 1:59 PM    Patient was admitted to [facility] on 19 and discharged on 2019  for cecum mass. The physician discharge summary was available at the time of outreach. Patient was contacted within 1 business days of discharge. This is RADHA follow up from recent d/c and RADHA            Advance Care Planning:   Does patient have an Advance Directive:  reviewed and current          Inpatient RRAT score: 64  Was this a readmission? no   Patient stated reason for the readmission: mary beth    Care Transition Nurse (CTN) contacted the patient by telephone to perform post hospital discharge assessment. Verified name and  with patient as identifiers. Provided introduction to self, and explanation of the CTN role. Patient received hospital discharge instructions. CTN reviewed discharge instructions and red flags with patient who verbalized understanding. Patient given an opportunity to ask questions and does not have any further questions or concerns at this time. The patient agrees to contact the PCP office for questions related to their healthcare. CTN provided contact information for future reference. Disease Specific:   N/A    Patients top risk factors for readmission: medical condition, medication management  Home Health orders at discharge: SN, PT, sw- decline [facility]: [facility]  Date of initial visit:     Durable Medical Equipment ordered at [address] received: mray beth    Medication(s):   Medications at Discharge:no recent changes from [facility] call    Medication reconciliation was performed with patient, who verbalizes understanding of administration of home medications. There were no barriers to obtaining medications identified at this time.       Current Outpatient Medications   Medication Sig    carvedilol (COREG) 25 mg tablet Take 25 mg by mouth two (2) times a day.  irbesartan (AVAPRO) 150 mg tablet Take 2 Tabs by mouth nightly. Indications: chronic heart failure, high blood pressure    busPIRone (BUSPAR) 15 mg tablet Take 15 mg by mouth daily.  insulin aspart U-100 (NOVOLOG FLEXPEN U-100 INSULIN) 100 unit/mL (3 mL) inpn by SubCUTAneous route Before breakfast, lunch, and dinner. Inject 8 units standing dose + additional insulin according to sliding scale (up to 16 units) subQ before each meal    151-200 = 2 units  201-250 = 4 units  251-300 = 6 units  201-350 = 8 units    furosemide (LASIX) 40 mg tablet Take 40 mg by mouth two (2) times a day.  nitroglycerin (NITROSTAT) 0.4 mg SL tablet 1 Tab by SubLINGual route every five (5) minutes as needed for Chest Pain. Up to 3 doses.  raNITIdine (ZANTAC) 300 mg tab Take 300 mg by mouth nightly.  albuterol (VENTOLIN HFA) 90 mcg/actuation inhaler Take 2 Puffs by inhalation every four (4) hours as needed for Wheezing or Shortness of Breath.  acetaminophen (TYLENOL) 500 mg tablet Take 1,000 mg by mouth every six (6) hours as needed for Fever or Pain.  pantoprazole (PROTONIX) 40 mg tablet Take 1 Tab by mouth daily. Take 1 tablet po 30 minutes before breakfast    cyanocobalamin (VITAMIN B12) 500 mcg tablet Take 1 Tab by mouth daily. Indications: b12 deficiency    tamsulosin (FLOMAX) 0.4 mg capsule Take 1 Cap by mouth daily.  metFORMIN (GLUCOPHAGE) 1,000 mg tablet Take 1 Tab by mouth two (2) times daily (with meals).  potassium chloride (KLOR-CON M20) 20 mEq tablet TAKE 1 TABLET TWICE A DAY    atorvastatin (LIPITOR) 80 mg tablet Take 1 Tab by mouth nightly.  polyethylene glycol (MIRALAX) 17 gram/dose powder Take 17 g by mouth daily.  insulin degludec (TRESIBA FLEXTOUCH U-100) 100 unit/mL (3 mL) inpn 135 Units by SubCUTAneous route daily.  ezetimibe (ZETIA) 10 mg tablet Take 1 Tab by mouth nightly.     pregabalin (LYRICA) 150 mg capsule Take 150 mg by mouth two (2) times a day.  loratadine (CLARITIN) 10 mg tablet Take 10 mg by mouth daily. No current facility-administered medications for this visit. There are no discontinued medications. [facility] follow up appointment(s):   Future Appointments   Date Time Provider Lashay Kiser   12/18/2019  3:30 PM Omar Patel [address]   12/18/2019 To Be Determined [facility] [address]   12/20/2019 To Be Determined Claudette Amabile, MSW [facility]   12/20/2019  1:00 PM [facility]   12/24/2019  9:00 AM CSA NURSE ONLY [facility] CSA CSA MAIN   1/30/2020  1:30 PM Walter Rajput PA-C [facility] MAT MAT   2/18/2020 10:30 AM Melanie Ramirez MD [facility] UCDG UCD   3/18/2020 10:45 AM Melecio Keane MD [id number] PGU      Non-[facility] follow up appointment(s):na  7 Day follow up with PCP or Specialist:   Transportation:         Goals      Facilitate transitions of care (ie. palliative care, assisted living, nursing home, changes in living arrangements).  Patient verbalizes understanding of self -management goals of living with Diabetes. Eat every 3-4 hours  Each meal/snack has protein and carb  No fruit juice. If eat dessert, have it with a meal.             Next Outreach Scheduled: 7-10 days. NO acute issues.  Patient has advanced diet, is not experiencing pain and is working with SN, PT, SW from [facility]

## 2019-12-19 PROCEDURE — 3331090001 HH PPS REVENUE CREDIT

## 2019-12-19 PROCEDURE — 3331090002 HH PPS REVENUE DEBIT

## 2019-12-20 ENCOUNTER — HOME CARE VISIT (OUTPATIENT)
Dept: SCHEDULING | Facility: HOME HEALTH | Age: 79
End: 2019-12-20
Payer: MEDICARE

## 2019-12-20 ENCOUNTER — HOME CARE VISIT (OUTPATIENT)
Dept: HOME HEALTH SERVICES | Facility: HOME HEALTH | Age: 79
End: 2019-12-20
Payer: MEDICARE

## 2019-12-20 PROCEDURE — 3331090001 HH PPS REVENUE CREDIT

## 2019-12-20 PROCEDURE — G0299 HHS/HOSPICE OF RN EA 15 MIN: HCPCS

## 2019-12-20 PROCEDURE — 3331090002 HH PPS REVENUE DEBIT

## 2019-12-21 PROCEDURE — 3331090002 HH PPS REVENUE DEBIT

## 2019-12-21 PROCEDURE — 3331090001 HH PPS REVENUE CREDIT

## 2019-12-22 VITALS
SYSTOLIC BLOOD PRESSURE: 112 MMHG | OXYGEN SATURATION: 98 % | TEMPERATURE: 98.2 F | HEART RATE: 86 BPM | RESPIRATION RATE: 18 BRPM | DIASTOLIC BLOOD PRESSURE: 62 MMHG

## 2019-12-22 PROCEDURE — 3331090002 HH PPS REVENUE DEBIT

## 2019-12-22 PROCEDURE — 3331090001 HH PPS REVENUE CREDIT

## 2019-12-23 PROCEDURE — 3331090002 HH PPS REVENUE DEBIT

## 2019-12-23 PROCEDURE — 3331090001 HH PPS REVENUE CREDIT

## 2019-12-24 ENCOUNTER — HOME CARE VISIT (OUTPATIENT)
Dept: HOME HEALTH SERVICES | Facility: HOME HEALTH | Age: 79
End: 2019-12-24
Payer: MEDICARE

## 2019-12-24 VITALS
RESPIRATION RATE: 17 BRPM | SYSTOLIC BLOOD PRESSURE: 135 MMHG | HEART RATE: 65 BPM | TEMPERATURE: 97.9 F | DIASTOLIC BLOOD PRESSURE: 73 MMHG

## 2019-12-24 PROCEDURE — 3331090002 HH PPS REVENUE DEBIT

## 2019-12-24 PROCEDURE — G0151 HHCP-SERV OF PT,EA 15 MIN: HCPCS

## 2019-12-24 PROCEDURE — 3331090001 HH PPS REVENUE CREDIT

## 2019-12-25 PROCEDURE — 3331090001 HH PPS REVENUE CREDIT

## 2019-12-25 PROCEDURE — 3331090002 HH PPS REVENUE DEBIT

## 2019-12-26 PROCEDURE — 3331090001 HH PPS REVENUE CREDIT

## 2019-12-26 PROCEDURE — 3331090002 HH PPS REVENUE DEBIT

## 2019-12-27 PROCEDURE — 3331090002 HH PPS REVENUE DEBIT

## 2019-12-27 PROCEDURE — 3331090001 HH PPS REVENUE CREDIT

## 2019-12-28 PROCEDURE — 3331090001 HH PPS REVENUE CREDIT

## 2019-12-28 PROCEDURE — 3331090002 HH PPS REVENUE DEBIT

## 2019-12-29 PROCEDURE — 3331090001 HH PPS REVENUE CREDIT

## 2019-12-29 PROCEDURE — 3331090002 HH PPS REVENUE DEBIT

## 2019-12-30 PROCEDURE — 3331090001 HH PPS REVENUE CREDIT

## 2019-12-30 PROCEDURE — 3331090002 HH PPS REVENUE DEBIT

## 2019-12-31 ENCOUNTER — HOME CARE VISIT (OUTPATIENT)
Dept: SCHEDULING | Facility: HOME HEALTH | Age: 79
End: 2019-12-31
Payer: MEDICARE

## 2019-12-31 VITALS
HEART RATE: 61 BPM | TEMPERATURE: 98.1 F | DIASTOLIC BLOOD PRESSURE: 76 MMHG | SYSTOLIC BLOOD PRESSURE: 145 MMHG | RESPIRATION RATE: 18 BRPM

## 2019-12-31 PROCEDURE — G0151 HHCP-SERV OF PT,EA 15 MIN: HCPCS

## 2019-12-31 PROCEDURE — 3331090002 HH PPS REVENUE DEBIT

## 2019-12-31 PROCEDURE — 3331090001 HH PPS REVENUE CREDIT

## 2020-01-01 PROCEDURE — 3331090002 HH PPS REVENUE DEBIT

## 2020-01-01 PROCEDURE — 3331090001 HH PPS REVENUE CREDIT

## 2020-01-07 ENCOUNTER — PATIENT OUTREACH (OUTPATIENT)
Dept: CASE MANAGEMENT | Age: 80
End: 2020-01-07

## 2020-01-07 NOTE — PROGRESS NOTES
This note will not be viewable in [address] CTN reached out to patient in regards to recent outreach to cardiology. Patient has noticed his blood pressure has been in the 150's-160's lately and has notified cardiology. Patient was able to verbalize changes to medication and is now prescribed coreg 12.5mg BID. Patient has meds in the home but have also been called to patients pharmacy. Patient appreciated the follow up and has not questions or concerns at this time. Patient has recently been d/c from [facility]. CTN episode to end Jan 10th.

## 2020-01-09 PROBLEM — Z90.49 S/P RIGHT HEMICOLECTOMY: Status: ACTIVE | Noted: 2020-01-09

## 2020-01-10 ENCOUNTER — PATIENT OUTREACH (OUTPATIENT)
Dept: CASE MANAGEMENT | Age: 80
End: 2020-01-10

## 2020-01-10 NOTE — PROGRESS NOTES
This note will not be viewable in [address] CTN followed patient for 30 days. HH has signed off, patient has been doing well without issues from recent d/c. Patient is able to verbalize follow up appts and medication regimen at this time. No further questions or concerns.    CTN will resolve the RADHA episode and remove himself from care team.   No further RADHA follow up needed at this time

## 2020-01-16 ENCOUNTER — HOSPITAL ENCOUNTER (OUTPATIENT)
Dept: LAB | Age: 80
Discharge: HOME | End: 2020-01-16
Payer: MEDICARE

## 2020-01-16 DIAGNOSIS — I10 ESSENTIAL HYPERTENSION WITH GOAL BLOOD PRESSURE LESS THAN 130/85: ICD-10-CM

## 2020-01-16 DIAGNOSIS — Z79.899 LONG-TERM USE OF HIGH-RISK MEDICATION: ICD-10-CM

## 2020-01-16 DIAGNOSIS — N18.30 CKD (CHRONIC KIDNEY DISEASE) STAGE 3, GFR 30-59 ML/MIN (HCC): ICD-10-CM

## 2020-01-16 DIAGNOSIS — E87.6 HYPOKALEMIA: ICD-10-CM

## 2020-01-16 DIAGNOSIS — I50.32 DIASTOLIC CHF, CHRONIC (HCC): ICD-10-CM

## 2020-01-16 LAB
ANION GAP SERPL CALC-SCNC: 2 MMOL/L (ref 7–16)
BUN SERPL-MCNC: 16 MG/DL (ref 8–23)
CALCIUM SERPL-MCNC: 8.7 MG/DL (ref 8.3–10.4)
CHLORIDE SERPL-SCNC: 107 MMOL/L (ref 98–107)
CO2 SERPL-SCNC: 31 MMOL/L (ref 21–32)
CREAT SERPL-MCNC: 1.14 MG/DL (ref 0.8–1.5)
GLUCOSE SERPL-MCNC: 120 MG/DL (ref 65–100)
POTASSIUM SERPL-SCNC: 4 MMOL/L (ref 3.5–5.1)
SODIUM SERPL-SCNC: 140 MMOL/L (ref 136–145)

## 2020-01-16 PROCEDURE — 36415 COLL VENOUS BLD VENIPUNCTURE: CPT

## 2020-01-16 PROCEDURE — 80048 BASIC METABOLIC PNL TOTAL CA: CPT

## 2020-01-23 NOTE — PROGRESS NOTES
Per Dr. Meena Rice, advised pt 1/16/20 potassium and kidney function were normal and advised pt to continue same Rx. Pt verbalized understanding. See triage note.

## 2020-02-04 ENCOUNTER — HOSPITAL ENCOUNTER (OUTPATIENT)
Dept: ULTRASOUND IMAGING | Age: 80
Discharge: HOME | End: 2020-02-04
Attending: RADIOLOGY
Payer: MEDICARE

## 2020-02-04 DIAGNOSIS — I26.90: ICD-10-CM

## 2020-02-04 PROCEDURE — 93970 EXTREMITY STUDY: CPT

## 2020-02-25 ENCOUNTER — HOSPITAL ENCOUNTER (OUTPATIENT)
Dept: LAB | Age: 80
Discharge: HOME | End: 2020-02-25
Payer: MEDICARE

## 2020-02-25 LAB
ANION GAP SERPL CALC-SCNC: 6 MMOL/L (ref 7–16)
APTT PPP: 30.1 SEC (ref 24.3–35.4)
BASOPHILS # BLD: 0 K/UL (ref 0–0.2)
BASOPHILS NFR BLD: 0 % (ref 0–2)
BUN SERPL-MCNC: 15 MG/DL (ref 8–23)
CALCIUM SERPL-MCNC: 8.7 MG/DL (ref 8.3–10.4)
CHLORIDE SERPL-SCNC: 105 MMOL/L (ref 98–107)
CO2 SERPL-SCNC: 29 MMOL/L (ref 21–32)
CREAT SERPL-MCNC: 1.18 MG/DL (ref 0.8–1.5)
DIFFERENTIAL METHOD BLD: ABNORMAL
EOSINOPHIL # BLD: 0.2 K/UL (ref 0–0.8)
EOSINOPHIL NFR BLD: 4 % (ref 0.5–7.8)
ERYTHROCYTE [DISTWIDTH] IN BLOOD BY AUTOMATED COUNT: 18.6 % (ref 11.9–14.6)
GLUCOSE SERPL-MCNC: 130 MG/DL (ref 65–100)
HCT VFR BLD AUTO: 32.3 % (ref 41.1–50.3)
HGB BLD-MCNC: 9.7 G/DL (ref 13.6–17.2)
IMM GRANULOCYTES # BLD AUTO: 0 K/UL (ref 0–0.5)
IMM GRANULOCYTES NFR BLD AUTO: 0 % (ref 0–5)
INR PPP: 1
LYMPHOCYTES # BLD: 2.1 K/UL (ref 0.5–4.6)
LYMPHOCYTES NFR BLD: 42 % (ref 13–44)
MCH RBC QN AUTO: 25.3 PG (ref 26.1–32.9)
MCHC RBC AUTO-ENTMCNC: 30 G/DL (ref 31.4–35)
MCV RBC AUTO: 84.1 FL (ref 79.6–97.8)
MONOCYTES # BLD: 0.5 K/UL (ref 0.1–1.3)
MONOCYTES NFR BLD: 11 % (ref 4–12)
NEUTS SEG # BLD: 2.1 K/UL (ref 1.7–8.2)
NEUTS SEG NFR BLD: 42 % (ref 43–78)
NRBC # BLD: 0 K/UL (ref 0–0.2)
PLATELET # BLD AUTO: 182 K/UL (ref 150–450)
PMV BLD AUTO: 10.7 FL (ref 9.4–12.3)
POTASSIUM SERPL-SCNC: 3.9 MMOL/L (ref 3.5–5.1)
PROTHROMBIN TIME: 13.9 SEC (ref 12–14.7)
RBC # BLD AUTO: 3.84 M/UL (ref 4.23–5.6)
SODIUM SERPL-SCNC: 140 MMOL/L (ref 136–145)
WBC # BLD AUTO: 4.9 K/UL (ref 4.3–11.1)

## 2020-02-25 PROCEDURE — 85610 PROTHROMBIN TIME: CPT

## 2020-02-25 PROCEDURE — 85025 COMPLETE CBC W/AUTO DIFF WBC: CPT

## 2020-02-25 PROCEDURE — 80048 BASIC METABOLIC PNL TOTAL CA: CPT

## 2020-02-25 PROCEDURE — 36415 COLL VENOUS BLD VENIPUNCTURE: CPT

## 2020-02-25 PROCEDURE — 85730 THROMBOPLASTIN TIME PARTIAL: CPT

## 2020-02-27 ENCOUNTER — HOSPITAL ENCOUNTER (OUTPATIENT)
Dept: INTERVENTIONAL RADIOLOGY/VASCULAR | Age: 80
Discharge: HOME | End: 2020-02-27
Attending: RADIOLOGY
Payer: MEDICARE

## 2020-02-27 VITALS
DIASTOLIC BLOOD PRESSURE: 63 MMHG | SYSTOLIC BLOOD PRESSURE: 137 MMHG | TEMPERATURE: 97.1 F | OXYGEN SATURATION: 95 % | RESPIRATION RATE: 20 BRPM | HEART RATE: 77 BPM

## 2020-02-27 DIAGNOSIS — I26.99 PE (PULMONARY THROMBOEMBOLISM) (HCC): ICD-10-CM

## 2020-02-27 LAB
GLUCOSE BLD STRIP.AUTO-MCNC: 116 MG/DL (ref 65–100)
GLUCOSE BLD STRIP.AUTO-MCNC: 73 MG/DL (ref 65–100)
GLUCOSE BLD STRIP.AUTO-MCNC: 88 MG/DL (ref 65–100)

## 2020-02-27 PROCEDURE — C1773 RET DEV, INSERTABLE: HCPCS

## 2020-02-27 PROCEDURE — 82962 GLUCOSE BLOOD TEST: CPT

## 2020-02-27 PROCEDURE — 77030004524 HC CATH ANGI DX FLS COOK -B

## 2020-02-27 PROCEDURE — C1769 GUIDE WIRE: HCPCS

## 2020-02-27 PROCEDURE — 74011636320 HC RX REV CODE- 636/320: Performed by: RADIOLOGY

## 2020-02-27 PROCEDURE — C1894 INTRO/SHEATH, NON-LASER: HCPCS

## 2020-02-27 PROCEDURE — 37193 REM ENDOVAS VENA CAVA FILTER: CPT

## 2020-02-27 PROCEDURE — 74011000250 HC RX REV CODE- 250: Performed by: RADIOLOGY

## 2020-02-27 PROCEDURE — 74011250636 HC RX REV CODE- 250/636: Performed by: RADIOLOGY

## 2020-02-27 RX ORDER — SODIUM CHLORIDE 9 MG/ML
500 INJECTION, SOLUTION INTRAVENOUS ONCE
Status: COMPLETED | OUTPATIENT
Start: 2020-02-27 | End: 2020-02-27

## 2020-02-27 RX ORDER — LIDOCAINE HYDROCHLORIDE 20 MG/ML
20-300 INJECTION, SOLUTION INFILTRATION; PERINEURAL
Status: DISCONTINUED | OUTPATIENT
Start: 2020-02-27 | End: 2020-03-02 | Stop reason: HOSPADM

## 2020-02-27 RX ORDER — HEPARIN SODIUM 200 [USP'U]/100ML
1000 INJECTION, SOLUTION INTRAVENOUS CONTINUOUS
Status: DISCONTINUED | OUTPATIENT
Start: 2020-02-27 | End: 2020-03-02 | Stop reason: HOSPADM

## 2020-02-27 RX ORDER — FENTANYL CITRATE 50 UG/ML
25-100 INJECTION, SOLUTION INTRAMUSCULAR; INTRAVENOUS
Status: DISCONTINUED | OUTPATIENT
Start: 2020-02-27 | End: 2020-03-02 | Stop reason: HOSPADM

## 2020-02-27 RX ORDER — MIDAZOLAM HYDROCHLORIDE 1 MG/ML
.5-2 INJECTION, SOLUTION INTRAMUSCULAR; INTRAVENOUS
Status: DISCONTINUED | OUTPATIENT
Start: 2020-02-27 | End: 2020-03-02 | Stop reason: HOSPADM

## 2020-02-27 RX ORDER — DEXTROSE 50 % IN WATER (D50W) INTRAVENOUS SYRINGE
12.5 AS NEEDED
Status: DISCONTINUED | OUTPATIENT
Start: 2020-02-27 | End: 2020-03-02 | Stop reason: HOSPADM

## 2020-02-27 RX ADMIN — DEXTROSE 12.5 G: 50 INJECTION, SOLUTION INTRAVENOUS at 12:10

## 2020-02-27 RX ADMIN — SODIUM CHLORIDE 500 ML: 900 INJECTION, SOLUTION INTRAVENOUS at 13:37

## 2020-02-27 RX ADMIN — MIDAZOLAM 1 MG: 1 INJECTION INTRAMUSCULAR; INTRAVENOUS at 13:37

## 2020-02-27 RX ADMIN — FENTANYL CITRATE 50 MCG: 50 INJECTION, SOLUTION INTRAMUSCULAR; INTRAVENOUS at 13:37

## 2020-02-27 RX ADMIN — MIDAZOLAM 1 MG: 1 INJECTION INTRAMUSCULAR; INTRAVENOUS at 13:47

## 2020-02-27 RX ADMIN — HEPARIN SODIUM 2000 UNITS: 200 INJECTION, SOLUTION INTRAVENOUS at 13:43

## 2020-02-27 RX ADMIN — IOPAMIDOL 30 ML: 612 INJECTION, SOLUTION INTRAVENOUS at 13:48

## 2020-02-27 RX ADMIN — DEXTROSE 12.5 G: 50 INJECTION, SOLUTION INTRAVENOUS at 12:35

## 2020-02-27 RX ADMIN — LIDOCAINE HYDROCHLORIDE 150 MG: 20 INJECTION, SOLUTION INFILTRATION; PERINEURAL at 13:43

## 2020-02-27 RX ADMIN — FENTANYL CITRATE 50 MCG: 50 INJECTION, SOLUTION INTRAMUSCULAR; INTRAVENOUS at 13:47

## 2020-02-27 NOTE — DISCHARGE INSTRUCTIONS
[address] [facility]  Department of Interventional Radiology  [facility]  [phone] Office  [phone] Fax       Inferior Vena Cava Filter Removal Discharge Instructions    General Information:   Today you had an inferior vena cava filter removed. This was removed because it was determined that you no longer needed this filter. Home Care Instructions: You can resume your regular diet and medication regimen. Do not drink alcohol, drive, or make any important legal decisions in the next 24 hours. Do not lift anything heavier than a gallon of milk, or do anything strenuous for the next 24 hours. You will notice a dressing on your neck or groin. This was the insertion site for the retrieval of the device. Keep the site covered until the puncture site has totally healed. You make take a shower with the bandage, but do cover it with plastic wrap. Do not immerse yourself in water until the wound has totally healed. After your puncture wound heals, there is no special care that is needed. You may resume your normal level of activity slowly, after about one week of light activity. Call If:   You should call your Physician and/or the Radiology Nurse if you have any bleeding other than a small spot on your bandage. Please call if you have any signs of infection; fever, swelling, or increased pain at the site. Call if you have any questions of how to take care of your wound. Follow-Up Instructions: Please see your ordering doctor as he/she has requested. To Reach Us:      Patient Signature:  Date: 2/27/2020  Discharging Nurse: Amelia Avalos  If you have any questions about your procedure, please call the Interventional Radiology department at [phone]. After business hours (5pm) and weekends, call the answering service at [phone] and ask for the Radiologist on call to be paged.    Interventional Radiology General Nurse Discharge    After general anesthesia or intravenous sedation, for 24 hours or while taking prescription Narcotics:  · Limit your activities  · Do not drive and operate hazardous machinery  · Do not make important personal or business decisions  · Do  not drink alcoholic beverages  · If you have not urinated within 8 hours after discharge, please contact your surgeon on call. * Please give a list of your current medications to your Primary Care Provider. * Please update this list whenever your medications are discontinued, doses are     changed, or new medications (including over-the-counter products) are added. * Please carry medication information at all times in case of emergency situations. These are general instructions for a healthy lifestyle:    No smoking/ No tobacco products/ Avoid exposure to second hand smoke  Surgeon General's Warning:  Quitting smoking now greatly reduces serious risk to your health. Obesity, smoking, and sedentary lifestyle greatly increases your risk for illness  A healthy diet, regular physical exercise & weight monitoring are important for maintaining a healthy lifestyle    You may be retaining fluid if you have a history of heart failure or if you experience any of the following symptoms:  Weight gain of 3 pounds or more overnight or 5 pounds in a week, increased swelling in our hands or feet or shortness of breath while lying flat in bed. Please call your doctor as soon as you notice any of these symptoms; do not wait until your next office visit. Recognize signs and symptoms of STROKE:  F-face looks uneven    A-arms unable to move or move unevenly    S-speech slurred or non-existent    T-time-call 911 as soon as signs and symptoms begin-DO NOT go       Back to bed or wait to see if you get better-TIME IS BRAIN.

## 2020-02-27 NOTE — PROCEDURES
Department of Interventional Radiology  [phone]        Interventional Radiology Brief Procedure Note    Patient: Janette Pro MRN: [id number]  SSN: [id number]    YOB: 1940  Age: 70 y.o.   Sex: male      Date of Procedure: 2/27/2020    Pre-Procedure Diagnosis: Resolved DVT    Post-Procedure Diagnosis: SAME    Procedure(s): Filter retrieval    Brief Description of Procedure: as above    Performed By: Leatha Muniz MD     Assistants: None    Anesthesia:Moderate Sedation    Estimated Blood Loss: Less than 10ml    Specimens:  None    Implants:  None    Findings: Celect filter retrieved without difficulty    Complications: None    Recommendations: routine post care     Follow Up: as needed    Signed By: Leatha Muniz MD     February 27, 2020

## 2020-02-27 NOTE — H&P
History and Physical    Patient: Emilia Bhat MRN: [id number]  SSN: [id number]    YOB: 1940  Age: 70 y.o. Sex: male      Subjective:      Emilia Bhat is a 70 y.o. male who Has IVC filter no longer required. No DVT.  NPO.      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 (HCC)     R - Moderate, L - Mild    Diastolic congestive heart failure (HCC)     Diverticulosis of colon June 2010    DVT (deep venous thrombosis) (HCC)     RLE    Extrinsic allergic asthma     GERD (gastroesophageal reflux disease)     managed well with meds    History of anticoagulant use     Xarelto discontinued    History of TIA (transient ischemic attack) 11/09/2017    slight weakness to LLE    Hx of colonic polyps 07/29/2010    Hyperplastic     Hyperlipidemia LDL goal < 70     Hypertension     Obstructive sleep apnea     Non-Compliant with CPAP    Perennial allergic rhinitis with seasonal variation     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 ([address]     Presence of IVC filter     Pulmonary embolism ([address] Mar 2014    Bilateral - Completed 6 months on Xarelto    Rectal mass     Type 2 diabetes, uncontrolled, with neuropathy ([address] 1/22/2015     Past Surgical History:   Procedure Laterality Date    COLONOSCOPY  07/29/2010    Diverticulosis & Colon/Rectal Polyps - Due 2020    COLONOSCOPY N/A 10/24/2019    COLONOSCOPY/ 42/ 622 performed by Aniyah Connor [facility] at [facility] ENDOSCOPY    EGD  5/9/2011         HX BUNIONECTOMY Bilateral     HX CIRCUMCISION      HX COLECTOMY Right     HX HERNIA REPAIR Bilateral     Inguinal, Repeat on Both Sides Separately    HX KNEE ARTHROSCOPY Right 1991    x 3    IR IVC FILTER  10/22/2019    SINUS SURGERY PROC UNLISTED      TOTAL KNEE ARTHROPLASTY Right 2006      Family History   Problem Relation Age of Onset    Stroke Mother     Diabetes Mother     Heart Disease Mother     Diabetes Maternal Grandmother     Glaucoma Maternal Grandmother     Stroke Father     Diabetes Father     Diabetes Paternal Aunt     Cancer Paternal Aunt     Diabetes Paternal Uncle     Cancer Paternal Uncle         Colon    Heart Attack Sister 76    Cancer Sister     Prostate Cancer Brother     Heart Disease Sister 48    Prostate Cancer Brother      Social History     Tobacco Use    Smoking status: Never Smoker    Smokeless tobacco: Never Used   Substance Use Topics    Alcohol use: No      Prior to Admission medications    Medication Sig Start Date End Date Taking? Authorizing Provider   hydrALAZINE (APRESOLINE) 50 mg tablet Take 1 Tab by mouth two (2) times a day. 2/18/20  Yes Adalberto Bernardo MD   ezetimibe (ZETIA) 10 mg tablet Take 1 Tab by mouth nightly. 1/30/20  Yes Juanis Pearson PA-C   irbesartan (AVAPRO) 300 mg tablet Take 1 Tab by mouth nightly. Indications: chronic heart failure, high blood pressure 1/30/20  Yes Juanis Pearson PA-C   metFORMIN (GLUCOPHAGE) 1,000 mg tablet Take 1 Tab by mouth two (2) times daily (with meals). 1/30/20  Yes Juanis Pearson PA-C   pantoprazole (PROTONIX) 40 mg tablet Take 1 Tab by mouth Daily (before breakfast). 1/30/20  Yes Juanis Pearson PA-C   busPIRone (BUSPAR) 15 mg tablet Take 1 Tab by mouth daily. 1/30/20  Yes Juanis Pearson PA-C   carvedilol (COREG) 25 mg tablet Take 0.5 Tabs by mouth two (2) times a day. 1/7/20  Yes Adalberto Bernardo MD   furosemide (LASIX) 40 mg tablet Take 40 mg by mouth two (2) times a day. Yes Provider, Historical   tamsulosin (FLOMAX) 0.4 mg capsule Take 1 Cap by mouth daily.  9/18/19  Yes Peter Miranda MD   potassium chloride (KLOR-CON M20) 20 mEq tablet TAKE 1 TABLET TWICE A DAY 7/17/19  Yes Cr Coreas MD   atorvastatin (LIPITOR) 80 mg tablet Take 1 Tab by mouth nightly. 6/24/19  Yes Jose Luis Knight MD   polyethylene glycol (MIRALAX) 17 gram/dose powder Take 17 g by mouth daily. 6/24/19  Yes Jose Luis Knight MD   pregabalin (LYRICA) 150 mg capsule Take 150 mg by mouth two (2) times a day. Yes Provider, Historical   loratadine (CLARITIN) 10 mg tablet Take 10 mg by mouth daily. Yes Provider, Historical   insulin degludec (TRESIBA FLEXTOUCH U-200) 200 unit/mL (3 mL) inpn 135 Units by SubCUTAneous route daily. 2/3/20   Juanis Pearson PA-C   insulin aspart U-100 (NOVOLOG FLEXPEN U-100 INSULIN) 100 unit/mL (3 mL) inpn by SubCUTAneous route Before breakfast, lunch, and dinner. Inject 8 units standing dose + additional insulin according to sliding scale (up to 16 units) subQ before each meal    151-200 = 2 units  201-250 = 4 units  251-300 = 6 units  201-350 = 8 units    Provider, Historical   nitroglycerin (NITROSTAT) 0.4 mg SL tablet 1 Tab by SubLINGual route every five (5) minutes as needed for Chest Pain. Up to 3 doses. 11/20/19   Cr Coreas MD   albuterol (VENTOLIN HFA) 90 mcg/actuation inhaler Take 2 Puffs by inhalation every four (4) hours as needed for Wheezing or Shortness of Breath. 10/29/19   Mayra Del Valle NP   acetaminophen (TYLENOL) 500 mg tablet Take 1,000 mg by mouth every six (6) hours as needed for Fever or Pain. Provider, Historical   cyanocobalamin (VITAMIN B12) 500 mcg tablet Take 1 Tab by mouth daily. Indications: b12 deficiency 9/27/19   Zoë Velasquez MD        Allergies   Allergen Reactions    Vasotec [Enalapril Maleate] Shortness of Breath and Cough       Review of Systems:  Pertinent items are noted in the History of Present Illness.     Objective:     Vitals:    02/27/20 1206   BP: 153/72   Pulse: 83   SpO2: 98%        Physical Exam:  LUNG: clear to auscultation bilaterally  RRR    Assessment:     Hospital Problems  Date Reviewed: 1/31/2020    None          Plan:     Image guided IVC filter retrieval.  Moderate sedation    Signed By: Avelino Springer MD     February 27, 2020

## 2020-04-16 ENCOUNTER — TELEPHONE (OUTPATIENT)
Dept: CASE MANAGEMENT | Age: 80
End: 2020-04-16

## 2020-04-16 NOTE — TELEPHONE ENCOUNTER
[employer] [employer] was contacted to activate their [employer] account. Spoke with patient and offered to assist with account setup. They declined to sign up at this time. If patient declined, why? Doesn't have computer. If patient accepted, was ACP introduced?  Not applicable    Foster Carpio LPN

## 2020-06-12 ENCOUNTER — HOSPITAL ENCOUNTER (OUTPATIENT)
Dept: PHYSICAL THERAPY | Age: 80
Discharge: HOME | End: 2020-06-12
Payer: MEDICARE

## 2020-06-12 DIAGNOSIS — I89.0 LYMPHEDEMA OF BOTH LOWER EXTREMITIES: ICD-10-CM

## 2020-06-12 PROCEDURE — 97140 MANUAL THERAPY 1/> REGIONS: CPT

## 2020-06-12 PROCEDURE — 97166 OT EVAL MOD COMPLEX 45 MIN: CPT

## 2020-06-12 NOTE — THERAPY EVALUATION
OUTPATIENT OCCUPATIONAL THERAPY: Initial Assessment and Daily Note 6/12/2020 ICD-10: Treatment Diagnosis: I89.0, Lymphedema not elsewhere classified 
M79.89, Swelling of both lower extremities Precautions/Allergies:  
Oliver hutchison] MD Orders: eval and treat MEDICAL/REFERRING DIAGNOSIS:  
Lymphedema of both lower extremities [I89.0] DATE OF ONSET: Chronic REFERRING PHYSICIAN: Delores Forbes MD 
RETURN PHYSICIAN APPOINTMENT: to be determined INITIAL ASSESSMENT:  Mr. Devon Parham presents with stage 2 bilateral lower extremity lymphedema. He reports swelling in his legs since 2004. He presents with bilateral 3+ pitting edema, positive Stemmer sign, papillomas, moderate lymphostatic fibrosis, hyperpigmentation, peaud'orange skin, lymph bullae, hyperkeratotic scales, lymphorrhea right anterior ankle, swollen toes and ankle cuffs. Mr. Devon Parham has not has any treatment for lymphedema. He is a retired nurse. Comorbidities include uncontrolled DM with neuropathy, diabetic retinopathy of both eyes, diverticulitis and colectomy, TIA, HTN. He lives with his brother; his sister assists with IADLs. Mr. Devon Parham is an excellent candidate for complete decongestive therapy, compression bandaging and long term compression management garments. He is in agreement with POC and goals. PLAN OF CARE:  
PROBLEM LIST: 
1. Decreased ADL/Functional Activities 2. Decreased Activity Tolerance 3. Decreased Flexibility/Joint Mobility 4. Edema/Girth 5. Decreased Knowledge of Precautions 6. Decreased Skin Integrity/Hygeine 7. Decreased [place] with Home Exercise Program INTERVENTIONS PLANNED 1. Skin care 2. Compression bandaging 3. Fitting for compression garment(s) 4. Manual therapy/Manual lymph drainage 5. Therapeutic exercise/Therapeutic activities 6. Patient Education 7. Compression pump trial prn 
8.  kinesiotaping TREATMENT PLAN: 
 Effective Dates: 6/12/2020 TO 9/13/2020 (90 days). Frequency/Duration: 2 times a week for 90 Day(s) Will adjust frequency and duration as progress indicates. GOALS: (Goals have been discussed and agreed upon with patient.) Short-Term Functional Goals: Time Frame: 45 days 1. The patient/caregiver will verbalize understanding of lymphedema precautions. 2. Patient will be independent with skin care regimen to decrease risk of cellulitis. 3. The patient/caregiver will be independent with self-manual lymph drainage techniques and show decrease in limb volume. 4. Patient will be independent in lymphatic exercises. Discharge Goals: Time Frame: 90 days 1. Patient's bilateral lower extremity circumferential measurements will decrease 8 cm to 10 cm on volumetric graph to maximize functional use in ADL's.   
2. The patient/caregiver will be independent with home management of lymphedema. 3. Patient/caregiver will be independent donning and doffing bilateral lower extremity compression garment. Rehabilitation Potential For Stated Goals: Fair Regarding Manuel Galeazzi therapy, I certify that the treatment plan above will be carried out by a therapist or under their direction. Thank you for this referral, Alexus Kraft, OT Referring Physician Signature: Pacheco Prieto MD _________________________  Date _________ The information in this section was collected on 6/12/2020 
 (except where otherwise noted). OCCUPATIONAL PROFILE & HISTORY:  
History of Present Injury/Illness (Reason for Referral): Mr. Yana Contreras presents with stage 2 bilateral lower extremity lymphedema. He reports swelling in his legs since 2004.   He presents with bilateral 3+ pitting edema, positive Stemmer sign, papillomas, moderate lymphostatic fibrosis, hyperpigmentation, peaud'orange skin, lymph bullae, hyperkeratotic scales, lymphorrhea right anterior ankle, swollen toes and ankle cuffs. Mr. Gabby Madrigal has not has any treatment for lymphedema. He is a retired nurse. Comorbidities include uncontrolled DM with neuropathy, diabetic retinopathy of both eyes, diverticulitis and colectomy, TIA, HTN. He lives with his brother; his sister assists with IADLs. Mr. Gabby Madrigal is an excellent candidate for complete decongestive therapy, compression bandaging and long term compression management garments. He is in agreement with POC and goals. Past Medical History/Comorbidities:  
Mr. Gabby Madrigal  has a past medical history of Anxiety, Cecal lesion, Cervical stenosis of spine, Chronic kidney disease, Claustrophobia, Degenerative disc disease, lumbar, Diabetes mellitus type II, Diabetic retinopathy of both eyes without macular edema associated with type 2 diabetes mellitus ([address] Diastolic congestive heart failure ([address] Diverticulosis of colon (June 2010), DVT (deep venous thrombosis) ([address] Extrinsic allergic asthma, GERD (gastroesophageal reflux disease), History of anticoagulant use, History of TIA (transient ischemic attack) (11/09/2017), colonic polyps (07/29/2010), Hyperlipidemia LDL goal < 70, Hypertension, Obstructive sleep apnea, Perennial allergic rhinitis with seasonal variation, 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 He also has no past medical history of Coagulation defects or COPD. Mr. Gabby Madrigal  has a past surgical history that includes colonoscopy (07/29/2010); egd (5/9/2011); pr sinus surgery proc unlisted; hx knee arthroscopy (Right, 1991); pr total knee arthroplasty (Right, 2006); hx bunionectomy (Bilateral); ir ivc filter (10/22/2019); colonoscopy (N/A, 10/24/2019); hx circumcision; hx hernia repair (Bilateral); hx colectomy (Right); and ir remove ivc filter w si (2/27/2020). Social History/Living Environment: Lives with brother; sister assists with IADLs and LE ADLs Prior Level of Function/Work/Activity: Moderate assistance LB ADLs; modified independent ADLs Dominant Side:  
      RIGHT Previous Treatment Approaches:   
      Diuretics Ambulatory/Rehab Services H2 Model Falls Risk Assessment Risk Factors: 
     (1)  Gender [Male] Ability to Rise from Chair: 
     (3)  Multiple attempts, but successful Falls Prevention Plan: Mobility Assistance Device (specify): Wheelchair/walker Total: (5 or greater = High Risk): 4  
 ©2010 [facility] [address] [place] [address] Patent #[id number]. Federal Law prohibits the replication, distribution or use without written permission from [facility] [email or web address] Current Medications:   
Current Outpatient Medications:  
  HYDROcodone-acetaminophen (Norco)  mg tablet, Take 1 Tab by mouth three (3) times daily. , Disp: , Rfl:  
  furosemide (LASIX) 40 mg tablet, TAKE 1 TABLET BY MOUTH THREE TIMES DAILY, Disp: 270 Tab, Rfl: 0 
  metFORMIN (GLUCOPHAGE) 1,000 mg tablet, Take 1 Tab by mouth two (2) times daily (with meals). , Disp: 180 Tab, Rfl: 1 
  atorvastatin (LIPITOR) 80 mg tablet, Take 1 Tab by mouth nightly., Disp: 90 Tab, Rfl: 3 
  spironolactone (ALDACTONE) 25 mg tablet, Take 1 Tab by mouth daily for 30 days. , Disp: 30 Tab, Rfl: 0 
  cephALEXin (KEFLEX) 500 mg capsule, Take 1 Cap by mouth two (2) times a day., Disp: 10 Cap, Rfl: 0 
  potassium chloride (K-DUR, KLOR-CON) 20 mEq tablet, Take 1 tablet by mouth twice daily, Disp: 180 Tab, Rfl: 1 
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 
  insulin syringe-needle U-100 (BD Veo Insulin Syringe UF) 0.3 mL 31 gauge x 15/64\" syrg, USE 1 SYRINGE TO INJECT INSULIN  ONCE DAILY, Disp: 90 Pen Needle, Rfl: 0 
   hydrALAZINE (APRESOLINE) 50 mg tablet, Take 1 Tab by mouth two (2) times a day., Disp: 180 Tab, Rfl: 3 
  insulin degludec (TRESIBA FLEXTOUCH U-200) 200 unit/mL (3 mL) inpn, 135 Units by SubCUTAneous route daily. , Disp: 7 Adjustable Dose Pre-filled Pen Syringe, Rfl: 5 
  ezetimibe (ZETIA) 10 mg tablet, Take 1 Tab by mouth nightly., Disp: 90 Tab, Rfl: 3 
  irbesartan (AVAPRO) 300 mg tablet, Take 1 Tab by mouth nightly. Indications: chronic heart failure, high blood pressure, Disp: 90 Tab, Rfl: 3 
  pantoprazole (PROTONIX) 40 mg tablet, Take 1 Tab by mouth Daily (before breakfast). , Disp: 90 Tab, Rfl: 3 
  busPIRone (BUSPAR) 15 mg tablet, Take 1 Tab by mouth daily. , Disp: 90 Tab, Rfl: 3 
  carvedilol (COREG) 25 mg tablet, Take 0.5 Tabs by mouth two (2) times a day., Disp: 60 Tab, Rfl: 11 
  insulin aspart U-100 (NOVOLOG FLEXPEN U-100 INSULIN) 100 unit/mL (3 mL) inpn, by SubCUTAneous route Before breakfast, lunch, and dinner. Inject 8 units standing dose + additional insulin according to sliding scale (up to 16 units) subQ before each meal  151-200 = 2 units 201-250 = 4 units 251-300 = 6 units 201-350 = 8 units, Disp: , Rfl:  
  nitroglycerin (NITROSTAT) 0.4 mg SL tablet, 1 Tab by SubLINGual route every five (5) minutes as needed for Chest Pain. Up to 3 doses. , Disp: 30 Tab, Rfl: 5 
  albuterol (VENTOLIN HFA) 90 mcg/actuation inhaler, Take 2 Puffs by inhalation every four (4) hours as needed for Wheezing or Shortness of Breath., Disp: 1 Inhaler, Rfl: 3 
  acetaminophen (TYLENOL) 500 mg tablet, Take 1,000 mg by mouth every six (6) hours as needed for Fever or Pain., Disp: , Rfl:  
  cyanocobalamin (VITAMIN B12) 500 mcg tablet, Take 1 Tab by mouth daily. Indications: b12 deficiency, Disp: 30 Tab, Rfl: 2 
  tamsulosin (FLOMAX) 0.4 mg capsule, Take 1 Cap by mouth daily. , Disp: 90 Cap, Rfl: 3 
  polyethylene glycol (MIRALAX) 17 gram/dose powder, Take 17 g by mouth daily. , Disp: 17 g, Rfl: 5   pregabalin (LYRICA) 150 mg capsule, Take 150 mg by mouth two (2) times a day., Disp: , Rfl:  
  loratadine (CLARITIN) 10 mg tablet, Take 10 mg by mouth daily. , Disp: , Rfl:   
        
Date Last Reviewed:  6/12/2020 Complexity Level : Expanded review of therapy/medical records (1-2):  MODERATE COMPLEXITY ASSESSMENT OF OCCUPATIONAL PERFORMANCE:  
Palpation:   
      Pitting 3+ edema bilaterally, swollen toes and ankle cuffs. ROM:  
      [facility] [place] Strength:   
      Generalized deconditioning Special Tests:   
       Positive Stemmer's sign (inability to pinch skin at base of second toe) Skin Integrity:   
      Tissue changes including:   papillomas, moderate lymphostatic fibrosis, hyperpigmentation, peaud'orange skin, lymph bullae, hyperkeratotic scales, lymphorrhea right anterior ankle. Sensation:  Impaired; diabetic neuropathy Functional Mobility:  Wheelchair for community mobility; walker at home Activities of Daily Living Max assist LB ADLs due to limited reach and sitting balance; max A IADLs, modified independent with all others Edema/Girth:  3+ and pitting PRETREATMENT AFFECTED LIMB(s): lower extremity Date:  6.12.2020 Right / Left Groin  
[]      []        
 
8 inches  
[]      []        
 
4 inches  
[]      [] PoplitealSpace  
[x]      [x] 43/42       
 
8 inches  
[x]      [x] 50.5/51       
 
4 inches  
[x]      [x] 47/48 Ankle  
[x]      [x] 42/39.5 Instep [x]      [x] 28.5/27.5 Measurements are taken in centimeters:  2.54 cm = 1 inch Cumulative Circumferential Volumetric Graph Measurements RIGHT  cm LEFT  cm Physical Skills Involved: 1. Activity Tolerance 2. Edema 3. Skin Integrity Cognitive Skills Affected (resulting in the inability to perform in a timely and safe manner): 
1. N/A Psychosocial Skills Affected: 1. Habits/Routines Number of elements that affect the Plan of Care: 3-5:  MODERATE COMPLEXITY CLINICAL DECISION MAKING:  
Outcome Measure: Tool Used: Lymphedema Life Impact Scale Score:  Initial: 34 Most Recent: X (Date: -- ) Interpretation of Score: The Lymphedema Life Impact Scale (LLIS) is a validated instrument that measures the physical, functional, and psychosocial concerns pertinent to patients with extremity lymphedema. The Scale's questionnaire is administered to patients to gauge impairments, activity limitations, and participation restrictions resulting from their lymphedema. Score 0 1-13 14-26 27-40 41-54 55-67 68 Modifier CH CI CJ CK CL CM CN  
 
? Other PT/OT Primary Functional Limitations:  
  - CURRENT STATUS: CK - 40%-59% impaired, limited or restricted  - GOAL STATUS: CJ - 20%-39% impaired, limited or restricted  - D/C STATUS:  ---------------To be determined--------------- Medical Necessity:  
· Skilled intervention continues to be required due to Chronic stage 2 lymphedema B LEs. Roman Griffiths Reason for Services/Other Comments: 
· Patient continues to require present interventions due to patient's inability to inability to manage stage 2 lymphedema and increased risk for cellulitis. Use of outcome tool(s) and clinical judgement create a POC that gives a: Questionable prediction of patient's progress: MODERATE COMPLEXITY  
TREATMENT:  
(In addition to Assessment/Re-Assessment sessions the following treatments were rendered) Pre-treatment Symptoms/Complaints: Decreased functional independence due to swelling, tissue changes, limb heaviness and decreased mobility B LEs Pain: Initial:  
Pain Intensity 1: 5  Post Session:  5 Occupational Therapy Treatments: 
 
OT eval( X ) OT eval was completed. Pt received information on lymphedema and risk reduction/self management practices as outlined by the [employer].  
Therapeutic Exercise (    minutes):   
 HEP:  As above; handouts given to patient for all exercises. Manual Therapy:( 30 minutes)   Pt was educated on lymphatic pathophysiology, complete decongestive therapy, precautions and skin integrity management. Manual Lymph Drainage: 
        Lymph Nodes:  
 Cervical Supraclavicular Axillary Abdominal Inguinal Popliteal Antecubital  
RIGHT []     []     []     []     []     []     [] LEFT []     []     []     []     []     []     [] Anastamoses: Axillo-axillary Inguino-inguinal Axillo-inguinal Inguino-axillary ANTERIOR []     []     []     [] POSTERIOR []     []     []     []      
RIGHT []     []     []     [] LEFT []     []     []     []      
 
Limbs:   []    RUE     []    LUE     []    RLE    []    LLE Compression:  
 
Treatment/Session Assessment:   
· Response to Treatment:  Pt in agreement with POC and goals; pt is on Keflex for 5 days. Will initiate CDT next treatment session. · Compliance with Program/Exercises: Will assess as treatment progresses. · Recommendations/Intent for next treatment session: Next visit will focus on phase 1 complete decongestive therapy. Total Treatment Duration:  60 minutes OT Patient Time In/Time Out Time In: 0900 Time Out: 1000 Mary Singer OT

## 2020-06-16 ENCOUNTER — HOSPITAL ENCOUNTER (OUTPATIENT)
Dept: PHYSICAL THERAPY | Age: 80
Discharge: HOME | End: 2020-06-16
Payer: MEDICARE

## 2020-06-16 PROCEDURE — 97140 MANUAL THERAPY 1/> REGIONS: CPT

## 2020-06-16 NOTE — PROGRESS NOTES
David Basurto  : 1940  [place]  Secondary: Deepak Roca 77 at [facility] Therapy  [address], [address]  [phone]   [phone]         OUTPATIENT OCCUPATIONAL THERAPY: Daily Note 2020    ICD-10: Treatment Diagnosis: I89.0, Lymphedema not elsewhere classified  M79.89, Swelling of both lower extremities  Precautions/Allergies:   Bethanie hutchison]   MD Orders: eval and treat MEDICAL/REFERRING DIAGNOSIS:   Lymphedema, not elsewhere classified [I89.0]   DATE OF ONSET: Chronic   REFERRING PHYSICIAN: Ameena Vanegas MD  RETURN PHYSICIAN APPOINTMENT: to be determined      INITIAL ASSESSMENT:  Mr. Damon Lopez presents with stage 2 bilateral lower extremity lymphedema. He reports swelling in his legs since . He presents with bilateral 3+ pitting edema, positive Stemmer sign, papillomas, moderate lymphostatic fibrosis, hyperpigmentation, peaud'orange skin, lymph bullae, hyperkeratotic scales, lymphorrhea right anterior ankle, swollen toes and ankle cuffs. Mr. Damon Lopez has not has any treatment for lymphedema. He is a retired nurse. Comorbidities include uncontrolled DM with neuropathy, diabetic retinopathy of both eyes, diverticulitis and colectomy, TIA, HTN. He lives with his brother; his sister assists with IADLs. Mr. Damon Lopez is an excellent candidate for complete decongestive therapy, compression bandaging and long term compression management garments. He is in agreement with POC and goals. PLAN OF CARE:   PROBLEM LIST:  1. Decreased ADL/Functional Activities  2. Decreased Activity Tolerance  3. Decreased Flexibility/Joint Mobility  4. Edema/Girth  5. Decreased Knowledge of Precautions  6. Decreased Skin Integrity/Hygeine  7. Decreased [place] with Home Exercise Program INTERVENTIONS PLANNED  1. Skin care  2. Compression bandaging  3.   Fitting for compression garment(s)  4. Manual therapy/Manual lymph drainage  5. Therapeutic exercise/Therapeutic activities  6. Patient Education  7. Compression pump trial prn  8.  kinesiotaping    TREATMENT PLAN:  Effective Dates: 6/12/2020 TO 9/13/2020 (90 days). Frequency/Duration: 2 times a week for 90 Day(s)  Will adjust frequency and duration as progress indicates. GOALS: (Goals have been discussed and agreed upon with patient.)  Short-Term Functional Goals: Time Frame: 45 days  1. The patient/caregiver will verbalize understanding of lymphedema precautions. 2. Patient will be independent with skin care regimen to decrease risk of cellulitis. 3. The patient/caregiver will be independent with self-manual lymph drainage techniques and show decrease in limb volume. 4. Patient will be independent in lymphatic exercises. Discharge Goals: Time Frame: 90 days  1. Patient's bilateral lower extremity circumferential measurements will decrease 8 cm to 10 cm on volumetric graph to maximize functional use in ADL's.    2. The patient/caregiver will be independent with home management of lymphedema. 3. Patient/caregiver will be independent donning and doffing bilateral lower extremity compression garment. Rehabilitation Potential For Stated Goals: Fair            The information in this section was collected on 6/16/2020   (except where otherwise noted). OCCUPATIONAL PROFILE & HISTORY:   History of Present Injury/Illness (Reason for Referral):    Mr. Yana Contreras presents with stage 2 bilateral lower extremity lymphedema. He reports swelling in his legs since 2004. He presents with bilateral 3+ pitting edema, positive Stemmer sign, papillomas, moderate lymphostatic fibrosis, hyperpigmentation, peaud'orange skin, lymph bullae, hyperkeratotic scales, lymphorrhea right anterior ankle, swollen toes and ankle cuffs. Mr. Yana Contreras has not has any treatment for lymphedema. He is a retired nurse.   Comorbidities include uncontrolled DM with neuropathy, diabetic retinopathy of both eyes, diverticulitis and colectomy, TIA, HTN. He lives with his brother; his sister assists with IADLs. Mr. Gabby Madrigal is an excellent candidate for complete decongestive therapy, compression bandaging and long term compression management garments. He is in agreement with POC and goals. Past Medical History/Comorbidities:   Mr. Gabby Madrigal  has a past medical history of Anxiety, Cecal lesion, Cervical stenosis of spine, Chronic kidney disease, Claustrophobia, Degenerative disc disease, lumbar, Diabetes mellitus type II, Diabetic retinopathy of both eyes without macular edema associated with type 2 diabetes mellitus ([address] Diastolic congestive heart failure ([address] Diverticulosis of colon (June 2010), DVT (deep venous thrombosis) ([address] Extrinsic allergic asthma, GERD (gastroesophageal reflux disease), History of anticoagulant use, History of TIA (transient ischemic attack) (11/09/2017), colonic polyps (07/29/2010), Hyperlipidemia LDL goal < 70, Hypertension, Obstructive sleep apnea, Perennial allergic rhinitis with seasonal variation, 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 He also has no past medical history of Coagulation defects or COPD. Mr. Gabby Madrigal  has a past surgical history that includes colonoscopy (07/29/2010); egd (5/9/2011); pr sinus surgery proc unlisted; hx knee arthroscopy (Right, 1991); pr total knee arthroplasty (Right, 2006); hx bunionectomy (Bilateral); ir ivc filter (10/22/2019); colonoscopy (N/A, 10/24/2019); hx circumcision; hx hernia repair (Bilateral); hx colectomy (Right); and ir remove ivc filter w si (2/27/2020).   Social History/Living Environment:     Lives with brother; sister assists with IADLs and LE ADLs  Prior Level of Function/Work/Activity:  Moderate assistance LB ADLs; modified independent ADLs  Dominant Side:         RIGHT  Previous Treatment Approaches:          Diuretics   Ambulatory/Rehab Services H2 Model Falls Risk Assessment    Risk Factors:       (1)  Gender [Male] Ability to Rise from Chair:       (3)  Multiple attempts, but successful    Falls Prevention Plan: Mobility Assistance Device (specify): Wheelchair/walker   Total: (5 or greater = High Risk): 4    ©2010 [facility] [address] [place] [address] Z2 Patent #[id number]. Federal Law prohibits the replication, distribution or use without written permission from [facility]     Current Medications:    Current Outpatient Medications:     HYDROcodone-acetaminophen (Norco)  mg tablet, Take 1 Tab by mouth three (3) times daily. , Disp: , Rfl:     furosemide (LASIX) 40 mg tablet, TAKE 1 TABLET BY MOUTH THREE TIMES DAILY, Disp: 270 Tab, Rfl: 0    metFORMIN (GLUCOPHAGE) 1,000 mg tablet, Take 1 Tab by mouth two (2) times daily (with meals). , Disp: 180 Tab, Rfl: 1    atorvastatin (LIPITOR) 80 mg tablet, Take 1 Tab by mouth nightly., Disp: 90 Tab, Rfl: 3    spironolactone (ALDACTONE) 25 mg tablet, Take 1 Tab by mouth daily for 30 days. 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 , Disp: 7 Adjustable Dose Pre-filled Pen Syringe, Rfl: 5    ezetimibe (ZETIA) 10 mg tablet, Take 1 Tab by mouth nightly., Disp: 90 Tab, Rfl: 3    irbesartan (AVAPRO) 300 mg tablet, Take 1 Tab by mouth nightly. Indications: chronic heart failure, high blood pressure, Disp: 90 Tab, Rfl: 3    pantoprazole (PROTONIX) 40 mg tablet, Take 1 Tab by mouth Daily (before breakfast). , Disp: 90 Tab, Rfl: 3    busPIRone (BUSPAR) 15 mg tablet, Take 1 Tab by mouth daily. , Disp: 90 Tab, Rfl: 3    carvedilol (COREG) 25 mg tablet, Take 0.5 Tabs by mouth two (2) times a day., Disp: 60 Tab, Rfl: 11    insulin aspart U-100 (NOVOLOG FLEXPEN U-100 INSULIN) 100 unit/mL (3 mL) inpn, by SubCUTAneous route Before breakfast, lunch, and dinner. Inject 8 units standing dose + additional insulin according to sliding scale (up to 16 units) subQ before each meal  151-200 = 2 units 201-250 = 4 units 251-300 = 6 units 201-350 = 8 units, Disp: , Rfl:     nitroglycerin (NITROSTAT) 0.4 mg SL tablet, 1 Tab by SubLINGual route every five (5) minutes as needed for Chest Pain. Up to 3 doses. , Disp: 30 Tab, Rfl: 5    albuterol (VENTOLIN HFA) 90 mcg/actuation inhaler, Take 2 Puffs by inhalation every four (4) hours as needed for Wheezing or Shortness of Breath., Disp: 1 Inhaler, Rfl: 3    acetaminophen (TYLENOL) 500 mg tablet, Take 1,000 mg by mouth every six (6) hours as needed for Fever or Pain., Disp: , Rfl:     cyanocobalamin (VITAMIN B12) 500 mcg tablet, Take 1 Tab by mouth daily. Indications: b12 deficiency, Disp: 30 Tab, Rfl: 2    tamsulosin (FLOMAX) 0.4 mg capsule, Take 1 Cap by mouth daily. , Disp: 90 Cap, Rfl: 3    polyethylene glycol (MIRALAX) 17 gram/dose powder, Take 17 g by mouth daily. , Disp: 17 g, Rfl: 5    pregabalin (LYRICA) 150 mg capsule, Take 150 mg by mouth two (2) times a day., Disp: , Rfl:     loratadine (CLARITIN) 10 mg tablet, Take 10 mg by mouth daily. , Disp: , Rfl:             Date Last Reviewed:  6/16/2020     Complexity Level : Expanded review of therapy/medical records (1-2):  MODERATE COMPLEXITY   ASSESSMENT OF OCCUPATIONAL PERFORMANCE:   Palpation:          Pitting 3+ edema bilaterally, swollen toes and ankle cuffs. ROM:         WFL  Strength:          Generalized deconditioning  Special Tests:           Positive Stemmer's sign (inability to pinch skin at base of second toe)   Skin Integrity:          Tissue changes including:   papillomas, moderate lymphostatic fibrosis, hyperpigmentation, peaud'orange skin, lymph bullae, hyperkeratotic scales, lymphorrhea right anterior ankle. Sensation:  Impaired; diabetic neuropathy  Functional Mobility:  Wheelchair for community mobility; walker at home  Activities of Daily Living Max assist LB ADLs due to limited reach and sitting balance; max A IADLs, modified independent with all others  Edema/Girth:  3+ and pitting   PRETREATMENT AFFECTED LIMB(s): lower extremity      Date:  6.12.2020         Right / Left           Groin   []      []           8 inches   []      []           4 inches   []      []         PoplitealSpace   [x]      [x] 43/42          8 inches   [x]      [x] 50.5/51          4 inches   [x]      [x] 47/48          Ankle   [x]      [x] 42/39.5          Instep   [x]      [x] 28.5/27.5        Measurements are taken in centimeters:  2.54 cm = 1 inch  Cumulative Circumferential Volumetric Graph Measurements  RIGHT  cm        LEFT  cm               Physical Skills Involved:  1. Activity Tolerance  2. Edema  3. Skin Integrity Cognitive Skills Affected (resulting in the inability to perform in a timely and safe manner):  1. N/A Psychosocial Skills Affected:  1. Habits/Routines   Number of elements that affect the Plan of Care: 3-5:  MODERATE COMPLEXITY   CLINICAL DECISION MAKING:   Outcome Measure:    Tool Used: Lymphedema Life Impact Scale   Score:  Initial: 34 Most Recent: X (Date: -- )   Interpretation of Score: The Lymphedema Life Impact Scale (LLIS) is a validated instrument that measures the physical, functional, and psychosocial concerns pertinent to patients with extremity lymphedema. The Scale's questionnaire is administered to patients to gauge impairments, activity limitations, and participation restrictions resulting from their lymphedema. Score 0 1-13 14-26 27-40 41-54 55-67 68   Modifier CH CI CJ CK CL CM CN     ? Other PT/OT Primary Functional Limitations:     - CURRENT STATUS: CK - 40%-59% impaired, limited or restricted    - GOAL STATUS: CJ - 20%-39% impaired, limited or restricted    - D/C STATUS:  ---------------To be determined---------------       Medical Necessity:   · Skilled intervention continues to be required due to Chronic stage 2 lymphedema B LEs. .  Reason for Services/Other Comments:  · Patient continues to require present interventions due to patient's inability to inability to manage stage 2 lymphedema and increased risk for cellulitis. Use of outcome tool(s) and clinical judgement create a POC that gives a: Questionable prediction of patient's progress: MODERATE COMPLEXITY   TREATMENT:   (In addition to Assessment/Re-Assessment sessions the following treatments were rendered)    Pre-treatment Symptoms/Complaints:  Tightness and limb heaviness  Pain: Initial:   Pain Intensity 1: 5  Post Session:  5   Occupational Therapy Treatments    Therapeutic Exercise (    minutes):     HEP:  As above; handouts given to patient for all exercises.   Manual Therapy:( 60 minutes)   Manual lymphatic drainage, skin care, Medipore bandage over draining lymph bullae on right anterior calf, multilayer compression bandaging            Manual Lymph Drainage:          Lymph Nodes:    Cervical Supraclavicular Axillary Abdominal Inguinal Popliteal Antecubital   RIGHT [x]     [x]     [x]     []     [x]     [x]     []       LEFT [x]     [x]     [x]     []     [x]     [x]     [] Anastamoses:   Axillo-axillary Inguino-inguinal Axillo-inguinal Inguino-axillary   ANTERIOR []     []     []     [x]       POSTERIOR []     []     []     []       RIGHT []     []     []     [x]       LEFT []     []     []     [x]         Limbs:   []    RUE     []    LUE     [x]    RLE    [x]    LLE   Compression:  Multilayer compression bandaging B LE using stockinette, lymphesoft foam, gray foam on lateral malleoli, 3 short stretch bandages. Pt to leave on until next scheduled visit day. Remove if having pain or increased shortness of breath. Treatment/Session Assessment:    · Response to Treatment:  Tolerated treatment without complication  · Compliance with Program/Exercises:   Compliant  · Recommendations/Intent for next treatment session: Next visit will focus on phase 1 complete decongestive therapy.   Total Treatment Duration:  60 minutes  OT Patient Time In/Time Out  Time In: 0900  Time Out: 119 [address]ellael , OT

## 2020-06-16 NOTE — THERAPY EVALUATION
Tim Hyatt : 1940 [place] Secondary: Deepak Roca 77 at [address], [facility] [address] [phone]   [phone] OUTPATIENT OCCUPATIONAL THERAPY: Daily Note 2020 ICD-10: Treatment Diagnosis: I89.0, Lymphedema not elsewhere classified 
M79.89, Swelling of both lower extremities Precautions/Allergies:  
Miles Vasquez maleate] MD Orders: eval and treat MEDICAL/REFERRING DIAGNOSIS:  
Lymphedema, not elsewhere classified [I89.0] DATE OF ONSET: Chronic REFERRING PHYSICIAN: Liss Grimaldo MD 
RETURN PHYSICIAN APPOINTMENT: to be determined INITIAL ASSESSMENT:  Mr. Nato Perla presents with stage 2 bilateral lower extremity lymphedema. He reports swelling in his legs since . He presents with bilateral 3+ pitting edema, positive Stemmer sign, papillomas, moderate lymphostatic fibrosis, hyperpigmentation, peaud'orange skin, lymph bullae, hyperkeratotic scales, lymphorrhea right anterior ankle, swollen toes and ankle cuffs. Mr. Nato Perla has not has any treatment for lymphedema. He is a retired nurse. Comorbidities include uncontrolled DM with neuropathy, diabetic retinopathy of both eyes, diverticulitis and colectomy, TIA, HTN. He lives with his brother; his sister assists with IADLs. Mr. Nato Perla is an excellent candidate for complete decongestive therapy, compression bandaging and long term compression management garments. He is in agreement with POC and goals. PLAN OF CARE:  
PROBLEM LIST: 
1. Decreased ADL/Functional Activities 2. Decreased Activity Tolerance 3. Decreased Flexibility/Joint Mobility 4. Edema/Girth 5. Decreased Knowledge of Precautions 6. Decreased Skin Integrity/Hygeine 7. Decreased [place] with Home Exercise Program INTERVENTIONS PLANNED 1. Skin care 2. Compression bandaging 3.  Fitting for compression garment(s) 4. Manual therapy/Manual lymph drainage 5. Therapeutic exercise/Therapeutic activities 6. Patient Education 7. Compression pump trial prn 
8.  kinesiotaping TREATMENT PLAN: 
Effective Dates: 6/12/2020 TO 9/13/2020 (90 days). Frequency/Duration: 2 times a week for 90 Day(s) Will adjust frequency and duration as progress indicates. GOALS: (Goals have been discussed and agreed upon with patient.) Short-Term Functional Goals: Time Frame: 45 days 1. The patient/caregiver will verbalize understanding of lymphedema precautions. 2. Patient will be independent with skin care regimen to decrease risk of cellulitis. 3. The patient/caregiver will be independent with self-manual lymph drainage techniques and show decrease in limb volume. 4. Patient will be independent in lymphatic exercises. Discharge Goals: Time Frame: 90 days 1. Patient's bilateral lower extremity circumferential measurements will decrease 8 cm to 10 cm on volumetric graph to maximize functional use in ADL's.   
2. The patient/caregiver will be independent with home management of lymphedema. 3. Patient/caregiver will be independent donning and doffing bilateral lower extremity compression garment. Rehabilitation Potential For Stated Goals: Fair The information in this section was collected on 6/16/2020 
 (except where otherwise noted). OCCUPATIONAL PROFILE & HISTORY:  
History of Present Injury/Illness (Reason for Referral): Mr. Yves Davis presents with stage 2 bilateral lower extremity lymphedema. He reports swelling in his legs since 2004. He presents with bilateral 3+ pitting edema, positive Stemmer sign, papillomas, moderate lymphostatic fibrosis, hyperpigmentation, peaud'orange skin, lymph bullae, hyperkeratotic scales, lymphorrhea right anterior ankle, swollen toes and ankle cuffs. Mr. Yves Davis has not has any treatment for lymphedema.   He is a retired nurse. Comorbidities include uncontrolled DM with neuropathy, diabetic retinopathy of both eyes, diverticulitis and colectomy, TIA, HTN. He lives with his brother; his sister assists with IADLs. Mr. Pavel Haywood is an excellent candidate for complete decongestive therapy, compression bandaging and long term compression management garments. He is in agreement with POC and goals. Past Medical History/Comorbidities:  
Mr. Pavel Haywood  has a past medical history of Anxiety, Cecal lesion, Cervical stenosis of spine, Chronic kidney disease, Claustrophobia, Degenerative disc disease, lumbar, Diabetes mellitus type II, Diabetic retinopathy of both eyes without macular edema associated with type 2 diabetes mellitus ([address] Diastolic congestive heart failure ([address] Diverticulosis of colon (June 2010), DVT (deep venous thrombosis) ([address] Extrinsic allergic asthma, GERD (gastroesophageal reflux disease), History of anticoagulant use, History of TIA (transient ischemic attack) (11/09/2017), colonic polyps (07/29/2010), Hyperlipidemia LDL goal < 70, Hypertension, Obstructive sleep apnea, Perennial allergic rhinitis with seasonal variation, 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 He also has no past medical history of Coagulation defects or COPD. Mr. Pavel Haywood  has a past surgical history that includes colonoscopy (07/29/2010); egd (5/9/2011); pr sinus surgery proc unlisted; hx knee arthroscopy (Right, 1991); pr total knee arthroplasty (Right, 2006); hx bunionectomy (Bilateral); ir ivc filter (10/22/2019); colonoscopy (N/A, 10/24/2019); hx circumcision; hx hernia repair (Bilateral); hx colectomy (Right); and ir remove ivc filter w si (2/27/2020). Social History/Living Environment:  
  Lives with brother; sister assists with IADLs and LE ADLs Prior Level of Function/Work/Activity: Moderate assistance LB ADLs; modified independent ADLs Dominant Side:  
      RIGHT Previous Treatment Approaches:   
      Diuretics Ambulatory/Rehab Services H2 Model Falls Risk Assessment Risk Factors: 
     (1)  Gender [Male] Ability to Rise from Chair: 
     (3)  Multiple attempts, but successful Falls Prevention Plan: Mobility Assistance Device (specify): Wheelchair/walker Total: (5 or greater = High Risk): 4  
 ©2010 [facility] [address] [place] [address] Patent #[id number]. Federal Law prohibits the replication, distribution or use without written permission from [facility] [employer] Current Medications:   
Current Outpatient Medications:  
  HYDROcodone-acetaminophen (Norco)  mg tablet, Take 1 Tab by mouth three (3) times daily. , Disp: , Rfl:  
  furosemide (LASIX) 40 mg tablet, TAKE 1 TABLET BY MOUTH THREE TIMES DAILY, Disp: 270 Tab, Rfl: 0 
  metFORMIN (GLUCOPHAGE) 1,000 mg tablet, Take 1 Tab by mouth two (2) times daily (with meals). , Disp: 180 Tab, Rfl: 1 
  atorvastatin (LIPITOR) 80 mg tablet, Take 1 Tab by mouth nightly., Disp: 90 Tab, Rfl: 3 
  spironolactone (ALDACTONE) 25 mg tablet, Take 1 Tab by mouth daily for 30 days. , Disp: 30 Tab, Rfl: 0 
  cephALEXin (KEFLEX) 500 mg capsule, Take 1 Cap by mouth two (2) times a day., Disp: 10 Cap, Rfl: 0 
  potassium chloride (K-DUR, KLOR-CON) 20 mEq tablet, Take 1 tablet by mouth twice daily, Disp: 180 Tab, Rfl: 1 
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 
  insulin syringe-needle U-100 (BD Veo Insulin Syringe UF) 0.3 mL 31 gauge x 15/64\" syrg, USE 1 SYRINGE TO INJECT INSULIN  ONCE DAILY, Disp: 90 Pen Needle, Rfl: 0 
  hydrALAZINE (APRESOLINE) 50 mg tablet, Take 1 Tab by mouth two (2) times a day., Disp: 180 Tab, Rfl: 3 
  insulin degludec (TRESIBA FLEXTOUCH U-200) 200 unit/mL (3 mL) inpn, 135 Units by SubCUTAneous route daily. , Disp: 7 Adjustable Dose Pre-filled Pen Syringe, Rfl: 5 
  ezetimibe (ZETIA) 10 mg tablet, Take 1 Tab by mouth nightly., Disp: 90 Tab, Rfl: 3 
  irbesartan (AVAPRO) 300 mg tablet, Take 1 Tab by mouth nightly. Indications: chronic heart failure, high blood pressure, Disp: 90 Tab, Rfl: 3 
  pantoprazole (PROTONIX) 40 mg tablet, Take 1 Tab by mouth Daily (before breakfast). , Disp: 90 Tab, Rfl: 3 
  busPIRone (BUSPAR) 15 mg tablet, Take 1 Tab by mouth daily. , Disp: 90 Tab, Rfl: 3 
  carvedilol (COREG) 25 mg tablet, Take 0.5 Tabs by mouth two (2) times a day., Disp: 60 Tab, Rfl: 11 
  insulin aspart U-100 (NOVOLOG FLEXPEN U-100 INSULIN) 100 unit/mL (3 mL) inpn, by SubCUTAneous route Before breakfast, lunch, and dinner. Inject 8 units standing dose + additional insulin according to sliding scale (up to 16 units) subQ before each meal  151-200 = 2 units 201-250 = 4 units 251-300 = 6 units 201-350 = 8 units, Disp: , Rfl:  
  nitroglycerin (NITROSTAT) 0.4 mg SL tablet, 1 Tab by SubLINGual route every five (5) minutes as needed for Chest Pain. Up to 3 doses. , Disp: 30 Tab, Rfl: 5 
  albuterol (VENTOLIN HFA) 90 mcg/actuation inhaler, Take 2 Puffs by inhalation every four (4) hours as needed for Wheezing or Shortness of Breath., Disp: 1 Inhaler, Rfl: 3 
  acetaminophen (TYLENOL) 500 mg tablet, Take 1,000 mg by mouth every six (6) hours as needed for Fever or Pain., Disp: , Rfl:  
  cyanocobalamin (VITAMIN B12) 500 mcg tablet, Take 1 Tab by mouth daily. Indications: b12 deficiency, Disp: 30 Tab, Rfl: 2 
  tamsulosin (FLOMAX) 0.4 mg capsule, Take 1 Cap by mouth daily. , Disp: 90 Cap, Rfl: 3 
  polyethylene glycol (MIRALAX) 17 gram/dose powder, Take 17 g by mouth daily. , Disp: 17 g, Rfl: 5 
  pregabalin (LYRICA) 150 mg capsule, Take 150 mg by mouth two (2) times a day., Disp: , Rfl:  
  loratadine (CLARITIN) 10 mg tablet, Take 10 mg by mouth daily. , Disp: , Rfl:   
        
Date Last Reviewed:  6/16/2020 Complexity Level : Expanded review of therapy/medical records (1-2):  MODERATE COMPLEXITY ASSESSMENT OF OCCUPATIONAL PERFORMANCE:  
Palpation:   
      Pitting 3+ edema bilaterally, swollen toes and ankle cuffs. ROM:  
      [facility] Strength:   
      Generalized deconditioning Special Tests:   
       Positive Stemmer's sign (inability to pinch skin at base of second toe) Skin Integrity:   
      Tissue changes including:   papillomas, moderate lymphostatic fibrosis, hyperpigmentation, peaud'orange skin, lymph bullae, hyperkeratotic scales, lymphorrhea right anterior ankle. Sensation:  Impaired; diabetic neuropathy Functional Mobility:  Wheelchair for community mobility; walker at home Activities of Daily Living Max assist LB ADLs due to limited reach and sitting balance; max A IADLs, modified independent with all others Edema/Girth:  3+ and pitting PRETREATMENT AFFECTED LIMB(s): lower extremity Date:  6.12.2020 Right / Left Groin  
[]      []        
 
8 inches  
[]      []        
 
4 inches  
[]      [] PoplitealSpace  
[x]      [x] 43/42       
 
8 inches  
[x]      [x] 50.5/51       
 
4 inches  
[x]      [x] 47/48 Ankle  
[x]      [x] 42/39.5 Instep [x]      [x] 28.5/27.5 Measurements are taken in centimeters:  2.54 cm = 1 inch Cumulative Circumferential Volumetric Graph Measurements RIGHT  cm LEFT  cm Physical Skills Involved: 1. Activity Tolerance 2. Edema 3. Skin Integrity Cognitive Skills Affected (resulting in the inability to perform in a timely and safe manner): 
1. N/A Psychosocial Skills Affected: 1. Habits/Routines Number of elements that affect the Plan of Care: 3-5:  MODERATE COMPLEXITY CLINICAL DECISION MAKING:  
Outcome Measure: Tool Used: Lymphedema Life Impact Scale Score:  Initial: 34 Most Recent: X (Date: -- ) Interpretation of Score: The Lymphedema Life Impact Scale (LLIS) is a validated instrument that measures the physical, functional, and psychosocial concerns pertinent to patients with extremity lymphedema. The Scale's questionnaire is administered to patients to gauge impairments, activity limitations, and participation restrictions resulting from their lymphedema. Score 0 1-13 14-26 27-40 41-54 55-67 68 Modifier CH CI CJ CK CL CM CN  
 
? Other PT/OT Primary Functional Limitations:  
  - CURRENT STATUS: CK - 40%-59% impaired, limited or restricted  - GOAL STATUS: CJ - 20%-39% impaired, limited or restricted  - D/C STATUS:  ---------------To be determined--------------- Medical Necessity:  
· Skilled intervention continues to be required due to Chronic stage 2 lymphedema RADHA Braden Reason for Services/Other Comments: 
· Patient continues to require present interventions due to patient's inability to inability to manage stage 2 lymphedema and increased risk for cellulitis. Use of outcome tool(s) and clinical judgement create a POC that gives a: Questionable prediction of patient's progress: MODERATE COMPLEXITY  
TREATMENT:  
(In addition to Assessment/Re-Assessment sessions the following treatments were rendered) Pre-treatment Symptoms/Complaints:  Tightness and limb heaviness Pain: Initial:  
Pain Intensity 1: 5  Post Session:  5 Occupational Therapy Treatments Therapeutic Exercise (    minutes): HEP:  As above; handouts given to patient for all exercises. Manual Therapy:( 60 minutes)   Manual lymphatic drainage, skin care, Medipore bandage over draining lymph bullae on right anterior calf, multilayer compression bandaging         Manual Lymph Drainage: 
        Lymph Nodes:  
 Cervical Supraclavicular Axillary Abdominal Inguinal Popliteal Antecubital  
 RIGHT [x]     [x]     [x]     []     [x]     [x]     [] LEFT [x]     [x]     [x]     []     [x]     [x]     [] Anastamoses: Axillo-axillary Inguino-inguinal Axillo-inguinal Inguino-axillary ANTERIOR []     []     []     [x] POSTERIOR []     []     []     []      
RIGHT []     []     []     [x] LEFT []     []     []     [x] Limbs:   []    RUE     []    LUE     [x]    RLE    [x]    LLE Compression:  Multilayer compression bandaging B LE using stockinette, lymphesoft foam, gray foam on lateral malleoli, 3 short stretch bandages. Pt to leave on until next scheduled visit day. Remove if having pain or increased shortness of breath. Treatment/Session Assessment:   
· Response to Treatment:  Tolerated treatment without complication · Compliance with Program/Exercises:   Compliant · Recommendations/Intent for next treatment session: Next visit will focus on phase 1 complete decongestive therapy. Total Treatment Duration:  60 minutes OT Patient Time In/Time Out Time In: 0900 Time Out: 1000 Sly Loredo OT

## 2020-06-19 ENCOUNTER — HOSPITAL ENCOUNTER (OUTPATIENT)
Dept: PHYSICAL THERAPY | Age: 80
Discharge: HOME | End: 2020-06-19
Payer: MEDICARE

## 2020-06-19 PROCEDURE — 97140 MANUAL THERAPY 1/> REGIONS: CPT

## 2020-06-19 NOTE — PROGRESS NOTES
Lakshmi Tapia  : 1940  [place]  Secondary: Deepak Roca [address] at [facility]  [address], [address]  [phone]   [phone]         OUTPATIENT OCCUPATIONAL THERAPY: Daily Note 2020    ICD-10: Treatment Diagnosis: I89.0, Lymphedema not elsewhere classified  M79.89, Swelling of both lower extremities  Precautions/Allergies:   Arnaldo hutchison]   MD Orders: eval and treat MEDICAL/REFERRING DIAGNOSIS:   Lymphedema, not elsewhere classified [I89.0]   DATE OF ONSET: Chronic   REFERRING PHYSICIAN: Oumou Bautista MD  RETURN PHYSICIAN APPOINTMENT: to be determined      INITIAL ASSESSMENT:  Mr. Adama Anthony presents with stage 2 bilateral lower extremity lymphedema. He reports swelling in his legs since . He presents with bilateral 3+ pitting edema, positive Stemmer sign, papillomas, moderate lymphostatic fibrosis, hyperpigmentation, peaud'orange skin, lymph bullae, hyperkeratotic scales, lymphorrhea right anterior ankle, swollen toes and ankle cuffs. Mr. dAama Anthony has not has any treatment for lymphedema. He is a retired nurse. Comorbidities include uncontrolled DM with neuropathy, diabetic retinopathy of both eyes, diverticulitis and colectomy, TIA, HTN. He lives with his brother; his sister assists with IADLs. Mr. Adama Anthony is an excellent candidate for complete decongestive therapy, compression bandaging and long term compression management garments. He is in agreement with POC and goals. PLAN OF CARE:   PROBLEM LIST:  1. Decreased ADL/Functional Activities  2. Decreased Activity Tolerance  3. Decreased Flexibility/Joint Mobility  4. Edema/Girth  5. Decreased Knowledge of Precautions  6. Decreased Skin Integrity/Hygeine  7. Decreased [place] with Home Exercise Program INTERVENTIONS PLANNED  1. Skin care  2. Compression bandaging  3.   Fitting for compression garment(s)  4. Manual therapy/Manual lymph drainage  5. Therapeutic exercise/Therapeutic activities  6. Patient Education  7. Compression pump trial prn  8.  kinesiotaping    TREATMENT PLAN:  Effective Dates: 6/12/2020 TO 9/13/2020 (90 days). Frequency/Duration: 2 times a week for 90 Day(s)  Will adjust frequency and duration as progress indicates. GOALS: (Goals have been discussed and agreed upon with patient.)  Short-Term Functional Goals: Time Frame: 45 days  1. The patient/caregiver will verbalize understanding of lymphedema precautions. 2. Patient will be independent with skin care regimen to decrease risk of cellulitis. 3. The patient/caregiver will be independent with self-manual lymph drainage techniques and show decrease in limb volume. 4. Patient will be independent in lymphatic exercises. Discharge Goals: Time Frame: 90 days  1. Patient's bilateral lower extremity circumferential measurements will decrease 8 cm to 10 cm on volumetric graph to maximize functional use in ADL's.    2. The patient/caregiver will be independent with home management of lymphedema. 3. Patient/caregiver will be independent donning and doffing bilateral lower extremity compression garment. Rehabilitation Potential For Stated Goals: Fair            The information in this section was collected on 6/19/2020   (except where otherwise noted). OCCUPATIONAL PROFILE & HISTORY:   History of Present Injury/Illness (Reason for Referral):    Mr. Kimberly Gil presents with stage 2 bilateral lower extremity lymphedema. He reports swelling in his legs since 2004. He presents with bilateral 3+ pitting edema, positive Stemmer sign, papillomas, moderate lymphostatic fibrosis, hyperpigmentation, peaud'orange skin, lymph bullae, hyperkeratotic scales, lymphorrhea right anterior ankle, swollen toes and ankle cuffs. Mr. Kimberly Gil has not has any treatment for lymphedema. He is a retired nurse.   Comorbidities include uncontrolled DM with neuropathy, diabetic retinopathy of both eyes, diverticulitis and colectomy, TIA, HTN. He lives with his brother; his sister assists with IADLs. Mr. Ivelisse Luque is an excellent candidate for complete decongestive therapy, compression bandaging and long term compression management garments. He is in agreement with POC and goals. Past Medical History/Comorbidities:   Mr. Ivelisse Luque  has a past medical history of Anxiety, Cecal lesion, Cervical stenosis of spine, Chronic kidney disease, Claustrophobia, Degenerative disc disease, lumbar, Diabetes mellitus type II, Diabetic retinopathy of both eyes without macular edema associated with type 2 diabetes mellitus ([address] Diastolic congestive heart failure ([address] Diverticulosis of colon (June 2010), DVT (deep venous thrombosis) ([address] Extrinsic allergic asthma, GERD (gastroesophageal reflux disease), History of anticoagulant use, History of TIA (transient ischemic attack) (11/09/2017), colonic polyps (07/29/2010), Hyperlipidemia LDL goal < 70, Hypertension, Obstructive sleep apnea, Perennial allergic rhinitis with seasonal variation, 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 He also has no past medical history of Coagulation defects or COPD. Mr. Ivelisse Luque  has a past surgical history that includes colonoscopy (07/29/2010); egd (5/9/2011); pr sinus surgery proc unlisted; hx knee arthroscopy (Right, 1991); pr total knee arthroplasty (Right, 2006); hx bunionectomy (Bilateral); ir ivc filter (10/22/2019); colonoscopy (N/A, 10/24/2019); hx circumcision; hx hernia repair (Bilateral); hx colectomy (Right); and ir remove ivc filter w si (2/27/2020).   Social History/Living Environment:     Lives with brother; sister assists with IADLs and LE ADLs  Prior Level of Function/Work/Activity:  Moderate assistance LB ADLs; modified independent ADLs  Dominant Side:         RIGHT  Previous Treatment Approaches:          Diuretics   Ambulatory/Rehab Services H2 Model Falls Risk Assessment    Risk Factors:       (1)  Gender [Male] Ability to Rise from Chair:       (3)  Multiple attempts, but successful    Falls Prevention Plan: Mobility Assistance Device (specify): Wheelchair/walker   Total: (5 or greater = High Risk): 4    ©2010 [facility] [place] [address] [place] [address] Patent #[id number]. Federal Law prohibits the replication, distribution or use without written permission from [facility]     Current Medications:    Current Outpatient Medications:     lancets (FreeStyle Lancets) 28 gauge misc, Use 1 lancet to check blood sugar 4 times/day. Dx: E11.59, Disp: 400 Lancet, Rfl: 3    glucose blood VI test strips (FreeStyle Lite Strips) strip, Use 1 lancet to check glucose 4 times/day. Dx: E11.59, Disp: 400 Strip, Rfl: 3    HYDROcodone-acetaminophen (Norco)  mg tablet, Take 1 Tab by mouth three (3) times daily. , Disp: , Rfl:     furosemide (LASIX) 40 mg tablet, TAKE 1 TABLET BY MOUTH THREE TIMES DAILY, Disp: 270 Tab, Rfl: 0    metFORMIN (GLUCOPHAGE) 1,000 mg tablet, Take 1 Tab by mouth two (2) times daily (with meals). , Disp: 180 Tab, Rfl: 1    atorvastatin (LIPITOR) 80 mg tablet, Take 1 Tab by mouth nightly., Disp: 90 Tab, Rfl: 3    spironolactone (ALDACTONE) 25 mg tablet, Take 1 Tab by mouth daily for 30 days. 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 , Disp: 7 Adjustable Dose Pre-filled Pen Syringe, Rfl: 5    ezetimibe (ZETIA) 10 mg tablet, Take 1 Tab by mouth nightly., Disp: 90 Tab, Rfl: 3    irbesartan (AVAPRO) 300 mg tablet, Take 1 Tab by mouth nightly. Indications: chronic heart failure, high blood pressure, Disp: 90 Tab, Rfl: 3    pantoprazole (PROTONIX) 40 mg tablet, Take 1 Tab by mouth Daily (before breakfast). , Disp: 90 Tab, Rfl: 3    busPIRone (BUSPAR) 15 mg tablet, Take 1 Tab by mouth daily. , Disp: 90 Tab, Rfl: 3    carvedilol (COREG) 25 mg tablet, Take 0.5 Tabs by mouth two (2) times a day., Disp: 60 Tab, Rfl: 11    insulin aspart U-100 (NOVOLOG FLEXPEN U-100 INSULIN) 100 unit/mL (3 mL) inpn, by SubCUTAneous route Before breakfast, lunch, and dinner. Inject 8 units standing dose + additional insulin according to sliding scale (up to 16 units) subQ before each meal  151-200 = 2 units 201-250 = 4 units 251-300 = 6 units 201-350 = 8 units, Disp: , Rfl:     nitroglycerin (NITROSTAT) 0.4 mg SL tablet, 1 Tab by SubLINGual route every five (5) minutes as needed for Chest Pain. Up to 3 doses. , Disp: 30 Tab, Rfl: 5    albuterol (VENTOLIN HFA) 90 mcg/actuation inhaler, Take 2 Puffs by inhalation every four (4) hours as needed for Wheezing or Shortness of Breath., Disp: 1 Inhaler, Rfl: 3    acetaminophen (TYLENOL) 500 mg tablet, Take 1,000 mg by mouth every six (6) hours as needed for Fever or Pain., Disp: , Rfl:     cyanocobalamin (VITAMIN B12) 500 mcg tablet, Take 1 Tab by mouth daily. Indications: b12 deficiency, Disp: 30 Tab, Rfl: 2    tamsulosin (FLOMAX) 0.4 mg capsule, Take 1 Cap by mouth daily. , Disp: 90 Cap, Rfl: 3    polyethylene glycol (MIRALAX) 17 gram/dose powder, Take 17 g by mouth daily. , Disp: 17 g, Rfl: 5    pregabalin (LYRICA) 150 mg capsule, Take 150 mg by mouth two (2) times a day., Disp: , Rfl:     loratadine (CLARITIN) 10 mg tablet, Take 10 mg by mouth daily. , Disp: , Rfl:             Date Last Reviewed:  6/19/2020     Complexity Level : Expanded review of therapy/medical records (1-2):  MODERATE COMPLEXITY   ASSESSMENT OF OCCUPATIONAL PERFORMANCE:   Palpation:          Pitting 3+ edema bilaterally, swollen toes and ankle cuffs. ROM:         WFL  Strength:          Generalized deconditioning  Special Tests:           Positive Stemmer's sign (inability to pinch skin at base of second toe)   Skin Integrity:          Tissue changes including:   papillomas, moderate lymphostatic fibrosis, hyperpigmentation, peaud'orange skin, lymph bullae, hyperkeratotic scales, lymphorrhea right anterior ankle. Sensation:  Impaired; diabetic neuropathy  Functional Mobility:  Wheelchair for community mobility; walker at home  Activities of Daily Living Max assist LB ADLs due to limited reach and sitting balance; max A IADLs, modified independent with all others  Edema/Girth:  3+ and pitting   PRETREATMENT AFFECTED LIMB(s): lower extremity      Date:  6.12.2020 6.19.20        Right / Left           Groin   []      []           8 inches   []      []           4 inches   []      []         PoplitealSpace   [x]      [x] 43/42 43.5/41         8 inches   [x]      [x] 50.5/51 50/50         4 inches   [x]      [x] 47/48 46/47         Ankle   [x]      [x] 42/39.5 38.5/37         Instep   [x]      [x] 28.5/27.5 25/25.5       Measurements are taken in centimeters:  2.54 cm = 1 inch  Cumulative Circumferential Volumetric Graph Measurements  RIGHT  cm 203       LEFT  cm 200.5              Physical Skills Involved:  1. Activity Tolerance  2. Edema  3.  Skin Integrity Cognitive Skills Affected (resulting in the inability to perform in a timely and safe manner):  1. N/A Psychosocial Skills Affected:  1. Habits/Routines   Number of elements that affect the Plan of Care: 3-5:  MODERATE COMPLEXITY   CLINICAL DECISION MAKING:   Outcome Measure: Tool Used: Lymphedema Life Impact Scale   Score:  Initial: 34 Most Recent: X (Date: -- )   Interpretation of Score: The Lymphedema Life Impact Scale (LLIS) is a validated instrument that measures the physical, functional, and psychosocial concerns pertinent to patients with extremity lymphedema. The Scale's questionnaire is administered to patients to gauge impairments, activity limitations, and participation restrictions resulting from their lymphedema. Score 0 1-13 14-26 27-40 41-54 55-67 68   Modifier CH CI CJ CK CL CM CN     ? Other PT/OT Primary Functional Limitations:     - CURRENT STATUS: CK - 40%-59% impaired, limited or restricted    - GOAL STATUS: CJ - 20%-39% impaired, limited or restricted    - D/C STATUS:  ---------------To be determined---------------       Medical Necessity:   · Skilled intervention continues to be required due to Chronic stage 2 lymphedema B LEs. .  Reason for Services/Other Comments:  · Patient continues to require present interventions due to patient's inability to inability to manage stage 2 lymphedema and increased risk for cellulitis. Use of outcome tool(s) and clinical judgement create a POC that gives a: Questionable prediction of patient's progress: MODERATE COMPLEXITY   TREATMENT:   (In addition to Assessment/Re-Assessment sessions the following treatments were rendered)    Pre-treatment Symptoms/Complaints:  Reduction of 8 cm R LE and 7.5 cm L LE  Pain: Initial:   Pain Intensity 1: 5  Post Session:  5   Occupational Therapy Treatments    Therapeutic Exercise (    minutes):     HEP:  As above; handouts given to patient for all exercises.   Manual Therapy:( 60 minutes)   Manual lymphatic drainage, skin care, Medipore bandage over draining lymph bullae on right anterior calf, multilayer compression bandaging            Manual Lymph Drainage:          Lymph Nodes:    Cervical Supraclavicular Axillary Abdominal Inguinal Popliteal Antecubital   RIGHT [x]     [x]     [x]     []     [x]     [x]     []       LEFT [x]     [x]     [x]     []     [x]     [x]     []          Anastamoses:   Axillo-axillary Inguino-inguinal Axillo-inguinal Inguino-axillary   ANTERIOR []     []     []     [x]       POSTERIOR []     []     []     []       RIGHT []     []     []     [x]       LEFT []     []     []     [x]         Limbs:   []    RUE     []    LUE     [x]    RLE    [x]    LLE   Compression:  Multilayer compression bandaging B LE using stockinette, 3 short stretch bandages and B toe wraps. Pt to leave on until next scheduled visit day. Remove if having pain or increased shortness of breath. Treatment/Session Assessment:    · Response to Treatment:  Tolerated treatment without complication  · Compliance with Program/Exercises:   Compliant  · Recommendations/Intent for next treatment session: Next visit will focus on phase 1 complete decongestive therapy.   Total Treatment Duration:  60 minutes  OT Patient Time In/Time Out  Time In: 0900  Time Out: FAVIAN Mcdermott

## 2020-06-23 ENCOUNTER — HOSPITAL ENCOUNTER (OUTPATIENT)
Dept: PHYSICAL THERAPY | Age: 80
Discharge: HOME | End: 2020-06-23
Payer: MEDICARE

## 2020-06-23 PROCEDURE — 97140 MANUAL THERAPY 1/> REGIONS: CPT

## 2020-06-23 NOTE — PROGRESS NOTES
Mayco Russell  : 1940  [place]  Secondary: Deepak Roca [address] at [facility] Therapy  [address], [address]  [phone]   [phone]         OUTPATIENT OCCUPATIONAL THERAPY: Daily Note 2020    ICD-10: Treatment Diagnosis: I89.0, Lymphedema not elsewhere classified  M79.89, Swelling of both lower extremities  Precautions/Allergies:   Ulises Tang maleate]   MD Orders: eval and treat MEDICAL/REFERRING DIAGNOSIS:   Lymphedema, not elsewhere classified [I89.0]   DATE OF ONSET: Chronic   REFERRING PHYSICIAN: Allyssa Girard MD  RETURN PHYSICIAN APPOINTMENT: to be determined      INITIAL ASSESSMENT:  Mr. Kory Andre presents with stage 2 bilateral lower extremity lymphedema. He reports swelling in his legs since . He presents with bilateral 3+ pitting edema, positive Stemmer sign, papillomas, moderate lymphostatic fibrosis, hyperpigmentation, peaud'orange skin, lymph bullae, hyperkeratotic scales, lymphorrhea right anterior ankle, swollen toes and ankle cuffs. Mr. Kory Andre has not has any treatment for lymphedema. He is a retired nurse. Comorbidities include uncontrolled DM with neuropathy, diabetic retinopathy of both eyes, diverticulitis and colectomy, TIA, HTN. He lives with his brother; his sister assists with IADLs. Mr. Kory Andre is an excellent candidate for complete decongestive therapy, compression bandaging and long term compression management garments. He is in agreement with POC and goals. PLAN OF CARE:   PROBLEM LIST:  1. Decreased ADL/Functional Activities  2. Decreased Activity Tolerance  3. Decreased Flexibility/Joint Mobility  4. Edema/Girth  5. Decreased Knowledge of Precautions  6. Decreased Skin Integrity/Hygeine  7. Decreased Vale with Home Exercise Program INTERVENTIONS PLANNED  1. Skin care  2. Compression bandaging  3.   Fitting for compression garment(s)  4. Manual therapy/Manual lymph drainage  5. Therapeutic exercise/Therapeutic activities  6. Patient Education  7. Compression pump trial prn  8.  kinesiotaping    TREATMENT PLAN:  Effective Dates: 6/12/2020 TO 9/13/2020 (90 days). Frequency/Duration: 2 times a week for 90 Day(s)  Will adjust frequency and duration as progress indicates. GOALS: (Goals have been discussed and agreed upon with patient.)  Short-Term Functional Goals: Time Frame: 45 days  1. The patient/caregiver will verbalize understanding of lymphedema precautions. 2. Patient will be independent with skin care regimen to decrease risk of cellulitis. 3. The patient/caregiver will be independent with self-manual lymph drainage techniques and show decrease in limb volume. 4. Patient will be independent in lymphatic exercises. Discharge Goals: Time Frame: 90 days  1. Patient's bilateral lower extremity circumferential measurements will decrease 8 cm to 10 cm on volumetric graph to maximize functional use in ADL's.    2. The patient/caregiver will be independent with home management of lymphedema. 3. Patient/caregiver will be independent donning and doffing bilateral lower extremity compression garment. Rehabilitation Potential For Stated Goals: Fair            The information in this section was collected on 6/23/2020   (except where otherwise noted). OCCUPATIONAL PROFILE & HISTORY:   History of Present Injury/Illness (Reason for Referral):    Mr. Sasha Salazar presents with stage 2 bilateral lower extremity lymphedema. He reports swelling in his legs since 2004. He presents with bilateral 3+ pitting edema, positive Stemmer sign, papillomas, moderate lymphostatic fibrosis, hyperpigmentation, peaud'orange skin, lymph bullae, hyperkeratotic scales, lymphorrhea right anterior ankle, swollen toes and ankle cuffs. Mr. Sasha Salazar has not has any treatment for lymphedema. He is a retired nurse.   Comorbidities include uncontrolled DM with neuropathy, diabetic retinopathy of both eyes, diverticulitis and colectomy, TIA, HTN. He lives with his brother; his sister assists with IADLs. Mr. Criss Teran is an excellent candidate for complete decongestive therapy, compression bandaging and long term compression management garments. He is in agreement with POC and goals. Past Medical History/Comorbidities:   Mr. Criss Teran  has a past medical history of Anxiety, Cecal lesion, Cervical stenosis of spine, Chronic kidney disease, Claustrophobia, Degenerative disc disease, lumbar, Diabetes mellitus type II, Diabetic retinopathy of both eyes without macular edema associated with type 2 diabetes mellitus ([address] Diastolic congestive heart failure ([address] Diverticulosis of colon (June 2010), DVT (deep venous thrombosis) ([address] Extrinsic allergic asthma, GERD (gastroesophageal reflux disease), History of anticoagulant use, History of TIA (transient ischemic attack) (11/09/2017), colonic polyps (07/29/2010), Hyperlipidemia LDL goal < 70, Hypertension, Obstructive sleep apnea, Perennial allergic rhinitis with seasonal variation, 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 He also has no past medical history of Coagulation defects or COPD. Mr. Criss Teran  has a past surgical history that includes colonoscopy (07/29/2010); egd (5/9/2011); pr sinus surgery proc unlisted; hx knee arthroscopy (Right, 1991); pr total knee arthroplasty (Right, 2006); hx bunionectomy (Bilateral); ir ivc filter (10/22/2019); colonoscopy (N/A, 10/24/2019); hx circumcision; hx hernia repair (Bilateral); hx colectomy (Right); and ir remove ivc filter w si (2/27/2020).   Social History/Living Environment:     Lives with brother; sister assists with IADLs and LE ADLs  Prior Level of Function/Work/Activity:  Moderate assistance LB ADLs; modified independent ADLs  Dominant Side:         RIGHT  Previous Treatment Approaches:          Diuretics   Ambulatory/Rehab Services H2 Model Falls Risk Assessment    Risk Factors:       (1)  Gender [Male] Ability to Rise from Chair:       (3)  Multiple attempts, but successful    Falls Prevention Plan: Mobility Assistance Device (specify): Wheelchair/walker   Total: (5 or greater = High Risk): 4    ©2010 [facility] . [facility] Patent #[id number]. Federal Law prohibits the replication, distribution or use without written permission from [facility] [employer]     Current Medications:    Current Outpatient Medications:     glucose blood VI test strips (FreeStyle Lite Strips) strip, Use 1 lancet to check glucose 4 times/day. Dx: E11.59, Disp: 400 Strip, Rfl: 3    lancets (FreeStyle Lancets) 28 gauge misc, Use 1 lancet to check blood sugar 4 times/day. Dx: E11.59, Disp: 400 Lancet, Rfl: 3    potassium chloride (K-DUR, KLOR-CON) 20 mEq tablet, Take 1 Tab by mouth two (2) times a day., Disp: 180 Tab, Rfl: 1    HYDROcodone-acetaminophen (Norco)  mg tablet, Take 1 Tab by mouth three (3) times daily. , Disp: , Rfl:     furosemide (LASIX) 40 mg tablet, TAKE 1 TABLET BY MOUTH THREE TIMES DAILY, Disp: 270 Tab, Rfl: 0    metFORMIN (GLUCOPHAGE) 1,000 mg tablet, Take 1 Tab by mouth two (2) times daily (with meals). , Disp: 180 Tab, Rfl: 1    atorvastatin (LIPITOR) 80 mg tablet, Take 1 Tab by mouth nightly., Disp: 90 Tab, Rfl: 3    spironolactone (ALDACTONE) 25 mg tablet, Take 1 Tab by mouth daily for 30 days. 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 , Disp: 7 Adjustable Dose Pre-filled Pen Syringe, Rfl: 5    ezetimibe (ZETIA) 10 mg tablet, Take 1 Tab by mouth nightly., Disp: 90 Tab, Rfl: 3    irbesartan (AVAPRO) 300 mg tablet, Take 1 Tab by mouth nightly. Indications: chronic heart failure, high blood pressure, Disp: 90 Tab, Rfl: 3    pantoprazole (PROTONIX) 40 mg tablet, Take 1 Tab by mouth Daily (before breakfast). , Disp: 90 Tab, Rfl: 3    busPIRone (BUSPAR) 15 mg tablet, Take 1 Tab by mouth daily. , Disp: 90 Tab, Rfl: 3    carvedilol (COREG) 25 mg tablet, Take 0.5 Tabs by mouth two (2) times a day., Disp: 60 Tab, Rfl: 11    insulin aspart U-100 (NOVOLOG FLEXPEN U-100 INSULIN) 100 unit/mL (3 mL) inpn, by SubCUTAneous route Before breakfast, lunch, and dinner. Inject 8 units standing dose + additional insulin according to sliding scale (up to 16 units) subQ before each meal  151-200 = 2 units 201-250 = 4 units 251-300 = 6 units 201-350 = 8 units, Disp: , Rfl:     nitroglycerin (NITROSTAT) 0.4 mg SL tablet, 1 Tab by SubLINGual route every five (5) minutes as needed for Chest Pain. Up to 3 doses. , Disp: 30 Tab, Rfl: 5    albuterol (VENTOLIN HFA) 90 mcg/actuation inhaler, Take 2 Puffs by inhalation every four (4) hours as needed for Wheezing or Shortness of Breath., Disp: 1 Inhaler, Rfl: 3    acetaminophen (TYLENOL) 500 mg tablet, Take 1,000 mg by mouth every six (6) hours as needed for Fever or Pain., Disp: , Rfl:     cyanocobalamin (VITAMIN B12) 500 mcg tablet, Take 1 Tab by mouth daily. Indications: b12 deficiency, Disp: 30 Tab, Rfl: 2    tamsulosin (FLOMAX) 0.4 mg capsule, Take 1 Cap by mouth daily. , Disp: 90 Cap, Rfl: 3    polyethylene glycol (MIRALAX) 17 gram/dose powder, Take 17 g by mouth daily. , Disp: 17 g, Rfl: 5    pregabalin (LYRICA) 150 mg capsule, Take 150 mg by mouth two (2) times a day., Disp: , Rfl:     loratadine (CLARITIN) 10 mg tablet, Take 10 mg by mouth daily. , Disp: , Rfl:             Date Last Reviewed:  6/23/2020     Complexity Level : Expanded review of therapy/medical records (1-2):  MODERATE COMPLEXITY   ASSESSMENT OF OCCUPATIONAL PERFORMANCE:   Palpation:          Pitting 3+ edema bilaterally, swollen toes and ankle cuffs. ROM:         WFL  Strength:          Generalized deconditioning  Special Tests:           Positive Stemmer's sign (inability to pinch skin at base of second toe)   Skin Integrity:          Tissue changes including:   papillomas, moderate lymphostatic fibrosis, hyperpigmentation, peaud'orange skin, lymph bullae, hyperkeratotic scales, lymphorrhea right anterior ankle. Sensation:  Impaired; diabetic neuropathy  Functional Mobility:  Wheelchair for community mobility; walker at home  Activities of Daily Living Max assist LB ADLs due to limited reach and sitting balance; max A IADLs, modified independent with all others  Edema/Girth:  3+ and pitting   PRETREATMENT AFFECTED LIMB(s): lower extremity      Date:  6.12.2020 6.19.20        Right / Left           Groin   []      []           8 inches   []      []           4 inches   []      []         PoplitealSpace   [x]      [x] 43/42 43.5/41         8 inches   [x]      [x] 50.5/51 50/50         4 inches   [x]      [x] 47/48 46/47         Ankle   [x]      [x] 42/39.5 38.5/37         Instep   [x]      [x] 28.5/27.5 25/25.5       Measurements are taken in centimeters:  2.54 cm = 1 inch  Cumulative Circumferential Volumetric Graph Measurements  RIGHT  cm 203       LEFT  cm 200.5              Physical Skills Involved:  1. Activity Tolerance  2. Edema  3.  Skin Integrity Cognitive Skills Affected (resulting in the inability to perform in a timely and safe manner):  1. N/A Psychosocial Skills Affected:  1. Habits/Routines   Number of elements that affect the Plan of Care: 3-5:  MODERATE COMPLEXITY   CLINICAL DECISION MAKING:   Outcome Measure: Tool Used: Lymphedema Life Impact Scale   Score:  Initial: 34 Most Recent: X (Date: -- )   Interpretation of Score: The Lymphedema Life Impact Scale (LLIS) is a validated instrument that measures the physical, functional, and psychosocial concerns pertinent to patients with extremity lymphedema. The Scale's questionnaire is administered to patients to gauge impairments, activity limitations, and participation restrictions resulting from their lymphedema. Score 0 1-13 14-26 27-40 41-54 55-67 68   Modifier CH CI CJ CK CL CM CN     ? Other PT/OT Primary Functional Limitations:     - CURRENT STATUS: CK - 40%-59% impaired, limited or restricted    - GOAL STATUS: CJ - 20%-39% impaired, limited or restricted    - D/C STATUS:  ---------------To be determined---------------       Medical Necessity:   · Skilled intervention continues to be required due to Chronic stage 2 lymphedema B LEs. .  Reason for Services/Other Comments:  · Patient continues to require present interventions due to patient's inability to inability to manage stage 2 lymphedema and increased risk for cellulitis. Use of outcome tool(s) and clinical judgement create a POC that gives a: Questionable prediction of patient's progress: MODERATE COMPLEXITY   TREATMENT:   (In addition to Assessment/Re-Assessment sessions the following treatments were rendered)    Pre-treatment Symptoms/Complaints: \"I had to throw away the bandages because I had an incontinence incident\"  Pain: Initial:   Pain Intensity 1: 5  Post Session:  5   Occupational Therapy Treatments    Therapeutic Exercise (    minutes):     HEP:  As above; handouts given to patient for all exercises.   Manual Therapy:( 60 minutes)   Manual lymphatic drainage, skin care, Medipore bandage over draining lymph bullae on right anterior calf, multilayer compression bandaging            Manual Lymph Drainage:          Lymph Nodes:    Cervical Supraclavicular Axillary Abdominal Inguinal Popliteal Antecubital   RIGHT [x]     [x]     [x]     []     [x]     [x]     []       LEFT [x]     [x]     [x]     []     [x]     [x]     []          Anastamoses:   Axillo-axillary Inguino-inguinal Axillo-inguinal Inguino-axillary   ANTERIOR []     []     []     [x]       POSTERIOR []     []     []     []       RIGHT []     []     []     [x]       LEFT []     []     []     [x]         Limbs:   []    RUE     []    LUE     [x]    RLE    [x]    LLE   Compression:  Multilayer compression bandaging B LE using stockinette, 2 short stretch bandages, lymphesoft foam and grey foam pads on L lateral and R medial malleoli. .  Pt to leave on until next scheduled visit day. Remove if having pain or increased shortness of breath. Treatment/Session Assessment:    · Response to Treatment:  Tolerated treatment without complication  · Compliance with Program/Exercises:   Compliant  · Recommendations/Intent for next treatment session: Next visit will focus on phase 1 complete decongestive therapy.   Total Treatment Duration:  60 minutes  OT Patient Time In/Time Out  Time In: 1000  Time Out: [address] [place], OT

## 2020-06-26 ENCOUNTER — HOSPITAL ENCOUNTER (OUTPATIENT)
Dept: PHYSICAL THERAPY | Age: 80
Discharge: HOME | End: 2020-06-26
Payer: MEDICARE

## 2020-06-26 PROCEDURE — 97140 MANUAL THERAPY 1/> REGIONS: CPT

## 2020-06-26 NOTE — PROGRESS NOTES
Shukri Marshall  : 1940  [place]  Secondary: Deepak Roca [address] at [facility]  [address], [address]  [phone]   [phone]         OUTPATIENT OCCUPATIONAL THERAPY: Daily Note 2020    ICD-10: Treatment Diagnosis: I89.0, Lymphedema not elsewhere classified  M79.89, Swelling of both lower extremities  Precautions/Allergies:   Daquan Perales malesteven]   MD Orders: eval and treat MEDICAL/REFERRING DIAGNOSIS:   Lymphedema, not elsewhere classified [I89.0]   DATE OF ONSET: Chronic   REFERRING PHYSICIAN: Clarke Monsivais MD  RETURN PHYSICIAN APPOINTMENT: to be determined      INITIAL ASSESSMENT:  Mr. Antonio Spence presents with stage 2 bilateral lower extremity lymphedema. He reports swelling in his legs since . He presents with bilateral 3+ pitting edema, positive Stemmer sign, papillomas, moderate lymphostatic fibrosis, hyperpigmentation, peaud'orange skin, lymph bullae, hyperkeratotic scales, lymphorrhea right anterior ankle, swollen toes and ankle cuffs. Mr. Antonio Spence has not has any treatment for lymphedema. He is a retired nurse. Comorbidities include uncontrolled DM with neuropathy, diabetic retinopathy of both eyes, diverticulitis and colectomy, TIA, HTN. He lives with his brother; his sister assists with IADLs. Mr. Antonio Spence is an excellent candidate for complete decongestive therapy, compression bandaging and long term compression management garments. He is in agreement with POC and goals. PLAN OF CARE:   PROBLEM LIST:  1. Decreased ADL/Functional Activities  2. Decreased Activity Tolerance  3. Decreased Flexibility/Joint Mobility  4. Edema/Girth  5. Decreased Knowledge of Precautions  6. Decreased Skin Integrity/Hygeine  7. Decreased [place] with Home Exercise Program INTERVENTIONS PLANNED  1. Skin care  2. Compression bandaging  3.   Fitting for compression garment(s)  4. Manual therapy/Manual lymph drainage  5. Therapeutic exercise/Therapeutic activities  6. Patient Education  7. Compression pump trial prn  8.  kinesiotaping    TREATMENT PLAN:  Effective Dates: 6/12/2020 TO 9/13/2020 (90 days). Frequency/Duration: 2 times a week for 90 Day(s)  Will adjust frequency and duration as progress indicates. GOALS: (Goals have been discussed and agreed upon with patient.)  Short-Term Functional Goals: Time Frame: 45 days  1. The patient/caregiver will verbalize understanding of lymphedema precautions. 2. Patient will be independent with skin care regimen to decrease risk of cellulitis. 3. The patient/caregiver will be independent with self-manual lymph drainage techniques and show decrease in limb volume. 4. Patient will be independent in lymphatic exercises. Discharge Goals: Time Frame: 90 days  1. Patient's bilateral lower extremity circumferential measurements will decrease 8 cm to 10 cm on volumetric graph to maximize functional use in ADL's.    2. The patient/caregiver will be independent with home management of lymphedema. 3. Patient/caregiver will be independent donning and doffing bilateral lower extremity compression garment. Rehabilitation Potential For Stated Goals: Fair            The information in this section was collected on 6/26/2020   (except where otherwise noted). OCCUPATIONAL PROFILE & HISTORY:   History of Present Injury/Illness (Reason for Referral):    Mr. Collins Priest presents with stage 2 bilateral lower extremity lymphedema. He reports swelling in his legs since 2004. He presents with bilateral 3+ pitting edema, positive Stemmer sign, papillomas, moderate lymphostatic fibrosis, hyperpigmentation, peaud'orange skin, lymph bullae, hyperkeratotic scales, lymphorrhea right anterior ankle, swollen toes and ankle cuffs. Mr. Collins Priest has not has any treatment for lymphedema. He is a retired nurse.   Comorbidities include uncontrolled DM with neuropathy, diabetic retinopathy of both eyes, diverticulitis and colectomy, TIA, HTN. He lives with his brother; his sister assists with IADLs. Mr. Yana Contreras is an excellent candidate for complete decongestive therapy, compression bandaging and long term compression management garments. He is in agreement with POC and goals. Past Medical History/Comorbidities:   Mr. Yana Contreras  has a past medical history of Anxiety, Cecal lesion, Cervical stenosis of spine, Chronic kidney disease, Claustrophobia, Degenerative disc disease, lumbar, Diabetes mellitus type II, Diabetic retinopathy of both eyes without macular edema associated with type 2 diabetes mellitus ([address] Diastolic congestive heart failure ([address] Diverticulosis of colon (June 2010), DVT (deep venous thrombosis) ([address] Extrinsic allergic asthma, GERD (gastroesophageal reflux disease), History of anticoagulant use, History of TIA (transient ischemic attack) (11/09/2017), colonic polyps (07/29/2010), Hyperlipidemia LDL goal < 70, Hypertension, Obstructive sleep apnea, Perennial allergic rhinitis with seasonal variation, 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 He also has no past medical history of Coagulation defects or COPD. Mr. Yana Contreras  has a past surgical history that includes colonoscopy (07/29/2010); egd (5/9/2011); pr sinus surgery proc unlisted; hx knee arthroscopy (Right, 1991); pr total knee arthroplasty (Right, 2006); hx bunionectomy (Bilateral); ir ivc filter (10/22/2019); colonoscopy (N/A, 10/24/2019); hx circumcision; hx hernia repair (Bilateral); hx colectomy (Right); and ir remove ivc filter w si (2/27/2020).   Social History/Living Environment:     Lives with brother; sister assists with IADLs and LE ADLs  Prior Level of Function/Work/Activity:  Moderate assistance LB ADLs; modified independent ADLs  Dominant Side:         RIGHT  Previous Treatment Approaches:          Diuretics   Ambulatory/Rehab Services H2 Model Falls Risk Assessment    Risk Factors:       (1)  Gender [Male] Ability to Rise from Chair:       (3)  Multiple attempts, but successful    Falls Prevention Plan: Mobility Assistance Device (specify): Wheelchair/walker   Total: (5 or greater = High Risk): 4    ©2010 [employer] [facility] [employer] . [facility] Patent #[id number]. Federal Law prohibits the replication, distribution or use without written permission from [facility] [employer]     Current Medications:    Current Outpatient Medications:     glucose blood VI test strips (FreeStyle Lite Strips) strip, Use 1 lancet to check glucose 4 times/day. Dx: E11.59, Disp: 400 Strip, Rfl: 3    lancets (FreeStyle Lancets) 28 gauge misc, Use 1 lancet to check blood sugar 4 times/day. Dx: E11.59, Disp: 400 Lancet, Rfl: 3    potassium chloride (K-DUR, KLOR-CON) 20 mEq tablet, Take 1 Tab by mouth two (2) times a day., Disp: 180 Tab, Rfl: 1    HYDROcodone-acetaminophen (Norco)  mg tablet, Take 1 Tab by mouth three (3) times daily. , Disp: , Rfl:     furosemide (LASIX) 40 mg tablet, TAKE 1 TABLET BY MOUTH THREE TIMES DAILY, Disp: 270 Tab, Rfl: 0    metFORMIN (GLUCOPHAGE) 1,000 mg tablet, Take 1 Tab by mouth two (2) times daily (with meals). , Disp: 180 Tab, Rfl: 1    atorvastatin (LIPITOR) 80 mg tablet, Take 1 Tab by mouth nightly., Disp: 90 Tab, Rfl: 3    spironolactone (ALDACTONE) 25 mg tablet, Take 1 Tab by mouth daily for 30 days. 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 , Disp: 7 Adjustable Dose Pre-filled Pen Syringe, Rfl: 5    ezetimibe (ZETIA) 10 mg tablet, Take 1 Tab by mouth nightly., Disp: 90 Tab, Rfl: 3    irbesartan (AVAPRO) 300 mg tablet, Take 1 Tab by mouth nightly. Indications: chronic heart failure, high blood pressure, Disp: 90 Tab, Rfl: 3    pantoprazole (PROTONIX) 40 mg tablet, Take 1 Tab by mouth Daily (before breakfast). , Disp: 90 Tab, Rfl: 3    busPIRone (BUSPAR) 15 mg tablet, Take 1 Tab by mouth daily. , Disp: 90 Tab, Rfl: 3    carvedilol (COREG) 25 mg tablet, Take 0.5 Tabs by mouth two (2) times a day., Disp: 60 Tab, Rfl: 11    insulin aspart U-100 (NOVOLOG FLEXPEN U-100 INSULIN) 100 unit/mL (3 mL) inpn, by SubCUTAneous route Before breakfast, lunch, and dinner. Inject 8 units standing dose + additional insulin according to sliding scale (up to 16 units) subQ before each meal  151-200 = 2 units 201-250 = 4 units 251-300 = 6 units 201-350 = 8 units, Disp: , Rfl:     nitroglycerin (NITROSTAT) 0.4 mg SL tablet, 1 Tab by SubLINGual route every five (5) minutes as needed for Chest Pain. Up to 3 doses. , Disp: 30 Tab, Rfl: 5    albuterol (VENTOLIN HFA) 90 mcg/actuation inhaler, Take 2 Puffs by inhalation every four (4) hours as needed for Wheezing or Shortness of Breath., Disp: 1 Inhaler, Rfl: 3    acetaminophen (TYLENOL) 500 mg tablet, Take 1,000 mg by mouth every six (6) hours as needed for Fever or Pain., Disp: , Rfl:     cyanocobalamin (VITAMIN B12) 500 mcg tablet, Take 1 Tab by mouth daily. Indications: b12 deficiency, Disp: 30 Tab, Rfl: 2    tamsulosin (FLOMAX) 0.4 mg capsule, Take 1 Cap by mouth daily. , Disp: 90 Cap, Rfl: 3    polyethylene glycol (MIRALAX) 17 gram/dose powder, Take 17 g by mouth daily. , Disp: 17 g, Rfl: 5    pregabalin (LYRICA) 150 mg capsule, Take 150 mg by mouth two (2) times a day., Disp: , Rfl:     loratadine (CLARITIN) 10 mg tablet, Take 10 mg by mouth daily. , Disp: , Rfl:             Date Last Reviewed:  6/26/2020     Complexity Level : Expanded review of therapy/medical records (1-2):  MODERATE COMPLEXITY   ASSESSMENT OF OCCUPATIONAL PERFORMANCE:   Palpation:          Pitting 3+ edema bilaterally, swollen toes and ankle cuffs. ROM:         WFL  Strength:          Generalized deconditioning  Special Tests:           Positive Stemmer's sign (inability to pinch skin at base of second toe)   Skin Integrity:          Tissue changes including:   papillomas, moderate lymphostatic fibrosis, hyperpigmentation, peaud'orange skin, lymph bullae, hyperkeratotic scales, lymphorrhea right anterior ankle. Sensation:  Impaired; diabetic neuropathy  Functional Mobility:  Wheelchair for community mobility; walker at home  Activities of Daily Living Max assist LB ADLs due to limited reach and sitting balance; max A IADLs, modified independent with all others  Edema/Girth:  3+ and pitting   PRETREATMENT AFFECTED LIMB(s): lower extremity      Date:  6.12.2020 6.19.20 6.26.20       Right / Left           Groin   []      []           8 inches   []      []           4 inches   []      []         PoplitealSpace   [x]      [x] 43/42 43.5/41 43.5/41.5        8 inches   [x]      [x] 50.5/51 50/50 48.5/48.5        4 inches   [x]      [x] 47/48 46/47 43.5/43.5        Ankle   [x]      [x] 42/39.5 38.5/37 34.5/32        Instep   [x]      [x] 28.5/27.5 25/25.5 25/24.5      Measurements are taken in centimeters:  2.54 cm = 1 inch  Cumulative Circumferential Volumetric Graph Measurements  RIGHT  cm 203 195 cm      LEFT  cm 200.5 189.5 cm             Physical Skills Involved:  1. Activity Tolerance  2. Edema  3.  Skin Integrity Cognitive Skills Affected (resulting in the inability to perform in a timely and safe manner):  1. N/A Psychosocial Skills Affected:  1. Habits/Routines   Number of elements that affect the Plan of Care: 3-5:  MODERATE COMPLEXITY   CLINICAL DECISION MAKING:   Outcome Measure: Tool Used: Lymphedema Life Impact Scale   Score:  Initial: 34 Most Recent: X (Date: -- )   Interpretation of Score: The Lymphedema Life Impact Scale (LLIS) is a validated instrument that measures the physical, functional, and psychosocial concerns pertinent to patients with extremity lymphedema. The Scale's questionnaire is administered to patients to gauge impairments, activity limitations, and participation restrictions resulting from their lymphedema. Score 0 1-13 14-26 27-40 41-54 55-67 68   Modifier CH CI CJ CK CL CM CN     ? Other PT/OT Primary Functional Limitations:     - CURRENT STATUS: CK - 40%-59% impaired, limited or restricted    - GOAL STATUS: CJ - 20%-39% impaired, limited or restricted    - D/C STATUS:  ---------------To be determined---------------       Medical Necessity:   · Skilled intervention continues to be required due to Chronic stage 2 lymphedema B LEs. .  Reason for Services/Other Comments:  · Patient continues to require present interventions due to patient's inability to inability to manage stage 2 lymphedema and increased risk for cellulitis.    Use of outcome tool(s) and clinical judgement create a POC that gives a: Questionable prediction of patient's progress: MODERATE COMPLEXITY   TREATMENT:   (In addition to Assessment/Re-Assessment sessions the following treatments were rendered)    Pre-treatment Symptoms/Complaints: Pt arrived with bandages on from previous session:  Reduction of 16 cm (6.5 inches) R LE and 18.5 cm (7.25 inches) L LE  Pain: Initial:   Pain Intensity 1: 5  Post Session:  5   Occupational Therapy Treatments    Therapeutic Exercise (    minutes):     HEP:  As above; handouts given to patient for all exercises. Manual Therapy:( 60 minutes)   Manual lymphatic drainage, skin care, multilayer compression bandaging            Manual Lymph Drainage:          Lymph Nodes:    Cervical Supraclavicular Axillary Abdominal Inguinal Popliteal Antecubital   RIGHT [x]     [x]     [x]     []     [x]     [x]     []       LEFT [x]     [x]     [x]     []     [x]     [x]     []          Anastamoses:   Axillo-axillary Inguino-inguinal Axillo-inguinal Inguino-axillary   ANTERIOR []     []     []     [x]       POSTERIOR []     []     []     []       RIGHT []     []     []     [x]       LEFT []     []     []     [x]         Limbs:   []    RUE     []    LUE     [x]    RLE    [x]    LLE   Compression:  Multilayer compression bandaging B LE using stockinette, 2 short stretch bandages, lymphesoft foam and grey foam pads on L lateral and R medial malleoli. .  Pt to leave on until next scheduled visit day. Remove if having pain or increased shortness of breath. Treatment/Session Assessment:    · Response to Treatment:  Tolerated treatment without complication Trace drainage anterior right calf; therefore, did not replace Medipore dressing. · Compliance with Program/Exercises:   Compliant  · Recommendations/Intent for next treatment session: Next visit will focus on phase 1 complete decongestive therapy.   Total Treatment Duration:  60 minutes  OT Patient Time In/Time Out  Time In: 1000  Time Out: 1330 [facility], OTR/L, CLT

## 2020-06-29 ENCOUNTER — HOSPITAL ENCOUNTER (OUTPATIENT)
Dept: GENERAL RADIOLOGY | Age: 80
Discharge: HOME | End: 2020-06-29
Attending: INTERNAL MEDICINE
Payer: MEDICARE

## 2020-06-29 DIAGNOSIS — I27.20 PULMONARY HYPERTENSION (HCC): Chronic | ICD-10-CM

## 2020-06-29 DIAGNOSIS — R07.2 PRECORDIAL PAIN: ICD-10-CM

## 2020-06-29 DIAGNOSIS — I82.532 CHRONIC DEEP VEIN THROMBOSIS (DVT) OF POPLITEAL VEIN OF LEFT LOWER EXTREMITY (HCC): ICD-10-CM

## 2020-06-29 DIAGNOSIS — I27.82 OTHER CHRONIC PULMONARY EMBOLISM, UNSPECIFIED WHETHER ACUTE COR PULMONALE PRESENT (HCC): ICD-10-CM

## 2020-06-29 DIAGNOSIS — R06.02 SHORTNESS OF BREATH: ICD-10-CM

## 2020-06-29 DIAGNOSIS — I50.32 DIASTOLIC CHF, CHRONIC (HCC): ICD-10-CM

## 2020-06-29 PROCEDURE — 71046 X-RAY EXAM CHEST 2 VIEWS: CPT

## 2020-06-30 ENCOUNTER — APPOINTMENT (OUTPATIENT)
Dept: PHYSICAL THERAPY | Age: 80
End: 2020-06-30
Payer: MEDICARE

## 2020-06-30 PROBLEM — I82.532 CHRONIC DEEP VEIN THROMBOSIS (DVT) OF POPLITEAL VEIN OF LEFT LOWER EXTREMITY (HCC): Status: ACTIVE | Noted: 2020-06-30

## 2020-06-30 PROBLEM — J40 BRONCHITIS: Status: ACTIVE | Noted: 2020-06-30

## 2020-06-30 PROBLEM — R06.02 SHORTNESS OF BREATH: Status: ACTIVE | Noted: 2020-06-30

## 2020-06-30 PROBLEM — R07.2 PRECORDIAL PAIN: Status: ACTIVE | Noted: 2020-06-30

## 2020-06-30 PROBLEM — I89.0 LYMPHEDEMA: Status: ACTIVE | Noted: 2020-06-30

## 2020-07-02 ENCOUNTER — HOSPITAL ENCOUNTER (OUTPATIENT)
Dept: PHYSICAL THERAPY | Age: 80
Discharge: HOME | End: 2020-07-02
Payer: MEDICARE

## 2020-07-02 PROCEDURE — 97140 MANUAL THERAPY 1/> REGIONS: CPT

## 2020-07-02 NOTE — PROGRESS NOTES
Yanet Wray  : 1940  [place]  Secondary: Deepak Roca [address] at [facility]  [address], [address]  [phone]   [phone]         OUTPATIENT OCCUPATIONAL THERAPY: Daily Note 2020    ICD-10: Treatment Diagnosis: I89.0, Lymphedema not elsewhere classified  M79.89, Swelling of both lower extremities  Precautions/Allergies:   Tamica hutchison]   MD Orders: eval and treat MEDICAL/REFERRING DIAGNOSIS:   Lymphedema, not elsewhere classified [I89.0]   DATE OF ONSET: Chronic   REFERRING PHYSICIAN: Kena Dixon MD  RETURN PHYSICIAN APPOINTMENT: to be determined      INITIAL ASSESSMENT:  Mr. Leila Ibarra presents with stage 2 bilateral lower extremity lymphedema. He reports swelling in his legs since . He presents with bilateral 3+ pitting edema, positive Stemmer sign, papillomas, moderate lymphostatic fibrosis, hyperpigmentation, peaud'orange skin, lymph bullae, hyperkeratotic scales, lymphorrhea right anterior ankle, swollen toes and ankle cuffs. Mr. Leila Ibarra has not has any treatment for lymphedema. He is a retired nurse. Comorbidities include uncontrolled DM with neuropathy, diabetic retinopathy of both eyes, diverticulitis and colectomy, TIA, HTN. He lives with his brother; his sister assists with IADLs. Mr. Leila Ibarra is an excellent candidate for complete decongestive therapy, compression bandaging and long term compression management garments. He is in agreement with POC and goals. PLAN OF CARE:   PROBLEM LIST:  1. Decreased ADL/Functional Activities  2. Decreased Activity Tolerance  3. Decreased Flexibility/Joint Mobility  4. Edema/Girth  5. Decreased Knowledge of Precautions  6. Decreased Skin Integrity/Hygeine  7. Decreased [place] with Home Exercise Program INTERVENTIONS PLANNED  1. Skin care  2. Compression bandaging  3.   Fitting for compression garment(s)  4. Manual therapy/Manual lymph drainage  5. Therapeutic exercise/Therapeutic activities  6. Patient Education  7. Compression pump trial prn  8.  kinesiotaping    TREATMENT PLAN:  Effective Dates: 6/12/2020 TO 9/13/2020 (90 days). Frequency/Duration: 2 times a week for 90 Day(s)  Will adjust frequency and duration as progress indicates. GOALS: (Goals have been discussed and agreed upon with patient.)  Short-Term Functional Goals: Time Frame: 45 days  1. The patient/caregiver will verbalize understanding of lymphedema precautions. 2. Patient will be independent with skin care regimen to decrease risk of cellulitis. 3. The patient/caregiver will be independent with self-manual lymph drainage techniques and show decrease in limb volume. 4. Patient will be independent in lymphatic exercises. Discharge Goals: Time Frame: 90 days  1. Patient's bilateral lower extremity circumferential measurements will decrease 8 cm to 10 cm on volumetric graph to maximize functional use in ADL's.    2. The patient/caregiver will be independent with home management of lymphedema. 3. Patient/caregiver will be independent donning and doffing bilateral lower extremity compression garment. Rehabilitation Potential For Stated Goals: Fair            The information in this section was collected on 7/2/2020   (except where otherwise noted). OCCUPATIONAL PROFILE & HISTORY:   History of Present Injury/Illness (Reason for Referral):    Mr. Tremayne Mitchell presents with stage 2 bilateral lower extremity lymphedema. He reports swelling in his legs since 2004. He presents with bilateral 3+ pitting edema, positive Stemmer sign, papillomas, moderate lymphostatic fibrosis, hyperpigmentation, peaud'orange skin, lymph bullae, hyperkeratotic scales, lymphorrhea right anterior ankle, swollen toes and ankle cuffs. Mr. Tremayne Mitchell has not has any treatment for lymphedema. He is a retired nurse.   Comorbidities include uncontrolled DM with neuropathy, diabetic retinopathy of both eyes, diverticulitis and colectomy, TIA, HTN. He lives with his brother; his sister assists with IADLs. Mr. Tremayne Mitchell is an excellent candidate for complete decongestive therapy, compression bandaging and long term compression management garments. He is in agreement with POC and goals. Past Medical History/Comorbidities:   Mr. Tremayne Mitchell  has a past medical history of Anxiety, Cecal lesion, Cervical stenosis of spine, Chronic kidney disease, Claustrophobia, Degenerative disc disease, lumbar, Diabetes mellitus type II, Diabetic retinopathy of both eyes without macular edema associated with type 2 diabetes mellitus ([address] Diastolic congestive heart failure ([address] Diverticulosis of colon (June 2010), DVT (deep venous thrombosis) ([address] Extrinsic allergic asthma, GERD (gastroesophageal reflux disease), History of anticoagulant use, History of TIA (transient ischemic attack) (11/09/2017), colonic polyps (07/29/2010), Hyperlipidemia LDL goal < 70, Hypertension, Obstructive sleep apnea, Perennial allergic rhinitis with seasonal variation, 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 He also has no past medical history of Coagulation defects or COPD. Mr. Tremayne Mitchell  has a past surgical history that includes colonoscopy (07/29/2010); egd (5/9/2011); pr sinus surgery proc unlisted; hx knee arthroscopy (Right, 1991); pr total knee arthroplasty (Right, 2006); hx bunionectomy (Bilateral); ir ivc filter (10/22/2019); colonoscopy (N/A, 10/24/2019); hx circumcision; hx hernia repair (Bilateral); hx colectomy (Right); and ir remove ivc filter w si (2/27/2020).   Social History/Living Environment:     Lives with brother; sister assists with IADLs and LE ADLs  Prior Level of Function/Work/Activity:  Moderate assistance LB ADLs; modified independent ADLs  Dominant Side:         RIGHT  Previous Treatment Approaches:          Diuretics   Ambulatory/Rehab Services H2 Model Falls Risk Assessment    Risk Factors:       (1)  Gender [Male] Ability to Rise from Chair:       (3)  Multiple attempts, but successful    Falls Prevention Plan: Mobility Assistance Device (specify): Wheelchair/walker   Total: (5 or greater = High Risk): 4    ©2010 [facility] . [facility] [employer] Patent #[id number]. Federal Law prohibits the replication, distribution or use without written permission from [facility] [employer]     Current Medications:    Current Outpatient Medications:     amoxicillin (AMOXIL) 500 mg capsule, Take 1 Cap by mouth three (3) times daily. , Disp: 30 Cap, Rfl: 0    glucose blood VI test strips (FreeStyle Lite Strips) strip, Use 1 lancet to check glucose 4 times/day. Dx: E11.59, Disp: 400 Strip, Rfl: 3    lancets (FreeStyle Lancets) 28 gauge misc, Use 1 lancet to check blood sugar 4 times/day. Dx: E11.59, Disp: 400 Lancet, Rfl: 3    potassium chloride (K-DUR, KLOR-CON) 20 mEq tablet, Take 1 Tab by mouth two (2) times a day., Disp: 180 Tab, Rfl: 1    HYDROcodone-acetaminophen (Norco)  mg tablet, Take 1 Tab by mouth three (3) times daily. , Disp: , Rfl:     furosemide (LASIX) 40 mg tablet, TAKE 1 TABLET BY MOUTH THREE TIMES DAILY, Disp: 270 Tab, Rfl: 0    metFORMIN (GLUCOPHAGE) 1,000 mg tablet, Take 1 Tab by mouth two (2) times daily (with meals). , Disp: 180 Tab, Rfl: 1    atorvastatin (LIPITOR) 80 mg tablet, Take 1 Tab by mouth nightly., Disp: 90 Tab, Rfl: 3    spironolactone (ALDACTONE) 25 mg tablet, Take 1 Tab by mouth daily for 30 days. 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 (Patient taking differently: 120 Units by SubCUTAneous route daily.), Disp: 7 Adjustable Dose Pre-filled Pen Syringe, Rfl: 5    ezetimibe (ZETIA) 10 mg tablet, Take 1 Tab by mouth nightly., Disp: 90 Tab, Rfl: 3    irbesartan (AVAPRO) 300 mg tablet, Take 1 Tab by mouth nightly. Indications: chronic heart failure, high blood pressure, Disp: 90 Tab, Rfl: 3    pantoprazole (PROTONIX) 40 mg tablet, Take 1 Tab by mouth Daily (before breakfast). , Disp: 90 Tab, Rfl: 3    busPIRone (BUSPAR) 15 mg tablet, Take 1 Tab by mouth daily. , Disp: 90 Tab, Rfl: 3    carvedilol (COREG) 25 mg tablet, Take 0.5 Tabs by mouth two (2) times a day., Disp: 60 Tab, Rfl: 11    insulin aspart U-100 (NOVOLOG FLEXPEN U-100 INSULIN) 100 unit/mL (3 mL) inpn, by SubCUTAneous route Before breakfast, lunch, and dinner. Inject 8 units standing dose + additional insulin according to sliding scale (up to 16 units) subQ before each meal  151-200 = 2 units 201-250 = 4 units 251-300 = 6 units 201-350 = 8 units, Disp: , Rfl:     nitroglycerin (NITROSTAT) 0.4 mg SL tablet, 1 Tab by SubLINGual route every five (5) minutes as needed for Chest Pain. Up to 3 doses. , Disp: 30 Tab, Rfl: 5    albuterol (VENTOLIN HFA) 90 mcg/actuation inhaler, Take 2 Puffs by inhalation every four (4) hours as needed for Wheezing or Shortness of Breath., Disp: 1 Inhaler, Rfl: 3    acetaminophen (TYLENOL) 500 mg tablet, Take 1,000 mg by mouth every six (6) hours as needed for Fever or Pain., Disp: , Rfl:     cyanocobalamin (VITAMIN B12) 500 mcg tablet, Take 1 Tab by mouth daily.  Indications: b12 deficiency, Disp: 30 Tab, Rfl: 2   tamsulosin (FLOMAX) 0.4 mg capsule, Take 1 Cap by mouth daily. , Disp: 90 Cap, Rfl: 3    polyethylene glycol (MIRALAX) 17 gram/dose powder, Take 17 g by mouth daily. , Disp: 17 g, Rfl: 5    pregabalin (LYRICA) 150 mg capsule, Take 150 mg by mouth two (2) times a day., Disp: , Rfl:     loratadine (CLARITIN) 10 mg tablet, Take 10 mg by mouth daily. , Disp: , Rfl:             Date Last Reviewed:  7/2/2020     Complexity Level : Expanded review of therapy/medical records (1-2):  MODERATE COMPLEXITY   ASSESSMENT OF OCCUPATIONAL PERFORMANCE:   Palpation:          Pitting 3+ edema bilaterally, swollen toes and ankle cuffs. ROM:         WFL  Strength:          Generalized deconditioning  Special Tests:           Positive Stemmer's sign (inability to pinch skin at base of second toe)   Skin Integrity:          Tissue changes including:   papillomas, moderate lymphostatic fibrosis, hyperpigmentation, peaud'orange skin, lymph bullae, hyperkeratotic scales, lymphorrhea right anterior ankle.   Sensation:  Impaired; diabetic neuropathy  Functional Mobility:  Wheelchair for community mobility; walker at home  Activities of Daily Living Max assist LB ADLs due to limited reach and sitting balance; max A IADLs, modified independent with all others  Edema/Girth:  3+ and pitting   PRETREATMENT AFFECTED LIMB(s): lower extremity      Date:  6.12.2020 6.19.20 6.26.20 7.2.20      Right / Left           Groin   []      []           8 inches   []      []           4 inches   []      []         PoplitealSpace   [x]      [x] 43/42 43.5/41 43.5/41.5 43/42.5       8 inches   [x]      [x] 50.5/51 50/50 48.5/48.5 47.5/49       4 inches   [x]      [x] 47/48 46/47 43.5/43.5 42/44       Ankle   [x]      [x] 42/39.5 38.5/37 34.5/32 32.5/30       Instep   [x]      [x] 28.5/27.5 25/25.5 25/24.5 25/23.5     Measurements are taken in centimeters:  2.54 cm = 1 inch  Cumulative Circumferential Volumetric Graph Measurements  RIGHT  cm 203 195 cm 190 cm LEFT  cm 200.5 189.5 cm 189 cm            Physical Skills Involved:  1. Activity Tolerance  2. Edema  3. Skin Integrity Cognitive Skills Affected (resulting in the inability to perform in a timely and safe manner):  1. N/A Psychosocial Skills Affected:  1. Habits/Routines   Number of elements that affect the Plan of Care: 3-5:  MODERATE COMPLEXITY   CLINICAL DECISION MAKING:   Outcome Measure: Tool Used: Lymphedema Life Impact Scale   Score:  Initial: 34 Most Recent: X (Date: -- )   Interpretation of Score: The Lymphedema Life Impact Scale (LLIS) is a validated instrument that measures the physical, functional, and psychosocial concerns pertinent to patients with extremity lymphedema. The Scale's questionnaire is administered to patients to gauge impairments, activity limitations, and participation restrictions resulting from their lymphedema. Score 0 1-13 14-26 27-40 41-54 55-67 68   Modifier CH CI CJ CK CL CM CN     ? Other PT/OT Primary Functional Limitations:     - CURRENT STATUS: CK - 40%-59% impaired, limited or restricted    - GOAL STATUS: CJ - 20%-39% impaired, limited or restricted    - D/C STATUS:  ---------------To be determined---------------       Medical Necessity:   · Skilled intervention continues to be required due to Chronic stage 2 lymphedema B LEs. .  Reason for Services/Other Comments:  · Patient continues to require present interventions due to patient's inability to inability to manage stage 2 lymphedema and increased risk for cellulitis.    Use of outcome tool(s) and clinical judgement create a POC that gives a: Questionable prediction of patient's progress: MODERATE COMPLEXITY   TREATMENT:   (In addition to Assessment/Re-Assessment sessions the following treatments were rendered)    Pre-treatment Symptoms/Complaints: Pt arrived with bandages on from previous session:  Reduction of 21 cm R LE and 19 cm  L LE  Pain: Initial:   Pain Intensity 1: 5  Post Session:  5 Occupational Therapy Treatments    Therapeutic Exercise (    minutes):     HEP:  As above; handouts given to patient for all exercises. Manual Therapy:( 60 minutes)   Manual lymphatic drainage, skin care, multilayer compression bandaging            Manual Lymph Drainage:          Lymph Nodes:    Cervical Supraclavicular Axillary Abdominal Inguinal Popliteal Antecubital   RIGHT [x]     [x]     [x]     []     [x]     [x]     []       LEFT [x]     [x]     [x]     []     [x]     [x]     []          Anastamoses:   Axillo-axillary Inguino-inguinal Axillo-inguinal Inguino-axillary   ANTERIOR []     []     []     [x]       POSTERIOR []     []     []     []       RIGHT []     []     []     [x]       LEFT []     []     []     [x]         Limbs:   []    RUE     []    LUE     [x]    RLE    [x]    LLE   Compression:  Multilayer compression bandaging B LE using stockinette, 2 short stretch bandages, lymphesoft foam and grey foam pads on L lateral and R medial malleoli. .  Pt to leave on until next scheduled visit day. Remove if having pain or increased shortness of breath. Treatment/Session Assessment:    · Response to Treatment:  Tolerated treatment without complication Trace drainage anterior right calf. · Compliance with Program/Exercises:   Compliant  · Recommendations/Intent for next treatment session: Next visit will focus on phase 1 complete decongestive therapy.   Total Treatment Duration:  60 minutes  OT Patient Time In/Time Out  Time In: 1000  Time Out: 1330 [facility] OTR/L, CLT

## 2020-07-07 ENCOUNTER — HOSPITAL ENCOUNTER (OUTPATIENT)
Dept: PHYSICAL THERAPY | Age: 80
Discharge: HOME | End: 2020-07-07
Payer: MEDICARE

## 2020-07-07 PROCEDURE — 97140 MANUAL THERAPY 1/> REGIONS: CPT

## 2020-07-07 NOTE — PROGRESS NOTES
Maira Tuttle  : 1940  [place]  Secondary: Deepak Roca [facility] at [facility]  [address], [address]  [phone]   [phone]         OUTPATIENT OCCUPATIONAL THERAPY: Daily Note 2020    ICD-10: Treatment Diagnosis: I89.0, Lymphedema not elsewhere classified  M79.89, Swelling of both lower extremities  Precautions/Allergies:   Racheal Pry maleate]   MD Orders: eval and treat MEDICAL/REFERRING DIAGNOSIS:   Lymphedema, not elsewhere classified [I89.0]   DATE OF ONSET: Chronic   REFERRING PHYSICIAN: Mike Millan MD  RETURN PHYSICIAN APPOINTMENT: to be determined      INITIAL ASSESSMENT:  Mr. Rosaline Rahman presents with stage 2 bilateral lower extremity lymphedema. He reports swelling in his legs since . He presents with bilateral 3+ pitting edema, positive Stemmer sign, papillomas, moderate lymphostatic fibrosis, hyperpigmentation, peaud'orange skin, lymph bullae, hyperkeratotic scales, lymphorrhea right anterior ankle, swollen toes and ankle cuffs. Mr. Rosaline Rahman has not has any treatment for lymphedema. He is a retired nurse. Comorbidities include uncontrolled DM with neuropathy, diabetic retinopathy of both eyes, diverticulitis and colectomy, TIA, HTN. He lives with his brother; his sister assists with IADLs. Mr. Rosaline Rahman is an excellent candidate for complete decongestive therapy, compression bandaging and long term compression management garments. He is in agreement with POC and goals. PLAN OF CARE:   PROBLEM LIST:  1. Decreased ADL/Functional Activities  2. Decreased Activity Tolerance  3. Decreased Flexibility/Joint Mobility  4. Edema/Girth  5. Decreased Knowledge of Precautions  6. Decreased Skin Integrity/Hygeine  7. Decreased [place] with Home Exercise Program INTERVENTIONS PLANNED  1. Skin care  2. Compression bandaging  3.   Fitting for compression garment(s)  4. Manual therapy/Manual lymph drainage  5. Therapeutic exercise/Therapeutic activities  6. Patient Education  7. Compression pump trial prn  8.  kinesiotaping    TREATMENT PLAN:  Effective Dates: 6/12/2020 TO 9/13/2020 (90 days). Frequency/Duration: 2 times a week for 90 Day(s)  Will adjust frequency and duration as progress indicates. GOALS: (Goals have been discussed and agreed upon with patient.)  Short-Term Functional Goals: Time Frame: 45 days  1. The patient/caregiver will verbalize understanding of lymphedema precautions. 2. Patient will be independent with skin care regimen to decrease risk of cellulitis. 3. The patient/caregiver will be independent with self-manual lymph drainage techniques and show decrease in limb volume. 4. Patient will be independent in lymphatic exercises. Discharge Goals: Time Frame: 90 days  1. Patient's bilateral lower extremity circumferential measurements will decrease 8 cm to 10 cm on volumetric graph to maximize functional use in ADL's.    2. The patient/caregiver will be independent with home management of lymphedema. 3. Patient/caregiver will be independent donning and doffing bilateral lower extremity compression garment. Rehabilitation Potential For Stated Goals: Fair            The information in this section was collected on 7/7/2020   (except where otherwise noted). OCCUPATIONAL PROFILE & HISTORY:   History of Present Injury/Illness (Reason for Referral):    Mr. Seng Melendez presents with stage 2 bilateral lower extremity lymphedema. He reports swelling in his legs since 2004. He presents with bilateral 3+ pitting edema, positive Stemmer sign, papillomas, moderate lymphostatic fibrosis, hyperpigmentation, peaud'orange skin, lymph bullae, hyperkeratotic scales, lymphorrhea right anterior ankle, swollen toes and ankle cuffs. Mr. Seng Melendez has not has any treatment for lymphedema. He is a retired nurse.   Comorbidities include uncontrolled DM with neuropathy, diabetic retinopathy of both eyes, diverticulitis and colectomy, TIA, HTN. He lives with his brother; his sister assists with IADLs. Mr. Xuan Mccauley is an excellent candidate for complete decongestive therapy, compression bandaging and long term compression management garments. He is in agreement with POC and goals. Past Medical History/Comorbidities:   Mr. Xuan Mccauley  has a past medical history of Anxiety, Cecal lesion, Cervical stenosis of spine, Chronic kidney disease, Claustrophobia, Degenerative disc disease, lumbar, Diabetes mellitus type II, Diabetic retinopathy of both eyes without macular edema associated with type 2 diabetes mellitus ([address] Diastolic congestive heart failure ([address] Diverticulosis of colon (June 2010), DVT (deep venous thrombosis) ([address] Extrinsic allergic asthma, GERD (gastroesophageal reflux disease), History of anticoagulant use, History of TIA (transient ischemic attack) (11/09/2017), colonic polyps (07/29/2010), Hyperlipidemia LDL goal < 70, Hypertension, Obstructive sleep apnea, Perennial allergic rhinitis with seasonal variation, 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 He also has no past medical history of Coagulation defects or COPD. Mr. Xuan Mccauley  has a past surgical history that includes colonoscopy (07/29/2010); egd (5/9/2011); pr sinus surgery proc unlisted; hx knee arthroscopy (Right, 1991); pr total knee arthroplasty (Right, 2006); hx bunionectomy (Bilateral); ir ivc filter (10/22/2019); colonoscopy (N/A, 10/24/2019); hx circumcision; hx hernia repair (Bilateral); hx colectomy (Right); and ir remove ivc filter w si (2/27/2020).   Social History/Living Environment:     Lives with brother; sister assists with IADLs and LE ADLs  Prior Level of Function/Work/Activity:  Moderate assistance LB ADLs; modified independent ADLs  Dominant Side:         RIGHT  Previous Treatment Approaches:          Diuretics   Ambulatory/Rehab Services H2 Model Falls Risk Assessment    Risk Factors:       (1)  Gender [Male] Ability to Rise from Chair:       (3)  Multiple attempts, but successful    Falls Prevention Plan: Mobility Assistance Device (specify): Wheelchair/walker   Total: (5 or greater = High Risk): 4    ©2010 [facility] . [facility] [employer]) Patent #[id number]. Federal Law prohibits the replication, distribution or use without written permission from [facility] [employer]     Current Medications:    Current Outpatient Medications:     amoxicillin (AMOXIL) 500 mg capsule, Take 1 Cap by mouth three (3) times daily. , Disp: 30 Cap, Rfl: 0    glucose blood VI test strips (FreeStyle Lite Strips) strip, Use 1 lancet to check glucose 4 times/day. Dx: E11.59, Disp: 400 Strip, Rfl: 3    lancets (FreeStyle Lancets) 28 gauge misc, Use 1 lancet to check blood sugar 4 times/day. Dx: E11.59, Disp: 400 Lancet, Rfl: 3    potassium chloride (K-DUR, KLOR-CON) 20 mEq tablet, Take 1 Tab by mouth two (2) times a day., Disp: 180 Tab, Rfl: 1    HYDROcodone-acetaminophen (Norco)  mg tablet, Take 1 Tab by mouth three (3) times daily. , Disp: , Rfl:     furosemide (LASIX) 40 mg tablet, TAKE 1 TABLET BY MOUTH THREE TIMES DAILY, Disp: 270 Tab, Rfl: 0    metFORMIN (GLUCOPHAGE) 1,000 mg tablet, Take 1 Tab by mouth two (2) times daily (with meals). , Disp: 180 Tab, Rfl: 1    atorvastatin (LIPITOR) 80 mg tablet, Take 1 Tab by mouth nightly., Disp: 90 Tab, Rfl: 3    spironolactone (ALDACTONE) 25 mg tablet, Take 1 Tab by mouth daily for 30 days. 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 (Patient taking differently: 120 Units by SubCUTAneous route daily.), Disp: 7 Adjustable Dose Pre-filled Pen Syringe, Rfl: 5    ezetimibe (ZETIA) 10 mg tablet, Take 1 Tab by mouth nightly., Disp: 90 Tab, Rfl: 3    irbesartan (AVAPRO) 300 mg tablet, Take 1 Tab by mouth nightly. Indications: chronic heart failure, high blood pressure, Disp: 90 Tab, Rfl: 3    pantoprazole (PROTONIX) 40 mg tablet, Take 1 Tab by mouth Daily (before breakfast). , Disp: 90 Tab, Rfl: 3    busPIRone (BUSPAR) 15 mg tablet, Take 1 Tab by mouth daily. , Disp: 90 Tab, Rfl: 3    carvedilol (COREG) 25 mg tablet, Take 0.5 Tabs by mouth two (2) times a day., Disp: 60 Tab, Rfl: 11    insulin aspart U-100 (NOVOLOG FLEXPEN U-100 INSULIN) 100 unit/mL (3 mL) inpn, by SubCUTAneous route Before breakfast, lunch, and dinner. Inject 8 units standing dose + additional insulin according to sliding scale (up to 16 units) subQ before each meal  151-200 = 2 units 201-250 = 4 units 251-300 = 6 units 201-350 = 8 units, Disp: , Rfl:     nitroglycerin (NITROSTAT) 0.4 mg SL tablet, 1 Tab by SubLINGual route every five (5) minutes as needed for Chest Pain. Up to 3 doses. , Disp: 30 Tab, Rfl: 5    albuterol (VENTOLIN HFA) 90 mcg/actuation inhaler, Take 2 Puffs by inhalation every four (4) hours as needed for Wheezing or Shortness of Breath., Disp: 1 Inhaler, Rfl: 3    acetaminophen (TYLENOL) 500 mg tablet, Take 1,000 mg by mouth every six (6) hours as needed for Fever or Pain., Disp: , Rfl:     cyanocobalamin (VITAMIN B12) 500 mcg tablet, Take 1 Tab by mouth daily.  Indications: b12 deficiency, Disp: 30 Tab, Rfl: 2   tamsulosin (FLOMAX) 0.4 mg capsule, Take 1 Cap by mouth daily. , Disp: 90 Cap, Rfl: 3    polyethylene glycol (MIRALAX) 17 gram/dose powder, Take 17 g by mouth daily. , Disp: 17 g, Rfl: 5    pregabalin (LYRICA) 150 mg capsule, Take 150 mg by mouth two (2) times a day., Disp: , Rfl:     loratadine (CLARITIN) 10 mg tablet, Take 10 mg by mouth daily. , Disp: , Rfl:             Date Last Reviewed:  7/7/2020     Complexity Level : Expanded review of therapy/medical records (1-2):  MODERATE COMPLEXITY   ASSESSMENT OF OCCUPATIONAL PERFORMANCE:   Palpation:          Pitting 3+ edema bilaterally, swollen toes and ankle cuffs. ROM:         WFL  Strength:          Generalized deconditioning  Special Tests:           Positive Stemmer's sign (inability to pinch skin at base of second toe)   Skin Integrity:          Tissue changes including:   papillomas, moderate lymphostatic fibrosis, hyperpigmentation, peaud'orange skin, lymph bullae, hyperkeratotic scales, lymphorrhea right anterior ankle.   Sensation:  Impaired; diabetic neuropathy  Functional Mobility:  Wheelchair for community mobility; walker at home  Activities of Daily Living Max assist LB ADLs due to limited reach and sitting balance; max A IADLs, modified independent with all others  Edema/Girth:  3+ and pitting   PRETREATMENT AFFECTED LIMB(s): lower extremity      Date:  6.12.2020 6.19.20 6.26.20 7.2.20 7.7.20     Right / Left           Groin   []      []           8 inches   []      []           4 inches   []      []         PoplitealSpace   [x]      [x] 43/42 43.5/41 43.5/41.5 43/42.5 42/40.5      8 inches   [x]      [x] 50.5/51 50/50 48.5/48.5 47.5/49 44.5/44.5      4 inches   [x]      [x] 47/48 46/47 43.5/43.5 42/44 41/42      Ankle   [x]      [x] 42/39.5 38.5/37 34.5/32 32.5/30 31/29      Instep   [x]      [x] 28.5/27.5 25/25.5 25/24.5 25/23.5 24/23    Measurements are taken in centimeters:  2.54 cm = 1 inch  Cumulative Circumferential Volumetric Graph Measurements  RIGHT  cm 203 195 cm 190 cm 182.5 cm    LEFT  cm 200.5 189.5 cm 189 cm 179 cm           Physical Skills Involved:  1. Activity Tolerance  2. Edema  3. Skin Integrity Cognitive Skills Affected (resulting in the inability to perform in a timely and safe manner):  1. N/A Psychosocial Skills Affected:  1. Habits/Routines   Number of elements that affect the Plan of Care: 3-5:  MODERATE COMPLEXITY   CLINICAL DECISION MAKING:   Outcome Measure: Tool Used: Lymphedema Life Impact Scale   Score:  Initial: 34 Most Recent: X (Date: -- )   Interpretation of Score: The Lymphedema Life Impact Scale (LLIS) is a validated instrument that measures the physical, functional, and psychosocial concerns pertinent to patients with extremity lymphedema. The Scale's questionnaire is administered to patients to gauge impairments, activity limitations, and participation restrictions resulting from their lymphedema. Score 0 1-13 14-26 27-40 41-54 55-67 68   Modifier CH CI CJ CK CL CM CN     ? Other PT/OT Primary Functional Limitations:     - CURRENT STATUS: CK - 40%-59% impaired, limited or restricted    - GOAL STATUS: CJ - 20%-39% impaired, limited or restricted    - D/C STATUS:  ---------------To be determined---------------       Medical Necessity:   · Skilled intervention continues to be required due to Chronic stage 2 lymphedema B LEs. .  Reason for Services/Other Comments:  · Patient continues to require present interventions due to patient's inability to inability to manage stage 2 lymphedema and increased risk for cellulitis.    Use of outcome tool(s) and clinical judgement create a POC that gives a: Questionable prediction of patient's progress: MODERATE COMPLEXITY   TREATMENT:   (In addition to Assessment/Re-Assessment sessions the following treatments were rendered)    Pre-treatment Symptoms/Complaints: Pt arrived with bandages on from previous session:  Reduction of 28.5 cm R LE and 29 cm L LE  Pain: Initial:   Pain Intensity 1: 5  Post Session:  5   Occupational Therapy Treatments    Therapeutic Exercise (    minutes):     HEP:  As above; handouts given to patient for all exercises. Manual Therapy:( 60 minutes)   Manual lymphatic drainage, skin care, multilayer compression bandaging. Trace plus drainage R LE. Wrapped area with Kerlix today. Manual Lymph Drainage:          Lymph Nodes:    Cervical Supraclavicular Axillary Abdominal Inguinal Popliteal Antecubital   RIGHT [x]     [x]     [x]     []     [x]     [x]     []       LEFT [x]     [x]     [x]     []     [x]     [x]     []          Anastamoses:   Axillo-axillary Inguino-inguinal Axillo-inguinal Inguino-axillary   ANTERIOR []     []     []     [x]       POSTERIOR []     []     []     []       RIGHT []     []     []     [x]       LEFT []     []     []     [x]         Limbs:   []    RUE     []    LUE     [x]    RLE    [x]    LLE   Compression:  Multilayer compression bandaging B LE using stockinette, 2 short stretch bandages and grey foam pads on L lateral and R medial malleoli. .  Pt to leave on until next scheduled visit day. Remove if having pain or increased shortness of breath. Treatment/Session Assessment:    · Response to Treatment:  Tolerated treatment without complication Trace drainage anterior right calf. Excellent results to date. Recommend patient order Hiram Whitfield wraps for management of reduction. · Compliance with Program/Exercises:   Compliant  · Recommendations/Intent for next treatment session: Next visit will focus on phase 1 complete decongestive therapy.   Total Treatment Duration:  60 minutes  OT Patient Time In/Time Out  Time In: 1000  Time Out: 13[facility] OTR/L, CLT

## 2020-07-10 ENCOUNTER — HOSPITAL ENCOUNTER (OUTPATIENT)
Dept: PHYSICAL THERAPY | Age: 80
Discharge: HOME | End: 2020-07-10
Payer: MEDICARE

## 2020-07-10 PROCEDURE — 97140 MANUAL THERAPY 1/> REGIONS: CPT

## 2020-07-10 NOTE — PROGRESS NOTES
Em Patiño  : 1940  [place]  Secondary: Deepak [address]  [address], [address]  [phone]   [phone]         OUTPATIENT OCCUPATIONAL THERAPY: Daily Note 7/10/2020    ICD-10: Treatment Diagnosis: I89.0, Lymphedema not elsewhere classified  M79.89, Swelling of both lower extremities  Precautions/Allergies:   Lynn Stubbs malesteven]   MD Orders: eval and treat MEDICAL/REFERRING DIAGNOSIS:   Lymphedema, not elsewhere classified [I89.0]   DATE OF ONSET: Chronic   REFERRING PHYSICIAN: Hardy Henderson MD  RETURN PHYSICIAN APPOINTMENT: to be determined      INITIAL ASSESSMENT:  Mr. Nancy Dugan presents with stage 2 bilateral lower extremity lymphedema. He reports swelling in his legs since . He presents with bilateral 3+ pitting edema, positive Stemmer sign, papillomas, moderate lymphostatic fibrosis, hyperpigmentation, peaud'orange skin, lymph bullae, hyperkeratotic scales, lymphorrhea right anterior ankle, swollen toes and ankle cuffs. Mr. Nancy Dugan has not has any treatment for lymphedema. He is a retired nurse. Comorbidities include uncontrolled DM with neuropathy, diabetic retinopathy of both eyes, diverticulitis and colectomy, TIA, HTN. He lives with his brother; his sister assists with IADLs. Mr. Nancy Dugan is an excellent candidate for complete decongestive therapy, compression bandaging and long term compression management garments. He is in agreement with POC and goals. PLAN OF CARE:   PROBLEM LIST:  1. Decreased ADL/Functional Activities  2. Decreased Activity Tolerance  3. Decreased Flexibility/Joint Mobility  4. Edema/Girth  5. Decreased Knowledge of Precautions  6. Decreased Skin Integrity/Hygeine  7. Decreased [place] with Home Exercise Program INTERVENTIONS PLANNED  1. Skin care  2. Compression bandaging  3.   Fitting for compression garment(s)  4. Manual therapy/Manual lymph drainage  5. Therapeutic exercise/Therapeutic activities  6. Patient Education  7. Compression pump trial prn  8.  kinesiotaping    TREATMENT PLAN:  Effective Dates: 6/12/2020 TO 9/13/2020 (90 days). Frequency/Duration: 2 times a week for 90 Day(s)  Will adjust frequency and duration as progress indicates. GOALS: (Goals have been discussed and agreed upon with patient.)  Short-Term Functional Goals: Time Frame: 45 days  1. The patient/caregiver will verbalize understanding of lymphedema precautions. 2. Patient will be independent with skin care regimen to decrease risk of cellulitis. 3. The patient/caregiver will be independent with self-manual lymph drainage techniques and show decrease in limb volume. 4. Patient will be independent in lymphatic exercises. Discharge Goals: Time Frame: 90 days  1. Patient's bilateral lower extremity circumferential measurements will decrease 8 cm to 10 cm on volumetric graph to maximize functional use in ADL's.    2. The patient/caregiver will be independent with home management of lymphedema. 3. Patient/caregiver will be independent donning and doffing bilateral lower extremity compression garment. Rehabilitation Potential For Stated Goals: Fair            The information in this section was collected on 7/10/2020   (except where otherwise noted). OCCUPATIONAL PROFILE & HISTORY:   History of Present Injury/Illness (Reason for Referral):    Mr. Xuan Mccauley presents with stage 2 bilateral lower extremity lymphedema. He reports swelling in his legs since 2004. He presents with bilateral 3+ pitting edema, positive Stemmer sign, papillomas, moderate lymphostatic fibrosis, hyperpigmentation, peaud'orange skin, lymph bullae, hyperkeratotic scales, lymphorrhea right anterior ankle, swollen toes and ankle cuffs. Mr. Xuan Mccauley has not has any treatment for lymphedema. He is a retired nurse.   Comorbidities include uncontrolled DM with neuropathy, diabetic retinopathy of both eyes, diverticulitis and colectomy, TIA, HTN. He lives with his brother; his sister assists with IADLs. Mr. Dania Guzman is an excellent candidate for complete decongestive therapy, compression bandaging and long term compression management garments. He is in agreement with POC and goals. Past Medical History/Comorbidities:   Mr. Dania Guzman  has a past medical history of Anxiety, Cecal lesion, Cervical stenosis of spine, Chronic kidney disease, Claustrophobia, Degenerative disc disease, lumbar, Diabetes mellitus type II, Diabetic retinopathy of both eyes without macular edema associated with type 2 diabetes mellitus ([address] Diastolic congestive heart failure ([address] Diverticulosis of colon (June 2010), DVT (deep venous thrombosis) ([address] Extrinsic allergic asthma, GERD (gastroesophageal reflux disease), History of anticoagulant use, History of TIA (transient ischemic attack) (11/09/2017), colonic polyps (07/29/2010), Hyperlipidemia LDL goal < 70, Hypertension, Obstructive sleep apnea, Perennial allergic rhinitis with seasonal variation, 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 He also has no past medical history of Coagulation defects or COPD. Mr. Dania Guzman  has a past surgical history that includes colonoscopy (07/29/2010); egd (5/9/2011); pr sinus surgery proc unlisted; hx knee arthroscopy (Right, 1991); pr total knee arthroplasty (Right, 2006); hx bunionectomy (Bilateral); ir ivc filter (10/22/2019); colonoscopy (N/A, 10/24/2019); hx circumcision; hx hernia repair (Bilateral); hx colectomy (Right); and ir remove ivc filter w si (2/27/2020).   Social History/Living Environment:     Lives with brother; sister assists with IADLs and LE ADLs  Prior Level of Function/Work/Activity:  Moderate assistance LB ADLs; modified independent ADLs  Dominant Side:         RIGHT  Previous Treatment Approaches:          Diuretics   Ambulatory/Rehab Services H2 Model Falls Risk Assessment    Risk Factors:       (1)  Gender [Male] Ability to Rise from Chair:       (3)  Multiple attempts, but successful    Falls Prevention Plan: Mobility Assistance Device (specify): Wheelchair/walker   Total: (5 or greater = High Risk): 4    ©2010 [facility] . [facility] [employer] Patent #[id number]. Federal Law prohibits the replication, distribution or use without written permission from [facility]     Current Medications:    Current Outpatient Medications:     amoxicillin (AMOXIL) 500 mg capsule, Take 1 Cap by mouth three (3) times daily. , Disp: 30 Cap, Rfl: 0    glucose blood VI test strips (FreeStyle Lite Strips) strip, Use 1 lancet to check glucose 4 times/day. Dx: E11.59, Disp: 400 Strip, Rfl: 3    lancets (FreeStyle Lancets) 28 gauge misc, Use 1 lancet to check blood sugar 4 times/day. Dx: E11.59, Disp: 400 Lancet, Rfl: 3    potassium chloride (K-DUR, KLOR-CON) 20 mEq tablet, Take 1 Tab by mouth two (2) times a day., Disp: 180 Tab, Rfl: 1    HYDROcodone-acetaminophen (Norco)  mg tablet, Take 1 Tab by mouth three (3) times daily. , Disp: , Rfl:     furosemide (LASIX) 40 mg tablet, TAKE 1 TABLET BY MOUTH THREE TIMES DAILY, Disp: 270 Tab, Rfl: 0    metFORMIN (GLUCOPHAGE) 1,000 mg tablet, Take 1 Tab by mouth two (2) times daily (with meals). , Disp: 180 Tab, Rfl: 1    atorvastatin (LIPITOR) 80 mg tablet, Take 1 Tab by mouth nightly., Disp: 90 Tab, Rfl: 3    spironolactone (ALDACTONE) 25 mg tablet, Take 1 Tab by mouth daily for 30 days. 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 (Patient taking differently: 120 Units by SubCUTAneous route daily.), Disp: 7 Adjustable Dose Pre-filled Pen Syringe, Rfl: 5    ezetimibe (ZETIA) 10 mg tablet, Take 1 Tab by mouth nightly., Disp: 90 Tab, Rfl: 3    irbesartan (AVAPRO) 300 mg tablet, Take 1 Tab by mouth nightly. Indications: chronic heart failure, high blood pressure, Disp: 90 Tab, Rfl: 3    pantoprazole (PROTONIX) 40 mg tablet, Take 1 Tab by mouth Daily (before breakfast). , Disp: 90 Tab, Rfl: 3    busPIRone (BUSPAR) 15 mg tablet, Take 1 Tab by mouth daily. , Disp: 90 Tab, Rfl: 3    carvedilol (COREG) 25 mg tablet, Take 0.5 Tabs by mouth two (2) times a day., Disp: 60 Tab, Rfl: 11    insulin aspart U-100 (NOVOLOG FLEXPEN U-100 INSULIN) 100 unit/mL (3 mL) inpn, by SubCUTAneous route Before breakfast, lunch, and dinner. Inject 8 units standing dose + additional insulin according to sliding scale (up to 16 units) subQ before each meal  151-200 = 2 units 201-250 = 4 units 251-300 = 6 units 201-350 = 8 units, Disp: , Rfl:     nitroglycerin (NITROSTAT) 0.4 mg SL tablet, 1 Tab by SubLINGual route every five (5) minutes as needed for Chest Pain. Up to 3 doses. , Disp: 30 Tab, Rfl: 5    albuterol (VENTOLIN HFA) 90 mcg/actuation inhaler, Take 2 Puffs by inhalation every four (4) hours as needed for Wheezing or Shortness of Breath., Disp: 1 Inhaler, Rfl: 3    acetaminophen (TYLENOL) 500 mg tablet, Take 1,000 mg by mouth every six (6) hours as needed for Fever or Pain., Disp: , Rfl:     cyanocobalamin (VITAMIN B12) 500 mcg tablet, Take 1 Tab by mouth daily.  Indications: b12 deficiency, Disp: 30 Tab, Rfl: 2   tamsulosin (FLOMAX) 0.4 mg capsule, Take 1 Cap by mouth daily. , Disp: 90 Cap, Rfl: 3    polyethylene glycol (MIRALAX) 17 gram/dose powder, Take 17 g by mouth daily. , Disp: 17 g, Rfl: 5    pregabalin (LYRICA) 150 mg capsule, Take 150 mg by mouth two (2) times a day., Disp: , Rfl:     loratadine (CLARITIN) 10 mg tablet, Take 10 mg by mouth daily. , Disp: , Rfl:             Date Last Reviewed:  7/10/2020     Complexity Level : Expanded review of therapy/medical records (1-2):  MODERATE COMPLEXITY   ASSESSMENT OF OCCUPATIONAL PERFORMANCE:   Palpation:          Pitting 3+ edema bilaterally, swollen toes and ankle cuffs. ROM:         WFL  Strength:          Generalized deconditioning  Special Tests:           Positive Stemmer's sign (inability to pinch skin at base of second toe)   Skin Integrity:          Tissue changes including:   papillomas, moderate lymphostatic fibrosis, hyperpigmentation, peaud'orange skin, lymph bullae, hyperkeratotic scales, lymphorrhea right anterior ankle.   Sensation:  Impaired; diabetic neuropathy  Functional Mobility:  Wheelchair for community mobility; walker at home  Activities of Daily Living Max assist LB ADLs due to limited reach and sitting balance; max A IADLs, modified independent with all others  Edema/Girth:  3+ and pitting   PRETREATMENT AFFECTED LIMB(s): lower extremity      Date:  6.12.2020 6.19.20 6.26.20 7.2.20 7.7.20     Right / Left           Groin   []      []           8 inches   []      []           4 inches   []      []         PoplitealSpace   [x]      [x] 43/42 43.5/41 43.5/41.5 43/42.5 42/40.5      8 inches   [x]      [x] 50.5/51 50/50 48.5/48.5 47.5/49 44.5/44.5      4 inches   [x]      [x] 47/48 46/47 43.5/43.5 42/44 41/42      Ankle   [x]      [x] 42/39.5 38.5/37 34.5/32 32.5/30 31/29      Instep   [x]      [x] 28.5/27.5 25/25.5 25/24.5 25/23.5 24/23    Measurements are taken in centimeters:  2.54 cm = 1 inch  Cumulative Circumferential Volumetric Graph Measurements  RIGHT  cm 203 195 cm 190 cm 182.5 cm    LEFT  cm 200.5 189.5 cm 189 cm 179 cm           Physical Skills Involved:  1. Activity Tolerance  2. Edema  3. Skin Integrity Cognitive Skills Affected (resulting in the inability to perform in a timely and safe manner):  1. N/A Psychosocial Skills Affected:  1. Habits/Routines   Number of elements that affect the Plan of Care: 3-5:  MODERATE COMPLEXITY   CLINICAL DECISION MAKING:   Outcome Measure: Tool Used: Lymphedema Life Impact Scale   Score:  Initial: 34 Most Recent: X (Date: -- )   Interpretation of Score: The Lymphedema Life Impact Scale (LLIS) is a validated instrument that measures the physical, functional, and psychosocial concerns pertinent to patients with extremity lymphedema. The Scale's questionnaire is administered to patients to gauge impairments, activity limitations, and participation restrictions resulting from their lymphedema. Score 0 1-13 14-26 27-40 41-54 55-67 68   Modifier CH CI CJ CK CL CM CN     ? Other PT/OT Primary Functional Limitations:     - CURRENT STATUS: CK - 40%-59% impaired, limited or restricted    - GOAL STATUS: CJ - 20%-39% impaired, limited or restricted    - D/C STATUS:  ---------------To be determined---------------       Medical Necessity:   · Skilled intervention continues to be required due to Chronic stage 2 lymphedema B LEs. .  Reason for Services/Other Comments:  · Patient continues to require present interventions due to patient's inability to inability to manage stage 2 lymphedema and increased risk for cellulitis. Use of outcome tool(s) and clinical judgement create a POC that gives a: Questionable prediction of patient's progress: MODERATE COMPLEXITY   TREATMENT:   (In addition to Assessment/Re-Assessment sessions the following treatments were rendered)    Pre-treatment Symptoms/Complaints: Continued compliance with compression bandaging.   Pain: Initial:   Pain Intensity 1: 5 Post Session:  5   Occupational Therapy Treatments    Therapeutic Exercise (    minutes):     HEP:  As above; handouts given to patient for all exercises. Manual Therapy:( 60 minutes)   Manual lymphatic drainage, skin care, multilayer compression bandaging. Trace drainage R LE. Wrapped area with Kerlix today. Manual Lymph Drainage:          Lymph Nodes:    Cervical Supraclavicular Axillary Abdominal Inguinal Popliteal Antecubital   RIGHT [x]     [x]     [x]     []     [x]     [x]     []       LEFT [x]     [x]     [x]     []     [x]     [x]     []          Anastamoses:   Axillo-axillary Inguino-inguinal Axillo-inguinal Inguino-axillary   ANTERIOR []     []     []     [x]       POSTERIOR []     []     []     []       RIGHT []     []     []     [x]       LEFT []     []     []     [x]         Limbs:   []    RUE     []    LUE     [x]    RLE    [x]    LLE   Compression:  Multilayer compression bandaging B LE using stockinette, 2 short stretch bandages and grey foam pads on R lateral and  medial malleoli. .  Pt to leave on until next scheduled visit day. Remove if having pain or increased shortness of breath. Treatment/Session Assessment:    · Response to Treatment:  Tolerated treatment without complication Trace drainage anterior right calf. Excellent results to date. Again discussed Memory Oz wraps for management of reduction; pt hesitant but seems to be understanding the necessity of compression to maintain stabilization of reduction. · Compliance with Program/Exercises:   Compliant  · Recommendations/Intent for next treatment session: Next visit will focus on phase 1 complete decongestive therapy.   Total Treatment Duration:  60 minutes  OT Patient Time In/Time Out  Time In: 1000  Time Out: 1330 [facility] OTR/L, CLT

## 2020-07-13 ENCOUNTER — HOSPITAL ENCOUNTER (OUTPATIENT)
Dept: PHYSICAL THERAPY | Age: 80
Discharge: HOME | End: 2020-07-13
Payer: MEDICARE

## 2020-07-13 PROCEDURE — 97140 MANUAL THERAPY 1/> REGIONS: CPT

## 2020-07-13 NOTE — PROGRESS NOTES
Fabio Barger  : 1940  [place]  Secondary: Deepak Roca [address] at [facility]  [address], [address]  [phone]   [phone]         OUTPATIENT OCCUPATIONAL THERAPY: Daily Note and Progress Report 2020    ICD-10: Treatment Diagnosis: I89.0, Lymphedema not elsewhere classified  M79.89, Swelling of both lower extremities  Precautions/Allergies:   Kevin Oanh maleate]   MD Orders: eval and treat MEDICAL/REFERRING DIAGNOSIS:   Lymphedema, not elsewhere classified [I89.0]   DATE OF ONSET: Chronic   REFERRING PHYSICIAN: Gilbert Aguilera MD  RETURN PHYSICIAN APPOINTMENT: to be determined      INITIAL ASSESSMENT:  Mr. Matthias Lim presents with stage 2 bilateral lower extremity lymphedema. He reports swelling in his legs since . He presents with bilateral 3+ pitting edema, positive Stemmer sign, papillomas, moderate lymphostatic fibrosis, hyperpigmentation, peaud'orange skin, lymph bullae, hyperkeratotic scales, lymphorrhea right anterior ankle, swollen toes and ankle cuffs. Mr. Matthias Lim has not has any treatment for lymphedema. He is a retired nurse. Comorbidities include uncontrolled DM with neuropathy, diabetic retinopathy of both eyes, diverticulitis and colectomy, TIA, HTN. He lives with his brother; his sister assists with IADLs. Mr. Matthias Lim is an excellent candidate for complete decongestive therapy, compression bandaging and long term compression management garments. He is in agreement with POC and goals. PLAN OF CARE:   PROBLEM LIST:  1. Decreased ADL/Functional Activities  2. Decreased Activity Tolerance  3. Decreased Flexibility/Joint Mobility  4. Edema/Girth  5. Decreased Knowledge of Precautions  6. Decreased Skin Integrity/Hygeine  7. Decreased [place] with Home Exercise Program INTERVENTIONS PLANNED  1. Skin care  2. Compression bandaging  3.   Fitting for compression garment(s)  4. Manual therapy/Manual lymph drainage  5. Therapeutic exercise/Therapeutic activities  6. Patient Education  7. Compression pump trial prn  8.  kinesiotaping    TREATMENT PLAN:  Effective Dates: 6/12/2020 TO 9/13/2020 (90 days). Frequency/Duration: 2 times a week for 90 Day(s)  Will adjust frequency and duration as progress indicates. GOALS: (Goals have been discussed and agreed upon with patient.)  Short-Term Functional Goals: Time Frame: 45 days  1. The patient/caregiver will verbalize understanding of lymphedema precautions. Goal met  2. Patient will be independent with skin care regimen to decrease risk of cellulitis. Goal met  3. The patient/caregiver will be independent with self-manual lymph drainage techniques and show decrease in limb volume. Progressing  4. Patient will be independent in lymphatic exercises. Goal met  Discharge Goals: Time Frame: 90 days  1. Patient's bilateral lower extremity circumferential measurements will decrease 8 cm to 10 cm on volumetric graph to maximize functional use in ADL's. Goal met  2. The patient/caregiver will be independent with home management of lymphedema. Progressing  3. Patient/caregiver will be independent donning and doffing bilateral lower extremity compression garment. Not met    Rehabilitation Potential For Stated Goals: Fair            The information in this section was collected on 7/13/2020   (except where otherwise noted). OCCUPATIONAL PROFILE & HISTORY:   History of Present Injury/Illness (Reason for Referral):    Mr. Edward Wagner presents with stage 2 bilateral lower extremity lymphedema. He reports swelling in his legs since 2004. He presents with bilateral 3+ pitting edema, positive Stemmer sign, papillomas, moderate lymphostatic fibrosis, hyperpigmentation, peaud'orange skin, lymph bullae, hyperkeratotic scales, lymphorrhea right anterior ankle, swollen toes and ankle cuffs.   Mr. Edward Wagner has not has any treatment for lymphedema. He is a retired nurse. Comorbidities include uncontrolled DM with neuropathy, diabetic retinopathy of both eyes, diverticulitis and colectomy, TIA, HTN. He lives with his brother; his sister assists with IADLs. Mr. Lázaro Yip is an excellent candidate for complete decongestive therapy, compression bandaging and long term compression management garments. He is in agreement with POC and goals. Past Medical History/Comorbidities:   Mr. Lázaro Yip  has a past medical history of Anxiety, Cecal lesion, Cervical stenosis of spine, Chronic kidney disease, Claustrophobia, Degenerative disc disease, lumbar, Diabetes mellitus type II, Diabetic retinopathy of both eyes without macular edema associated with type 2 diabetes mellitus ([address] Diastolic congestive heart failure ([address] Diverticulosis of colon (June 2010), DVT (deep venous thrombosis) ([address] Extrinsic allergic asthma, GERD (gastroesophageal reflux disease), History of anticoagulant use, History of TIA (transient ischemic attack) (11/09/2017), colonic polyps (07/29/2010), Hyperlipidemia LDL goal < 70, Hypertension, Obstructive sleep apnea, Perennial allergic rhinitis with seasonal variation, 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 He also has no past medical history of Coagulation defects or COPD. Mr. Lázaro Yip  has a past surgical history that includes colonoscopy (07/29/2010); egd (5/9/2011); pr sinus surgery proc unlisted; hx knee arthroscopy (Right, 1991); pr total knee arthroplasty (Right, 2006); hx bunionectomy (Bilateral); ir ivc filter (10/22/2019); colonoscopy (N/A, 10/24/2019); hx circumcision; hx hernia repair (Bilateral); hx colectomy (Right); and ir remove ivc filter w si (2/27/2020).   Social History/Living Environment:     Lives with brother; sister assists with IADLs and LE ADLs  Prior Level of Function/Work/Activity:  Moderate assistance LB ADLs; modified independent ADLs  Dominant Side:         RIGHT  Previous Treatment Approaches:          Diuretics   Ambulatory/Rehab Services H2 Model Falls Risk Assessment    Risk Factors:       (1)  Gender [Male] Ability to Rise from Chair:       (3)  Multiple attempts, but successful    Falls Prevention Plan: Mobility Assistance Device (specify): Wheelchair/walker   Total: (5 or greater = High Risk): 4    ©2010 [facility] [address] [place] [address] Patent #[id number]. Federal Law prohibits the replication, distribution or use without written permission from [facility] [employer]     Current Medications:    Current Outpatient Medications:     amoxicillin (AMOXIL) 500 mg capsule, Take 1 Cap by mouth three (3) times daily. , Disp: 30 Cap, Rfl: 0    glucose blood VI test strips (FreeStyle Lite Strips) strip, Use 1 lancet to check glucose 4 times/day. Dx: E11.59, Disp: 400 Strip, Rfl: 3    lancets (FreeStyle Lancets) 28 gauge misc, Use 1 lancet to check blood sugar 4 times/day. Dx: E11.59, Disp: 400 Lancet, Rfl: 3    potassium chloride (K-DUR, KLOR-CON) 20 mEq tablet, Take 1 Tab by mouth two (2) times a day., Disp: 180 Tab, Rfl: 1    HYDROcodone-acetaminophen (Norco)  mg tablet, Take 1 Tab by mouth three (3) times daily. , Disp: , Rfl:     furosemide (LASIX) 40 mg tablet, TAKE 1 TABLET BY MOUTH THREE TIMES DAILY, Disp: 270 Tab, Rfl: 0    metFORMIN (GLUCOPHAGE) 1,000 mg tablet, Take 1 Tab by mouth two (2) times daily (with meals). 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 (Patient taking differently: 120 Units by SubCUTAneous route daily.), Disp: 7 Adjustable Dose Pre-filled Pen Syringe, Rfl: 5    ezetimibe (ZETIA) 10 mg tablet, Take 1 Tab by mouth nightly., Disp: 90 Tab, Rfl: 3    irbesartan (AVAPRO) 300 mg tablet, Take 1 Tab by mouth nightly. Indications: chronic heart failure, high blood pressure, Disp: 90 Tab, Rfl: 3    pantoprazole (PROTONIX) 40 mg tablet, Take 1 Tab by mouth Daily (before breakfast). , Disp: 90 Tab, Rfl: 3    busPIRone (BUSPAR) 15 mg tablet, Take 1 Tab by mouth daily. , Disp: 90 Tab, Rfl: 3    carvedilol (COREG) 25 mg tablet, Take 0.5 Tabs by mouth two (2) times a day., Disp: 60 Tab, Rfl: 11    insulin aspart U-100 (NOVOLOG FLEXPEN U-100 INSULIN) 100 unit/mL (3 mL) inpn, by SubCUTAneous route Before breakfast, lunch, and dinner. Inject 8 units standing dose + additional insulin according to sliding scale (up to 16 units) subQ before each meal  151-200 = 2 units 201-250 = 4 units 251-300 = 6 units 201-350 = 8 units, Disp: , Rfl:     nitroglycerin (NITROSTAT) 0.4 mg SL tablet, 1 Tab by SubLINGual route every five (5) minutes as needed for Chest Pain. Up to 3 doses. , Disp: 30 Tab, Rfl: 5    albuterol (VENTOLIN HFA) 90 mcg/actuation inhaler, Take 2 Puffs by inhalation every four (4) hours as needed for Wheezing or Shortness of Breath., Disp: 1 Inhaler, Rfl: 3    acetaminophen (TYLENOL) 500 mg tablet, Take 1,000 mg by mouth every six (6) hours as needed for Fever or Pain., Disp: , Rfl:     cyanocobalamin (VITAMIN B12) 500 mcg tablet, Take 1 Tab by mouth daily.  Indications: b12 deficiency, Disp: 30 Tab, Rfl: 2    tamsulosin (FLOMAX) 0.4 mg capsule, Take 1 Cap by mouth daily. , Disp: 90 Cap, Rfl: 3    polyethylene glycol (MIRALAX) 17 gram/dose powder, Take 17 g by mouth daily. , Disp: 17 g, Rfl: 5    pregabalin (LYRICA) 150 mg capsule, Take 150 mg by mouth two (2) times a day., Disp: , Rfl:     loratadine (CLARITIN) 10 mg tablet, Take 10 mg by mouth daily. , Disp: , Rfl:             Date Last Reviewed:  7/13/2020     Complexity Level : Expanded review of therapy/medical records (1-2):  MODERATE COMPLEXITY   ASSESSMENT OF OCCUPATIONAL PERFORMANCE:   Palpation:          Pitting 3+ edema bilaterally, swollen toes and ankle cuffs. ROM:         WFL  Strength:          Generalized deconditioning  Special Tests:           Positive Stemmer's sign (inability to pinch skin at base of second toe)   Skin Integrity:          Tissue changes including:   papillomas, moderate lymphostatic fibrosis, hyperpigmentation, peaud'orange skin, lymph bullae, hyperkeratotic scales, lymphorrhea right anterior ankle.   Sensation:  Impaired; diabetic neuropathy  Functional Mobility:  Wheelchair for community mobility; walker at home  Activities of Daily Living Max assist LB ADLs due to limited reach and sitting balance; max A IADLs, modified independent with all others  Edema/Girth:  3+ and pitting   PRETREATMENT AFFECTED LIMB(s): lower extremity      Date:  6.12.2020 6.19.20 6.26.20 7.2.20 7.7.20 7.13.20    Right / Left           Groin   []      []           8 inches   []      []           4 inches   []      []         PoplitealSpace   [x]      [x] 43/42 43.5/41 43.5/41.5 43/42.5 42/40.5 42/40.5     8 inches   [x]      [x] 50.5/51 50/50 48.5/48.5 47.5/49 44.5/44.5 41.5/41     4 inches   [x]      [x] 47/48 46/47 43.5/43.5 42/44 41/42 39/40     Ankle   [x]      [x] 42/39.5 38.5/37 34.5/32 32.5/30 31/29 30/26     Instep   [x]      [x] 28.5/27.5 25/25.5 25/24.5 25/23.5 24/23 24/24   Measurements are taken in centimeters:  2.54 cm = 1 inch  Cumulative Circumferential Volumetric Graph Measurements  RIGHT  cm 203 195 cm 190 cm 182.5 cm 178.5   LEFT  cm 200.5 189.5 cm 189 cm 179 cm 171.5          Physical Skills Involved:  1. Activity Tolerance  2. Edema  3. Skin Integrity Cognitive Skills Affected (resulting in the inability to perform in a timely and safe manner):  1. N/A Psychosocial Skills Affected:  1. Habits/Routines   Number of elements that affect the Plan of Care: 3-5:  MODERATE COMPLEXITY   CLINICAL DECISION MAKING:   Outcome Measure: Tool Used: Lymphedema Life Impact Scale   Score:  Initial: 34 Most Recent: 42  (Date: 7/13/20 )   Interpretation of Score: The Lymphedema Life Impact Scale (LLIS) is a validated instrument that measures the physical, functional, and psychosocial concerns pertinent to patients with extremity lymphedema. The Scale's questionnaire is administered to patients to gauge impairments, activity limitations, and participation restrictions resulting from their lymphedema. Score 0 1-13 14-26 27-40 41-54 55-67 68   Modifier CH CI CJ CK CL CM CN     ? Other PT/OT Primary Functional Limitations:     - CURRENT STATUS: CK - 40%-59% impaired, limited or restricted    - GOAL STATUS: CJ - 20%-39% impaired, limited or restricted    - D/C STATUS:  ---------------To be determined---------------       Medical Necessity:   · Skilled intervention continues to be required due to Chronic stage 2 lymphedema B LEs. .  Reason for Services/Other Comments:  · Patient continues to require present interventions due to patient's inability to inability to manage stage 2 lymphedema and increased risk for cellulitis. Use of outcome tool(s) and clinical judgement create a POC that gives a: Questionable prediction of patient's progress: MODERATE COMPLEXITY   TREATMENT:   (In addition to Assessment/Re-Assessment sessions the following treatments were rendered)    Pre-treatment Symptoms/Complaints: Significant reduction in volumetric measurements.   Pt ready to order Willem Gibbs wraps. Pain: Initial:   Pain Intensity 1: 5  Post Session:  5   Occupational Therapy Treatments    Therapeutic Exercise (    minutes):     HEP:  As above; handouts given to patient for all exercises. Manual Therapy:( 60 minutes)   Manual lymphatic drainage, skin care, multilayer compression bandaging. R LE wound healed with no drainage. Manual Lymph Drainage:          Lymph Nodes:    Cervical Supraclavicular Axillary Abdominal Inguinal Popliteal Antecubital   RIGHT [x]     [x]     [x]     []     [x]     [x]     []       LEFT [x]     [x]     [x]     []     [x]     [x]     []          Anastamoses:   Axillo-axillary Inguino-inguinal Axillo-inguinal Inguino-axillary   ANTERIOR []     []     []     [x]       POSTERIOR []     []     []     []       RIGHT []     []     []     [x]       LEFT []     []     []     [x]         Limbs:   []    RUE     []    LUE     [x]    RLE    [x]    LLE   Compression:  Multilayer compression bandaging B LE using stockinette, 2 short stretch bandages and grey foam pads on R lateral and  medial malleoli. .  Pt to leave on until next scheduled visit day. Remove if having pain or increased shortness of breath. Treatment/Session Assessment:    · Response to Treatment:  Tolerated treatment without complication . Excellent results to date. Pt ready to order Willem Gibbs wraps. · Compliance with Program/Exercises:   Compliant  · Recommendations/Intent for next treatment session: Next visit will focus on phase 2 complete decongestive therapy.   Total Treatment Duration:  60 minutes  OT Patient Time In/Time Out  Time In: 0900  Time Out: 1000    Dana Bellamy OTR/L, CLJAMIE

## 2020-07-17 ENCOUNTER — HOSPITAL ENCOUNTER (OUTPATIENT)
Dept: PHYSICAL THERAPY | Age: 80
Discharge: HOME | End: 2020-07-17
Payer: MEDICARE

## 2020-07-17 PROCEDURE — 97140 MANUAL THERAPY 1/> REGIONS: CPT

## 2020-07-17 NOTE — PROGRESS NOTES
Aaron Dos Santos  : 1940  [place]  Secondary: Deepak [address] at [address]  [address], [address]  [phone]   [phone]         OUTPATIENT OCCUPATIONAL THERAPY: Daily Note 2020    ICD-10: Treatment Diagnosis: I89.0, Lymphedema not elsewhere classified  M79.89, Swelling of both lower extremities  Precautions/Allergies:   Denny Papito maleate]   MD Orders: eval and treat MEDICAL/REFERRING DIAGNOSIS:   Lymphedema, not elsewhere classified [I89.0]   DATE OF ONSET: Chronic   REFERRING PHYSICIAN: Kristin Villegas MD  RETURN PHYSICIAN APPOINTMENT: to be determined      INITIAL ASSESSMENT:  Mr. Tremayne Mitchell presents with stage 2 bilateral lower extremity lymphedema. He reports swelling in his legs since . He presents with bilateral 3+ pitting edema, positive Stemmer sign, papillomas, moderate lymphostatic fibrosis, hyperpigmentation, peaud'orange skin, lymph bullae, hyperkeratotic scales, lymphorrhea right anterior ankle, swollen toes and ankle cuffs. Mr. Tremayne Mitchell has not has any treatment for lymphedema. He is a retired nurse. Comorbidities include uncontrolled DM with neuropathy, diabetic retinopathy of both eyes, diverticulitis and colectomy, TIA, HTN. He lives with his brother; his sister assists with IADLs. Mr. Tremayne Mitchell is an excellent candidate for complete decongestive therapy, compression bandaging and long term compression management garments. He is in agreement with POC and goals. PLAN OF CARE:   PROBLEM LIST:  1. Decreased ADL/Functional Activities  2. Decreased Activity Tolerance  3. Decreased Flexibility/Joint Mobility  4. Edema/Girth  5. Decreased Knowledge of Precautions  6. Decreased Skin Integrity/Hygeine  7. Decreased Mary Esther with Home Exercise Program INTERVENTIONS PLANNED  1. Skin care  2. Compression bandaging  3.   Fitting for compression garment(s)  4. Manual therapy/Manual lymph drainage  5. Therapeutic exercise/Therapeutic activities  6. Patient Education  7. Compression pump trial prn  8.  kinesiotaping    TREATMENT PLAN:  Effective Dates: 6/12/2020 TO 9/13/2020 (90 days). Frequency/Duration: 2 times a week for 90 Day(s)  Will adjust frequency and duration as progress indicates. GOALS: (Goals have been discussed and agreed upon with patient.)  Short-Term Functional Goals: Time Frame: 45 days  1. The patient/caregiver will verbalize understanding of lymphedema precautions. Goal met  2. Patient will be independent with skin care regimen to decrease risk of cellulitis. Goal met  3. The patient/caregiver will be independent with self-manual lymph drainage techniques and show decrease in limb volume. Progressing  4. Patient will be independent in lymphatic exercises. Goal met  Discharge Goals: Time Frame: 90 days  1. Patient's bilateral lower extremity circumferential measurements will decrease 8 cm to 10 cm on volumetric graph to maximize functional use in ADL's. Goal met  2. The patient/caregiver will be independent with home management of lymphedema. Progressing  3. Patient/caregiver will be independent donning and doffing bilateral lower extremity compression garment. Not met    Rehabilitation Potential For Stated Goals: Fair            The information in this section was collected on 7/17/2020   (except where otherwise noted). OCCUPATIONAL PROFILE & HISTORY:   History of Present Injury/Illness (Reason for Referral):    Mr. Paul Beyer presents with stage 2 bilateral lower extremity lymphedema. He reports swelling in his legs since 2004. He presents with bilateral 3+ pitting edema, positive Stemmer sign, papillomas, moderate lymphostatic fibrosis, hyperpigmentation, peaud'orange skin, lymph bullae, hyperkeratotic scales, lymphorrhea right anterior ankle, swollen toes and ankle cuffs.   Mr. Paul Beyer has not has any treatment for lymphedema. He is a retired nurse. Comorbidities include uncontrolled DM with neuropathy, diabetic retinopathy of both eyes, diverticulitis and colectomy, TIA, HTN. He lives with his brother; his sister assists with IADLs. Mr. Andre Maradiaga is an excellent candidate for complete decongestive therapy, compression bandaging and long term compression management garments. He is in agreement with POC and goals. Past Medical History/Comorbidities:   Mr. Andre Maradiaga  has a past medical history of Anxiety, Cecal lesion, Cervical stenosis of spine, Chronic kidney disease, Claustrophobia, Degenerative disc disease, lumbar, Diabetes mellitus type II, Diabetic retinopathy of both eyes without macular edema associated with type 2 diabetes mellitus ([address] Diastolic congestive heart failure ([address] Diverticulosis of colon (June 2010), DVT (deep venous thrombosis) ([address] Extrinsic allergic asthma, GERD (gastroesophageal reflux disease), History of anticoagulant use, History of TIA (transient ischemic attack) (11/09/2017), colonic polyps (07/29/2010), Hyperlipidemia LDL goal < 70, Hypertension, Obstructive sleep apnea, Perennial allergic rhinitis with seasonal variation, 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 He also has no past medical history of Coagulation defects or COPD. Mr. Andre Maradiaga  has a past surgical history that includes colonoscopy (07/29/2010); egd (5/9/2011); pr sinus surgery proc unlisted; hx knee arthroscopy (Right, 1991); pr total knee arthroplasty (Right, 2006); hx bunionectomy (Bilateral); ir ivc filter (10/22/2019); colonoscopy (N/A, 10/24/2019); hx circumcision; hx hernia repair (Bilateral); hx colectomy (Right); and ir remove ivc filter w si (2/27/2020).   Social History/Living Environment:     Lives with brother; sister assists with IADLs and LE ADLs  Prior Level of Function/Work/Activity:  Moderate assistance LB ADLs; modified independent ADLs  Dominant Side:         RIGHT  Previous Treatment Approaches:          Diuretics   Ambulatory/Rehab Services H2 Model Falls Risk Assessment    Risk Factors:       (1)  Gender [Male] Ability to Rise from Chair:       (3)  Multiple attempts, but successful    Falls Prevention Plan: Mobility Assistance Device (specify): Wheelchair/walker   Total: (5 or greater = High Risk): 4    ©2010 [facility] [address] [place] [address] Patent #[id number]. Federal Law prohibits the replication, distribution or use without written permission from [facility] of [address] [place] [address]     Current Medications:    Current Outpatient Medications:     carvediloL (COREG) 25 mg tablet, Take 0.5 Tabs by mouth two (2) times a day., Disp: 60 Tab, Rfl: 11    glucose blood VI test strips (FreeStyle Lite Strips) strip, Use 1 lancet to check glucose 4 times/day. Dx: E11.59, Disp: 400 Strip, Rfl: 3    lancets (FreeStyle Lancets) 28 gauge misc, Use 1 lancet to check blood sugar 4 times/day. Dx: E11.59, Disp: 400 Lancet, Rfl: 3    amoxicillin (AMOXIL) 500 mg capsule, Take 1 Cap by mouth three (3) times daily. , Disp: 30 Cap, Rfl: 0    potassium chloride (K-DUR, KLOR-CON) 20 mEq tablet, Take 1 Tab by mouth two (2) times a day., Disp: 180 Tab, Rfl: 1    HYDROcodone-acetaminophen (Norco)  mg tablet, Take 1 Tab by mouth three (3) times daily. , Disp: , Rfl:     furosemide (LASIX) 40 mg tablet, TAKE 1 TABLET BY MOUTH THREE TIMES DAILY, Disp: 270 Tab, Rfl: 0    metFORMIN (GLUCOPHAGE) 1,000 mg tablet, Take 1 Tab by mouth two (2) times daily (with meals). 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 (Patient taking differently: 120 Units by SubCUTAneous route daily.), Disp: 7 Adjustable Dose Pre-filled Pen Syringe, Rfl: 5    ezetimibe (ZETIA) 10 mg tablet, Take 1 Tab by mouth nightly., Disp: 90 Tab, Rfl: 3    irbesartan (AVAPRO) 300 mg tablet, Take 1 Tab by mouth nightly. Indications: chronic heart failure, high blood pressure, Disp: 90 Tab, Rfl: 3    pantoprazole (PROTONIX) 40 mg tablet, Take 1 Tab by mouth Daily (before breakfast). , Disp: 90 Tab, Rfl: 3    busPIRone (BUSPAR) 15 mg tablet, Take 1 Tab by mouth daily. , Disp: 90 Tab, Rfl: 3    insulin aspart U-100 (NOVOLOG FLEXPEN U-100 INSULIN) 100 unit/mL (3 mL) inpn, by SubCUTAneous route Before breakfast, lunch, and dinner. Inject 8 units standing dose + additional insulin according to sliding scale (up to 16 units) subQ before each meal  151-200 = 2 units 201-250 = 4 units 251-300 = 6 units 201-350 = 8 units, Disp: , Rfl:     nitroglycerin (NITROSTAT) 0.4 mg SL tablet, 1 Tab by SubLINGual route every five (5) minutes as needed for Chest Pain. Up to 3 doses. , Disp: 30 Tab, Rfl: 5    albuterol (VENTOLIN HFA) 90 mcg/actuation inhaler, Take 2 Puffs by inhalation every four (4) hours as needed for Wheezing or Shortness of Breath., Disp: 1 Inhaler, Rfl: 3    acetaminophen (TYLENOL) 500 mg tablet, Take 1,000 mg by mouth every six (6) hours as needed for Fever or Pain., Disp: , Rfl:     cyanocobalamin (VITAMIN B12) 500 mcg tablet, Take 1 Tab by mouth daily.  Indications: b12 deficiency, Disp: 30 Tab, Rfl: 2    tamsulosin (FLOMAX) 0.4 mg capsule, Take 1 Cap by mouth daily. , Disp: 90 Cap, Rfl: 3    polyethylene glycol (MIRALAX) 17 gram/dose powder, Take 17 g by mouth daily. , Disp: 17 g, Rfl: 5    pregabalin (LYRICA) 150 mg capsule, Take 150 mg by mouth two (2) times a day., Disp: , Rfl:     loratadine (CLARITIN) 10 mg tablet, Take 10 mg by mouth daily. , Disp: , Rfl:             Date Last Reviewed:  7/17/2020     Complexity Level : Expanded review of therapy/medical records (1-2):  MODERATE COMPLEXITY   ASSESSMENT OF OCCUPATIONAL PERFORMANCE:   Palpation:          Pitting 3+ edema bilaterally, swollen toes and ankle cuffs. ROM:         WFL  Strength:          Generalized deconditioning  Special Tests:           Positive Stemmer's sign (inability to pinch skin at base of second toe)   Skin Integrity:          Tissue changes including:   papillomas, moderate lymphostatic fibrosis, hyperpigmentation, peaud'orange skin, lymph bullae, hyperkeratotic scales, lymphorrhea right anterior ankle.   Sensation:  Impaired; diabetic neuropathy  Functional Mobility:  Wheelchair for community mobility; walker at home  Activities of Daily Living Max assist LB ADLs due to limited reach and sitting balance; max A IADLs, modified independent with all others  Edema/Girth:  3+ and pitting   PRETREATMENT AFFECTED LIMB(s): lower extremity      Date:  6.12.2020 6.19.20 6.26.20 7.2.20 7.7.20 7.13.20     Right / Left            Groin   []      []            8 inches   []      []            4 inches   []      []          PoplitealSpace   [x]      [x] 43/42 43.5/41 43.5/41.5 43/42.5 42/40.5 42/40.5      8 inches   [x]      [x] 50.5/51 50/50 48.5/48.5 47.5/49 44.5/44.5 41.5/41      4 inches   [x]      [x] 47/48 46/47 43.5/43.5 42/44 41/42 39/40      Ankle   [x]      [x] 42/39.5 38.5/37 34.5/32 32.5/30 31/29 30/26      Instep   [x]      [x] 28.5/27.5 25/25.5 25/24.5 25/23.5 24/23 24/24    Measurements are taken in centimeters:  2.54 cm = 1 inch  Cumulative Circumferential Volumetric Graph Measurements  RIGHT  cm 203 195 cm 190 cm 182.5 cm 178.5    LEFT  cm 200.5 189.5 cm 189 cm 179 cm 171.5           Physical Skills Involved:  1. Activity Tolerance  2. Edema  3. Skin Integrity Cognitive Skills Affected (resulting in the inability to perform in a timely and safe manner):  1. N/A Psychosocial Skills Affected:  1. Habits/Routines   Number of elements that affect the Plan of Care: 3-5:  MODERATE COMPLEXITY   CLINICAL DECISION MAKING:   Outcome Measure: Tool Used: Lymphedema Life Impact Scale   Score:  Initial: 34 Most Recent: 42  (Date: 7/13/20 )   Interpretation of Score: The Lymphedema Life Impact Scale (LLIS) is a validated instrument that measures the physical, functional, and psychosocial concerns pertinent to patients with extremity lymphedema. The Scale's questionnaire is administered to patients to gauge impairments, activity limitations, and participation restrictions resulting from their lymphedema. Score 0 1-13 14-26 27-40 41-54 55-67 68   Modifier CH CI CJ CK CL CM CN     ? Other PT/OT Primary Functional Limitations:     - CURRENT STATUS: CK - 40%-59% impaired, limited or restricted    - GOAL STATUS: CJ - 20%-39% impaired, limited or restricted    - D/C STATUS:  ---------------To be determined---------------       Medical Necessity:   · Skilled intervention continues to be required due to Chronic stage 2 lymphedema B LEs. .  Reason for Services/Other Comments:  · Patient continues to require present interventions due to patient's inability to inability to manage stage 2 lymphedema and increased risk for cellulitis.    Use of outcome tool(s) and clinical judgement create a POC that gives a: Questionable prediction of patient's progress: MODERATE COMPLEXITY   TREATMENT:   (In addition to Assessment/Re-Assessment sessions the following treatments were rendered)    Pre-treatment Symptoms/Complaints: Maintaining reduction; ordered Dollene Rump wraps today  Pain: Initial: Pain Intensity 1: 5  Post Session:  5   Occupational Therapy Treatments    Therapeutic Exercise (    minutes):     HEP:  As above; handouts given to patient for all exercises. Manual Therapy:( 60 minutes)   Manual lymphatic drainage, skin care, multilayer compression bandaging. R LE wound healed with no drainage. Manual Lymph Drainage:          Lymph Nodes:    Cervical Supraclavicular Axillary Abdominal Inguinal Popliteal Antecubital   RIGHT [x]     [x]     [x]     []     [x]     [x]     []       LEFT [x]     [x]     [x]     []     [x]     [x]     []          Anastamoses:   Axillo-axillary Inguino-inguinal Axillo-inguinal Inguino-axillary   ANTERIOR []     []     []     [x]       POSTERIOR []     []     []     []       RIGHT []     []     []     [x]       LEFT []     []     []     [x]         Limbs:   []    RUE     []    LUE     [x]    RLE    [x]    LLE   Compression:  Multilayer compression bandaging B LE using stockinette, 2 short stretch bandages and grey foam pads on R lateral and  medial malleoli. .  Pt to leave on until next scheduled visit day. Remove if having pain or increased shortness of breath. Treatment/Session Assessment:    · Response to Treatment:  Tolerated treatment without complication . Excellent results to date. Pt seemed a little confused today; blood pressure 113/64, O2 sats 94%, pulse 74. Informed niece of pt's slight confusion. · Compliance with Program/Exercises:   Compliant  · Recommendations/Intent for next treatment session: Next visit will focus on phase 2 complete decongestive therapy.   Total Treatment Duration:  60 minutes  OT Patient Time In/Time Out  Time In: 0900  Time Out: 1000    RANJEET Julian/EULALIA, CLT

## 2020-07-20 ENCOUNTER — HOSPITAL ENCOUNTER (OUTPATIENT)
Dept: PHYSICAL THERAPY | Age: 80
Discharge: HOME | End: 2020-07-20
Payer: MEDICARE

## 2020-07-20 PROCEDURE — 97140 MANUAL THERAPY 1/> REGIONS: CPT

## 2020-07-20 NOTE — PROGRESS NOTES
Maira Tuttle  : 1940  [place]  Secondary: Deepak R[address] at [address]  [address], [address]  [phone]   [phone]         OUTPATIENT OCCUPATIONAL THERAPY: Daily Note 2020    ICD-10: Treatment Diagnosis: I89.0, Lymphedema not elsewhere classified  M79.89, Swelling of both lower extremities  Precautions/Allergies:   Racheal Pry maleate]   MD Orders: eval and treat MEDICAL/REFERRING DIAGNOSIS:   Lymphedema, not elsewhere classified [I89.0]   DATE OF ONSET: Chronic   REFERRING PHYSICIAN: Mike Millan MD  RETURN PHYSICIAN APPOINTMENT: to be determined      INITIAL ASSESSMENT:  Mr. Rosaline Rahman presents with stage 2 bilateral lower extremity lymphedema. He reports swelling in his legs since . He presents with bilateral 3+ pitting edema, positive Stemmer sign, papillomas, moderate lymphostatic fibrosis, hyperpigmentation, peaud'orange skin, lymph bullae, hyperkeratotic scales, lymphorrhea right anterior ankle, swollen toes and ankle cuffs. Mr. Rosaline Rahman has not has any treatment for lymphedema. He is a retired nurse. Comorbidities include uncontrolled DM with neuropathy, diabetic retinopathy of both eyes, diverticulitis and colectomy, TIA, HTN. He lives with his brother; his sister assists with IADLs. Mr. Rosaline Rahman is an excellent candidate for complete decongestive therapy, compression bandaging and long term compression management garments. He is in agreement with POC and goals. PLAN OF CARE:   PROBLEM LIST:  1. Decreased ADL/Functional Activities  2. Decreased Activity Tolerance  3. Decreased Flexibility/Joint Mobility  4. Edema/Girth  5. Decreased Knowledge of Precautions  6. Decreased Skin Integrity/Hygeine  7. Decreased Butler with Home Exercise Program INTERVENTIONS PLANNED  1. Skin care  2. Compression bandaging  3.   Fitting for compression garment(s)  4. Manual therapy/Manual lymph drainage  5. Therapeutic exercise/Therapeutic activities  6. Patient Education  7. Compression pump trial prn  8.  kinesiotaping    TREATMENT PLAN:  Effective Dates: 6/12/2020 TO 9/13/2020 (90 days). Frequency/Duration: 2 times a week for 90 Day(s)  Will adjust frequency and duration as progress indicates. GOALS: (Goals have been discussed and agreed upon with patient.)  Short-Term Functional Goals: Time Frame: 45 days  1. The patient/caregiver will verbalize understanding of lymphedema precautions. Goal met  2. Patient will be independent with skin care regimen to decrease risk of cellulitis. Goal met  3. The patient/caregiver will be independent with self-manual lymph drainage techniques and show decrease in limb volume. Progressing  4. Patient will be independent in lymphatic exercises. Goal met  Discharge Goals: Time Frame: 90 days  1. Patient's bilateral lower extremity circumferential measurements will decrease 8 cm to 10 cm on volumetric graph to maximize functional use in ADL's. Goal met  2. The patient/caregiver will be independent with home management of lymphedema. Progressing  3. Patient/caregiver will be independent donning and doffing bilateral lower extremity compression garment. Not met    Rehabilitation Potential For Stated Goals: Fair            The information in this section was collected on 7/20/2020   (except where otherwise noted). OCCUPATIONAL PROFILE & HISTORY:   History of Present Injury/Illness (Reason for Referral):    Mr. Seng Melendez presents with stage 2 bilateral lower extremity lymphedema. He reports swelling in his legs since 2004. He presents with bilateral 3+ pitting edema, positive Stemmer sign, papillomas, moderate lymphostatic fibrosis, hyperpigmentation, peaud'orange skin, lymph bullae, hyperkeratotic scales, lymphorrhea right anterior ankle, swollen toes and ankle cuffs.   Mr. Seng Melendez has not has any treatment for lymphedema. He is a retired nurse. Comorbidities include uncontrolled DM with neuropathy, diabetic retinopathy of both eyes, diverticulitis and colectomy, TIA, HTN. He lives with his brother; his sister assists with IADLs. Mr. Lázaro Yip is an excellent candidate for complete decongestive therapy, compression bandaging and long term compression management garments. He is in agreement with POC and goals. Past Medical History/Comorbidities:   Mr. Lázaro Yip  has a past medical history of Anxiety, Cecal lesion, Cervical stenosis of spine, Chronic kidney disease, Claustrophobia, Degenerative disc disease, lumbar, Diabetes mellitus type II, Diabetic retinopathy of both eyes without macular edema associated with type 2 diabetes mellitus ([address] Diastolic congestive heart failure ([address] Diverticulosis of colon (June 2010), DVT (deep venous thrombosis) ([address] Extrinsic allergic asthma, GERD (gastroesophageal reflux disease), History of anticoagulant use, History of TIA (transient ischemic attack) (11/09/2017), colonic polyps (07/29/2010), Hyperlipidemia LDL goal < 70, Hypertension, Obstructive sleep apnea, Perennial allergic rhinitis with seasonal variation, 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 He also has no past medical history of Coagulation defects or COPD. Mr. Lázaro Yip  has a past surgical history that includes colonoscopy (07/29/2010); egd (5/9/2011); pr sinus surgery proc unlisted; hx knee arthroscopy (Right, 1991); pr total knee arthroplasty (Right, 2006); hx bunionectomy (Bilateral); ir ivc filter (10/22/2019); colonoscopy (N/A, 10/24/2019); hx circumcision; hx hernia repair (Bilateral); hx colectomy (Right); and ir remove ivc filter w si (2/27/2020).   Social History/Living Environment:     Lives with brother; sister assists with IADLs and LE ADLs  Prior Level of Function/Work/Activity:  Moderate assistance LB ADLs; modified independent ADLs  Dominant Side:         RIGHT  Previous Treatment Approaches:          Diuretics   Ambulatory/Rehab Services H2 Model Falls Risk Assessment    Risk Factors:       (1)  Gender [Male] Ability to Rise from Chair:       (3)  Multiple attempts, but successful    Falls Prevention Plan: Mobility Assistance Device (specify): Wheelchair/walker   Total: (5 or greater = High Risk): 4    ©2010 [facility] [address] [place] [address] Patent #[id number]. Federal Law prohibits the replication, distribution or use without written permission from [facility] [employer]     Current Medications:    Current Outpatient Medications:     carvediloL (COREG) 25 mg tablet, Take 0.5 Tabs by mouth two (2) times a day., Disp: 60 Tab, Rfl: 11    glucose blood VI test strips (FreeStyle Lite Strips) strip, Use 1 lancet to check glucose 4 times/day. Dx: E11.59, Disp: 400 Strip, Rfl: 3    lancets (FreeStyle Lancets) 28 gauge misc, Use 1 lancet to check blood sugar 4 times/day. Dx: E11.59, Disp: 400 Lancet, Rfl: 3    amoxicillin (AMOXIL) 500 mg capsule, Take 1 Cap by mouth three (3) times daily. , Disp: 30 Cap, Rfl: 0    potassium chloride (K-DUR, KLOR-CON) 20 mEq tablet, Take 1 Tab by mouth two (2) times a day., Disp: 180 Tab, Rfl: 1    HYDROcodone-acetaminophen (Norco)  mg tablet, Take 1 Tab by mouth three (3) times daily. , Disp: , Rfl:     furosemide (LASIX) 40 mg tablet, TAKE 1 TABLET BY MOUTH THREE TIMES DAILY, Disp: 270 Tab, Rfl: 0    metFORMIN (GLUCOPHAGE) 1,000 mg tablet, Take 1 Tab by mouth two (2) times daily (with meals). , Disp: 180 Tab, Rfl: 1    atorvastatin (LIPITOR) 80 mg tablet, Take 1 Tab by mouth nightly., Disp: 90 Tab, Rfl: 3    Walker (Ultra-Light Rollator) misc, Lightweight Rollator Walker w/ Seat - Dx: I09.64 Osteoarthritis of Spine w/ Radiculopathy + E11.40 Chronic Painful Diabetic Neuropathy, Disp: 1 Each, Rfl: 0    insulin syringe-needle U-100 (BD Veo Insulin Syringe UF) 0.3 mL 31 gauge x 15/64\" syrg, USE 1 SYRINGE TO INJECT INSULIN  ONCE DAILY, Disp: 90 Pen Needle, Rfl: 0    hydrALAZINE (APRESOLINE) 50 mg tablet, Take 1 Tab by mouth two (2) times a day., Disp: 180 Tab, Rfl: 3    insulin degludec (TRESIBA FLEXTOUCH U-200) 200 unit/mL (3 mL) inpn, 135 Units by SubCUTAneous route daily. (Patient taking differently: 120 Units by SubCUTAneous route daily.), Disp: 7 Adjustable Dose Pre-filled Pen Syringe, Rfl: 5    ezetimibe (ZETIA) 10 mg tablet, Take 1 Tab by mouth nightly., Disp: 90 Tab, Rfl: 3    irbesartan (AVAPRO) 300 mg tablet, Take 1 Tab by mouth nightly. Indications: chronic heart failure, high blood pressure, Disp: 90 Tab, Rfl: 3    pantoprazole (PROTONIX) 40 mg tablet, Take 1 Tab by mouth Daily (before breakfast). , Disp: 90 Tab, Rfl: 3    busPIRone (BUSPAR) 15 mg tablet, Take 1 Tab by mouth daily. , Disp: 90 Tab, Rfl: 3    insulin aspart U-100 (NOVOLOG FLEXPEN U-100 INSULIN) 100 unit/mL (3 mL) inpn, by SubCUTAneous route Before breakfast, lunch, and dinner. Inject 8 units standing dose + additional insulin according to sliding scale (up to 16 units) subQ before each meal  151-200 = 2 units 201-250 = 4 units 251-300 = 6 units 201-350 = 8 units, Disp: , Rfl:     nitroglycerin (NITROSTAT) 0.4 mg SL tablet, 1 Tab by SubLINGual route every five (5) minutes as needed for Chest Pain. Up to 3 doses. , Disp: 30 Tab, Rfl: 5    albuterol (VENTOLIN HFA) 90 mcg/actuation inhaler, Take 2 Puffs by inhalation every four (4) hours as needed for Wheezing or Shortness of Breath., Disp: 1 Inhaler, Rfl: 3    acetaminophen (TYLENOL) 500 mg tablet, Take 1,000 mg by mouth every six (6) hours as needed for Fever or Pain., Disp: , Rfl:     cyanocobalamin (VITAMIN B12) 500 mcg tablet, Take 1 Tab by mouth daily.  Indications: b12 deficiency, Disp: 30 Tab, Rfl: 2    tamsulosin (FLOMAX) 0.4 mg capsule, Take 1 Cap by mouth daily. , Disp: 90 Cap, Rfl: 3    polyethylene glycol (MIRALAX) 17 gram/dose powder, Take 17 g by mouth daily. , Disp: 17 g, Rfl: 5    pregabalin (LYRICA) 150 mg capsule, Take 150 mg by mouth two (2) times a day., Disp: , Rfl:     loratadine (CLARITIN) 10 mg tablet, Take 10 mg by mouth daily. , Disp: , Rfl:             Date Last Reviewed:  7/20/2020     Complexity Level : Expanded review of therapy/medical records (1-2):  MODERATE COMPLEXITY   ASSESSMENT OF OCCUPATIONAL PERFORMANCE:   Palpation:          Pitting 3+ edema bilaterally, swollen toes and ankle cuffs. ROM:         WFL  Strength:          Generalized deconditioning  Special Tests:           Positive Stemmer's sign (inability to pinch skin at base of second toe)   Skin Integrity:          Tissue changes including:   papillomas, moderate lymphostatic fibrosis, hyperpigmentation, peaud'orange skin, lymph bullae, hyperkeratotic scales, lymphorrhea right anterior ankle.   Sensation:  Impaired; diabetic neuropathy  Functional Mobility:  Wheelchair for community mobility; walker at home  Activities of Daily Living Max assist LB ADLs due to limited reach and sitting balance; max A IADLs, modified independent with all others  Edema/Girth:  3+ and pitting   PRETREATMENT AFFECTED LIMB(s): lower extremity      Date:  6.12.2020 6.19.20 6.26.20 7.2.20 7.7.20 7.13.20     Right / Left            Groin   []      []            8 inches   []      []            4 inches   []      []          PoplitealSpace   [x]      [x] 43/42 43.5/41 43.5/41.5 43/42.5 42/40.5 42/40.5      8 inches   [x]      [x] 50.5/51 50/50 48.5/48.5 47.5/49 44.5/44.5 41.5/41      4 inches   [x]      [x] 47/48 46/47 43.5/43.5 42/44 41/42 39/40      Ankle   [x]      [x] 42/39.5 38.5/37 34.5/32 32.5/30 31/29 30/26      Instep   [x]      [x] 28.5/27.5 25/25.5 25/24.5 25/23.5 24/23 24/24    Measurements are taken in centimeters:  2.54 cm = 1 inch  Cumulative Circumferential Volumetric Graph Measurements  RIGHT  cm 203 195 cm 190 cm 182.5 cm 178.5    LEFT  cm 200.5 189.5 cm 189 cm 179 cm 171.5           Physical Skills Involved:  1. Activity Tolerance  2. Edema  3. Skin Integrity Cognitive Skills Affected (resulting in the inability to perform in a timely and safe manner):  1. N/A Psychosocial Skills Affected:  1. Habits/Routines   Number of elements that affect the Plan of Care: 3-5:  MODERATE COMPLEXITY   CLINICAL DECISION MAKING:   Outcome Measure: Tool Used: Lymphedema Life Impact Scale   Score:  Initial: 34 Most Recent: 42  (Date: 7/13/20 )   Interpretation of Score: The Lymphedema Life Impact Scale (LLIS) is a validated instrument that measures the physical, functional, and psychosocial concerns pertinent to patients with extremity lymphedema. The Scale's questionnaire is administered to patients to gauge impairments, activity limitations, and participation restrictions resulting from their lymphedema. Score 0 1-13 14-26 27-40 41-54 55-67 68   Modifier CH CI CJ CK CL CM CN     ? Other PT/OT Primary Functional Limitations:     - CURRENT STATUS: CK - 40%-59% impaired, limited or restricted    - GOAL STATUS: CJ - 20%-39% impaired, limited or restricted    - D/C STATUS:  ---------------To be determined---------------       Medical Necessity:   · Skilled intervention continues to be required due to Chronic stage 2 lymphedema B LEs. .  Reason for Services/Other Comments:  · Patient continues to require present interventions due to patient's inability to inability to manage stage 2 lymphedema and increased risk for cellulitis.    Use of outcome tool(s) and clinical judgement create a POC that gives a: Questionable prediction of patient's progress: MODERATE COMPLEXITY   TREATMENT:   (In addition to Assessment/Re-Assessment sessions the following treatments were rendered)    Pre-treatment Symptoms/Complaints: Maintaining reduction; ordered Willem Gibbs wraps at last appointment  Pain: Initial:   Pain Intensity 1: 5  Post Session:  5   Occupational Therapy Treatments    Therapeutic Exercise (    minutes):     HEP:  As above; handouts given to patient for all exercises. Manual Therapy:( 60 minutes)   Manual lymphatic drainage, skin care, multilayer compression bandaging to BLE's. R LE wound healed with no drainage. Manual Lymph Drainage:          Lymph Nodes:    Cervical Supraclavicular Axillary Abdominal Inguinal Popliteal Antecubital   RIGHT [x]     [x]     [x]     []     [x]     [x]     []       LEFT [x]     [x]     [x]     []     [x]     [x]     []          Anastamoses:   Axillo-axillary Inguino-inguinal Axillo-inguinal Inguino-axillary   ANTERIOR []     []     []     [x]       POSTERIOR []     []     []     []       RIGHT []     []     []     [x]       LEFT []     []     []     [x]         Limbs:   []    RUE     []    LUE     [x]    RLE    [x]    LLE   Compression:  Multilayer compression bandaging B LE using stockinette, 2 short stretch bandages and grey foam pads on R lateral and  medial malleoli. .  Pt to leave on until next scheduled visit day. Remove if having pain or increased shortness of breath. [place] wraps ordered - he will remain in bandages until garments arrive. Treatment/Session Assessment:    · Response to Treatment:  Tolerated treatment without complication . Excellent results to date. · Compliance with Program/Exercises:   Compliant  · Recommendations/Intent for next treatment session: Next visit will focus on phase 2 complete decongestive therapy.   Total Treatment Duration:  60 minutes  OT Patient Time In/Time Out  Time In: 0900  Time Out: [id number] Wendy Merino OTR/L, CLT

## 2020-07-23 ENCOUNTER — HOSPITAL ENCOUNTER (OUTPATIENT)
Dept: PHYSICAL THERAPY | Age: 80
Discharge: HOME | End: 2020-07-23
Payer: MEDICARE

## 2020-07-23 PROCEDURE — 97140 MANUAL THERAPY 1/> REGIONS: CPT

## 2020-07-23 NOTE — PROGRESS NOTES
Maira Tuttle  : 1940  [place]  Secondary: Deepak Roca [address] at [facility]  [address], [address]  [phone]   [phone]         OUTPATIENT OCCUPATIONAL THERAPY: Daily Note 2020    ICD-10: Treatment Diagnosis: I89.0, Lymphedema not elsewhere classified  M79.89, Swelling of both lower extremities  Precautions/Allergies:   Racheal Pry maleate]   MD Orders: eval and treat MEDICAL/REFERRING DIAGNOSIS:   Lymphedema, not elsewhere classified [I89.0]   DATE OF ONSET: Chronic   REFERRING PHYSICIAN: Mike Millan MD  RETURN PHYSICIAN APPOINTMENT: to be determined      INITIAL ASSESSMENT:  Mr. Rosaline Rahman presents with stage 2 bilateral lower extremity lymphedema. He reports swelling in his legs since . He presents with bilateral 3+ pitting edema, positive Stemmer sign, papillomas, moderate lymphostatic fibrosis, hyperpigmentation, peaud'orange skin, lymph bullae, hyperkeratotic scales, lymphorrhea right anterior ankle, swollen toes and ankle cuffs. Mr. Rosaline Rahman has not has any treatment for lymphedema. He is a retired nurse. Comorbidities include uncontrolled DM with neuropathy, diabetic retinopathy of both eyes, diverticulitis and colectomy, TIA, HTN. He lives with his brother; his sister assists with IADLs. Mr. Rosaline Rahman is an excellent candidate for complete decongestive therapy, compression bandaging and long term compression management garments. He is in agreement with POC and goals. PLAN OF CARE:   PROBLEM LIST:  1. Decreased ADL/Functional Activities  2. Decreased Activity Tolerance  3. Decreased Flexibility/Joint Mobility  4. Edema/Girth  5. Decreased Knowledge of Precautions  6. Decreased Skin Integrity/Hygeine  7. Decreased [place] with Home Exercise Program INTERVENTIONS PLANNED  1. Skin care  2. Compression bandaging  3.   Fitting for compression garment(s)  4. Manual therapy/Manual lymph drainage  5. Therapeutic exercise/Therapeutic activities  6. Patient Education  7. Compression pump trial prn  8.  kinesiotaping    TREATMENT PLAN:  Effective Dates: 6/12/2020 TO 9/13/2020 (90 days). Frequency/Duration: 2 times a week for 90 Day(s)  Will adjust frequency and duration as progress indicates. GOALS: (Goals have been discussed and agreed upon with patient.)  Short-Term Functional Goals: Time Frame: 45 days  1. The patient/caregiver will verbalize understanding of lymphedema precautions. Goal met  2. Patient will be independent with skin care regimen to decrease risk of cellulitis. Goal met  3. The patient/caregiver will be independent with self-manual lymph drainage techniques and show decrease in limb volume. Progressing  4. Patient will be independent in lymphatic exercises. Goal met  Discharge Goals: Time Frame: 90 days  1. Patient's bilateral lower extremity circumferential measurements will decrease 8 cm to 10 cm on volumetric graph to maximize functional use in ADL's. Goal met  2. The patient/caregiver will be independent with home management of lymphedema. Progressing  3. Patient/caregiver will be independent donning and doffing bilateral lower extremity compression garment. Not met    Rehabilitation Potential For Stated Goals: Fair            The information in this section was collected on 7/23/2020   (except where otherwise noted). OCCUPATIONAL PROFILE & HISTORY:   History of Present Injury/Illness (Reason for Referral):    Mr. Kenneth Stewart presents with stage 2 bilateral lower extremity lymphedema. He reports swelling in his legs since 2004. He presents with bilateral 3+ pitting edema, positive Stemmer sign, papillomas, moderate lymphostatic fibrosis, hyperpigmentation, peaud'orange skin, lymph bullae, hyperkeratotic scales, lymphorrhea right anterior ankle, swollen toes and ankle cuffs.   Mr. Kenneth Stewart has not has any treatment for lymphedema. He is a retired nurse. Comorbidities include uncontrolled DM with neuropathy, diabetic retinopathy of both eyes, diverticulitis and colectomy, TIA, HTN. He lives with his brother; his sister assists with IADLs. Mr. Matthias Lim is an excellent candidate for complete decongestive therapy, compression bandaging and long term compression management garments. He is in agreement with POC and goals. Past Medical History/Comorbidities:   Mr. Matthias Lim  has a past medical history of Anxiety, Cecal lesion, Cervical stenosis of spine, Chronic kidney disease, Claustrophobia, Degenerative disc disease, lumbar, Diabetes mellitus type II, Diabetic retinopathy of both eyes without macular edema associated with type 2 diabetes mellitus ([address] Diastolic congestive heart failure ([address] Diverticulosis of colon (June 2010), DVT (deep venous thrombosis) ([address] Extrinsic allergic asthma, GERD (gastroesophageal reflux disease), History of anticoagulant use, History of TIA (transient ischemic attack) (11/09/2017), colonic polyps (07/29/2010), Hyperlipidemia LDL goal < 70, Hypertension, Obstructive sleep apnea, Perennial allergic rhinitis with seasonal variation, 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 He also has no past medical history of Coagulation defects or COPD. Mr. Matthias Lim  has a past surgical history that includes colonoscopy (07/29/2010); egd (5/9/2011); pr sinus surgery proc unlisted; hx knee arthroscopy (Right, 1991); pr total knee arthroplasty (Right, 2006); hx bunionectomy (Bilateral); ir ivc filter (10/22/2019); colonoscopy (N/A, 10/24/2019); hx circumcision; hx hernia repair (Bilateral); hx colectomy (Right); and ir remove ivc filter w si (2/27/2020).   Social History/Living Environment:     Lives with brother; sister assists with IADLs and LE ADLs  Prior Level of Function/Work/Activity:  Moderate assistance LB ADLs; modified independent ADLs  Dominant Side:         RIGHT  Previous Treatment Approaches:          Diuretics   Ambulatory/Rehab Services H2 Model Falls Risk Assessment    Risk Factors:       (1)  Gender [Male] Ability to Rise from Chair:       (3)  Multiple attempts, but successful    Falls Prevention Plan: Mobility Assistance Device (specify): Wheelchair/walker   Total: (5 or greater = High Risk): 4    ©2010 [facility] [place] [address] [place] [address] Patent #[id number]. Federal Law prohibits the replication, distribution or use without written permission from [facility] [employer]     Current Medications:    Current Outpatient Medications:     carvediloL (COREG) 25 mg tablet, Take 0.5 Tabs by mouth two (2) times a day., Disp: 60 Tab, Rfl: 11    glucose blood VI test strips (FreeStyle Lite Strips) strip, Use 1 lancet to check glucose 4 times/day. Dx: E11.59, Disp: 400 Strip, Rfl: 3    lancets (FreeStyle Lancets) 28 gauge misc, Use 1 lancet to check blood sugar 4 times/day. Dx: E11.59, Disp: 400 Lancet, Rfl: 3    amoxicillin (AMOXIL) 500 mg capsule, Take 1 Cap by mouth three (3) times daily. , Disp: 30 Cap, Rfl: 0    potassium chloride (K-DUR, KLOR-CON) 20 mEq tablet, Take 1 Tab by mouth two (2) times a day., Disp: 180 Tab, Rfl: 1    HYDROcodone-acetaminophen (Norco)  mg tablet, Take 1 Tab by mouth three (3) times daily. , Disp: , Rfl:     furosemide (LASIX) 40 mg tablet, TAKE 1 TABLET BY MOUTH THREE TIMES DAILY, Disp: 270 Tab, Rfl: 0    metFORMIN (GLUCOPHAGE) 1,000 mg tablet, Take 1 Tab by mouth two (2) times daily (with meals). , Disp: 180 Tab, Rfl: 1    atorvastatin (LIPITOR) 80 mg tablet, Take 1 Tab by mouth nightly., Disp: 90 Tab, Rfl: 3    Walker (Ultra-Light Rollator) misc, Lightweight Rollator Walker w/ Seat - Dx: G70.97 Osteoarthritis of Spine w/ Radiculopathy + E11.40 Chronic Painful Diabetic Neuropathy, Disp: 1 Each, Rfl: 0    insulin syringe-needle U-100 (BD Veo Insulin Syringe UF) 0.3 mL 31 gauge x 15/64\" syrg, USE 1 SYRINGE TO INJECT INSULIN  ONCE DAILY, Disp: 90 Pen Needle, Rfl: 0    hydrALAZINE (APRESOLINE) 50 mg tablet, Take 1 Tab by mouth two (2) times a day., Disp: 180 Tab, Rfl: 3    insulin degludec (TRESIBA FLEXTOUCH U-200) 200 unit/mL (3 mL) inpn, 135 Units by SubCUTAneous route daily. (Patient taking differently: 120 Units by SubCUTAneous route daily.), Disp: 7 Adjustable Dose Pre-filled Pen Syringe, Rfl: 5    ezetimibe (ZETIA) 10 mg tablet, Take 1 Tab by mouth nightly., Disp: 90 Tab, Rfl: 3    irbesartan (AVAPRO) 300 mg tablet, Take 1 Tab by mouth nightly. Indications: chronic heart failure, high blood pressure, Disp: 90 Tab, Rfl: 3    pantoprazole (PROTONIX) 40 mg tablet, Take 1 Tab by mouth Daily (before breakfast). , Disp: 90 Tab, Rfl: 3    busPIRone (BUSPAR) 15 mg tablet, Take 1 Tab by mouth daily. , Disp: 90 Tab, Rfl: 3    insulin aspart U-100 (NOVOLOG FLEXPEN U-100 INSULIN) 100 unit/mL (3 mL) inpn, by SubCUTAneous route Before breakfast, lunch, and dinner. Inject 8 units standing dose + additional insulin according to sliding scale (up to 16 units) subQ before each meal  151-200 = 2 units 201-250 = 4 units 251-300 = 6 units 201-350 = 8 units, Disp: , Rfl:     nitroglycerin (NITROSTAT) 0.4 mg SL tablet, 1 Tab by SubLINGual route every five (5) minutes as needed for Chest Pain. Up to 3 doses. , Disp: 30 Tab, Rfl: 5    albuterol (VENTOLIN HFA) 90 mcg/actuation inhaler, Take 2 Puffs by inhalation every four (4) hours as needed for Wheezing or Shortness of Breath., Disp: 1 Inhaler, Rfl: 3    acetaminophen (TYLENOL) 500 mg tablet, Take 1,000 mg by mouth every six (6) hours as needed for Fever or Pain., Disp: , Rfl:     cyanocobalamin (VITAMIN B12) 500 mcg tablet, Take 1 Tab by mouth daily.  Indications: b12 deficiency, Disp: 30 Tab, Rfl: 2    tamsulosin (FLOMAX) 0.4 mg capsule, Take 1 Cap by mouth daily. , Disp: 90 Cap, Rfl: 3    polyethylene glycol (MIRALAX) 17 gram/dose powder, Take 17 g by mouth daily. , Disp: 17 g, Rfl: 5    pregabalin (LYRICA) 150 mg capsule, Take 150 mg by mouth two (2) times a day., Disp: , Rfl:     loratadine (CLARITIN) 10 mg tablet, Take 10 mg by mouth daily. , Disp: , Rfl:             Date Last Reviewed:  7/23/2020     Complexity Level : Expanded review of therapy/medical records (1-2):  MODERATE COMPLEXITY   ASSESSMENT OF OCCUPATIONAL PERFORMANCE:   Palpation:          Pitting 3+ edema bilaterally, swollen toes and ankle cuffs. ROM:         WFL  Strength:          Generalized deconditioning  Special Tests:           Positive Stemmer's sign (inability to pinch skin at base of second toe)   Skin Integrity:          Tissue changes including:   papillomas, moderate lymphostatic fibrosis, hyperpigmentation, peaud'orange skin, lymph bullae, hyperkeratotic scales, lymphorrhea right anterior ankle.   Sensation:  Impaired; diabetic neuropathy  Functional Mobility:  Wheelchair for community mobility; walker at home  Activities of Daily Living Max assist LB ADLs due to limited reach and sitting balance; max A IADLs, modified independent with all others  Edema/Girth:  3+ and pitting   PRETREATMENT AFFECTED LIMB(s): lower extremity      Date:  6.12.2020 6.19.20 6.26.20 7.2.20 7.7.20 7.13.20     Right / Left            Groin   []      []            8 inches   []      []            4 inches   []      []          PoplitealSpace   [x]      [x] 43/42 43.5/41 43.5/41.5 43/42.5 42/40.5 42/40.5      8 inches   [x]      [x] 50.5/51 50/50 48.5/48.5 47.5/49 44.5/44.5 41.5/41      4 inches   [x]      [x] 47/48 46/47 43.5/43.5 42/44 41/42 39/40      Ankle   [x]      [x] 42/39.5 38.5/37 34.5/32 32.5/30 31/29 30/26      Instep   [x]      [x] 28.5/27.5 25/25.5 25/24.5 25/23.5 24/23 24/24    Measurements are taken in centimeters:  2.54 cm = 1 inch  Cumulative Circumferential Volumetric Graph Measurements  RIGHT  cm 203 195 cm 190 cm 182.5 cm 178.5    LEFT  cm 200.5 189.5 cm 189 cm 179 cm 171.5           Physical Skills Involved:  1. Activity Tolerance  2. Edema  3. Skin Integrity Cognitive Skills Affected (resulting in the inability to perform in a timely and safe manner):  1. N/A Psychosocial Skills Affected:  1. Habits/Routines   Number of elements that affect the Plan of Care: 3-5:  MODERATE COMPLEXITY   CLINICAL DECISION MAKING:   Outcome Measure: Tool Used: Lymphedema Life Impact Scale   Score:  Initial: 34 Most Recent: 42  (Date: 7/13/20 )   Interpretation of Score: The Lymphedema Life Impact Scale (LLIS) is a validated instrument that measures the physical, functional, and psychosocial concerns pertinent to patients with extremity lymphedema. The Scale's questionnaire is administered to patients to gauge impairments, activity limitations, and participation restrictions resulting from their lymphedema. Score 0 1-13 14-26 27-40 41-54 55-67 68   Modifier CH CI CJ CK CL CM CN     ? Other PT/OT Primary Functional Limitations:     - CURRENT STATUS: CK - 40%-59% impaired, limited or restricted    - GOAL STATUS: CJ - 20%-39% impaired, limited or restricted    - D/C STATUS:  ---------------To be determined---------------       Medical Necessity:   · Skilled intervention continues to be required due to Chronic stage 2 lymphedema B LEs. .  Reason for Services/Other Comments:  · Patient continues to require present interventions due to patient's inability to inability to manage stage 2 lymphedema and increased risk for cellulitis.    Use of outcome tool(s) and clinical judgement create a POC that gives a: Questionable prediction of patient's progress: MODERATE COMPLEXITY   TREATMENT:   (In addition to Assessment/Re-Assessment sessions the following treatments were rendered)    Pre-treatment Symptoms/Complaints: Maintaining reduction - Lisa Furry wraps arrived and to be issued today.  Pain: Initial:   Pain Intensity 1: 5  Post Session:  5   Occupational Therapy Treatments    Therapeutic Exercise (    minutes):     HEP:  As above; handouts given to patient for all exercises. Manual Therapy:( 60 minutes)   See compression section below. Manual Lymph Drainage:          Lymph Nodes:    Cervical Supraclavicular Axillary Abdominal Inguinal Popliteal Antecubital   RIGHT [x]     [x]     [x]     []     [x]     [x]     []       LEFT [x]     [x]     [x]     []     [x]     [x]     []          Anastamoses:   Axillo-axillary Inguino-inguinal Axillo-inguinal Inguino-axillary   ANTERIOR []     []     []     [x]       POSTERIOR []     []     []     []       RIGHT []     []     []     [x]       LEFT []     []     []     [x]         Limbs:   []    RUE     []    LUE     [x]    RLE    [x]    LLE   Compression: Patient issued Brooke Bees wraps today with extensive patient education for application of garments. He had difficulty donning garments but with extensive time he was awkwardly able to apply them. This difficulty was due mainly to his large stomach making it difficult to reach is feet. Patient has a sock aid at home and this should help with donning the liner sock. He also lives with his brother and he indicated he may be able to assist with donning of [place] wraps. Patient will wer for day time and remove at night. Treatment/Session Assessment:    · Response to Treatment:  Tolerated treatment without complication . See compression section above. Patient will return for another appointment after wearing wraps for several days to assess effectiveness of garments and problem solve any further donning issues he may have. · Compliance with Program/Exercises:   Compliant  · Recommendations/Intent for next treatment session: Next visit will focus on phase 2 complete decongestive therapy.   Total Treatment Duration:  60 minutes  OT Patient Time In/Time Out  Time In: 1000  Time Out: One Arch Rambo, OTR/L, CLT

## 2020-08-06 ENCOUNTER — APPOINTMENT (OUTPATIENT)
Dept: PHYSICAL THERAPY | Age: 80
End: 2020-08-06

## 2020-10-26 PROBLEM — R07.2 PRECORDIAL PAIN: Status: RESOLVED | Noted: 2020-06-30 | Resolved: 2020-10-26

## 2020-10-26 PROBLEM — E66.01 MORBID OBESITY WITH BMI OF 45.0-49.9, ADULT (HCC): Status: ACTIVE | Noted: 2020-10-26

## 2020-10-26 PROBLEM — J40 BRONCHITIS: Status: RESOLVED | Noted: 2020-06-30 | Resolved: 2020-10-26

## 2020-10-26 PROBLEM — R10.31 RLQ ABDOMINAL PAIN: Status: RESOLVED | Noted: 2019-09-28 | Resolved: 2020-10-26

## 2020-10-26 PROBLEM — E11.21 TYPE 2 DIABETES WITH NEPHROPATHY (HCC): Status: RESOLVED | Noted: 2018-05-11 | Resolved: 2020-10-26

## 2020-10-26 PROBLEM — I63.9 CEREBROVASCULAR ACCIDENT (CVA) (HCC): Status: RESOLVED | Noted: 2019-03-21 | Resolved: 2020-10-26

## 2020-10-26 PROBLEM — R13.19 ESOPHAGEAL DYSPHAGIA: Status: RESOLVED | Noted: 2017-11-09 | Resolved: 2020-10-26

## 2020-10-26 PROBLEM — E66.01 MORBID OBESITY DUE TO EXCESS CALORIES (HCC): Status: RESOLVED | Noted: 2017-11-09 | Resolved: 2020-10-26

## 2020-10-26 PROBLEM — Z90.49 S/P RIGHT HEMICOLECTOMY: Status: RESOLVED | Noted: 2020-01-09 | Resolved: 2020-10-26

## 2020-10-26 PROBLEM — E11.40 DIABETIC NEUROPATHY ASSOCIATED WITH TYPE 2 DIABETES MELLITUS (HCC): Chronic | Status: RESOLVED | Noted: 2017-07-18 | Resolved: 2020-10-26

## 2020-10-26 PROBLEM — K57.32 CECAL DIVERTICULITIS: Status: RESOLVED | Noted: 2019-09-29 | Resolved: 2020-10-26

## 2020-10-26 PROBLEM — R06.02 SHORTNESS OF BREATH: Status: RESOLVED | Noted: 2020-06-30 | Resolved: 2020-10-26

## 2020-10-26 PROBLEM — E11.9 TYPE 2 DIABETES MELLITUS WITHOUT COMPLICATION (HCC): Status: RESOLVED | Noted: 2019-03-21 | Resolved: 2020-10-26

## 2020-10-26 PROBLEM — G47.30 SLEEP APNEA: Status: RESOLVED | Noted: 2017-11-09 | Resolved: 2020-10-26

## 2020-10-26 PROBLEM — K63.89 CECUM MASS: Status: RESOLVED | Noted: 2019-12-06 | Resolved: 2020-10-26

## 2020-10-26 PROBLEM — K63.89 MASS OF CECUM: Chronic | Status: RESOLVED | Noted: 2019-10-26 | Resolved: 2020-10-26

## 2020-10-26 PROBLEM — G45.9 TIA (TRANSIENT ISCHEMIC ATTACK): Status: RESOLVED | Noted: 2017-11-09 | Resolved: 2020-10-26

## 2020-10-26 PROBLEM — K92.2 GI BLEED: Status: RESOLVED | Noted: 2019-10-15 | Resolved: 2020-10-26

## 2020-10-28 NOTE — THERAPY DISCHARGE
Rishabh Crane  : 1940  [place]  Secondary: Deepak Roca 75 at [facility]  [address], [facility] [address]  [phone]   [phone]         OUTPATIENT OCCUPATIONAL THERAPY: Discharge 10/28/2020    ICD-10: Treatment Diagnosis: I89.0, Lymphedema not elsewhere classified  M79.89, Swelling of both lower extremities  Precautions/Allergies:   Doneta Pimple maleate]   MD Orders: eval and treat MEDICAL/REFERRING DIAGNOSIS:   Lymphedema, not elsewhere classified [I89.0]   DATE OF ONSET: Chronic   REFERRING PHYSICIAN: Joao Boyle MD  RETURN PHYSICIAN APPOINTMENT: to be determined    DISCHARGE 10/28/2020:  Mr. Janett Madera completed 12 lymphedema therapy sessions from 2020 through 2020. Pt was educated on lymphatic pathophysiology, complete decongestive therapy, precautions and skin integrity management. He was compliant with compression management and demonstrated a reduction of 32.5 cm R LE and 36.5 cm L LE cumulative circumferential volumetric graph measurements. He purchased [employer] Man wraps and was independent with donning and doffing. He met all goals and is discharged from services. INITIAL ASSESSMENT:  Mr. Janett Madera presents with stage 2 bilateral lower extremity lymphedema. He reports swelling in his legs since . He presents with bilateral 3+ pitting edema, positive Stemmer sign, papillomas, moderate lymphostatic fibrosis, hyperpigmentation, peaud'orange skin, lymph bullae, hyperkeratotic scales, lymphorrhea right anterior ankle, swollen toes and ankle cuffs. Mr. Janett Madera has not has any treatment for lymphedema. He is a retired nurse. Comorbidities include uncontrolled DM with neuropathy, diabetic retinopathy of both eyes, diverticulitis and colectomy, TIA, HTN. He lives with his brother; his sister assists with IADLs.   Mr. Janett Madera is an excellent candidate for complete decongestive therapy, compression bandaging and long term compression management garments. He is in agreement with POC and goals. PLAN OF CARE:   PROBLEM LIST:  1. Decreased ADL/Functional Activities  2. Decreased Activity Tolerance  3. Decreased Flexibility/Joint Mobility  4. Edema/Girth  5. Decreased Knowledge of Precautions  6. Decreased Skin Integrity/Hygeine  7. Decreased [place] with Home Exercise Program INTERVENTIONS PLANNED  1. Skin care  2. Compression bandaging  3. Fitting for compression garment(s)  4. Manual therapy/Manual lymph drainage  5. Therapeutic exercise/Therapeutic activities  6. Patient Education  7. Compression pump trial prn  8.  kinesiotaping    TREATMENT PLAN:  Effective Dates: 6/12/2020 TO 9/13/2020 (90 days). Frequency/Duration: 2 times a week for 90 Day(s)  Will adjust frequency and duration as progress indicates. GOALS: (Goals have been discussed and agreed upon with patient.)  Short-Term Functional Goals: Time Frame: 45 days  1. The patient/caregiver will verbalize understanding of lymphedema precautions. Goal met  2. Patient will be independent with skin care regimen to decrease risk of cellulitis. Goal met  3. The patient/caregiver will be independent with self-manual lymph drainage techniques and show decrease in limb volume. Goal met  4. Patient will be independent in lymphatic exercises. Goal met  Discharge Goals: Time Frame: 90 days  1. Patient's bilateral lower extremity circumferential measurements will decrease 8 cm to 10 cm on volumetric graph to maximize functional use in ADL's. Goal met  2. The patient/caregiver will be independent with home management of lymphedema. Goal met  3. Patient/caregiver will be independent donning and doffing bilateral lower extremity compression garment.   Goal met

## 2020-12-10 ENCOUNTER — HOSPITAL ENCOUNTER (OUTPATIENT)
Dept: LAB | Age: 80
Discharge: HOME | End: 2020-12-10
Attending: PHYSICIAN ASSISTANT
Payer: COMMERCIAL

## 2020-12-10 DIAGNOSIS — E78.5 HYPERLIPIDEMIA LDL GOAL <70: ICD-10-CM

## 2020-12-10 DIAGNOSIS — Z12.5 SCREENING FOR PROSTATE CANCER: ICD-10-CM

## 2020-12-10 DIAGNOSIS — N28.9 RENAL INSUFFICIENCY: ICD-10-CM

## 2020-12-10 DIAGNOSIS — D50.9 IRON DEFICIENCY ANEMIA, UNSPECIFIED IRON DEFICIENCY ANEMIA TYPE: ICD-10-CM

## 2020-12-10 LAB
ALBUMIN SERPL-MCNC: 3.3 G/DL (ref 3.2–4.6)
ALBUMIN/GLOB SERPL: 0.8 {RATIO} (ref 1.2–3.5)
ALP SERPL-CCNC: 102 U/L (ref 50–136)
ALT SERPL-CCNC: 14 U/L (ref 12–65)
ANION GAP SERPL CALC-SCNC: 3 MMOL/L (ref 7–16)
AST SERPL-CCNC: 15 U/L (ref 15–37)
BASOPHILS # BLD: 0 K/UL (ref 0–0.2)
BASOPHILS NFR BLD: 1 % (ref 0–2)
BILIRUB SERPL-MCNC: 0.4 MG/DL (ref 0.2–1.1)
BUN SERPL-MCNC: 25 MG/DL (ref 8–23)
CALCIUM SERPL-MCNC: 8.7 MG/DL (ref 8.3–10.4)
CHLORIDE SERPL-SCNC: 107 MMOL/L (ref 98–107)
CHOLEST SERPL-MCNC: 109 MG/DL
CO2 SERPL-SCNC: 30 MMOL/L (ref 21–32)
CREAT SERPL-MCNC: 1.3 MG/DL (ref 0.8–1.5)
DIFFERENTIAL METHOD BLD: ABNORMAL
EOSINOPHIL # BLD: 0.3 K/UL (ref 0–0.8)
EOSINOPHIL NFR BLD: 6 % (ref 0.5–7.8)
ERYTHROCYTE [DISTWIDTH] IN BLOOD BY AUTOMATED COUNT: 15.5 % (ref 11.9–14.6)
EST. AVERAGE GLUCOSE BLD GHB EST-MCNC: 217 MG/DL
GLOBULIN SER CALC-MCNC: 3.9 G/DL (ref 2.3–3.5)
GLUCOSE SERPL-MCNC: 115 MG/DL (ref 65–100)
HBA1C MFR BLD: 9.2 %
HCT VFR BLD AUTO: 33.9 % (ref 41.1–50.3)
HDLC SERPL-MCNC: 47 MG/DL (ref 40–60)
HDLC SERPL: 2.3 {RATIO}
HGB BLD-MCNC: 10.6 G/DL (ref 13.6–17.2)
IMM GRANULOCYTES # BLD AUTO: 0 K/UL (ref 0–0.5)
IMM GRANULOCYTES NFR BLD AUTO: 1 % (ref 0–5)
IRON SATN MFR SERPL: 23 %
IRON SERPL-MCNC: 69 UG/DL (ref 35–150)
LDLC SERPL CALC-MCNC: 49 MG/DL
LIPID PROFILE,FLP: NORMAL
LYMPHOCYTES # BLD: 1.9 K/UL (ref 0.5–4.6)
LYMPHOCYTES NFR BLD: 35 % (ref 13–44)
MCH RBC QN AUTO: 29 PG (ref 26.1–32.9)
MCHC RBC AUTO-ENTMCNC: 31.3 G/DL (ref 31.4–35)
MCV RBC AUTO: 92.9 FL (ref 79.6–97.8)
MONOCYTES # BLD: 0.7 K/UL (ref 0.1–1.3)
MONOCYTES NFR BLD: 12 % (ref 4–12)
NEUTS SEG # BLD: 2.5 K/UL (ref 1.7–8.2)
NEUTS SEG NFR BLD: 47 % (ref 43–78)
NRBC # BLD: 0 K/UL (ref 0–0.2)
PLATELET # BLD AUTO: 154 K/UL (ref 150–450)
PMV BLD AUTO: 11.3 FL (ref 9.4–12.3)
POTASSIUM SERPL-SCNC: 4.8 MMOL/L (ref 3.5–5.1)
PROT SERPL-MCNC: 7.2 G/DL (ref 6.3–8.2)
PSA SERPL-MCNC: 1.6 NG/ML
RBC # BLD AUTO: 3.65 M/UL (ref 4.23–5.6)
SODIUM SERPL-SCNC: 140 MMOL/L (ref 136–145)
TIBC SERPL-MCNC: 304 UG/DL (ref 250–450)
TRIGL SERPL-MCNC: 65 MG/DL (ref 35–150)
VLDLC SERPL CALC-MCNC: 13 MG/DL (ref 6–23)
WBC # BLD AUTO: 5.5 K/UL (ref 4.3–11.1)

## 2020-12-10 PROCEDURE — 80061 LIPID PANEL: CPT

## 2020-12-10 PROCEDURE — 84153 ASSAY OF PSA TOTAL: CPT

## 2020-12-10 PROCEDURE — 83036 HEMOGLOBIN GLYCOSYLATED A1C: CPT

## 2020-12-10 PROCEDURE — 36415 COLL VENOUS BLD VENIPUNCTURE: CPT

## 2020-12-10 PROCEDURE — 80053 COMPREHEN METABOLIC PANEL: CPT

## 2020-12-10 PROCEDURE — 85025 COMPLETE CBC W/AUTO DIFF WBC: CPT

## 2020-12-10 PROCEDURE — 83540 ASSAY OF IRON: CPT

## 2020-12-10 NOTE — PROGRESS NOTES
Fasting blood sugar looks pretty good. Kidney function is much improved in the past month. Liver function is normal.  Cholesterol levels are well maintained on current regimen. Iron levels are improved and within normal range. Overall glucose levels have increased significantly over the past 2 months. Results will be discussed with patient during upcoming office visit.

## 2020-12-10 NOTE — PROGRESS NOTES
Blood counts show worsened anemia since vitamin b12 has been out of the mix for the past few months. Other labs pending. Results will be discussed with patient during upcoming office visit.

## 2020-12-10 NOTE — PROGRESS NOTES
Prostate blood test is stable and normal.  Results will be discussed with patient during upcoming office visit.

## 2020-12-15 PROBLEM — I27.20 PULMONARY HYPERTENSION (HCC): Status: ACTIVE | Noted: 2020-12-15

## 2021-04-01 ENCOUNTER — HOSPITAL ENCOUNTER (OUTPATIENT)
Dept: LAB | Age: 81
Discharge: HOME | End: 2021-04-01
Attending: PHYSICIAN ASSISTANT
Payer: MEDICARE

## 2021-04-01 DIAGNOSIS — N28.9 FUNCTION KIDNEY DECREASED: ICD-10-CM

## 2021-04-01 DIAGNOSIS — I10 ESSENTIAL HYPERTENSION, BENIGN: Chronic | ICD-10-CM

## 2021-04-01 DIAGNOSIS — Z11.59 ENCOUNTER FOR HEPATITIS C SCREENING TEST FOR LOW RISK PATIENT: ICD-10-CM

## 2021-04-01 DIAGNOSIS — E78.5 HYPERLIPIDEMIA LDL GOAL <70: ICD-10-CM

## 2021-04-01 DIAGNOSIS — E87.5 HYPERKALEMIA: ICD-10-CM

## 2021-04-01 DIAGNOSIS — D50.9 IRON DEFICIENCY ANEMIA, UNSPECIFIED IRON DEFICIENCY ANEMIA TYPE: ICD-10-CM

## 2021-04-01 LAB
ALBUMIN SERPL-MCNC: 3.8 G/DL (ref 3.2–4.6)
ALBUMIN/GLOB SERPL: 1 {RATIO} (ref 1.2–3.5)
ALP SERPL-CCNC: 98 U/L (ref 50–136)
ALT SERPL-CCNC: 26 U/L (ref 12–65)
ANION GAP SERPL CALC-SCNC: 2 MMOL/L (ref 7–16)
AST SERPL-CCNC: 16 U/L (ref 15–37)
BASOPHILS # BLD: 0 K/UL (ref 0–0.2)
BASOPHILS NFR BLD: 1 % (ref 0–2)
BILIRUB SERPL-MCNC: 0.3 MG/DL (ref 0.2–1.1)
BUN SERPL-MCNC: 42 MG/DL (ref 8–23)
CALCIUM SERPL-MCNC: 9 MG/DL (ref 8.3–10.4)
CHLORIDE SERPL-SCNC: 104 MMOL/L (ref 98–107)
CHOLEST SERPL-MCNC: 105 MG/DL
CO2 SERPL-SCNC: 29 MMOL/L (ref 21–32)
CREAT SERPL-MCNC: 1.75 MG/DL (ref 0.8–1.5)
DIFFERENTIAL METHOD BLD: ABNORMAL
EOSINOPHIL # BLD: 0.3 K/UL (ref 0–0.8)
EOSINOPHIL NFR BLD: 4 % (ref 0.5–7.8)
ERYTHROCYTE [DISTWIDTH] IN BLOOD BY AUTOMATED COUNT: 15.3 % (ref 11.9–14.6)
EST. AVERAGE GLUCOSE BLD GHB EST-MCNC: 174 MG/DL
GLOBULIN SER CALC-MCNC: 4 G/DL (ref 2.3–3.5)
GLUCOSE SERPL-MCNC: 160 MG/DL (ref 65–100)
HBA1C MFR BLD: 7.7 % (ref 4.2–6.3)
HCT VFR BLD AUTO: 36 % (ref 41.1–50.3)
HCV AB SER QL: NONREACTIVE
HDLC SERPL-MCNC: 47 MG/DL (ref 40–60)
HDLC SERPL: 2.2 {RATIO}
HGB BLD-MCNC: 11 G/DL (ref 13.6–17.2)
IMM GRANULOCYTES # BLD AUTO: 0 K/UL (ref 0–0.5)
IMM GRANULOCYTES NFR BLD AUTO: 0 % (ref 0–5)
LDLC SERPL CALC-MCNC: 39.8 MG/DL
LIPID PROFILE,FLP: NORMAL
LYMPHOCYTES # BLD: 2.6 K/UL (ref 0.5–4.6)
LYMPHOCYTES NFR BLD: 38 % (ref 13–44)
MCH RBC QN AUTO: 28.4 PG (ref 26.1–32.9)
MCHC RBC AUTO-ENTMCNC: 30.6 G/DL (ref 31.4–35)
MCV RBC AUTO: 92.8 FL (ref 79.6–97.8)
MONOCYTES # BLD: 0.7 K/UL (ref 0.1–1.3)
MONOCYTES NFR BLD: 10 % (ref 4–12)
NEUTS SEG # BLD: 3.2 K/UL (ref 1.7–8.2)
NEUTS SEG NFR BLD: 47 % (ref 43–78)
NRBC # BLD: 0 K/UL (ref 0–0.2)
PLATELET # BLD AUTO: 143 K/UL (ref 150–450)
PMV BLD AUTO: 11.1 FL (ref 9.4–12.3)
POTASSIUM SERPL-SCNC: 5.1 MMOL/L (ref 3.5–5.1)
PROT SERPL-MCNC: 7.8 G/DL (ref 6.3–8.2)
RBC # BLD AUTO: 3.88 M/UL (ref 4.23–5.6)
SODIUM SERPL-SCNC: 135 MMOL/L (ref 136–145)
TRIGL SERPL-MCNC: 91 MG/DL (ref 35–150)
VLDLC SERPL CALC-MCNC: 18.2 MG/DL (ref 6–23)
WBC # BLD AUTO: 6.8 K/UL (ref 4.3–11.1)

## 2021-04-01 PROCEDURE — 86803 HEPATITIS C AB TEST: CPT

## 2021-04-01 PROCEDURE — 80061 LIPID PANEL: CPT

## 2021-04-01 PROCEDURE — 80053 COMPREHEN METABOLIC PANEL: CPT

## 2021-04-01 PROCEDURE — 36415 COLL VENOUS BLD VENIPUNCTURE: CPT

## 2021-04-01 PROCEDURE — 85025 COMPLETE CBC W/AUTO DIFF WBC: CPT

## 2021-04-01 PROCEDURE — 83036 HEMOGLOBIN GLYCOSYLATED A1C: CPT

## 2021-04-02 NOTE — PROGRESS NOTES
He will need a 1 month follow-up on this.   Please schedule a 4-5 week follow-up in the office for potassium recheck

## 2021-04-02 NOTE — PROGRESS NOTES
Pt notified that his potassium range is back into normal range but barely. Pt states that he had forgotten to stop taking the potassium for a few days but he will stay off of it for 1 more week and then proceed with taking it once daily. Pt verbalized understanding.

## 2021-04-02 NOTE — PROGRESS NOTES
Potassium is back into normal range but barely. Just to confirm - he's been completely off potassium for the past 2 weeks? If so, please remain off x 1 more week then resume potassium at only once daily.

## 2021-04-09 ENCOUNTER — HOSPITAL ENCOUNTER (OUTPATIENT)
Dept: GENERAL RADIOLOGY | Age: 81
Discharge: HOME | End: 2021-04-09
Attending: PHYSICIAN ASSISTANT
Payer: MEDICARE

## 2021-04-09 DIAGNOSIS — R13.14 PHARYNGOESOPHAGEAL DYSPHAGIA: ICD-10-CM

## 2021-04-09 PROCEDURE — 74246 X-RAY XM UPR GI TRC 2CNTRST: CPT

## 2021-04-09 PROCEDURE — 74011000250 HC RX REV CODE- 250: Performed by: PHYSICIAN ASSISTANT

## 2021-04-09 RX ADMIN — ANTACID/ANTIFLATULENT 4 G: 380; 550; 10; 10 GRANULE, EFFERVESCENT ORAL at 11:18

## 2021-04-09 RX ADMIN — BARIUM SULFATE 700 MG: 700 TABLET ORAL at 11:28

## 2021-04-09 RX ADMIN — BARIUM SULFATE 135 ML: 980 POWDER, FOR SUSPENSION ORAL at 11:18

## 2021-04-12 NOTE — PROGRESS NOTES
His upper GI showed that there is a narrowing in his lower esophagus that kept the barium tablet from passing into stomach for several minutes. I would like for him to see GI - looks like he saw Dr. Felix Loya so will send over a new referral for this issue.

## 2021-04-13 NOTE — PROGRESS NOTES
Pt notified that his his upper GI showed that there is a narrowing in his lower esophagus that kept the barium tablet from passing into stomach for several minutes. I would like for him to see GI - looks like he saw Dr. Patterson Come so will send over a new referral for this issue. Pt states that he does not want to see Dr. Patterson Come again. Jemal Luna with referral notified and pt verbalized understanding.

## 2021-06-10 ENCOUNTER — HOSPITAL ENCOUNTER (OUTPATIENT)
Dept: LAB | Age: 81
Discharge: HOME | End: 2021-06-10
Attending: PHYSICIAN ASSISTANT
Payer: MEDICARE

## 2021-06-10 LAB
ANION GAP SERPL CALC-SCNC: 5 MMOL/L (ref 7–16)
BUN SERPL-MCNC: 19 MG/DL (ref 8–23)
CALCIUM SERPL-MCNC: 9.2 MG/DL (ref 8.3–10.4)
CHLORIDE SERPL-SCNC: 107 MMOL/L (ref 98–107)
CO2 SERPL-SCNC: 30 MMOL/L (ref 21–32)
CREAT SERPL-MCNC: 1.34 MG/DL (ref 0.8–1.5)
GLUCOSE SERPL-MCNC: 76 MG/DL (ref 65–100)
POTASSIUM SERPL-SCNC: 4.2 MMOL/L (ref 3.5–5.1)
SODIUM SERPL-SCNC: 142 MMOL/L (ref 136–145)

## 2021-06-10 PROCEDURE — 80048 BASIC METABOLIC PNL TOTAL CA: CPT

## 2021-06-10 PROCEDURE — 36415 COLL VENOUS BLD VENIPUNCTURE: CPT

## 2021-06-28 ENCOUNTER — ANESTHESIA EVENT (OUTPATIENT)
Dept: ENDOSCOPY | Age: 81
End: 2021-06-28
Payer: MEDICARE

## 2021-06-28 RX ORDER — SODIUM CHLORIDE, SODIUM LACTATE, POTASSIUM CHLORIDE, CALCIUM CHLORIDE 600; 310; 30; 20 MG/100ML; MG/100ML; MG/100ML; MG/100ML
100 INJECTION, SOLUTION INTRAVENOUS CONTINUOUS
Status: CANCELLED | OUTPATIENT
Start: 2021-06-28

## 2021-06-28 NOTE — PROGRESS NOTES
Unable to confirm scheduled procedure with patient. No answer, but message left informing patient of time of arrival, entry location, and  policy. Call back number left in case of further questions.

## 2021-06-29 ENCOUNTER — HOSPITAL ENCOUNTER (OUTPATIENT)
Age: 81
Setting detail: OUTPATIENT SURGERY
Discharge: HOME | End: 2021-06-29
Attending: INTERNAL MEDICINE | Admitting: INTERNAL MEDICINE
Payer: MEDICARE

## 2021-06-29 ENCOUNTER — ANESTHESIA (OUTPATIENT)
Dept: ENDOSCOPY | Age: 81
End: 2021-06-29
Payer: MEDICARE

## 2021-06-29 VITALS
WEIGHT: 278 LBS | HEART RATE: 75 BPM | BODY MASS INDEX: 43.63 KG/M2 | HEIGHT: 67 IN | OXYGEN SATURATION: 94 % | DIASTOLIC BLOOD PRESSURE: 83 MMHG | TEMPERATURE: 96.8 F | SYSTOLIC BLOOD PRESSURE: 131 MMHG | RESPIRATION RATE: 20 BRPM

## 2021-06-29 PROCEDURE — 76040000025: Performed by: INTERNAL MEDICINE

## 2021-06-29 PROCEDURE — 2709999900 HC NON-CHARGEABLE SUPPLY: Performed by: INTERNAL MEDICINE

## 2021-06-29 PROCEDURE — 74011250636 HC RX REV CODE- 250/636: Performed by: ANESTHESIOLOGY

## 2021-06-29 PROCEDURE — 74011000250 HC RX REV CODE- 250: Performed by: REGISTERED NURSE

## 2021-06-29 PROCEDURE — 82962 GLUCOSE BLOOD TEST: CPT

## 2021-06-29 PROCEDURE — 76060000032 HC ANESTHESIA 0.5 TO 1 HR: Performed by: INTERNAL MEDICINE

## 2021-06-29 PROCEDURE — 74011250636 HC RX REV CODE- 250/636: Performed by: REGISTERED NURSE

## 2021-06-29 RX ORDER — SODIUM CHLORIDE, SODIUM LACTATE, POTASSIUM CHLORIDE, CALCIUM CHLORIDE 600; 310; 30; 20 MG/100ML; MG/100ML; MG/100ML; MG/100ML
100 INJECTION, SOLUTION INTRAVENOUS CONTINUOUS
Status: DISCONTINUED | OUTPATIENT
Start: 2021-06-29 | End: 2021-06-29 | Stop reason: HOSPADM

## 2021-06-29 RX ORDER — LIDOCAINE HYDROCHLORIDE 20 MG/ML
INJECTION, SOLUTION EPIDURAL; INFILTRATION; INTRACAUDAL; PERINEURAL AS NEEDED
Status: DISCONTINUED | OUTPATIENT
Start: 2021-06-29 | End: 2021-06-29 | Stop reason: HOSPADM

## 2021-06-29 RX ORDER — PROPOFOL 10 MG/ML
INJECTION, EMULSION INTRAVENOUS AS NEEDED
Status: DISCONTINUED | OUTPATIENT
Start: 2021-06-29 | End: 2021-06-29 | Stop reason: HOSPADM

## 2021-06-29 RX ORDER — PROPOFOL 10 MG/ML
INJECTION, EMULSION INTRAVENOUS
Status: DISCONTINUED | OUTPATIENT
Start: 2021-06-29 | End: 2021-06-29 | Stop reason: HOSPADM

## 2021-06-29 RX ADMIN — LIDOCAINE HYDROCHLORIDE 60 MG: 20 INJECTION, SOLUTION EPIDURAL; INFILTRATION; INTRACAUDAL; PERINEURAL at 07:16

## 2021-06-29 RX ADMIN — SODIUM CHLORIDE, SODIUM LACTATE, POTASSIUM CHLORIDE, AND CALCIUM CHLORIDE 100 ML/HR: 600; 310; 30; 20 INJECTION, SOLUTION INTRAVENOUS at 07:09

## 2021-06-29 RX ADMIN — PROPOFOL 160 MCG/KG/MIN: 10 INJECTION, EMULSION INTRAVENOUS at 07:16

## 2021-06-29 RX ADMIN — PROPOFOL 50 MG: 10 INJECTION, EMULSION INTRAVENOUS at 07:16

## 2021-06-29 NOTE — PROGRESS NOTES
Spiritual Care visit. Initial Visit, Pre Surgery Consult. Visit and prayer before patient goes to surgery.     Visit by Gage Resendiz M.Ed., Th.B. ,Staff

## 2021-06-29 NOTE — PROCEDURES
Esophagogastroduodenoscopy    DATE of PROCEDURE: 6/29/2021    INDICATIONS:  1. Dysphagia  2. Abnormal esophagram    MEDICATIONS ADMINISTERED: MAC    INSTRUMENT: GIF    PROCEDURE:  After obtaining informed consent, the patient was placed in the left lateral position and sedated. The endoscope was advanced under direct vision without difficulty. The esophagus, stomach (including retroflexed views) and duodenum were evaluated. The patient was taken to the recovery area in stable condition. FINDINGS:  ESOPHAGUS: normal appearance. Successive 44, 52 and 60 [place] dilation resulted in no mucosal trauma. STOMACH: normal  DUODENUM: normal    Estimated blood loss: 0-minimal     PLAN:  1.  Follow up in the office    Huyen Black MD  [facility] [place]

## 2021-06-29 NOTE — ANESTHESIA POSTPROCEDURE EVALUATION
Procedure(s):  ESOPHAGOGASTRODUODENOSCOPY (EGD) WITH DILATION/BMI 46  ESOPHAGEAL DILATION. total IV anesthesia    Anesthesia Post Evaluation      Multimodal analgesia: multimodal analgesia not used between 6 hours prior to anesthesia start to PACU discharge  Patient location during evaluation: bedside  Patient participation: complete - patient participated  Level of consciousness: awake  Pain score: 1  Pain management: adequate  Airway patency: patent  Anesthetic complications: no  Cardiovascular status: acceptable  Respiratory status: acceptable  Hydration status: acceptable  Comments: Pt doing well. Post anesthesia nausea and vomiting:  none  Final Post Anesthesia Temperature Assessment:  Normothermia (36.0-37.5 degrees C)      INITIAL Post-op Vital signs:   Vitals Value Taken Time   /61 06/29/21 0755   Temp 36 °C (96.8 °F) 06/29/21 0747   Pulse 76 06/29/21 0759   Resp 18 06/29/21 0755   SpO2 97 % 06/29/21 0759   Vitals shown include unvalidated device data.

## 2021-06-29 NOTE — H&P
History and Physical    [facility] H&P (Admission)        Date:  6/29/2021    Chief Complaint:  dysphagia    Subjective:     History of Present Illness:  Patient is a 68 y.o. being admitted for EGD with dilation.     PMH:  Past Medical History:   Diagnosis Date    Anemia     Iron + Vitamin B12 Deficiencies    Anxiety     managed well with meds    Cervical stenosis of spine     Chronic kidney disease     Stage 3    Claustrophobia     Degenerative disc disease, lumbar     debilitating arthritis    Diabetes mellitus type II     Diabetic retinopathy of both eyes without macular edema associated with type 2 diabetes mellitus (HCC)     R - Moderate, L - Mild    Diastolic congestive heart failure (HCC)     Diverticulosis of colon June 2010    DVT (deep venous thrombosis) (HCC)     RLE    Extrinsic allergic asthma     GERD (gastroesophageal reflux disease)     managed well with meds    History of TIA (transient ischemic attack) 11/09/2017    slight weakness to LLE    Hx of colonic polyps 07/29/2010    Hyperplastic     Hyperlipidemia LDL goal < 70     Hypertension     Lymphedema     Obstructive sleep apnea     Non-Compliant with CPAP    Perennial allergic rhinitis with seasonal variation     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 ([address]     Presence of IVC filter     Pulmonary embolism ([facility] Mar 2014    Bilateral - Completed 6 months on Xarelto    Stroke [facility]) 2018, 2019,2010    left sided weakness       PSH:  Past Surgical History:   Procedure Laterality Date    COLONOSCOPY  07/29/2010    Diverticulosis & Colon/Rectal Polyps - Due 2020    COLONOSCOPY N/A 10/24/2019    COLONOSCOPY/ 42/ 622 performed by Thony Tracey MD at [facility] ENDOSCOPY    EGD  5/9/2011         HX APPENDECTOMY  12/06/2019    HX BUNIONECTOMY Bilateral     HX CIRCUMCISION      HX HERNIA REPAIR Bilateral     Inguinal, Repeat on Both Sides Separately    HX KNEE ARTHROSCOPY Right 1991    x 3    HX TOTAL COLECTOMY Right 12/06/2019    IR IVC FILTER  10/22/2019    IR REMOVE IVC FILTER W SI  2/27/2020    ID SINUS SURGERY PROC UNLISTED  1970's    ID TOTAL KNEE ARTHROPLASTY Right 2006       Allergies: Allergies   Allergen Reactions    Vasotec [Enalapril Maleate] Shortness of Breath and Cough       Home Medications:  Prior to Admission medications    Medication Sig Start Date End Date Taking? Authorizing Provider   azithromycin (ZITHROMAX) 250 mg tablet Take 1 Tablet by mouth See Admin Instructions for 5 days. Dispense Z-Pack. 6/27/21 7/2/21 Yes Zayra Cabral MD   Blood-Glucose Meter (OneTouch Verio Meter) misc Use to check glucose 4 times/day. Dx: E11.59 6/17/21  Yes Juanis Pearson PA-C   glucose blood VI test strips (OneTouch Verio test strips) strip Use to check glucose 4 times/day. Dx: E11.59 6/17/21  Yes Juanis Pearson PA-C   lancets (One Touch Delica) 33 gauge misc Use to check glucose 4 times/day. Dx: E11.59 6/17/21  Yes Juanis Pearson PA-C   atorvastatin (LIPITOR) 80 mg tablet Take 1 Tablet by mouth nightly. 6/15/21  Yes Juanis Pearson PA-C   insulin degludec May Ing FlexTouch U-200) 200 unit/mL (3 mL) inpn pen Inject 85 units subQ every morning. Patient taking differently: Inject 83 units subQ every morning. 6/15/21  Yes Juanis Pearson PA-C   metFORMIN (GLUCOPHAGE) 1,000 mg tablet Take 1 tablet by mouth twice daily with meals 6/15/21  Yes Juanis Pearson PA-C   glucose blood VI test strips (FreeStyle Lite Strips) strip Use 1 lancet to check glucose 4 times/day. Dx: E11.59 6/15/21  Yes Strahl, Milagros Phalen, PA-C   spironolactone (ALDACTONE) 25 mg tablet Take 1 Tablet by mouth daily. 6/15/21  Yes Juanis Pearson PA-C   tamsulosin (FLOMAX) 0.4 mg capsule Take 1 capsule by mouth once daily 4/11/21  Yes Xin Santo MD   hydrALAZINE (APRESOLINE) 50 mg tablet Take 1 Tab by mouth two (2) times a day.  3/18/21  Yes Juanis Pearson PA-C   insulin aspart U-100 (NovoLOG Flexpen U-100 Insulin) 100 unit/mL (3 mL) inpn Inject 6 units standing dose + additional insulin according to sliding scale (up to 14 units) subQ before each meal 3/18/21  Yes Juanis Pearson PA-C   ferrous sulfate (IRON) 325 mg (65 mg iron) EC tablet Take 1 Tab by mouth Daily (before dinner). 12/29/20  Yes Juanis Pearson PA-C   ezetimibe (Zetia) 10 mg tablet Take 1 Tab by mouth nightly. 12/29/20  Yes Juanis Pearson PA-C   irbesartan (AVAPRO) 300 mg tablet Take 1 Tab by mouth nightly. Indications: chronic heart failure, high blood pressure 12/29/20  Yes Juanis Pearson PA-C   pantoprazole (PROTONIX) 40 mg tablet Take 1 Tab by mouth Daily (before breakfast). 12/29/20  Yes Juanis Pearson PA-C   busPIRone (BUSPAR) 15 mg tablet Take 1 Tab by mouth daily. 12/29/20  Yes Juanis Pearson PA-C   carvediloL (COREG) 12.5 mg tablet Take 1 Tab by mouth two (2) times a day. 12/15/20  Yes Jean Pinzon MD   insulin syringe-needle U-100 (BD Veo Insulin Syringe UF) 0.3 mL 31 gauge x 15/64\" syrg Use 1 syringe to inject insulin once daily 9/15/20  Yes Juanis Pearson PA-C   furosemide (LASIX) 40 mg tablet Take 1 Tab by mouth three (3) times daily. Patient taking differently: Take 40 mg by mouth two (2) times a day. 9/15/20  Yes Dulce Maria Monsivais PA-C   Walker (Ultra-Light Rollator) misc Lightweight Rollator Walker w/ Seat - Dx: J99.50 Osteoarthritis of Spine w/ Radiculopathy + E11.40 Chronic Painful Diabetic Neuropathy 4/23/20  Yes Juanis Pearson PA-C   albuterol (VENTOLIN HFA) 90 mcg/actuation inhaler Take 2 Puffs by inhalation every four (4) hours as needed for Wheezing or Shortness of Breath. 10/29/19  Yes Krys Gonzalez NP   acetaminophen (TYLENOL) 500 mg tablet Take 1,000 mg by mouth every six (6) hours as needed for Fever or Pain. Yes Provider, Historical   polyethylene glycol (MIRALAX) 17 gram/dose powder Take 17 g by mouth daily. Patient taking differently: Take 17 g by mouth daily.  Use every other day 6/24/19  Yes Miquel Snellen, MD   pregabalin (LYRICA) 150 mg capsule Take 150 mg by mouth two (2) times a day. Yes Provider, Historical   loratadine (CLARITIN) 10 mg tablet Take 10 mg by mouth daily. Yes Provider, Historical   naloxone (Narcan) 4 mg/actuation nasal spray 1 [place] by IntraNASal route once as needed for Overdose. Use 1 spray intranasally, then discard. Repeat with new spray every 2 min as needed for opioid overdose symptoms, alternating nostrils. Patient not taking: Reported on 6/29/2021    Provider, Historical   folic acid/multivit-min/lutein (CENTRUM SILVER PO) Take  by mouth. Provider, Historical   HYDROcodone-acetaminophen (Norco)  mg tablet Take 1 Tablet by mouth every eight (8) hours as needed. Provider, Historical   nitroglycerin (NITROSTAT) 0.4 mg SL tablet 1 Tab by SubLINGual route every five (5) minutes as needed for Chest Pain. Up to 3 doses. 11/20/19   Rene Christopher MD       Hospital Medications:  No current facility-administered medications for this encounter. Social History:  Social History     Tobacco Use    Smoking status: Never Smoker    Smokeless tobacco: Never Used   Substance Use Topics    Alcohol use: No         Family History:  Family History   Problem Relation Age of Onset    Stroke Mother     Diabetes Mother     Heart Disease Mother     Diabetes Maternal Grandmother     Glaucoma Maternal Grandmother     Stroke Father     Diabetes Father     Diabetes Paternal Aunt     Cancer Paternal Aunt     Diabetes Paternal Uncle     Cancer Paternal Uncle         Colon    Heart Attack Sister 76    Cancer Sister     Prostate Cancer Brother     Hypertension Brother     Hypertension Sister     Prostate Cancer Brother        Review of Systems:  A detailed 10 system ROS is obtained, with pertinent positives as listed above. All others are negative.     Objective:     Physical Exam:  Vitals:  Visit Vitals  BP (!) 146/61   Pulse 72 Temp 98.2 °F (36.8 °C)   Resp 16   Ht 5' 7\" (1.702 m)   Wt 126.1 kg (278 lb)   SpO2 98%   BMI 43.54 kg/m²     Gen:  Pt is alert, cooperative, no acute distress  Skin: no large lesions  HEENT: MMM  Cardiovascular: Regular rate and rhythm. No murmurs, gallops, or rubs. Respiratory:  Comfortable breathing with no accessory muscle use. Clear breath sounds with no wheezes, rales, or rhonchi. GI:  Abdomen nondistended, soft, and nontender. Normal active bowel sounds. obese  Neurological:  Gross memory appears intact. Patient is alert and oriented. Psychiatric:  Mood appears appropriate with judgement intact. Laboratory:    No results for input(s): WBC, HGB, HCT, PLT, MCV, NA, K, CL, CO2, BUN, CREA, CA, MG, GLU, AP, TBIL, CBIL, ALB, TP, AML, LPSE, PTP, INR, APTT, HGBEXT, HCTEXT, PLTEXT, INREXT in the last 72 hours. No lab exists for component: SGOT, GPT, DBIL    Assessment:       Active Problems:    * No active hospital problems. *      Plan:     Endoscopy and risks explained to the patient. Risks including reaction to sedation, cardiopulmonary events, infection, bleeding, perforation, requirement for surgery if complications should occur, death were explained to patient who expressed understanding and agreed to proceed with endoscopy.         David Delgado MD  [facility], [place]

## 2021-06-30 ENCOUNTER — HOSPITAL ENCOUNTER (OUTPATIENT)
Dept: GENERAL RADIOLOGY | Age: 81
Discharge: HOME | End: 2021-06-30
Payer: MEDICARE

## 2021-06-30 DIAGNOSIS — R05.8 PRODUCTIVE COUGH: ICD-10-CM

## 2021-06-30 PROCEDURE — 71046 X-RAY EXAM CHEST 2 VIEWS: CPT

## 2021-06-30 NOTE — PROGRESS NOTES
Late Entry    Spiritual Care visit. Initial Visit, Pre Surgery Consult. Visit and prayer before patient goes to surgery.     Visit by Thea Alfonso M.Ed., Th.B. ,Staff

## 2021-06-30 NOTE — PROGRESS NOTES
Spiritual Care visit. Initial Visit, Pre Surgery Consult. Visit and prayer before patient goes to surgery.     Visit by Venus Tellez M.Ed., Th.B. ,Staff

## 2021-07-01 NOTE — PROGRESS NOTES
Pt notified to try increasing Lasix to 3 times/day x 1 week and see if this helps any with the cough (assuming he is usually just taking it twice a day as I have documented). If not better with cough pills and extra Lasix he will need to be re-evaluated next week. Pt verbalized understanding.

## 2021-07-01 NOTE — PROGRESS NOTES
Please try increasing Lasix to 3 times/day x 1 week and see if this helps any with the cough (assuming he is usually just taking it twice a day as I have documented). If not better with cough pills and extra Lasix he will need to be re-evaluated next week.

## 2021-07-05 LAB
GLUCOSE BLD STRIP.AUTO-MCNC: 117 MG/DL (ref 65–100)
SERVICE CMNT-IMP: ABNORMAL

## 2021-08-13 NOTE — PROGRESS NOTES
TRANSFER - IN REPORT:    Verbal report [facility] S[facility]unit[facility] for routine progression of care      Report consisted of patients Situation, Background, Assessment and   Recommendations(SBAR). Information from the following report(s) SBAR was reviewed with the receiving nurse. Opportunity for questions and clarification was provided. Assessment completed upon patients arrival to unit and care assumed. Yes

## 2021-08-21 ENCOUNTER — HOSPITAL ENCOUNTER (OUTPATIENT)
Dept: CT IMAGING | Age: 81
Discharge: HOME | End: 2021-08-21
Attending: PHYSICIAN ASSISTANT
Payer: MEDICARE

## 2021-08-21 DIAGNOSIS — R05.3 PERSISTENT COUGH FOR 3 WEEKS OR LONGER: ICD-10-CM

## 2021-08-21 PROCEDURE — 71250 CT THORAX DX C-: CPT

## 2021-08-22 NOTE — PROGRESS NOTES
CT scan of chest is clear of any abnormalities. Can he describe the cough at this point - dry or productive, any less frequent than before, wheezing, shortness of breath?

## 2021-08-23 NOTE — PROGRESS NOTES
Let's do a short course of steroids to help calm it down. This will obviously elevate his blood sugars for about 7-10 days. If that doesn't work to relieve the cough then we'll need to send him to pulmonology.

## 2021-08-23 NOTE — PROGRESS NOTES
Pt notified that his CT scan of chest is clear of any abnormalities. Pt verbalized understanding and states that his cough is non-productive, no wheezing and no SOB. It is still as frequent as it was before.

## 2021-09-15 ENCOUNTER — HOSPITAL ENCOUNTER (OUTPATIENT)
Dept: LAB | Age: 81
Discharge: HOME | End: 2021-09-15
Attending: PHYSICIAN ASSISTANT
Payer: MEDICARE

## 2021-09-15 DIAGNOSIS — E78.5 HYPERLIPIDEMIA LDL GOAL <70: ICD-10-CM

## 2021-09-15 DIAGNOSIS — D50.9 IRON DEFICIENCY ANEMIA, UNSPECIFIED IRON DEFICIENCY ANEMIA TYPE: ICD-10-CM

## 2021-09-15 DIAGNOSIS — I10 ESSENTIAL HYPERTENSION, BENIGN: ICD-10-CM

## 2021-09-15 LAB
ALBUMIN SERPL-MCNC: 3.6 G/DL (ref 3.2–4.6)
ALBUMIN/GLOB SERPL: 1 {RATIO} (ref 1.2–3.5)
ALP SERPL-CCNC: 91 U/L (ref 50–136)
ALT SERPL-CCNC: 19 U/L (ref 12–65)
ANION GAP SERPL CALC-SCNC: 5 MMOL/L (ref 7–16)
AST SERPL-CCNC: 11 U/L (ref 15–37)
BASOPHILS # BLD: 0 K/UL (ref 0–0.2)
BASOPHILS NFR BLD: 0 % (ref 0–2)
BILIRUB SERPL-MCNC: 0.3 MG/DL (ref 0.2–1.1)
BUN SERPL-MCNC: 23 MG/DL (ref 8–23)
CALCIUM SERPL-MCNC: 8.7 MG/DL (ref 8.3–10.4)
CHLORIDE SERPL-SCNC: 109 MMOL/L (ref 98–107)
CHOLEST SERPL-MCNC: 104 MG/DL
CO2 SERPL-SCNC: 29 MMOL/L (ref 21–32)
CREAT SERPL-MCNC: 1.32 MG/DL (ref 0.8–1.5)
CREAT UR-MCNC: 102 MG/DL
DIFFERENTIAL METHOD BLD: ABNORMAL
EOSINOPHIL # BLD: 0.2 K/UL (ref 0–0.8)
EOSINOPHIL NFR BLD: 3 % (ref 0.5–7.8)
ERYTHROCYTE [DISTWIDTH] IN BLOOD BY AUTOMATED COUNT: 15.6 % (ref 11.9–14.6)
EST. AVERAGE GLUCOSE BLD GHB EST-MCNC: 180 MG/DL
GLOBULIN SER CALC-MCNC: 3.7 G/DL (ref 2.3–3.5)
GLUCOSE SERPL-MCNC: 53 MG/DL (ref 65–100)
HBA1C MFR BLD: 7.9 % (ref 4.2–6.3)
HCT VFR BLD AUTO: 35.9 % (ref 41.1–50.3)
HDLC SERPL-MCNC: 49 MG/DL (ref 40–60)
HDLC SERPL: 2.1 {RATIO}
HGB BLD-MCNC: 11.1 G/DL (ref 13.6–17.2)
IMM GRANULOCYTES # BLD AUTO: 0 K/UL (ref 0–0.5)
IMM GRANULOCYTES NFR BLD AUTO: 0 % (ref 0–5)
LDLC SERPL CALC-MCNC: 43.4 MG/DL
LYMPHOCYTES # BLD: 2.4 K/UL (ref 0.5–4.6)
LYMPHOCYTES NFR BLD: 33 % (ref 13–44)
MCH RBC QN AUTO: 29.2 PG (ref 26.1–32.9)
MCHC RBC AUTO-ENTMCNC: 30.9 G/DL (ref 31.4–35)
MCV RBC AUTO: 94.5 FL (ref 79.6–97.8)
MICROALBUMIN UR-MCNC: 1.11 MG/DL
MICROALBUMIN/CREAT UR-RTO: 11 MG/G
MONOCYTES # BLD: 0.7 K/UL (ref 0.1–1.3)
MONOCYTES NFR BLD: 10 % (ref 4–12)
NEUTS SEG # BLD: 3.8 K/UL (ref 1.7–8.2)
NEUTS SEG NFR BLD: 54 % (ref 43–78)
NRBC # BLD: 0 K/UL (ref 0–0.2)
PLATELET # BLD AUTO: 175 K/UL (ref 150–450)
PMV BLD AUTO: 10.9 FL (ref 9.4–12.3)
POTASSIUM SERPL-SCNC: 4.5 MMOL/L (ref 3.5–5.1)
PROT SERPL-MCNC: 7.3 G/DL (ref 6.3–8.2)
RBC # BLD AUTO: 3.8 M/UL (ref 4.23–5.6)
SODIUM SERPL-SCNC: 143 MMOL/L (ref 136–145)
TRIGL SERPL-MCNC: 58 MG/DL (ref 35–150)
VLDLC SERPL CALC-MCNC: 11.6 MG/DL (ref 6–23)
WBC # BLD AUTO: 7.1 K/UL (ref 4.3–11.1)

## 2021-09-15 PROCEDURE — 82728 ASSAY OF FERRITIN: CPT

## 2021-09-15 PROCEDURE — 36415 COLL VENOUS BLD VENIPUNCTURE: CPT

## 2021-09-15 PROCEDURE — 80053 COMPREHEN METABOLIC PANEL: CPT

## 2021-09-15 PROCEDURE — 85025 COMPLETE CBC W/AUTO DIFF WBC: CPT

## 2021-09-15 PROCEDURE — 80061 LIPID PANEL: CPT

## 2021-09-15 PROCEDURE — 83036 HEMOGLOBIN GLYCOSYLATED A1C: CPT

## 2021-09-15 PROCEDURE — 83540 ASSAY OF IRON: CPT

## 2021-09-15 PROCEDURE — 82043 UR ALBUMIN QUANTITATIVE: CPT

## 2021-09-16 LAB
FERRITIN SERPL-MCNC: 101 NG/ML (ref 8–388)
IRON SATN MFR SERPL: 23 %
IRON SERPL-MCNC: 70 UG/DL (ref 35–150)
TIBC SERPL-MCNC: 298 UG/DL (ref 250–450)

## 2021-09-16 NOTE — PROGRESS NOTES
His iron levels look ok but would really like to see his hemoglobin better than it is. Still proceed with the Integra Plus instead of the others as we reviewed today. We'll have to work on getting Integra approved through his insurance.

## 2021-09-17 ENCOUNTER — HOSPITAL ENCOUNTER (OUTPATIENT)
Dept: GENERAL RADIOLOGY | Age: 81
Discharge: HOME | End: 2021-09-17
Payer: MEDICARE

## 2021-09-17 DIAGNOSIS — Z91.81 HISTORY OF RECENT FALL: ICD-10-CM

## 2021-09-17 DIAGNOSIS — R07.89 LEFT-SIDED CHEST WALL PAIN: ICD-10-CM

## 2021-09-17 PROCEDURE — 71100 X-RAY EXAM RIBS UNI 2 VIEWS: CPT

## 2021-09-17 NOTE — PROGRESS NOTES
Pt notified that his his iron levels look ok but would really like to see his hemoglobin better than it is. Still proceed with the Integra Plus instead of the others as we reviewed today. Pt verbalized understanding.

## 2021-09-20 NOTE — PROGRESS NOTES
No rib fracture found. Likely just some internal bruising that will take a few more weeks to resolve.

## 2021-09-20 NOTE — PROGRESS NOTES
Pt notified that his xray showed no rib fractures. Likely just some internal bruising that will take a few more weeks to resolve. Pt verbalized understanding.

## 2021-10-18 NOTE — PROGRESS NOTES
No Protocol    Last seen 10/14/21    Pending appt 11/30/21    Last refilled 8/27/21    Ok to refill as requested?   Problem: Self Care Deficits Care Plan (Adult)  Goal: *Therapy Goal (Edit Goal, Insert Text)  Description  LTG 1: Pt will be I with toileting by 10/9/19 to prevent skin breakdown. CONTINUE GOAL (10/7/2019)  Not met 10/9/2019        Outcome: Not Met  Goal: *Therapy Goal (Edit Goal, Insert Text)  Description  LTG 2: Pt will be I with LB dressing by 10/9/19 to reduce risk of falls. Downgrade to moderate assist 10/7/2019   GOAL MET 10/9/2019     Outcome: Resolved/Met  Goal: *Therapy Goal (Edit Goal, Insert Text)  Description  LTG 3: Pt will be I with bathing by 10/9/19 to promote good skin integrity. CONTINUE GOAL (10/7/2019)  GOAL MET 10/9/2019          Outcome: Resolved/Met  Goal: *Therapy Goal (Edit Goal, Insert Text)  Description  LTG 4: Pt will be able to get a snack from the fridge with I by 10/9/19 to promote proper nutrition and hydration. Downgrade to supervision 10/7/2019  GOAL MET 10/9/2019        Outcome: Resolved/Met  Goal: *Therapy Goal (Edit Goal, Insert Text)  Description  LTG 5: Pt/caregiver will verbalize  understanding of OT recommendations regarding ADL status, functional transfer status, home safety, DME, AE, energy conservation techniques, safety awareness, activity tolerance, and/or follow-up therapy to increase safety with functional tasks upon discharge. CONTINUE GOAL (10/7/2019)   GOAL MET 10/9/2019     Outcome: Resolved/Met  Goal: *Therapy Goal (Edit Goal, Insert Text)  Outcome: Resolved/Met     Toileting goal was not met due to patient did not toilet during discharge evaluation.

## 2022-01-04 PROBLEM — I27.82 CHRONIC PULMONARY EMBOLISM (HCC): Status: RESOLVED | Noted: 2019-11-20 | Resolved: 2022-01-04

## 2022-03-18 PROBLEM — I89.0 LYMPHEDEMA: Status: ACTIVE | Noted: 2020-06-30

## 2022-03-18 PROBLEM — I50.32 DIASTOLIC CHF, CHRONIC (HCC): Status: ACTIVE | Noted: 2017-03-20

## 2022-03-18 PROBLEM — N18.30 CKD (CHRONIC KIDNEY DISEASE) STAGE 3, GFR 30-59 ML/MIN (HCC): Status: ACTIVE | Noted: 2019-06-24

## 2022-03-19 PROBLEM — I82.532 CHRONIC DEEP VEIN THROMBOSIS (DVT) OF POPLITEAL VEIN OF LEFT LOWER EXTREMITY (HCC): Status: ACTIVE | Noted: 2020-06-30

## 2022-03-19 PROBLEM — E66.01 MORBID OBESITY WITH BMI OF 45.0-49.9, ADULT (HCC): Status: ACTIVE | Noted: 2020-10-26

## 2022-03-19 PROBLEM — I27.20 PULMONARY HYPERTENSION (HCC): Status: ACTIVE | Noted: 2020-12-15

## 2022-03-19 PROBLEM — G81.92 HEMIPARESIS OF LEFT DOMINANT SIDE (HCC): Status: ACTIVE | Noted: 2019-03-07

## 2022-03-20 PROBLEM — J45.20 MILD INTERMITTENT ASTHMA WITHOUT COMPLICATION: Status: ACTIVE | Noted: 2017-08-24

## 2022-04-01 ENCOUNTER — HOSPITAL ENCOUNTER (OUTPATIENT)
Dept: LAB | Age: 82
Discharge: HOME | End: 2022-04-01
Attending: PHYSICIAN ASSISTANT
Payer: MEDICARE

## 2022-04-01 DIAGNOSIS — I10 ESSENTIAL HYPERTENSION, BENIGN: ICD-10-CM

## 2022-04-01 DIAGNOSIS — E78.5 HYPERLIPIDEMIA LDL GOAL <70: ICD-10-CM

## 2022-04-01 DIAGNOSIS — D50.9 IRON DEFICIENCY ANEMIA, UNSPECIFIED IRON DEFICIENCY ANEMIA TYPE: ICD-10-CM

## 2022-04-01 LAB
ALBUMIN SERPL-MCNC: 3.5 G/DL (ref 3.2–4.6)
ALBUMIN/GLOB SERPL: 0.9 {RATIO} (ref 1.2–3.5)
ALP SERPL-CCNC: 104 U/L (ref 50–136)
ALT SERPL-CCNC: 19 U/L (ref 12–65)
ANION GAP SERPL CALC-SCNC: 2 MMOL/L (ref 7–16)
AST SERPL-CCNC: 14 U/L (ref 15–37)
BASOPHILS # BLD: 0 K/UL (ref 0–0.2)
BASOPHILS NFR BLD: 1 % (ref 0–2)
BILIRUB SERPL-MCNC: 0.4 MG/DL (ref 0.2–1.1)
BUN SERPL-MCNC: 33 MG/DL (ref 8–23)
CALCIUM SERPL-MCNC: 8.7 MG/DL (ref 8.3–10.4)
CHLORIDE SERPL-SCNC: 107 MMOL/L (ref 98–107)
CHOLEST SERPL-MCNC: 114 MG/DL
CO2 SERPL-SCNC: 29 MMOL/L (ref 21–32)
CREAT SERPL-MCNC: 1.64 MG/DL (ref 0.8–1.5)
DIFFERENTIAL METHOD BLD: ABNORMAL
EOSINOPHIL # BLD: 0.2 K/UL (ref 0–0.8)
EOSINOPHIL NFR BLD: 4 % (ref 0.5–7.8)
ERYTHROCYTE [DISTWIDTH] IN BLOOD BY AUTOMATED COUNT: 14.7 % (ref 11.9–14.6)
EST. AVERAGE GLUCOSE BLD GHB EST-MCNC: 186 MG/DL
GLOBULIN SER CALC-MCNC: 4.1 G/DL (ref 2.3–3.5)
GLUCOSE SERPL-MCNC: 71 MG/DL (ref 65–100)
HBA1C MFR BLD: 8.1 % (ref 4.2–6.3)
HCT VFR BLD AUTO: 35.8 % (ref 41.1–50.3)
HDLC SERPL-MCNC: 46 MG/DL (ref 40–60)
HDLC SERPL: 2.5 {RATIO}
HGB BLD-MCNC: 11.2 G/DL (ref 13.6–17.2)
IMM GRANULOCYTES # BLD AUTO: 0 K/UL (ref 0–0.5)
IMM GRANULOCYTES NFR BLD AUTO: 0 % (ref 0–5)
LDLC SERPL CALC-MCNC: 57.8 MG/DL
LYMPHOCYTES # BLD: 2.4 K/UL (ref 0.5–4.6)
LYMPHOCYTES NFR BLD: 41 % (ref 13–44)
MCH RBC QN AUTO: 29.2 PG (ref 26.1–32.9)
MCHC RBC AUTO-ENTMCNC: 31.3 G/DL (ref 31.4–35)
MCV RBC AUTO: 93.2 FL (ref 79.6–97.8)
MONOCYTES # BLD: 0.6 K/UL (ref 0.1–1.3)
MONOCYTES NFR BLD: 11 % (ref 4–12)
NEUTS SEG # BLD: 2.5 K/UL (ref 1.7–8.2)
NEUTS SEG NFR BLD: 43 % (ref 43–78)
NRBC # BLD: 0 K/UL (ref 0–0.2)
PLATELET # BLD AUTO: 172 K/UL (ref 150–450)
PMV BLD AUTO: 11.2 FL (ref 9.4–12.3)
POTASSIUM SERPL-SCNC: 4.5 MMOL/L (ref 3.5–5.1)
PROT SERPL-MCNC: 7.6 G/DL (ref 6.3–8.2)
RBC # BLD AUTO: 3.84 M/UL (ref 4.23–5.6)
SODIUM SERPL-SCNC: 138 MMOL/L (ref 136–145)
TRIGL SERPL-MCNC: 51 MG/DL (ref 35–150)
VLDLC SERPL CALC-MCNC: 10.2 MG/DL (ref 6–23)
WBC # BLD AUTO: 5.7 K/UL (ref 4.3–11.1)

## 2022-04-01 PROCEDURE — 80061 LIPID PANEL: CPT

## 2022-04-01 PROCEDURE — 83036 HEMOGLOBIN GLYCOSYLATED A1C: CPT

## 2022-04-01 PROCEDURE — 80053 COMPREHEN METABOLIC PANEL: CPT

## 2022-04-01 PROCEDURE — 85025 COMPLETE CBC W/AUTO DIFF WBC: CPT

## 2022-04-01 PROCEDURE — 36415 COLL VENOUS BLD VENIPUNCTURE: CPT

## 2022-04-07 PROBLEM — E11.9 TYPE 2 DIABETES MELLITUS (HCC): Status: ACTIVE | Noted: 2022-04-07

## 2022-04-11 PROBLEM — E11.9 TYPE 2 DIABETES MELLITUS (HCC): Status: ACTIVE | Noted: 2022-04-07

## 2022-04-28 PROBLEM — I25.119 ATHEROSCLEROTIC HEART DISEASE OF NATIVE CORONARY ARTERY WITH UNSPECIFIED ANGINA PECTORIS (HCC): Status: ACTIVE | Noted: 2022-04-28

## 2022-04-28 PROBLEM — I20.9 ANGINA PECTORIS, UNSPECIFIED (HCC): Status: ACTIVE | Noted: 2022-04-28

## 2022-06-15 ENCOUNTER — TELEPHONE (OUTPATIENT)
Dept: INTERNAL MEDICINE CLINIC | Facility: CLINIC | Age: 82
End: 2022-06-15

## 2022-06-15 NOTE — TELEPHONE ENCOUNTER
----- Message from Rosalio Gamboa sent at 6/15/2022 12:20 PM EDT -----  Subject: Message to Provider    QUESTIONS  Information for Provider? Patient was uncertain when he should come in for   a f/up Per Mago Lazo will tell Manuel Liam to call pt. \" ##this patient is not   too happy about Lillian Hollis returning his call.  ---------------------------------------------------------------------------  --------------  CALL BACK INFO  What is the best way for the office to contact you? OK to leave message on   voicemail  Preferred Call Back Phone Number? [phone]  ---------------------------------------------------------------------------  --------------  SCRIPT ANSWERS  Relationship to Patient?  Self

## 2022-06-15 NOTE — TELEPHONE ENCOUNTER
Please call pt to let him know that he is due for his 3 month DM follow-up in July and please get him scheduled with Eduardo Chavis. Thank you.

## 2022-07-19 ENCOUNTER — OFFICE VISIT (OUTPATIENT)
Dept: INTERNAL MEDICINE CLINIC | Facility: CLINIC | Age: 82
End: 2022-07-19
Payer: MEDICARE

## 2022-07-19 VITALS
OXYGEN SATURATION: 96 % | HEIGHT: 67 IN | BODY MASS INDEX: 43.95 KG/M2 | TEMPERATURE: 97.8 F | DIASTOLIC BLOOD PRESSURE: 70 MMHG | SYSTOLIC BLOOD PRESSURE: 110 MMHG | HEART RATE: 63 BPM | WEIGHT: 280 LBS

## 2022-07-19 DIAGNOSIS — J30.2 PERENNIAL ALLERGIC RHINITIS WITH SEASONAL VARIATION: ICD-10-CM

## 2022-07-19 DIAGNOSIS — E11.59 TYPE 2 DIABETES MELLITUS WITH OTHER CIRCULATORY COMPLICATION, WITH LONG-TERM CURRENT USE OF INSULIN (HCC): Primary | ICD-10-CM

## 2022-07-19 DIAGNOSIS — I10 ESSENTIAL HYPERTENSION WITH GOAL BLOOD PRESSURE LESS THAN 130/85: ICD-10-CM

## 2022-07-19 DIAGNOSIS — J30.89 PERENNIAL ALLERGIC RHINITIS WITH SEASONAL VARIATION: ICD-10-CM

## 2022-07-19 DIAGNOSIS — E78.5 HYPERLIPIDEMIA LDL GOAL <70: ICD-10-CM

## 2022-07-19 DIAGNOSIS — E11.42 DIABETIC PERIPHERAL NEUROPATHY ASSOCIATED WITH TYPE 2 DIABETES MELLITUS (HCC): ICD-10-CM

## 2022-07-19 DIAGNOSIS — Z79.4 TYPE 2 DIABETES MELLITUS WITH OTHER CIRCULATORY COMPLICATION, WITH LONG-TERM CURRENT USE OF INSULIN (HCC): Primary | ICD-10-CM

## 2022-07-19 DIAGNOSIS — D50.9 IRON DEFICIENCY ANEMIA, UNSPECIFIED IRON DEFICIENCY ANEMIA TYPE: ICD-10-CM

## 2022-07-19 DIAGNOSIS — J45.20: ICD-10-CM

## 2022-07-19 DIAGNOSIS — E53.8 VITAMIN B12 DEFICIENCY: ICD-10-CM

## 2022-07-19 DIAGNOSIS — B35.1 ONYCHOMYCOSIS: ICD-10-CM

## 2022-07-19 DIAGNOSIS — E66.01 MORBID OBESITY WITH BMI OF 40.0-44.9, ADULT (HCC): ICD-10-CM

## 2022-07-19 LAB — HBA1C MFR BLD: 7.1 %

## 2022-07-19 PROCEDURE — 1123F ACP DISCUSS/DSCN MKR DOCD: CPT | Performed by: PHYSICIAN ASSISTANT

## 2022-07-19 PROCEDURE — G8417 CALC BMI ABV UP PARAM F/U: HCPCS | Performed by: PHYSICIAN ASSISTANT

## 2022-07-19 PROCEDURE — 3052F HG A1C>EQUAL 8.0%<EQUAL 9.0%: CPT | Performed by: PHYSICIAN ASSISTANT

## 2022-07-19 PROCEDURE — 99215 OFFICE O/P EST HI 40 MIN: CPT | Performed by: PHYSICIAN ASSISTANT

## 2022-07-19 PROCEDURE — 83036 HEMOGLOBIN GLYCOSYLATED A1C: CPT | Performed by: PHYSICIAN ASSISTANT

## 2022-07-19 PROCEDURE — G8427 DOCREV CUR MEDS BY ELIG CLIN: HCPCS | Performed by: PHYSICIAN ASSISTANT

## 2022-07-19 PROCEDURE — 4004F PT TOBACCO SCREEN RCVD TLK: CPT | Performed by: PHYSICIAN ASSISTANT

## 2022-07-19 RX ORDER — ALBUTEROL SULFATE 90 UG/1
2 AEROSOL, METERED RESPIRATORY (INHALATION) EVERY 4 HOURS PRN
Qty: 18 G | Refills: 5 | Status: SHIPPED | OUTPATIENT
Start: 2022-07-19

## 2022-07-19 RX ORDER — FERROUS FUMARATE AND POLYSACCHRIDE IRON VITAMIN MINERAL COMPLEX SUPPLEMENT 191.2; 135.9; 1; 210; 20; 5; 5; 7; 25; 3 MG/1; MG/1; MG/1; MG/1; MG/1; MG/1; MG/1; MG/1; MG/1; UG/1
CAPSULE ORAL
COMMUNITY
Start: 2022-05-19 | End: 2022-07-19 | Stop reason: SDUPTHER

## 2022-07-19 RX ORDER — FLUTICASONE PROPIONATE 50 MCG
2 SPRAY, SUSPENSION (ML) NASAL DAILY
Qty: 1 EACH | Refills: 5 | Status: CANCELLED | OUTPATIENT
Start: 2022-07-19

## 2022-07-19 RX ORDER — FERROUS FUMARATE AND POLYSACCHRIDE IRON VITAMIN MINERAL COMPLEX SUPPLEMENT 191.2; 135.9; 1; 210; 20; 5; 5; 7; 25; 3 MG/1; MG/1; MG/1; MG/1; MG/1; MG/1; MG/1; MG/1; MG/1; UG/1
1 CAPSULE ORAL DAILY
COMMUNITY
Start: 2021-09-16 | End: 2022-07-19

## 2022-07-19 RX ORDER — NYSTATIN 100000 [USP'U]/G
POWDER TOPICAL 4 TIMES DAILY
Qty: 60 G | Refills: 2 | Status: CANCELLED | OUTPATIENT
Start: 2022-07-19

## 2022-07-19 RX ORDER — FEXOFENADINE HCL 180 MG/1
180 TABLET ORAL DAILY
Qty: 90 TABLET | Refills: 3 | Status: SHIPPED | OUTPATIENT
Start: 2022-07-19

## 2022-07-19 RX ORDER — FERROUS FUMARATE AND POLYSACCHRIDE IRON VITAMIN MINERAL COMPLEX SUPPLEMENT 191.2; 135.9; 1; 210; 20; 5; 5; 7; 25; 3 MG/1; MG/1; MG/1; MG/1; MG/1; MG/1; MG/1; MG/1; MG/1; UG/1
CAPSULE ORAL
Qty: 30 CAPSULE | Refills: 5 | Status: CANCELLED | OUTPATIENT
Start: 2022-07-19

## 2022-07-19 RX ORDER — FERROUS FUMARATE AND POLYSACCHRIDE IRON VITAMIN MINERAL COMPLEX SUPPLEMENT 191.2; 135.9; 1; 210; 20; 5; 5; 7; 25; 3 MG/1; MG/1; MG/1; MG/1; MG/1; MG/1; MG/1; MG/1; MG/1; UG/1
1 CAPSULE ORAL DAILY
Qty: 30 CAPSULE | Refills: 5 | Status: SHIPPED | OUTPATIENT
Start: 2022-07-19

## 2022-07-19 RX ORDER — ALBUTEROL SULFATE 90 UG/1
2 AEROSOL, METERED RESPIRATORY (INHALATION) EVERY 4 HOURS PRN
Qty: 1 EACH | Refills: 3 | Status: CANCELLED | OUTPATIENT
Start: 2022-07-19

## 2022-07-19 RX ORDER — ATORVASTATIN CALCIUM 80 MG/1
80 TABLET, FILM COATED ORAL NIGHTLY
Qty: 90 TABLET | Refills: 3 | Status: CANCELLED | OUTPATIENT
Start: 2022-07-19

## 2022-07-19 ASSESSMENT — PATIENT HEALTH QUESTIONNAIRE - PHQ9
SUM OF ALL RESPONSES TO PHQ9 QUESTIONS 1 & 2: 0
SUM OF ALL RESPONSES TO PHQ QUESTIONS 1-9: 0
2. FEELING DOWN, DEPRESSED OR HOPELESS: 0
1. LITTLE INTEREST OR PLEASURE IN DOING THINGS: 0

## 2022-07-19 ASSESSMENT — ENCOUNTER SYMPTOMS
DIARRHEA: 1
SHORTNESS OF BREATH: 0

## 2022-07-19 NOTE — PATIENT INSTRUCTIONS
Emphasized need for fasting blood work to be done before the end of July  Resume Integra Plus every other day  Reduce Tresiba to 80 units every morning in hopes of reducing frequency of hypoglycemia  Advised that I cannot get in the middle of issues with the pain management practice so he should call and try to work things out with them directly - if new provider is going to be needed he will need to contact his insurance company to find out who is in his network and then call those offices to find out if they will accept him  Monitor blood sugar 3 times/day and keep record to bring to next appointment  Continue chronic medications as prescribed  Monitor blood pressure 2-3 times/week and keep record to bring to next appointment  Reminded to stay well hydrated with plenty of water  Encouraged to get 2nd COVID booster completed given age and health conditions  Discussed that 2nd shingles vaccine dose does not appear to have been done so that should also get done as well but not for at least a month after COVID booster

## 2022-07-19 NOTE — PROGRESS NOTES
Sunny Moctezuma (:  1940) is a 80 y.o. male,Established patient, here for evaluation of the following chief complaint(s):  Follow-up (Diabetes, Hyperlipidemia, Anemia, Hypertension)         ASSESSMENT/PLAN:  1. Type 2 diabetes mellitus with other circulatory complication, with long-term current use of insulin (HCC)  -     AMB POC HEMOGLOBIN A1C  -     Microalbumin / Creatinine Urine Ratio; Future  -     Comprehensive Metabolic Panel; Future  -     Insulin Syringe-Needle U-100 31G X 5/16\" 0.3 ML MISC; Disp-100 each, R-3, NormalUse 1 syringe to inject insulin daily  2. Extrinsic allergic asthma, mild intermittent, uncomplicated  -     albuterol sulfate HFA (PROVENTIL;VENTOLIN;PROAIR) 108 (90 Base) MCG/ACT inhaler; Inhale 2 puffs into the lungs every 4 hours as needed for Wheezing, Disp-18 g, R-5Normal  3. Perennial allergic rhinitis with seasonal variation  -     fexofenadine (ALLEGRA) 180 MG tablet; Take 1 tablet by mouth in the morning., Disp-90 tablet, R-3Normal  4. Iron deficiency anemia, unspecified iron deficiency anemia type  -     CBC with Auto Differential; Future  -     MrObb-UsBdbr-TP-B Cmp-C-Biot (INTEGRA PLUS) CAPS; Take 1 capsule by mouth daily, Disp-30 capsule, R-5Normal  5. Vitamin B12 deficiency  6. Hyperlipidemia LDL goal <70  -     Comprehensive Metabolic Panel; Future  -     Lipid Panel; Future  7. Essential hypertension with goal blood pressure less than 130/85  -     Comprehensive Metabolic Panel; Future  8. Morbid obesity with BMI of 40.0-44.9, adult ([facility] [address]  9. Diabetic peripheral neuropathy associated with type 2 diabetes mellitus ([facility] [address]  -     External Referral To Podiatry  10.  Onychomycosis  -     External Referral To Podiatry      Patient Instructions   Emphasized need for fasting blood work to be done before the end of July  Resume Integra Plus every other day  Reduce Tresiba to 80 units every morning in hopes of reducing frequency of hypoglycemia  Advised that I cannot get in the middle of issues with the pain management practice so he should call and try to work things out with them directly - if new provider is going to be needed he will need to contact his insurance company to find out who is in his network and then call those offices to find out if they will accept him  Monitor blood sugar 3 times/day and keep record to bring to next appointment  Continue chronic medications as prescribed  Monitor blood pressure 2-3 times/week and keep record to bring to next appointment  Reminded to stay well hydrated with plenty of water  Encouraged to get 2nd COVID booster completed given age and health conditions  Discussed that 2nd shingles vaccine dose does not appear to have been done so that should also get done as well but not for at least a month after COVID booster        Return in about 3 months (around 10/19/2022) for diabetes f/u. Subjective   SUBJECTIVE/OBJECTIVE:  The patient is a 80 y.o. male who is seen for follow up of diabetes. Current monitoring regimen: BGs consistently in an acceptable range. Glucose monitoring frequency: 3 times daily  Home blood sugar records: fasting range: , pre-lunch range: , pre-dinner range: 120-200  Any episodes of hypoglycemia? yes - several times/week usually upon awakening or if meal is delayed during the day  Known diabetic complications: peripheral neuropathy, cardiovascular disease, and cerebrovascular disease  Current diabetic medications include: oral agent (monotherapy): metformin (generic) and insulin injections: Tresiba 85 units q AM + Novolog 6 units before meals plus additional based on sliding scale according to pre meal glucose . Current Eye Exam (within one year): Yes - Mar 2022  Current Urine Microalbumin: Yes, normal - Sept 2021  Is She on ACE inhibitor or angiotensin II receptor blocker?   Yes - irbesartan (Avapro)  Current Foot Exam (within one year): Yes - July 2022      Weight trend: fluctuating a bit  Prior visit with dietician: yes - 2015  Current diet: meals per day on average: 3;  restricted intake of the following: rice & potatoes  Current exercise: no regular exercise        Last HbA1C:    Lab Results   Component Value Date    LABA1C 8.1 (H) 04/01/2022     Lab Results   Component Value Date     04/01/2022         The patient is a 80 y.o. male who is seen for evaluation of hyperlipidemia. He was tested because of hyperlipidemia w/ LDL goal < 70 + diabetes + CAD. The current state of this condition is control uncertain and no significant medication side effects noted on Lipitor 80 mg q hs + Zetia 10 mg daily - taking as prescribed. Last Lipid Panel:   Lab Results   Component Value Date    CHOL 114 04/01/2022    CHOL 112 01/04/2022    CHOL 104 09/15/2021     Lab Results   Component Value Date    TRIG 51 04/01/2022    TRIG 68 01/04/2022    TRIG 58 09/15/2021     Lab Results   Component Value Date    HDL 46 04/01/2022    HDL 40 01/04/2022    HDL 49 09/15/2021     Lab Results   Component Value Date    LDLCALC 57.8 04/01/2022    LDLCALC 58 01/04/2022    LDLCALC 43.4 09/15/2021     Lab Results   Component Value Date    LABVLDL 10.2 04/01/2022    LABVLDL 11.6 09/15/2021    LABVLDL 18.2 04/01/2021    VLDL 14 01/04/2022    VLDL 19 03/18/2021    VLDL 16 09/15/2020     Lab Results   Component Value Date    CHOLHDLRATIO 2.5 04/01/2022    CHOLHDLRATIO 2.1 09/15/2021    CHOLHDLRATIO 2.2 04/01/2021       Patient is here for follow-up of anemia w/ iron deficiency + vitamin b12 deficiency. The current state of this condition is control uncertain and no significant medication side effects noted on Integra Plus daily - not taking over the past month as prescribed, adverse effects: watery diarrhea + foul stool odor.       RBC   Date Value Ref Range Status   04/01/2022 3.84 (L) 4.23 - 5.6 M/uL Final   01/04/2022 3.70 (L) 4.14 - 5.80 x10E6/uL Final   09/15/2021 3.80 (L) 4.23 - 5.6 M/uL Final     Hemoglobin   Date Value Ref Range Status   04/01/2022 11.2 (L) 13.6 - 17.2 g/dL Final   01/04/2022 10.6 (L) 13.0 - 17.7 g/dL Final   09/15/2021 11.1 (L) 13.6 - 17.2 g/dL Final     MCV   Date Value Ref Range Status   04/01/2022 93.2 79.6 - 97.8 FL Final   01/04/2022 92 79 - 97 fL Final   09/15/2021 94.5 79.6 - 97.8 FL Final     MCH   Date Value Ref Range Status   04/01/2022 29.2 26.1 - 32.9 PG Final   01/04/2022 28.6 26.6 - 33.0 pg Final   09/15/2021 29.2 26.1 - 32.9 PG Final     MCHC   Date Value Ref Range Status   04/01/2022 31.3 (L) 31.4 - 35.0 g/dL Final   01/04/2022 31.3 (L) 31.5 - 35.7 g/dL Final   09/15/2021 30.9 (L) 31.4 - 35.0 g/dL Final     RDW   Date Value Ref Range Status   04/01/2022 14.7 (H) 11.9 - 14.6 % Final   01/04/2022 13.1 11.6 - 15.4 % Final   09/15/2021 15.6 (H) 11.9 - 14.6 % Final     Platelets   Date Value Ref Range Status   04/01/2022 172 150 - 450 K/uL Final   01/04/2022 150 150 - 450 x10E3/uL Final   09/15/2021 175 150 - 450 K/uL Final     Iron   Date Value Ref Range Status   09/15/2021 70 35 - 150 ug/dL Final     Comment:     Known Interfering Substances section:  \"Iron values may be falsely elevated in  serum samples from patients with  anticoagulants (e.g., hemodialysis patients). \"  Limitations of Procedure section:  \"Turbidity resulting from precipitation of  fibrinogen in the serum of patients treated  with anticoagulants (e.g. hemodialysis  patients) may cause spuriously elevated  iron results. \"     12/10/2020 69 35 - 150 ug/dL Final     Comment:     Known Interfering Substances section:  \"Iron values may be falsely elevated in  serum samples from patients with  anticoagulants (e.g., hemodialysis patients). \"  Limitations of Procedure section:  \"Turbidity resulting from precipitation of  fibrinogen in the serum of patients treated  with anticoagulants (e.g. hemodialysis  patients) may cause spuriously elevated  iron results. \"     07/06/2020 37 (L) 38 - 169 ug/dL Final     TIBC   Date Value Ref Range Status 09/15/2021 298 250 - 450 ug/dL Final   12/10/2020 304 250 - 450 ug/dL Final   07/06/2020 368 250 - 450 ug/dL Final     TRANSFERRIN SATURATION   Date Value Ref Range Status   09/15/2021 23 % Final   12/10/2020 23 % Final     Ferritin   Date Value Ref Range Status   09/15/2021 101 8 - 388 NG/ML Final   07/06/2020 37 30 - 400 ng/mL Final     Vitamin B-12   Date Value Ref Range Status   07/06/2020 1499 (H) 232 - 1245 pg/mL Final   09/26/2019 191 (L) 193 - 986 pg/mL Final     Folate   Date Value Ref Range Status   07/06/2020 13.3 >3.0 ng/mL Final     Comment:     A serum folate concentration of less than 3.1 ng/mL is  considered to represent clinical deficiency. 09/26/2019 15.1 3.1 - 17.5 ng/mL Final        The patient is a 80 y.o. male who is seen for follow-up of hypertension. He is not exercising and is adherent to low salt diet. Daily caffiene intake: a known amount (3-4 servings/day). Current medication regimen consists of: Avapro 300 mg daily + Hydralazine 50 mg bid + Carvedilol 12.5 mg bid + Spironolactone 25 mg daily + Lasix 40 mg bid +/- 3rd dose based on volume of swelling. Blood pressure is well controlled at home, ranging 100-130's/50-70's. BP Readings from Last 3 Encounters:   07/19/22 110/70   04/28/22 106/60   04/07/22 137/63       Patient is here for follow-up of perennial allergic rhinitis w/ seasonal variation. The current state of this condition is no significant medication side effects noted and reasonably well controlled on Allegra 180 mg daily +/- Flonase 2 sprays/nostril once daily prn - taking as prescribed. Additional concerns addressed today include desire for a new podiatrist.  He doesn't feel that he is getting \"good care\" because he has to ask for his pedal pulses to be checked. He also mentions being dismissed from pain management practice that he was seeing due to missed appointments when he was sick which he claims to have notified them of.       Review of Systems Constitutional:  Positive for fatigue (intermittent). Unexpected weight change: gradual loss. Eyes:  Positive for visual disturbance. Respiratory:  Negative for shortness of breath. Cardiovascular:  Positive for leg swelling. Negative for chest pain and palpitations. Gastrointestinal:  Positive for diarrhea (with Integra Plus). Endocrine: Negative for polydipsia. Genitourinary:  Negative for frequency. Musculoskeletal:  Negative for myalgias. Allergic/Immunologic: Positive for environmental allergies (perennial with seasonal variation). Neurological:  Positive for dizziness and headaches (past 2 months). Negative for numbness (mainly R leg). /70 (Site: Left Upper Arm, Position: Sitting, Cuff Size: Large Adult)   Pulse 63   Temp 97.8 °F (36.6 °C) (Temporal)   Ht 5' 7\" (1.702 m)   Wt 280 lb (127 kg)   SpO2 96%   BMI 43.85 kg/m²       Objective   Physical Exam  Constitutional:       Appearance: Normal appearance. He is obese. HENT:      Head: Normocephalic and atraumatic. Eyes:      Conjunctiva/sclera: Conjunctivae normal.      Pupils: Pupils are equal, round, and reactive to light. Neck:      Vascular: No carotid bruit. Cardiovascular:      Rate and Rhythm: Normal rate and regular rhythm. Heart sounds: Normal heart sounds. Pulmonary:      Effort: Pulmonary effort is normal.      Breath sounds: Normal breath sounds. Musculoskeletal:      Cervical back: Normal range of motion. Right lower leg: Edema (chronic lymphedema) present. Left lower leg: Edema (chronic lymphedema) present. Comments: Ambulates with cane   Skin:     General: Skin is warm and dry. Neurological:      General: No focal deficit present. Mental Status: He is alert and oriented to person, place, and time. Psychiatric:         Mood and Affect: Mood normal.         Behavior: Behavior normal.         Thought Content:  Thought content normal.         Judgment: Judgment normal. On this date 7/19/2022 I have spent 50 minutes reviewing previous notes, test results and face to face with the patient discussing the diagnosis and importance of compliance with the treatment plan as well as documenting on the day of the visit. An electronic signature was used to authenticate this note.     --Roderick Jamison PA-C

## 2022-07-19 NOTE — PROGRESS NOTES
Gurdeep Fleming (:  1940) is a 80 y.o. male,Established patient, here for evaluation of the following chief complaint(s):  Diabetes, Hypertension, and Hyperlipidemia         ASSESSMENT/PLAN:  1. Extrinsic asthma, moderate persistent, uncomplicated  The following orders have not been finalized:  -     albuterol sulfate HFA (PROVENTIL;VENTOLIN;PROAIR) 108 (90 Base) MCG/ACT inhaler      No follow-ups on file. Subjective   SUBJECTIVE/OBJECTIVE:  Diabetes  Hypoglycemia symptoms include dizziness and headaches (past 2 months). Pertinent negatives for diabetes include no chest pain, no fatigue, no polydipsia and no polyuria. Hypertension  Associated symptoms include headaches (past 2 months). Pertinent negatives include no chest pain, palpitations or shortness of breath. Hyperlipidemia  Pertinent negatives include no chest pain, myalgias or shortness of breath. Review of Systems   Constitutional:  Negative for fatigue and unexpected weight change. Respiratory:  Negative for shortness of breath. Cardiovascular:  Positive for leg swelling (bilateral). Negative for chest pain and palpitations. Endocrine: Negative for polydipsia and polyuria. Musculoskeletal:  Negative for myalgias. Neurological:  Positive for dizziness and headaches (past 2 months). Objective   Physical Exam       On this date 2022 I have spent *** minutes reviewing previous notes, test results and face to face with the patient discussing the diagnosis and importance of compliance with the treatment plan as well as documenting on the day of the visit. An electronic signature was used to authenticate this note.     --LOCO ROE MA

## 2022-07-19 NOTE — Clinical Note
Looking for a podiatrist near our office. He's been seeing Dr Millicent Alpers but doesn't feel that he's getting \"good care\". He is very particular about how thorough someone's physical exam is so know anyone who would be extra thorough in our area over here?  : )  He does not want to drive to [facility] for Dr. Rakan Carias.

## 2022-07-20 RX ORDER — BLOOD SUGAR DIAGNOSTIC
STRIP MISCELLANEOUS
Qty: 100 EACH | Refills: 3 | Status: SHIPPED | OUTPATIENT
Start: 2022-07-20 | End: 2022-08-04 | Stop reason: SDUPTHER

## 2022-07-22 ENCOUNTER — TELEPHONE (OUTPATIENT)
Dept: INTERNAL MEDICINE CLINIC | Facility: CLINIC | Age: 82
End: 2022-07-22

## 2022-07-22 NOTE — TELEPHONE ENCOUNTER
----- Message from Jeff Valencia PA-C sent at 7/21/2022  3:16 PM EDT -----  Please let patient know what Hope shared below. There is only 1 other podiatrist in close proximity to our office so we have sent a referral to them.  ----- Message -----  From: Isidra Johnson MA  Sent: 7/21/2022  10:17 AM EDT  To: Uriel Pedroza the other one in that area is Dr Venecia Claros. I sent it there, hopefully that will work out. :(   ----- Message -----  From: Jeff Valencia PA-C  Sent: 7/20/2022  11:23 PM EDT  To: Isidra Johnson MA    Looking for a podiatrist near our office. He's been seeing Dr Shirley Montesinos but doesn't feel that he's getting \"good care\". He is very particular about how thorough someone's physical exam is so know anyone who would be extra thorough in our area over here?  : )  He does not want to drive to [facility] for Dr. Jazmín Castillo.

## 2022-07-28 DIAGNOSIS — Z79.4 TYPE 2 DIABETES MELLITUS WITH OTHER CIRCULATORY COMPLICATION, WITH LONG-TERM CURRENT USE OF INSULIN (HCC): Primary | ICD-10-CM

## 2022-07-28 DIAGNOSIS — E11.59 TYPE 2 DIABETES MELLITUS WITH OTHER CIRCULATORY COMPLICATION, WITH LONG-TERM CURRENT USE OF INSULIN (HCC): Primary | ICD-10-CM

## 2022-07-28 NOTE — TELEPHONE ENCOUNTER
Patient requested refill of: insulin pens, tresiba, novolog    Please send to: [facility]    [phone]

## 2022-07-29 NOTE — TELEPHONE ENCOUNTER
Spoke with the pharmacy who states that the received a RX for syringes, but they need a RX for his insulin needles. They also did not receive the Novolog refill sent on 4/5/22, so a new RX is needed for that as well. The confirmed that pt turned in printed RX for Tresiba from April and they will have it ready to be filled on 8/5/22.

## 2022-08-01 DIAGNOSIS — E78.5 HYPERLIPIDEMIA LDL GOAL <70: ICD-10-CM

## 2022-08-01 DIAGNOSIS — E11.59 TYPE 2 DIABETES MELLITUS WITH OTHER CIRCULATORY COMPLICATION, WITH LONG-TERM CURRENT USE OF INSULIN (HCC): ICD-10-CM

## 2022-08-01 DIAGNOSIS — D50.9 IRON DEFICIENCY ANEMIA, UNSPECIFIED IRON DEFICIENCY ANEMIA TYPE: ICD-10-CM

## 2022-08-01 DIAGNOSIS — Z79.4 TYPE 2 DIABETES MELLITUS WITH OTHER CIRCULATORY COMPLICATION, WITH LONG-TERM CURRENT USE OF INSULIN (HCC): ICD-10-CM

## 2022-08-01 DIAGNOSIS — I10 ESSENTIAL HYPERTENSION WITH GOAL BLOOD PRESSURE LESS THAN 130/85: ICD-10-CM

## 2022-08-01 LAB
ALBUMIN SERPL-MCNC: 3.9 G/DL (ref 3.2–4.6)
ALBUMIN/GLOB SERPL: 1 {RATIO} (ref 1.2–3.5)
ALP SERPL-CCNC: 98 U/L (ref 50–136)
ALT SERPL-CCNC: 23 U/L (ref 12–65)
ANION GAP SERPL CALC-SCNC: 4 MMOL/L (ref 7–16)
AST SERPL-CCNC: 21 U/L (ref 15–37)
BASOPHILS # BLD: 0 K/UL (ref 0–0.2)
BASOPHILS NFR BLD: 0 % (ref 0–2)
BILIRUB SERPL-MCNC: 0.3 MG/DL (ref 0.2–1.1)
BUN SERPL-MCNC: 46 MG/DL (ref 8–23)
CALCIUM SERPL-MCNC: 9.3 MG/DL (ref 8.3–10.4)
CHLORIDE SERPL-SCNC: 109 MMOL/L (ref 98–107)
CHOLEST SERPL-MCNC: 103 MG/DL
CO2 SERPL-SCNC: 28 MMOL/L (ref 21–32)
CREAT SERPL-MCNC: 1.9 MG/DL (ref 0.8–1.5)
CREAT UR-MCNC: 16 MG/DL
DIFFERENTIAL METHOD BLD: ABNORMAL
EOSINOPHIL # BLD: 0.2 K/UL (ref 0–0.8)
EOSINOPHIL NFR BLD: 3 % (ref 0.5–7.8)
ERYTHROCYTE [DISTWIDTH] IN BLOOD BY AUTOMATED COUNT: 15 % (ref 11.9–14.6)
GLOBULIN SER CALC-MCNC: 4.1 G/DL (ref 2.3–3.5)
GLUCOSE SERPL-MCNC: 85 MG/DL (ref 65–100)
HCT VFR BLD AUTO: 36.1 % (ref 41.1–50.3)
HDLC SERPL-MCNC: 48 MG/DL (ref 40–60)
HDLC SERPL: 2.1 {RATIO}
HGB BLD-MCNC: 11.1 G/DL (ref 13.6–17.2)
IMM GRANULOCYTES # BLD AUTO: 0 K/UL (ref 0–0.5)
IMM GRANULOCYTES NFR BLD AUTO: 0 % (ref 0–5)
LDLC SERPL CALC-MCNC: 40 MG/DL
LYMPHOCYTES # BLD: 2.3 K/UL (ref 0.5–4.6)
LYMPHOCYTES NFR BLD: 43 % (ref 13–44)
MCH RBC QN AUTO: 29.6 PG (ref 26.1–32.9)
MCHC RBC AUTO-ENTMCNC: 30.7 G/DL (ref 31.4–35)
MCV RBC AUTO: 96.3 FL (ref 79.6–97.8)
MICROALBUMIN UR-MCNC: <0.5 MG/DL
MICROALBUMIN/CREAT UR-RTO: NORMAL MG/G
MONOCYTES # BLD: 0.5 K/UL (ref 0.1–1.3)
MONOCYTES NFR BLD: 9 % (ref 4–12)
NEUTS SEG # BLD: 2.4 K/UL (ref 1.7–8.2)
NEUTS SEG NFR BLD: 45 % (ref 43–78)
NRBC # BLD: 0 K/UL (ref 0–0.2)
PLATELET # BLD AUTO: 153 K/UL (ref 150–450)
PMV BLD AUTO: 11.4 FL (ref 9.4–12.3)
POTASSIUM SERPL-SCNC: 4.5 MMOL/L (ref 3.5–5.1)
PROT SERPL-MCNC: 8 G/DL (ref 6.3–8.2)
RBC # BLD AUTO: 3.75 M/UL (ref 4.23–5.6)
SODIUM SERPL-SCNC: 141 MMOL/L (ref 138–145)
TRIGL SERPL-MCNC: 75 MG/DL (ref 35–150)
VLDLC SERPL CALC-MCNC: 15 MG/DL (ref 6–23)
WBC # BLD AUTO: 5.4 K/UL (ref 4.3–11.1)

## 2022-08-04 RX ORDER — BLOOD SUGAR DIAGNOSTIC
STRIP MISCELLANEOUS
Qty: 100 EACH | Refills: 3 | Status: SHIPPED | OUTPATIENT
Start: 2022-08-04

## 2022-08-04 NOTE — RESULT ENCOUNTER NOTE
Cholesterol levels are well maintained on current regimen. Fasting blood sugar is good. Kidney function is lower than usual which is not good but is likely due to not drinking enough water and being on all the diuretics he's taking - NEEDS to be much more purposeful with hydrating on a consistent basis. Liver function is normal.  Anemia is stable but not improving because he wasn't taking Integra Plus regularly like prescribed - we've agreed that he would take it every other day so please ensure he is following through on this. Will send him a new order for blood work to be done prior to next office visit.

## 2022-08-15 ENCOUNTER — TELEPHONE (OUTPATIENT)
Dept: INTERNAL MEDICINE CLINIC | Facility: CLINIC | Age: 82
End: 2022-08-15

## 2022-08-15 DIAGNOSIS — E11.59 TYPE 2 DIABETES MELLITUS WITH OTHER CIRCULATORY COMPLICATION, WITH LONG-TERM CURRENT USE OF INSULIN (HCC): Primary | ICD-10-CM

## 2022-08-15 DIAGNOSIS — Z79.4 TYPE 2 DIABETES MELLITUS WITH OTHER CIRCULATORY COMPLICATION, WITH LONG-TERM CURRENT USE OF INSULIN (HCC): Primary | ICD-10-CM

## 2022-08-15 RX ORDER — LANCETS 33 GAUGE
EACH MISCELLANEOUS
Qty: 300 EACH | Refills: 0 | OUTPATIENT
Start: 2022-08-15

## 2022-08-15 RX ORDER — SYRING-NEEDL,DISP,INSUL,0.3 ML 31GX15/64"
SYRINGE, EMPTY DISPOSABLE MISCELLANEOUS
Qty: 90 EACH | Refills: 0 | OUTPATIENT
Start: 2022-08-15

## 2022-08-16 RX ORDER — LANCETS
1 EACH MISCELLANEOUS 3 TIMES DAILY
Qty: 100 EACH | Refills: 3 | Status: SHIPPED | OUTPATIENT
Start: 2022-08-16 | End: 2022-08-18 | Stop reason: SDUPTHER

## 2022-08-16 NOTE — TELEPHONE ENCOUNTER
Orders Placed This Encounter    ONE TOUCH ULTRASOFT LANCETS MISC     Si each by Does not apply route 3 times daily     Dispense:  100 each     Refill:  3         Bassam Renae Ala, MD

## 2022-08-17 ENCOUNTER — OFFICE VISIT (OUTPATIENT)
Dept: UROLOGY | Age: 82
End: 2022-08-17
Payer: MEDICARE

## 2022-08-17 DIAGNOSIS — R35.0 BENIGN PROSTATIC HYPERPLASIA WITH URINARY FREQUENCY: Primary | ICD-10-CM

## 2022-08-17 DIAGNOSIS — B37.9 YEAST INFECTION: ICD-10-CM

## 2022-08-17 DIAGNOSIS — N40.1 BENIGN PROSTATIC HYPERPLASIA WITH URINARY FREQUENCY: Primary | ICD-10-CM

## 2022-08-17 DIAGNOSIS — R10.31 RIGHT LOWER QUADRANT ABDOMINAL PAIN: ICD-10-CM

## 2022-08-17 LAB
BILIRUBIN, URINE, POC: NEGATIVE
BLOOD URINE, POC: NEGATIVE
GLUCOSE URINE, POC: NEGATIVE
KETONES, URINE, POC: NEGATIVE
LEUKOCYTE ESTERASE, URINE, POC: NEGATIVE
NITRITE, URINE, POC: NEGATIVE
PH, URINE, POC: 5.5 (ref 4.6–8)
PROTEIN,URINE, POC: NEGATIVE
PVR, POC: 230 CC
SPECIFIC GRAVITY, URINE, POC: 1 (ref 1–1.03)
URINALYSIS CLARITY, POC: NORMAL
URINALYSIS COLOR, POC: NORMAL
UROBILINOGEN, POC: NORMAL

## 2022-08-17 PROCEDURE — G8427 DOCREV CUR MEDS BY ELIG CLIN: HCPCS | Performed by: UROLOGY

## 2022-08-17 PROCEDURE — 1036F TOBACCO NON-USER: CPT | Performed by: UROLOGY

## 2022-08-17 PROCEDURE — 81003 URINALYSIS AUTO W/O SCOPE: CPT | Performed by: UROLOGY

## 2022-08-17 PROCEDURE — 99214 OFFICE O/P EST MOD 30 MIN: CPT | Performed by: UROLOGY

## 2022-08-17 PROCEDURE — G8417 CALC BMI ABV UP PARAM F/U: HCPCS | Performed by: UROLOGY

## 2022-08-17 PROCEDURE — 51798 US URINE CAPACITY MEASURE: CPT | Performed by: UROLOGY

## 2022-08-17 PROCEDURE — 1123F ACP DISCUSS/DSCN MKR DOCD: CPT | Performed by: UROLOGY

## 2022-08-17 RX ORDER — TAMSULOSIN HYDROCHLORIDE 0.4 MG/1
0.4 CAPSULE ORAL DAILY
Qty: 90 CAPSULE | Refills: 3 | Status: SHIPPED | OUTPATIENT
Start: 2022-08-17

## 2022-08-17 RX ORDER — CLOTRIMAZOLE 1 %
CREAM (GRAM) TOPICAL
Qty: 28 G | Refills: 1 | Status: SHIPPED | OUTPATIENT
Start: 2022-08-17 | End: 2022-08-24

## 2022-08-17 ASSESSMENT — ENCOUNTER SYMPTOMS
VOMITING: 0
COUGH: 0
CONSTIPATION: 0
HEARTBURN: 0
SKIN LESIONS: 0
BACK PAIN: 0
WHEEZING: 0
SHORTNESS OF BREATH: 0
NAUSEA: 0
EYE DISCHARGE: 0
ABDOMINAL PAIN: 0
BLOOD IN STOOL: 0
INDIGESTION: 0
EYE PAIN: 0
DIARRHEA: 0

## 2022-08-17 NOTE — PROGRESS NOTES
[facility]  [address]    Bambi Haver [address], [address] [facility]  [phone]    Dustin Adrian  : 1940    Chief Complaint   Patient presents with    Follow-up          HPI     Dustin Adrian is a [de-identified] y.o. AA male with a PMH of poorly controlled DM Type 2 on insulin who presents for follow up due to incomplete bladder emptying and to BPH. He reports a history of recurrent UTIs. He thinks that he empties his bladder completely. He started flomax 2019 and states this has been working well. Needs refills today. No history of  surgery. No hematuria. No bothersome urgency/frequency/incontinence/hematuria or other urinary concerns at this time as long as he takes flomax. Has not had recent PHIL. PSA 1.6 (2022). No UTI recently. No history of stones. Of note, he does report unexplained RLQ abdominal pain today. He has not had this evaluated. Wants to discuss today. Also reports yeast infection in groin area.  cc today.     Lab Results   Component Value Date    PSA 1.6 2022    PSA 1.6 12/10/2020       Past Medical History:   Diagnosis Date    Anemia     Iron + Vitamin B12 Deficiencies    Anxiety     managed well with meds    BPH with obstruction/lower urinary tract symptoms     Cervical stenosis of spine     Chronic kidney disease     Stage 3    Claustrophobia     Degenerative disc disease, lumbar     debilitating arthritis    Diabetic retinopathy of both eyes without macular edema associated with type 2 diabetes mellitus (HCC)     R - Moderate, L - Mild    Diastolic congestive heart failure ([address]     Diverticulosis of colon 2010    DVT (deep venous thrombosis) (HCC)     RLE    Extrinsic allergic asthma     GERD (gastroesophageal reflux disease)     managed well with meds    History of TIA (transient ischemic attack) 2017    slight weakness to LLE    Hx of colonic polyps 2010    Hyperplastic     Hyperlipidemia LDL goal < 70     Hypertension Lymphedema     Obstructive sleep apnea     Non-Compliant with CPAP    Perennial allergic rhinitis with seasonal variation     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 ([facility]     Presence of IVC filter     Pulmonary embolism [facility]) Mar 2014    Bilateral - Completed 6 months on Xarelto    Stroke [facility]) 2018, 2019,2010    left sided weakness     Past Surgical History:   Procedure Laterality Date    APPENDECTOMY  12/06/2019    BUNIONECTOMY Bilateral     CIRCUMCISION      COLONOSCOPY N/A 10/24/2019    COLONOSCOPY/ 42/ 622 performed by Marisol Stoner MD at [facility] ENDOSCOPY    COLONOSCOPY  07/29/2010    Diverticulosis & Colon/Rectal Polyps - Due 2020    HERNIA REPAIR Bilateral     Inguinal, Repeat on Both Sides Separately    IR IVC FILTER PLACEMENT W IMAGING  10/22/2019    IR IVC FILTER PLACEMENT W IMAGING  10/22/2019    IR IVC FILTER PLACEMENT W IMAGING 10/22/2019 [facility]CI[facility]    IR IVC FILTER RETRIEVAL  2/27/2020    IR IVC FILTER RETRIEVAL  2/27/2020    IR IVC FILTER RETRIEVAL 2/27/2020 [facility] SPECIALS    KNEE ARTHROSCOPY Right 1991    x 3    SINUS SURGERY PROC UNLISTED  1970's    TOTAL COLECTOMY Right 12/06/2019    TOTAL KNEE ARTHROPLASTY Right 2006    UPPER GASTROINTESTINAL ENDOSCOPY  06/29/2021    Normal Findings, Dilation Performed    UPPER GASTROINTESTINAL ENDOSCOPY  5/9/2011          Current Outpatient Medications   Medication Sig Dispense Refill    tamsulosin (FLOMAX) 0.4 MG capsule Take 1 capsule by mouth daily Take 1 capsule by mouth once daily 90 capsule 3    clotrimazole (LOTRIMIN AF) 1 % cream Apply topically 2 times daily.  28 g 1    ONE TOUCH ULTRASOFT LANCETS MISC 1 each by Does not apply route 3 times daily 100 each 3    insulin aspart (NOVOLOG) 100 UNIT/ML injection pen Inject 6 units standing dose + additional insulin according to sliding scale (up to 14 units) subQ before each meal 38 mL 3    Insulin Syringe-Needle U-100 31G X 5/16\" 0.3 ML MISC Use 1 syringe to inject insulin once daily 100 each 3    fexofenadine (ALLEGRA) 180 MG tablet Take 1 tablet by mouth in the morning. 90 tablet 3    [id number] Cmp-C-Biot (INTEGRA PLUS) CAPS Take 1 capsule by mouth daily 30 capsule 5    albuterol sulfate HFA (PROVENTIL;VENTOLIN;PROAIR) 108 (90 Base) MCG/ACT inhaler Inhale 2 puffs into the lungs every 4 hours as needed for Wheezing 18 g 5    acetaminophen (TYLENOL) 500 MG tablet Take 1,000 mg by mouth every 6 hours as needed      atorvastatin (LIPITOR) 80 MG tablet Take 80 mg by mouth      busPIRone (BUSPAR) 15 MG tablet Take 15 mg by mouth daily      carvedilol (COREG) 12.5 MG tablet Take 12.5 mg by mouth 2 times daily      Cyanocobalamin ER 2000 MCG TBCR Take by mouth once daily      ezetimibe (ZETIA) 10 MG tablet Take 10 mg by mouth      fluticasone (FLONASE) 50 MCG/ACT nasal spray 2 sprays by Nasal route daily      furosemide (LASIX) 40 MG tablet Take 40 mg by mouth 2 times daily      hydrALAZINE (APRESOLINE) 50 MG tablet Take 50 mg by mouth 2 times daily      HYDROcodone-acetaminophen (NORCO)  MG per tablet Take 1 tablet by mouth every 8 hours as needed. Insulin Degludec (TRESIBA FLEXTOUCH) 200 UNIT/ML SOPN Inject 85 units subQ every morning. irbesartan (AVAPRO) 300 MG tablet Take 300 mg by mouth      metFORMIN (GLUCOPHAGE) 1000 MG tablet Take 1 tablet by mouth twice daily with meals      naloxone 4 MG/0.1ML LIQD nasal spray 1 spray by Nasal route once as needed      nitroGLYCERIN (NITROSTAT) 0.4 MG SL tablet Place 0.4 mg under the tongue      nystatin (MYCOSTATIN) 270221 UNIT/GM powder Apply topically 4 times daily      pantoprazole (PROTONIX) 40 MG tablet Take 40 mg by mouth every morning (before breakfast)      polyethylene glycol (GLYCOLAX) 17 GM/SCOOP powder Take 17 g by mouth daily      potassium chloride 20 MEQ/15ML (10%) oral solution TAKE 15 ML BY MOUTH TWICE DAILY      pregabalin (LYRICA) 100 MG capsule Take 100 mg by mouth 3 times daily. spironolactone (ALDACTONE) 25 MG tablet Take 25 mg by mouth daily       No current facility-administered medications for this visit. Allergies   Allergen Reactions    Enalapril Maleate Other (See Comments) and Shortness Of Breath     Social History     Socioeconomic History    Marital status:      Spouse name: Not on file    Number of children: Not on file    Years of education: Not on file    Highest education level: Not on file   Occupational History    Not on file   Tobacco Use    Smoking status: Never    Smokeless tobacco: Never   Vaping Use    Vaping Use: Never used   Substance and Sexual Activity    Alcohol use: No    Drug use: No    Sexual activity: Not on file   Other Topics Concern    Not on file   Social History Narrative    Not on file     Social Determinants of Health     Financial Resource Strain: Not on file   Food Insecurity: Not on file   Transportation Needs: Not on file   Physical Activity: Not on file   Stress: Not on file   Social Connections: Not on file   Intimate Partner Violence: Not on file   Housing Stability: Not on file     Family History   Problem Relation Age of Onset    Cancer Paternal Aunt     Stroke Father     Glaucoma Maternal Grandmother     Diabetes Maternal Grandmother     Heart Disease Mother     Diabetes Mother     Stroke Mother     Prostate Cancer Brother     Hypertension Sister     Hypertension Brother     Prostate Cancer Brother     Cancer Sister     Heart Attack Sister 76    Cancer Paternal Uncle         Colon    Diabetes Paternal Aunt     Diabetes Father     Diabetes Paternal Uncle        Review of Systems  Constitutional:   Negative for fever, chills, appetite change, malaise/fatigue, headaches and weight loss. Skin:  Negative for skin lesions, rash and itching. Eyes:  Negative for visual disturbance, eye pain and eye discharge. ENT:  Negative for difficulty articulating words, pain swallowing, high frequency hearing loss and dry mouth.   Respiratory: Negative for cough, blood in sputum, shortness of breath and wheezing. Cardiovascular:  Negative for chest pain, hypertension, irregular heartbeat, leg pain, leg swelling, regular rate and rhythm and varicose veins. GI:  Negative for nausea, vomiting, abdominal pain, blood in stool, constipation, diarrhea, indigestion and heartburn. Genitourinary:  Negative for urinary burning, hematuria, flank pain, recurrent UTIs, history of urolithiasis, nocturia, slower stream, straining, urgency, leakage w/ urge, frequent urination, incomplete emptying, erectile dysfunction, testicular pain, sexually transmitted disease, discharge and urethral stricture. Musculoskeletal:  Negative for back pain, bone pain, arthralgias, tenderness, muscle weakness and neck pain. Neurological:  Negative for dizziness, focal weakness, numbness, seizures and tremors. Psychological:  Negative for depression and psychiatric problem. Endocrine:  Negative for cold intolerance, thirst, excessive urination, fatigue and heat intolerance. Hem/Lymphatic:  Negative for easy bleeding, easy bruising and frequent infections.     Urinalysis  UA - Dipstick  Results for orders placed or performed in visit on 08/17/22   AMB POC URINALYSIS DIP STICK AUTO W/O MICRO   Result Value Ref Range    Color (UA POC)      Clarity (UA POC)      Glucose, Urine, POC Negative Negative    Bilirubin, Urine, POC Negative Negative    KETONES, Urine, POC Negative Negative    Specific Gravity, Urine, POC 1.005 1.001 - 1.035    Blood (UA POC) Negative Negative    pH, Urine, POC 5.5 4.6 - 8.0    Protein, Urine, POC Negative Negative    Urobilinogen, POC 0.2 mg/dL     Nitrite, Urine, POC Negative Negative    Leukocyte Esterase, Urine, POC Negative Negative   Results for orders placed or performed in visit on 10/26/20   AMB POC URINALYSIS DIP STICK AUTO W/O MICRO   Result Value Ref Range    Color (UA POC) Yellow     Clarity (UA POC) Clear     Glucose, Urine, POC Negative Negative Bilirubin, Urine, POC Negative Negative    Ketones, Urine, POC Negative Negative    Specific Gravity, Urine, POC 1.025 1.001 - 1.035 NA    Blood (UA POC) Negative Negative    pH, Urine, POC 5.5 4.6 - 8.0 NA    Protein, Urine, POC Negative Negative    Urobilinogen, POC 0.2 mg/dL 0.2 - 1    Nitrite, Urine, POC Negative Negative    Leukocyte Esterase, Urine, POC Negative Negative       There were no vitals taken for this visit. GENERAL: No acute distress, Awake, Alert, Oriented X 3, Gait normal  CARDIAC: regular rate and rhythm  CHEST AND LUNG: Easy work of breathing, clear to auscultation bilaterally, no cyanosis  ABDOMEN: soft, non tender, non-distended, positive bowel sounds, no organomegaly, no palpable masses, no guarding, no rebound tenderness  PHIL: 40 gram, symmetric, smooth without nodules. SKIN: No rash, no erythema, no lacerations or abrasions, no ecchymosis  NEUROLOGIC: cranial nerves 2-12 grossly intact           Assessment and Plan    ICD-10-CM    1. Benign prostatic hyperplasia with urinary frequency  N40.1 AMB POC URINALYSIS DIP STICK AUTO W/O MICRO    R35.0 AMB POC PVR, FADI,POST-VOID RES,US,NON-IMAGING     tamsulosin (FLOMAX) 0.4 MG capsule      2. Yeast infection  B37.9 clotrimazole (LOTRIMIN AF) 1 % cream      3. Right lower quadrant abdominal pain  R10.31 CT ABDOMEN PELVIS WO CONTRAST Additional Contrast? None        BPH/LUTS:   Continue flomax. Working well for him. Refills sent. Also discussed double voiding to try to improve emptying as  cc today. Does not want to start additional meds at this time. Yeast Infection:   Fluconazole + clotrimazole cream given today. Discussed hygeine. Abdominal Pain:   NO obvious etiology on P/E. Will get CT A/P to further evaluate and call with results.       Follow up 1 year with me or sooner if needed    I have spent 30 minutes today reviewing previous notes, test results and face to face with the patient as well as

## 2022-08-18 RX ORDER — LANCETS
EACH MISCELLANEOUS
Qty: 300 EACH | Refills: 3 | Status: SHIPPED | OUTPATIENT
Start: 2022-08-18

## 2022-08-18 NOTE — TELEPHONE ENCOUNTER
It appears that the wrong quantity was sent in - he tests 3 times/day and only #100 wee sent in. I have corrected. Please have pharmacy cancel the original script that came over from Dr. Prudence Cunningham on 8/16/22 for #100.

## 2022-08-30 ENCOUNTER — HOSPITAL ENCOUNTER (OUTPATIENT)
Dept: CT IMAGING | Age: 82
Discharge: HOME | End: 2022-09-02
Payer: MEDICARE

## 2022-08-30 ENCOUNTER — TELEPHONE (OUTPATIENT)
Dept: UROLOGY | Age: 82
End: 2022-08-30

## 2022-08-30 DIAGNOSIS — R10.31 RIGHT LOWER QUADRANT ABDOMINAL PAIN: ICD-10-CM

## 2022-08-30 PROCEDURE — 74176 CT ABD & PELVIS W/O CONTRAST: CPT

## 2022-08-30 NOTE — TELEPHONE ENCOUNTER
LVM about info below. ac    ----- Message from Destinee Gonzalez MD sent at 8/30/2022 12:07 PM EDT -----  Ana M Singletary,    Please call Mr. Peter Costa to let him know that his CT scan shows no cause for his groin / abdominal pain. In these cases, it likely is musculoskeletal and I would recommend he discuss further with his PCP. He should keep follow up with me as scheduled in 1 year    Thanks!   Cindi

## 2022-09-02 RX ORDER — POTASSIUM CHLORIDE 20MEQ/15ML
LIQUID (ML) ORAL
Qty: 900 ML | Refills: 0 | OUTPATIENT
Start: 2022-09-02

## 2022-09-02 RX ORDER — BLOOD SUGAR DIAGNOSTIC
STRIP MISCELLANEOUS
Qty: 250 EACH | Refills: 0 | OUTPATIENT
Start: 2022-09-02

## 2022-09-21 RX ORDER — ATORVASTATIN CALCIUM 80 MG/1
TABLET, FILM COATED ORAL
Qty: 30 TABLET | Refills: 0 | OUTPATIENT
Start: 2022-09-21

## 2022-09-28 RX ORDER — ATORVASTATIN CALCIUM 80 MG/1
TABLET, FILM COATED ORAL
Qty: 30 TABLET | Refills: 0 | OUTPATIENT
Start: 2022-09-28

## 2022-09-28 RX ORDER — ATORVASTATIN CALCIUM 80 MG/1
TABLET, FILM COATED ORAL
Qty: 90 TABLET | Refills: 0 | OUTPATIENT
Start: 2022-09-28

## 2022-10-19 RX ORDER — ATORVASTATIN CALCIUM 80 MG/1
TABLET, FILM COATED ORAL
Qty: 90 TABLET | Refills: 0 | OUTPATIENT
Start: 2022-10-19

## 2022-10-26 ENCOUNTER — OFFICE VISIT (OUTPATIENT)
Dept: CARDIOLOGY CLINIC | Age: 82
End: 2022-10-26
Payer: MEDICARE

## 2022-10-26 VITALS
HEART RATE: 76 BPM | WEIGHT: 272 LBS | DIASTOLIC BLOOD PRESSURE: 60 MMHG | BODY MASS INDEX: 42.6 KG/M2 | SYSTOLIC BLOOD PRESSURE: 136 MMHG

## 2022-10-26 DIAGNOSIS — I10 ESSENTIAL HYPERTENSION WITH GOAL BLOOD PRESSURE LESS THAN 130/85: ICD-10-CM

## 2022-10-26 DIAGNOSIS — R07.89 OTHER CHEST PAIN: ICD-10-CM

## 2022-10-26 DIAGNOSIS — E78.2 MIXED HYPERLIPIDEMIA: ICD-10-CM

## 2022-10-26 DIAGNOSIS — I25.119 ATHEROSCLEROSIS OF NATIVE CORONARY ARTERY OF NATIVE HEART WITH ANGINA PECTORIS (HCC): Primary | ICD-10-CM

## 2022-10-26 PROCEDURE — 3078F DIAST BP <80 MM HG: CPT | Performed by: INTERNAL MEDICINE

## 2022-10-26 PROCEDURE — G8427 DOCREV CUR MEDS BY ELIG CLIN: HCPCS | Performed by: INTERNAL MEDICINE

## 2022-10-26 PROCEDURE — 3074F SYST BP LT 130 MM HG: CPT | Performed by: INTERNAL MEDICINE

## 2022-10-26 PROCEDURE — 1123F ACP DISCUSS/DSCN MKR DOCD: CPT | Performed by: INTERNAL MEDICINE

## 2022-10-26 PROCEDURE — 93000 ELECTROCARDIOGRAM COMPLETE: CPT | Performed by: INTERNAL MEDICINE

## 2022-10-26 PROCEDURE — G8484 FLU IMMUNIZE NO ADMIN: HCPCS | Performed by: INTERNAL MEDICINE

## 2022-10-26 PROCEDURE — 99214 OFFICE O/P EST MOD 30 MIN: CPT | Performed by: INTERNAL MEDICINE

## 2022-10-26 PROCEDURE — 1036F TOBACCO NON-USER: CPT | Performed by: INTERNAL MEDICINE

## 2022-10-26 PROCEDURE — G8417 CALC BMI ABV UP PARAM F/U: HCPCS | Performed by: INTERNAL MEDICINE

## 2022-10-26 RX ORDER — SPIRONOLACTONE 25 MG/1
25 TABLET ORAL DAILY
Qty: 90 TABLET | Refills: 3 | Status: SHIPPED | OUTPATIENT
Start: 2022-10-26

## 2022-10-26 RX ORDER — ATORVASTATIN CALCIUM 80 MG/1
80 TABLET, FILM COATED ORAL DAILY
Qty: 90 TABLET | Refills: 3 | Status: SHIPPED | OUTPATIENT
Start: 2022-10-26

## 2022-10-26 RX ORDER — BLOOD SUGAR DIAGNOSTIC
STRIP MISCELLANEOUS
Qty: 250 EACH | Refills: 0 | OUTPATIENT
Start: 2022-10-26

## 2022-10-26 RX ORDER — FUROSEMIDE 40 MG/1
TABLET ORAL
Qty: 180 TABLET | Refills: 0 | OUTPATIENT
Start: 2022-10-26

## 2022-10-26 ASSESSMENT — ENCOUNTER SYMPTOMS
DIARRHEA: 0
HEMATOCHEZIA: 0
COUGH: 0
SHORTNESS OF BREATH: 0
VOMITING: 0
ABDOMINAL PAIN: 0
BACK PAIN: 1
COLOR CHANGE: 0
HEMOPTYSIS: 0
SPUTUM PRODUCTION: 0
ORTHOPNEA: 0
NAUSEA: 1
BOWEL INCONTINENCE: 0
HOARSE VOICE: 0
HEMATEMESIS: 0
WHEEZING: 0
BLURRED VISION: 0

## 2022-10-26 NOTE — PROGRESS NOTES
[facility]  [address], [address]  [place], [address] [facility]  PHONE: [phone]        10/26/22        NAME:  Heber Draper  : 1940  MRN: [id number]       SUBJECTIVE:   Heber Draper is a [de-identified] y.o. male seen for a follow up visit regarding the following: The patient has primary hypertension and peripheral edema. He complains of headaches,chronic pain,left arm numbness,chest pain,and indigestion/diarrhea. Chief Complaint   Patient presents with    Hypertension    Coronary Artery Disease       HPI:    Hypertension  This is a chronic problem. The problem is unchanged. The problem is controlled. Associated symptoms include chest pain, headaches, malaise/fatigue, neck pain and peripheral edema. Pertinent negatives include no anxiety, blurred vision, orthopnea, palpitations, PND, shortness of breath or sweats. Chest Pain   This is a chronic problem. The onset quality is gradual. The problem occurs every several days. The problem has been gradually improving (worse last month). Pain location: left lateral chest . The pain is mild. The quality of the pain is described as dull. Associated symptoms include back pain, headaches, lower extremity edema, malaise/fatigue, nausea and weakness. Pertinent negatives include no abdominal pain, claudication, cough, diaphoresis, dizziness, exertional chest pressure, fever, hemoptysis, irregular heartbeat, leg pain, near-syncope, numbness, orthopnea, palpitations, PND, shortness of breath, sputum production, syncope or vomiting. His past medical history is significant for muscle weakness. Past Medical History, Past Surgical History, Family history, Social History, and Medications were all reviewed with the patient today and updated as necessary.          Current Outpatient Medications:     atorvastatin (LIPITOR) 80 MG tablet, Take 1 tablet by mouth daily, Disp: 90 tablet, Rfl: 3    spironolactone (ALDACTONE) 25 MG tablet, Take 1 tablet by mouth daily, Disp: 90 tablet, Rfl: 3    tamsulosin (FLOMAX) 0.4 MG capsule, Take 1 capsule by mouth daily Take 1 capsule by mouth once daily, Disp: 90 capsule, Rfl: 3    insulin aspart (NOVOLOG) 100 UNIT/ML injection pen, Inject 6 units standing dose + additional insulin according to sliding scale (up to 14 units) subQ before each meal, Disp: 38 mL, Rfl: 3    fexofenadine (ALLEGRA) 180 MG tablet, Take 1 tablet by mouth in the morning., Disp: 90 tablet, Rfl: 3    albuterol sulfate HFA (PROVENTIL;VENTOLIN;PROAIR) 108 (90 Base) MCG/ACT inhaler, Inhale 2 puffs into the lungs every 4 hours as needed for Wheezing, Disp: 18 g, Rfl: 5    acetaminophen (TYLENOL) 500 MG tablet, Take 1,000 mg by mouth every 6 hours as needed, Disp: , Rfl:     busPIRone (BUSPAR) 15 MG tablet, Take 15 mg by mouth daily, Disp: , Rfl:     carvedilol (COREG) 12.5 MG tablet, Take 12.5 mg by mouth 2 times daily, Disp: , Rfl:     ezetimibe (ZETIA) 10 MG tablet, Take 10 mg by mouth, Disp: , Rfl:     fluticasone (FLONASE) 50 MCG/ACT nasal spray, 2 sprays by Nasal route daily, Disp: , Rfl:     furosemide (LASIX) 40 MG tablet, Take 40 mg by mouth 2 times daily, Disp: , Rfl:     hydrALAZINE (APRESOLINE) 50 MG tablet, Take 50 mg by mouth 2 times daily, Disp: , Rfl:     HYDROcodone-acetaminophen (NORCO)  MG per tablet, Take 1 tablet by mouth every 8 hours as needed. , Disp: , Rfl:     Insulin Degludec (TRESIBA FLEXTOUCH) 200 UNIT/ML SOPN, Inject 85 units subQ every morning., Disp: , Rfl:     irbesartan (AVAPRO) 300 MG tablet, Take 300 mg by mouth, Disp: , Rfl:     metFORMIN (GLUCOPHAGE) 1000 MG tablet, Take 1 tablet by mouth twice daily with meals, Disp: , Rfl:     pantoprazole (PROTONIX) 40 MG tablet, Take 40 mg by mouth every morning (before breakfast), Disp: , Rfl:     polyethylene glycol (GLYCOLAX) 17 GM/SCOOP powder, Take 17 g by mouth daily, Disp: , Rfl:     potassium chloride 20 MEQ/15ML (10%) oral solution, TAKE 15 ML BY MOUTH TWICE DAILY, Disp: , Rfl: ONE TOUCH ULTRASOFT LANCETS MISC, Use to check glucose 3 times/day. Dx: E11.59, I10, Disp: 300 each, Rfl: 3    Insulin Syringe-Needle U-100 31G X 5/16\" 0.3 ML MISC, Use 1 syringe to inject insulin once daily, Disp: 100 each, Rfl: 3    [id number]-[id number] Cmp-C-Biot (INTEGRA PLUS) CAPS, Take 1 capsule by mouth daily (Patient not taking: Reported on 10/26/2022), Disp: 30 capsule, Rfl: 5    naloxone 4 MG/0.1ML LIQD nasal spray, 1 spray by Nasal route once as needed, Disp: , Rfl:     nitroGLYCERIN (NITROSTAT) 0.4 MG SL tablet, Place 0.4 mg under the tongue, Disp: , Rfl:     nystatin (MYCOSTATIN) 594676 UNIT/GM powder, Apply topically 4 times daily, Disp: , Rfl:     pregabalin (LYRICA) 100 MG capsule, Take 100 mg by mouth 3 times daily.  (Patient not taking: Reported on 10/26/2022), Disp: , Rfl:   Allergies   Allergen Reactions    Enalapril Maleate Other (See Comments) and Shortness Of Breath     Past Medical History:   Diagnosis Date    Anemia     Iron + Vitamin B12 Deficiencies    Anxiety     managed well with meds    BPH with obstruction/lower urinary tract symptoms     Cervical stenosis of spine     Chronic kidney disease     Stage 3    Claustrophobia     Degenerative disc disease, lumbar     debilitating arthritis    Diabetic retinopathy of both eyes without macular edema associated with type 2 diabetes mellitus (HCC)     R - Moderate, L - Mild    Diastolic congestive heart failure (HCC)     Diverticulosis of colon June 2010    DVT (deep venous thrombosis) (HCC)     RLE    Extrinsic allergic asthma     GERD (gastroesophageal reflux disease)     managed well with meds    History of TIA (transient ischemic attack) 11/09/2017    slight weakness to LLE    Hx of colonic polyps 07/29/2010    Hyperplastic     Hyperlipidemia LDL goal < 70     Hypertension     Lymphedema     Obstructive sleep apnea     Non-Compliant with CPAP    Other chest pain 10/26/2022    Perennial allergic rhinitis with seasonal variation     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) Mar 2014    Bilateral - Completed 6 months on Xarelto    Stroke [facility]) 2018, 2019,2010    left sided weakness     Past Surgical History:   Procedure Laterality Date    APPENDECTOMY  12/06/2019    BUNIONECTOMY Bilateral     CIRCUMCISION      COLONOSCOPY N/A 10/24/2019    COLONOSCOPY/ 42/ 622 performed by Simon Lang MD at [facility] ENDOSCOPY    COLONOSCOPY  07/29/2010    Diverticulosis & Colon/Rectal Polyps - Due 2020    HERNIA REPAIR Bilateral     Inguinal, Repeat on Both Sides Separately    IR IVC FILTER PLACEMENT W IMAGING  10/22/2019    IR IVC FILTER PLACEMENT W IMAGING  10/22/2019    IR IVC FILTER PLACEMENT W IMAGING 10/22/2019 [facility]    IR IVC FILTER RETRIEVAL  2/27/2020    IR IVC FILTER RETRIEVAL  2/27/2020    IR IVC FILTER RETRIEVAL 2/27/2020 [facility] SPECI[facility]    KNEE ARTHROSCOPY Right 1991    x 3    SINUS SURGERY PROC UNLISTED  1970's    TOTAL COLECTOMY Right 12/06/2019    TOTAL KNEE ARTHROPLASTY Right 2006    UPPER GASTROINTESTINAL ENDOSCOPY  06/29/2021    Normal Findings, Dilation Performed    UPPER GASTROINTESTINAL ENDOSCOPY  5/9/2011          Family History   Problem Relation Age of Onset    Cancer Paternal Aunt     Stroke Father     Glaucoma Maternal Grandmother     Diabetes Maternal Grandmother     Heart Disease Mother     Diabetes Mother     Stroke Mother     Prostate Cancer Brother     Hypertension Sister     Hypertension Brother     Prostate Cancer Brother     Cancer Sister     Heart Attack Sister 76    Cancer Paternal Uncle         Colon    Diabetes Paternal Aunt     Diabetes Father     Diabetes Paternal Uncle       Social History     Tobacco Use    Smoking status: Never    Smokeless tobacco: Never   Substance Use Topics    Alcohol use: No       ROS:    Review of Systems   Constitutional: Positive for malaise/fatigue.  Negative for chills, decreased appetite, diaphoresis and

## 2022-10-31 ENCOUNTER — OFFICE VISIT (OUTPATIENT)
Dept: INTERNAL MEDICINE CLINIC | Facility: CLINIC | Age: 82
End: 2022-10-31
Payer: MEDICARE

## 2022-10-31 VITALS
WEIGHT: 274 LBS | SYSTOLIC BLOOD PRESSURE: 128 MMHG | OXYGEN SATURATION: 98 % | BODY MASS INDEX: 43 KG/M2 | HEIGHT: 67 IN | DIASTOLIC BLOOD PRESSURE: 72 MMHG | TEMPERATURE: 97.5 F | HEART RATE: 56 BPM

## 2022-10-31 DIAGNOSIS — E53.8 VITAMIN B12 DEFICIENCY: ICD-10-CM

## 2022-10-31 DIAGNOSIS — I10 ESSENTIAL HYPERTENSION WITH GOAL BLOOD PRESSURE LESS THAN 130/85: ICD-10-CM

## 2022-10-31 DIAGNOSIS — Z79.4 TYPE 2 DIABETES MELLITUS WITH OTHER CIRCULATORY COMPLICATION, WITH LONG-TERM CURRENT USE OF INSULIN (HCC): Primary | ICD-10-CM

## 2022-10-31 DIAGNOSIS — E11.42 DIABETIC PERIPHERAL NEUROPATHY ASSOCIATED WITH TYPE 2 DIABETES MELLITUS (HCC): ICD-10-CM

## 2022-10-31 DIAGNOSIS — R30.0 BURNING WITH URINATION: ICD-10-CM

## 2022-10-31 DIAGNOSIS — E66.01 MORBID OBESITY WITH BMI OF 40.0-44.9, ADULT (HCC): ICD-10-CM

## 2022-10-31 DIAGNOSIS — E78.5 HYPERLIPIDEMIA LDL GOAL <70: ICD-10-CM

## 2022-10-31 DIAGNOSIS — E11.59 TYPE 2 DIABETES MELLITUS WITH OTHER CIRCULATORY COMPLICATION, WITH LONG-TERM CURRENT USE OF INSULIN (HCC): Primary | ICD-10-CM

## 2022-10-31 DIAGNOSIS — D50.9 IRON DEFICIENCY ANEMIA, UNSPECIFIED IRON DEFICIENCY ANEMIA TYPE: ICD-10-CM

## 2022-10-31 LAB
BILIRUBIN, URINE, POC: NEGATIVE
BLOOD URINE, POC: NORMAL
GLUCOSE URINE, POC: NEGATIVE
HBA1C MFR BLD: 6.7 %
KETONES, URINE, POC: NEGATIVE
LEUKOCYTE ESTERASE, URINE, POC: NEGATIVE
NITRITE, URINE, POC: NORMAL
PH, URINE, POC: 5.5 (ref 4.6–8)
PROTEIN,URINE, POC: NEGATIVE
SPECIFIC GRAVITY, URINE, POC: 1.01 (ref 1–1.03)
URINALYSIS CLARITY, POC: CLEAR
URINALYSIS COLOR, POC: YELLOW
UROBILINOGEN, POC: 0.2

## 2022-10-31 PROCEDURE — 1036F TOBACCO NON-USER: CPT | Performed by: PHYSICIAN ASSISTANT

## 2022-10-31 PROCEDURE — 1123F ACP DISCUSS/DSCN MKR DOCD: CPT | Performed by: PHYSICIAN ASSISTANT

## 2022-10-31 PROCEDURE — 3052F HG A1C>EQUAL 8.0%<EQUAL 9.0%: CPT | Performed by: PHYSICIAN ASSISTANT

## 2022-10-31 PROCEDURE — 3074F SYST BP LT 130 MM HG: CPT | Performed by: PHYSICIAN ASSISTANT

## 2022-10-31 PROCEDURE — 83036 HEMOGLOBIN GLYCOSYLATED A1C: CPT | Performed by: PHYSICIAN ASSISTANT

## 2022-10-31 PROCEDURE — 99215 OFFICE O/P EST HI 40 MIN: CPT | Performed by: PHYSICIAN ASSISTANT

## 2022-10-31 PROCEDURE — G8417 CALC BMI ABV UP PARAM F/U: HCPCS | Performed by: PHYSICIAN ASSISTANT

## 2022-10-31 PROCEDURE — G8484 FLU IMMUNIZE NO ADMIN: HCPCS | Performed by: PHYSICIAN ASSISTANT

## 2022-10-31 PROCEDURE — 81003 URINALYSIS AUTO W/O SCOPE: CPT | Performed by: PHYSICIAN ASSISTANT

## 2022-10-31 PROCEDURE — 3078F DIAST BP <80 MM HG: CPT | Performed by: PHYSICIAN ASSISTANT

## 2022-10-31 PROCEDURE — G8427 DOCREV CUR MEDS BY ELIG CLIN: HCPCS | Performed by: PHYSICIAN ASSISTANT

## 2022-10-31 RX ORDER — PEN NEEDLE, DIABETIC 31 GX5/16"
1 NEEDLE, DISPOSABLE MISCELLANEOUS DAILY
Qty: 100 EACH | Refills: 3 | Status: SHIPPED | OUTPATIENT
Start: 2022-10-31

## 2022-10-31 RX ORDER — LORATADINE 10 MG/1
10 TABLET ORAL DAILY
COMMUNITY

## 2022-10-31 RX ORDER — CEPHALEXIN 500 MG/1
500 CAPSULE ORAL 3 TIMES DAILY
COMMUNITY

## 2022-10-31 RX ORDER — FERROUS SULFATE 325(65) MG
1 TABLET ORAL
COMMUNITY

## 2022-10-31 RX ORDER — PEN NEEDLE, DIABETIC 31 GX5/16"
NEEDLE, DISPOSABLE MISCELLANEOUS
COMMUNITY
Start: 2022-07-30 | End: 2022-10-31 | Stop reason: CLARIF

## 2022-10-31 ASSESSMENT — PATIENT HEALTH QUESTIONNAIRE - PHQ9
SUM OF ALL RESPONSES TO PHQ QUESTIONS 1-9: 0
2. FEELING DOWN, DEPRESSED OR HOPELESS: 0
SUM OF ALL RESPONSES TO PHQ9 QUESTIONS 1 & 2: 0
1. LITTLE INTEREST OR PLEASURE IN DOING THINGS: 0
SUM OF ALL RESPONSES TO PHQ QUESTIONS 1-9: 0

## 2022-10-31 ASSESSMENT — ENCOUNTER SYMPTOMS
BACK PAIN: 1
SHORTNESS OF BREATH: 1

## 2022-10-31 NOTE — RESULT ENCOUNTER NOTE
Results reviewed with patient during office visit today.   Hasn't been feeling well x several months so appetite has been less and eating lighter than usual.

## 2022-10-31 NOTE — PROGRESS NOTES
Heber Draper (:  1940) is a [de-identified] y.o. male,Established patient, here for evaluation of the following chief complaint(s):  Follow-up (Diabetes, Hyperlipidemia, Anemia, Hypertension)         ASSESSMENT/PLAN:  1. Type 2 diabetes mellitus with other circulatory complication, with long-term current use of insulin (HCC)  -     AMB POC HEMOGLOBIN A1C  -     Insulin Pen Needle (B-D ULTRAFINE III SHORT PEN) 31G X 8 MM MISC; DAILY Starting Mon 10/31/2022, Disp-100 each, R-3, NormalUse once daily to inject Jayesh Johnson  -     Comprehensive Metabolic Panel; Future  -     Hemoglobin A1C; Future  2. Burning with urination  -     AMB POC URINALYSIS DIP STICK AUTO W/O MICRO  3. Diabetic peripheral neuropathy associated with type 2 diabetes mellitus ([facility]  4. Essential hypertension with goal blood pressure less than 130/85  -     CBC with Auto Differential; Future  -     Comprehensive Metabolic Panel; Future  5. Morbid obesity with BMI of 40.0-44.9, adult ([facility]  6. Iron deficiency anemia, unspecified iron deficiency anemia type  -     CBC with Auto Differential; Future  7. Hyperlipidemia LDL goal <70  -     Comprehensive Metabolic Panel; Future  -     Lipid Panel; Future  8.  Vitamin B12 deficiency      Patient Instructions   Retry Integra Plus every other day in hopes of better tolerating without diarrhea  Monitor blood sugar 3 times/day and keep record to bring to next appointment - asked to notify me after 1 month if fasting levels are still in the 80s more than 1-2 times/week  Continue chronic medications as prescribed  Monitor blood pressure 2 times/week and keep record to bring to next appointment  Stay well hydrated with plenty of water  Encouraged to graduate diet away from soups ASAP since they are naturally higher in sodium than his diet usually consists  Recommend getting newest COVID vaccine given formulation that should better protect against the most recently circulating Omicron variant    Return in about 3 months (around 1/31/2023) for MWE + diabetes f/u @ 9:00 am.         Subjective   SUBJECTIVE/OBJECTIVE:  The patient is a [de-identified] y.o. male who is seen for follow up of diabetes. Current monitoring regimen:  patient did not bring glucose record to appointment today . Glucose monitoring frequency: 3 times daily  Home blood sugar records: fasting range:   Any episodes of hypoglycemia? yes - 1-2 times/week mostly upon awakening  Known diabetic complications: peripheral neuropathy, cardiovascular disease, and cerebrovascular disease  Current diabetic medications include: oral agent (monotherapy): metformin (generic) 1000 mg bid and insulin injections: Tresiba 80 units q AM + Novolog 6 units before meals plus additional based on sliding scale according to pre meal glucose. Current Eye Exam (within one year): Yes - Mar 2022  Current Urine Microalbumin: Yes, normal - Oct 2022  Lab Results   Component Value Date    LABMICR <0.50 08/01/2022   Is She on ACE inhibitor or angiotensin II receptor blocker? Yes - irbesartan (Avapro)  Current Foot Exam (within one year): Yes - July 2022      Weight trend: decreased recently  Prior visit with dietician: yes - 2015  Current diet: meals per day on average: 2; diet has been mostly soup & salad during the past few months while not feeling well  Current exercise: no regular exercise        Last HbA1C:    Lab Results   Component Value Date    LABA1C 8.1 (H) 04/01/2022     Lab Results   Component Value Date     04/01/2022       The patient is a [de-identified] y.o. male who is seen for evaluation of hyperlipidemia. He was tested because of hyperlipidemia w/ LDL goal < 70 + diabetes + CAD. The current state of this condition is no significant medication side effects noted and well controlled on Lipitor 80 mg q hs + Zetia 10 mg daily - taking as prescribed.        Last Lipid Panel:   Lab Results   Component Value Date    CHOL 103 08/01/2022    CHOL 114 04/01/2022    CHOL 112 01/04/2022     Lab Results   Component Value Date    TRIG 75 08/01/2022    TRIG 51 04/01/2022    TRIG 68 01/04/2022     Lab Results   Component Value Date    HDL 48 08/01/2022    HDL 46 04/01/2022    HDL 40 01/04/2022     Lab Results   Component Value Date    LDLCALC 40 08/01/2022    LDLCALC 57.8 04/01/2022    LDLCALC 58 01/04/2022     Lab Results   Component Value Date    LABVLDL 15 08/01/2022    LABVLDL 10.2 04/01/2022    LABVLDL 11.6 09/15/2021    VLDL 14 01/04/2022    VLDL 19 03/18/2021    VLDL 16 09/15/2020     Lab Results   Component Value Date    CHOLHDLRATIO 2.1 08/01/2022    CHOLHDLRATIO 2.5 04/01/2022    CHOLHDLRATIO 2.1 09/15/2021       Patient is here for follow-up of anemia w/ iron deficiency + vitamin b12 deficiency. The current state of this condition is control uncertain on Integra Plus dailly - not currently on medications for this problem, adverse effects: diarrhea.     Lab Results   Component Value Date    WBC 5.4 08/01/2022    WBC 5.7 04/01/2022    WBC 5.4 01/04/2022     Lab Results   Component Value Date    HGB 11.1 (L) 08/01/2022    HGB 11.2 (L) 04/01/2022    HGB 10.6 (L) 01/04/2022     Lab Results   Component Value Date    HCT 36.1 (L) 08/01/2022    HCT 35.8 (L) 04/01/2022    HCT 33.9 (L) 01/04/2022     Lab Results   Component Value Date    MCV 96.3 08/01/2022    MCV 93.2 04/01/2022    MCV 92 01/04/2022     Lab Results   Component Value Date     08/01/2022     04/01/2022     01/04/2022       Lab Results   Component Value Date    IRON 70 09/15/2021    IRON 69 12/10/2020    IRON 37 (L) 07/06/2020     Lab Results   Component Value Date    TIBC 298 09/15/2021    TIBC 304 12/10/2020    TIBC 368 07/06/2020     Lab Results   Component Value Date    LABIRON 10 (L) 07/06/2020    TRFS 23 09/15/2021    TRFS 23 12/10/2020     Lab Results   Component Value Date    FERRITIN 101 09/15/2021    FERRITIN 37 07/06/2020     Lab Results   Component Value Date    HCGNJZUV85 1499 (H) 07/06/2020    IIVFYFFL33 191 (L) 09/26/2019     Lab Results   Component Value Date    FOLATE 13.3 07/06/2020    FOLATE 15.1 09/26/2019       The patient is a [de-identified] y.o. male who is seen for follow-up of hypertension. He is not exercising and is adherent to low salt diet but diet has been heavily focused on soup lately. Daily caffiene intake: a known amount (2 servings/day). Current medication regimen consists of: Avapro 300 mg daily + Hydralazine 50 mg bid + Carvedilol 12.5 mg bid + Spironolactone 25 mg daily + Lasix 40 mg bid +/- 3rd dose based on volume of swelling. Blood pressure is borderline controlled at home, ranging 130-140's/80's. BP Readings from Last 3 Encounters:   10/31/22 (!) 145/85   10/26/22 136/60   07/19/22 110/70       Additional concerns addressed today include being out of [facility] for a while now. He explains that he missed an appointment with pain management and they haven't responded to pharmacy's refill requests. However, he admits that he hasn't reached out to them personally. Review of Systems   Constitutional:  Positive for unexpected weight change. Negative for fatigue. Eyes:  Negative for visual disturbance. Respiratory:  Positive for shortness of breath (upon exertion). Cardiovascular:  Positive for chest pain (unsure if pain or gas) and leg swelling (bilateral - chronic). Negative for palpitations. Endocrine: Negative for polydipsia. Genitourinary:  Positive for dysuria and frequency. Musculoskeletal:  Positive for arthralgias (increased lately), back pain and myalgias. Neurological:  Positive for numbness (bilateral-R lower especially) and headaches. Negative for dizziness.         BP (!) 145/85 (Site: Left Upper Arm, Position: Sitting, Cuff Size: Large Adult)   Pulse 56   Temp 97.5 °F (36.4 °C) (Temporal)   Ht 5' 7\" (1.702 m)   Wt 274 lb (124.3 kg)   SpO2 98%   BMI 42.91 kg/m²       /72   Pulse 56   Temp 97.5 °F (36.4 °C) (Temporal)   Ht 5' 7\" (1.702 m)   Wt 274 lb (124.3 kg)   SpO2 98%   BMI 42.91 kg/m²       Objective   Physical Exam  Constitutional:       Appearance: Normal appearance. He is obese. HENT:      Head: Normocephalic and atraumatic. Eyes:      Conjunctiva/sclera: Conjunctivae normal.      Pupils: Pupils are equal, round, and reactive to light. Neck:      Vascular: No carotid bruit. Cardiovascular:      Rate and Rhythm: Normal rate and regular rhythm. Heart sounds: Normal heart sounds. Pulmonary:      Effort: Pulmonary effort is normal.      Breath sounds: Normal breath sounds. Musculoskeletal:         General: Normal range of motion. Cervical back: Normal range of motion. Right lower leg: Edema (chronic) present. Left lower leg: Edema (chronic) present. Skin:     General: Skin is warm and dry. Neurological:      Mental Status: He is alert and oriented to person, place, and time. Psychiatric:         Mood and Affect: Mood normal.         Behavior: Behavior normal.         Thought Content:  Thought content normal.         Judgment: Judgment normal.          Results for orders placed or performed in visit on 10/31/22   AMB POC URINALYSIS DIP STICK AUTO W/O MICRO   Result Value Ref Range    Color, Urine, POC yellow     Clarity, Urine, POC clear     Glucose, Urine, POC Negative Negative    Bilirubin, Urine, POC Negative Negative    Ketones, Urine, POC Negative Negative    Specific Gravity, Urine, POC 1.010 1.001 - 1.035    Blood, Urine, POC neg Negative    pH, Urine, POC 5.5 4.6 - 8.0    Protein, Urine, POC Negative Negative    Urobilinogen, POC 0.2     Nitrate, Urine, POC neg Negative    Leukocyte Esterase, Urine, POC Negative Negative   AMB POC HEMOGLOBIN A1C   Result Value Ref Range    Hemoglobin A1C, POC 6.7 %         On this date 10/31/2022 I have spent 45 minutes reviewing previous notes, test results and face to face with the patient discussing the diagnosis and importance of compliance with the treatment plan as well as documenting on the day of the visit. An electronic signature was used to authenticate this note.     --Maribel Ovalle PA-C

## 2022-10-31 NOTE — PATIENT INSTRUCTIONS
Retry Integra Plus every other day in hopes of better tolerating without diarrhea  Monitor blood sugar 3 times/day and keep record to bring to next appointment - asked to notify me after 1 month if fasting levels are still in the 80s more than 1-2 times/week  Continue chronic medications as prescribed  Monitor blood pressure 2 times/week and keep record to bring to next appointment  Stay well hydrated with plenty of water  Encouraged to graduate diet away from soups ASAP since they are naturally higher in sodium than his diet usually consists  Recommend getting newest COVID vaccine given formulation that should better protect against the most recently circulating Omicron variant

## 2022-12-01 DIAGNOSIS — Z79.4 TYPE 2 DIABETES MELLITUS WITH OTHER CIRCULATORY COMPLICATION, WITH LONG-TERM CURRENT USE OF INSULIN (HCC): ICD-10-CM

## 2022-12-01 DIAGNOSIS — E11.59 TYPE 2 DIABETES MELLITUS WITH OTHER CIRCULATORY COMPLICATION, WITH LONG-TERM CURRENT USE OF INSULIN (HCC): ICD-10-CM

## 2022-12-01 RX ORDER — FLUTICASONE PROPIONATE 50 MCG
SPRAY, SUSPENSION (ML) NASAL
Qty: 16 G | Refills: 0 | OUTPATIENT
Start: 2022-12-01

## 2022-12-01 RX ORDER — POTASSIUM CHLORIDE 20MEQ/15ML
LIQUID (ML) ORAL
Qty: 900 ML | Refills: 0 | OUTPATIENT
Start: 2022-12-01

## 2022-12-01 RX ORDER — BLOOD SUGAR DIAGNOSTIC
STRIP MISCELLANEOUS
Qty: 250 EACH | Refills: 0 | OUTPATIENT
Start: 2022-12-01

## 2022-12-01 RX ORDER — INSULIN ASPART 100 [IU]/ML
INJECTION, SOLUTION INTRAVENOUS; SUBCUTANEOUS
Qty: 15 ML | Refills: 0 | OUTPATIENT
Start: 2022-12-01

## 2022-12-02 RX ORDER — CARVEDILOL 12.5 MG/1
TABLET ORAL
Qty: 180 TABLET | Refills: 3 | Status: SHIPPED | OUTPATIENT
Start: 2022-12-02

## 2023-01-18 RX ORDER — PANTOPRAZOLE SODIUM 40 MG/1
TABLET, DELAYED RELEASE ORAL
Qty: 90 TABLET | Refills: 0 | OUTPATIENT
Start: 2023-01-18

## 2023-01-18 RX ORDER — EZETIMIBE 10 MG/1
TABLET ORAL
Qty: 90 TABLET | Refills: 0 | OUTPATIENT
Start: 2023-01-18

## 2023-01-18 RX ORDER — IRBESARTAN 300 MG/1
TABLET ORAL
Qty: 90 TABLET | Refills: 0 | OUTPATIENT
Start: 2023-01-18

## 2023-01-18 RX ORDER — FUROSEMIDE 40 MG/1
TABLET ORAL
Qty: 180 TABLET | Refills: 0 | OUTPATIENT
Start: 2023-01-18

## 2023-01-31 ENCOUNTER — OFFICE VISIT (OUTPATIENT)
Dept: INTERNAL MEDICINE CLINIC | Facility: CLINIC | Age: 83
End: 2023-01-31
Payer: MEDICARE

## 2023-01-31 VITALS
HEIGHT: 67 IN | SYSTOLIC BLOOD PRESSURE: 120 MMHG | TEMPERATURE: 97.3 F | DIASTOLIC BLOOD PRESSURE: 70 MMHG | WEIGHT: 261 LBS | OXYGEN SATURATION: 98 % | BODY MASS INDEX: 40.97 KG/M2 | HEART RATE: 60 BPM

## 2023-01-31 DIAGNOSIS — I50.32 CHRONIC DIASTOLIC (CONGESTIVE) HEART FAILURE (HCC): ICD-10-CM

## 2023-01-31 DIAGNOSIS — N18.31 CHRONIC KIDNEY DISEASE, STAGE 3A (HCC): ICD-10-CM

## 2023-01-31 DIAGNOSIS — E78.5 HYPERLIPIDEMIA LDL GOAL <70: ICD-10-CM

## 2023-01-31 DIAGNOSIS — Z79.899 LONG TERM CURRENT USE OF DIURETIC: ICD-10-CM

## 2023-01-31 DIAGNOSIS — E53.8 VITAMIN B12 DEFICIENCY: ICD-10-CM

## 2023-01-31 DIAGNOSIS — Z12.5 SCREENING FOR PROSTATE CANCER: ICD-10-CM

## 2023-01-31 DIAGNOSIS — I25.119 ATHEROSCLEROSIS OF NATIVE CORONARY ARTERY OF NATIVE HEART WITH ANGINA PECTORIS (HCC): ICD-10-CM

## 2023-01-31 DIAGNOSIS — Z00.00 MEDICARE ANNUAL WELLNESS VISIT, SUBSEQUENT: Primary | ICD-10-CM

## 2023-01-31 DIAGNOSIS — I10 ESSENTIAL HYPERTENSION WITH GOAL BLOOD PRESSURE LESS THAN 130/85: ICD-10-CM

## 2023-01-31 DIAGNOSIS — E11.59 TYPE 2 DIABETES MELLITUS WITH OTHER CIRCULATORY COMPLICATION, WITH LONG-TERM CURRENT USE OF INSULIN (HCC): ICD-10-CM

## 2023-01-31 DIAGNOSIS — I69.354 HEMIPLEGIA OF LEFT NONDOMINANT SIDE AS LATE EFFECT OF CEREBRAL INFARCTION, UNSPECIFIED HEMIPLEGIA TYPE (HCC): ICD-10-CM

## 2023-01-31 DIAGNOSIS — E66.01 OBESITY, CLASS III, BMI 40-49.9 (MORBID OBESITY) (HCC): ICD-10-CM

## 2023-01-31 DIAGNOSIS — Z79.4 TYPE 2 DIABETES MELLITUS WITH OTHER CIRCULATORY COMPLICATION, WITH LONG-TERM CURRENT USE OF INSULIN (HCC): ICD-10-CM

## 2023-01-31 DIAGNOSIS — D50.9 IRON DEFICIENCY ANEMIA, UNSPECIFIED IRON DEFICIENCY ANEMIA TYPE: ICD-10-CM

## 2023-01-31 DIAGNOSIS — F39 UNSPECIFIED MOOD (AFFECTIVE) DISORDER (HCC): ICD-10-CM

## 2023-01-31 DIAGNOSIS — K21.9 GASTRO-ESOPHAGEAL REFLUX DISEASE WITHOUT ESOPHAGITIS: ICD-10-CM

## 2023-01-31 LAB
ALBUMIN SERPL-MCNC: 3.6 G/DL (ref 3.2–4.6)
ALBUMIN/GLOB SERPL: 1 (ref 0.4–1.6)
ALP SERPL-CCNC: 103 U/L (ref 50–136)
ALT SERPL-CCNC: 24 U/L (ref 12–65)
ANION GAP SERPL CALC-SCNC: 8 MMOL/L (ref 2–11)
AST SERPL-CCNC: 17 U/L (ref 15–37)
BASOPHILS # BLD: 0 K/UL (ref 0–0.2)
BASOPHILS NFR BLD: 1 % (ref 0–2)
BILIRUB SERPL-MCNC: 0.5 MG/DL (ref 0.2–1.1)
BUN SERPL-MCNC: 31 MG/DL (ref 8–23)
CALCIUM SERPL-MCNC: 8.7 MG/DL (ref 8.3–10.4)
CHLORIDE SERPL-SCNC: 106 MMOL/L (ref 101–110)
CHOLEST SERPL-MCNC: 97 MG/DL
CO2 SERPL-SCNC: 26 MMOL/L (ref 21–32)
CREAT SERPL-MCNC: 1.8 MG/DL (ref 0.8–1.5)
DIFFERENTIAL METHOD BLD: ABNORMAL
EOSINOPHIL # BLD: 0.2 K/UL (ref 0–0.8)
EOSINOPHIL NFR BLD: 3 % (ref 0.5–7.8)
ERYTHROCYTE [DISTWIDTH] IN BLOOD BY AUTOMATED COUNT: 13.6 % (ref 11.9–14.6)
EST. AVERAGE GLUCOSE BLD GHB EST-MCNC: 143 MG/DL
GLOBULIN SER CALC-MCNC: 3.6 G/DL (ref 2.8–4.5)
GLUCOSE SERPL-MCNC: 88 MG/DL (ref 65–100)
HBA1C MFR BLD: 6.6 % (ref 4.8–5.6)
HCT VFR BLD AUTO: 33.3 % (ref 41.1–50.3)
HDLC SERPL-MCNC: 43 MG/DL (ref 40–60)
HDLC SERPL: 2.3
HGB BLD-MCNC: 10.4 G/DL (ref 13.6–17.2)
IMM GRANULOCYTES # BLD AUTO: 0 K/UL (ref 0–0.5)
IMM GRANULOCYTES NFR BLD AUTO: 0 % (ref 0–5)
LDLC SERPL CALC-MCNC: 46 MG/DL
LYMPHOCYTES # BLD: 2.2 K/UL (ref 0.5–4.6)
LYMPHOCYTES NFR BLD: 36 % (ref 13–44)
MCH RBC QN AUTO: 29.5 PG (ref 26.1–32.9)
MCHC RBC AUTO-ENTMCNC: 31.2 G/DL (ref 31.4–35)
MCV RBC AUTO: 94.6 FL (ref 82–102)
MONOCYTES # BLD: 0.6 K/UL (ref 0.1–1.3)
MONOCYTES NFR BLD: 9 % (ref 4–12)
NEUTS SEG # BLD: 3.1 K/UL (ref 1.7–8.2)
NEUTS SEG NFR BLD: 51 % (ref 43–78)
NRBC # BLD: 0 K/UL (ref 0–0.2)
PLATELET # BLD AUTO: 223 K/UL (ref 150–450)
PMV BLD AUTO: 10.5 FL (ref 9.4–12.3)
POTASSIUM SERPL-SCNC: 4.5 MMOL/L (ref 3.5–5.1)
PROT SERPL-MCNC: 7.2 G/DL (ref 6.3–8.2)
RBC # BLD AUTO: 3.52 M/UL (ref 4.23–5.6)
SODIUM SERPL-SCNC: 140 MMOL/L (ref 133–143)
TRIGL SERPL-MCNC: 40 MG/DL (ref 35–150)
VLDLC SERPL CALC-MCNC: 8 MG/DL (ref 6–23)
WBC # BLD AUTO: 6.1 K/UL (ref 4.3–11.1)

## 2023-01-31 PROCEDURE — G8484 FLU IMMUNIZE NO ADMIN: HCPCS | Performed by: PHYSICIAN ASSISTANT

## 2023-01-31 PROCEDURE — 3078F DIAST BP <80 MM HG: CPT | Performed by: PHYSICIAN ASSISTANT

## 2023-01-31 PROCEDURE — G8417 CALC BMI ABV UP PARAM F/U: HCPCS | Performed by: PHYSICIAN ASSISTANT

## 2023-01-31 PROCEDURE — 3074F SYST BP LT 130 MM HG: CPT | Performed by: PHYSICIAN ASSISTANT

## 2023-01-31 PROCEDURE — 99215 OFFICE O/P EST HI 40 MIN: CPT | Performed by: PHYSICIAN ASSISTANT

## 2023-01-31 PROCEDURE — G0439 PPPS, SUBSEQ VISIT: HCPCS | Performed by: PHYSICIAN ASSISTANT

## 2023-01-31 PROCEDURE — 1123F ACP DISCUSS/DSCN MKR DOCD: CPT | Performed by: PHYSICIAN ASSISTANT

## 2023-01-31 PROCEDURE — 1036F TOBACCO NON-USER: CPT | Performed by: PHYSICIAN ASSISTANT

## 2023-01-31 PROCEDURE — 3044F HG A1C LEVEL LT 7.0%: CPT | Performed by: PHYSICIAN ASSISTANT

## 2023-01-31 PROCEDURE — G8427 DOCREV CUR MEDS BY ELIG CLIN: HCPCS | Performed by: PHYSICIAN ASSISTANT

## 2023-01-31 RX ORDER — BUSPIRONE HYDROCHLORIDE 15 MG/1
15 TABLET ORAL DAILY
Qty: 90 TABLET | Refills: 3 | Status: SHIPPED | OUTPATIENT
Start: 2023-01-31

## 2023-01-31 RX ORDER — IRBESARTAN 300 MG/1
300 TABLET ORAL DAILY
Qty: 90 TABLET | Refills: 3 | Status: SHIPPED | OUTPATIENT
Start: 2023-01-31 | End: 2023-01-31 | Stop reason: SDUPTHER

## 2023-01-31 RX ORDER — PANTOPRAZOLE SODIUM 40 MG/1
40 TABLET, DELAYED RELEASE ORAL
Qty: 90 TABLET | Refills: 3 | Status: SHIPPED | OUTPATIENT
Start: 2023-01-31 | End: 2023-01-31 | Stop reason: SDUPTHER

## 2023-01-31 RX ORDER — EZETIMIBE 10 MG/1
10 TABLET ORAL DAILY
Qty: 90 TABLET | Refills: 3 | Status: SHIPPED | OUTPATIENT
Start: 2023-01-31

## 2023-01-31 RX ORDER — HYDRALAZINE HYDROCHLORIDE 50 MG/1
50 TABLET, FILM COATED ORAL 2 TIMES DAILY
Qty: 180 TABLET | Refills: 3 | Status: SHIPPED | OUTPATIENT
Start: 2023-01-31

## 2023-01-31 RX ORDER — FUROSEMIDE 40 MG/1
40 TABLET ORAL 2 TIMES DAILY
Qty: 180 TABLET | Refills: 3 | Status: SHIPPED | OUTPATIENT
Start: 2023-01-31

## 2023-01-31 RX ORDER — IRBESARTAN 300 MG/1
300 TABLET ORAL DAILY
Qty: 90 TABLET | Refills: 3 | Status: SHIPPED | OUTPATIENT
Start: 2023-01-31

## 2023-01-31 RX ORDER — INSULIN DEGLUDEC 200 U/ML
80 INJECTION, SOLUTION SUBCUTANEOUS DAILY
Qty: 36 ML | Refills: 3 | Status: SHIPPED | OUTPATIENT
Start: 2023-01-31

## 2023-01-31 RX ORDER — PANTOPRAZOLE SODIUM 40 MG/1
40 TABLET, DELAYED RELEASE ORAL
Qty: 90 TABLET | Refills: 3 | Status: SHIPPED | OUTPATIENT
Start: 2023-01-31

## 2023-01-31 RX ORDER — EZETIMIBE 10 MG/1
10 TABLET ORAL DAILY
Qty: 90 TABLET | Refills: 3 | Status: SHIPPED | OUTPATIENT
Start: 2023-01-31 | End: 2023-01-31 | Stop reason: SDUPTHER

## 2023-01-31 RX ORDER — POTASSIUM CHLORIDE 20MEQ/15ML
20 LIQUID (ML) ORAL 2 TIMES DAILY
Qty: 2700 ML | Refills: 3 | Status: SHIPPED | OUTPATIENT
Start: 2023-01-31

## 2023-01-31 ASSESSMENT — ENCOUNTER SYMPTOMS
NAUSEA: 1
ABDOMINAL DISTENTION: 1
ABDOMINAL PAIN: 0
DIARRHEA: 1
CONSTIPATION: 1
VOMITING: 0
BLOOD IN STOOL: 0
SHORTNESS OF BREATH: 1

## 2023-01-31 ASSESSMENT — PATIENT HEALTH QUESTIONNAIRE - PHQ9
2. FEELING DOWN, DEPRESSED OR HOPELESS: 0
SUM OF ALL RESPONSES TO PHQ QUESTIONS 1-9: 1
1. LITTLE INTEREST OR PLEASURE IN DOING THINGS: 1
SUM OF ALL RESPONSES TO PHQ QUESTIONS 1-9: 1
SUM OF ALL RESPONSES TO PHQ QUESTIONS 1-9: 1
SUM OF ALL RESPONSES TO PHQ9 QUESTIONS 1 & 2: 1
SUM OF ALL RESPONSES TO PHQ QUESTIONS 1-9: 1

## 2023-01-31 ASSESSMENT — LIFESTYLE VARIABLES
HOW OFTEN DO YOU HAVE A DRINK CONTAINING ALCOHOL: NEVER
HOW MANY STANDARD DRINKS CONTAINING ALCOHOL DO YOU HAVE ON A TYPICAL DAY: PATIENT DOES NOT DRINK

## 2023-01-31 NOTE — Clinical Note
Please advise patient to reduce Tresiba to 70 units due to frequency of low blood sugar readings in the morning. Also make sure he is having a protein rich snack like a cheese stick, greek yogurt or handful of nuts every night before bed.

## 2023-01-31 NOTE — PATIENT INSTRUCTIONS
Start OTC vitamin b12 supplement (best is \"quick dissolve\" or \"sublingual\") daily  Reduce Tresiba to 70 units every morning  Reminded of the importance of a protein rich snack before bed every night - handful of nuts, peanut butter crackers, greek yogurt, and/or cheese stick  Monitor blood sugar 3 times/day and keep record to bring to next appointment  Continue chronic medications as prescribed  Monitor blood pressure daily and keep record to bring to next appointment  Reminded of the need to stay well hydrated with water  Encouraged him to read through and fill out paperwork to designate power of  & end of life preferences - form given that he can fill out and will need to have witnessed by 2 non-family members  Recommend getting newest COVID vaccine given formulation that should better protect against the most recently circulating Omicron variant         Learning About Being Active as an Older Adult  Why is being active important as you get older? Being active is one of the best things you can do for your health. And it's never too late to start. Being active--or getting active, if you aren't already--has definite benefits. It can:  Give you more energy,  Keep your mind sharp. Improve balance to reduce your risk of falls. Help you manage chronic illness with fewer medicines. No matter how old you are, how fit you are, or what health problems you have, there is a form of activity that will work for you. And the more physical activity you can do, the better your overall health will be. What kinds of activity can help you stay healthy? Being more active will make your daily activities easier. Physical activity includes planned exercise and things you do in daily life. There are four types of activity:  Aerobic. Doing aerobic activity makes your heart and lungs strong. Includes walking, dancing, and gardening. Aim for at least 2½ hours spread throughout the week.   It improves your energy and can help you sleep better. Muscle-strengthening. This type of activity can help maintain muscle and strengthen bones. Includes climbing stairs, using resistance bands, and lifting or carrying heavy loads. Aim for at least twice a week. It can help protect the knees and other joints. Stretching. Stretching gives you better range of motion in joints and muscles. Includes upper arm stretches, calf stretches, and gentle yoga. Aim for at least twice a week, preferably after your muscles are warmed up from other activities. It can help you function better in daily life. Balancing. This helps you stay coordinated and have good posture. Includes heel-to-toe walking, osito chi, and certain types of yoga. Aim for at least 3 days a week. It can reduce your risk of falling. Even if you have a hard time meeting the recommendations, it's better to be more active than less active. All activity done in each category counts toward your weekly total. You'd be surprised how daily things like carrying groceries, keeping up with grandchildren, and taking the stairs can add up. What keeps you from being active? If you've had a hard time being more active, you're not alone. Maybe you remember being able to do more. Or maybe you've never thought of yourself as being active. It's frustrating when you can't do the things you want. Being more active can help. What's holding you back? Getting started. Have a goal, but break it into easy tasks. Small steps build into big accomplishments. Staying motivated. If you feel like skipping your activity, remember your goal. Maybe you want to move better and stay independent. Every activity gets you one step closer. Not feeling your best.  Start with 5 minutes of an activity you enjoy. Prove to yourself you can do it. As you get comfortable, increase your time. You may not be where you want to be. But you're in the process of getting there. Everyone starts somewhere.   How can you find safe ways to stay active? Talk with your doctor about any physical challenges you're facing. Make a plan with your doctor if you have a health problem or aren't sure how to get started with activity. If you're already active, ask your doctor if there is anything you should change to stay safe as your body and health change. If you tend to feel dizzy after you take medicine, avoid activity at that time. Try being active before you take your medicine. This will reduce your risk of falls. If you plan to be active at home, make sure to clear your space before you get started. Remove things like TV cords, coffee tables, and throw rugs. It's safest to have plenty of space to move freely. The key to getting more active is to take it slow and steady. Try to improve only a little bit at a time. Pick just one area to improve on at first. And if an activity hurts, stop and talk to your doctor. Where can you learn more? Go to http://www.sanders.com/ and enter P600 to learn more about \"Learning About Being Active as an Older Adult. \"  Current as of: October 10, 2022               Content Version: 13.5  © 9643-8765 Healthwise, Incorporated. Care instructions adapted under license by [facility] ([facility]). If you have questions about a medical condition or this instruction, always ask your healthcare professional. [address] any warranty or liability for your use of this information. Learning About Activities of Daily Living  What are activities of daily living? Activities of daily living (ADLs) are the basic self-care tasks you do every day. As you age, and if you have health problems, you may find that it's harder to do these things for yourself. That's when you may need some help. Your doctor uses ADLs to measure how much help you need. Knowing what you can and can't do for yourself is an important first step to getting help.  And when you have the help you need, you can stay as independent as possible. Your doctor will want to know if you are able to do tasks such as: Take a bath or shower without help. Go to the bathroom by yourself. Dress and undress without help. Shave, comb your hair, and brush teeth on your own. Get in and out of bed or a chair without help. Feed yourself without help. If you are having trouble doing basic self-care tasks, talk with your doctor. You may want to bring a caregiver or family member who can help the doctor understand your needs and abilities. How will a doctor assess your ADLs? Asking about ADLs is part of a routine health checkup your doctor will likely do as you age. Your health check might be done in a doctor's office, in your home, or at a hospital. The goal is to find out if you are having any problems that could make your health problems worse or that make it unsafe for you to be on your own. To measure your ADLs, your doctor will ask how hard it is for you to do routine tasks. He or she may also want to know if you have changed the way you do a task because of a health problem. He or she may watch how you:  Walk back and forth. Keep your balance while you stand or walk. Move from sitting to standing or from a bed to a chair. Button or unbutton a shirt or sweater. Remove and put on your shoes. It's normal to feel a little worried or anxious if you find you can't do all the things you used to be able to do. Talking with your doctor about ADLs isn't a test that you either pass or fail. It's just a way to get more information about your health and safety. Follow-up care is a key part of your treatment and safety. Be sure to make and go to all appointments, and call your doctor if you are having problems. It's also a good idea to know your test results and keep a list of the medicines you take. Current as of: October 6, 2021               Content Version: 13.5  © 6937-1460 Healthwise, Incorporated.    Care instructions adapted under license by [facility] ([facility]). If you have questions about a medical condition or this instruction, always ask your healthcare professional. [address] any warranty or liability for your use of this information. Advance Directives: Care Instructions  Overview  An advance directive is a legal way to state your wishes at the end of your life. It tells your family and your doctor what to do if you can't say what you want. There are two main types of advance directives. You can change them any time your wishes change. Living will. This form tells your family and your doctor your wishes about life support and other treatment. The form is also called a declaration. Medical power of . This form lets you name a person to make treatment decisions for you when you can't speak for yourself. This person is called a health care agent (health care proxy, health care surrogate). The form is also called a durable power of  for health care. If you do not have an advance directive, decisions about your medical care may be made by a family member, or by a doctor or a  who doesn't know you. It may help to think of an advance directive as a gift to the people who care for you. If you have one, they won't have to make tough decisions by themselves. For more information, including forms for your state, see the [address] website (www.caringinfo.org/planning/advance-directives/). Follow-up care is a key part of your treatment and safety. Be sure to make and go to all appointments, and call your doctor if you are having problems. It's also a good idea to know your test results and keep a list of the medicines you take. What should you include in an advance directive? Many states have a unique advance directive form. (It may ask you to address specific issues.) Or you might use a universal form that's approved by many states.   If your form doesn't tell you what to address, it may be hard to know what to include in your advance directive. Use the questions below to help you get started. Who do you want to make decisions about your medical care if you are not able to? What life-support measures do you want if you have a serious illness that gets worse over time or can't be cured? What are you most afraid of that might happen? (Maybe you're afraid of having pain, losing your independence, or being kept alive by machines.)  Where would you prefer to die? (Your home? A hospital? A nursing home?)  Do you want to donate your organs when you die? Do you want certain Druze practices performed before you die? When should you call for help? Be sure to contact your doctor if you have any questions. Where can you learn more? Go to http://www.sanders.com/ and enter R264 to learn more about \"Advance Directives: Care Instructions. \"  Current as of: June 16, 2022               Content Version: 13.5  © 4290-6575 [employer]. Care instructions adapted under license by [facility] ([facility]). If you have questions about a medical condition or this instruction, always ask your healthcare professional. [place] [address] any warranty or liability for your use of this information. Personalized Preventive Plan for [facility] - 1/31/2023  Medicare offers a range of preventive health benefits. Some of the tests and screenings are paid in full while other may be subject to a deductible, co-insurance, and/or copay. Some of these benefits include a comprehensive review of your medical history including lifestyle, illnesses that may run in your family, and various assessments and screenings as appropriate. After reviewing your medical record and screening and assessments performed today your provider may have ordered immunizations, labs, imaging, and/or referrals for you.   A list of these orders (if applicable) as well as your Preventive Care list are included within your After Visit Summary for your review. Other Preventive Recommendations:    A preventive eye exam performed by an eye specialist is recommended every 1-2 years to screen for glaucoma; cataracts, macular degeneration, and other eye disorders. A preventive dental visit is recommended every 6 months. Try to get at least 150 minutes of exercise per week or 10,000 steps per day on a pedometer . Order or download the FREE \"Exercise & Physical Activity: Your Everyday Guide\" from [employer]. Call 6-437.162.4534 or search [employer] online. You need 8135-0880 mg of calcium and 3154-8159 IU of vitamin D per day. It is possible to meet your calcium requirement with diet alone, but a vitamin D supplement is usually necessary to meet this goal.  When exposed to the sun, use a sunscreen that protects against both UVA and UVB radiation with an SPF of 30 or greater. Reapply every 2 to 3 hours or after sweating, drying off with a towel, or swimming. Always wear a seat belt when traveling in a car. Always wear a helmet when riding a bicycle or motorcycle.

## 2023-01-31 NOTE — RESULT ENCOUNTER NOTE
Diabetes is doing excellent right now with A1c of 6.6%. Anemia is worse but he also hasn't been taking any iron or B12 so not too surprising - please resume B12 supplement daily as discussed earlier today. Will add on iron studies to see if iron infusions are needed. Cholesterol is well managed with current medication. Fasting blood sugar is 88. Kidney function remains suboptimal but slightly improved from Aug 2022 - please focus on drinking more water!   Liver function & electrolytes are normal.

## 2023-01-31 NOTE — PROGRESS NOTES
Medicare Annual Wellness Visit    Araceli Musa is here for Medicare AWV and Discuss Labs    Assessment & Plan   Medicare annual wellness visit, subsequent  Type 2 diabetes mellitus with other circulatory complication, with long-term current use of insulin (HCC)  -     metFORMIN (GLUCOPHAGE) 1000 MG tablet; Take 1 tablet by mouth 2 times daily (with meals) Take 1 tablet by mouth twice daily with meals, Disp-180 tablet, R-3Print  -     Insulin Degludec (TRESIBA FLEXTOUCH) 200 UNIT/ML SOPN; Inject 80 Units into the skin daily Inject 85 units subQ every morning., Disp-36 mL, R-3Print  -     Comprehensive Metabolic Panel; Future  -     Hemoglobin A1C; Future  Unspecified mood (affective) disorder (HCC)  -     busPIRone (BUSPAR) 15 MG tablet; Take 15 mg by mouth daily, Disp-90 tablet, R-3Print  Obesity, Class III, BMI 40-49.9 (morbid obesity) ([facility] [address]  Hemiplegia of left 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, unspecified hemiplegia type [facility])  -     [facility] DO [place], Neurology, [facility]  Chronic diastolic (congestive) heart failure (HCC)  -     furosemide (LASIX) 40 MG tablet; Take 1 tablet by mouth 2 times daily, Disp-180 tablet, R-3Print  -     potassium chloride 20 MEQ/15ML (10%) oral solution; Take 15 mLs by mouth 2 times daily, Disp-2700 mL, R-3Print  Atherosclerosis of native coronary artery of native heart with angina pectoris (HCC)  -     ezetimibe (ZETIA) 10 MG tablet; Take 1 tablet by mouth daily, Disp-90 tablet, R-3Print  -     Lipid Panel; Future  Chronic kidney disease, stage 3a ([facility] [address]  -     CBC with Auto Differential; Future  -     Comprehensive Metabolic Panel; Future  Iron deficiency anemia, unspecified iron deficiency anemia type  -     CBC with Auto Differential; Future  Vitamin B12 deficiency  -     CBC with Auto Differential; Future  -     Vitamin B12; Future  Hyperlipidemia LDL goal <70  -     ezetimibe (ZETIA) 10 MG tablet;  Take 1 tablet by mouth daily, Disp-90 tablet, R-3Print  -     Comprehensive Metabolic Panel; Future  -     Lipid Panel; Future  Screening for prostate cancer  -     PSA Screening; Future  Gastro-esophageal reflux disease without esophagitis  -     pantoprazole (PROTONIX) 40 MG tablet; Take 1 tablet by mouth every morning (before breakfast), Disp-90 tablet, R-3Print  Essential hypertension with goal blood pressure less than 130/85  -     hydrALAZINE (APRESOLINE) 50 MG tablet; Take 1 tablet by mouth 2 times daily, Disp-180 tablet, R-3Print  -     irbesartan (AVAPRO) 300 MG tablet; Take 1 tablet by mouth daily, Disp-90 tablet, R-3Print  -     Comprehensive Metabolic Panel; Future  Long term current use of diuretic  -     potassium chloride 20 MEQ/15ML (10%) oral solution; Take 15 mLs by mouth 2 times daily, Disp-2700 mL, R-3Print       Patient Instructions   Start OTC vitamin b12 supplement (best is \"quick dissolve\" or \"sublingual\") daily  Reduce Tresiba to 70 units every morning  Reminded of the importance of a protein rich snack before bed every night - handful of nuts, peanut butter crackers, greek yogurt, and/or cheese stick  Monitor blood sugar 3 times/day and keep record to bring to next appointment  Continue chronic medications as prescribed  Monitor blood pressure daily and keep record to bring to next appointment  Reminded of the need to stay well hydrated with water  Encouraged him to read through and fill out paperwork to designate power of  & end of life preferences - form given that he can fill out and will need to have witnessed by 2 non-family members  Recommend getting newest COVID vaccine given formulation that should better protect against the most recently circulating Omicron variant         Learning About Being Active as an Older Adult  Why is being active important as you get older? Being active is one of the best things you can do for your health. And it's never too late to start.  Being active--or getting active, if you aren't already--has definite benefits. It can:  Give you more energy,  Keep your mind sharp. Improve balance to reduce your risk of falls. Help you manage chronic illness with fewer medicines. No matter how old you are, how fit you are, or what health problems you have, there is a form of activity that will work for you. And the more physical activity you can do, the better your overall health will be. What kinds of activity can help you stay healthy? Being more active will make your daily activities easier. Physical activity includes planned exercise and things you do in daily life. There are four types of activity:  Aerobic. Doing aerobic activity makes your heart and lungs strong. Includes walking, dancing, and gardening. Aim for at least 2½ hours spread throughout the week. It improves your energy and can help you sleep better. Muscle-strengthening. This type of activity can help maintain muscle and strengthen bones. Includes climbing stairs, using resistance bands, and lifting or carrying heavy loads. Aim for at least twice a week. It can help protect the knees and other joints. Stretching. Stretching gives you better range of motion in joints and muscles. Includes upper arm stretches, calf stretches, and gentle yoga. Aim for at least twice a week, preferably after your muscles are warmed up from other activities. It can help you function better in daily life. Balancing. This helps you stay coordinated and have good posture. Includes heel-to-toe walking, osito chi, and certain types of yoga. Aim for at least 3 days a week. It can reduce your risk of falling. Even if you have a hard time meeting the recommendations, it's better to be more active than less active. All activity done in each category counts toward your weekly total. You'd be surprised how daily things like carrying groceries, keeping up with grandchildren, and taking the stairs can add up. What keeps you from being active?   If you've had a hard time being more active, you're not alone. Maybe you remember being able to do more. Or maybe you've never thought of yourself as being active. It's frustrating when you can't do the things you want. Being more active can help. What's holding you back? Getting started. Have a goal, but break it into easy tasks. Small steps build into big accomplishments. Staying motivated. If you feel like skipping your activity, remember your goal. Maybe you want to move better and stay independent. Every activity gets you one step closer. Not feeling your best.  Start with 5 minutes of an activity you enjoy. Prove to yourself you can do it. As you get comfortable, increase your time. You may not be where you want to be. But you're in the process of getting there. Everyone starts somewhere. How can you find safe ways to stay active? Talk with your doctor about any physical challenges you're facing. Make a plan with your doctor if you have a health problem or aren't sure how to get started with activity. If you're already active, ask your doctor if there is anything you should change to stay safe as your body and health change. If you tend to feel dizzy after you take medicine, avoid activity at that time. Try being active before you take your medicine. This will reduce your risk of falls. If you plan to be active at home, make sure to clear your space before you get started. Remove things like TV cords, coffee tables, and throw rugs. It's safest to have plenty of space to move freely. The key to getting more active is to take it slow and steady. Try to improve only a little bit at a time. Pick just one area to improve on at first. And if an activity hurts, stop and talk to your doctor. Where can you learn more? Go to http://www.sanders.com/ and enter P600 to learn more about \"Learning About Being Active as an Older Adult. \"  Current as of: October 10, 2022               Content Version: 13.5  © 2006-2022 Healthwise, Incorporated. Care instructions adapted under license by [facility] ([facility]). If you have questions about a medical condition or this instruction, always ask your healthcare professional. [address] any warranty or liability for your use of this information. Learning About Activities of Daily Living  What are activities of daily living? Activities of daily living (ADLs) are the basic self-care tasks you do every day. As you age, and if you have health problems, you may find that it's harder to do these things for yourself. That's when you may need some help. Your doctor uses ADLs to measure how much help you need. Knowing what you can and can't do for yourself is an important first step to getting help. And when you have the help you need, you can stay as independent as possible. Your doctor will want to know if you are able to do tasks such as: Take a bath or shower without help. Go to the bathroom by yourself. Dress and undress without help. Shave, comb your hair, and brush teeth on your own. Get in and out of bed or a chair without help. Feed yourself without help. If you are having trouble doing basic self-care tasks, talk with your doctor. You may want to bring a caregiver or family member who can help the doctor understand your needs and abilities. How will a doctor assess your ADLs? Asking about ADLs is part of a routine health checkup your doctor will likely do as you age. Your health check might be done in a doctor's office, in your home, or at a hospital. The goal is to find out if you are having any problems that could make your health problems worse or that make it unsafe for you to be on your own. To measure your ADLs, your doctor will ask how hard it is for you to do routine tasks. He or she may also want to know if you have changed the way you do a task because of a health problem.  He or she may watch how you:  Walk back and forth.  Keep your balance while you stand or walk. Move from sitting to standing or from a bed to a chair. Button or unbutton a shirt or sweater. Remove and put on your shoes. It's normal to feel a little worried or anxious if you find you can't do all the things you used to be able to do. Talking with your doctor about ADLs isn't a test that you either pass or fail. It's just a way to get more information about your health and safety. Follow-up care is a key part of your treatment and safety. Be sure to make and go to all appointments, and call your doctor if you are having problems. It's also a good idea to know your test results and keep a list of the medicines you take. Current as of: October 6, 2021               Content Version: 13.5  © 2006-2022 Healthwise, [employer]. Care instructions adapted under license by [facility] ([facility]). If you have questions about a medical condition or this instruction, always ask your healthcare professional. [place] [address] any warranty or liability for your use of this information. Advance Directives: Care Instructions  Overview  An advance directive is a legal way to state your wishes at the end of your life. It tells your family and your doctor what to do if you can't say what you want. There are two main types of advance directives. You can change them any time your wishes change. Living will. This form tells your family and your doctor your wishes about life support and other treatment. The form is also called a declaration. Medical power of . This form lets you name a person to make treatment decisions for you when you can't speak for yourself. This person is called a health care agent (health care proxy, health care surrogate). The form is also called a durable power of  for health care.   If you do not have an advance directive, decisions about your medical care may be made by a family member, or by a doctor or a  who doesn't know you. It may help to think of an advance directive as a gift to the people who care for you. If you have one, they won't have to make tough decisions by themselves. For more information, including forms for your state, see the [address] website (www.caringinfo.org/planning/advance-directives/). Follow-up care is a key part of your treatment and safety. Be sure to make and go to all appointments, and call your doctor if you are having problems. It's also a good idea to know your test results and keep a list of the medicines you take. What should you include in an advance directive? Many states have a unique advance directive form. (It may ask you to address specific issues.) Or you might use a universal form that's approved by many states. If your form doesn't tell you what to address, it may be hard to know what to include in your advance directive. Use the questions below to help you get started. Who do you want to make decisions about your medical care if you are not able to? What life-support measures do you want if you have a serious illness that gets worse over time or can't be cured? What are you most afraid of that might happen? (Maybe you're afraid of having pain, losing your independence, or being kept alive by machines.)  Where would you prefer to die? (Your home? A hospital? A nursing home?)  Do you want to donate your organs when you die? Do you want certain Religion practices performed before you die? When should you call for help? Be sure to contact your doctor if you have any questions. Where can you learn more? Go to http://www.sanders.com/ and enter R264 to learn more about \"Advance Directives: Care Instructions. \"  Current as of: June 16, 2022               Content Version: 13.5  © 0177-8372 Healthwise, Incorporated. Care instructions adapted under license by [facility] ([facility]).  If you have questions about a medical condition or this instruction, always ask your healthcare professional. [address] any warranty or liability for your use of this information. Personalized Preventive Plan for Araceli Second - 1/31/2023  Medicare offers a range of preventive health benefits. Some of the tests and screenings are paid in full while other may be subject to a deductible, co-insurance, and/or copay. Some of these benefits include a comprehensive review of your medical history including lifestyle, illnesses that may run in your family, and various assessments and screenings as appropriate. After reviewing your medical record and screening and assessments performed today your provider may have ordered immunizations, labs, imaging, and/or referrals for you. A list of these orders (if applicable) as well as your Preventive Care list are included within your After Visit Summary for your review. Other Preventive Recommendations:    A preventive eye exam performed by an eye specialist is recommended every 1-2 years to screen for glaucoma; cataracts, macular degeneration, and other eye disorders. A preventive dental visit is recommended every 6 months. Try to get at least 150 minutes of exercise per week or 10,000 steps per day on a pedometer . Order or download the FREE \"Exercise & Physical Activity: Your Everyday Guide\" from [employer]. Call 8-442.223.9445 or search [employer] online. You need 4867-7026 mg of calcium and 6685-2689 IU of vitamin D per day. It is possible to meet your calcium requirement with diet alone, but a vitamin D supplement is usually necessary to meet this goal.  When exposed to the sun, use a sunscreen that protects against both UVA and UVB radiation with an SPF of 30 or greater. Reapply every 2 to 3 hours or after sweating, drying off with a towel, or swimming. Always wear a seat belt when traveling in a car. Always wear a helmet when riding a bicycle or motorcycle. Recommendations for Preventive Services Due: see orders and patient instructions/AVS.  Recommended screening schedule for the next 5-10 years is provided to the patient in written form: see Patient Instructions/AVS.     Return in 4 months (on 5/31/2023) for diabetes f/u. Subjective   The patient is a [de-identified] y.o. male who is seen for a comprehensive physical exam.  Health behaviors: sedentary, nonsmoker, generally following a diabetic diet . Date of last physical exam: Jan 2022. I have reviewed the patient's medical history in detail and updated the computerized patient record. Previous Preventative Testing:  Prostate Exam: Aug 2022 (results: mildly abnormal - enlarged); PSA: Jan 2022 (results: normal); Colonoscopy: Oct 2019 (results: normal - diverticulosis); EKG: Oct 2022 (results: normal); Chest X-Ray (if smoker): June 2021 (results: normal); Hemoccult: Jan 2021 (results: normal);  Glaucoma Screening: Mar 2022 (results: abnormal - moderate nonproliferative diabetic retinopathy bilaterally, bilateral cataracts, bilateral posterior vitreous detachment)     Immunizations: up to date and documented  Immunization History   Administered Date(s) Administered    COVID-19, PFIZER PURPLE top, DILUTE for use, (age 15 y+), 30mcg/0.3mL 01/22/2021, 02/17/2021, 10/20/2021    Influenza Trivalent 09/12/2012, 10/01/2014    Influenza Virus Vaccine 10/01/2015    Influenza, FLUAD, (age 72 y+), Adjuvanted, 0.5mL 09/15/2020, 09/16/2021, 10/27/2022    Influenza, High Dose (Fluzone 65 yrs and older) 09/03/2015, 08/21/2016, 08/24/2017, 08/27/2018, 09/21/2019    PPD Test 09/27/2019    Pneumococcal Conjugate 13-valent (Pbiuepj12) 09/11/2015    Pneumococcal Polysaccharide (Xwrhdebyt23) 09/13/2010, 11/01/2010, 03/18/2021    Tdap (Boostrix, Adacel) 06/22/2017    Zoster Live (Zostavax) 01/10/2013    Zoster Recombinant (Shingrix) 09/02/2018         The following acute and/or chronic problems were also addressed today:      The patient is a [de-identified] y.o. male who is seen for follow up of diabetes. Current monitoring regimen: BGs have been labile ranging between 50 and 250 . Glucose monitoring frequency: 3 times daily  Home blood sugar records: fasting range: 60-90, pre-lunch range: , pre-dinner range: 115-180  Any episodes of hypoglycemia? yes - 2-5 times/week with documented glucose 50-70s often occurring upon awakening or evening hours  Known diabetic complications: peripheral neuropathy, cardiovascular disease, and cerebrovascular disease  Current diabetic medications include: oral agent (monotherapy): metformin (generic) 1000 mg bid and insulin injections: Tresiba 80 units q AM + Novolog 6 units before meals plus additional based on sliding scale according to pre-meal glucose. Current Eye Exam (within one year): Yes - Mar 2022  Current Urine Microalbumin: Yes, normal - Oct 2022  Lab Results   Component Value Date    LABMICR <0.50 08/01/2022   Is She on ACE inhibitor or angiotensin II receptor blocker? Yes - irbesartan (Avapro)  Current Foot Exam (within one year): Yes - Nov 2022 by podiatrist      Weight trend: decreasing steadily  Prior visit with dietician: yes - 2015  Current diet: meals per day on average: 2; restricting intake of the following:   Current exercise: no regular exercise        Last HbA1C:    Lab Results   Component Value Date    LABA1C 6.6 (H) 01/31/2023     Lab Results   Component Value Date     01/31/2023       The patient is a [de-identified] y.o. male who is seen for evaluation of hyperlipidemia. He was tested because of hyperlipidemia w/ LDL goal < 70 + diabetes + CAD. The current state of this condition is no significant medication side effects noted and well controlled on Lipitor 80 mg q hs + Zetia 10 mg daily - taking as prescribed.        Last Lipid Panel:   Lab Results   Component Value Date    CHOL 97 01/31/2023    CHOL 103 08/01/2022    CHOL 114 04/01/2022     Lab Results   Component Value Date    TRIG 40 01/31/2023    TRIG 75 08/01/2022    TRIG 51 04/01/2022     Lab Results   Component Value Date    HDL 43 01/31/2023    HDL 48 08/01/2022    HDL 46 04/01/2022     Lab Results   Component Value Date    LDLCALC 46 01/31/2023    LDLCALC 40 08/01/2022    LDLCALC 57.8 04/01/2022     Lab Results   Component Value Date    LABVLDL 8 01/31/2023    LABVLDL 15 08/01/2022    LABVLDL 10.2 04/01/2022    VLDL 14 01/04/2022    VLDL 19 03/18/2021    VLDL 16 09/15/2020     Lab Results   Component Value Date    CHOLHDLRATIO 2.3 01/31/2023    CHOLHDLRATIO 2.1 08/01/2022    CHOLHDLRATIO 2.5 04/01/2022       Patient is here for follow-up of anemia w/ iron deficiency + vitamin b12 deficiency. The current state of this condition is asymptomatic and control uncertain - not currently on medications for this problem, not taking Integra Plus as prescribed, adverse effects: diarrhea.     Lab Results   Component Value Date    WBC 5.4 08/01/2022    WBC 5.7 04/01/2022    WBC 5.4 01/04/2022     Lab Results   Component Value Date    HGB 11.1 (L) 08/01/2022    HGB 11.2 (L) 04/01/2022    HGB 10.6 (L) 01/04/2022     Lab Results   Component Value Date    HCT 36.1 (L) 08/01/2022    HCT 35.8 (L) 04/01/2022    HCT 33.9 (L) 01/04/2022     Lab Results   Component Value Date    MCV 96.3 08/01/2022    MCV 93.2 04/01/2022    MCV 92 01/04/2022     Lab Results   Component Value Date     08/01/2022     04/01/2022     01/04/2022       Lab Results   Component Value Date    IRON 70 09/15/2021    IRON 69 12/10/2020    IRON 37 (L) 07/06/2020     Lab Results   Component Value Date    TIBC 298 09/15/2021    TIBC 304 12/10/2020    TIBC 368 07/06/2020     Lab Results   Component Value Date    LABIRON 10 (L) 07/06/2020    TRFS 23 09/15/2021    TRFS 23 12/10/2020     Lab Results   Component Value Date    FERRITIN 101 09/15/2021    FERRITIN 37 07/06/2020     Lab Results   Component Value Date    NFNXCZPA30 1499 (H) 07/06/2020    BHZYRDSF40 191 (L) 09/26/2019     Lab Results   Component Value Date    FOLATE 13.3 07/06/2020    FOLATE 15.1 09/26/2019       The patient is a [de-identified] y.o. male who is seen for follow-up of hypertension. He is not exercising and is not adherent to low salt diet recently due to frequent consumption of soup. Daily caffiene intake: a known amount (2 servings/day). Current medication regimen consists of: Avapro 300 mg daily + Hydralazine 50 mg bid + Carvedilol 12.5 mg bid + Spironolactone 25 mg daily + Lasix 40 mg bid +/- 3rd dose based on volume of swelling. Blood pressure is well controlled at home, ranging 's/60-80's with occasional jump to 051-101Z systolic. BP Readings from Last 3 Encounters:   01/31/23 120/70   10/31/22 128/72   10/26/22 136/60       Patient is here for follow-up of GERD. The current state of this condition is no significant medication side effects noted and well controlled on Protonix 40 mg daily - taking as prescribed. Patient is here for follow-up of mood disorder. The current state of this condition is no significant medication side effects noted and reasonably well controlled on Buspar 7.5 mg bid - taking as prescribed. Additional concerns addressed today include request for updated neurology referral since he never did see them after referral we placed in 2020. He has a history of TIA in Sept 2019 with no acute findings found on CT but notes residual L sided weakness. Review of Systems   Constitutional:  Positive for fatigue (occasional) and unexpected weight change (lost 13 pounds since last visit). HENT:  Positive for hearing loss (L) and postnasal drip. Negative for ear pain. Eyes:  Positive for visual disturbance. Respiratory:  Positive for shortness of breath (exertional). Cardiovascular:  Positive for leg swelling (chronic). Negative for chest pain and palpitations.    Gastrointestinal:  Positive for abdominal distention, constipation, diarrhea (with retrial on Integra Plus) and nausea (intermittent). Negative for abdominal pain, blood in stool and vomiting. Negative for heartburn   Endocrine: Negative for polydipsia. Genitourinary:  Negative for frequency. Musculoskeletal:  Negative for myalgias. Allergic/Immunologic: Positive for environmental allergies (perennial with seasonal variation). Neurological:  Positive for weakness (L sided s/p CVA), numbness (bilateral lower extremities R > L) and headaches (occasional). Negative for dizziness. Psychiatric/Behavioral:  Negative for sleep disturbance. The patient is nervous/anxious (easily irritated/mood disorder).           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 (HCC)     R - Moderate, L - Mild    Diastolic congestive heart failure ([address]     Diverticulosis of colon 06/2010    DVT (deep venous thrombosis) ([facility])     RLE    Extrinsic allergic asthma     GERD (gastroesophageal reflux disease)     managed well with meds    History of TIA (transient ischemic attack) 11/09/2017    slight weakness to LLE    Hx of colonic polyps 07/29/2010    Hyperplastic     Hyperlipidemia LDL goal < 70     Hypertension     Lymphedema     Morbid obesity ([address]     Obstructive sleep apnea     Non-Compliant with CPAP    Perennial allergic rhinitis with seasonal variation     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 ([address]     Presence of IVC filter     Pulmonary embolism ([address] 03/2014    Bilateral - Completed 6 months on Xarelto    Stroke ([address] 2018, 2019,2010    left sided weakness    Type 2 diabetes mellitus ([address]          Past Surgical History:   Procedure Laterality Date    APPENDECTOMY  12/06/2019    BUNIONECTOMY Bilateral     CIRCUMCISION      COLONOSCOPY N/A 10/24/2019    COLONOSCOPY/ 42/ 622 performed by Veronika Donald MD at [facility] ENDOSCOPY    COLONOSCOPY  07/29/2010    Diverticulosis & Colon/Rectal Polyps - Due 2020    HERNIA REPAIR Bilateral     Inguinal, Repeat on Both Sides Separately    IR IVC FILTER PLACEMENT W IMAGING  10/22/2019    IR IVC FILTER PLACEMENT W IMAGING 10/22/2019 [facility]    IR IVC FILTER RETRIEVAL  02/27/2020    IR IVC FILTER RETRIEVAL 2/27/2020 [facility]    KNEE ARTHROSCOPY Right 1991    x 3    SINUS SURGERY PROC UNLISTED  1970's    TOTAL COLECTOMY Right 12/06/2019    TOTAL KNEE ARTHROPLASTY Right 2006    UPPER GASTROINTESTINAL ENDOSCOPY  06/29/2021    Normal Findings, Dilation Performed    UPPER GASTROINTESTINAL ENDOSCOPY  5/9/2011              Social History     Socioeconomic History    Marital status:       Spouse name: None    Number of children: None    Years of education: None    Highest education level: None   Tobacco Use    Smoking status: Never    Smokeless tobacco: Never   Vaping Use    Vaping Use: Never used   Substance and Sexual Activity    Alcohol use: Not Currently    Drug use: Never     Social Determinants of Health     Physical Activity: Inactive    Days of Exercise per Week: 0 days    Minutes of Exercise per Session: 0 min         Family History   Problem Relation Age of Onset    Heart Disease Mother     Diabetes Mother     Stroke Mother     Stroke Father     Diabetes Father     Hypertension Sister     Cancer Sister     Heart Attack Sister 76    Prostate Cancer Brother     Hypertension Brother     Hypertension Brother     Prostate Cancer Brother     Glaucoma Maternal Grandmother     Diabetes Maternal Grandmother     Cancer Paternal Aunt     Diabetes Paternal Aunt     Cancer Paternal Uncle         Colon    Diabetes Paternal Uncle          Current Outpatient Medications Medication Sig Dispense Refill    carvedilol (COREG) 12.5 MG tablet Take 1 tablet by mouth twice daily 180 tablet 3    cephALEXin (KEFLEX) 500 MG capsule Take 500 mg by mouth 3 times daily      ferrous sulfate (IRON 325) 325 (65 Fe) MG tablet Take 1 tablet by mouth      loratadine (CLARITIN) 10 MG tablet Take 10 mg by mouth daily      Insulin Pen Needle (B-D ULTRAFINE III SHORT PEN) 31G X 8 MM MISC 1 each by Does not apply route daily Use once daily to inject Tresiba 100 each 3    atorvastatin (LIPITOR) 80 MG tablet Take 1 tablet by mouth daily 90 tablet 3    spironolactone (ALDACTONE) 25 MG tablet Take 1 tablet by mouth daily 90 tablet 3    ONE TOUCH ULTRASOFT LANCETS MISC Use to check glucose 3 times/day. Dx: E11.59, I10 300 each 3    tamsulosin (FLOMAX) 0.4 MG capsule Take 1 capsule by mouth daily Take 1 capsule by mouth once daily 90 capsule 3    insulin aspart (NOVOLOG) 100 UNIT/ML injection pen Inject 6 units standing dose + additional insulin according to sliding scale (up to 14 units) subQ before each meal 38 mL 3    Insulin Syringe-Needle U-100 31G X 5/16\" 0.3 ML MISC Use 1 syringe to inject insulin once daily 100 each 3    fexofenadine (ALLEGRA) 180 MG tablet Take 1 tablet by mouth in the morning.  90 tablet 3    UmKij-RgXtmv-[id number] Cmp-C-Biot (INTEGRA PLUS) CAPS Take 1 capsule by mouth daily 30 capsule 5    albuterol sulfate HFA (PROVENTIL;VENTOLIN;PROAIR) 108 (90 Base) MCG/ACT inhaler Inhale 2 puffs into the lungs every 4 hours as needed for Wheezing 18 g 5    acetaminophen (TYLENOL) 500 MG tablet Take 1,000 mg by mouth every 6 hours as needed      busPIRone (BUSPAR) 15 MG tablet Take 15 mg by mouth daily      ezetimibe (ZETIA) 10 MG tablet Take 10 mg by mouth      fluticasone (FLONASE) 50 MCG/ACT nasal spray 2 sprays by Nasal route daily      furosemide (LASIX) 40 MG tablet Take 40 mg by mouth 2 times daily      hydrALAZINE (APRESOLINE) 50 MG tablet Take 50 mg by mouth 2 times daily HYDROcodone-acetaminophen (NORCO)  MG per tablet Take 1 tablet by mouth every 8 hours as needed. Insulin Degludec (TRESIBA FLEXTOUCH) 200 UNIT/ML SOPN Inject 85 units subQ every morning. irbesartan (AVAPRO) 300 MG tablet Take 300 mg by mouth      metFORMIN (GLUCOPHAGE) 1000 MG tablet Take 1 tablet by mouth twice daily with meals      naloxone 4 MG/0.1ML LIQD nasal spray 1 spray by Nasal route once as needed      nitroGLYCERIN (NITROSTAT) 0.4 MG SL tablet Place 0.4 mg under the tongue      nystatin (MYCOSTATIN) 648737 UNIT/GM powder Apply topically 4 times daily      pantoprazole (PROTONIX) 40 MG tablet Take 40 mg by mouth every morning (before breakfast)      polyethylene glycol (GLYCOLAX) 17 GM/SCOOP powder Take 17 g by mouth daily      potassium chloride 20 MEQ/15ML (10%) oral solution TAKE 15 ML BY MOUTH TWICE DAILY      pregabalin (LYRICA) 100 MG capsule Take 100 mg by mouth 3 times daily. No current facility-administered medications for this visit. Patient's complete Health Risk Assessment and screening values have been reviewed and are found in Flowsheets. The following problems were reviewed today and where indicated follow up appointments were made and/or referrals ordered. Positive Risk Factor Screenings with Interventions:       Cognitive: Words recalled: 3 Words Recalled   Clock Drawing Test (CDT): (!) Abnormal   Total Score: 3   Total Score Interpretation: Normal Mini-Cog      Interventions:  Patient declines any further evaluation or treatment            Opioid Risk: (Low risk score <55) Opioid risk score: 12    Patient is low risk for opioid use disorder or overdose.   Last PDMP [place] as Reviewed:  Review User Review Instant Review Result                    Weight and Activity:  Physical Activity: Inactive    Days of Exercise per Week: 0 days    Minutes of Exercise per Session: 0 min     On average, how many days per week do you engage in moderate to strenuous exercise (like a brisk walk)?: 0 days  Have you lost any weight without trying in the past 3 months?: (!) Yes  Body mass index: (!) 40.87      Inactivity Interventions:  Recommendations: patient encouraged to increase physical activity    Unintentional Weight Loss Interventions:  None - weight fluctuates due to water weight  Obesity Interventions:  Patient declines any further evaluation or treatment         Hearing Screen:  Do you or your family notice any trouble with your hearing that hasn't been managed with hearing aids?: (!) Yes    Interventions:  Patient declines any further evaluation or treatment     Safety:  Do you have any tripping hazards - loose or unsecured carpets or rugs?: (!) Yes  Do you have either shower bars, grab bars, non-slip mats or non-slip surfaces in your shower or bathtub?: (!) No  Do you always fasten your seatbelt when you are in a car?: (!) No  Interventions:  Patient declined any further interventions or treatment    ADL's:   Patient reports needing help with:  Select all that apply: (!) Housekeeping, Shopping, Transportation  Interventions:  Patient has family support to help with these tasks    Advanced Directives:  Do you have a Living Will?: (!) No    Intervention:  has NO advanced directive - information provided               Objective   Vitals:    01/31/23 0953   BP: 120/70   Site: Right Upper Arm   Position: Sitting   Cuff Size: Large Adult   Pulse: 60   Temp: 97.3 °F (36.3 °C)   TempSrc: Temporal   SpO2: 98%   Weight: 261 lb (118.4 kg)   Height: 5' 7\" (1.702 m)      Body mass index is 40.88 kg/m². Physical Exam  Constitutional:       Appearance: Normal appearance. He is obese. HENT:      Head: Normocephalic and atraumatic. Right Ear: Tympanic membrane, ear canal and external ear normal.      Left Ear: Tympanic membrane, ear canal and external ear normal.      Nose: Nose normal.      Right Turbinates: Not swollen. Left Turbinates: Not swollen.       Mouth/Throat: Mouth: Mucous membranes are moist.      Pharynx: Oropharynx is clear. Eyes:      Extraocular Movements: Extraocular movements intact. Conjunctiva/sclera: Conjunctivae normal.      Pupils: Pupils are equal, round, and reactive to light. Neck:      Thyroid: No thyromegaly. Vascular: No carotid bruit. Cardiovascular:      Rate and Rhythm: Normal rate and regular rhythm. Heart sounds: Normal heart sounds. Pulmonary:      Effort: Pulmonary effort is normal.      Breath sounds: Normal breath sounds. Abdominal:      General: Bowel sounds are normal.      Palpations: Abdomen is soft. Tenderness: There is no abdominal tenderness. Musculoskeletal:      Cervical back: Normal range of motion. Right lower leg: Edema (chronic) present. Left lower leg: Edema (chronic) present. Comments: Uses cane to ambulate   Lymphadenopathy:      Head:      Right side of head: No submandibular or tonsillar adenopathy. Left side of head: No submandibular or tonsillar adenopathy. Skin:     General: Skin is warm and dry. Neurological:      Mental Status: He is alert and oriented to person, place, and time. Psychiatric:         Mood and Affect: Mood normal.         Behavior: Behavior normal.         Thought Content: Thought content normal.         Judgment: Judgment normal.            Allergies   Allergen Reactions    Enalapril Maleate Other (See Comments) and Shortness Of Breath     Prior to Visit Medications    Medication Sig Taking?  Authorizing Provider   carvedilol (COREG) 12.5 MG tablet Take 1 tablet by mouth twice daily Yes Nghia Lazar MD   cephALEXin (KEFLEX) 500 MG capsule Take 500 mg by mouth 3 times daily Yes Historical Provider, MD   ferrous sulfate (IRON 325) 325 (65 Fe) MG tablet Take 1 tablet by mouth Yes Historical Provider, MD   loratadine (CLARITIN) 10 MG tablet Take 10 mg by mouth daily Yes Historical Provider, MD   Insulin Pen Needle (B-D ULTRAFINE III SHORT PEN) 31G X 8 MM MISC 1 each by Does not apply route daily Use once daily to inject Tresiba Yes Sue Garza PA-C   atorvastatin (LIPITOR) 80 MG tablet Take 1 tablet by mouth daily Yes Ofelia Duverney, MD   spironolactone (ALDACTONE) 25 MG tablet Take 1 tablet by mouth daily Yes Ofelia Duverney, MD   ONE TOUCH ULTRASOFT LANCETS MISC Use to check glucose 3 times/day. Dx: E11.59, I10 Yes Sue Marquez PA-C   tamsulosin (FLOMAX) 0.4 MG capsule Take 1 capsule by mouth daily Take 1 capsule by mouth once daily Yes Genoveva Bermudez MD   insulin aspart (NOVOLOG) 100 UNIT/ML injection pen Inject 6 units standing dose + additional insulin according to sliding scale (up to 14 units) subQ before each meal Yes Sue Garza PA-C   Insulin Syringe-Needle U-100 31G X 5/16\" 0.3 ML MISC Use 1 syringe to inject insulin once daily Yes Sue Garza PA-C   fexofenadine (ALLEGRA) 180 MG tablet Take 1 tablet by mouth in the morning.  Yes Hank Queen PA-C   DiDwq-HiWjav-II-B Cmp-C-Biot (INTEGRA PLUS) CAPS Take 1 capsule by mouth daily Yes Sue Garza PA-C   albuterol sulfate HFA (PROVENTIL;VENTOLIN;PROAIR) 108 (90 Base) MCG/ACT inhaler Inhale 2 puffs into the lungs every 4 hours as needed for Wheezing Yes Sue Garza PA-C   acetaminophen (TYLENOL) 500 MG tablet Take 1,000 mg by mouth every 6 hours as needed Yes Ar Automatic Reconciliation   busPIRone (BUSPAR) 15 MG tablet Take 15 mg by mouth daily Yes Ar Automatic Reconciliation   ezetimibe (ZETIA) 10 MG tablet Take 10 mg by mouth Yes Ar Automatic Reconciliation   fluticasone (FLONASE) 50 MCG/ACT nasal spray 2 sprays by Nasal route daily Yes Ar Automatic Reconciliation   furosemide (LASIX) 40 MG tablet Take 40 mg by mouth 2 times daily Yes Ar Automatic Reconciliation   hydrALAZINE (APRESOLINE) 50 MG tablet Take 50 mg by mouth 2 times daily Yes Ar Automatic Reconciliation   HYDROcodone-acetaminophen (NORCO)  MG per tablet Take 1 tablet by mouth every 8 hours as needed. Yes Ar Automatic Reconciliation   Insulin Degludec (TRESIBA FLEXTOUCH) 200 UNIT/ML SOPN Inject 85 units subQ every morning. Yes Ar Automatic Reconciliation   irbesartan (AVAPRO) 300 MG tablet Take 300 mg by mouth Yes Ar Automatic Reconciliation   metFORMIN (GLUCOPHAGE) 1000 MG tablet Take 1 tablet by mouth twice daily with meals Yes Ar Automatic Reconciliation   naloxone 4 MG/0.1ML LIQD nasal spray 1 spray by Nasal route once as needed Yes Ar Automatic Reconciliation   nitroGLYCERIN (NITROSTAT) 0.4 MG SL tablet Place 0.4 mg under the tongue Yes Ar Automatic Reconciliation   nystatin (MYCOSTATIN) 519466 UNIT/GM powder Apply topically 4 times daily Yes Ar Automatic Reconciliation   pantoprazole (PROTONIX) 40 MG tablet Take 40 mg by mouth every morning (before breakfast) Yes Ar Automatic Reconciliation   polyethylene glycol (GLYCOLAX) 17 GM/SCOOP powder Take 17 g by mouth daily Yes Ar Automatic Reconciliation   potassium chloride 20 MEQ/15ML (10%) oral solution TAKE 15 ML BY MOUTH TWICE DAILY Yes Ar Automatic Reconciliation   pregabalin (LYRICA) 100 MG capsule Take 100 mg by mouth 3 times daily.  Yes Ar Automatic Reconciliation       CareTeam (Including outside providers/suppliers regularly involved in providing care):   Patient Care Team:  Angelito Rodriguez MD as PCP - Mariel Ureña MD as PCP - [facility]  Angelito Rodriguez MD as Physician  Noble Mcdowell MD as Physician  Edelmira Martinez MD (Podiatry)  Debbie Batista MD as Consulting Physician (Cardiology)  Ayleen Del Cid MD (Urology)  Kelly Alberto MD (Ophthalmology)     Reviewed and updated this visit:  Tobacco  Meds  Med Hx  Surg Hx  Soc Hx  Fam Hx        Greater than 50% of this 45 minute visit separate from [facility] components was spent counseling Mikey Roldan about his lab results, status of chronic conditions, recommended medication changes and lifestyle modifications to gain or maintain good control of his overall health.

## 2023-02-01 PROBLEM — F39 UNSPECIFIED MOOD (AFFECTIVE) DISORDER (HCC): Status: ACTIVE | Noted: 2023-02-01

## 2023-02-01 LAB
FERRITIN SERPL-MCNC: 201 NG/ML (ref 8–388)
IRON SATN MFR SERPL: 22 %
IRON SERPL-MCNC: 56 UG/DL (ref 35–150)
TIBC SERPL-MCNC: 255 UG/DL (ref 250–450)

## 2023-02-02 NOTE — RESULT ENCOUNTER NOTE
No signs of iron deficiency so will not pursue iron replacement given previous intoelrance. I DO want him to get started on OTC vitamin b12 sublingual or quick dissolve daily and take as consistently as he would his prescriptions.

## 2023-02-06 ENCOUNTER — TELEPHONE (OUTPATIENT)
Dept: INTERNAL MEDICINE CLINIC | Facility: CLINIC | Age: 83
End: 2023-02-06

## 2023-02-06 NOTE — TELEPHONE ENCOUNTER
Pt states that his pharmacy has no record of him receiving a 2nd shingles vaccine and he wants to know if you would like for him to go ahead and proceed with receiving the 2nd dose.

## 2023-02-07 ENCOUNTER — TELEPHONE (OUTPATIENT)
Dept: INTERNAL MEDICINE CLINIC | Facility: CLINIC | Age: 83
End: 2023-02-07

## 2023-02-07 NOTE — TELEPHONE ENCOUNTER
NICU team called prior to delivery for fetal tachycardia, lower position after pushing efforts. [place] brought to warmer after immediate cord clamping in OR. Low tone, blue color, no resp effort.  immediately dried and stimulated, second RN called. PPV initiated at approx 1 min for HR auscultated at 45 by second RN and code immediately called. NICU RN arrival shortly after code called and PPV/CPAP assumed by NICU. HR showing improvement at this time to 130s per second RN.  Care of  from approx 3 min of life to 0200 under NNP and NICU nurse as  needed IV placement for chorio diagnosis.    Pt notified and verbalized understanding.

## 2023-02-07 NOTE — TELEPHONE ENCOUNTER
----- Message from Mikael Watkins PA-C sent at 2/6/2023  7:09 PM EST -----  Please advise patient to reduce Tresiba to 70 units due to frequency of low blood sugar readings in the morning. Also make sure he is having a protein rich snack like a cheese stick, greek yogurt or handful of nuts every night before bed.

## 2023-04-10 NOTE — PROGRESS NOTES
Hospitalist Note     Admit Date:  10/15/2019  7:11 PM   Name:  Seema Oliva   Age:  70 y.o.  :  1940   MRN:  [id number]   PCP:  Ina Grant MD  Treatment Team: Attending Provider: Aric Quintero MD; Consulting Provider: Zabrina Heredia MD; Utilization Review: Angélica Spring RN; : GISELLA Clemons    subj:     Patient is a 71 y/o male with hx DM, DVT/PE, diastolic CHF, CKD 3, asthma who presents to ED with several episodes of copious blood and clots per rectum. No pain but has had episodes of acute urgency. He was admitted -10/1 for cecal diverticulitis. He has completed antibiotics. Last hemoglobin checked was 10.7 on 10/8. Today is 8.6 with gross blood and clots seen in bedside commode. His last colonoscopy over 10 years ago and he had polyps. He takes aspirin and plavix. No chest pain or shortness of breath. GI has been notified. He will be admitted for further workup. 10/17  Had additional bleeding episode yesterday evening. No TRBC scan done at that time. Hg dropped to 6.6 this AM, transfusion ongoing. No further bloody BM. LLQ slightly sore, no pain. No nausea.     Objective:     Patient Vitals for the past 24 hrs:   Temp Pulse Resp BP SpO2   10/17/19 1405 97.4 °F (36.3 °C) 83 18 138/75 100 %   10/17/19 1304 96.5 °F (35.8 °C) 79 18 144/76 100 %   10/17/19 1214 97.3 °F (36.3 °C) 89 18 144/71    10/17/19 1149 97.3 °F (36.3 °C) 93 18 147/65 99 %   10/17/19 1028 98.3 °F (36.8 °C) 87 18 126/66 100 %   10/17/19 0948 97.5 °F (36.4 °C) 90  143/72    10/17/19 0902 97.7 °F (36.5 °C) 94 18 138/70 96 %   10/17/19 0806 97.6 °F (36.4 °C) 90 18 137/65 98 %   10/17/19 0742 97.6 °F (36.4 °C) 89 18 135/64 98 %   10/17/19 0420 98 °F (36.7 °C) 96 18 155/78 95 %   10/16/19 2327 98.3 °F (36.8 °C) 93 18 156/69 96 %   10/16/19 2028 97.8 °F (36.6 °C) 94 18 165/75 97 %   10/16/19 1629 98.8 °F (37.1 °C) 88 18 146/69 94 %     Oxygen Therapy  O2 Sat (%): 100 % (10/17/19 1405)  Pulse via Oximetry: 105 beats per minute (10/15/19 2346)  O2 Device: Nasal cannula (10/17/19 0742)  O2 Flow Rate (L/min): 2 l/min (10/17/19 0742)    Intake/Output Summary (Last 24 hours) at 10/17/2019 1458  Last data filed at 10/16/2019 2327  Gross per 24 hour   Intake    Output 1100 ml   Net -1100 ml       Physical Exam:  General:    Well nourished. Alert. Pale obese  CV:   RRR. No murmur, rub, or gallop. Lungs:  CTAB. No wheezing, rhonchi, or rales. Abdomen: Soft, not distended, active bowel sounds, non tender  Extremities: Warm and dry. No cyanosis or edema. Neurologic: grossly intact. Skin:     No rashes or jaundice. No wounds. Psych:  Normal mood and affect. I reviewed the labs, imaging, EKGs, telemetry, and other studies done this admission.   Data Review:   Recent Results (from the past 24 hour(s))   HEMOGLOBIN    Collection Time: 10/16/19  8:43 PM   Result Value Ref Range    HGB 8.1 (L) 13.6 - 17.2 g/dL   GLUCOSE, POC    Collection Time: 10/16/19  9:43 PM   Result Value Ref Range    Glucose (POC) 167 (H) 65 - 100 mg/dL   HEMOGLOBIN    Collection Time: 10/17/19  4:28 AM   Result Value Ref Range    HGB 6.6 (LL) 13.6 - 17.2 g/dL   GLUCOSE, POC    Collection Time: 10/17/19  7:38 AM   Result Value Ref Range    Glucose (POC) 94 65 - 100 mg/dL   GLUCOSE, POC    Collection Time: 10/17/19 12:19 PM   Result Value Ref Range    Glucose (POC) 143 (H) 65 - 100 mg/dL       Imaging Studies:  CXR Results  (Last 48 hours)    None        CT Results  (Last 48 hours)    None          Assessment and Plan:     Hospital Problems as of 10/17/2019 Date Reviewed: 9/18/2019          Codes Class Noted - Resolved POA    * (Principal) GI bleed ICD-10-CM: K92.2  ICD-9-CM: 578.9  10/15/2019 - Present Yes        Cecal diverticulitis ICD-10-CM: K57.32  ICD-9-CM: 562.11  9/29/2019 - Present Yes        Type 2 diabetes mellitus without complication ([facility] [id number]: E11.9  ICD-9-CM: 250.00  3/21/2019 - Present Yes        Sleep apnea ICD-10-CM: G47.30  ICD-9-CM: 780.57  11/9/2017 - Present Yes        Diastolic CHF, chronic (HCC) (Chronic) ICD-10-CM: I50.32  ICD-9-CM: 428.32, 428.0  3/20/2017 - Present Yes        Essential hypertension, benign (Chronic) ICD-10-CM: I10  ICD-9-CM: 401.1  1/22/2015 - Present Yes        Morbid obesity ([address] (Chronic) ICD-10-CM: E66.01  ICD-9-CM: 278.01  6/27/2011 - Present Yes              PLAN:    Monitor Hb  Liquid diet, no active bleeding at this time - TRBC scan per GI if bleeds again. Suspect diverticular bleed, defer endoscopy for now. Possible IR after TRBC.    Has low normal blood glucose, asymptomatic  PPI iv  Appreciate GI assistance    Dispo: home     Signed:  Lorenzo Akbar MD No

## 2023-05-25 ENCOUNTER — HOSPITAL ENCOUNTER (OUTPATIENT)
Dept: LAB | Age: 83
Discharge: HOME | End: 2023-05-28

## 2023-05-25 ENCOUNTER — OFFICE VISIT (OUTPATIENT)
Dept: INTERNAL MEDICINE CLINIC | Facility: CLINIC | Age: 83
End: 2023-05-25
Payer: MEDICARE

## 2023-05-25 VITALS
HEIGHT: 67 IN | DIASTOLIC BLOOD PRESSURE: 60 MMHG | BODY MASS INDEX: 40.49 KG/M2 | HEART RATE: 60 BPM | TEMPERATURE: 97.5 F | SYSTOLIC BLOOD PRESSURE: 134 MMHG | WEIGHT: 258 LBS | OXYGEN SATURATION: 98 %

## 2023-05-25 DIAGNOSIS — D50.9 IRON DEFICIENCY ANEMIA, UNSPECIFIED IRON DEFICIENCY ANEMIA TYPE: ICD-10-CM

## 2023-05-25 DIAGNOSIS — I25.119 ATHEROSCLEROSIS OF NATIVE CORONARY ARTERY OF NATIVE HEART WITH ANGINA PECTORIS (HCC): ICD-10-CM

## 2023-05-25 DIAGNOSIS — N18.31 STAGE 3A CHRONIC KIDNEY DISEASE (HCC): ICD-10-CM

## 2023-05-25 DIAGNOSIS — Z12.5 SCREENING FOR PROSTATE CANCER: ICD-10-CM

## 2023-05-25 DIAGNOSIS — I50.32 CHRONIC DIASTOLIC (CONGESTIVE) HEART FAILURE (HCC): ICD-10-CM

## 2023-05-25 DIAGNOSIS — E11.649 HYPOGLYCEMIA ASSOCIATED WITH TYPE 2 DIABETES MELLITUS (HCC): ICD-10-CM

## 2023-05-25 DIAGNOSIS — E53.8 VITAMIN B12 DEFICIENCY: ICD-10-CM

## 2023-05-25 DIAGNOSIS — I10 ESSENTIAL HYPERTENSION WITH GOAL BLOOD PRESSURE LESS THAN 130/85: ICD-10-CM

## 2023-05-25 DIAGNOSIS — E78.5 HYPERLIPIDEMIA LDL GOAL <70: ICD-10-CM

## 2023-05-25 DIAGNOSIS — R07.9 LEFT-SIDED CHEST PAIN: ICD-10-CM

## 2023-05-25 DIAGNOSIS — E11.59 TYPE 2 DIABETES MELLITUS WITH OTHER CIRCULATORY COMPLICATION, WITH LONG-TERM CURRENT USE OF INSULIN (HCC): ICD-10-CM

## 2023-05-25 DIAGNOSIS — Z79.4 TYPE 2 DIABETES MELLITUS WITH OTHER CIRCULATORY COMPLICATION, WITH LONG-TERM CURRENT USE OF INSULIN (HCC): ICD-10-CM

## 2023-05-25 DIAGNOSIS — E11.59 TYPE 2 DIABETES MELLITUS WITH OTHER CIRCULATORY COMPLICATION, WITH LONG-TERM CURRENT USE OF INSULIN (HCC): Primary | ICD-10-CM

## 2023-05-25 DIAGNOSIS — Z79.4 TYPE 2 DIABETES MELLITUS WITH OTHER CIRCULATORY COMPLICATION, WITH LONG-TERM CURRENT USE OF INSULIN (HCC): Primary | ICD-10-CM

## 2023-05-25 DIAGNOSIS — N18.31 CHRONIC KIDNEY DISEASE, STAGE 3A (HCC): ICD-10-CM

## 2023-05-25 DIAGNOSIS — I10 ELEVATED BLOOD PRESSURE READING IN OFFICE WITH DIAGNOSIS OF HYPERTENSION: ICD-10-CM

## 2023-05-25 LAB
BASOPHILS # BLD: 0 K/UL (ref 0–0.2)
BASOPHILS NFR BLD: 1 % (ref 0–2)
DIFFERENTIAL METHOD BLD: ABNORMAL
EOSINOPHIL # BLD: 0.1 K/UL (ref 0–0.8)
EOSINOPHIL NFR BLD: 3 % (ref 0.5–7.8)
ERYTHROCYTE [DISTWIDTH] IN BLOOD BY AUTOMATED COUNT: 14.7 % (ref 11.9–14.6)
EST. AVERAGE GLUCOSE BLD GHB EST-MCNC: 131 MG/DL
HBA1C MFR BLD: 6.2 % (ref 4.8–5.6)
HCT VFR BLD AUTO: 33.4 % (ref 41.1–50.3)
HGB BLD-MCNC: 10.1 G/DL (ref 13.6–17.2)
IMM GRANULOCYTES # BLD AUTO: 0 K/UL (ref 0–0.5)
IMM GRANULOCYTES NFR BLD AUTO: 0 % (ref 0–5)
LYMPHOCYTES # BLD: 2.2 K/UL (ref 0.5–4.6)
LYMPHOCYTES NFR BLD: 39 % (ref 13–44)
MCH RBC QN AUTO: 29.3 PG (ref 26.1–32.9)
MCHC RBC AUTO-ENTMCNC: 30.2 G/DL (ref 31.4–35)
MCV RBC AUTO: 96.8 FL (ref 82–102)
MONOCYTES # BLD: 0.5 K/UL (ref 0.1–1.3)
MONOCYTES NFR BLD: 9 % (ref 4–12)
NEUTS SEG # BLD: 2.8 K/UL (ref 1.7–8.2)
NEUTS SEG NFR BLD: 49 % (ref 43–78)
NRBC # BLD: 0 K/UL (ref 0–0.2)
PLATELET # BLD AUTO: 212 K/UL (ref 150–450)
PMV BLD AUTO: 10.3 FL (ref 9.4–12.3)
RBC # BLD AUTO: 3.45 M/UL (ref 4.23–5.6)
WBC # BLD AUTO: 5.6 K/UL (ref 4.3–11.1)

## 2023-05-25 PROCEDURE — 1123F ACP DISCUSS/DSCN MKR DOCD: CPT | Performed by: PHYSICIAN ASSISTANT

## 2023-05-25 PROCEDURE — G8427 DOCREV CUR MEDS BY ELIG CLIN: HCPCS | Performed by: PHYSICIAN ASSISTANT

## 2023-05-25 PROCEDURE — 3078F DIAST BP <80 MM HG: CPT | Performed by: PHYSICIAN ASSISTANT

## 2023-05-25 PROCEDURE — 99215 OFFICE O/P EST HI 40 MIN: CPT | Performed by: PHYSICIAN ASSISTANT

## 2023-05-25 PROCEDURE — 3044F HG A1C LEVEL LT 7.0%: CPT | Performed by: PHYSICIAN ASSISTANT

## 2023-05-25 PROCEDURE — 3074F SYST BP LT 130 MM HG: CPT | Performed by: PHYSICIAN ASSISTANT

## 2023-05-25 PROCEDURE — G8417 CALC BMI ABV UP PARAM F/U: HCPCS | Performed by: PHYSICIAN ASSISTANT

## 2023-05-25 PROCEDURE — 1036F TOBACCO NON-USER: CPT | Performed by: PHYSICIAN ASSISTANT

## 2023-05-25 RX ORDER — BLOOD SUGAR DIAGNOSTIC
STRIP MISCELLANEOUS
Qty: 300 EACH | Refills: 3 | Status: SHIPPED | OUTPATIENT
Start: 2023-05-25 | End: 2023-05-30 | Stop reason: SDUPTHER

## 2023-05-25 ASSESSMENT — PATIENT HEALTH QUESTIONNAIRE - PHQ9
SUM OF ALL RESPONSES TO PHQ9 QUESTIONS 1 & 2: 0
SUM OF ALL RESPONSES TO PHQ QUESTIONS 1-9: 0
1. LITTLE INTEREST OR PLEASURE IN DOING THINGS: 0
2. FEELING DOWN, DEPRESSED OR HOPELESS: 0

## 2023-05-25 ASSESSMENT — ENCOUNTER SYMPTOMS: SHORTNESS OF BREATH: 1

## 2023-05-26 LAB
ALBUMIN SERPL-MCNC: 3.6 G/DL (ref 3.2–4.6)
ALBUMIN/GLOB SERPL: 1.1 (ref 0.4–1.6)
ALP SERPL-CCNC: 95 U/L (ref 50–136)
ALT SERPL-CCNC: 17 U/L (ref 12–65)
ANION GAP SERPL CALC-SCNC: 7 MMOL/L (ref 2–11)
AST SERPL-CCNC: 12 U/L (ref 15–37)
BILIRUB SERPL-MCNC: 0.3 MG/DL (ref 0.2–1.1)
BUN SERPL-MCNC: 21 MG/DL (ref 8–23)
CALCIUM SERPL-MCNC: 8.9 MG/DL (ref 8.3–10.4)
CHLORIDE SERPL-SCNC: 107 MMOL/L (ref 101–110)
CHOLEST SERPL-MCNC: 100 MG/DL
CO2 SERPL-SCNC: 27 MMOL/L (ref 21–32)
CREAT SERPL-MCNC: 1.4 MG/DL (ref 0.8–1.5)
GLOBULIN SER CALC-MCNC: 3.3 G/DL (ref 2.8–4.5)
GLUCOSE SERPL-MCNC: 117 MG/DL (ref 65–100)
HDLC SERPL-MCNC: 50 MG/DL (ref 40–60)
HDLC SERPL: 2
LDLC SERPL CALC-MCNC: 35.2 MG/DL
POTASSIUM SERPL-SCNC: 3.9 MMOL/L (ref 3.5–5.1)
PROT SERPL-MCNC: 6.9 G/DL (ref 6.3–8.2)
PSA SERPL-MCNC: 2.3 NG/ML
SODIUM SERPL-SCNC: 141 MMOL/L (ref 133–143)
TRIGL SERPL-MCNC: 74 MG/DL (ref 35–150)
VIT B12 SERPL-MCNC: 314 PG/ML (ref 193–986)
VLDLC SERPL CALC-MCNC: 14.8 MG/DL (ref 6–23)

## 2023-05-30 RX ORDER — BLOOD SUGAR DIAGNOSTIC
STRIP MISCELLANEOUS
Qty: 300 EACH | Refills: 3 | Status: SHIPPED | OUTPATIENT
Start: 2023-05-30

## 2023-05-30 ASSESSMENT — ENCOUNTER SYMPTOMS: NAUSEA: 1

## 2023-05-31 ENCOUNTER — TELEPHONE (OUTPATIENT)
Dept: INTERNAL MEDICINE CLINIC | Facility: CLINIC | Age: 83
End: 2023-05-31

## 2023-06-21 ENCOUNTER — OFFICE VISIT (OUTPATIENT)
Age: 83
End: 2023-06-21
Payer: MEDICARE

## 2023-06-21 VITALS
SYSTOLIC BLOOD PRESSURE: 138 MMHG | WEIGHT: 259.4 LBS | DIASTOLIC BLOOD PRESSURE: 70 MMHG | HEART RATE: 72 BPM | BODY MASS INDEX: 40.71 KG/M2 | HEIGHT: 67 IN

## 2023-06-21 DIAGNOSIS — R07.89 OTHER CHEST PAIN: Primary | ICD-10-CM

## 2023-06-21 DIAGNOSIS — I89.0 LYMPHEDEMA: ICD-10-CM

## 2023-06-21 DIAGNOSIS — E11.59 TYPE 2 DIABETES MELLITUS WITH OTHER CIRCULATORY COMPLICATION, WITH LONG-TERM CURRENT USE OF INSULIN (HCC): ICD-10-CM

## 2023-06-21 DIAGNOSIS — Z79.4 TYPE 2 DIABETES MELLITUS WITH OTHER CIRCULATORY COMPLICATION, WITH LONG-TERM CURRENT USE OF INSULIN (HCC): ICD-10-CM

## 2023-06-21 DIAGNOSIS — I10 ESSENTIAL HYPERTENSION WITH GOAL BLOOD PRESSURE LESS THAN 130/85: ICD-10-CM

## 2023-06-21 PROCEDURE — 93000 ELECTROCARDIOGRAM COMPLETE: CPT | Performed by: INTERNAL MEDICINE

## 2023-06-21 PROCEDURE — 3075F SYST BP GE 130 - 139MM HG: CPT | Performed by: INTERNAL MEDICINE

## 2023-06-21 PROCEDURE — G8417 CALC BMI ABV UP PARAM F/U: HCPCS | Performed by: INTERNAL MEDICINE

## 2023-06-21 PROCEDURE — 3078F DIAST BP <80 MM HG: CPT | Performed by: INTERNAL MEDICINE

## 2023-06-21 PROCEDURE — 1036F TOBACCO NON-USER: CPT | Performed by: INTERNAL MEDICINE

## 2023-06-21 PROCEDURE — 99214 OFFICE O/P EST MOD 30 MIN: CPT | Performed by: INTERNAL MEDICINE

## 2023-06-21 PROCEDURE — 1123F ACP DISCUSS/DSCN MKR DOCD: CPT | Performed by: INTERNAL MEDICINE

## 2023-06-21 PROCEDURE — G8427 DOCREV CUR MEDS BY ELIG CLIN: HCPCS | Performed by: INTERNAL MEDICINE

## 2023-06-21 ASSESSMENT — ENCOUNTER SYMPTOMS
COLOR CHANGE: 0
WHEEZING: 0
BLURRED VISION: 0
HEMATOCHEZIA: 0
COUGH: 0
HOARSE VOICE: 0
BACK PAIN: 0
ABDOMINAL PAIN: 0
VOMITING: 0
BOWEL INCONTINENCE: 0
NAUSEA: 0
HEMOPTYSIS: 0
ORTHOPNEA: 0
DIARRHEA: 0
HEMATEMESIS: 0
SHORTNESS OF BREATH: 1
SPUTUM PRODUCTION: 0

## 2023-06-21 NOTE — PROGRESS NOTES
production and wheezing. Endocrine: Negative for polydipsia, polyphagia and polyuria. Skin:  Negative for color change. Musculoskeletal:  Negative for back pain and neck pain. Gastrointestinal:  Negative for abdominal pain, bowel incontinence, diarrhea, hematemesis, hematochezia, nausea and vomiting. Genitourinary:  Negative for dysuria, frequency and hematuria. Neurological:  Negative for dizziness, focal weakness, headaches, light-headedness, loss of balance, numbness, sensory change and weakness. Psychiatric/Behavioral:  Negative for altered mental status and memory loss. PHYSICAL EXAM:   /70   Pulse 72   Ht 5' 7\" (1.702 m)   Wt 259 lb 6.4 oz (117.7 kg)   BMI 40.63 kg/m²      Physical Exam  Constitutional:       Appearance: Normal appearance. He is obese. HENT:      Head: Normocephalic and atraumatic. Nose: Nose normal.   Eyes:      Extraocular Movements: Extraocular movements intact. Pupils: Pupils are equal, round, and reactive to light. Neck:      Vascular: No carotid bruit. Cardiovascular:      Rate and Rhythm: Regular rhythm. Pulses: Normal pulses. Heart sounds: No murmur heard. Pulmonary:      Effort: Pulmonary effort is normal.      Breath sounds: Normal breath sounds. Abdominal:      General: Abdomen is flat. Bowel sounds are normal.      Palpations: Abdomen is soft. Musculoskeletal:         General: Swelling (3-4 + bilateral hard LE edema) present. Normal range of motion. Cervical back: Normal range of motion and neck supple. Right lower leg: Edema present. Left lower leg: Edema present. Skin:     General: Skin is warm and dry. Neurological:      General: No focal deficit present. Mental Status: He is alert and oriented to person, place, and time. Psychiatric:         Mood and Affect: Mood normal.       Medical problems and test results were reviewed with the patient today.      Recent Results (from the past 672

## 2023-06-22 RX ORDER — LANCETS
EACH MISCELLANEOUS
Qty: 300 EACH | Refills: 3 | Status: SHIPPED | OUTPATIENT
Start: 2023-06-22

## 2023-07-25 DIAGNOSIS — Z79.4 TYPE 2 DIABETES MELLITUS WITH OTHER CIRCULATORY COMPLICATION, WITH LONG-TERM CURRENT USE OF INSULIN (HCC): ICD-10-CM

## 2023-07-25 DIAGNOSIS — E11.59 TYPE 2 DIABETES MELLITUS WITH OTHER CIRCULATORY COMPLICATION, WITH LONG-TERM CURRENT USE OF INSULIN (HCC): ICD-10-CM

## 2023-07-25 RX ORDER — LANCETS 33 GAUGE
EACH MISCELLANEOUS
Qty: 100 EACH | Refills: 2 | OUTPATIENT
Start: 2023-07-25

## 2023-09-11 ENCOUNTER — PATIENT MESSAGE (OUTPATIENT)
Dept: INTERNAL MEDICINE CLINIC | Facility: CLINIC | Age: 83
End: 2023-09-11

## 2023-09-11 DIAGNOSIS — Z79.4 TYPE 2 DIABETES MELLITUS WITH OTHER CIRCULATORY COMPLICATION, WITH LONG-TERM CURRENT USE OF INSULIN (HCC): ICD-10-CM

## 2023-09-11 DIAGNOSIS — E11.59 TYPE 2 DIABETES MELLITUS WITH OTHER CIRCULATORY COMPLICATION, WITH LONG-TERM CURRENT USE OF INSULIN (HCC): ICD-10-CM

## 2023-09-12 RX ORDER — PEN NEEDLE, DIABETIC 31 GX5/16"
1 NEEDLE, DISPOSABLE MISCELLANEOUS DAILY
Qty: 100 EACH | Refills: 3 | Status: SHIPPED | OUTPATIENT
Start: 2023-09-12

## 2023-09-12 NOTE — TELEPHONE ENCOUNTER
From: Nallely Gomes  To: Moriah Gomez  Sent: 9/11/2023 8:30 PM EDT  Subject: Refill    Hi Sue,   This is Krystyna Smith, reaching out for my [employer]. He needs a new prescription for the needles for his insulin, Tresbia. He went by the pharmacy and they said he was out of refills. He ended up having to pay $35. Please let me know if you have any questions about sending the refill.      Dia Pelayo

## 2023-09-18 ENCOUNTER — OFFICE VISIT (OUTPATIENT)
Dept: UROLOGY | Age: 83
End: 2023-09-18
Payer: MEDICARE

## 2023-09-18 DIAGNOSIS — R35.0 BENIGN PROSTATIC HYPERPLASIA WITH URINARY FREQUENCY: Primary | ICD-10-CM

## 2023-09-18 DIAGNOSIS — N40.1 BENIGN PROSTATIC HYPERPLASIA WITH URINARY FREQUENCY: Primary | ICD-10-CM

## 2023-09-18 LAB
BILIRUBIN, URINE, POC: NEGATIVE
BLOOD URINE, POC: NEGATIVE
GLUCOSE URINE, POC: NEGATIVE
KETONES, URINE, POC: NEGATIVE
LEUKOCYTE ESTERASE, URINE, POC: NEGATIVE
NITRITE, URINE, POC: NEGATIVE
PH, URINE, POC: 5.5 (ref 4.6–8)
PROTEIN,URINE, POC: NEGATIVE
SPECIFIC GRAVITY, URINE, POC: 1.01 (ref 1–1.03)
URINALYSIS CLARITY, POC: NORMAL
URINALYSIS COLOR, POC: NORMAL
UROBILINOGEN, POC: NORMAL

## 2023-09-18 PROCEDURE — 99214 OFFICE O/P EST MOD 30 MIN: CPT | Performed by: UROLOGY

## 2023-09-18 PROCEDURE — G8427 DOCREV CUR MEDS BY ELIG CLIN: HCPCS | Performed by: UROLOGY

## 2023-09-18 PROCEDURE — 1036F TOBACCO NON-USER: CPT | Performed by: UROLOGY

## 2023-09-18 PROCEDURE — 1123F ACP DISCUSS/DSCN MKR DOCD: CPT | Performed by: UROLOGY

## 2023-09-18 PROCEDURE — 81003 URINALYSIS AUTO W/O SCOPE: CPT | Performed by: UROLOGY

## 2023-09-18 PROCEDURE — G8417 CALC BMI ABV UP PARAM F/U: HCPCS | Performed by: UROLOGY

## 2023-09-18 RX ORDER — TAMSULOSIN HYDROCHLORIDE 0.4 MG/1
0.4 CAPSULE ORAL DAILY
Qty: 90 CAPSULE | Refills: 3 | Status: SHIPPED | OUTPATIENT
Start: 2023-09-18

## 2023-09-18 ASSESSMENT — ENCOUNTER SYMPTOMS
NAUSEA: 0
EYE DISCHARGE: 0
DIARRHEA: 0
CONSTIPATION: 0
WHEEZING: 0
BLOOD IN STOOL: 0
HEARTBURN: 0
BACK PAIN: 0
VOMITING: 0
INDIGESTION: 0
SKIN LESIONS: 0
EYE PAIN: 0
SHORTNESS OF BREATH: 0
ABDOMINAL PAIN: 0
COUGH: 0

## 2023-09-18 NOTE — PROGRESS NOTES
[facility]  [address]  [phone]          Paula Sewell  : 1940    Chief Complaint   Patient presents with    Follow-up          HPI     Paula Sewell is a 82 y.o. male with a PMH of poorly controlled DM Type 2 on insulin who presents for follow up due to incomplete bladder emptying and to BPH. He reports a history of recurrent UTIs. He thinks that he empties his bladder completely. He started flomax 2019 and states this has been working well. Needs refills today. No history of  surgery. No hematuria. No bothersome urgency/frequency/incontinence/hematuria or other urinary concerns at this time as long as he takes flomax. Has not had recent PHIL. PSA 2.3 2023. No UTI recently. No history of stones. Of note, he does report unexplained RLQ abdominal pain today. Had this last year and has not resolved. Had CT A/P  that was negative for source.  cc today.     Lab Results   Component Value Date    PSA 2.3 2023    PSA 1.6 2022    PSA 1.6 12/10/2020                 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 (HCC)     R - Moderate, L - Mild    Diastolic congestive heart failure ([address])     Diverticulosis of colon 2010    DVT (deep venous thrombosis) (HCC)     RLE    Extrinsic allergic asthma     GERD (gastroesophageal reflux disease)     managed well with meds    History of TIA (transient ischemic attack) 2017    slight weakness to LLE    Hx of colonic polyps 2010    Hyperplastic     Hyperlipidemia LDL goal

## 2023-09-25 RX ORDER — FEXOFENADINE HCL 180 MG/1
180 TABLET ORAL DAILY
Qty: 90 TABLET | Refills: 3 | Status: CANCELLED | OUTPATIENT
Start: 2023-09-25

## 2023-09-25 RX ORDER — SPIRONOLACTONE 25 MG/1
25 TABLET ORAL DAILY
Qty: 90 TABLET | Refills: 3 | Status: CANCELLED | OUTPATIENT
Start: 2023-09-25

## 2023-09-25 NOTE — PROGRESS NOTES
06/21/23 138/70   05/25/23 134/60         Patient is here for follow-up of chronic kidney disease. The current state of this condition is asymptomatic and poorly controlled - not currently on medications for this problem. He reports drinking more water lately. Lab Results   Component Value Date    CREATININE 1.40 05/25/2023    BUN 21 05/25/2023     05/25/2023    K 3.9 05/25/2023     05/25/2023    CO2 27 05/25/2023       Patient is here for follow-up of anemia w/ iron + B12 deficiency. The current state of this condition is no significant medication side effects noted and poorly controlled on OTC MVI - not taking iron supplement as prescribed, medication compliance problems: diarrhea.     Lab Results   Component Value Date    WBC 6.2 09/26/2023    WBC 5.6 05/25/2023    WBC 6.1 01/31/2023     Lab Results   Component Value Date    HGB 9.8 (L) 09/26/2023    HGB 10.1 (L) 05/25/2023    HGB 10.4 (L) 01/31/2023     Lab Results   Component Value Date    HCT 31.8 (L) 09/26/2023    HCT 33.4 (L) 05/25/2023    HCT 33.3 (L) 01/31/2023     Lab Results   Component Value Date    MCV 95.8 09/26/2023    MCV 96.8 05/25/2023    MCV 94.6 01/31/2023     Lab Results   Component Value Date     09/26/2023     05/25/2023     01/31/2023       Lab Results   Component Value Date    IRON 56 01/31/2023    IRON 70 09/15/2021    IRON 69 12/10/2020     Lab Results   Component Value Date    TIBC 255 01/31/2023    TIBC 298 09/15/2021    TIBC 304 12/10/2020     Lab Results   Component Value Date    LABIRON 10 (L) 07/06/2020    TRFS 22 01/31/2023    TRFS 23 09/15/2021    TRFS 23 12/10/2020     Lab Results   Component Value Date    FERRITIN 201 01/31/2023    FERRITIN 101 09/15/2021    FERRITIN 37 07/06/2020     Lab Results   Component Value Date    RWSMTEUW35 561 09/26/2023    MNLWSGVQ46 314 05/25/2023    DFJPMKLL56 1499 (H) 07/06/2020     Lab Results   Component Value Date    FOLATE 13.3 07/06/2020    FOLATE 15.1

## 2023-09-26 ENCOUNTER — HOSPITAL ENCOUNTER (OUTPATIENT)
Dept: LAB | Age: 83
Discharge: HOME | End: 2023-09-29

## 2023-09-26 ENCOUNTER — OFFICE VISIT (OUTPATIENT)
Dept: INTERNAL MEDICINE CLINIC | Facility: CLINIC | Age: 83
End: 2023-09-26

## 2023-09-26 VITALS
TEMPERATURE: 97.7 F | HEIGHT: 67 IN | SYSTOLIC BLOOD PRESSURE: 130 MMHG | HEART RATE: 63 BPM | DIASTOLIC BLOOD PRESSURE: 70 MMHG | WEIGHT: 260 LBS | OXYGEN SATURATION: 99 % | BODY MASS INDEX: 40.81 KG/M2

## 2023-09-26 DIAGNOSIS — D50.9 IRON DEFICIENCY ANEMIA, UNSPECIFIED IRON DEFICIENCY ANEMIA TYPE: ICD-10-CM

## 2023-09-26 DIAGNOSIS — Z79.4 TYPE 2 DIABETES MELLITUS WITH OTHER CIRCULATORY COMPLICATION, WITH LONG-TERM CURRENT USE OF INSULIN (HCC): Primary | ICD-10-CM

## 2023-09-26 DIAGNOSIS — N18.32 STAGE 3B CHRONIC KIDNEY DISEASE (HCC): ICD-10-CM

## 2023-09-26 DIAGNOSIS — E78.5 HYPERLIPIDEMIA LDL GOAL <70: ICD-10-CM

## 2023-09-26 DIAGNOSIS — E53.8 VITAMIN B12 DEFICIENCY: ICD-10-CM

## 2023-09-26 DIAGNOSIS — I10 ESSENTIAL HYPERTENSION WITH GOAL BLOOD PRESSURE LESS THAN 130/85: ICD-10-CM

## 2023-09-26 DIAGNOSIS — K21.9 GASTRO-ESOPHAGEAL REFLUX DISEASE WITHOUT ESOPHAGITIS: ICD-10-CM

## 2023-09-26 DIAGNOSIS — I25.119 ATHEROSCLEROSIS OF NATIVE CORONARY ARTERY OF NATIVE HEART WITH ANGINA PECTORIS (HCC): ICD-10-CM

## 2023-09-26 DIAGNOSIS — E11.59 TYPE 2 DIABETES MELLITUS WITH OTHER CIRCULATORY COMPLICATION, WITH LONG-TERM CURRENT USE OF INSULIN (HCC): ICD-10-CM

## 2023-09-26 DIAGNOSIS — Z29.11 NEED FOR IMMUNIZATION AGAINST RESPIRATORY SYNCYTIAL VIRUS: ICD-10-CM

## 2023-09-26 DIAGNOSIS — Z79.4 TYPE 2 DIABETES MELLITUS WITH OTHER CIRCULATORY COMPLICATION, WITH LONG-TERM CURRENT USE OF INSULIN (HCC): ICD-10-CM

## 2023-09-26 DIAGNOSIS — E11.59 TYPE 2 DIABETES MELLITUS WITH OTHER CIRCULATORY COMPLICATION, WITH LONG-TERM CURRENT USE OF INSULIN (HCC): Primary | ICD-10-CM

## 2023-09-26 DIAGNOSIS — N18.31 STAGE 3A CHRONIC KIDNEY DISEASE (HCC): ICD-10-CM

## 2023-09-26 DIAGNOSIS — I10 ELEVATED BLOOD PRESSURE READING IN OFFICE WITH DIAGNOSIS OF HYPERTENSION: ICD-10-CM

## 2023-09-26 PROBLEM — G25.3 MYOCLONUS: Status: ACTIVE | Noted: 2020-10-22

## 2023-09-26 PROBLEM — E11.40 PAINFUL DIABETIC NEUROPATHY (HCC): Status: ACTIVE | Noted: 2017-07-19

## 2023-09-26 LAB
ALBUMIN SERPL-MCNC: 3.4 G/DL (ref 3.2–4.6)
ALBUMIN/GLOB SERPL: 0.9 (ref 0.4–1.6)
ALP SERPL-CCNC: 96 U/L (ref 50–136)
ALT SERPL-CCNC: 23 U/L (ref 12–65)
ANION GAP SERPL CALC-SCNC: 6 MMOL/L (ref 2–11)
AST SERPL-CCNC: 14 U/L (ref 15–37)
BASOPHILS # BLD: 0 K/UL (ref 0–0.2)
BASOPHILS NFR BLD: 1 % (ref 0–2)
BILIRUB SERPL-MCNC: 0.4 MG/DL (ref 0.2–1.1)
BUN SERPL-MCNC: 24 MG/DL (ref 8–23)
CALCIUM SERPL-MCNC: 9.2 MG/DL (ref 8.3–10.4)
CHLORIDE SERPL-SCNC: 110 MMOL/L (ref 101–110)
CHOLEST SERPL-MCNC: 108 MG/DL
CO2 SERPL-SCNC: 26 MMOL/L (ref 21–32)
CREAT SERPL-MCNC: 1.6 MG/DL (ref 0.8–1.5)
DIFFERENTIAL METHOD BLD: ABNORMAL
EOSINOPHIL # BLD: 0.2 K/UL (ref 0–0.8)
EOSINOPHIL NFR BLD: 3 % (ref 0.5–7.8)
ERYTHROCYTE [DISTWIDTH] IN BLOOD BY AUTOMATED COUNT: 15 % (ref 11.9–14.6)
GLOBULIN SER CALC-MCNC: 3.6 G/DL (ref 2.8–4.5)
GLUCOSE SERPL-MCNC: 66 MG/DL (ref 65–100)
GLUCOSE, POC: 80 MG/DL
HCT VFR BLD AUTO: 31.8 % (ref 41.1–50.3)
HDLC SERPL-MCNC: 50 MG/DL (ref 40–60)
HDLC SERPL: 2.2
HGB BLD-MCNC: 9.8 G/DL (ref 13.6–17.2)
IMM GRANULOCYTES # BLD AUTO: 0 K/UL (ref 0–0.5)
IMM GRANULOCYTES NFR BLD AUTO: 0 % (ref 0–5)
LDLC SERPL CALC-MCNC: 48.4 MG/DL
LYMPHOCYTES # BLD: 2.4 K/UL (ref 0.5–4.6)
LYMPHOCYTES NFR BLD: 38 % (ref 13–44)
MCH RBC QN AUTO: 29.5 PG (ref 26.1–32.9)
MCHC RBC AUTO-ENTMCNC: 30.8 G/DL (ref 31.4–35)
MCV RBC AUTO: 95.8 FL (ref 82–102)
MONOCYTES # BLD: 0.6 K/UL (ref 0.1–1.3)
MONOCYTES NFR BLD: 10 % (ref 4–12)
NEUTS SEG # BLD: 3.1 K/UL (ref 1.7–8.2)
NEUTS SEG NFR BLD: 49 % (ref 43–78)
NRBC # BLD: 0 K/UL (ref 0–0.2)
PLATELET # BLD AUTO: 192 K/UL (ref 150–450)
PMV BLD AUTO: 10.5 FL (ref 9.4–12.3)
POTASSIUM SERPL-SCNC: 4.6 MMOL/L (ref 3.5–5.1)
PROT SERPL-MCNC: 7 G/DL (ref 6.3–8.2)
RBC # BLD AUTO: 3.32 M/UL (ref 4.23–5.6)
SODIUM SERPL-SCNC: 142 MMOL/L (ref 133–143)
TRIGL SERPL-MCNC: 48 MG/DL (ref 35–150)
VIT B12 SERPL-MCNC: 561 PG/ML (ref 193–986)
VLDLC SERPL CALC-MCNC: 9.6 MG/DL (ref 6–23)
WBC # BLD AUTO: 6.2 K/UL (ref 4.3–11.1)

## 2023-09-26 RX ORDER — EZETIMIBE 10 MG/1
10 TABLET ORAL DAILY
Qty: 90 TABLET | Refills: 3 | Status: SHIPPED | OUTPATIENT
Start: 2023-09-26

## 2023-09-26 RX ORDER — PANTOPRAZOLE SODIUM 40 MG/1
40 TABLET, DELAYED RELEASE ORAL
Qty: 90 TABLET | Refills: 3 | Status: SHIPPED | OUTPATIENT
Start: 2023-09-26

## 2023-09-26 RX ORDER — ATORVASTATIN CALCIUM 80 MG/1
80 TABLET, FILM COATED ORAL DAILY
Qty: 90 TABLET | Refills: 3 | Status: SHIPPED | OUTPATIENT
Start: 2023-09-26

## 2023-09-26 RX ORDER — RESPIRATORY SYNCYTIAL VIRUS VACCINE 120MCG/0.5
0.5 KIT INTRAMUSCULAR ONCE
Qty: 0.5 ML | Refills: 0 | Status: SHIPPED | OUTPATIENT
Start: 2023-09-26 | End: 2023-09-26

## 2023-09-26 ASSESSMENT — PATIENT HEALTH QUESTIONNAIRE - PHQ9
SUM OF ALL RESPONSES TO PHQ9 QUESTIONS 1 & 2: 0
2. FEELING DOWN, DEPRESSED OR HOPELESS: 0
SUM OF ALL RESPONSES TO PHQ QUESTIONS 1-9: 0
1. LITTLE INTEREST OR PLEASURE IN DOING THINGS: 0
SUM OF ALL RESPONSES TO PHQ QUESTIONS 1-9: 0

## 2023-09-26 ASSESSMENT — ENCOUNTER SYMPTOMS
ABDOMINAL PAIN: 1
CONSTIPATION: 1
DIARRHEA: 0
NAUSEA: 1
ABDOMINAL DISTENTION: 1
SHORTNESS OF BREATH: 1
VOMITING: 0
BLOOD IN STOOL: 0

## 2023-09-26 NOTE — PATIENT INSTRUCTIONS
Reduce Tresiba to 50 units every morning  Encouraged to order a B-Complex that doesn't have vitamin c in it - example given through handout  Emphasized the importance of eating at least 2 balanced meals/day with option to utilize Glucerna or Boost Glucose Control in place of the 3rd meal  Reminded of how important it is to stay well hydrated with plenty of water  Will consider trial reduction of Protonix if kidney function continues to decline to see if he requires this dose in order to maintain good symptom control  Monitor blood sugar 3-4 times/day and keep record to bring to next appointment  Continue chronic medications as prescribed  Monitor blood pressure 2-3 times/week and keep record to bring to next appointment  Encouraged to get COVID vaccine 3-4 weeks after receiving flu   Reviewed benefits of RSV vaccine and provided prescription to present to pharmacy if patient desires to receive this

## 2023-09-27 LAB
EST. AVERAGE GLUCOSE BLD GHB EST-MCNC: 148 MG/DL
HBA1C MFR BLD: 6.8 % (ref 4.8–5.6)

## 2023-10-30 NOTE — RESULT ENCOUNTER NOTE
Diabetes remains well controlled. Kidney function has declined further - please make sure to stay well hydrated with water. Liver function is normal.  Anemia is worse likely because kidney function is worse but please be sure to stay on the multivitamin daily + find a B-complex vitamin without the vitamin c as we discussed. Cholesterol levels look great!

## 2023-12-20 DIAGNOSIS — E11.59 TYPE 2 DIABETES MELLITUS WITH OTHER CIRCULATORY COMPLICATION, WITH LONG-TERM CURRENT USE OF INSULIN (HCC): ICD-10-CM

## 2023-12-20 DIAGNOSIS — I10 ESSENTIAL HYPERTENSION WITH GOAL BLOOD PRESSURE LESS THAN 130/85: ICD-10-CM

## 2023-12-20 DIAGNOSIS — F39 UNSPECIFIED MOOD (AFFECTIVE) DISORDER (HCC): ICD-10-CM

## 2023-12-20 DIAGNOSIS — Z79.4 TYPE 2 DIABETES MELLITUS WITH OTHER CIRCULATORY COMPLICATION, WITH LONG-TERM CURRENT USE OF INSULIN (HCC): ICD-10-CM

## 2023-12-20 NOTE — TELEPHONE ENCOUNTER
Spoke with [facility] pharmacy who confirmed that pt has refills available on his lancets, test strips, pen needles, and syringes. They will prepare his refills to be picked up later today or tomorrow. They state that they have no RX's on file for syringes or Narcan. Pt notified and verbalized understanding.     Pt states that he has been using 30 cc syringes for his Novolog to draw up his units because he feels that his Novolog pens seldomly feel they aren't working correctly and not drawing up the correct amount of units. He also states that he likes to keep Narcan on hand but it is cheaper with a prescription than to buy it OTC.

## 2023-12-20 NOTE — TELEPHONE ENCOUNTER
Patient requesting the following med refills, has not had his insulin in two days. Please send to [facility].      Pen Needle ultra short 31G x 8 mm  Lancets  Glucose test strips- One Touch Verio  30cc Syringes  Narcan

## 2023-12-23 ENCOUNTER — APPOINTMENT (OUTPATIENT)
Dept: GENERAL RADIOLOGY | Age: 83
End: 2023-12-23
Payer: MEDICARE

## 2023-12-23 ENCOUNTER — HOSPITAL ENCOUNTER (EMERGENCY)
Age: 83
Discharge: HOME | End: 2023-12-23
Attending: GENERAL PRACTICE
Payer: MEDICARE

## 2023-12-23 VITALS
BODY MASS INDEX: 38.3 KG/M2 | TEMPERATURE: 99.2 F | DIASTOLIC BLOOD PRESSURE: 72 MMHG | RESPIRATION RATE: 16 BRPM | HEART RATE: 75 BPM | OXYGEN SATURATION: 97 % | WEIGHT: 244 LBS | SYSTOLIC BLOOD PRESSURE: 152 MMHG | HEIGHT: 67 IN

## 2023-12-23 DIAGNOSIS — J11.1 INFLUENZA WITH RESPIRATORY MANIFESTATION OTHER THAN PNEUMONIA: Primary | ICD-10-CM

## 2023-12-23 LAB
ALBUMIN SERPL-MCNC: 3.6 G/DL (ref 3.2–4.6)
ALBUMIN/GLOB SERPL: 0.9 (ref 0.4–1.6)
ALP SERPL-CCNC: 103 U/L (ref 50–136)
ALT SERPL-CCNC: 17 U/L (ref 12–65)
ANION GAP SERPL CALC-SCNC: 8 MMOL/L (ref 2–11)
AST SERPL-CCNC: 16 U/L (ref 15–37)
BASOPHILS # BLD: 0 K/UL (ref 0–0.2)
BASOPHILS NFR BLD: 0 % (ref 0–2)
BILIRUB SERPL-MCNC: 0.4 MG/DL (ref 0.2–1.1)
BUN SERPL-MCNC: 22 MG/DL (ref 8–23)
CALCIUM SERPL-MCNC: 9 MG/DL (ref 8.3–10.4)
CHLORIDE SERPL-SCNC: 104 MMOL/L (ref 103–113)
CO2 SERPL-SCNC: 27 MMOL/L (ref 21–32)
CREAT SERPL-MCNC: 1.54 MG/DL (ref 0.8–1.5)
DIFFERENTIAL METHOD BLD: ABNORMAL
EOSINOPHIL # BLD: 0.1 K/UL (ref 0–0.8)
EOSINOPHIL NFR BLD: 2 % (ref 0.5–7.8)
ERYTHROCYTE [DISTWIDTH] IN BLOOD BY AUTOMATED COUNT: 13.6 % (ref 11.9–14.6)
FLUAV RNA SPEC QL NAA+PROBE: DETECTED
FLUBV RNA SPEC QL NAA+PROBE: NOT DETECTED
GLOBULIN SER CALC-MCNC: 4 G/DL (ref 2.8–4.5)
GLUCOSE SERPL-MCNC: 86 MG/DL (ref 65–100)
HCT VFR BLD AUTO: 32.9 % (ref 41.1–50.3)
HGB BLD-MCNC: 10.2 G/DL (ref 13.6–17.2)
IMM GRANULOCYTES # BLD AUTO: 0 K/UL (ref 0–0.5)
IMM GRANULOCYTES NFR BLD AUTO: 0 % (ref 0–5)
LYMPHOCYTES # BLD: 1 K/UL (ref 0.5–4.6)
LYMPHOCYTES NFR BLD: 14 % (ref 13–44)
MCH RBC QN AUTO: 28.8 PG (ref 26.1–32.9)
MCHC RBC AUTO-ENTMCNC: 31 G/DL (ref 31.4–35)
MCV RBC AUTO: 92.9 FL (ref 82–102)
MONOCYTES # BLD: 0.6 K/UL (ref 0.1–1.3)
MONOCYTES NFR BLD: 8 % (ref 4–12)
NEUTS SEG # BLD: 5.1 K/UL (ref 1.7–8.2)
NEUTS SEG NFR BLD: 76 % (ref 43–78)
NRBC # BLD: 0 K/UL (ref 0–0.2)
NT PRO BNP: 292 PG/ML
PLATELET # BLD AUTO: 206 K/UL (ref 150–450)
PMV BLD AUTO: 9.3 FL (ref 9.4–12.3)
POTASSIUM SERPL-SCNC: 3.7 MMOL/L (ref 3.5–5.1)
PROT SERPL-MCNC: 7.6 G/DL (ref 6.3–8.2)
RBC # BLD AUTO: 3.54 M/UL (ref 4.23–5.6)
SARS-COV-2 RDRP RESP QL NAA+PROBE: NOT DETECTED
SODIUM SERPL-SCNC: 139 MMOL/L (ref 136–146)
SOURCE: NORMAL
WBC # BLD AUTO: 6.8 K/UL (ref 4.3–11.1)

## 2023-12-23 PROCEDURE — 85025 COMPLETE CBC W/AUTO DIFF WBC: CPT

## 2023-12-23 PROCEDURE — 87635 SARS-COV-2 COVID-19 AMP PRB: CPT

## 2023-12-23 PROCEDURE — 83880 ASSAY OF NATRIURETIC PEPTIDE: CPT

## 2023-12-23 PROCEDURE — 71045 X-RAY EXAM CHEST 1 VIEW: CPT

## 2023-12-23 PROCEDURE — 99284 EMERGENCY DEPT VISIT MOD MDM: CPT

## 2023-12-23 PROCEDURE — 80053 COMPREHEN METABOLIC PANEL: CPT

## 2023-12-23 PROCEDURE — 87502 INFLUENZA DNA AMP PROBE: CPT

## 2023-12-23 RX ORDER — BENZONATATE 100 MG/1
100 CAPSULE ORAL 2 TIMES DAILY PRN
Qty: 20 CAPSULE | Refills: 0 | Status: SHIPPED | OUTPATIENT
Start: 2023-12-23 | End: 2023-12-30

## 2023-12-23 RX ORDER — OSELTAMIVIR PHOSPHATE 75 MG/1
75 CAPSULE ORAL 2 TIMES DAILY
Qty: 10 CAPSULE | Refills: 0 | Status: SHIPPED | OUTPATIENT
Start: 2023-12-23 | End: 2023-12-28

## 2024-01-08 RX ORDER — BLOOD SUGAR DIAGNOSTIC
STRIP MISCELLANEOUS
Qty: 300 EACH | Refills: 3 | Status: SHIPPED | OUTPATIENT
Start: 2024-01-08

## 2024-01-08 RX ORDER — IRBESARTAN 300 MG/1
300 TABLET ORAL DAILY
Qty: 90 TABLET | Refills: 0 | Status: CANCELLED | OUTPATIENT
Start: 2024-01-08

## 2024-01-08 NOTE — TELEPHONE ENCOUNTER
If he wants to draw up his Novolog then I'll just send in the vials for him - it will give him much more insulin for his $.  Prescription for syringes will be sent in.  Narcan needs to come from whomever is treating him with pain medication - most likely pain management.

## 2024-01-08 NOTE — TELEPHONE ENCOUNTER
Pt notified and verbalized understanding. He states that he has 4-5 boxes of Novolog pens left and will let us know when he starts getting low on the pens to let us know if he wants to switch to the vials.    Pt also states that he needs new RX's for hydralazine, buspirone, and irbesartan, please. He states that he has not been to pain management in over a year and does not plan to go back in regards to the Narcan RX.

## 2024-01-09 RX ORDER — HYDRALAZINE HYDROCHLORIDE 50 MG/1
50 TABLET, FILM COATED ORAL 2 TIMES DAILY
Qty: 180 TABLET | Refills: 0 | Status: SHIPPED | OUTPATIENT
Start: 2024-01-09

## 2024-01-09 RX ORDER — BUSPIRONE HYDROCHLORIDE 15 MG/1
15 TABLET ORAL DAILY
Qty: 90 TABLET | Refills: 0 | Status: SHIPPED | OUTPATIENT
Start: 2024-01-09

## 2024-01-09 NOTE — TELEPHONE ENCOUNTER
Pt will have enough of his irbesartan to last until upcoming appt, but he is out of buspirone and hydralazine.

## 2024-01-09 NOTE — TELEPHONE ENCOUNTER
Is he going to be out of those 3 meds before his upcoming appointment on 1/25/24?  If so, I can send in a temporary prescription for them but long term prescriptions will need to wait until his next office visit.  If he won't be completely out before I see him then I'll wait and take care of those when he is here.  Regarding the Narcan - if he's not still seeing pain management then is he still taking any narcotics like Hydrocodone/[place]?  If not then there wouldn't be a need for Narcan...  We can discuss further when he is here next time, if he'd like.

## 2024-02-19 DIAGNOSIS — E11.59 TYPE 2 DIABETES MELLITUS WITH OTHER CIRCULATORY COMPLICATION, WITH LONG-TERM CURRENT USE OF INSULIN (HCC): ICD-10-CM

## 2024-02-19 DIAGNOSIS — Z79.4 TYPE 2 DIABETES MELLITUS WITH OTHER CIRCULATORY COMPLICATION, WITH LONG-TERM CURRENT USE OF INSULIN (HCC): ICD-10-CM

## 2024-02-21 RX ORDER — INSULIN DEGLUDEC 200 U/ML
50 INJECTION, SOLUTION SUBCUTANEOUS DAILY
Qty: 23 ML | Refills: 3 | Status: SHIPPED | OUTPATIENT
Start: 2024-02-21

## 2024-04-05 DIAGNOSIS — I10 ESSENTIAL HYPERTENSION WITH GOAL BLOOD PRESSURE LESS THAN 130/85: ICD-10-CM

## 2024-04-05 DIAGNOSIS — F39 UNSPECIFIED MOOD (AFFECTIVE) DISORDER (HCC): ICD-10-CM

## 2024-04-09 RX ORDER — HYDRALAZINE HYDROCHLORIDE 50 MG/1
50 TABLET, FILM COATED ORAL 2 TIMES DAILY
Qty: 180 TABLET | Refills: 0 | Status: SHIPPED | OUTPATIENT
Start: 2024-04-09 | End: 2024-04-10 | Stop reason: SDUPTHER

## 2024-04-09 RX ORDER — BUSPIRONE HYDROCHLORIDE 15 MG/1
15 TABLET ORAL DAILY
Qty: 90 TABLET | Refills: 0 | Status: SHIPPED | OUTPATIENT
Start: 2024-04-09 | End: 2024-04-10 | Stop reason: SDUPTHER

## 2024-04-10 RX ORDER — BLOOD SUGAR DIAGNOSTIC
STRIP MISCELLANEOUS
Qty: 300 EACH | Refills: 3 | Status: CANCELLED | OUTPATIENT
Start: 2024-04-10

## 2024-04-10 RX ORDER — LANCETS
EACH MISCELLANEOUS
Qty: 300 EACH | Refills: 3 | Status: CANCELLED | OUTPATIENT
Start: 2024-04-10

## 2024-04-10 NOTE — PROGRESS NOTES
pitting - chronic) present.      Left lower leg: Edema (2+ pitting - chronic) present.   Skin:     General: Skin is warm and dry.   Neurological:      Mental Status: He is alert and oriented to person, place, and time.   Psychiatric:         Mood and Affect: Mood normal.         Behavior: Behavior normal.         Thought Content: Thought content normal.         Judgment: Judgment normal.            On this date 4/11/2024 I have spent 48 minutes reviewing previous notes, test results and face to face with the patient discussing the diagnosis and importance of compliance with the treatment plan as well as documenting on the day of the visit.      An electronic signature was used to authenticate this note.    --Sue Garza PA-C

## 2024-04-11 ENCOUNTER — OFFICE VISIT (OUTPATIENT)
Dept: INTERNAL MEDICINE CLINIC | Facility: CLINIC | Age: 84
End: 2024-04-11
Payer: MEDICARE

## 2024-04-11 ENCOUNTER — TELEPHONE (OUTPATIENT)
Dept: INTERNAL MEDICINE CLINIC | Facility: CLINIC | Age: 84
End: 2024-04-11

## 2024-04-11 VITALS
BODY MASS INDEX: 39.87 KG/M2 | OXYGEN SATURATION: 99 % | HEIGHT: 67 IN | WEIGHT: 254 LBS | TEMPERATURE: 97.2 F | HEART RATE: 61 BPM | DIASTOLIC BLOOD PRESSURE: 76 MMHG | SYSTOLIC BLOOD PRESSURE: 130 MMHG

## 2024-04-11 DIAGNOSIS — N18.32 STAGE 3B CHRONIC KIDNEY DISEASE (HCC): ICD-10-CM

## 2024-04-11 DIAGNOSIS — Z79.4 TYPE 2 DIABETES MELLITUS WITH OTHER CIRCULATORY COMPLICATION, WITH LONG-TERM CURRENT USE OF INSULIN (HCC): ICD-10-CM

## 2024-04-11 DIAGNOSIS — Z79.899 LONG TERM CURRENT USE OF DIURETIC: ICD-10-CM

## 2024-04-11 DIAGNOSIS — D50.9 IRON DEFICIENCY ANEMIA, UNSPECIFIED IRON DEFICIENCY ANEMIA TYPE: Primary | ICD-10-CM

## 2024-04-11 DIAGNOSIS — D50.9 IRON DEFICIENCY ANEMIA, UNSPECIFIED IRON DEFICIENCY ANEMIA TYPE: ICD-10-CM

## 2024-04-11 DIAGNOSIS — I50.32 CHRONIC DIASTOLIC (CONGESTIVE) HEART FAILURE (HCC): ICD-10-CM

## 2024-04-11 DIAGNOSIS — I10 ESSENTIAL HYPERTENSION WITH GOAL BLOOD PRESSURE LESS THAN 130/85: ICD-10-CM

## 2024-04-11 DIAGNOSIS — E78.5 HYPERLIPIDEMIA LDL GOAL <70: ICD-10-CM

## 2024-04-11 DIAGNOSIS — F39 UNSPECIFIED MOOD (AFFECTIVE) DISORDER (HCC): ICD-10-CM

## 2024-04-11 DIAGNOSIS — I25.119 ATHEROSCLEROSIS OF NATIVE CORONARY ARTERY OF NATIVE HEART WITH ANGINA PECTORIS (HCC): ICD-10-CM

## 2024-04-11 DIAGNOSIS — E11.59 TYPE 2 DIABETES MELLITUS WITH OTHER CIRCULATORY COMPLICATION, WITH LONG-TERM CURRENT USE OF INSULIN (HCC): ICD-10-CM

## 2024-04-11 DIAGNOSIS — I69.354 HEMIPLEGIA OF LEFT NONDOMINANT SIDE AS LATE EFFECT OF CEREBRAL INFARCTION, UNSPECIFIED HEMIPLEGIA TYPE (HCC): ICD-10-CM

## 2024-04-11 DIAGNOSIS — Z79.4 TYPE 2 DIABETES MELLITUS WITH OTHER CIRCULATORY COMPLICATION, WITH LONG-TERM CURRENT USE OF INSULIN (HCC): Primary | ICD-10-CM

## 2024-04-11 DIAGNOSIS — E11.59 TYPE 2 DIABETES MELLITUS WITH OTHER CIRCULATORY COMPLICATION, WITH LONG-TERM CURRENT USE OF INSULIN (HCC): Primary | ICD-10-CM

## 2024-04-11 DIAGNOSIS — E53.8 VITAMIN B12 DEFICIENCY: ICD-10-CM

## 2024-04-11 LAB
ALBUMIN SERPL-MCNC: 4 G/DL (ref 3.2–4.6)
ALBUMIN/GLOB SERPL: 1.1 (ref 0.4–1.6)
ALP SERPL-CCNC: 115 U/L (ref 50–136)
ALT SERPL-CCNC: 26 U/L (ref 12–65)
ANION GAP SERPL CALC-SCNC: 4 MMOL/L (ref 2–11)
AST SERPL-CCNC: 16 U/L (ref 15–37)
BASOPHILS # BLD: 0 K/UL (ref 0–0.2)
BASOPHILS NFR BLD: 0 % (ref 0–2)
BILIRUB SERPL-MCNC: 0.3 MG/DL (ref 0.2–1.1)
BUN SERPL-MCNC: 29 MG/DL (ref 8–23)
CALCIUM SERPL-MCNC: 9.4 MG/DL (ref 8.3–10.4)
CHLORIDE SERPL-SCNC: 109 MMOL/L (ref 103–113)
CHOLEST SERPL-MCNC: 138 MG/DL
CO2 SERPL-SCNC: 30 MMOL/L (ref 21–32)
CREAT SERPL-MCNC: 1.8 MG/DL (ref 0.8–1.5)
CREAT UR-MCNC: 22 MG/DL
DIFFERENTIAL METHOD BLD: ABNORMAL
EOSINOPHIL # BLD: 0.1 K/UL (ref 0–0.8)
EOSINOPHIL NFR BLD: 2 % (ref 0.5–7.8)
ERYTHROCYTE [DISTWIDTH] IN BLOOD BY AUTOMATED COUNT: 14.6 % (ref 11.9–14.6)
EST. AVERAGE GLUCOSE BLD GHB EST-MCNC: 134 MG/DL
GLOBULIN SER CALC-MCNC: 3.5 G/DL (ref 2.8–4.5)
GLUCOSE SERPL-MCNC: 79 MG/DL (ref 65–100)
HBA1C MFR BLD: 6.3 % (ref 4.8–5.6)
HCT VFR BLD AUTO: 33.4 % (ref 41.1–50.3)
HDLC SERPL-MCNC: 56 MG/DL (ref 40–60)
HDLC SERPL: 2.5
HGB BLD-MCNC: 10.1 G/DL (ref 13.6–17.2)
IMM GRANULOCYTES # BLD AUTO: 0 K/UL (ref 0–0.5)
IMM GRANULOCYTES NFR BLD AUTO: 0 % (ref 0–5)
LDLC SERPL CALC-MCNC: 71.4 MG/DL
LYMPHOCYTES # BLD: 2.5 K/UL (ref 0.5–4.6)
LYMPHOCYTES NFR BLD: 43 % (ref 13–44)
MCH RBC QN AUTO: 29.6 PG (ref 26.1–32.9)
MCHC RBC AUTO-ENTMCNC: 30.2 G/DL (ref 31.4–35)
MCV RBC AUTO: 97.9 FL (ref 82–102)
MICROALBUMIN UR-MCNC: 7.51 MG/DL
MICROALBUMIN/CREAT UR-RTO: 341 MG/G (ref 0–30)
MONOCYTES # BLD: 0.5 K/UL (ref 0.1–1.3)
MONOCYTES NFR BLD: 8 % (ref 4–12)
NEUTS SEG # BLD: 2.7 K/UL (ref 1.7–8.2)
NEUTS SEG NFR BLD: 46 % (ref 43–78)
NRBC # BLD: 0 K/UL (ref 0–0.2)
PLATELET # BLD AUTO: 207 K/UL (ref 150–450)
PMV BLD AUTO: 9.7 FL (ref 9.4–12.3)
POTASSIUM SERPL-SCNC: 4.3 MMOL/L (ref 3.5–5.1)
PROT SERPL-MCNC: 7.5 G/DL (ref 6.3–8.2)
RBC # BLD AUTO: 3.41 M/UL (ref 4.23–5.6)
SODIUM SERPL-SCNC: 143 MMOL/L (ref 136–146)
TRIGL SERPL-MCNC: 53 MG/DL (ref 35–150)
VLDLC SERPL CALC-MCNC: 10.6 MG/DL (ref 6–23)
WBC # BLD AUTO: 5.9 K/UL (ref 4.3–11.1)

## 2024-04-11 PROCEDURE — 3075F SYST BP GE 130 - 139MM HG: CPT | Performed by: PHYSICIAN ASSISTANT

## 2024-04-11 PROCEDURE — 1123F ACP DISCUSS/DSCN MKR DOCD: CPT | Performed by: PHYSICIAN ASSISTANT

## 2024-04-11 PROCEDURE — G8417 CALC BMI ABV UP PARAM F/U: HCPCS | Performed by: PHYSICIAN ASSISTANT

## 2024-04-11 PROCEDURE — 3078F DIAST BP <80 MM HG: CPT | Performed by: PHYSICIAN ASSISTANT

## 2024-04-11 PROCEDURE — G8427 DOCREV CUR MEDS BY ELIG CLIN: HCPCS | Performed by: PHYSICIAN ASSISTANT

## 2024-04-11 PROCEDURE — 1036F TOBACCO NON-USER: CPT | Performed by: PHYSICIAN ASSISTANT

## 2024-04-11 PROCEDURE — 99215 OFFICE O/P EST HI 40 MIN: CPT | Performed by: PHYSICIAN ASSISTANT

## 2024-04-11 RX ORDER — POTASSIUM CHLORIDE 20MEQ/15ML
20 LIQUID (ML) ORAL 2 TIMES DAILY
Qty: 2700 ML | Refills: 3 | Status: SHIPPED | OUTPATIENT
Start: 2024-04-11

## 2024-04-11 RX ORDER — FUROSEMIDE 40 MG/1
40 TABLET ORAL 2 TIMES DAILY
Qty: 180 TABLET | Refills: 3 | Status: SHIPPED | OUTPATIENT
Start: 2024-04-11

## 2024-04-11 RX ORDER — BLOOD SUGAR DIAGNOSTIC
STRIP MISCELLANEOUS
Qty: 300 EACH | Refills: 3 | Status: SHIPPED | OUTPATIENT
Start: 2024-04-11

## 2024-04-11 RX ORDER — LANCETS
EACH MISCELLANEOUS
Qty: 300 EACH | Refills: 3 | Status: SHIPPED | OUTPATIENT
Start: 2024-04-11

## 2024-04-11 RX ORDER — HYDRALAZINE HYDROCHLORIDE 50 MG/1
50 TABLET, FILM COATED ORAL 2 TIMES DAILY
Qty: 180 TABLET | Refills: 3 | Status: SHIPPED | OUTPATIENT
Start: 2024-04-11

## 2024-04-11 RX ORDER — IRBESARTAN 300 MG/1
300 TABLET ORAL DAILY
Qty: 90 TABLET | Refills: 3 | Status: SHIPPED | OUTPATIENT
Start: 2024-04-11

## 2024-04-11 RX ORDER — BUSPIRONE HYDROCHLORIDE 15 MG/1
15 TABLET ORAL DAILY
Qty: 90 TABLET | Refills: 3 | Status: SHIPPED | OUTPATIENT
Start: 2024-04-11

## 2024-04-11 SDOH — ECONOMIC STABILITY: INCOME INSECURITY: HOW HARD IS IT FOR YOU TO PAY FOR THE VERY BASICS LIKE FOOD, HOUSING, MEDICAL CARE, AND HEATING?: NOT HARD AT ALL

## 2024-04-11 SDOH — ECONOMIC STABILITY: FOOD INSECURITY: WITHIN THE PAST 12 MONTHS, THE FOOD YOU BOUGHT JUST DIDN'T LAST AND YOU DIDN'T HAVE MONEY TO GET MORE.: NEVER TRUE

## 2024-04-11 SDOH — ECONOMIC STABILITY: HOUSING INSECURITY
IN THE LAST 12 MONTHS, WAS THERE A TIME WHEN YOU DID NOT HAVE A STEADY PLACE TO SLEEP OR SLEPT IN A SHELTER (INCLUDING NOW)?: NO

## 2024-04-11 SDOH — ECONOMIC STABILITY: FOOD INSECURITY: WITHIN THE PAST 12 MONTHS, YOU WORRIED THAT YOUR FOOD WOULD RUN OUT BEFORE YOU GOT MONEY TO BUY MORE.: NEVER TRUE

## 2024-04-11 ASSESSMENT — PATIENT HEALTH QUESTIONNAIRE - PHQ9
SUM OF ALL RESPONSES TO PHQ QUESTIONS 1-9: 0
SUM OF ALL RESPONSES TO PHQ9 QUESTIONS 1 & 2: 0
SUM OF ALL RESPONSES TO PHQ QUESTIONS 1-9: 0
2. FEELING DOWN, DEPRESSED OR HOPELESS: NOT AT ALL
SUM OF ALL RESPONSES TO PHQ QUESTIONS 1-9: 0
SUM OF ALL RESPONSES TO PHQ QUESTIONS 1-9: 0
1. LITTLE INTEREST OR PLEASURE IN DOING THINGS: NOT AT ALL

## 2024-04-11 ASSESSMENT — ENCOUNTER SYMPTOMS
BACK PAIN: 1
SHORTNESS OF BREATH: 1
DIARRHEA: 1

## 2024-04-11 NOTE — TELEPHONE ENCOUNTER
When I spoke with pt, he was interested in someone coming out to his home to help him with anything that they can.

## 2024-04-11 NOTE — TELEPHONE ENCOUNTER
Yes, I can do that.  Is he interested in a home health agency sending a  out or more of a support/assistance from the phone like our Care Coordinators provide?  Also what are his barriers that he needs help with - I know about food & transportation.  Are there any others?

## 2024-04-11 NOTE — RESULT ENCOUNTER NOTE
Diabetes is doing even better - please get a list of fasting glucose readings from him that go back 2-3 weeks so I know if we need to decrease the dose of Tresiba any further.  Kidney function is declining further - with all the diuretics he takes he is going to need to get at least another 20-30 oz of water in on top of what he's already doing.  Electrolytes & liver function all look normal.  Anemia is stable.  Cholesterol levels look decent - LDL could stand to be a bit lower since goal is < 70 - his LDL levels are usually 30-50s so 71.4 is high for him.  Just make sure he's not skipping or missing Lipitor or Zetia.

## 2024-04-11 NOTE — TELEPHONE ENCOUNTER
I think we'll start with Care Coordination so they can help sort through what he needs.  Home health will not provide in home assistance with anything other than medicaitons and vital signs.  It sounds like he's looking for a caregiver to help with meals, cleaning, etc which is usually out-of-pocket but sometimes various criteria can be met and he might qualify for some assistance.

## 2024-04-12 ENCOUNTER — CARE COORDINATION (OUTPATIENT)
Dept: CARE COORDINATION | Facility: CLINIC | Age: 84
End: 2024-04-12

## 2024-04-15 ENCOUNTER — CARE COORDINATION (OUTPATIENT)
Dept: CARE COORDINATION | Facility: CLINIC | Age: 84
End: 2024-04-15

## 2024-04-19 ENCOUNTER — CARE COORDINATION (OUTPATIENT)
Dept: CARE COORDINATION | Facility: CLINIC | Age: 84
End: 2024-04-19

## 2024-04-19 NOTE — CARE COORDINATION
ELSIE NELSON was able to reach pt today.  Pt lives in an apartment.  His brother lives with him  He is struggling with wrapping his legs and his brother's health is hindering his ability to assist pt in doing so.  Pt requesting a nurse to assist.  ELSIE NELSON sent message to NP requesting HH order RN/aide.  Pt also requested that KATEY Lantigua reach out to him on his home number.  ELSIE NELSON notified Rhina of his request. Pt declined [facility] referral, stating he had an aide through Medicaid in the past and was dissatisfied with her services.

## 2024-04-26 ENCOUNTER — CARE COORDINATION (OUTPATIENT)
Dept: CARE COORDINATION | Facility: CLINIC | Age: 84
End: 2024-04-26

## 2024-05-01 ENCOUNTER — TELEPHONE (OUTPATIENT)
Dept: INTERNAL MEDICINE CLINIC | Facility: CLINIC | Age: 84
End: 2024-05-01

## 2024-05-01 ENCOUNTER — NURSE ONLY (OUTPATIENT)
Dept: INTERNAL MEDICINE CLINIC | Facility: CLINIC | Age: 84
End: 2024-05-01
Payer: MEDICARE

## 2024-05-01 DIAGNOSIS — E11.59 TYPE 2 DIABETES MELLITUS WITH OTHER CIRCULATORY COMPLICATION, WITH LONG-TERM CURRENT USE OF INSULIN (HCC): Primary | ICD-10-CM

## 2024-05-01 DIAGNOSIS — E11.3299 NONPROLIFERATIVE DIABETIC RETINOPATHY (HCC): ICD-10-CM

## 2024-05-01 DIAGNOSIS — H43.813 POSTERIOR VITREOUS DETACHMENT OF BOTH EYES: ICD-10-CM

## 2024-05-01 DIAGNOSIS — Z79.4 TYPE 2 DIABETES MELLITUS WITH OTHER CIRCULATORY COMPLICATION, WITH LONG-TERM CURRENT USE OF INSULIN (HCC): Primary | ICD-10-CM

## 2024-05-01 DIAGNOSIS — H25.813 COMBINED FORMS OF AGE-RELATED CATARACT OF BOTH EYES: ICD-10-CM

## 2024-05-01 PROCEDURE — 99211 OFF/OP EST MAY X REQ PHY/QHP: CPT | Performed by: PHYSICIAN ASSISTANT

## 2024-05-01 PROCEDURE — 3044F HG A1C LEVEL LT 7.0%: CPT | Performed by: PHYSICIAN ASSISTANT

## 2024-05-01 NOTE — PROGRESS NOTES
Applied pt supplied Dexcom G7 sensor to back of L upper arm. Educated pt on how to apply sensor at home and how to end session. Set up pt supplied Dexcom G7  and paired with current sensor. Added pt to our clinic's Dexcom Clarity account and notified pt to bring  to every appt to download his BS readings. Pt verbalized understanding of nurse education and states he no further questions at this time.

## 2024-05-01 NOTE — TELEPHONE ENCOUNTER
Pt is requesting a new Optometrist referral, please. He states that he no longer wants to go to [facility].

## 2024-05-02 NOTE — TELEPHONE ENCOUNTER
Ok, we have patients that are happy at [facility] which should be very convenient for him.  Referral placed.

## 2024-05-03 ENCOUNTER — CARE COORDINATION (OUTPATIENT)
Dept: CARE COORDINATION | Facility: CLINIC | Age: 84
End: 2024-05-03

## 2024-05-03 ENCOUNTER — TELEPHONE (OUTPATIENT)
Dept: INTERNAL MEDICINE CLINIC | Facility: CLINIC | Age: 84
End: 2024-05-03

## 2024-05-03 NOTE — CARE COORDINATION
ELSIE NELSON left VM with pt to update him regarding HH order.  No order in at this time.  Message sent to PA to inquire. ELSIE NELSON will continue to follow

## 2024-05-03 NOTE — TELEPHONE ENCOUNTER
I spoke with Judy Benavidez LCSW who stated that they need a new referral for HH RN/aide to assist pt with wrapping his legs and washing his back. Can you please place referral? Thank you!

## 2024-05-06 ENCOUNTER — HOME HEALTH ADMISSION (OUTPATIENT)
Dept: HOME HEALTH SERVICES | Facility: HOME HEALTH | Age: 84
End: 2024-05-06

## 2024-05-06 NOTE — TELEPHONE ENCOUNTER
Referral placed by Sue Garza today and confirmed with [facility] that referral was received.

## 2024-05-07 ENCOUNTER — CARE COORDINATION (OUTPATIENT)
Dept: CARE COORDINATION | Facility: CLINIC | Age: 84
End: 2024-05-07

## 2024-05-07 NOTE — CARE COORDINATION
ELSIE NELSON confirmed with [facility] that they were able to admit pt.  They began services yesterday and will likely return tomorrow.

## 2024-05-08 ENCOUNTER — HOME CARE VISIT (OUTPATIENT)
Dept: HOME HEALTH SERVICES | Facility: HOME HEALTH | Age: 84
End: 2024-05-08

## 2024-05-09 ENCOUNTER — CARE COORDINATION (OUTPATIENT)
Dept: CARE COORDINATION | Facility: CLINIC | Age: 84
End: 2024-05-09

## 2024-05-09 NOTE — CARE COORDINATION
Pt had initially requesting lymphedema specialist to assist with leg wrapping.  However, [facility] does not have an OT specialist for lymphedema.  The referral specialist at PCP office reached out to multiple agencies and the only agency that has a specialist is [facility].  Unfortunately, they do not accept pt's insurance ([facility]).  Pt notified and stated that he does not have to have a specialist if that's not an option-he just needs any assistance that he can get.  ELSIE NELSON notified PCP.  New order can be sent to [facility] for RN and aide.  If they are willing to admit pt even though they don't have a specialist, he should be able to begin care.

## 2024-05-10 ENCOUNTER — CARE COORDINATION (OUTPATIENT)
Dept: CARE COORDINATION | Facility: CLINIC | Age: 84
End: 2024-05-10

## 2024-05-10 NOTE — CARE COORDINATION
ELSIE NELSON spoke with [facility] regarding pt non-admit status.  They can admit pt if new order is sent for RN/Aide.  Pt initially told [facility] that he wanted a lymphedema specialist but he has since stated that he doesn't mind a nurse assisting with leg wraps that is not a specialist.  [facility] will not be able to see pt until T or W of next week if they receive new order. If he needs to be seen sooner, referral can be sent to external agency (such as [facility]) in hopes that they can see him sooner. PCP office notified.

## 2024-05-11 DIAGNOSIS — I10 ESSENTIAL HYPERTENSION WITH GOAL BLOOD PRESSURE LESS THAN 130/85: ICD-10-CM

## 2024-05-13 ENCOUNTER — CARE COORDINATION (OUTPATIENT)
Dept: CARE COORDINATION | Facility: CLINIC | Age: 84
End: 2024-05-13

## 2024-05-13 RX ORDER — IRBESARTAN 300 MG/1
300 TABLET ORAL DAILY
Qty: 90 TABLET | Refills: 0 | OUTPATIENT
Start: 2024-05-13

## 2024-05-14 ENCOUNTER — HOME HEALTH ADMISSION (OUTPATIENT)
Dept: HOME HEALTH SERVICES | Facility: HOME HEALTH | Age: 84
End: 2024-05-14

## 2024-05-14 ENCOUNTER — TELEPHONE (OUTPATIENT)
Dept: INTERNAL MEDICINE CLINIC | Facility: CLINIC | Age: 84
End: 2024-05-14

## 2024-05-14 ENCOUNTER — CARE COORDINATION (OUTPATIENT)
Dept: CARE COORDINATION | Facility: CLINIC | Age: 84
End: 2024-05-14

## 2024-05-14 NOTE — TELEPHONE ENCOUNTER
[facility] called in for the pt.   Due to being full up until June, so they can not accept the referral and will have to close the referral.   Pt denied the previous adima wrapping offered before.   Consulted pt Nurse and she stated to put in an encounter for pt.   An encounter was also put in for pt on 5/10/24 discussing the matter

## 2024-05-14 NOTE — CARE COORDINATION
[facility] not accepting new patients until June.  ELSIE NELSON faxed order to [facility].  They are accepting new patients.  Will follow up to confirm receipt of referral in the morning.

## 2024-05-15 ENCOUNTER — CARE COORDINATION (OUTPATIENT)
Dept: CARE COORDINATION | Facility: CLINIC | Age: 84
End: 2024-05-15

## 2024-05-15 DIAGNOSIS — I69.352 HEMIPARESIS OF LEFT DOMINANT SIDE AS LATE EFFECT OF CEREBRAL INFARCTION (HCC): ICD-10-CM

## 2024-05-15 DIAGNOSIS — I89.0 LYMPHEDEMA: Primary | ICD-10-CM

## 2024-05-15 NOTE — CARE COORDINATION
ELSIE NELSON called [facility] this morning to inquire if they received referral sent through [facility] for HH RN/aide.  They did not.  ELSIE NELSON notified PCP office and requested that they fax it from their office.  ELSIE NELSON will follow up.

## 2024-05-15 NOTE — CARE COORDINATION
ELSIE NELSON will check in with [facility] tomorrow morning to make sure they received HH order from PCP office.

## 2024-05-16 ENCOUNTER — CARE COORDINATION (OUTPATIENT)
Dept: CARE COORDINATION | Facility: CLINIC | Age: 84
End: 2024-05-16

## 2024-05-16 NOTE — CARE COORDINATION
ELSIE NELSON confirmed that [facility] has received HH referral for RN/aide.  They have and will reach out to pt this week or early next week for scheduling.  Pt notified.

## 2024-05-17 ENCOUNTER — TELEPHONE (OUTPATIENT)
Dept: INTERNAL MEDICINE CLINIC | Facility: CLINIC | Age: 84
End: 2024-05-17

## 2024-05-17 NOTE — TELEPHONE ENCOUNTER
Received notice earlier this week from [facility] that they can not accommodate pt until June and requested that we send pt's referral to [facility]. [facility] states that they also can not accommodate pt at this time. I spoke with Janelle who stated that she sent you some information last week in regards to  agencies on this pt. Please advise.

## 2024-05-17 NOTE — TELEPHONE ENCOUNTER
Referral sent to patient from [facility].    They cannot do the home health referral at this time, will have to contact some place else    Phone number [phone]

## 2024-05-20 NOTE — TELEPHONE ENCOUNTER
Here is what I received:  Sue, Ok, after 6 phone calls , the only one that offers it , is [facility], and unfortunately he has [facility] and they no longer take that insurance!

## 2024-05-23 ENCOUNTER — CARE COORDINATION (OUTPATIENT)
Dept: CARE COORDINATION | Facility: CLINIC | Age: 84
End: 2024-05-23

## 2024-06-25 DIAGNOSIS — F39 UNSPECIFIED MOOD (AFFECTIVE) DISORDER (HCC): ICD-10-CM

## 2024-06-26 RX ORDER — BUSPIRONE HYDROCHLORIDE 15 MG/1
15 TABLET ORAL DAILY
Qty: 90 TABLET | Refills: 0 | OUTPATIENT
Start: 2024-06-26

## 2024-07-11 ENCOUNTER — OFFICE VISIT (OUTPATIENT)
Dept: INTERNAL MEDICINE CLINIC | Facility: CLINIC | Age: 84
End: 2024-07-11

## 2024-07-11 ENCOUNTER — LAB (OUTPATIENT)
Dept: INTERNAL MEDICINE CLINIC | Facility: CLINIC | Age: 84
End: 2024-07-11

## 2024-07-11 VITALS
WEIGHT: 250 LBS | TEMPERATURE: 97 F | SYSTOLIC BLOOD PRESSURE: 134 MMHG | HEIGHT: 67 IN | BODY MASS INDEX: 39.24 KG/M2 | DIASTOLIC BLOOD PRESSURE: 60 MMHG | OXYGEN SATURATION: 97 % | HEART RATE: 74 BPM

## 2024-07-11 DIAGNOSIS — D50.9 IRON DEFICIENCY ANEMIA, UNSPECIFIED IRON DEFICIENCY ANEMIA TYPE: ICD-10-CM

## 2024-07-11 DIAGNOSIS — E11.59 TYPE 2 DIABETES MELLITUS WITH OTHER CIRCULATORY COMPLICATION, WITH LONG-TERM CURRENT USE OF INSULIN (HCC): ICD-10-CM

## 2024-07-11 DIAGNOSIS — E66.01 SEVERE OBESITY (BMI 35.0-39.9) WITH COMORBIDITY (HCC): ICD-10-CM

## 2024-07-11 DIAGNOSIS — E66.9 LYMPHEDEMA ASSOCIATED WITH OBESITY: ICD-10-CM

## 2024-07-11 DIAGNOSIS — N18.32 STAGE 3B CHRONIC KIDNEY DISEASE (HCC): ICD-10-CM

## 2024-07-11 DIAGNOSIS — Z79.4 TYPE 2 DIABETES MELLITUS WITH OTHER CIRCULATORY COMPLICATION, WITH LONG-TERM CURRENT USE OF INSULIN (HCC): Primary | ICD-10-CM

## 2024-07-11 DIAGNOSIS — E53.8 VITAMIN B12 DEFICIENCY: ICD-10-CM

## 2024-07-11 DIAGNOSIS — E78.5 HYPERLIPIDEMIA LDL GOAL <70: ICD-10-CM

## 2024-07-11 DIAGNOSIS — E11.59 TYPE 2 DIABETES MELLITUS WITH OTHER CIRCULATORY COMPLICATION, WITH LONG-TERM CURRENT USE OF INSULIN (HCC): Primary | ICD-10-CM

## 2024-07-11 DIAGNOSIS — Z00.00 MEDICARE ANNUAL WELLNESS VISIT, SUBSEQUENT: ICD-10-CM

## 2024-07-11 DIAGNOSIS — Z86.73 HISTORY OF STROKE: ICD-10-CM

## 2024-07-11 DIAGNOSIS — I25.119 ATHEROSCLEROSIS OF NATIVE CORONARY ARTERY OF NATIVE HEART WITH ANGINA PECTORIS (HCC): ICD-10-CM

## 2024-07-11 DIAGNOSIS — K21.9 GASTRO-ESOPHAGEAL REFLUX DISEASE WITHOUT ESOPHAGITIS: ICD-10-CM

## 2024-07-11 DIAGNOSIS — Z79.4 TYPE 2 DIABETES MELLITUS WITH OTHER CIRCULATORY COMPLICATION, WITH LONG-TERM CURRENT USE OF INSULIN (HCC): ICD-10-CM

## 2024-07-11 DIAGNOSIS — I10 ESSENTIAL HYPERTENSION WITH GOAL BLOOD PRESSURE LESS THAN 130/85: ICD-10-CM

## 2024-07-11 DIAGNOSIS — I89.0 LYMPHEDEMA ASSOCIATED WITH OBESITY: ICD-10-CM

## 2024-07-11 DIAGNOSIS — K52.9 POSTPRANDIAL DIARRHEA: ICD-10-CM

## 2024-07-11 LAB
ALBUMIN SERPL-MCNC: 3.9 G/DL (ref 3.2–4.6)
ALBUMIN/GLOB SERPL: 1.2 (ref 1–1.9)
ALP SERPL-CCNC: 97 U/L (ref 40–129)
ALT SERPL-CCNC: 14 U/L (ref 12–65)
ANION GAP SERPL CALC-SCNC: 14 MMOL/L (ref 9–18)
AST SERPL-CCNC: 23 U/L (ref 15–37)
BASOPHILS # BLD: 0 K/UL (ref 0–0.2)
BASOPHILS NFR BLD: 0 % (ref 0–2)
BILIRUB SERPL-MCNC: 0.3 MG/DL (ref 0–1.2)
BUN SERPL-MCNC: 35 MG/DL (ref 8–23)
CALCIUM SERPL-MCNC: 9.5 MG/DL (ref 8.8–10.2)
CHLORIDE SERPL-SCNC: 101 MMOL/L (ref 98–107)
CHOLEST SERPL-MCNC: 115 MG/DL (ref 0–200)
CO2 SERPL-SCNC: 25 MMOL/L (ref 20–28)
CREAT SERPL-MCNC: 2 MG/DL (ref 0.8–1.3)
DIFFERENTIAL METHOD BLD: ABNORMAL
EOSINOPHIL # BLD: 0.2 K/UL (ref 0–0.8)
EOSINOPHIL NFR BLD: 3 % (ref 0.5–7.8)
ERYTHROCYTE [DISTWIDTH] IN BLOOD BY AUTOMATED COUNT: 13.9 % (ref 11.9–14.6)
EST. AVERAGE GLUCOSE BLD GHB EST-MCNC: 152 MG/DL
FERRITIN SERPL-MCNC: 299 NG/ML (ref 8–388)
GLOBULIN SER CALC-MCNC: 3.3 G/DL (ref 2.3–3.5)
GLUCOSE SERPL-MCNC: 78 MG/DL (ref 70–99)
HBA1C MFR BLD: 6.9 % (ref 0–5.6)
HCT VFR BLD AUTO: 33.4 % (ref 41.1–50.3)
HDLC SERPL-MCNC: 44 MG/DL (ref 40–60)
HDLC SERPL: 2.6 (ref 0–5)
HGB BLD-MCNC: 10.2 G/DL (ref 13.6–17.2)
IMM GRANULOCYTES # BLD AUTO: 0 K/UL (ref 0–0.5)
IMM GRANULOCYTES NFR BLD AUTO: 0 % (ref 0–5)
IRON SATN MFR SERPL: 24 % (ref 20–50)
IRON SERPL-MCNC: 65 UG/DL (ref 35–100)
LDLC SERPL CALC-MCNC: 54 MG/DL (ref 0–100)
LYMPHOCYTES # BLD: 2.5 K/UL (ref 0.5–4.6)
LYMPHOCYTES NFR BLD: 37 % (ref 13–44)
MCH RBC QN AUTO: 29.3 PG (ref 26.1–32.9)
MCHC RBC AUTO-ENTMCNC: 30.5 G/DL (ref 31.4–35)
MCV RBC AUTO: 96 FL (ref 82–102)
MONOCYTES # BLD: 0.6 K/UL (ref 0.1–1.3)
MONOCYTES NFR BLD: 9 % (ref 4–12)
NEUTS SEG # BLD: 3.4 K/UL (ref 1.7–8.2)
NEUTS SEG NFR BLD: 51 % (ref 43–78)
NRBC # BLD: 0 K/UL (ref 0–0.2)
PLATELET # BLD AUTO: 227 K/UL (ref 150–450)
PMV BLD AUTO: 10.1 FL (ref 9.4–12.3)
POTASSIUM SERPL-SCNC: 4.8 MMOL/L (ref 3.5–5.1)
PROT SERPL-MCNC: 7.2 G/DL (ref 6.3–8.2)
RBC # BLD AUTO: 3.48 M/UL (ref 4.23–5.6)
SODIUM SERPL-SCNC: 140 MMOL/L (ref 136–145)
TIBC SERPL-MCNC: 268 UG/DL (ref 240–450)
TRIGL SERPL-MCNC: 82 MG/DL (ref 0–150)
UIBC SERPL-MCNC: 203 UG/DL (ref 112–347)
VIT B12 SERPL-MCNC: 935 PG/ML (ref 193–986)
VLDLC SERPL CALC-MCNC: 16 MG/DL (ref 6–23)
WBC # BLD AUTO: 6.8 K/UL (ref 4.3–11.1)

## 2024-07-11 RX ORDER — EZETIMIBE 10 MG/1
10 TABLET ORAL DAILY
Qty: 90 TABLET | Refills: 3 | Status: SHIPPED | OUTPATIENT
Start: 2024-07-11

## 2024-07-11 RX ORDER — PANTOPRAZOLE SODIUM 40 MG/1
40 TABLET, DELAYED RELEASE ORAL
Qty: 90 TABLET | Refills: 3 | Status: SHIPPED | OUTPATIENT
Start: 2024-07-11

## 2024-07-11 RX ORDER — ATORVASTATIN CALCIUM 80 MG/1
80 TABLET, FILM COATED ORAL DAILY
Qty: 90 TABLET | Refills: 3 | Status: SHIPPED | OUTPATIENT
Start: 2024-07-11

## 2024-07-11 ASSESSMENT — ENCOUNTER SYMPTOMS
CONSTIPATION: 1
ABDOMINAL PAIN: 0
NAUSEA: 1
SHORTNESS OF BREATH: 1
ABDOMINAL DISTENTION: 1
DIARRHEA: 1
VOMITING: 0
BLOOD IN STOOL: 0

## 2024-07-11 ASSESSMENT — LIFESTYLE VARIABLES
HOW MANY STANDARD DRINKS CONTAINING ALCOHOL DO YOU HAVE ON A TYPICAL DAY: PATIENT DOES NOT DRINK
HOW OFTEN DO YOU HAVE A DRINK CONTAINING ALCOHOL: NEVER

## 2024-07-11 ASSESSMENT — PATIENT HEALTH QUESTIONNAIRE - PHQ9
SUM OF ALL RESPONSES TO PHQ QUESTIONS 1-9: 0
1. LITTLE INTEREST OR PLEASURE IN DOING THINGS: NOT AT ALL
SUM OF ALL RESPONSES TO PHQ QUESTIONS 1-9: 0
2. FEELING DOWN, DEPRESSED OR HOPELESS: NOT AT ALL
SUM OF ALL RESPONSES TO PHQ9 QUESTIONS 1 & 2: 0
SUM OF ALL RESPONSES TO PHQ QUESTIONS 1-9: 0
SUM OF ALL RESPONSES TO PHQ QUESTIONS 1-9: 0

## 2024-07-11 NOTE — RESULT ENCOUNTER NOTE
Diabetes levels are doing well - please consider eliminating sweet tea and drinking unsweet tea flavored with his preferred sugar substitute.  Anemia is stable.  Kidney function continues to decline - really needs to increase water consumption and eliminate caffeine which acts as a diuretic and depletes water volume keeping him in a dehydrated state when combined with the other high dose diuretics he takes every day.  Cholesterol levels are doing great!  Iron & B12 levels look good also.

## 2024-07-11 NOTE — PROGRESS NOTES
Valeriano Anton (:  1940) is a 83 y.o. male,Established patient, here for evaluation of the following chief complaint(s):  Medicare AWV (Pt is here for his routine annual Medicare wellness visit. ), Diabetes (Pt is here for a 3 month diabetic f/u.), Hypertension, and Hyperlipidemia      Assessment & Plan   1. Type 2 diabetes mellitus with other circulatory complication, with long-term current use of insulin (HCC)  2. Atherosclerosis of native coronary artery of native heart with angina pectoris (HCC)  The following orders have not been finalized:  -     ezetimibe (ZETIA) 10 MG tablet  3. Hyperlipidemia LDL goal <70  The following orders have not been finalized:  -     atorvastatin (LIPITOR) 80 MG tablet  -     ezetimibe (ZETIA) 10 MG tablet  4. Gastro-esophageal reflux disease without esophagitis  The following orders have not been finalized:  -     pantoprazole (PROTONIX) 40 MG tablet      No follow-ups on file.       Subjective   HPI    Review of Systems   Constitutional:  Negative for fatigue and unexpected weight change.   Eyes:  Positive for visual disturbance.   Respiratory:  Positive for shortness of breath (on exertion).    Cardiovascular:  Positive for leg swelling (bilateral legs). Negative for chest pain and palpitations.   Gastrointestinal:  Positive for abdominal distention, constipation, diarrhea and nausea (occasionally when glucose is < 50). Negative for abdominal pain, blood in stool and vomiting.        Negative for heartburn or melena   Endocrine: Negative for polydipsia.   Genitourinary:  Negative for frequency.   Musculoskeletal:  Positive for myalgias (occasional nocturnal leg cramps).   Neurological:  Positive for numbness (bilateral feet) and headaches. Negative for dizziness.          Objective   Physical Exam         On this date 2024 I have spent *** minutes reviewing previous notes, test results and face to face with the patient discussing the diagnosis and importance of 
Glaucoma Screening: Aug 2023 (results: abnormal - bilateral nonproliferative diabetic retinopathy, bilateral cataracts, bilateral posterior vitreous detachment)     Immunizations: up to date and documented  Immunization History   Administered Date(s) Administered    COVID-19, PFIZER Bivalent, DO NOT Dilute, (age 12y+), IM, 30 mcg/0.3 mL 02/21/2023    COVID-19, PFIZER PURPLE top, DILUTE for use, (age 12 y+), 30mcg/0.3mL 01/22/2021, 02/17/2021, 10/20/2021    COVID-19, PFIZER, (2023-24 formula), (age 12y+), IM, 30mcg/0.3mL 12/15/2023    Influenza Trivalent 09/12/2012, 10/01/2014    Influenza Virus Vaccine 10/01/2015    Influenza, FLUAD, (age 65 y+), Adjuvanted, 0.5mL 09/15/2020, 09/16/2021, 10/27/2022, 09/24/2023    Influenza, High Dose (Fluzone 65 yrs and older) 09/03/2015, 08/21/2016, 08/24/2017, 08/27/2018, 09/21/2019    PPD Test 09/27/2019    Pneumococcal, PCV-13, PREVNAR 13, (age 6w+), IM, 0.5mL 09/11/2015    Pneumococcal, PPSV23, PNEUMOVAX 23, (age 2y+), SC/IM, 0.5mL 09/13/2010, 11/01/2010, 03/18/2021    RSV, AREXVY, (age 60y+), PF, IM, 0.5mL 04/14/2024    TDaP, ADACEL (age 10y-64y), BOOSTRIX (age 10y+), IM, 0.5mL 06/22/2017    Zoster Live (Zostavax) 01/10/2013    Zoster Recombinant (Shingrix) 09/02/2018, 04/02/2023       The following acute and/or chronic problems were also addressed today:      The patient is a 83 y.o. male who is seen for follow up of diabetes.  Current monitoring regimen: patient does not check sugars and has not been wearing Dexcom sensor lately.  Glucose monitoring frequency: continously via Dexcom G7 CGM  Home blood sugar records: fasting range: 65-80; Dexcom data from 4/22-5/5/24 with average glucose: 133 mg/dL; time in target: 88%, time above target: 12% (high: 12%, very high: 0%), time below target: 0%  Any episodes of hypoglycemia? yes - frequently in the AM upon awakening  Known diabetic complications: peripheral neuropathy, cardiovascular disease, and cerebrovascular disease  Current

## 2024-07-12 ENCOUNTER — TELEPHONE (OUTPATIENT)
Dept: INTERNAL MEDICINE CLINIC | Facility: CLINIC | Age: 84
End: 2024-07-12

## 2024-07-12 NOTE — TELEPHONE ENCOUNTER
----- Message from Sue Garza PA-C sent at 7/11/2024  6:11 PM EDT -----  Please print out what Dexcom data we have in Clarity and send to be scanned.  I couldn't get my computer to print.  Thanks!

## 2024-08-16 PROBLEM — I82.532 CHRONIC DEEP VEIN THROMBOSIS (DVT) OF POPLITEAL VEIN OF LEFT LOWER EXTREMITY (HCC): Status: RESOLVED | Noted: 2020-06-30 | Resolved: 2024-08-16

## 2024-08-30 DIAGNOSIS — N40.1 BENIGN PROSTATIC HYPERPLASIA WITH URINARY FREQUENCY: ICD-10-CM

## 2024-08-30 DIAGNOSIS — R35.0 BENIGN PROSTATIC HYPERPLASIA WITH URINARY FREQUENCY: ICD-10-CM

## 2024-09-03 ENCOUNTER — PATIENT MESSAGE (OUTPATIENT)
Dept: INTERNAL MEDICINE CLINIC | Facility: CLINIC | Age: 84
End: 2024-09-03

## 2024-09-03 DIAGNOSIS — E11.59 TYPE 2 DIABETES MELLITUS WITH OTHER CIRCULATORY COMPLICATION, WITH LONG-TERM CURRENT USE OF INSULIN (HCC): ICD-10-CM

## 2024-09-03 DIAGNOSIS — Z79.4 TYPE 2 DIABETES MELLITUS WITH OTHER CIRCULATORY COMPLICATION, WITH LONG-TERM CURRENT USE OF INSULIN (HCC): ICD-10-CM

## 2024-09-03 RX ORDER — CARVEDILOL 12.5 MG/1
12.5 TABLET ORAL 2 TIMES DAILY
Qty: 180 TABLET | Refills: 3 | OUTPATIENT
Start: 2024-09-03

## 2024-09-03 RX ORDER — TAMSULOSIN HYDROCHLORIDE 0.4 MG/1
0.4 CAPSULE ORAL DAILY
Qty: 90 CAPSULE | Refills: 3 | Status: SHIPPED | OUTPATIENT
Start: 2024-09-03

## 2024-09-04 ENCOUNTER — TELEPHONE (OUTPATIENT)
Dept: INTERNAL MEDICINE CLINIC | Facility: CLINIC | Age: 84
End: 2024-09-04

## 2024-09-04 DIAGNOSIS — Z79.4 TYPE 2 DIABETES MELLITUS WITH OTHER CIRCULATORY COMPLICATION, WITH LONG-TERM CURRENT USE OF INSULIN (HCC): ICD-10-CM

## 2024-09-04 DIAGNOSIS — E11.59 TYPE 2 DIABETES MELLITUS WITH OTHER CIRCULATORY COMPLICATION, WITH LONG-TERM CURRENT USE OF INSULIN (HCC): ICD-10-CM

## 2024-09-04 NOTE — TELEPHONE ENCOUNTER
Pharmacy would like clarification:    Insulin that was sent in yesterday what is max units per day he is allowed to use?  Need info for insurance purposes.

## 2024-09-05 NOTE — TELEPHONE ENCOUNTER
Max daily dose is 40 units/day.  Prescription updated.  Please check with pharmacy and see if they need anything else?

## 2024-10-04 ENCOUNTER — TELEPHONE (OUTPATIENT)
Dept: INTERNAL MEDICINE CLINIC | Facility: CLINIC | Age: 84
End: 2024-10-04

## 2024-10-04 NOTE — TELEPHONE ENCOUNTER
Pt stated that he would like a from the LPN to discuss a personal matter. Pt didn't state the what he wanted to discuss.

## 2024-10-21 DIAGNOSIS — I10 ESSENTIAL HYPERTENSION WITH GOAL BLOOD PRESSURE LESS THAN 130/85: ICD-10-CM

## 2024-10-21 RX ORDER — HYDRALAZINE HYDROCHLORIDE 50 MG/1
50 TABLET, FILM COATED ORAL 2 TIMES DAILY
Qty: 180 TABLET | Refills: 0 | Status: SHIPPED | OUTPATIENT
Start: 2024-10-21

## 2024-10-21 RX ORDER — CARVEDILOL 12.5 MG/1
12.5 TABLET ORAL 2 TIMES DAILY
Qty: 180 TABLET | Refills: 0 | Status: SHIPPED | OUTPATIENT
Start: 2024-10-21

## 2024-10-21 NOTE — TELEPHONE ENCOUNTER
Appointment made for 11/06/2024    Verified rx in last OV date 6/21/23. Pharmacy confirmed. Erx as requested

## 2024-10-21 NOTE — TELEPHONE ENCOUNTER
MEDICATION REFILL REQUEST      Name of Medication:  carvedilol (COREG) 12.5 MG table   Dose:    Frequency:    Quantity:  3  Days' supply:  90      Name of Medication:  hydrALAZINE (APRESOLINE) 50 MG tablet   Dose:    Frequency:    Quantity:  3  Days' supply:  90      Pharmacy Name/Location:  [id number]  JOM[place] Prince    Please call and advise.

## 2024-10-31 NOTE — PROGRESS NOTES
Valeriano Anton (:  1940) is a 84 y.o. male,Established patient, here for evaluation of the following chief complaint(s):  Diabetes (3 month diabetic f/u.), Hypertension, and Hyperlipidemia          Assessment/Plan  1. Type 2 diabetes mellitus with other circulatory complication, with long-term current use of insulin (HCC)  -     Insulin Syringe-Needle U-100 31G X 5/16\" 0.3 ML MISC; Disp-300 each, R-3, NormalUse to inject insulin 3 times/day before meals  -     CGM Interpretation  -     AMB POC HEMOGLOBIN A1C  -     Comprehensive Metabolic Panel; Future  -     Microalbumin / Creatinine Urine Ratio; Future  2. Extrinsic allergic asthma, mild intermittent, uncomplicated  -     albuterol sulfate HFA (PROVENTIL;VENTOLIN;PROAIR) 108 (90 Base) MCG/ACT inhaler; Inhale 2 puffs into the lungs every 4 hours as needed for Wheezing, Disp-18 g, R-5Normal  3. Essential hypertension with goal blood pressure less than 130/85  -     spironolactone (ALDACTONE) 25 MG tablet; Take 1 tablet by mouth daily, Disp-90 tablet, R-3Normal  -     CBC with Auto Differential; Future  -     Comprehensive Metabolic Panel; Future  4. Hyperlipidemia LDL goal <70  -     Comprehensive Metabolic Panel; Future  -     Lipid Panel; Future  5. Hemiplegia of left 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, unspecified hemiplegia type (HCC)  6. Atherosclerosis of native coronary artery of native heart with angina pectoris (HCC)  -     Lipid Panel; Future  7. Iron deficiency anemia, unspecified iron deficiency anemia type  -     CBC with Auto Differential; Future  8. Stage 3b chronic kidney disease (HCC)  -     CBC with Auto Differential; Future  -     Comprehensive Metabolic Panel; Future  9. Severe obesity (BMI 35.0-39.9) with comorbidity  -     [facility]  10. Vitamin B12 deficiency  -     CBC with Auto Differential; Future  11. Lymphedema associated with obesity  12. Degeneration of intervertebral disc

## 2024-11-04 ENCOUNTER — OFFICE VISIT (OUTPATIENT)
Dept: INTERNAL MEDICINE CLINIC | Facility: CLINIC | Age: 84
End: 2024-11-04

## 2024-11-04 VITALS
HEART RATE: 78 BPM | HEIGHT: 67 IN | DIASTOLIC BLOOD PRESSURE: 82 MMHG | WEIGHT: 248 LBS | SYSTOLIC BLOOD PRESSURE: 134 MMHG | TEMPERATURE: 97.4 F | BODY MASS INDEX: 38.92 KG/M2 | OXYGEN SATURATION: 98 %

## 2024-11-04 DIAGNOSIS — Z79.4 TYPE 2 DIABETES MELLITUS WITH OTHER CIRCULATORY COMPLICATION, WITH LONG-TERM CURRENT USE OF INSULIN (HCC): Primary | ICD-10-CM

## 2024-11-04 DIAGNOSIS — M51.360 DEGENERATION OF INTERVERTEBRAL DISC OF LUMBAR REGION WITH DISCOGENIC BACK PAIN: ICD-10-CM

## 2024-11-04 DIAGNOSIS — Z91.199 NONCOMPLIANCE WITH CPAP TREATMENT: ICD-10-CM

## 2024-11-04 DIAGNOSIS — I25.119 ATHEROSCLEROSIS OF NATIVE CORONARY ARTERY OF NATIVE HEART WITH ANGINA PECTORIS (HCC): ICD-10-CM

## 2024-11-04 DIAGNOSIS — G47.33 OBSTRUCTIVE SLEEP APNEA: ICD-10-CM

## 2024-11-04 DIAGNOSIS — J45.20: ICD-10-CM

## 2024-11-04 DIAGNOSIS — I69.354 HEMIPLEGIA OF LEFT NONDOMINANT SIDE AS LATE EFFECT OF CEREBRAL INFARCTION, UNSPECIFIED HEMIPLEGIA TYPE (HCC): ICD-10-CM

## 2024-11-04 DIAGNOSIS — E11.59 TYPE 2 DIABETES MELLITUS WITH OTHER CIRCULATORY COMPLICATION, WITH LONG-TERM CURRENT USE OF INSULIN (HCC): ICD-10-CM

## 2024-11-04 DIAGNOSIS — E11.59 TYPE 2 DIABETES MELLITUS WITH OTHER CIRCULATORY COMPLICATION, WITH LONG-TERM CURRENT USE OF INSULIN (HCC): Primary | ICD-10-CM

## 2024-11-04 DIAGNOSIS — E53.8 VITAMIN B12 DEFICIENCY: ICD-10-CM

## 2024-11-04 DIAGNOSIS — E66.01 SEVERE OBESITY (BMI 35.0-39.9) WITH COMORBIDITY: ICD-10-CM

## 2024-11-04 DIAGNOSIS — E78.5 HYPERLIPIDEMIA LDL GOAL <70: ICD-10-CM

## 2024-11-04 DIAGNOSIS — D50.9 IRON DEFICIENCY ANEMIA, UNSPECIFIED IRON DEFICIENCY ANEMIA TYPE: ICD-10-CM

## 2024-11-04 DIAGNOSIS — I10 ESSENTIAL HYPERTENSION WITH GOAL BLOOD PRESSURE LESS THAN 130/85: ICD-10-CM

## 2024-11-04 DIAGNOSIS — N18.32 STAGE 3B CHRONIC KIDNEY DISEASE (HCC): ICD-10-CM

## 2024-11-04 DIAGNOSIS — E66.9 LYMPHEDEMA ASSOCIATED WITH OBESITY: ICD-10-CM

## 2024-11-04 DIAGNOSIS — I89.0 LYMPHEDEMA ASSOCIATED WITH OBESITY: ICD-10-CM

## 2024-11-04 DIAGNOSIS — Z79.4 TYPE 2 DIABETES MELLITUS WITH OTHER CIRCULATORY COMPLICATION, WITH LONG-TERM CURRENT USE OF INSULIN (HCC): ICD-10-CM

## 2024-11-04 LAB
ALBUMIN SERPL-MCNC: 4 G/DL (ref 3.2–4.6)
ALBUMIN/GLOB SERPL: 1 (ref 1–1.9)
ALP SERPL-CCNC: 94 U/L (ref 40–129)
ALT SERPL-CCNC: 15 U/L (ref 8–55)
ANION GAP SERPL CALC-SCNC: 12 MMOL/L (ref 7–16)
AST SERPL-CCNC: 24 U/L (ref 15–37)
BASOPHILS # BLD: 0 K/UL (ref 0–0.2)
BASOPHILS NFR BLD: 0 % (ref 0–2)
BILIRUB SERPL-MCNC: 0.3 MG/DL (ref 0–1.2)
BUN SERPL-MCNC: 48 MG/DL (ref 8–23)
CALCIUM SERPL-MCNC: 9.4 MG/DL (ref 8.8–10.2)
CHLORIDE SERPL-SCNC: 103 MMOL/L (ref 98–107)
CHOLEST SERPL-MCNC: 121 MG/DL (ref 0–200)
CO2 SERPL-SCNC: 27 MMOL/L (ref 20–29)
CREAT SERPL-MCNC: 2.26 MG/DL (ref 0.8–1.3)
DIFFERENTIAL METHOD BLD: ABNORMAL
EOSINOPHIL # BLD: 0.1 K/UL (ref 0–0.8)
EOSINOPHIL NFR BLD: 2 % (ref 0.5–7.8)
ERYTHROCYTE [DISTWIDTH] IN BLOOD BY AUTOMATED COUNT: 14 % (ref 11.9–14.6)
GLOBULIN SER CALC-MCNC: 3.8 G/DL (ref 2.3–3.5)
GLUCOSE SERPL-MCNC: 76 MG/DL (ref 70–99)
HBA1C MFR BLD: 7 %
HCT VFR BLD AUTO: 35.2 % (ref 41.1–50.3)
HDLC SERPL-MCNC: 44 MG/DL (ref 40–60)
HDLC SERPL: 2.8 (ref 0–5)
HGB BLD-MCNC: 10.8 G/DL (ref 13.6–17.2)
IMM GRANULOCYTES # BLD AUTO: 0 K/UL (ref 0–0.5)
IMM GRANULOCYTES NFR BLD AUTO: 0 % (ref 0–5)
LDLC SERPL CALC-MCNC: 64 MG/DL (ref 0–100)
LYMPHOCYTES # BLD: 2.2 K/UL (ref 0.5–4.6)
LYMPHOCYTES NFR BLD: 31 % (ref 13–44)
MCH RBC QN AUTO: 29.6 PG (ref 26.1–32.9)
MCHC RBC AUTO-ENTMCNC: 30.7 G/DL (ref 31.4–35)
MCV RBC AUTO: 96.4 FL (ref 82–102)
MONOCYTES # BLD: 0.7 K/UL (ref 0.1–1.3)
MONOCYTES NFR BLD: 10 % (ref 4–12)
NEUTS SEG # BLD: 4 K/UL (ref 1.7–8.2)
NEUTS SEG NFR BLD: 57 % (ref 43–78)
NRBC # BLD: 0 K/UL (ref 0–0.2)
PLATELET # BLD AUTO: 189 K/UL (ref 150–450)
PMV BLD AUTO: 10.5 FL (ref 9.4–12.3)
POTASSIUM SERPL-SCNC: 4.1 MMOL/L (ref 3.5–5.1)
PROT SERPL-MCNC: 7.8 G/DL (ref 6.3–8.2)
RBC # BLD AUTO: 3.65 M/UL (ref 4.23–5.6)
SODIUM SERPL-SCNC: 141 MMOL/L (ref 136–145)
TRIGL SERPL-MCNC: 66 MG/DL (ref 0–150)
VLDLC SERPL CALC-MCNC: 13 MG/DL (ref 6–23)
WBC # BLD AUTO: 7.1 K/UL (ref 4.3–11.1)

## 2024-11-04 RX ORDER — SPIRONOLACTONE 25 MG/1
25 TABLET ORAL DAILY
Qty: 90 TABLET | Refills: 3 | Status: SHIPPED | OUTPATIENT
Start: 2024-11-04

## 2024-11-04 RX ORDER — INSULIN DEGLUDEC 200 U/ML
50 INJECTION, SOLUTION SUBCUTANEOUS DAILY
Qty: 23 ML | Refills: 3 | Status: CANCELLED | OUTPATIENT
Start: 2024-11-04

## 2024-11-04 RX ORDER — ALBUTEROL SULFATE 90 UG/1
2 INHALANT RESPIRATORY (INHALATION) EVERY 4 HOURS PRN
Qty: 18 G | Refills: 5 | Status: SHIPPED | OUTPATIENT
Start: 2024-11-04

## 2024-11-04 RX ORDER — BLOOD SUGAR DIAGNOSTIC
STRIP MISCELLANEOUS
Qty: 300 EACH | Refills: 3 | Status: SHIPPED | OUTPATIENT
Start: 2024-11-04

## 2024-11-04 ASSESSMENT — ENCOUNTER SYMPTOMS
SHORTNESS OF BREATH: 1
BACK PAIN: 1

## 2024-11-05 ENCOUNTER — TELEPHONE (OUTPATIENT)
Dept: INTERNAL MEDICINE CLINIC | Facility: CLINIC | Age: 84
End: 2024-11-05

## 2024-11-05 NOTE — PATIENT INSTRUCTIONS
Emphasized the importance of measuring daily fluid intake and aiming for 64 oz/day minimum  Cautioned about carb loading meals - especially dinner since he goes to be so closely to completing his meal  Encouraged high protein intake with dinner to help sustain blood sugar overnight and hopefully reduce hypoglycemic episodes at night  Reviewed treatment options for spine including physical therapy, injections, and medication - patient willing to try physical therapy but does not drive so unable to attend outpatient therapy  Counseled about all the underlying damage that untreated sleep apnea does to the body and overall health including but not limited to blood pressure elevation, shortness of breath, chronic edema, overall metabolic dysfunction, fatigue, etc  Reminded that diabetic eye exam is overdue and needs to be scheduled ASAP  Monitor blood pressure 3 times/week and keep record to bring to next appointment  Continue chronic medications as prescribed

## 2024-11-05 NOTE — TELEPHONE ENCOUNTER
We did not discuss constipation or dermatology.  He can take OTC stool softeners up to twice daily or Miralax once daily but ultimately he needs more water, dietary fiber, and exercise/movement if bowel are going to move better.  We can treat the presumed yeast rash but would need to discuss and examine in an appointment - the Nystatin powder he is referencing was not prescribed by us.  In the meantime he can use Lotrisone cream OTC twice daily on areas where rash is present.  He should call [facility] and see if they will let him see Dr. Newton who is very well respected there.  Regarding the back pain, we discussed that he can return to his old orthopedic doctor if he'd like but if he's not open to injections and isn't a surgical candidate due to weight and age then there's not much ortho will be able to offer.  The only other treatment options include pain management who are mostly not prescribing just medications anymore if they're not also doing injections as well so he wasn't interested in the injections so not a worthwhile referral there either.  That leaves us with physical therapy which I have referred him to Larry VENEGAS who will be able to come to his home but he has to be a willing and agreeable participant for it to benefit him.

## 2024-11-05 NOTE — TELEPHONE ENCOUNTER
Contacted pt this morning in regards to his lab results and he states that he spoke with you about a few things that he wanted to follow-up on. He has ongoing constipation and wants to know what they plan for that is as well as the need for a Dermatologist because the Nystatin powder is not working well in his private areas where he sweats a lot and it causes those areas to be very itchy. He also feels like he just needs a good all over dermatologic exam. He is also requesting a new eye doctor referral as he did not like his previous one and he wanted to follow-up on what the plan is for his chronic back pain. Please advise.

## 2024-11-05 NOTE — RESULT ENCOUNTER NOTE
Anemia is stable with slight improvement in hemoglobin this time.  Blood sugar was a bit low but not surprising since it was afternoon and he was fasting.  Kidney function has declined even further - GFR is 28 which puts him at stage 4 kidney disease if he wants to check the chart I gave him.  He HAS to start focusing on drinking water or he's going to end up in kidney failure quite quickly at this rate.  Cholesterol values look great and liver function is normal.

## 2024-11-06 ENCOUNTER — OFFICE VISIT (OUTPATIENT)
Age: 84
End: 2024-11-06

## 2024-11-06 VITALS
WEIGHT: 248 LBS | SYSTOLIC BLOOD PRESSURE: 152 MMHG | DIASTOLIC BLOOD PRESSURE: 68 MMHG | HEART RATE: 77 BPM | BODY MASS INDEX: 38.92 KG/M2 | HEIGHT: 67 IN

## 2024-11-06 DIAGNOSIS — I89.0 LYMPHEDEMA: ICD-10-CM

## 2024-11-06 DIAGNOSIS — I10 ESSENTIAL HYPERTENSION WITH GOAL BLOOD PRESSURE LESS THAN 130/85: Primary | ICD-10-CM

## 2024-11-06 RX ORDER — CARVEDILOL 12.5 MG/1
12.5 TABLET ORAL 2 TIMES DAILY
Qty: 180 TABLET | Refills: 3 | Status: SHIPPED | OUTPATIENT
Start: 2024-11-06

## 2024-11-06 RX ORDER — HYDRALAZINE HYDROCHLORIDE 50 MG/1
50 TABLET, FILM COATED ORAL 2 TIMES DAILY
Qty: 180 TABLET | Refills: 3 | Status: SHIPPED | OUTPATIENT
Start: 2024-11-06

## 2024-11-06 ASSESSMENT — ENCOUNTER SYMPTOMS
COLOR CHANGE: 0
ABDOMINAL PAIN: 0
SHORTNESS OF BREATH: 0
BLURRED VISION: 0
HEMATEMESIS: 0
HEMATOCHEZIA: 0
ORTHOPNEA: 0
WHEEZING: 0
DIARRHEA: 0
SPUTUM PRODUCTION: 0
HOARSE VOICE: 0
BOWEL INCONTINENCE: 0

## 2024-11-06 NOTE — PROGRESS NOTES
[facility] CARDIOLOGY  [address] [place] [address], [address]  [place] [address]  PHONE: [phone]        24        NAME:  Valeriano Anton  : 1940  MRN: [id number]       SUBJECTIVE:   Valeriano Anton is a 84 y.o. male seen for a follow up visit regarding the following: Primary hypertension and chronic lymphedema.He returns for annual follow up.He reports doing ok.He admits to chronic GUIDO,lymphedema,and rare atypical chest pain.    Chief Complaint   Patient presents with    Hypertension       HPI:    Hypertension  This is a chronic problem. The problem is unchanged. Associated symptoms include chest pain (rare chest pain.) and malaise/fatigue. Pertinent negatives include no blurred vision, orthopnea, palpitations, PND or shortness of breath.       Past Medical History, Past Surgical History, Family history, Social History, and Medications were all reviewed with the patient today and updated as necessary.         Current Outpatient Medications:     hydrALAZINE (APRESOLINE) 50 MG tablet, Take 1 tablet by mouth 2 times daily, Disp: 180 tablet, Rfl: 3    carvedilol (COREG) 12.5 MG tablet, Take 1 tablet by mouth 2 times daily, Disp: 180 tablet, Rfl: 3    Insulin Syringe-Needle U-100 31G X 5/16\" 0.3 ML MISC, Use to inject insulin 3 times/day before meals, Disp: 300 each, Rfl: 3    albuterol sulfate HFA (PROVENTIL;VENTOLIN;PROAIR) 108 (90 Base) MCG/ACT inhaler, Inhale 2 puffs into the lungs every 4 hours as needed for Wheezing, Disp: 18 g, Rfl: 5    spironolactone (ALDACTONE) 25 MG tablet, Take 1 tablet by mouth daily, Disp: 90 tablet, Rfl: 3    insulin aspart (NOVOLOG) 100 UNIT/ML injection pen, Inject 6 units standing dose + additional insulin according to sliding scale (up to 14 units) subQ before each meal.  Max daily dose 40 units/day., Disp: 38 mL, Rfl: 3    tamsulosin (FLOMAX) 0.4 MG capsule, TAKE ONE CAPSULE BY MOUTH ONE TIME DAILY, Disp: 90 capsule, Rfl: 3    Insulin Pen Needle (B-D ULTRAFINE III SHORT

## 2025-02-03 ENCOUNTER — PATIENT MESSAGE (OUTPATIENT)
Dept: INTERNAL MEDICINE CLINIC | Facility: CLINIC | Age: 85
End: 2025-02-03

## 2025-02-10 NOTE — TELEPHONE ENCOUNTER
I called the pt back and no answer. He has an appt on 2/11/25 and I was calling him in regards to what he wanted to speak to Rhina about in a prior message

## 2025-02-10 NOTE — PROGRESS NOTES
Valeriano Anton (:  1940) is a 84 y.o. male,Established patient, here for evaluation of the following chief complaint(s):  Diabetes (3 month diabetes f/u.), Hypertension, and Hyperlipidemia          Assessment/Plan  1. Type 2 diabetes mellitus with other circulatory complication, with long-term current use of insulin (HCC)  -     AMB POC HEMOGLOBIN A1C  -     CGM Interpretation  -     blood glucose test strips (ONETOUCH VERIO) strip; Use to check glucose 3 times/day.  Dx: E11.59, I10, Disp-300 each, R-3Print  -     Insulin Degludec (TRESIBA FLEXTOUCH) 200 UNIT/ML SOPN; Inject 45 Units into the skin daily, Disp-21 mL, R-3Print  -     ONE TOUCH ULTRASOFT LANCETS MISC; Disp-300 each, R-3, PrintUse to check glucose 3 times/day.  Dx: E11.59, I10  -     Comprehensive Metabolic Panel; Future  -     Hemoglobin A1C; Future  2. Essential hypertension with goal blood pressure less than 130/85  -     irbesartan (AVAPRO) 300 MG tablet; Take 1 tablet by mouth daily, Disp-90 tablet, R-3Print  -     CBC with Auto Differential; Future  -     Comprehensive Metabolic Panel; Future  3. Unspecified mood (affective) disorder (HCC)  -     busPIRone (BUSPAR) 15 MG tablet; Take 15 mg by mouth daily, Disp-90 tablet, R-3Print  4. Chronic diastolic (congestive) heart failure (HCC)  -     furosemide (LASIX) 40 MG tablet; Take 1 tablet by mouth 2 times daily, Disp-180 tablet, R-3Print  5. Long term current use of diuretic  -     Comprehensive Metabolic Panel; Future  6. Atherosclerosis of native coronary artery of native heart with angina pectoris (HCC)  -     Lipid Panel; Future  7. Hemiplegia of left 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, unspecified hemiplegia type ([facility])  8. Stage 3b chronic kidney disease (HCC)  -     CBC with Auto Differential; Future  -     Comprehensive Metabolic Panel; Future  9. Hyperlipidemia LDL goal <70  -     Comprehensive Metabolic Panel; Future  -     Lipid Panel; Future  10. Iron

## 2025-02-11 ENCOUNTER — OFFICE VISIT (OUTPATIENT)
Dept: INTERNAL MEDICINE CLINIC | Facility: CLINIC | Age: 85
End: 2025-02-11

## 2025-02-11 ENCOUNTER — TELEPHONE (OUTPATIENT)
Dept: INTERNAL MEDICINE CLINIC | Facility: CLINIC | Age: 85
End: 2025-02-11

## 2025-02-11 VITALS
HEART RATE: 69 BPM | DIASTOLIC BLOOD PRESSURE: 68 MMHG | WEIGHT: 247 LBS | OXYGEN SATURATION: 97 % | SYSTOLIC BLOOD PRESSURE: 130 MMHG | BODY MASS INDEX: 38.77 KG/M2 | HEIGHT: 67 IN | TEMPERATURE: 97.5 F

## 2025-02-11 DIAGNOSIS — I50.32 CHRONIC DIASTOLIC (CONGESTIVE) HEART FAILURE (HCC): ICD-10-CM

## 2025-02-11 DIAGNOSIS — F39 UNSPECIFIED MOOD (AFFECTIVE) DISORDER (HCC): ICD-10-CM

## 2025-02-11 DIAGNOSIS — Z79.4 TYPE 2 DIABETES MELLITUS WITH OTHER CIRCULATORY COMPLICATION, WITH LONG-TERM CURRENT USE OF INSULIN (HCC): Primary | ICD-10-CM

## 2025-02-11 DIAGNOSIS — I69.354 HEMIPLEGIA OF LEFT NONDOMINANT SIDE AS LATE EFFECT OF CEREBRAL INFARCTION, UNSPECIFIED HEMIPLEGIA TYPE (HCC): ICD-10-CM

## 2025-02-11 DIAGNOSIS — I25.119 ATHEROSCLEROSIS OF NATIVE CORONARY ARTERY OF NATIVE HEART WITH ANGINA PECTORIS (HCC): ICD-10-CM

## 2025-02-11 DIAGNOSIS — I89.0 LYMPHEDEMA ASSOCIATED WITH OBESITY: Primary | ICD-10-CM

## 2025-02-11 DIAGNOSIS — N18.32 STAGE 3B CHRONIC KIDNEY DISEASE (HCC): ICD-10-CM

## 2025-02-11 DIAGNOSIS — I89.0 LYMPHEDEMA ASSOCIATED WITH OBESITY: ICD-10-CM

## 2025-02-11 DIAGNOSIS — E11.59 TYPE 2 DIABETES MELLITUS WITH OTHER CIRCULATORY COMPLICATION, WITH LONG-TERM CURRENT USE OF INSULIN (HCC): Primary | ICD-10-CM

## 2025-02-11 DIAGNOSIS — E66.9 LYMPHEDEMA ASSOCIATED WITH OBESITY: Primary | ICD-10-CM

## 2025-02-11 DIAGNOSIS — I10 ESSENTIAL HYPERTENSION WITH GOAL BLOOD PRESSURE LESS THAN 130/85: ICD-10-CM

## 2025-02-11 DIAGNOSIS — E53.8 VITAMIN B12 DEFICIENCY: ICD-10-CM

## 2025-02-11 DIAGNOSIS — Z79.899 LONG TERM CURRENT USE OF DIURETIC: ICD-10-CM

## 2025-02-11 DIAGNOSIS — D50.9 IRON DEFICIENCY ANEMIA, UNSPECIFIED IRON DEFICIENCY ANEMIA TYPE: ICD-10-CM

## 2025-02-11 DIAGNOSIS — E78.5 HYPERLIPIDEMIA LDL GOAL <70: ICD-10-CM

## 2025-02-11 DIAGNOSIS — E66.9 LYMPHEDEMA ASSOCIATED WITH OBESITY: ICD-10-CM

## 2025-02-11 LAB — HBA1C MFR BLD: 7 %

## 2025-02-11 RX ORDER — FUROSEMIDE 40 MG/1
40 TABLET ORAL 2 TIMES DAILY
Qty: 180 TABLET | Refills: 3 | Status: SHIPPED | OUTPATIENT
Start: 2025-02-11

## 2025-02-11 RX ORDER — INSULIN DEGLUDEC 200 U/ML
45 INJECTION, SOLUTION SUBCUTANEOUS DAILY
Qty: 21 ML | Refills: 3 | Status: SHIPPED | OUTPATIENT
Start: 2025-02-11

## 2025-02-11 RX ORDER — LANCETS
EACH MISCELLANEOUS
Qty: 300 EACH | Refills: 3 | Status: SHIPPED | OUTPATIENT
Start: 2025-02-11

## 2025-02-11 RX ORDER — BLOOD SUGAR DIAGNOSTIC
STRIP MISCELLANEOUS
Qty: 300 EACH | Refills: 3 | Status: SHIPPED | OUTPATIENT
Start: 2025-02-11

## 2025-02-11 RX ORDER — IRBESARTAN 300 MG/1
300 TABLET ORAL DAILY
Qty: 90 TABLET | Refills: 3 | Status: SHIPPED | OUTPATIENT
Start: 2025-02-11

## 2025-02-11 RX ORDER — BUSPIRONE HYDROCHLORIDE 15 MG/1
15 TABLET ORAL DAILY
Qty: 90 TABLET | Refills: 3 | Status: SHIPPED | OUTPATIENT
Start: 2025-02-11

## 2025-02-11 RX ORDER — POTASSIUM CHLORIDE 20MEQ/15ML
20 LIQUID (ML) ORAL 2 TIMES DAILY
Qty: 2700 ML | Refills: 3 | Status: CANCELLED | OUTPATIENT
Start: 2025-02-11

## 2025-02-11 SDOH — ECONOMIC STABILITY: FOOD INSECURITY: WITHIN THE PAST 12 MONTHS, THE FOOD YOU BOUGHT JUST DIDN'T LAST AND YOU DIDN'T HAVE MONEY TO GET MORE.: NEVER TRUE

## 2025-02-11 SDOH — ECONOMIC STABILITY: FOOD INSECURITY: WITHIN THE PAST 12 MONTHS, YOU WORRIED THAT YOUR FOOD WOULD RUN OUT BEFORE YOU GOT MONEY TO BUY MORE.: NEVER TRUE

## 2025-02-11 ASSESSMENT — PATIENT HEALTH QUESTIONNAIRE - PHQ9
SUM OF ALL RESPONSES TO PHQ QUESTIONS 1-9: 0
SUM OF ALL RESPONSES TO PHQ9 QUESTIONS 1 & 2: 0
SUM OF ALL RESPONSES TO PHQ QUESTIONS 1-9: 0
SUM OF ALL RESPONSES TO PHQ QUESTIONS 1-9: 0
2. FEELING DOWN, DEPRESSED OR HOPELESS: NOT AT ALL
SUM OF ALL RESPONSES TO PHQ QUESTIONS 1-9: 0
1. LITTLE INTEREST OR PLEASURE IN DOING THINGS: NOT AT ALL

## 2025-02-11 ASSESSMENT — ENCOUNTER SYMPTOMS
SHORTNESS OF BREATH: 1
BACK PAIN: 1

## 2025-02-11 NOTE — TELEPHONE ENCOUNTER
----- Message from Sue Garza PA-C sent at 2/11/2025 12:37 PM EST -----  Can you call wound care and see if they know of any home health services that would provide assistance with lymphedema wraps for a homebound patient?  We have hit dead end after dead end here for some reason but patient is unable to wrap them himself due to abdominal girth

## 2025-02-11 NOTE — PATIENT INSTRUCTIONS
Urged to make a mid day snack a priority to help avoid afternoon hypoglycemia - suggest a zero sugar greek yogurt, bowl of cottage cheese, or Glucerna drink  Encouraged a protein rich bedtime snack like handful of salt free nuts, zero sugar greek yogurt, cheese stick, or hard boiled egg  Praised efforts to eliminate caffeine and stay hydrated with 60 oz of water/day  Continue chronic medications as prescribed  Monitor blood pressure 2-3 times/day and keep record to bring to next appointment  Will check with wound care to see if they have any suggestions for at home lymphedema wrap assistance  Emphasized the importance of eating enough protein in his meals to avoid abrupt drop in blood sugar after eating  Explained that if we can keep him from being hypoglycemic in the afternoons he would probably feel a lot better around dinner time

## 2025-02-11 NOTE — TELEPHONE ENCOUNTER
Spoke with Clinical Supervisor at the wound clinic who stated that [facility] does lymphedema wrapping and so does [facility]. LVM on 2/11/25 @ 1321 to have [facility] return my call to see if they can accommodate pt. Spoke with [facility] who states that they do in-home lymphedema wrapping and just to specify this request in the referral order and if the provider/pt wants any specific materials used when wrapping.

## 2025-02-13 ENCOUNTER — TELEPHONE (OUTPATIENT)
Dept: INTERNAL MEDICINE CLINIC | Facility: CLINIC | Age: 85
End: 2025-02-13

## 2025-02-13 DIAGNOSIS — Z79.4 TYPE 2 DIABETES MELLITUS WITH OTHER CIRCULATORY COMPLICATION, WITH LONG-TERM CURRENT USE OF INSULIN (HCC): Primary | ICD-10-CM

## 2025-02-13 DIAGNOSIS — E11.59 TYPE 2 DIABETES MELLITUS WITH OTHER CIRCULATORY COMPLICATION, WITH LONG-TERM CURRENT USE OF INSULIN (HCC): Primary | ICD-10-CM

## 2025-02-17 ENCOUNTER — TELEPHONE (OUTPATIENT)
Dept: INTERNAL MEDICINE CLINIC | Facility: CLINIC | Age: 85
End: 2025-02-17

## 2025-02-17 NOTE — TELEPHONE ENCOUNTER
Tesha from [facility] called, states pt was seen Saturday, and orders were put in for Lymphedema wraps. States specification is needed on what type of wraps are required. States this order is needed in order for OT to see the pt.    Tesha [phone]

## 2025-02-18 NOTE — TELEPHONE ENCOUNTER
I would say 2 layer wraps but also open to OT suggestions if they feel something else would be better.

## 2025-02-18 NOTE — TELEPHONE ENCOUNTER
I would need to see options of what they need specified.  Whenever we write home health orders for anything else we are able to just let them assess and treat as felt appropriate - why is this any different?  Does wound care have any suggestions/insight on what details they may be looking for?

## 2025-02-18 NOTE — TELEPHONE ENCOUNTER
Tesha chisholm [facility] states that the options are 2 layer compression, 4 layer compression, and tubigrip.

## 2025-02-18 NOTE — TELEPHONE ENCOUNTER
Yes that is fine - should probably include additional diagnoses of lumbar DDD, hemiplegia of L nondominant side (I69.354).

## 2025-03-06 DIAGNOSIS — Z79.4 TYPE 2 DIABETES MELLITUS WITH OTHER CIRCULATORY COMPLICATION, WITH LONG-TERM CURRENT USE OF INSULIN (HCC): ICD-10-CM

## 2025-03-06 DIAGNOSIS — E11.59 TYPE 2 DIABETES MELLITUS WITH OTHER CIRCULATORY COMPLICATION, WITH LONG-TERM CURRENT USE OF INSULIN (HCC): ICD-10-CM

## 2025-03-06 RX ORDER — INSULIN DEGLUDEC 200 U/ML
45 INJECTION, SOLUTION SUBCUTANEOUS DAILY
Qty: 21 ML | Refills: 3 | Status: SHIPPED | OUTPATIENT
Start: 2025-03-06

## 2025-03-06 NOTE — TELEPHONE ENCOUNTER
Rx was printed at last visit but pharmacy is requesting a new Rx for Tresiba to be e-scribed, please.

## 2025-03-06 NOTE — TELEPHONE ENCOUNTER
Hamlet from [facility] called,states that they need new copy of Rx for Tresiba (or verbal).    Hamlet [phone]    Fax [phone]

## 2025-03-11 ENCOUNTER — TELEPHONE (OUTPATIENT)
Dept: INTERNAL MEDICINE CLINIC | Facility: CLINIC | Age: 85
End: 2025-03-11

## 2025-03-11 DIAGNOSIS — I10 ESSENTIAL HYPERTENSION WITH GOAL BLOOD PRESSURE LESS THAN 130/85: ICD-10-CM

## 2025-03-11 RX ORDER — IRBESARTAN 300 MG/1
300 TABLET ORAL DAILY
Qty: 90 TABLET | Refills: 3 | Status: SHIPPED | OUTPATIENT
Start: 2025-03-11

## 2025-03-11 NOTE — TELEPHONE ENCOUNTER
Pt's RX for irbesartan was printed during his last visit but he is calling requesting that it go to his [facility] pharmacy, please.

## 2025-03-11 NOTE — TELEPHONE ENCOUNTER
Pt called requesting to an update on [facility]    Patient called for a Medication Refill Request    Name of Medication : irbesartan (AVAPRO)     Strength of Medication: 300 MG tablet     Directions:  Take 1 tablet by mouth daily     30 day or 90 day supply: 90 day    Preferred Pharmacy:   [id number] - [place] - [id number] - P [phone]     Last Appt. Date: 2/11/2025    Next Appt. Date: 5/19/2025    Additional Information For Provider: The pt is requesting a call back.

## 2025-04-02 DIAGNOSIS — E11.59 TYPE 2 DIABETES MELLITUS WITH OTHER CIRCULATORY COMPLICATION, WITH LONG-TERM CURRENT USE OF INSULIN (HCC): ICD-10-CM

## 2025-04-02 DIAGNOSIS — Z79.4 TYPE 2 DIABETES MELLITUS WITH OTHER CIRCULATORY COMPLICATION, WITH LONG-TERM CURRENT USE OF INSULIN (HCC): ICD-10-CM

## 2025-04-04 ENCOUNTER — TELEPHONE (OUTPATIENT)
Dept: INTERNAL MEDICINE CLINIC | Facility: CLINIC | Age: 85
End: 2025-04-04

## 2025-04-04 NOTE — TELEPHONE ENCOUNTER
Spoke with Day at [facility] who states that pt refused further care during her assessment with him yesterday, so pt has been discharged from skilled nursing and therefore they can not fulfill UA order.

## 2025-04-04 NOTE — TELEPHONE ENCOUNTER
Day from home health called stating she received the orders from [facility] but pt declined home health yesterday so they are no longer taking care of pt. Day stated pt was difficult to work with and would not listen to the nurses. Day contact number is [phone].

## 2025-04-07 NOTE — TELEPHONE ENCOUNTER
Called pt to [facility] appt for 1pm today 4/7. Pt declined appt. Offered pt appt at [facility] for today or later this week. Pt declined appts. Pt disconnected phone call.

## 2025-04-07 NOTE — TELEPHONE ENCOUNTER
Patient can come in for the 1:00 pm appointment available tomorrow (Monday) if he hasn't already been to the urgent care to get urine checked.

## 2025-05-14 ENCOUNTER — TELEPHONE (OUTPATIENT)
Dept: INTERNAL MEDICINE CLINIC | Facility: CLINIC | Age: 85
End: 2025-05-14

## 2025-05-14 NOTE — TELEPHONE ENCOUNTER
Called pt to [facility] appt on 5/19 at 10 due to Sue being out of the office. Offered pt 5/19 at 8, 5/22 at 7:45, 6/5 at 1:30, and 6/17 at 10:30 but pt declined all appts. Pt also stated that he no longer needed ride share provided due to him using a walker. Do not schedule with Viv.

## 2025-05-19 NOTE — TELEPHONE ENCOUNTER
Patient will have to be seen so Rhina please call him and get him scheduled for one of these times.  He needs to know that we won't be able to provide medication refills long term unless he is current with his appointments.  If there's something he runs out of between now and his scheduled appt we can provide a carry over prescription but if not actively being seen and not scheduled we won't be able to provide refills until seen.

## 2025-05-21 NOTE — TELEPHONE ENCOUNTER
Conjuntivae and eyelids appear normal, Sclerae : White without injection Pt scheduled to see Sue for AWV + diabetes f/u with same day fasting labs on 6/17/25 @ 10:30.

## 2025-06-11 NOTE — PROGRESS NOTES
Medicare Annual Wellness Visit    Valeriano Anton is here for Medicare AWV (Routine annual Medicare wellness visit. ), Diabetes (4 month diabetes f/u with lab review.), Hypertension, and Hyperlipidemia    Assessment & Plan   Medicare annual wellness visit, subsequent  Type 2 diabetes mellitus with other circulatory complication, with long-term current use of insulin (HCC)  -     AMB POC HEMOGLOBIN A1C  -     CGM Interpretation  -     linagliptin (TRADJENTA) 5 MG tablet; Take 1 tablet by mouth daily, Disp-90 tablet, R-3Normal  -     Continuous Glucose Sensor (DEXCOM G7 SENSOR) MISC; Change sensor every 10 days. Use to monitor BS continuously. Dx: E11.59, Disp-2 each, R-0, DAWSample  -     Insulin Pen Needle 31G X 5 MM MISC; DAILY Starting Tue 6/17/2025, Disp-150 each, R-3, NormalUse to inject insulin 4 times/day  -     Comprehensive Metabolic Panel; Future  -     Hemoglobin A1C; Future  Hyperlipidemia LDL goal <70  -     atorvastatin (LIPITOR) 80 MG tablet; Take 1 tablet by mouth daily, Disp-90 tablet, R-3Normal  -     ezetimibe (ZETIA) 10 MG tablet; Take 1 tablet by mouth daily, Disp-90 tablet, R-3Normal  -     Comprehensive Metabolic Panel; Future  -     Lipid Panel; Future  Chronic diastolic (congestive) heart failure (HCC)  -     potassium chloride 20 MEQ/15ML (10%) oral solution; Take 15 mLs by mouth 2 times daily, Disp-2700 mL, R-3Normal  Long term current use of diuretic  -     potassium chloride 20 MEQ/15ML (10%) oral solution; Take 15 mLs by mouth 2 times daily, Disp-2700 mL, R-3Normal  -     Comprehensive Metabolic Panel; Future  Gastro-esophageal reflux disease without esophagitis  Atherosclerosis of native coronary artery of native heart with angina pectoris  -     ezetimibe (ZETIA) 10 MG tablet; Take 1 tablet by mouth daily, Disp-90 tablet, R-3Normal  -     Lipid Panel; Future  Screening for prostate cancer  Essential hypertension with goal blood pressure less than 130/85  -     Comprehensive Metabolic

## 2025-06-11 NOTE — PATIENT INSTRUCTIONS
of this information.    Personalized Preventive Plan for Valeriano Anton - 6/17/2025  Medicare offers a range of preventive health benefits. Some of the tests and screenings are paid in full while other may be subject to a deductible, co-insurance, and/or copay.  Some of these benefits include a comprehensive review of your medical history including lifestyle, illnesses that may run in your family, and various assessments and screenings as appropriate.  After reviewing your medical record and screening and assessments performed today your provider may have ordered immunizations, labs, imaging, and/or referrals for you.  A list of these orders (if applicable) as well as your Preventive Care list are included within your After Visit Summary for your review.

## 2025-06-12 DIAGNOSIS — N40.1 BENIGN PROSTATIC HYPERPLASIA WITH URINARY FREQUENCY: ICD-10-CM

## 2025-06-12 DIAGNOSIS — R35.0 BENIGN PROSTATIC HYPERPLASIA WITH URINARY FREQUENCY: ICD-10-CM

## 2025-06-13 RX ORDER — TAMSULOSIN HYDROCHLORIDE 0.4 MG/1
0.4 CAPSULE ORAL DAILY
Qty: 90 CAPSULE | Refills: 3 | OUTPATIENT
Start: 2025-06-13

## 2025-06-17 ENCOUNTER — LAB (OUTPATIENT)
Dept: INTERNAL MEDICINE CLINIC | Facility: CLINIC | Age: 85
End: 2025-06-17

## 2025-06-17 ENCOUNTER — RESULTS FOLLOW-UP (OUTPATIENT)
Dept: INTERNAL MEDICINE CLINIC | Facility: CLINIC | Age: 85
End: 2025-06-17

## 2025-06-17 ENCOUNTER — OFFICE VISIT (OUTPATIENT)
Dept: INTERNAL MEDICINE CLINIC | Facility: CLINIC | Age: 85
End: 2025-06-17

## 2025-06-17 VITALS
HEIGHT: 67 IN | DIASTOLIC BLOOD PRESSURE: 72 MMHG | OXYGEN SATURATION: 95 % | TEMPERATURE: 97.3 F | WEIGHT: 243 LBS | BODY MASS INDEX: 38.14 KG/M2 | HEART RATE: 71 BPM | SYSTOLIC BLOOD PRESSURE: 130 MMHG

## 2025-06-17 DIAGNOSIS — E78.5 HYPERLIPIDEMIA LDL GOAL <70: ICD-10-CM

## 2025-06-17 DIAGNOSIS — N18.32 STAGE 3B CHRONIC KIDNEY DISEASE (HCC): ICD-10-CM

## 2025-06-17 DIAGNOSIS — I50.32 CHRONIC DIASTOLIC (CONGESTIVE) HEART FAILURE (HCC): ICD-10-CM

## 2025-06-17 DIAGNOSIS — I10 ESSENTIAL HYPERTENSION WITH GOAL BLOOD PRESSURE LESS THAN 130/85: ICD-10-CM

## 2025-06-17 DIAGNOSIS — Z00.00 MEDICARE ANNUAL WELLNESS VISIT, SUBSEQUENT: Primary | ICD-10-CM

## 2025-06-17 DIAGNOSIS — Z12.5 SCREENING FOR PROSTATE CANCER: ICD-10-CM

## 2025-06-17 DIAGNOSIS — J30.2 SEASONAL ALLERGIC RHINITIS, UNSPECIFIED TRIGGER: ICD-10-CM

## 2025-06-17 DIAGNOSIS — E11.59 TYPE 2 DIABETES MELLITUS WITH OTHER CIRCULATORY COMPLICATION, WITH LONG-TERM CURRENT USE OF INSULIN (HCC): ICD-10-CM

## 2025-06-17 DIAGNOSIS — D50.9 IRON DEFICIENCY ANEMIA, UNSPECIFIED IRON DEFICIENCY ANEMIA TYPE: ICD-10-CM

## 2025-06-17 DIAGNOSIS — E53.8 VITAMIN B12 DEFICIENCY: ICD-10-CM

## 2025-06-17 DIAGNOSIS — Z79.4 TYPE 2 DIABETES MELLITUS WITH OTHER CIRCULATORY COMPLICATION, WITH LONG-TERM CURRENT USE OF INSULIN (HCC): ICD-10-CM

## 2025-06-17 DIAGNOSIS — K21.9 GASTRO-ESOPHAGEAL REFLUX DISEASE WITHOUT ESOPHAGITIS: ICD-10-CM

## 2025-06-17 DIAGNOSIS — R82.90 CLOUDY URINE: ICD-10-CM

## 2025-06-17 DIAGNOSIS — R10.9 LEFT FLANK PAIN: ICD-10-CM

## 2025-06-17 DIAGNOSIS — N18.4 STAGE 4 CHRONIC KIDNEY DISEASE (HCC): ICD-10-CM

## 2025-06-17 DIAGNOSIS — Z79.899 LONG TERM CURRENT USE OF DIURETIC: ICD-10-CM

## 2025-06-17 DIAGNOSIS — I25.119 ATHEROSCLEROSIS OF NATIVE CORONARY ARTERY OF NATIVE HEART WITH ANGINA PECTORIS: ICD-10-CM

## 2025-06-17 DIAGNOSIS — R10.9 FLANK PAIN: ICD-10-CM

## 2025-06-17 LAB
ALBUMIN SERPL-MCNC: 3.9 G/DL (ref 3.2–4.6)
ALBUMIN/GLOB SERPL: 1.1 (ref 1–1.9)
ALP SERPL-CCNC: 99 U/L (ref 40–129)
ALT SERPL-CCNC: 24 U/L (ref 8–55)
ANION GAP SERPL CALC-SCNC: 12 MMOL/L (ref 7–16)
APPEARANCE UR: CLEAR
AST SERPL-CCNC: 22 U/L (ref 15–37)
BACTERIA URNS QL MICRO: NEGATIVE /HPF
BASOPHILS # BLD: 0.03 K/UL (ref 0–0.2)
BASOPHILS NFR BLD: 0.5 % (ref 0–2)
BILIRUB SERPL-MCNC: 0.3 MG/DL (ref 0–1.2)
BILIRUB UR QL: NEGATIVE
BUN SERPL-MCNC: 71 MG/DL (ref 8–23)
CALCIUM SERPL-MCNC: 9.6 MG/DL (ref 8.8–10.2)
CASTS URNS QL MICRO: 0 /LPF
CHLORIDE SERPL-SCNC: 105 MMOL/L (ref 98–107)
CHOLEST SERPL-MCNC: 125 MG/DL (ref 0–200)
CO2 SERPL-SCNC: 22 MMOL/L (ref 20–29)
COLOR UR: NORMAL
CREAT SERPL-MCNC: 3.22 MG/DL (ref 0.8–1.3)
CRYSTALS URNS QL MICRO: 0 /LPF
DIFFERENTIAL METHOD BLD: ABNORMAL
EOSINOPHIL # BLD: 0.18 K/UL (ref 0–0.8)
EOSINOPHIL NFR BLD: 3 % (ref 0.5–7.8)
EPI CELLS #/AREA URNS HPF: NORMAL /HPF (ref 0–5)
ERYTHROCYTE [DISTWIDTH] IN BLOOD BY AUTOMATED COUNT: 14.2 % (ref 11.9–14.6)
GLOBULIN SER CALC-MCNC: 3.5 G/DL (ref 2.3–3.5)
GLUCOSE SERPL-MCNC: 104 MG/DL (ref 70–99)
GLUCOSE UR STRIP.AUTO-MCNC: NEGATIVE MG/DL
HBA1C MFR BLD: 6.4 %
HCT VFR BLD AUTO: 31.8 % (ref 41.1–50.3)
HDLC SERPL-MCNC: 46 MG/DL (ref 40–60)
HDLC SERPL: 2.7 (ref 0–5)
HGB BLD-MCNC: 9.9 G/DL (ref 13.6–17.2)
HGB UR QL STRIP: NEGATIVE
HYALINE CASTS URNS QL MICRO: NORMAL /LPF
IMM GRANULOCYTES # BLD AUTO: 0.02 K/UL (ref 0–0.5)
IMM GRANULOCYTES NFR BLD AUTO: 0.3 % (ref 0–5)
KETONES UR QL STRIP.AUTO: NEGATIVE MG/DL
LDLC SERPL CALC-MCNC: 71 MG/DL (ref 0–100)
LEUKOCYTE ESTERASE UR QL STRIP.AUTO: NEGATIVE
LYMPHOCYTES # BLD: 1.94 K/UL (ref 0.5–4.6)
LYMPHOCYTES NFR BLD: 32.6 % (ref 13–44)
MCH RBC QN AUTO: 29.2 PG (ref 26.1–32.9)
MCHC RBC AUTO-ENTMCNC: 31.1 G/DL (ref 31.4–35)
MCV RBC AUTO: 93.8 FL (ref 82–102)
MONOCYTES # BLD: 0.54 K/UL (ref 0.1–1.3)
MONOCYTES NFR BLD: 9.1 % (ref 4–12)
MUCOUS THREADS URNS QL MICRO: 0 /LPF
NEUTS SEG # BLD: 3.25 K/UL (ref 1.7–8.2)
NEUTS SEG NFR BLD: 54.5 % (ref 43–78)
NITRITE UR QL STRIP.AUTO: NEGATIVE
NRBC # BLD: 0 K/UL (ref 0–0.2)
PH UR STRIP: 5.5 (ref 5–9)
PLATELET # BLD AUTO: 178 K/UL (ref 150–450)
PMV BLD AUTO: 10.4 FL (ref 9.4–12.3)
POTASSIUM SERPL-SCNC: 5.7 MMOL/L (ref 3.5–5.1)
PROT SERPL-MCNC: 7.4 G/DL (ref 6.3–8.2)
PROT UR STRIP-MCNC: NEGATIVE MG/DL
RBC # BLD AUTO: 3.39 M/UL (ref 4.23–5.6)
RBC #/AREA URNS HPF: NORMAL /HPF (ref 0–5)
SODIUM SERPL-SCNC: 139 MMOL/L (ref 136–145)
SP GR UR REFRACTOMETRY: 1.01 (ref 1–1.02)
TRIGL SERPL-MCNC: 42 MG/DL (ref 0–150)
URINE CULTURE IF INDICATED: NORMAL
UROBILINOGEN UR QL STRIP.AUTO: 0.2 EU/DL (ref 0.2–1)
VLDLC SERPL CALC-MCNC: 8 MG/DL (ref 6–23)
WBC # BLD AUTO: 6 K/UL (ref 4.3–11.1)
WBC URNS QL MICRO: NORMAL /HPF (ref 0–4)

## 2025-06-17 RX ORDER — PEN NEEDLE, DIABETIC 31 GX5/16"
1 NEEDLE, DISPOSABLE MISCELLANEOUS DAILY
Qty: 100 EACH | Refills: 3 | Status: CANCELLED | OUTPATIENT
Start: 2025-06-17

## 2025-06-17 RX ORDER — EZETIMIBE 10 MG/1
10 TABLET ORAL DAILY
Qty: 90 TABLET | Refills: 3 | Status: SHIPPED | OUTPATIENT
Start: 2025-06-17

## 2025-06-17 RX ORDER — POTASSIUM CHLORIDE 20MEQ/15ML
20 LIQUID (ML) ORAL 2 TIMES DAILY
Qty: 2700 ML | Refills: 3 | Status: SHIPPED | OUTPATIENT
Start: 2025-06-17

## 2025-06-17 RX ORDER — ATORVASTATIN CALCIUM 80 MG/1
80 TABLET, FILM COATED ORAL DAILY
Qty: 90 TABLET | Refills: 3 | Status: SHIPPED | OUTPATIENT
Start: 2025-06-17

## 2025-06-17 RX ORDER — PANTOPRAZOLE SODIUM 40 MG/1
40 TABLET, DELAYED RELEASE ORAL
Qty: 90 TABLET | Refills: 3 | Status: CANCELLED | OUTPATIENT
Start: 2025-06-17

## 2025-06-17 RX ORDER — ACYCLOVIR 400 MG/1
TABLET ORAL
Qty: 2 EACH | Refills: 0 | Status: SHIPPED | COMMUNITY
Start: 2025-06-17

## 2025-06-17 ASSESSMENT — PATIENT HEALTH QUESTIONNAIRE - PHQ9
SUM OF ALL RESPONSES TO PHQ QUESTIONS 1-9: 0
2. FEELING DOWN, DEPRESSED OR HOPELESS: NOT AT ALL
SUM OF ALL RESPONSES TO PHQ QUESTIONS 1-9: 0
1. LITTLE INTEREST OR PLEASURE IN DOING THINGS: NOT AT ALL

## 2025-06-17 ASSESSMENT — ENCOUNTER SYMPTOMS: SHORTNESS OF BREATH: 1

## 2025-06-18 ENCOUNTER — RESULTS FOLLOW-UP (OUTPATIENT)
Dept: INTERNAL MEDICINE CLINIC | Facility: CLINIC | Age: 85
End: 2025-06-18

## 2025-06-18 DIAGNOSIS — N18.4 STAGE 4 CHRONIC KIDNEY DISEASE (HCC): Primary | ICD-10-CM

## 2025-06-18 ASSESSMENT — ENCOUNTER SYMPTOMS: COUGH: 1

## 2025-06-19 ENCOUNTER — TELEPHONE (OUTPATIENT)
Dept: INTERNAL MEDICINE CLINIC | Facility: CLINIC | Age: 85
End: 2025-06-19

## 2025-06-19 LAB — PSA SERPL-MCNC: 4.1 NG/ML (ref 0–4)

## 2025-06-19 NOTE — TELEPHONE ENCOUNTER
----- Message from Sue Garza PA-C sent at 6/18/2025  7:45 PM EDT -----  Please have lab add screening PSA w/ dx: Z12.5

## (undated) DEVICE — DRAPE,TOP,102X53,STERILE: Brand: MEDLINE

## (undated) DEVICE — SUTURE SZ 0 27IN 5/8 CIR UR-6  TAPER PT VIOLET ABSRB VICRYL J603H

## (undated) DEVICE — RELOAD STPL L75MM OPN H3.8MM CLS 1.5MM WIRE DIA0.2MM REG

## (undated) DEVICE — ELECTRO LUBE IS A SINGLE PATIENT USE DEVICE THAT IS INTENDED TO BE USED ON ELECTROSURGICAL ELECTRODES TO REDUCE STICKING.: Brand: KEY SURGICAL ELECTRO LUBE

## (undated) DEVICE — NEEDLE HYPO 21GA L1.5IN INTRAMUSCULAR S STL LATCH BVL UP

## (undated) DEVICE — CADIERE FORCEPS: Brand: ENDOWRIST

## (undated) DEVICE — MONOPOLAR CAUTERY CORD

## (undated) DEVICE — SYR 3ML LL TIP 1/10ML GRAD --

## (undated) DEVICE — BUTTON SWITCH PENCIL BLADE ELECTRODE, HOLSTER: Brand: EDGE

## (undated) DEVICE — LAPAROSCOPIC TROCAR SLEEVE/SINGLE USE: Brand: KII® OPTICAL ACCESS SYSTEM

## (undated) DEVICE — STAPLER SHEATH: Brand: ENDOWRIST

## (undated) DEVICE — BIPOLAR CAUTERY CORD

## (undated) DEVICE — STAPLER INT L75MM CUT LN L73MM STPL LN L77MM BLU B FRM 8

## (undated) DEVICE — GOWN,BREATHABLE SLV,AURORA,LG,STRL: Brand: MEDLINE

## (undated) DEVICE — [HIGH FLOW INSUFFLATOR,  DO NOT USE IF PACKAGE IS DAMAGED,  KEEP DRY,  KEEP AWAY FROM SUNLIGHT,  PROTECT FROM HEAT AND RADIOACTIVE SOURCES.]: Brand: PNEUMOSURE

## (undated) DEVICE — GOWN,REINF,POLY,ECL,PP SLV,XL: Brand: MEDLINE

## (undated) DEVICE — COVER EQUIP ABSRB TBL MOJAVE SHT L228XW101CM

## (undated) DEVICE — MEDI-VAC YANKAUER SUCTION HANDLE W/BULBOUS TIP: Brand: CARDINAL HEALTH

## (undated) DEVICE — Device

## (undated) DEVICE — SUTURE PERMAHAND SZ 2-0 L30IN NONABSORBABLE BLK SILK W/O A305H

## (undated) DEVICE — BLOCK BITE AD 60FR W/ VELC STRP ADDRESSES MOST PT AND

## (undated) DEVICE — AIRSEAL 5 MM ACCESS PORT AND LOW PROFILE OBTURATOR WITH BLADELESS OPTICAL TIP, 120 MM LENGTH: Brand: AIRSEAL

## (undated) DEVICE — FENESTRATED BIPOLAR FORCEPS: Brand: ENDOWRIST

## (undated) DEVICE — VISUALIZATION SYSTEM: Brand: CLEARIFY

## (undated) DEVICE — KENDALL RADIOLUCENT FOAM MONITORING ELECTRODE RECTANGULAR SHAPE: Brand: KENDALL

## (undated) DEVICE — DRAPE SHT 3 QTR PROXIMA 53X77 --

## (undated) DEVICE — BLADELESS OBTURATOR: Brand: WECK VISTA

## (undated) DEVICE — MEDI-VAC NON-CONDUCTIVE SUCTION TUBING: Brand: CARDINAL HEALTH

## (undated) DEVICE — CONNECTOR TBNG OD5-7MM O2 END DISP

## (undated) DEVICE — GARMENT,MEDLINE,DVT,INT,CALF,MED, GEN2: Brand: MEDLINE

## (undated) DEVICE — BLADE SURG NO15 S STL STR DISP GLASSVAN

## (undated) DEVICE — DRAPE,LAP,CHOLE,W/TROUGHS,STERILE: Brand: MEDLINE

## (undated) DEVICE — SYR 5ML 1/5 GRAD LL NSAF LF --

## (undated) DEVICE — TROCAR: Brand: KII FIOS FIRST ENTRY

## (undated) DEVICE — STANDARD HYPODERMIC NEEDLE,POLYPROPYLENE HUB: Brand: MONOJECT

## (undated) DEVICE — ARM DRAPE

## (undated) DEVICE — COVER,TABLE,44X76,STERILE: Brand: MEDLINE

## (undated) DEVICE — SUTURE PDS II SZ 1 L96IN ABSRB VLT TP-1 L65MM 1/2 CIR Z880G

## (undated) DEVICE — SUTURE VCRL SZ 2-0 L36IN ABSRB UD L36MM CT-1 1/2 CIR J945H

## (undated) DEVICE — LEGGINGS, PAIR, 31X48, STERILE: Brand: MEDLINE

## (undated) DEVICE — WOUND RETRACTOR AND PROTECTOR: Brand: ALEXIS O WOUND PROTECTOR-RETRACTOR

## (undated) DEVICE — SUTURE PERMAHAND SZ 3-0 L18IN NONABSORBABLE BLK L26MM SH C013D

## (undated) DEVICE — DISPOSABLE GRASPER CARTRIDGE: Brand: DIRECT DRIVE REPOSABLE GRASPERS

## (undated) DEVICE — CONTAINER PREFIL FRMLN 40ML --

## (undated) DEVICE — FORCEPS BX L240CM JAW DIA2.8MM L CAP W/ NDL MIC MESH TOOTH

## (undated) DEVICE — APPLICATOR FBR TIP L6IN COT TIP WOOD SHFT SWAB 2000 PER CA

## (undated) DEVICE — PERMANENT CAUTERY HOOK: Brand: ENDOWRIST

## (undated) DEVICE — SUTURE VCRL SZ 0 L27IN ABSRB UD L26MM CT-2 1/2 CIR J270H

## (undated) DEVICE — SYR LR LCK 1ML GRAD NSAF 30ML --

## (undated) DEVICE — SNARE POLYP SM W13MMXL240CM SHTH DIA2.4MM OVL FLX DISP

## (undated) DEVICE — DRAPE TWL SURG 16X26IN BLU ORB04] ALLCARE INC]

## (undated) DEVICE — SUT SLK 2-0SH 30IN BLK --

## (undated) DEVICE — CARDINAL HEALTH FLEXIBLE LIGHT HANDLE COVER: Brand: CARDINAL HEALTH

## (undated) DEVICE — SINGLE BASIN: Brand: CARDINAL HEALTH

## (undated) DEVICE — PROGRASP FORCEPS: Brand: ENDOWRIST

## (undated) DEVICE — REM POLYHESIVE ADULT PATIENT RETURN ELECTRODE: Brand: VALLEYLAB

## (undated) DEVICE — SEAL

## (undated) DEVICE — TROCARS: Brand: KII® BALLOON BLUNT TIP SYSTEM

## (undated) DEVICE — Z INACTIVE USE 2527070 DRAPE SURG W40XL44IN UNDERBUTTOCK SMS POLYPR W/ PCH BK DISP

## (undated) DEVICE — (D)PREP SKN CHLRAPRP APPL 26ML -- CONVERT TO ITEM 371833

## (undated) DEVICE — SUTURE PDS II SZ 1 L27IN ABSRB VLT CT-1 L36MM 1/2 CIR Z341H

## (undated) DEVICE — 3M™ SURGICAL CLIPPER SINGLE-USE BLADE ASSEMBLY, 9600: Brand: 3M™

## (undated) DEVICE — BLADE ELECTRODE: Brand: EDGE

## (undated) DEVICE — MARYLAND BIPOLAR FORCEPS: Brand: ENDOWRIST

## (undated) DEVICE — SCISSORS SURG DIA8MM MPLR CRV ENDOWRIST

## (undated) DEVICE — SYR 10ML LUER LOK 1/5ML GRAD --

## (undated) DEVICE — SUTURE ABSORBABLE MONOFILAMENT 2-0 WND CLOSURE GRN V-LOC 180 VLOCL0315

## (undated) DEVICE — YANKAUER,BULB TIP,W/O VENT,RIGID,STERILE: Brand: MEDLINE

## (undated) DEVICE — CANNULA NSL ORAL AD FOR CAPNOFLEX CO2 O2 AIRLFE

## (undated) DEVICE — COLUMN DRAPE

## (undated) DEVICE — SPONGE GZ W4XL4IN COT 12 PLY TYP VII WVN C FLD DSGN

## (undated) DEVICE — BASIC SINGLE BASIN-LF: Brand: MEDLINE INDUSTRIES, INC.

## (undated) DEVICE — TRI-LUMEN FILTERED TUBE SET WITH ACTIVATED CHARCOAL FILTER: Brand: AIRSEAL

## (undated) DEVICE — STAPLER 45: Brand: ENDOWRIST

## (undated) DEVICE — MEGA SUTURECUT ND: Brand: ENDOWRIST

## (undated) DEVICE — SYR 50ML SLIP TIP NSAF LF STRL --

## (undated) DEVICE — ROBOTIC COLON: Brand: MEDLINE INDUSTRIES, INC.

## (undated) DEVICE — NDL PRT INJ NSAF BLNT 18GX1.5 --

## (undated) DEVICE — INTENDED FOR TISSUE SEPARATION, AND OTHER PROCEDURES THAT REQUIRE A SHARP SURGICAL BLADE TO PUNCTURE OR CUT.: Brand: BARD-PARKER SAFETY BLADES SIZE 10, STERILE

## (undated) DEVICE — SPONGE LAP 18X18IN STRL -- 5/PK

## (undated) DEVICE — 1200 GUARD II KIT W/5MM TUBE W/O VAC TUBE: Brand: GUARDIAN

## (undated) DEVICE — JELLY,LUBE,STERILE,FLIP TOP,TUBE,4-OZ: Brand: MEDLINE